# Patient Record
Sex: FEMALE | Race: WHITE | NOT HISPANIC OR LATINO | Employment: UNEMPLOYED | URBAN - NONMETROPOLITAN AREA
[De-identification: names, ages, dates, MRNs, and addresses within clinical notes are randomized per-mention and may not be internally consistent; named-entity substitution may affect disease eponyms.]

---

## 2017-01-05 RX ORDER — ROSUVASTATIN CALCIUM 40 MG/1
TABLET, FILM COATED ORAL
Qty: 30 TABLET | Refills: 5 | Status: SHIPPED | OUTPATIENT
Start: 2017-01-05 | End: 2017-01-30 | Stop reason: SDUPTHER

## 2017-01-30 ENCOUNTER — OFFICE VISIT (OUTPATIENT)
Dept: FAMILY MEDICINE CLINIC | Facility: CLINIC | Age: 77
End: 2017-01-30

## 2017-01-30 VITALS
TEMPERATURE: 98.7 F | RESPIRATION RATE: 12 BRPM | SYSTOLIC BLOOD PRESSURE: 125 MMHG | DIASTOLIC BLOOD PRESSURE: 75 MMHG | OXYGEN SATURATION: 98 % | BODY MASS INDEX: 30.76 KG/M2 | HEIGHT: 67 IN | HEART RATE: 98 BPM | WEIGHT: 196 LBS

## 2017-01-30 DIAGNOSIS — H81.09 MENIERE'S DISEASE, UNSPECIFIED LATERALITY: ICD-10-CM

## 2017-01-30 DIAGNOSIS — D50.8 OTHER IRON DEFICIENCY ANEMIA: ICD-10-CM

## 2017-01-30 DIAGNOSIS — E55.9 VITAMIN D DEFICIENCY DISEASE: ICD-10-CM

## 2017-01-30 DIAGNOSIS — R74.8 ELEVATED LIVER ENZYMES: ICD-10-CM

## 2017-01-30 DIAGNOSIS — J30.9 CHRONIC ALLERGIC RHINITIS: ICD-10-CM

## 2017-01-30 DIAGNOSIS — E11.9 TYPE 2 DIABETES MELLITUS WITHOUT COMPLICATION, WITH LONG-TERM CURRENT USE OF INSULIN (HCC): ICD-10-CM

## 2017-01-30 DIAGNOSIS — J45.20 EXTRINSIC ASTHMA, MILD INTERMITTENT, UNCOMPLICATED: ICD-10-CM

## 2017-01-30 DIAGNOSIS — K21.9 GASTROESOPHAGEAL REFLUX DISEASE WITHOUT ESOPHAGITIS: ICD-10-CM

## 2017-01-30 DIAGNOSIS — Z79.4 TYPE 2 DIABETES MELLITUS WITHOUT COMPLICATION, WITH LONG-TERM CURRENT USE OF INSULIN (HCC): ICD-10-CM

## 2017-01-30 DIAGNOSIS — Z00.00 HEALTHCARE MAINTENANCE: ICD-10-CM

## 2017-01-30 DIAGNOSIS — M15.9 GENERALIZED OSTEOARTHRITIS: ICD-10-CM

## 2017-01-30 DIAGNOSIS — Z23 ENCOUNTER FOR IMMUNIZATION: ICD-10-CM

## 2017-01-30 DIAGNOSIS — E78.5 DYSLIPIDEMIA: ICD-10-CM

## 2017-01-30 DIAGNOSIS — I10 ESSENTIAL HYPERTENSION: Primary | ICD-10-CM

## 2017-01-30 PROCEDURE — 90471 IMMUNIZATION ADMIN: CPT | Performed by: GENERAL PRACTICE

## 2017-01-30 PROCEDURE — 99214 OFFICE O/P EST MOD 30 MIN: CPT | Performed by: GENERAL PRACTICE

## 2017-01-30 PROCEDURE — 90670 PCV13 VACCINE IM: CPT | Performed by: GENERAL PRACTICE

## 2017-01-30 RX ORDER — LOSARTAN POTASSIUM 100 MG/1
100 TABLET ORAL DAILY
Qty: 30 TABLET | Refills: 5 | Status: SHIPPED | OUTPATIENT
Start: 2017-01-30 | End: 2017-10-18 | Stop reason: SDUPTHER

## 2017-01-30 RX ORDER — FERROUS SULFATE 325(65) MG
325 TABLET ORAL
Qty: 30 TABLET | Refills: 5 | Status: SHIPPED | OUTPATIENT
Start: 2017-01-30 | End: 2017-01-30

## 2017-01-30 RX ORDER — PANTOPRAZOLE SODIUM 40 MG/1
40 TABLET, DELAYED RELEASE ORAL DAILY
Qty: 30 TABLET | Refills: 5 | Status: SHIPPED | OUTPATIENT
Start: 2017-01-30 | End: 2017-04-28 | Stop reason: SDUPTHER

## 2017-01-30 RX ORDER — AMLODIPINE BESYLATE 5 MG/1
5 TABLET ORAL DAILY
Qty: 30 TABLET | Refills: 5 | Status: SHIPPED | OUTPATIENT
Start: 2017-01-30 | End: 2017-07-31 | Stop reason: SDUPTHER

## 2017-01-30 RX ORDER — ALBUTEROL SULFATE 90 UG/1
2 AEROSOL, METERED RESPIRATORY (INHALATION) EVERY 4 HOURS PRN
Qty: 8.5 G | Refills: 5 | Status: SHIPPED | OUTPATIENT
Start: 2017-01-30 | End: 2017-12-05 | Stop reason: SDUPTHER

## 2017-01-30 RX ORDER — ROSUVASTATIN CALCIUM 40 MG/1
40 TABLET, FILM COATED ORAL DAILY
Qty: 30 TABLET | Refills: 5 | Status: SHIPPED | OUTPATIENT
Start: 2017-01-30 | End: 2017-03-01 | Stop reason: CLARIF

## 2017-01-30 RX ORDER — INSULIN GLARGINE 100 [IU]/ML
30 INJECTION, SOLUTION SUBCUTANEOUS DAILY
Qty: 10 ML | Refills: 5 | Status: SHIPPED | OUTPATIENT
Start: 2017-01-30 | End: 2017-05-03 | Stop reason: SDUPTHER

## 2017-01-30 RX ORDER — NAPROXEN 375 MG/1
375 TABLET ORAL 2 TIMES DAILY WITH MEALS
Qty: 60 TABLET | Refills: 5 | Status: SHIPPED | OUTPATIENT
Start: 2017-01-30 | End: 2017-12-07 | Stop reason: SDUPTHER

## 2017-01-30 RX ORDER — MECLIZINE HYDROCHLORIDE 25 MG/1
25 TABLET ORAL EVERY 6 HOURS PRN
Qty: 120 TABLET | Refills: 5 | Status: SHIPPED | OUTPATIENT
Start: 2017-01-30 | End: 2017-07-31 | Stop reason: SDUPTHER

## 2017-01-30 NOTE — PROGRESS NOTES
Subjective   Sean Chen is a 76 y.o. female.     History of Present Illness     Diabetes  Current symptoms include none. Patient denies paresthesia of the feet, visual disturbances, polydipsia, polyuria, hypoglycemia and foot ulcerations. Evaluation to date has been: fasting blood sugar and hemoglobin A1C. Home sugars: have steadily improved over last several months. Current treatments: metformin and basal insulin - lantus - 30 units daily. Last dilated eye exam more than one year ago. Most recent hemoglobin A1c   Lab Results   Component Value Date    HGBA1C 8.80 (H) 10/14/2016    HGBA1C 8.5 (H) 11/16/2015    HGBA1C 9.0 (H) 01/19/2015    HGBA1C 8.8 (H) 06/20/2014     Dyslipidemia  Compliance with treatment has been fair. The patient exercises intermittently. She is currently being prescribed the following medication for her dyslipidemia - rosuvastatin, omega 3 fatty acids. Patient denies side effects associated with her medications. Most recent lipids include  Lab Results   Component Value Date    TRIG 143 10/14/2016    TRIG 91 11/16/2015    TRIG 66 01/19/2015    HDL 60 10/14/2016    HDL 60 11/16/2015    HDL 58 (L) 01/19/2015    LDLCALC 31 10/14/2016    LDL 32 11/16/2015    LDL 25 01/19/2015     Hypertension  Home blood pressure readings: not doing. Associated signs and symptoms: shortness of breath with activity and intermittent lower extremity edema worse at night. Patient denies: chest pain, palpitations, orthopnea and paroxysmal nocturnal dyspnea. Current antihypertensive medications includes benazepril  and amlodipine. Medication compliance: taking as prescribed. Most recent creatinine   Lab Results   Component Value Date    CREATININE 0.82 10/14/2016    CREATININE 0.70 11/16/2015     Asthma  Patient's symptoms have included dyspnea and non-productive cough. She denies any chest pain and hemoptysis. The patient has been suffering from these symptoms for a number of years. Symptoms have been controlled  since last here. Medications used in the past to treat these symptoms include beta agonist inhalers and combination beta agonists/steroid inhalers. Suspected precipitants include upper respiratory infection and allergens. Patient is awoken from sleep approximately none times per night. Patient has not required emergency room treatment for these symptoms    Labs   Most recent vitamin D 40    The following portions of the patient's history were reviewed and updated as appropriate: allergies, current medications, past medical history, past social history and problem list.    Review of Systems   Constitutional: Positive for fatigue. Negative for appetite change, chills, fever and unexpected weight change.   HENT: Positive for hearing loss, rhinorrhea, sneezing and tinnitus. Negative for congestion, ear pain, sore throat and voice change.    Eyes: Negative for visual disturbance.   Respiratory: Positive for shortness of breath. Negative for cough and wheezing.    Cardiovascular: Positive for leg swelling. Negative for chest pain and palpitations.   Gastrointestinal: Negative for abdominal pain, blood in stool, constipation, diarrhea, nausea and vomiting.   Endocrine: Negative for polydipsia.   Genitourinary: Negative for difficulty urinating, dysuria, frequency, hematuria, menstrual problem, pelvic pain, urgency, vaginal bleeding and vaginal discharge.   Musculoskeletal: Positive for arthralgias and back pain. Negative for joint swelling, myalgias and neck pain.   Skin: Negative for color change.   Neurological: Negative for tremors, weakness, numbness and headaches.   Psychiatric/Behavioral: Negative for dysphoric mood, sleep disturbance and suicidal ideas. The patient is not nervous/anxious.      Objective   Physical Exam   Constitutional: She is oriented to person, place, and time. No distress.   Bright and in good spirits.  Gait slow and cautious. No apparent distress at rest. No pallor, jaundice, diaphoresis, or  cyanosis.     HENT:   Head: Atraumatic.   Right Ear: Tympanic membrane, external ear and ear canal normal.   Left Ear: Tympanic membrane, external ear and ear canal normal.   Nose: Nose normal.   Mouth/Throat: Oropharynx is clear and moist. Mucous membranes are not pale and not cyanotic.   Eyes: EOM are normal. Pupils are equal, round, and reactive to light. No scleral icterus.   Neck: No JVD present. Carotid bruit is not present. No tracheal deviation present. No thyromegaly present.   Cardiovascular: Normal rate, regular rhythm, S1 normal, S2 normal and intact distal pulses.  Exam reveals no gallop, no S3 and no S4.    No murmur heard.  Pulmonary/Chest: No stridor. No respiratory distress. She has no decreased breath sounds. She has wheezes (diffuse-mild).   Abdominal: Soft. Normal aorta and bowel sounds are normal. She exhibits no distension, no abdominal bruit and no mass. There is no hepatosplenomegaly. There is no tenderness. No hernia.   Musculoskeletal: She exhibits no tenderness or deformity.       Vascular Status -  Her exam exhibits no right foot edema. Her exam exhibits no left foot edema.  Lymphadenopathy:        Head (right side): No submandibular adenopathy present.        Head (left side): No submandibular adenopathy present.     She has no cervical adenopathy.   Neurological: She is alert and oriented to person, place, and time. She has normal reflexes. She displays normal reflexes. No cranial nerve deficit. She exhibits normal muscle tone. Coordination normal.   Skin: Skin is warm and dry. No rash noted. She is not diaphoretic. No cyanosis. No pallor. Nails show no clubbing.   Psychiatric: She has a normal mood and affect.     Assessment/Plan   Problems Addressed this Visit        Cardiovascular and Mediastinum    Essential hypertension - Primary  Hypertension: at goal. Evidence of target organ damage: none.  Encouraged to continue to work on diet and exercise plan.   Continue current medication     Relevant Medications    losartan (COZAAR) 100 MG tablet    amLODIPine (NORVASC) 5 MG tablet       Respiratory    Extrinsic asthma  Mild persistent asthma. The patient is not currently having an exacerbation. In general, the patient's disease is well controlled.  Discussed distinction between quick-relief and controlled medications.  Discussed monitoring symptoms and use of quick-relief medications and contacting us early in the course of exacerbations.    Relevant Medications    albuterol (PROAIR HFA) 108 (90 BASE) MCG/ACT inhaler    fluticasone-salmeterol (ADVAIR DISKUS) 100-50 MCG/DOSE DISKUS    Chronic allergic rhinitis       Digestive    Vitamin D deficiency disease    Relevant Medications    Cholecalciferol (VITAMIN D) 1000 UNITS tablet    Gastroesophageal reflux disease without esophagitis    Relevant Medications    pantoprazole (PROTONIX) 40 MG EC tablet       Endocrine    Type 2 diabetes mellitus without complication, with long-term current use of insulin  Diabetes mellitus Type II, under fair control.   Encouraged to continue to pursue ADA diet  Encouraged aerobic exercise.  Reminded to get yearly retinal exam.  Updated labs to be done at her return    Relevant Medications    metFORMIN (GLUCOPHAGE) 1000 MG tablet    insulin glargine (LANTUS) 100 UNIT/ML injection       Nervous and Auditory    Meniere's disease    Relevant Medications    meclizine (ANTIVERT) 25 MG tablet       Musculoskeletal and Integument    Generalized osteoarthritis    Relevant Medications    naproxen (NAPROSYN) 375 MG tablet       Hematopoietic and Hemostatic    Iron deficiency anemia       Other    Dyslipidemia  As above. Continue current medication.    Relevant Medications    CRESTOR 40 MG tablet    Elevated liver enzymes    Healthcare maintenance  Due for a Prevnar and Zostavax     Encounter for immunization    Relevant Orders    Pneumococcal Conjugate Vaccine 13-Valent All (Completed)

## 2017-01-30 NOTE — MR AVS SNAPSHOT
Sean Chen   1/30/2017 1:00 PM   Office Visit    Dept Phone:  [phone]   Encounter #:  [id number]    Provider:  Ganga Gallardo MD   Department:  [facility]                Your Full Care Plan              Today's Medication Changes          These changes are accurate as of: 1/30/17  2:14 PM.  If you have any questions, ask your nurse or doctor.               Medication(s)that have changed:     albuterol 108 (90 BASE) MCG/ACT inhaler   Commonly known as:  PROAIR HFA   Inhale 2 puffs Every 4 (Four) Hours As Needed for wheezing.   What changed:  See the new instructions.   Changed by:  Ganga Gallardo MD       CRESTOR 40 MG tablet   Generic drug:  rosuvastatin   Take 1 tablet by mouth Daily.   What changed:  See the new instructions.   Changed by:  Ganga Gallardo MD       fluticasone-salmeterol 100-50 MCG/DOSE DISKUS   Commonly known as:  ADVAIR DISKUS   Inhale 1 puff 2 (Two) Times a Day.   What changed:  See the new instructions.   Changed by:  Ganga Gallardo MD       insulin glargine 100 UNIT/ML injection   Commonly known as:  LANTUS   Inject 30 Units under the skin Daily.   What changed:  See the new instructions.   Changed by:  Ganga Gallardo MD       meclizine 25 MG tablet   Commonly known as:  ANTIVERT   Take 1 tablet by mouth Every 6 (Six) Hours As Needed for dizziness.   What changed:  See the new instructions.   Changed by:  Ganga Gallardo MD       metFORMIN 1000 MG tablet   Commonly known as:  GLUCOPHAGE   Take 1 tablet by mouth 2 (Two) Times a Day With Meals.   What changed:    - how much to take  - when to take this   Changed by:  Ganga Gallardo MD       naproxen 375 MG tablet   Commonly known as:  NAPROSYN   Take 1 tablet by mouth 2 (Two) Times a Day With Meals.   What changed:  See the new instructions.   Changed by:  Ganga Gallardo MD         Stop taking  "medication(s)listed here:     benazepril 20 MG tablet   Commonly known as:  LOTENSIN   Stopped by:  Ganga Gallardo MD           CARAFATE 1 G tablet   Generic drug:  sucralfate   Stopped by:  Ganga Gallardo MD           ferrous sulfate 325 (65 FE) MG tablet   Stopped by:  Ganga Gallardo MD           LOVAZA 1 G capsule   Generic drug:  omega-3 acid ethyl esters   Stopped by:  Ganga Gallardo MD                Where to Get Your Medications      These medications were sent to [place], [place] - [address] - [phone]  - [phone] [address] [address]     Phone:  [phone]     albuterol 108 (90 BASE) MCG/ACT inhaler    amLODIPine 5 MG tablet    CRESTOR 40 MG tablet    fluticasone-salmeterol 100-50 MCG/DOSE DISKUS    insulin glargine 100 UNIT/ML injection    losartan 100 MG tablet    meclizine 25 MG tablet    metFORMIN 1000 MG tablet    naproxen 375 MG tablet    pantoprazole 40 MG EC tablet    Vitamin D 1000 UNITS tablet                  Your Updated Medication List          This list is accurate as of: 1/30/17  2:14 PM.  Always use your most recent med list.                acetaminophen 500 MG tablet   Commonly known as:  TYLENOL       albuterol 108 (90 BASE) MCG/ACT inhaler   Commonly known as:  PROAIR HFA   Inhale 2 puffs Every 4 (Four) Hours As Needed for wheezing.       amLODIPine 5 MG tablet   Commonly known as:  NORVASC   Take 1 tablet by mouth Daily.       B-D INS SYR ULTRAFINE .3CC/30G 30G X 1/2\" 0.3 ML misc   Generic drug:  Insulin Syringe-Needle U-100   USE WITH INSULIN ONCE DAILY       CRESTOR 40 MG tablet   Generic drug:  rosuvastatin   Take 1 tablet by mouth Daily.       fluticasone-salmeterol 100-50 MCG/DOSE DISKUS   Commonly known as:  ADVAIR DISKUS   Inhale 1 puff 2 (Two) Times a Day.       insulin glargine 100 UNIT/ML injection   Commonly known as:  LANTUS   Inject 30 Units under the skin Daily.       losartan 100 " MG tablet   Commonly known as:  COZAAR   Take 1 tablet by mouth Daily.       meclizine 25 MG tablet   Commonly known as:  ANTIVERT   Take 1 tablet by mouth Every 6 (Six) Hours As Needed for dizziness.       metFORMIN 1000 MG tablet   Commonly known as:  GLUCOPHAGE   Take 1 tablet by mouth 2 (Two) Times a Day With Meals.       naproxen 375 MG tablet   Commonly known as:  NAPROSYN   Take 1 tablet by mouth 2 (Two) Times a Day With Meals.       pantoprazole 40 MG EC tablet   Commonly known as:  PROTONIX   Take 1 tablet by mouth Daily.       TRUEPLUS LANCETS 28G misc   1 each 3 (Three) Times a Day.       TRUETRACK TEST test strip   Generic drug:  glucose blood   TAKE AS DIRECTED. TEST 3 TIMES DAILY       Vitamin D 1000 UNITS tablet   Take 1 tablet by mouth Daily.               We Performed the Following     Pneumococcal Conjugate Vaccine 13-Valent All       You Were Diagnosed With        Codes Comments    Essential hypertension    -  Primary ICD-10-CM: I10  ICD-9-CM: 401.9     Extrinsic asthma, mild intermittent, uncomplicated     ICD-10-CM: J45.20  ICD-9-CM: 493.00     Chronic allergic rhinitis     ICD-10-CM: J30.9  ICD-9-CM: 477.9     Vitamin D deficiency disease     ICD-10-CM: E55.9  ICD-9-CM: 268.9     Gastroesophageal reflux disease without esophagitis     ICD-10-CM: K21.9  ICD-9-CM: 530.81     Type 2 diabetes mellitus without complication, with long-term current use of insulin     ICD-10-CM: E11.9, Z79.4  ICD-9-CM: 250.00, V58.67     Meniere's disease, unspecified laterality     ICD-10-CM: H81.09  ICD-9-CM: 386.00     Generalized osteoarthritis     ICD-10-CM: M15.9  ICD-9-CM: 715.00     Other iron deficiency anemia     ICD-10-CM: D50.8     Dyslipidemia     ICD-10-CM: E78.5  ICD-9-CM: 272.4     Elevated liver enzymes     ICD-10-CM: R74.8  ICD-9-CM: 790.5     Encounter for immunization     ICD-10-CM: Z23  ICD-9-CM: V03.89       Instructions     None    Patient Instructions History      Upcoming Appointments     Visit  "Type Date Time Department    OFFICE VISIT 1/30/2017  1:00 PM [facility]    OFFICE VISIT 4/28/2017  1:00 PM [facility]      MyChart Signup     Our records indicate that you have declined [facility] sign[facility]. If you would like to sign up for [facility], please email [email or web address] or call [phone] to obtain an activation code.             Other Info from Your Visit           Your Appointments     Apr 28, 2017  1:00 PM EDT   Office Visit with Ganga Gallardo MD   [facility] (--)    [address] [place] [id number] [phone]           Please arrive 10 minutes early. Bring a complete list of all medications and bring any previous records or diagnostic testing results.              Allergies     Keflex [Cephalexin]      Lodine [Etodolac]      Penicillins      Sulfa Antibiotics        Vital Signs     Pulse Temperature Height Weight Oxygen Saturation Body Mass Index    98 98.7 °F (37.1 °C) (Tympanic) 67\" (170.2 cm) 196 lb (88.9 kg) 98% 30.7 kg/m2    Smoking Status                   Never Smoker           Problems and Diagnoses Noted     Chronic allergic rhinitis    Dyslipidemia    Elevated liver enzymes    Encounter for immunization    High blood pressure    Allergic asthma    Acid reflux disease    Generalized osteoarthritis    Iron deficiency anemia    Meniere's disease    Type 2 diabetes mellitus without complication, with long-term current use of insulin    Vitamin D deficiency disease        "

## 2017-03-01 RX ORDER — ROSUVASTATIN CALCIUM 20 MG/1
40 TABLET, COATED ORAL DAILY
Qty: 60 TABLET | Refills: 4
Start: 2017-03-01 | End: 2017-03-14 | Stop reason: SDUPTHER

## 2017-03-14 ENCOUNTER — TELEPHONE (OUTPATIENT)
Dept: FAMILY MEDICINE CLINIC | Facility: CLINIC | Age: 77
End: 2017-03-14

## 2017-03-14 RX ORDER — ROSUVASTATIN CALCIUM 40 MG/1
40 TABLET, COATED ORAL DAILY
Qty: 30 TABLET | Refills: 5 | Status: SHIPPED | OUTPATIENT
Start: 2017-03-14 | End: 2017-10-30

## 2017-03-14 NOTE — TELEPHONE ENCOUNTER
----- Message from Ganga Gallardo MD sent at 3/14/2017  3:59 PM EDT -----  i dont know how she ended up on 20 mg tabs 1 twice daily  i have changed her to the 40 mg tabs 1 once daily      ----- Message -----     From: Tana Hitchcock MA     Sent: 3/14/2017   3:55 PM       To: Ganga Gallardo MD    [facility] has a QL on the Crestor. 31 per 31 days. Do you want to try something diff or do a QL PA?

## 2017-04-28 ENCOUNTER — OFFICE VISIT (OUTPATIENT)
Dept: FAMILY MEDICINE CLINIC | Facility: CLINIC | Age: 77
End: 2017-04-28

## 2017-04-28 VITALS
HEART RATE: 60 BPM | OXYGEN SATURATION: 99 % | WEIGHT: 198 LBS | SYSTOLIC BLOOD PRESSURE: 125 MMHG | TEMPERATURE: 98.6 F | HEIGHT: 67 IN | DIASTOLIC BLOOD PRESSURE: 70 MMHG | RESPIRATION RATE: 12 BRPM | BODY MASS INDEX: 31.08 KG/M2

## 2017-04-28 DIAGNOSIS — Z00.00 HEALTHCARE MAINTENANCE: ICD-10-CM

## 2017-04-28 DIAGNOSIS — M15.9 GENERALIZED OSTEOARTHRITIS: ICD-10-CM

## 2017-04-28 DIAGNOSIS — J45.20 EXTRINSIC ASTHMA, MILD INTERMITTENT, UNCOMPLICATED: ICD-10-CM

## 2017-04-28 DIAGNOSIS — Z12.31 ENCOUNTER FOR SCREENING MAMMOGRAM FOR BREAST CANCER: ICD-10-CM

## 2017-04-28 DIAGNOSIS — I10 ESSENTIAL HYPERTENSION: Primary | ICD-10-CM

## 2017-04-28 DIAGNOSIS — R74.8 ELEVATED LIVER ENZYMES: ICD-10-CM

## 2017-04-28 DIAGNOSIS — E11.9 TYPE 2 DIABETES MELLITUS WITHOUT COMPLICATION, WITH LONG-TERM CURRENT USE OF INSULIN (HCC): ICD-10-CM

## 2017-04-28 DIAGNOSIS — D50.8 OTHER IRON DEFICIENCY ANEMIA: ICD-10-CM

## 2017-04-28 DIAGNOSIS — H81.09 MENIERE'S DISEASE, UNSPECIFIED LATERALITY: ICD-10-CM

## 2017-04-28 DIAGNOSIS — J30.9 CHRONIC ALLERGIC RHINITIS: ICD-10-CM

## 2017-04-28 DIAGNOSIS — Z79.4 TYPE 2 DIABETES MELLITUS WITHOUT COMPLICATION, WITH LONG-TERM CURRENT USE OF INSULIN (HCC): ICD-10-CM

## 2017-04-28 DIAGNOSIS — K21.9 GASTROESOPHAGEAL REFLUX DISEASE WITHOUT ESOPHAGITIS: ICD-10-CM

## 2017-04-28 DIAGNOSIS — E55.9 VITAMIN D DEFICIENCY DISEASE: ICD-10-CM

## 2017-04-28 DIAGNOSIS — E78.5 DYSLIPIDEMIA: ICD-10-CM

## 2017-04-28 LAB
25(OH)D3 SERPL-MCNC: 21 NG/ML
ALBUMIN SERPL-MCNC: 4.8 G/DL (ref 3.4–4.8)
ALBUMIN UR-MCNC: 33.2 MG/L
ALBUMIN/GLOB SERPL: 1.7 G/DL (ref 1.5–2.5)
ALP SERPL-CCNC: 94 U/L (ref 35–104)
ALT SERPL W P-5'-P-CCNC: 16 U/L (ref 10–36)
ANION GAP SERPL CALCULATED.3IONS-SCNC: 6.8 MMOL/L (ref 3.6–11.2)
AST SERPL-CCNC: 17 U/L (ref 10–30)
BASOPHILS # BLD AUTO: 0.06 10*3/MM3 (ref 0–0.3)
BASOPHILS NFR BLD AUTO: 0.6 % (ref 0–2)
BILIRUB SERPL-MCNC: 0.6 MG/DL (ref 0.2–1.8)
BUN BLD-MCNC: 21 MG/DL (ref 7–21)
BUN/CREAT SERPL: 20 (ref 7–25)
CALCIUM SPEC-SCNC: 10 MG/DL (ref 7.7–10)
CHLORIDE SERPL-SCNC: 105 MMOL/L (ref 99–112)
CHOLEST SERPL-MCNC: 117 MG/DL (ref 0–200)
CO2 SERPL-SCNC: 29.2 MMOL/L (ref 24.3–31.9)
CREAT BLD-MCNC: 1.05 MG/DL (ref 0.43–1.29)
DEPRECATED RDW RBC AUTO: 38.8 FL (ref 37–54)
EOSINOPHIL # BLD AUTO: 0.44 10*3/MM3 (ref 0–0.7)
EOSINOPHIL NFR BLD AUTO: 4.3 % (ref 0–7)
ERYTHROCYTE [DISTWIDTH] IN BLOOD BY AUTOMATED COUNT: 12.6 % (ref 11.5–14.5)
GFR SERPL CREATININE-BSD FRML MDRD: 51 ML/MIN/1.73
GLOBULIN UR ELPH-MCNC: 2.9 GM/DL
GLUCOSE BLD-MCNC: 177 MG/DL (ref 70–110)
HBA1C MFR BLD: 10.2 % (ref 4.5–5.7)
HCT VFR BLD AUTO: 38.5 % (ref 37–47)
HDLC SERPL-MCNC: 51 MG/DL (ref 60–100)
HGB BLD-MCNC: 12.6 G/DL (ref 12–16)
IMM GRANULOCYTES # BLD: 0.04 10*3/MM3 (ref 0–0.03)
IMM GRANULOCYTES NFR BLD: 0.4 % (ref 0–0.5)
LDLC SERPL CALC-MCNC: 22 MG/DL (ref 0–100)
LDLC/HDLC SERPL: 0.43 {RATIO}
LYMPHOCYTES # BLD AUTO: 2.71 10*3/MM3 (ref 1–3)
LYMPHOCYTES NFR BLD AUTO: 26.4 % (ref 16–46)
MCH RBC QN AUTO: 28.3 PG (ref 27–33)
MCHC RBC AUTO-ENTMCNC: 32.7 G/DL (ref 33–37)
MCV RBC AUTO: 86.5 FL (ref 80–94)
MONOCYTES # BLD AUTO: 1.12 10*3/MM3 (ref 0.1–0.9)
MONOCYTES NFR BLD AUTO: 10.9 % (ref 0–12)
NEUTROPHILS # BLD AUTO: 5.9 10*3/MM3 (ref 1.4–6.5)
NEUTROPHILS NFR BLD AUTO: 57.4 % (ref 40–75)
OSMOLALITY SERPL CALC.SUM OF ELEC: 288.6 MOSM/KG (ref 273–305)
PLATELET # BLD AUTO: 190 10*3/MM3 (ref 130–400)
PMV BLD AUTO: 11.2 FL (ref 6–10)
POTASSIUM BLD-SCNC: 4.9 MMOL/L (ref 3.5–5.3)
PROT SERPL-MCNC: 7.7 G/DL (ref 6–8)
RBC # BLD AUTO: 4.45 10*6/MM3 (ref 4.2–5.4)
SODIUM BLD-SCNC: 141 MMOL/L (ref 135–153)
TRIGL SERPL-MCNC: 220 MG/DL (ref 0–150)
VLDLC SERPL-MCNC: 44 MG/DL
WBC NRBC COR # BLD: 10.27 10*3/MM3 (ref 4.5–12.5)

## 2017-04-28 PROCEDURE — 80053 COMPREHEN METABOLIC PANEL: CPT | Performed by: GENERAL PRACTICE

## 2017-04-28 PROCEDURE — 99214 OFFICE O/P EST MOD 30 MIN: CPT | Performed by: GENERAL PRACTICE

## 2017-04-28 PROCEDURE — 83036 HEMOGLOBIN GLYCOSYLATED A1C: CPT | Performed by: GENERAL PRACTICE

## 2017-04-28 PROCEDURE — 36415 COLL VENOUS BLD VENIPUNCTURE: CPT | Performed by: GENERAL PRACTICE

## 2017-04-28 PROCEDURE — 82043 UR ALBUMIN QUANTITATIVE: CPT | Performed by: GENERAL PRACTICE

## 2017-04-28 PROCEDURE — 82306 VITAMIN D 25 HYDROXY: CPT | Performed by: GENERAL PRACTICE

## 2017-04-28 PROCEDURE — 85025 COMPLETE CBC W/AUTO DIFF WBC: CPT | Performed by: GENERAL PRACTICE

## 2017-04-28 PROCEDURE — 80061 LIPID PANEL: CPT | Performed by: GENERAL PRACTICE

## 2017-04-28 RX ORDER — PANTOPRAZOLE SODIUM 40 MG/1
40 TABLET, DELAYED RELEASE ORAL DAILY
Qty: 30 TABLET | Refills: 5 | Status: SHIPPED | OUTPATIENT
Start: 2017-04-28 | End: 2018-04-30

## 2017-04-28 RX ORDER — GLUCOSAM/CHON-MSM1/C/MANG/BOSW 500-416.6
1 TABLET ORAL 3 TIMES DAILY
Qty: 100 EACH | Refills: 5 | Status: SHIPPED | OUTPATIENT
Start: 2017-04-28 | End: 2018-04-11 | Stop reason: SDUPTHER

## 2017-04-28 NOTE — PROGRESS NOTES
Subjective   Sean Chen is a 76 y.o. female.     History of Present Illness     Diabetes  Current symptoms include none. Patient denies paresthesia of the feet, visual disturbances, polydipsia, polyuria, hypoglycemia and foot ulcerations. Evaluation to date has been: fasting blood sugar and hemoglobin A1C. Home sugars: have steadily improved over last several months. Current treatments: metformin and basal insulin - lantus - 30 units daily. Last dilated eye exam more than one year ago.  She has had no recent labs    Dyslipidemia  Compliance with treatment has been fair. The patient exercises intermittently. She is currently being prescribed the following medication for her dyslipidemia - rosuvastatin, and omega 3 fatty acids. Patient denies side effects associated with her medications.     Hypertension  Home blood pressure readings: not doing. Associated signs and symptoms: shortness of breath with activity and intermittent lower extremity edema worse at night. Patient denies: chest pain, palpitations, orthopnea and paroxysmal nocturnal dyspnea. Current antihypertensive medications includes benazepril  and amlodipine. Medication compliance: taking as prescribed.     Asthma  Patient's symptoms have included dyspnea and non-productive cough. She denies any chest pain and hemoptysis. The patient has been suffering from these symptoms for a number of years.  Apart from an exacerbation with a recent URTI she has done well since last here.  Medications used in the past to treat these symptoms include beta agonist inhalers and combination beta agonists/steroid inhalers. Suspected precipitants include upper respiratory infection and allergens. Patient is awoken from sleep approximately none times per night. Patient has not required emergency room treatment for these symptoms    The following portions of the patient's history were reviewed and updated as appropriate: allergies, current medications, past medical history,  past social history and problem list.    Review of Systems   Constitutional: Positive for fatigue. Negative for appetite change, chills, fever and unexpected weight change.   HENT: Positive for hearing loss, rhinorrhea, sneezing and tinnitus. Negative for congestion, ear pain, sore throat and voice change.    Eyes: Negative for visual disturbance.   Respiratory: Positive for shortness of breath. Negative for cough and wheezing.    Cardiovascular: Positive for leg swelling. Negative for chest pain and palpitations.   Gastrointestinal: Negative for abdominal pain, blood in stool, constipation, diarrhea, nausea and vomiting.   Endocrine: Negative for polydipsia.   Genitourinary: Negative for difficulty urinating, dysuria, frequency, hematuria, menstrual problem, pelvic pain, urgency, vaginal bleeding and vaginal discharge.   Musculoskeletal: Positive for arthralgias and back pain. Negative for joint swelling, myalgias and neck pain.   Skin: Negative for color change.   Neurological: Negative for tremors, weakness, numbness and headaches.   Psychiatric/Behavioral: Negative for dysphoric mood, sleep disturbance and suicidal ideas. The patient is not nervous/anxious.      Objective   Physical Exam   Constitutional: She is oriented to person, place, and time. No distress.   Bright and in good spirits.  Gait slow and cautious. No apparent distress at rest. No pallor, jaundice, diaphoresis, or cyanosis.     HENT:   Head: Atraumatic.   Right Ear: Tympanic membrane, external ear and ear canal normal.   Left Ear: Tympanic membrane, external ear and ear canal normal.   Nose: Nose normal.   Mouth/Throat: Oropharynx is clear and moist. Mucous membranes are not pale and not cyanotic.   Eyes: EOM are normal. Pupils are equal, round, and reactive to light. No scleral icterus.   Neck: No JVD present. Carotid bruit is not present. No tracheal deviation present. No thyromegaly present.   Cardiovascular: Normal rate, regular rhythm, S1  normal, S2 normal and intact distal pulses.  Exam reveals no gallop, no S3 and no S4.    No murmur heard.  Pulmonary/Chest: No stridor. No respiratory distress. She has no decreased breath sounds. She has no wheezes.   Abdominal: Soft. Normal aorta and bowel sounds are normal. She exhibits no distension, no abdominal bruit and no mass. There is no hepatosplenomegaly. There is no tenderness. No hernia.   Musculoskeletal: She exhibits no tenderness or deformity.       Vascular Status -  Her exam exhibits no right foot edema. Her exam exhibits no left foot edema.  Lymphadenopathy:        Head (right side): No submandibular adenopathy present.        Head (left side): No submandibular adenopathy present.     She has no cervical adenopathy.   Neurological: She is alert and oriented to person, place, and time. She has normal reflexes. She displays normal reflexes. No cranial nerve deficit. She exhibits normal muscle tone. Coordination normal.   Skin: Skin is warm and dry. No rash noted. She is not diaphoretic. No cyanosis. No pallor. Nails show no clubbing.   Psychiatric: She has a normal mood and affect.     Assessment/Plan   Problems Addressed this Visit        Cardiovascular and Mediastinum    Essential hypertension  Hypertension: at goal. Evidence of target organ damage: none.  Encouraged to continue to work on diet and exercise plan.   Continue current medication  Reminded to continue to do home blood pressure checks and to bring log to next visit.    Updated lab work drawn     Relevant Orders    CBC & Differential (Completed)    Comprehensive Metabolic Panel (Completed)    CBC Auto Differential (Completed)    Osmolality, Calculated (Completed)       Respiratory    Extrinsic asthma  Mild persistent asthma. The patient is not currently having an exacerbation. In general, the patient's disease is well controlled.  Discussed distinction between quick-relief and controlled medications.  Discussed monitoring symptoms and  "use of quick-relief medications and contacting us early in the course of exacerbations.    Relevant Medications    fluticasone-salmeterol (ADVAIR DISKUS) 100-50 MCG/DOSE DISKUS    Chronic allergic rhinitis       Digestive    Vitamin D deficiency disease    Relevant Medications    Cholecalciferol (VITAMIN D) 1000 UNITS tablet    Other Relevant Orders    Vitamin D 25 Hydroxy (Completed)    Gastroesophageal reflux disease without esophagitis    Relevant Medications    pantoprazole (PROTONIX) 40 MG EC tablet       Endocrine    Type 2 diabetes mellitus without complication, with long-term current use of insulin  Diabetes mellitus Type II, under unknown control.   Encouraged to continue to pursue ADA diet  Encouraged aerobic exercise.    Relevant Medications    Insulin Syringe-Needle U-100 (B-D INS SYR ULTRAFINE .3CC/30G) 30G X 1/2\" 0.3 ML misc    TRUEPLUS LANCETS 28G misc    Other Relevant Orders    Hemoglobin A1c    MicroAlbumin, Urine, Random       Nervous and Auditory    Meniere's disease       Musculoskeletal and Integument    Generalized osteoarthritis  Reminded regarding symptomatic treatment.   Will continue current treatment.       Hematopoietic and Hemostatic    Iron deficiency anemia  Previous.  We'll continue to monitor    Relevant Orders    CBC & Differential (Completed)    CBC Auto Differential (Completed)       Other    Dyslipidemia  As above. Continue current medication.    Relevant Orders    Lipid Panel (Completed)    Elevated liver enzymes    Healthcare maintenance  Due for a mammogram and a Zostavax    Encounter for screening mammogram for breast cancer    Relevant Orders    Mammo Screening Digital Tomosynthesis Bilateral With CAD                 "

## 2017-05-03 DIAGNOSIS — E11.9 TYPE 2 DIABETES MELLITUS WITHOUT COMPLICATION, WITH LONG-TERM CURRENT USE OF INSULIN (HCC): ICD-10-CM

## 2017-05-03 DIAGNOSIS — Z79.4 TYPE 2 DIABETES MELLITUS WITHOUT COMPLICATION, WITH LONG-TERM CURRENT USE OF INSULIN (HCC): ICD-10-CM

## 2017-05-03 RX ORDER — ERGOCALCIFEROL 1.25 MG/1
50000 CAPSULE ORAL WEEKLY
Qty: 4 CAPSULE | Refills: 5 | Status: SHIPPED | OUTPATIENT
Start: 2017-05-03 | End: 2017-10-30 | Stop reason: SDUPTHER

## 2017-05-03 RX ORDER — INSULIN GLARGINE 100 [IU]/ML
40 INJECTION, SOLUTION SUBCUTANEOUS DAILY
Qty: 20 ML | Refills: 5 | Status: SHIPPED | OUTPATIENT
Start: 2017-05-03 | End: 2017-10-16 | Stop reason: SDUPTHER

## 2017-05-04 ENCOUNTER — TELEPHONE (OUTPATIENT)
Dept: FAMILY MEDICINE CLINIC | Facility: CLINIC | Age: 77
End: 2017-05-04

## 2017-05-31 ENCOUNTER — OFFICE VISIT (OUTPATIENT)
Dept: FAMILY MEDICINE CLINIC | Facility: CLINIC | Age: 77
End: 2017-05-31

## 2017-05-31 VITALS
BODY MASS INDEX: 31.55 KG/M2 | OXYGEN SATURATION: 97 % | TEMPERATURE: 98.5 F | SYSTOLIC BLOOD PRESSURE: 120 MMHG | WEIGHT: 201 LBS | HEIGHT: 67 IN | HEART RATE: 51 BPM | DIASTOLIC BLOOD PRESSURE: 70 MMHG

## 2017-05-31 DIAGNOSIS — Z00.00 MEDICARE ANNUAL WELLNESS VISIT, SUBSEQUENT: Primary | ICD-10-CM

## 2017-05-31 DIAGNOSIS — Z79.4 TYPE 2 DIABETES MELLITUS WITHOUT COMPLICATION, WITH LONG-TERM CURRENT USE OF INSULIN (HCC): ICD-10-CM

## 2017-05-31 DIAGNOSIS — E11.9 TYPE 2 DIABETES MELLITUS WITHOUT COMPLICATION, WITH LONG-TERM CURRENT USE OF INSULIN (HCC): ICD-10-CM

## 2017-05-31 PROCEDURE — G0439 PPPS, SUBSEQ VISIT: HCPCS | Performed by: NURSE PRACTITIONER

## 2017-06-07 ENCOUNTER — OFFICE VISIT (OUTPATIENT)
Dept: FAMILY MEDICINE CLINIC | Facility: CLINIC | Age: 77
End: 2017-06-07

## 2017-06-07 VITALS
TEMPERATURE: 98.3 F | BODY MASS INDEX: 32.02 KG/M2 | DIASTOLIC BLOOD PRESSURE: 60 MMHG | HEART RATE: 50 BPM | WEIGHT: 204 LBS | OXYGEN SATURATION: 99 % | SYSTOLIC BLOOD PRESSURE: 140 MMHG | HEIGHT: 67 IN

## 2017-06-07 DIAGNOSIS — I10 ESSENTIAL HYPERTENSION: Chronic | ICD-10-CM

## 2017-06-07 DIAGNOSIS — Z79.4 TYPE 2 DIABETES MELLITUS WITHOUT COMPLICATION, WITH LONG-TERM CURRENT USE OF INSULIN (HCC): Primary | Chronic | ICD-10-CM

## 2017-06-07 DIAGNOSIS — E11.9 TYPE 2 DIABETES MELLITUS WITHOUT COMPLICATION, WITH LONG-TERM CURRENT USE OF INSULIN (HCC): Primary | Chronic | ICD-10-CM

## 2017-06-07 DIAGNOSIS — E78.5 DYSLIPIDEMIA: Chronic | ICD-10-CM

## 2017-06-07 PROCEDURE — 99214 OFFICE O/P EST MOD 30 MIN: CPT | Performed by: NURSE PRACTITIONER

## 2017-06-07 NOTE — PROGRESS NOTES
"  HPI Comments: Sean presents to the clinic today regarding:    Diabetes   She has type 2 diabetes mellitus. Onset time: [birth year]. 1979. Her disease course has been improving. Pertinent negatives for hypoglycemia include no nervousness/anxiousness. Associated symptoms include fatigue. Pertinent negatives for diabetes include no chest pain, no foot ulcerations, no polydipsia, no polyphagia, no polyuria and no weakness. Current diabetic treatment includes insulin injections and oral agent (monotherapy).   Her most recent A1C on 4/28/2017 was 10.2. At that time, she was instructed to increase her basal insulin to 40 units. She reports when she did this that she had an episode of hypoglycemia so she has continued her previous dose of 30 units. She does bring in a blood glucose log which we will review. Sean does report one of the biggest issues is her inconsistent eating habits which she contributes to her \"stomach and bowel troubles\".     Dyslipidemia  Compliance with treatment has been fair. She exercises intermittently. She is currently being prescribed the following medication for her dyslipidemia - rosuvastatin, and omega 3 fatty acids. She denies side effects associated with her medications. Last lipid panel done on 4/28/2017 included a TC of 117 mg/dL; Triglycerides: 220 mg/dL; HDL: 51 mg/dL and LDL: 22 mg/dL.     Hypertension  Home blood pressure readings: not doing. Associated signs and symptoms: shortness of breath with activity and intermittent lower extremity edema worse at night. Patient denies: chest pain, palpitations, orthopnea and paroxysmal nocturnal dyspnea. Current antihypertensive medications includes benazepril  and amlodipine. Medication compliance: taking as prescribed.  CMP done on 4/28/2017 revealed a BUN: 21; Creatinine: 21 and EGFR: 51.      The following portions of the patient's history were reviewed and updated as appropriate: allergies, current medications, past family history, past " medical history, past social history, past surgical history and problem list.    Review of Systems   Constitutional: Positive for fatigue. Negative for activity change, appetite change, chills, fever and unexpected weight change.   HENT: Negative.    Eyes: Negative for visual disturbance.   Respiratory: Positive for cough (Intermittent). Negative for chest tightness, shortness of breath and wheezing.    Cardiovascular: Positive for leg swelling (Mostly in the evenings). Negative for chest pain and palpitations.   Gastrointestinal: Positive for diarrhea (Frequent). Negative for abdominal pain, nausea and vomiting.   Endocrine: Negative for cold intolerance, heat intolerance, polydipsia, polyphagia and polyuria.   Skin: Negative for color change and rash.   Neurological: Negative for weakness and light-headedness.   Hematological: Negative for adenopathy.   Psychiatric/Behavioral: Negative for behavioral problems, decreased concentration and self-injury. The patient is not nervous/anxious.    All other systems reviewed and are negative.    Physical Exam   Constitutional: She is oriented to person, place, and time. She appears well-developed and well-nourished. No distress.   HENT:   Head: Normocephalic.   Nose: Nose normal.   Mouth/Throat: Oropharynx is clear and moist.   Eyes: Conjunctivae are normal. Pupils are equal, round, and reactive to light. No scleral icterus.   Wears glasses   Neck: Neck supple. No JVD present.   Cardiovascular: Normal rate, regular rhythm and normal heart sounds.  Exam reveals no friction rub.    No murmur heard.  Pulmonary/Chest: Effort normal. No respiratory distress. She has no wheezes. She has no rales.   Musculoskeletal: She exhibits no edema.   Lymphadenopathy:     She has no cervical adenopathy.   Neurological: She is alert and oriented to person, place, and time.   Skin: Skin is warm and dry. No rash noted. No erythema.   Psychiatric: She has a normal mood and affect. Her behavior  is normal. Judgment and thought content normal.   Vitals reviewed.    Assessment/Plan   Diagnoses and all orders for this visit:    Type 2 diabetes mellitus without complication, with long-term current use of insulin    Dyslipidemia    Essential hypertension    Findings and recommendations discussed with Sean. Reviewed her blood glucose log which she brought with her today.Discussed options with her. At this time, she is receptive to gradually increasing her insulin until she has a consistent morning blood sugar of less than 150 mg/dL. Encouraged to always have a source of CHO with her to use for hypoglycemia. Also, Sean is having pain/tenderness with testing her blood sugars. Encouraged her to request Ultra Fine lancets. Counseled recommended technique with  She has f/u with Dr Gallardo in July. Encouraged for her to f/u with me as needed.

## 2017-06-07 NOTE — PATIENT INSTRUCTIONS
Type 2 Diabetes Mellitus, Self Care, Adult  When you have type 2 diabetes (type 2 diabetes mellitus), you must keep your blood sugar (glucose) under control. You can do this with:  · Nutrition.  · Exercise.  · Lifestyle changes.  · Medicines or insulin, if needed.  · Support from your doctors and others.  HOW DO I MANAGE MY BLOOD SUGAR?  · Check your blood sugar level every day, as often as told.  · Call your doctor if your blood sugar is above your goal numbers for 2 tests in a row.  · Have your A1c (hemoglobin A1c) level checked at least two times a year. Have it checked more often if your doctor tells you to.  Your doctor will set treatment goals for you. Generally, you should have these blood sugar levels:  · Before meals (preprandial):  mg/dL (4.4-7.2 mmol/L).  · After meals (postprandial): lower than 180 mg/dL (10 mmol/L).  · A1c level: less than 7%.  WHAT DO I NEED TO KNOW ABOUT HIGH BLOOD SUGAR?  High blood sugar is called hyperglycemia. Know the signs of high blood sugar. Signs may include:  · Feeling:    Thirsty.    Hungry.    Very tired.  · Needing to pee (urinate) more than usual.  · Blurry vision.  WHAT DO I NEED TO KNOW ABOUT LOW BLOOD SUGAR?  Low blood sugar is called hypoglycemia. This is when blood sugar is at or below 70 mg/dL (3.9 mmol/L). Symptoms may include:  · Feeling:    Hungry.    Worried or nervous (anxious).    Sweaty and clammy.    Confused.    Dizzy.    Sleepy.    Sick to your stomach (nauseous).  · Having:    A fast heartbeat (palpitations).    A headache.    A change in your vision.    Jerky movements that you cannot control (seizure).    Nightmares.    Tingling or no feeling (numbness) around the mouth, lips, or tongue.  · Having trouble with:    Talking.    Paying attention (concentrating).    Moving (coordination).    Sleeping.  · Shaking.  · Passing out (fainting).  · Getting upset easily (irritability).  Treating low blood sugar  To treat low blood sugar, eat or drink  something sugary right away. If you can think clearly and swallow safely, follow the 15:15 rule:   · Take 15 grams of a fast-acting carb (carbohydrate). Some fast-acting carbs are:    1 tube of glucose gel.    3 sugar tablets (glucose pills).    6-8 pieces of hard candy.    4 oz (120 mL) of fruit juice.    4 oz (120 mL) regular (not diet) soda.  · Check your blood sugar 15 minutes after you take the carb.  · If your blood sugar is still at or below 70 mg/dL (3.9 mmol/L), take 15 grams of a carb again.  · If your blood sugar does not go above 70 mg/dL (3.9 mmol/L) after 3 tries, get help right away.  · After your blood sugar goes back to normal, eat a meal or a snack within 1 hour.  Treating very low blood sugar  If your blood sugar is at or below 54 mg/dL (3 mmol/L), you have very low blood sugar (severe hypoglycemia). This is an emergency. Do not wait to see if the symptoms will go away. Get medical help right away. Call your local emergency services (911 in the [place] Do not drive yourself to the hospital.  If you have very low blood sugar and you cannot eat or drink, you may need a glucagon shot (injection). A family member or friend should learn how to check your blood sugar and how to give you a glucagon shot. Ask your doctor if you need to have a glucagon shot kit at home.  WHAT ELSE IS IMPORTANT TO MANAGE MY DIABETES?  Medicine  Follow these instructions about insulin and diabetes medicines:  · Take them as told by your doctor.  · Adjust them as told by your doctor.  · Do not run out of them.  Having diabetes can raise your risk for other long-term conditions. These include heart or kidney disease. Your doctor may prescribe medicines to help prevent problems from diabetes.  Food  · Make healthy food choices. These include:    Chicken, fish, egg whites, and beans.    Oats, whole wheat, bulgur, brown rice, quinoa, and millet.    Fresh fruits and vegetables.    Low-fat dairy products.    Nuts, avocado, olive  oil, and canola oil.  · Make a food plan with a specialist (dietitian).  · Follow instructions from your doctor about what you cannot eat or drink.  · Drink enough fluid to keep your pee (urine) clear or pale yellow.  · Eat healthy snacks between healthy meals.  · Keep track of carbs that you eat. Read food labels. Learn food serving sizes.  · Follow your sick day plan when you cannot eat or drink normally. Make this plan with your doctor so it is ready to use.  Activity  · Exercise at least 3 times a week.  · Do not go more than 2 days without exercising.  · Talk with your doctor before you start a new exercise. Your doctor may need to adjust your insulin, medicines, or food.  Lifestyle  · Do not use any tobacco products. These include cigarettes, chewing tobacco, and e-cigarettes.If you need help quitting, ask your doctor.  · Ask your doctor how much alcohol is safe for you.  · Learn to deal with stress. If you need help with this, ask your doctor.  Body care  · Stay up to date with your shots (immunizations).  · Have your eyes and feet checked by a doctor as often as told.  · Check your skin and feet every day. Check for cuts, bruises, redness, blisters, or sores.  · Brush your teeth and gums two times a day.  · Floss at least one time a day.  · Go to the dentist least one time every 6 months.  · Stay at a healthy weight.  General instructions  · Take over-the-counter and prescription medicines only as told by your doctor.  · Share your diabetes care plan with:    Your work or school.    People you live with.  · Check your pee (urine) for ketones:    When you are sick.    As told by your doctor.  · Carry a card or wear jewelry that says that you have diabetes.  · Ask your doctor:    Do I need to meet with a diabetes educator?    Where can I find a support group for people with diabetes?  · Keep all follow-up visits as told by your doctor. This is important.  WHERE TO FIND MORE INFORMATION:  To learn more about  diabetes, visit:  · American Diabetes Association: www.diabetes.org  · American Association of Diabetes Educators: www.diabeteseducator.org/patient-resources     This information is not intended to replace advice given to you by your health care provider. Make sure you discuss any questions you have with your health care provider.     Document Released: 04/10/2017 Document Reviewed: 01/20/2017  [employer] Interactive Patient Education ©2017 [employer]  Blood Glucose Monitoring, Adult  Monitoring your blood glucose (also known as blood sugar) helps you to manage your diabetes. It also helps you and your health care provider monitor your diabetes and determine how well your treatment plan is working.  WHY SHOULD YOU MONITOR YOUR BLOOD GLUCOSE?  · It can help you understand how food, exercise, and medicine affect your blood glucose.  · It allows you to know what your blood glucose is at any given moment. You can quickly tell if you are having low blood glucose (hypoglycemia) or high blood glucose (hyperglycemia).  · It can help you and your health care provider know how to adjust your medicines.  · It can help you understand how to manage an illness or adjust medicine for exercise.  WHEN SHOULD YOU TEST?  Your health care provider will help you decide how often you should check your blood glucose. This may depend on the type of diabetes you have, your diabetes control, or the types of medicines you are taking. Be sure to write down all of your blood glucose readings so that this information can be reviewed with your health care provider. See below for examples of testing times that your health care provider may suggest.  Type 1 Diabetes  · Test your blood glucose at least 2 times a day.  · Also test your blood glucose:    Before every insulin injection.    Before and after exercise.    Between meals.    2 hours after a meal.    Between 2:00 a.m. and 3:00 a.m. on occasional days.  · You may need to test your blood  glucose more often:    If you use an insulin pump.    If you need multiple daily injections.    If your diabetes is not well controlled.    If you are ill.  Type 2 Diabetes  · If you are taking insulin, test at least 2 times per day. However, it is best to test before every insulin injection.  · If you take medicines by mouth (orally), test 2 times a day.  · If you are on a controlled diet, test once a day.  · If your diabetes is not well controlled or if you are sick, you may need to monitor more often.  HOW TO MONITOR YOUR BLOOD GLUCOSE  Supplies Needed  · Blood glucose meter.  · Test strips for your meter. Each meter has its own strips. You must use the strips that go with your own meter.  · A pricking needle (lancet).  · A device that holds the lancet (lancing device).  · A journal or log book to write down your results.  Procedure  · Wash your hands with soap and water. Alcohol is not preferred.  · Prick the side of your finger (not the tip) with the lancet.  · Gently milk the finger until a small drop of blood appears.  · Follow the instructions that come with your meter for inserting the test strip, applying blood to the strip, and using your blood glucose meter.  Other Areas to Get Blood for Testing  Some meters allow you to use other areas of your body (other than your finger) to test your blood. These areas are called alternative sites. The most common alternative sites are:  · The forearm.  · The thigh.  · The back area of the lower leg.  · The palm of the hand.  The blood flow in these areas is slower. Therefore, the blood glucose values you get may be delayed, and the numbers are different from what you would get from your fingers. Do not use alternative sites if you think you are having hypoglycemia. Your reading will not be accurate. Always use a finger if you are having hypoglycemia. Also, if you cannot feel your lows (hypoglycemia unawareness), always use your fingers for your blood glucose  "checks.  ADDITIONAL TIPS FOR GLUCOSE MONITORING  · Do not reuse lancets.  · Always carry your supplies with you.  · All blood glucose meters have a 24-hour \"hotline\" number to call if you have questions or need help.  · Adjust (calibrate) your blood glucose meter with a control solution after finishing a few boxes of strips.  BLOOD GLUCOSE RECORD KEEPING  It is a good idea to keep a daily record or log of your blood glucose readings. Most glucose meters, if not all, keep your glucose records stored in the meter. Some meters come with the ability to download your records to your home computer. Keeping a record of your blood glucose readings is especially helpful if you are wanting to look for patterns. Make notes to go along with the blood glucose readings because you might forget what happened at that exact time. Keeping good records helps you and your health care provider to work together to achieve good diabetes management.    ASK FOR ULTRA FINE LANCETS  This information is not intended to replace advice given to you by your health care provider. Make sure you discuss any questions you have with your health care provider.     Document Released: 12/20/2004 Document Revised: 04/10/2017 Document Reviewed: 05/12/2014  [employer] Interactive Patient Education ©2017 [employer]    "

## 2017-07-31 ENCOUNTER — OFFICE VISIT (OUTPATIENT)
Dept: FAMILY MEDICINE CLINIC | Facility: CLINIC | Age: 77
End: 2017-07-31

## 2017-07-31 VITALS
SYSTOLIC BLOOD PRESSURE: 125 MMHG | BODY MASS INDEX: 31.39 KG/M2 | WEIGHT: 200 LBS | OXYGEN SATURATION: 97 % | DIASTOLIC BLOOD PRESSURE: 70 MMHG | HEIGHT: 67 IN | HEART RATE: 60 BPM | TEMPERATURE: 98.3 F | RESPIRATION RATE: 12 BRPM

## 2017-07-31 DIAGNOSIS — M15.9 GENERALIZED OSTEOARTHRITIS: ICD-10-CM

## 2017-07-31 DIAGNOSIS — E78.5 DYSLIPIDEMIA: ICD-10-CM

## 2017-07-31 DIAGNOSIS — I10 ESSENTIAL HYPERTENSION: ICD-10-CM

## 2017-07-31 DIAGNOSIS — Z00.00 HEALTHCARE MAINTENANCE: ICD-10-CM

## 2017-07-31 DIAGNOSIS — E55.9 VITAMIN D DEFICIENCY DISEASE: ICD-10-CM

## 2017-07-31 DIAGNOSIS — R74.8 ELEVATED LIVER ENZYMES: ICD-10-CM

## 2017-07-31 DIAGNOSIS — Z79.4 TYPE 2 DIABETES MELLITUS WITHOUT COMPLICATION, WITH LONG-TERM CURRENT USE OF INSULIN (HCC): ICD-10-CM

## 2017-07-31 DIAGNOSIS — E11.9 TYPE 2 DIABETES MELLITUS WITHOUT COMPLICATION, WITH LONG-TERM CURRENT USE OF INSULIN (HCC): ICD-10-CM

## 2017-07-31 DIAGNOSIS — K21.9 GASTROESOPHAGEAL REFLUX DISEASE WITHOUT ESOPHAGITIS: ICD-10-CM

## 2017-07-31 DIAGNOSIS — J45.20 EXTRINSIC ASTHMA, MILD INTERMITTENT, UNCOMPLICATED: ICD-10-CM

## 2017-07-31 DIAGNOSIS — H81.09 MENIERE'S DISEASE, UNSPECIFIED LATERALITY: ICD-10-CM

## 2017-07-31 DIAGNOSIS — J30.9 CHRONIC ALLERGIC RHINITIS: ICD-10-CM

## 2017-07-31 DIAGNOSIS — I10 ESSENTIAL HYPERTENSION: Primary | ICD-10-CM

## 2017-07-31 LAB
25(OH)D3 SERPL-MCNC: 34 NG/ML
HBA1C MFR BLD: 8.1 % (ref 4.5–5.7)

## 2017-07-31 PROCEDURE — 36415 COLL VENOUS BLD VENIPUNCTURE: CPT | Performed by: GENERAL PRACTICE

## 2017-07-31 PROCEDURE — 82306 VITAMIN D 25 HYDROXY: CPT | Performed by: GENERAL PRACTICE

## 2017-07-31 PROCEDURE — 99214 OFFICE O/P EST MOD 30 MIN: CPT | Performed by: GENERAL PRACTICE

## 2017-07-31 PROCEDURE — 83036 HEMOGLOBIN GLYCOSYLATED A1C: CPT | Performed by: GENERAL PRACTICE

## 2017-07-31 RX ORDER — AMLODIPINE BESYLATE 5 MG/1
TABLET ORAL
Qty: 30 TABLET | Refills: 5 | Status: SHIPPED | OUTPATIENT
Start: 2017-07-31 | End: 2018-03-07 | Stop reason: SDUPTHER

## 2017-07-31 RX ORDER — MONTELUKAST SODIUM 10 MG/1
10 TABLET ORAL DAILY
Qty: 30 TABLET | Refills: 5 | Status: SHIPPED | OUTPATIENT
Start: 2017-07-31 | End: 2018-02-05 | Stop reason: SDUPTHER

## 2017-07-31 RX ORDER — MECLIZINE HYDROCHLORIDE 25 MG/1
TABLET ORAL
Qty: 120 TABLET | Refills: 5 | Status: SHIPPED | OUTPATIENT
Start: 2017-07-31 | End: 2017-10-30

## 2017-07-31 NOTE — PROGRESS NOTES
Subjective   Sean Chen is a 77 y.o. female.     History of Present Illness     GERD  Patient complains of heartburn. Symptoms have been present for a number of years . Symptoms include nausea. The patient denies abdominal bloating, bilious reflux, dysphagia and vomiting or hematemesis. Symptoms appear to be worsened by large meals and lying down. Risk factors present for GERD include caffeine use, obesity and NSAID use. Risk factors absent for GERD are tobacco abuse, alcohol use and ASA use. Studies performed so far include upper endoscopy, result: negative. Treatments tried so far include proton pump inhibitor: pantoprazole. Results of treatment: some improvement in symptom severity, some improvement in symptom frequency.     Diabetes  Current symptoms include none. Patient denies paresthesia of the feet, visual disturbances, polydipsia, polyuria, hypoglycemia and foot ulcerations. Evaluation to date has been: hemoglobin A1C. Home sugars: have apparently been better since last here. Current treatments: metformin and basal insulin - lantus 46 units daily. Last dilated eye exam more than one year ago. Most recent hemoglobin A1c   Lab Results   Component Value Date    HGBA1C 8.10 (H) 07/31/2017    HGBA1C 10.20 (H) 04/28/2017    HGBA1C 8.80 (H) 10/14/2016      Dyslipidemia  Compliance with treatment has been fair. The patient exercises intermittently. She is currently being prescribed the following medication for her dyslipidemia - rosuvastatin, omega 3 fatty acids. Patient denies side effects associated with her medications. Most recent lipids include  Lab Results   Component Value Date    TRIG 220 (H) 04/28/2017    TRIG 143 10/14/2016    HDL 51 (L) 04/28/2017    HDL 60 10/14/2016    LDLCALC 22 04/28/2017    LDLCALC 31 10/14/2016    LDL 32 11/16/2015    LDL 25 01/19/2015     Hypertension  Home blood pressure readings: not doing. Associated signs and symptoms: dyspnea and peripheral edema. Patient denies: chest  pain, palpitations, orthopnea and paroxysmal nocturnal dyspnea. Current antihypertensive medications includes benazepril and amlodipine. Medication compliance: taking as prescribed. Most recent creatinine   Lab Results   Component Value Date    CREATININE 1.05 04/28/2017     Allergic Rhinitis  Patient has a history of allergic rhinitis. Patient's symptoms include sneezing, clear rhinorrhea, nasal congestion, periorbital pressure and postnasal drip. These symptoms are perennial with seasonal exacerbation. The patient has been suffering from these symptoms for a number of years .     Asthma  Patient's symptoms have included dyspnea and non-productive cough. She denies any chest pain and hemoptysis. The patient has been suffering from these symptoms for a number of years.  Like her allergies, these symptoms have been worse through summer.  Medications used in the past to treat these symptoms include beta agonist inhalers and combination beta agonists/steroid inhalers.  She has been using her rescue inhaler once or twice daily on average over the last month or two. Suspected precipitants include upper respiratory infection and allergens.     Labs  Most recent vitamin D 21    The following portions of the patient's history were reviewed and updated as appropriate: allergies, current medications, past medical history, past social history and problem list.    Review of Systems   Constitutional: Positive for fatigue. Negative for appetite change, chills, fever and unexpected weight change.   HENT: Positive for congestion, hearing loss, postnasal drip, rhinorrhea, sneezing and tinnitus. Negative for ear pain, sore throat and voice change.    Eyes: Negative for visual disturbance.   Respiratory: Positive for cough and shortness of breath. Negative for wheezing.    Cardiovascular: Positive for leg swelling. Negative for chest pain and palpitations.   Gastrointestinal: Positive for nausea. Negative for abdominal pain, blood in  stool, constipation, diarrhea and vomiting.        Intermittent heartburn   Endocrine: Negative for polydipsia.   Genitourinary: Negative for difficulty urinating, dysuria, frequency, hematuria, menstrual problem, pelvic pain, urgency, vaginal bleeding and vaginal discharge.   Musculoskeletal: Positive for arthralgias and back pain. Negative for joint swelling, myalgias and neck pain.   Skin: Negative for color change.   Neurological: Negative for tremors, weakness, numbness and headaches.   Psychiatric/Behavioral: Negative for dysphoric mood, sleep disturbance and suicidal ideas. The patient is not nervous/anxious.      Objective   Physical Exam   Constitutional: She is oriented to person, place, and time. No distress.   Bright and in good spirits.  Gait slow and cautious. No apparent distress at rest. No pallor, jaundice, diaphoresis, or cyanosis.     HENT:   Head: Atraumatic.   Right Ear: Tympanic membrane, external ear and ear canal normal.   Left Ear: Tympanic membrane, external ear and ear canal normal.   Nose: Nose normal.   Mouth/Throat: Oropharynx is clear and moist. Mucous membranes are not pale and not cyanotic.   Eyes: EOM are normal. Pupils are equal, round, and reactive to light. No scleral icterus.   Neck: No JVD present. Carotid bruit is not present. No tracheal deviation present. No thyromegaly present.   Cardiovascular: Normal rate, regular rhythm, S1 normal, S2 normal and intact distal pulses.  Exam reveals no gallop, no S3 and no S4.    No murmur heard.  Pulmonary/Chest: No stridor. No respiratory distress. She has no decreased breath sounds. She has no wheezes.   Abdominal: Soft. Normal aorta and bowel sounds are normal. She exhibits no distension, no abdominal bruit and no mass. There is no hepatosplenomegaly. There is no tenderness. No hernia.   Musculoskeletal: She exhibits no tenderness or deformity.    Sean had a diabetic foot exam performed today.    Vascular Status -  Her exam exhibits  no right foot edema. Her exam exhibits no left foot edema.  Lymphadenopathy:        Head (right side): No submandibular adenopathy present.        Head (left side): No submandibular adenopathy present.     She has no cervical adenopathy.   Neurological: She is alert and oriented to person, place, and time. She has normal reflexes. She displays normal reflexes. No cranial nerve deficit. She exhibits normal muscle tone. Coordination normal.   Skin: Skin is warm and dry. No rash noted. She is not diaphoretic. No cyanosis. No pallor. Nails show no clubbing.   Psychiatric: She has a normal mood and affect.     Assessment/Plan   Problems Addressed this Visit        Cardiovascular and Mediastinum    Essential hypertension   Hypertension: at goal. Evidence of target organ damage: none.  Encouraged to continue to work on diet and exercise plan.   Continue current medication       Respiratory    Extrinsic asthma  Moderate persistent asthma. The patient is currently having an exacerbation. In general, the patient's disease is well controlled.  Discussed distinction between quick-relief and controlled medications.  Medications: we'll titrate ICS and resume montelukast   Discussed monitoring symptoms and use of quick-relief medications and contacting us early in the course of exacerbations.    Relevant Medications    montelukast (SINGULAIR) 10 MG tablet    fluticasone-salmeterol (ADVAIR DISKUS) 250-50 MCG/DOSE DISKUS    Chronic allergic rhinitis  As above.    Relevant Medications    montelukast (SINGULAIR) 10 MG tablet       Digestive    Vitamin D deficiency disease  Will continue to monitor    Relevant Orders    Vitamin D 25 Hydroxy (Completed)    Gastroesophageal reflux disease without esophagitis  Symptoms are currently worse  Advised regarding lifestyle modification including: eating smaller meals, elevation of the head of bed at night, avoidance of caffeine, chocolate, nicotine and peppermint, and avoiding tight fitting  clothing.  Will continue current treatment.       Endocrine    Type 2 diabetes mellitus without complication, with long-term current use of insulin  Diabetes mellitus Type II, under unknown control.   Encouraged to continue to pursue ADA diet  Encouraged aerobic exercise.  Reminded to get yearly retinal exam.  Updated A1c drawn    Relevant Orders    Hemoglobin A1c (Completed)       Nervous and Auditory    Meniere's disease       Musculoskeletal and Integument    Generalized osteoarthritis       Other    Dyslipidemia  As above. Continue current medication.    Elevated liver enzymes    Healthcare maintenance  Reminded to follow through with her previously scheduled mammogram  Reminded to get a flu shot when available  We'll discuss a Zostavax again at her return

## 2017-08-01 DIAGNOSIS — H81.09 MENIERE'S DISEASE, UNSPECIFIED LATERALITY: ICD-10-CM

## 2017-08-01 DIAGNOSIS — I10 ESSENTIAL HYPERTENSION: ICD-10-CM

## 2017-08-02 RX ORDER — MECLIZINE HYDROCHLORIDE 25 MG/1
25 TABLET ORAL EVERY 6 HOURS PRN
Qty: 120 TABLET | Refills: 0 | Status: SHIPPED | OUTPATIENT
Start: 2017-08-02 | End: 2018-04-10 | Stop reason: SDUPTHER

## 2017-08-02 RX ORDER — AMLODIPINE BESYLATE 5 MG/1
TABLET ORAL
Qty: 30 TABLET | Refills: 5 | Status: SHIPPED | OUTPATIENT
Start: 2017-08-02 | End: 2017-10-30

## 2017-09-08 ENCOUNTER — EPISODE CHANGES (OUTPATIENT)
Dept: CASE MANAGEMENT | Facility: OTHER | Age: 77
End: 2017-09-08

## 2017-10-04 DIAGNOSIS — E78.5 DYSLIPIDEMIA: ICD-10-CM

## 2017-10-04 RX ORDER — ROSUVASTATIN CALCIUM 20 MG/1
TABLET, COATED ORAL
Qty: 30 TABLET | Refills: 5 | Status: SHIPPED | OUTPATIENT
Start: 2017-10-04 | End: 2018-05-09 | Stop reason: SDUPTHER

## 2017-10-16 DIAGNOSIS — E11.9 TYPE 2 DIABETES MELLITUS WITHOUT COMPLICATION, WITH LONG-TERM CURRENT USE OF INSULIN (HCC): ICD-10-CM

## 2017-10-16 DIAGNOSIS — Z79.4 TYPE 2 DIABETES MELLITUS WITHOUT COMPLICATION, WITH LONG-TERM CURRENT USE OF INSULIN (HCC): ICD-10-CM

## 2017-10-17 RX ORDER — INSULIN GLARGINE 100 [IU]/ML
INJECTION, SOLUTION SUBCUTANEOUS
Qty: 10 ML | Refills: 5 | Status: SHIPPED | OUTPATIENT
Start: 2017-10-17 | End: 2017-10-30 | Stop reason: SDUPTHER

## 2017-10-18 DIAGNOSIS — I10 ESSENTIAL HYPERTENSION: ICD-10-CM

## 2017-10-22 RX ORDER — LOSARTAN POTASSIUM 100 MG/1
TABLET ORAL
Qty: 30 TABLET | Refills: 5 | Status: SHIPPED | OUTPATIENT
Start: 2017-10-22 | End: 2018-10-09 | Stop reason: SDUPTHER

## 2017-10-30 ENCOUNTER — OFFICE VISIT (OUTPATIENT)
Dept: FAMILY MEDICINE CLINIC | Facility: CLINIC | Age: 77
End: 2017-10-30

## 2017-10-30 VITALS
OXYGEN SATURATION: 98 % | WEIGHT: 203 LBS | TEMPERATURE: 98.8 F | HEART RATE: 65 BPM | HEIGHT: 67 IN | DIASTOLIC BLOOD PRESSURE: 80 MMHG | BODY MASS INDEX: 31.86 KG/M2 | SYSTOLIC BLOOD PRESSURE: 125 MMHG

## 2017-10-30 DIAGNOSIS — Z79.4 TYPE 2 DIABETES MELLITUS WITHOUT COMPLICATION, WITH LONG-TERM CURRENT USE OF INSULIN (HCC): ICD-10-CM

## 2017-10-30 DIAGNOSIS — Z23 ENCOUNTER FOR IMMUNIZATION: ICD-10-CM

## 2017-10-30 DIAGNOSIS — K21.9 GASTROESOPHAGEAL REFLUX DISEASE WITHOUT ESOPHAGITIS: ICD-10-CM

## 2017-10-30 DIAGNOSIS — Z00.00 HEALTHCARE MAINTENANCE: ICD-10-CM

## 2017-10-30 DIAGNOSIS — I10 ESSENTIAL HYPERTENSION: Primary | ICD-10-CM

## 2017-10-30 DIAGNOSIS — E55.9 VITAMIN D DEFICIENCY DISEASE: ICD-10-CM

## 2017-10-30 DIAGNOSIS — E78.5 DYSLIPIDEMIA: ICD-10-CM

## 2017-10-30 DIAGNOSIS — J45.40 MODERATE PERSISTENT EXTRINSIC ASTHMA WITHOUT COMPLICATION: ICD-10-CM

## 2017-10-30 DIAGNOSIS — M15.9 GENERALIZED OSTEOARTHRITIS: ICD-10-CM

## 2017-10-30 DIAGNOSIS — J30.2 CHRONIC SEASONAL ALLERGIC RHINITIS, UNSPECIFIED TRIGGER: ICD-10-CM

## 2017-10-30 DIAGNOSIS — E11.9 TYPE 2 DIABETES MELLITUS WITHOUT COMPLICATION, WITH LONG-TERM CURRENT USE OF INSULIN (HCC): ICD-10-CM

## 2017-10-30 PROCEDURE — 99214 OFFICE O/P EST MOD 30 MIN: CPT | Performed by: GENERAL PRACTICE

## 2017-10-30 RX ORDER — ERGOCALCIFEROL 1.25 MG/1
CAPSULE ORAL
Qty: 4 CAPSULE | Refills: 0 | Status: SHIPPED | OUTPATIENT
Start: 2017-10-30 | End: 2018-01-08 | Stop reason: SDUPTHER

## 2017-10-30 RX ORDER — VITAMIN E 268 MG
400 CAPSULE ORAL DAILY
COMMUNITY
End: 2020-07-06 | Stop reason: SDUPTHER

## 2017-10-30 RX ORDER — INSULIN GLARGINE 100 [IU]/ML
48 INJECTION, SOLUTION SUBCUTANEOUS EVERY MORNING
Qty: 10 ML | Refills: 5 | Status: SHIPPED | OUTPATIENT
Start: 2017-10-30 | End: 2018-04-30 | Stop reason: SDUPTHER

## 2017-10-30 NOTE — PROGRESS NOTES
Subjective   Sean Chen is a 77 y.o. female.     History of Present Illness     Diabetes  Current symptoms include: 1 or 2 mild episodes of hypoglycemia since last here. Patient denies paresthesia of the feet, visual disturbances, polydipsia, polyuria, and foot ulcerations. Evaluation to date has been: hemoglobin A1C. Home sugars: have generally been at goal since last here. Current treatments: metformin and basal insulin - lantus 48 units daily. Last dilated eye exam more than one year ago. Most recent hemoglobin A1c   Lab Results   Component Value Date    HGBA1C 8.10 (H) 07/31/2017    HGBA1C 10.20 (H) 04/28/2017    HGBA1C 8.80 (H) 10/14/2016      Dyslipidemia  Compliance with treatment has been fair. The patient exercises intermittently. She is currently being prescribed the following medication for her dyslipidemia - rosuvastatin, omega 3 fatty acids. Patient denies side effects associated with her medications.     Hypertension  Home blood pressure readings: not doing. Associated signs and symptoms: dyspnea and peripheral edema. Patient denies: chest pain, palpitations, orthopnea and paroxysmal nocturnal dyspnea. Current antihypertensive medications includes benazepril and amlodipine. Medication compliance: taking as prescribed.     Allergic Rhinitis  Patient has a history of allergic rhinitis. Patient's symptoms include sneezing, clear rhinorrhea, nasal congestion, periorbital pressure and postnasal drip. These symptoms are perennial with seasonal exacerbation. The patient has been suffering from these symptoms for a number of years .     Asthma  Patient's symptoms have included dyspnea and non-productive cough. She denies any chest pain and hemoptysis. The patient has been suffering from these symptoms for a number of years. The symptoms have been under fairly good control as of late.  Medications used in the past to treat these symptoms include beta agonist inhalers and combination beta agonists/steroid  inhalers.  She has been using her rescue inhaler once or twice weekly on average over the last month or two. Suspected precipitants include upper respiratory infection and allergens.     GERD  Patient has noted a significant improvement in the frequency and severity of her heartburn. Symptoms have been present for a number of years . Symptoms include nausea. The patient denies abdominal bloating, bilious reflux, dysphagia and vomiting or hematemesis. Symptoms appear to be worsened by large meals and lying down. Risk factors present for GERD include caffeine use, obesity and NSAID use. Risk factors absent for GERD are tobacco abuse, alcohol use and ASA use. Studies performed so far include upper endoscopy, result: negative. Treatments tried so far include proton pump inhibitor: pantoprazole. Results of treatment: some improvement in symptom severity, some improvement in symptom frequency.     Labs  Most recent vitamin D 34    The following portions of the patient's history were reviewed and updated as appropriate: allergies, current medications, past medical history, past social history and problem list.    Review of Systems   Constitutional: Positive for fatigue. Negative for appetite change, chills, fever and unexpected weight change.   HENT: Positive for hearing loss, sneezing and tinnitus. Negative for congestion, ear pain, postnasal drip, rhinorrhea, sore throat and voice change.    Eyes: Negative for visual disturbance.   Respiratory: Negative for cough, shortness of breath and wheezing.    Cardiovascular: Positive for leg swelling. Negative for chest pain and palpitations.   Gastrointestinal: Negative for abdominal pain, blood in stool, constipation, diarrhea, nausea and vomiting.        Occasional heartburn   Endocrine: Negative for polydipsia.   Genitourinary: Negative for difficulty urinating, dysuria, frequency, hematuria, menstrual problem, pelvic pain, urgency, vaginal bleeding and vaginal discharge.    Musculoskeletal: Positive for arthralgias and back pain. Negative for joint swelling, myalgias and neck pain.   Skin: Negative for color change.   Neurological: Negative for tremors, weakness, numbness and headaches.   Psychiatric/Behavioral: Negative for dysphoric mood, sleep disturbance and suicidal ideas. The patient is not nervous/anxious.      Objective   Physical Exam   Constitutional: She is oriented to person, place, and time. No distress.   Bright and in good spirits.  Gait slow and cautious. No apparent distress at rest. No pallor, jaundice, diaphoresis, or cyanosis.     HENT:   Head: Atraumatic.   Right Ear: Tympanic membrane, external ear and ear canal normal.   Left Ear: Tympanic membrane, external ear and ear canal normal.   Nose: Nose normal.   Mouth/Throat: Oropharynx is clear and moist. Mucous membranes are not pale and not cyanotic.   Eyes: EOM are normal. Pupils are equal, round, and reactive to light. No scleral icterus.   Neck: No JVD present. Carotid bruit is not present. No tracheal deviation present. No thyromegaly present.   Cardiovascular: Normal rate, regular rhythm, S1 normal, S2 normal and intact distal pulses.  Exam reveals no gallop, no S3 and no S4.    No murmur heard.  Pulmonary/Chest: No stridor. No respiratory distress. She has no decreased breath sounds. She has no wheezes.   Abdominal: Soft. Normal aorta and bowel sounds are normal. She exhibits no distension, no abdominal bruit and no mass. There is no hepatosplenomegaly. There is no tenderness. No hernia.   Musculoskeletal: She exhibits no tenderness or deformity.       Vascular Status -  Her exam exhibits no right foot edema. Her exam exhibits no left foot edema.  Lymphadenopathy:        Head (right side): No submandibular adenopathy present.        Head (left side): No submandibular adenopathy present.     She has no cervical adenopathy.   Neurological: She is alert and oriented to person, place, and time. She has normal  reflexes. She displays normal reflexes. No cranial nerve deficit. She exhibits normal muscle tone. Coordination normal.   Skin: Skin is warm and dry. No rash noted. She is not diaphoretic. No cyanosis. No pallor. Nails show no clubbing.   Psychiatric: She has a normal mood and affect.     Assessment/Plan   Problems Addressed this Visit        Cardiovascular and Mediastinum    Essential hypertension   Hypertension: at goal. Evidence of target organ damage: none.  Encouraged to continue to work on diet and exercise plan.   Continue current medication       Respiratory    Extrinsic asthma  Moderate persistent asthma. The patient is not currently having an exacerbation. In general, the patient's disease is well controlled.  Discussed monitoring symptoms and use of quick-relief medications and contacting us early in the course of exacerbations.    Chronic seasonal allergic rhinitis  Continue current treatment. Reminded regarding allergen avoidance.       Digestive    Vitamin D deficiency disease    Gastroesophageal reflux disease without esophagitis  Symptoms are currently well controlled.  Reminded regarding lifestyle modification.  Will continue current treatment.       Endocrine    Type 2 diabetes mellitus without complication, with long-term current use of insulin  Diabetes mellitus Type II, under fair control.   Encouraged to continue to pursue ADA diet  Encouraged aerobic exercise.  Discussed ways to avoid symptomatic hypoglycemia.  Updated labs will be drawn at her return    Relevant Medications    insulin glargine (LANTUS) 100 UNIT/ML injection       Musculoskeletal and Integument    Generalized osteoarthritis  Reminded regarding symptomatic treatment.   Will continue current treatment       Other    Dyslipidemia  As above.   Continue current medication.    Healthcare maintenance  Recommended a flu shot

## 2017-11-06 ENCOUNTER — FLU SHOT (OUTPATIENT)
Dept: FAMILY MEDICINE CLINIC | Facility: CLINIC | Age: 77
End: 2017-11-06

## 2017-11-06 PROCEDURE — G0008 ADMIN INFLUENZA VIRUS VAC: HCPCS | Performed by: GENERAL PRACTICE

## 2017-11-06 PROCEDURE — 90686 IIV4 VACC NO PRSV 0.5 ML IM: CPT | Performed by: GENERAL PRACTICE

## 2017-12-05 DIAGNOSIS — J45.20 EXTRINSIC ASTHMA, MILD INTERMITTENT, UNCOMPLICATED: ICD-10-CM

## 2017-12-05 DIAGNOSIS — Z79.4 TYPE 2 DIABETES MELLITUS WITHOUT COMPLICATION, WITH LONG-TERM CURRENT USE OF INSULIN (HCC): ICD-10-CM

## 2017-12-05 DIAGNOSIS — E11.9 TYPE 2 DIABETES MELLITUS WITHOUT COMPLICATION, WITH LONG-TERM CURRENT USE OF INSULIN (HCC): ICD-10-CM

## 2017-12-06 RX ORDER — DIAPER,BRIEF,ADULT, DISPOSABLE
EACH MISCELLANEOUS
Qty: 100 EACH | Refills: 5 | Status: SHIPPED | OUTPATIENT
Start: 2017-12-06 | End: 2019-01-09 | Stop reason: SDUPTHER

## 2017-12-07 DIAGNOSIS — M15.9 GENERALIZED OSTEOARTHRITIS: ICD-10-CM

## 2017-12-07 RX ORDER — NAPROXEN 375 MG/1
TABLET ORAL
Qty: 60 TABLET | Refills: 5 | Status: SHIPPED | OUTPATIENT
Start: 2017-12-07 | End: 2018-06-08 | Stop reason: SDUPTHER

## 2018-01-09 RX ORDER — ERGOCALCIFEROL 1.25 MG/1
CAPSULE ORAL
Qty: 4 CAPSULE | Refills: 0 | Status: SHIPPED | OUTPATIENT
Start: 2018-01-09 | End: 2018-02-09 | Stop reason: SDUPTHER

## 2018-01-30 ENCOUNTER — OFFICE VISIT (OUTPATIENT)
Dept: FAMILY MEDICINE CLINIC | Facility: CLINIC | Age: 78
End: 2018-01-30

## 2018-01-30 DIAGNOSIS — M15.9 GENERALIZED OSTEOARTHRITIS: ICD-10-CM

## 2018-01-30 DIAGNOSIS — K21.9 GASTROESOPHAGEAL REFLUX DISEASE WITHOUT ESOPHAGITIS: ICD-10-CM

## 2018-01-30 DIAGNOSIS — E11.9 TYPE 2 DIABETES MELLITUS WITHOUT COMPLICATION, WITH LONG-TERM CURRENT USE OF INSULIN (HCC): ICD-10-CM

## 2018-01-30 DIAGNOSIS — D50.8 OTHER IRON DEFICIENCY ANEMIA: ICD-10-CM

## 2018-01-30 DIAGNOSIS — I10 ESSENTIAL HYPERTENSION: Primary | ICD-10-CM

## 2018-01-30 DIAGNOSIS — Z00.00 HEALTHCARE MAINTENANCE: ICD-10-CM

## 2018-01-30 DIAGNOSIS — E78.5 DYSLIPIDEMIA: ICD-10-CM

## 2018-01-30 DIAGNOSIS — J45.40 MODERATE PERSISTENT EXTRINSIC ASTHMA WITHOUT COMPLICATION: ICD-10-CM

## 2018-01-30 DIAGNOSIS — Z79.4 TYPE 2 DIABETES MELLITUS WITHOUT COMPLICATION, WITH LONG-TERM CURRENT USE OF INSULIN (HCC): ICD-10-CM

## 2018-01-30 DIAGNOSIS — J30.2 CHRONIC SEASONAL ALLERGIC RHINITIS, UNSPECIFIED TRIGGER: ICD-10-CM

## 2018-01-30 DIAGNOSIS — H81.09 MENIERE'S DISEASE, UNSPECIFIED LATERALITY: ICD-10-CM

## 2018-01-30 DIAGNOSIS — E55.9 VITAMIN D DEFICIENCY DISEASE: ICD-10-CM

## 2018-01-30 LAB
25(OH)D3 SERPL-MCNC: 38 NG/ML
ALBUMIN SERPL-MCNC: 5 G/DL (ref 3.4–4.8)
ALBUMIN/GLOB SERPL: 1.9 G/DL (ref 1.5–2.5)
ALP SERPL-CCNC: 91 U/L (ref 35–104)
ALT SERPL W P-5'-P-CCNC: 32 U/L (ref 10–36)
ANION GAP SERPL CALCULATED.3IONS-SCNC: 4 MMOL/L (ref 3.6–11.2)
AST SERPL-CCNC: 29 U/L (ref 10–30)
BASOPHILS # BLD AUTO: 0.05 10*3/MM3 (ref 0–0.3)
BASOPHILS NFR BLD AUTO: 0.4 % (ref 0–2)
BILIRUB SERPL-MCNC: 0.3 MG/DL (ref 0.2–1.8)
BUN BLD-MCNC: 21 MG/DL (ref 7–21)
BUN/CREAT SERPL: 18.6 (ref 7–25)
CALCIUM SPEC-SCNC: 10.1 MG/DL (ref 7.7–10)
CHLORIDE SERPL-SCNC: 107 MMOL/L (ref 99–112)
CHOLEST SERPL-MCNC: 139 MG/DL (ref 0–200)
CO2 SERPL-SCNC: 27 MMOL/L (ref 24.3–31.9)
CREAT BLD-MCNC: 1.13 MG/DL (ref 0.43–1.29)
DEPRECATED RDW RBC AUTO: 38.9 FL (ref 37–54)
EOSINOPHIL # BLD AUTO: 0.47 10*3/MM3 (ref 0–0.7)
EOSINOPHIL NFR BLD AUTO: 3.8 % (ref 0–7)
ERYTHROCYTE [DISTWIDTH] IN BLOOD BY AUTOMATED COUNT: 13.2 % (ref 11.5–14.5)
FERRITIN SERPL-MCNC: 34 NG/ML (ref 10–290.3)
GFR SERPL CREATININE-BSD FRML MDRD: 47 ML/MIN/1.73
GLOBULIN UR ELPH-MCNC: 2.6 GM/DL
GLUCOSE BLD-MCNC: 151 MG/DL (ref 70–110)
HBA1C MFR BLD: 9.9 % (ref 4.5–5.7)
HCT VFR BLD AUTO: 38.2 % (ref 37–47)
HDLC SERPL-MCNC: 54 MG/DL (ref 60–100)
HGB BLD-MCNC: 13 G/DL (ref 12–16)
IMM GRANULOCYTES # BLD: 0.05 10*3/MM3 (ref 0–0.03)
IMM GRANULOCYTES NFR BLD: 0.4 % (ref 0–0.5)
IRON 24H UR-MRATE: 39 MCG/DL (ref 49–151)
LDLC SERPL CALC-MCNC: 34 MG/DL (ref 0–100)
LDLC/HDLC SERPL: 0.63 {RATIO}
LYMPHOCYTES # BLD AUTO: 4.71 10*3/MM3 (ref 1–3)
LYMPHOCYTES NFR BLD AUTO: 38 % (ref 16–46)
MCH RBC QN AUTO: 28.3 PG (ref 27–33)
MCHC RBC AUTO-ENTMCNC: 34 G/DL (ref 33–37)
MCV RBC AUTO: 83.2 FL (ref 80–94)
MONOCYTES # BLD AUTO: 0.95 10*3/MM3 (ref 0.1–0.9)
MONOCYTES NFR BLD AUTO: 7.7 % (ref 0–12)
NEUTROPHILS # BLD AUTO: 6.15 10*3/MM3 (ref 1.4–6.5)
NEUTROPHILS NFR BLD AUTO: 49.7 % (ref 40–75)
OSMOLALITY SERPL CALC.SUM OF ELEC: 281.6 MOSM/KG (ref 273–305)
PLATELET # BLD AUTO: 220 10*3/MM3 (ref 130–400)
PMV BLD AUTO: 10.2 FL (ref 6–10)
POTASSIUM BLD-SCNC: 4.6 MMOL/L (ref 3.5–5.3)
PROT SERPL-MCNC: 7.6 G/DL (ref 6–8)
RBC # BLD AUTO: 4.59 10*6/MM3 (ref 4.2–5.4)
SODIUM BLD-SCNC: 138 MMOL/L (ref 135–153)
TRIGL SERPL-MCNC: 256 MG/DL (ref 0–150)
TSH SERPL DL<=0.05 MIU/L-ACNC: 1.77 MIU/ML (ref 0.55–4.78)
VLDLC SERPL-MCNC: 51.2 MG/DL
WBC NRBC COR # BLD: 12.38 10*3/MM3 (ref 4.5–12.5)

## 2018-01-30 PROCEDURE — 82306 VITAMIN D 25 HYDROXY: CPT | Performed by: GENERAL PRACTICE

## 2018-01-30 PROCEDURE — 84466 ASSAY OF TRANSFERRIN: CPT | Performed by: GENERAL PRACTICE

## 2018-01-30 PROCEDURE — 83540 ASSAY OF IRON: CPT | Performed by: GENERAL PRACTICE

## 2018-01-30 PROCEDURE — 36415 COLL VENOUS BLD VENIPUNCTURE: CPT | Performed by: GENERAL PRACTICE

## 2018-01-30 PROCEDURE — 80053 COMPREHEN METABOLIC PANEL: CPT | Performed by: GENERAL PRACTICE

## 2018-01-30 PROCEDURE — 99214 OFFICE O/P EST MOD 30 MIN: CPT | Performed by: GENERAL PRACTICE

## 2018-01-30 PROCEDURE — 82728 ASSAY OF FERRITIN: CPT | Performed by: GENERAL PRACTICE

## 2018-01-30 PROCEDURE — 85025 COMPLETE CBC W/AUTO DIFF WBC: CPT | Performed by: GENERAL PRACTICE

## 2018-01-30 PROCEDURE — 80061 LIPID PANEL: CPT | Performed by: GENERAL PRACTICE

## 2018-01-30 PROCEDURE — 83036 HEMOGLOBIN GLYCOSYLATED A1C: CPT | Performed by: GENERAL PRACTICE

## 2018-01-30 PROCEDURE — 84443 ASSAY THYROID STIM HORMONE: CPT | Performed by: GENERAL PRACTICE

## 2018-01-30 NOTE — PROGRESS NOTES
Subjective   Sean Chen is a 77 y.o. female.     History of Present Illness     Diabetes  Current symptoms include: mild fatigue. Patient denies paresthesia of the feet, visual disturbances, polydipsia, polyuria, and foot ulcerations. Evaluation to date has been: hemoglobin A1C. Home sugars: have generally been at or close to goal since last here. Current treatments: metformin and basal insulin - lantus 48 units daily. Last dilated eye exam more than one year ago. She has not had any recent labs     Dyslipidemia  Compliance with treatment has been fair. The patient exercises intermittently. She is currently being prescribed the following medication for her dyslipidemia - rosuvastatin, omega 3 fatty acids. Patient denies side effects associated with her medications.     Hypertension  Home blood pressure readings: not doing. Associated signs and symptoms: dyspnea and peripheral edema. Patient denies: chest pain, palpitations, orthopnea and paroxysmal nocturnal dyspnea. Current antihypertensive medications includes losartanl and amlodipine. Medication compliance: taking as prescribed.     Allergic Rhinitis  Patient has a history of allergic rhinitis. Patient's symptoms include sneezing, clear rhinorrhea, nasal congestion, periorbital pressure and postnasal drip. These symptoms are perennial with seasonal exacerbation. The patient has been suffering from these symptoms for a number of years .     Asthma  Patient's symptoms have included dyspnea and non-productive cough. She denies any chest pain and hemoptysis. The patient has been suffering from these symptoms for a number of years. The symptoms have been under fairly good control as of late.  Medications used in the past to treat these symptoms include beta agonist inhalers and combination beta agonists/steroid inhalers.  She has been using her rescue inhaler once or twice monthly on average since last here. Suspected precipitants include upper respiratory  infection and allergens.     GERD  Patient has noted a significant improvement in the frequency and severity of her heartburn. Symptoms have been present for a number of years . Symptoms include occasional nausea. The patient denies abdominal bloating, bilious reflux, dysphagia and vomiting or hematemesis. Symptoms appear to be worsened by large meals and lying down. Risk factors present for GERD include caffeine use, obesity and NSAID use. Risk factors absent for GERD are tobacco abuse, alcohol use and ASA use. Studies performed so far include upper endoscopy, result: negative. Treatments tried so far include proton pump inhibitor: pantoprazole. Results of treatment: some improvement in symptom severity, some improvement in symptom frequency.     The following portions of the patient's history were reviewed and updated as appropriate: allergies, current medications, past medical history, past social history and problem list.    Review of Systems   Constitutional: Positive for fatigue. Negative for appetite change, chills, fever and unexpected weight change.   HENT: Positive for hearing loss, sneezing and tinnitus. Negative for congestion, ear pain, postnasal drip, rhinorrhea, sore throat and voice change.    Eyes: Negative for visual disturbance.   Respiratory: Positive for cough and shortness of breath (both intermittent). Negative for wheezing.    Cardiovascular: Positive for leg swelling. Negative for chest pain and palpitations.   Gastrointestinal: Negative for abdominal pain, blood in stool, constipation, diarrhea, nausea and vomiting.        Occasional heartburn   Endocrine: Negative for polydipsia.   Genitourinary: Negative for difficulty urinating, dysuria, frequency, hematuria, menstrual problem, pelvic pain, urgency, vaginal bleeding and vaginal discharge.   Musculoskeletal: Positive for arthralgias and back pain. Negative for joint swelling, myalgias and neck pain.   Skin: Negative for color change.    Neurological: Negative for tremors, weakness, numbness and headaches.   Psychiatric/Behavioral: Negative for dysphoric mood, sleep disturbance and suicidal ideas. The patient is not nervous/anxious.      Objective   Physical Exam   Constitutional: She is oriented to person, place, and time. No distress.   Bright and in good spirits.  Gait slow and cautious. No apparent distress at rest. No pallor, jaundice, diaphoresis, or cyanosis.     HENT:   Head: Atraumatic.   Right Ear: Tympanic membrane, external ear and ear canal normal.   Left Ear: Tympanic membrane, external ear and ear canal normal.   Nose: Nose normal.   Mouth/Throat: Oropharynx is clear and moist. Mucous membranes are not pale and not cyanotic.   Eyes: EOM are normal. Pupils are equal, round, and reactive to light. No scleral icterus.   Neck: No JVD present. Carotid bruit is not present. No tracheal deviation present. No thyromegaly present.   Cardiovascular: Normal rate, regular rhythm, S1 normal, S2 normal and intact distal pulses.  Exam reveals no gallop, no S3 and no S4.    No murmur heard.  Pulmonary/Chest: No stridor. No respiratory distress. She has no decreased breath sounds. She has no wheezes.   Abdominal: Soft. Normal aorta and bowel sounds are normal. She exhibits no distension, no abdominal bruit and no mass. There is no hepatosplenomegaly. There is no tenderness. No hernia.   Musculoskeletal: She exhibits no tenderness or deformity.       Vascular Status -  Her exam exhibits no right foot edema. Her exam exhibits no left foot edema.  Lymphadenopathy:        Head (right side): No submandibular adenopathy present.        Head (left side): No submandibular adenopathy present.     She has no cervical adenopathy.   Neurological: She is alert and oriented to person, place, and time. She has normal reflexes. She displays normal reflexes. No cranial nerve deficit. She exhibits normal muscle tone. Coordination normal.   Skin: Skin is warm and dry.  No rash noted. She is not diaphoretic. No cyanosis. No pallor. Nails show no clubbing.   Psychiatric: She has a normal mood and affect.     Assessment/Plan   Problems Addressed this Visit        Cardiovascular and Mediastinum    Essential hypertension  Encouraged to continue to work on her diet and exercise plan.  Continue current medication  Updated labs drawn.    Relevant Orders    CBC & Differential (Completed)    Comprehensive Metabolic Panel (Completed)    CBC Auto Differential (Completed)    Osmolality, Calculated (Completed)       Respiratory    Extrinsic asthma  Moderate persistent asthma. The patient is not currently having an exacerbation. In general, the patient's disease is well controlled.  Discussed monitoring symptoms and use of quick-relief medications and contacting us early in the course of exacerbations.  If she continues to do well will eliminate LABA at her return and continue ICS alone    Chronic seasonal allergic rhinitis       Digestive    Vitamin D deficiency disease  Will continue to monitor    Relevant Orders    Vitamin D 25 Hydroxy (Completed)    Gastroesophageal reflux disease without esophagitis  Symptoms are currently well controlled.  Reminded regarding lifestyle modification.  Continue current medication       Endocrine    Type 2 diabetes mellitus without complication, with long-term current use of insulin  Diabetes mellitus Type II, under unknown control.   Encouraged to continue to pursue ADA diet  Encouraged aerobic exercise.  Reminded to get yearly retinal exam.    Relevant Orders    Hemoglobin A1c (Completed)       Nervous and Auditory    Meniere's disease       Musculoskeletal and Integument    Generalized osteoarthritis  Reminded regarding symptomatic treatment.   Continue current medication       Hematopoietic and Hemostatic    Iron deficiency anemia  Will continue to monitor    Relevant Orders    CBC & Differential (Completed)    Iron (Completed)    Ferritin (Completed)     Transferrin    CBC Auto Differential (Completed)       Other    Dyslipidemia  As above.   Continue current medication.    Relevant Orders    Lipid Panel (Completed)    TSH (Completed)    Healthcare maintenance  Reviewed the potential benefits of shingrix. Will discuss further at return.  Patient uninterested in an updated mammogram or DEXA scan

## 2018-01-31 VITALS
BODY MASS INDEX: 32.18 KG/M2 | DIASTOLIC BLOOD PRESSURE: 75 MMHG | RESPIRATION RATE: 12 BRPM | HEART RATE: 74 BPM | TEMPERATURE: 97.4 F | WEIGHT: 205 LBS | SYSTOLIC BLOOD PRESSURE: 125 MMHG | HEIGHT: 67 IN | OXYGEN SATURATION: 97 %

## 2018-02-01 LAB — TRANSFERRIN SERPL-MCNC: 257 MG/DL (ref 200–370)

## 2018-02-05 DIAGNOSIS — J45.20 EXTRINSIC ASTHMA, MILD INTERMITTENT, UNCOMPLICATED: ICD-10-CM

## 2018-02-05 DIAGNOSIS — J30.9 CHRONIC ALLERGIC RHINITIS: ICD-10-CM

## 2018-02-05 DIAGNOSIS — K21.9 GASTROESOPHAGEAL REFLUX DISEASE WITHOUT ESOPHAGITIS: ICD-10-CM

## 2018-02-05 DIAGNOSIS — E11.9 TYPE 2 DIABETES MELLITUS WITHOUT COMPLICATION, WITH LONG-TERM CURRENT USE OF INSULIN (HCC): ICD-10-CM

## 2018-02-05 DIAGNOSIS — Z79.4 TYPE 2 DIABETES MELLITUS WITHOUT COMPLICATION, WITH LONG-TERM CURRENT USE OF INSULIN (HCC): ICD-10-CM

## 2018-02-05 RX ORDER — PANTOPRAZOLE SODIUM 40 MG/1
TABLET, DELAYED RELEASE ORAL
Qty: 30 TABLET | Refills: 5 | Status: SHIPPED | OUTPATIENT
Start: 2018-02-05 | End: 2018-07-05 | Stop reason: SDUPTHER

## 2018-02-05 RX ORDER — SYRINGE-NEEDLE,INSULIN,0.5 ML 31 GX5/16"
SYRINGE, EMPTY DISPOSABLE MISCELLANEOUS
Qty: 30 EACH | Refills: 5 | Status: SHIPPED | OUTPATIENT
Start: 2018-02-05 | End: 2018-08-02

## 2018-02-06 RX ORDER — MONTELUKAST SODIUM 10 MG/1
TABLET ORAL
Qty: 30 TABLET | Refills: 5 | Status: SHIPPED | OUTPATIENT
Start: 2018-02-06 | End: 2018-07-05 | Stop reason: SDUPTHER

## 2018-02-10 RX ORDER — ERGOCALCIFEROL 1.25 MG/1
CAPSULE ORAL
Qty: 4 CAPSULE | Refills: 0 | Status: SHIPPED | OUTPATIENT
Start: 2018-02-10 | End: 2018-04-07 | Stop reason: SDUPTHER

## 2018-03-07 DIAGNOSIS — I10 ESSENTIAL HYPERTENSION: ICD-10-CM

## 2018-03-07 RX ORDER — AMLODIPINE BESYLATE 5 MG/1
TABLET ORAL
Qty: 30 TABLET | Refills: 5 | Status: SHIPPED | OUTPATIENT
Start: 2018-03-07 | End: 2018-10-04 | Stop reason: SDUPTHER

## 2018-04-09 RX ORDER — ERGOCALCIFEROL 1.25 MG/1
CAPSULE ORAL
Qty: 4 CAPSULE | Refills: 0 | Status: SHIPPED | OUTPATIENT
Start: 2018-04-09 | End: 2018-05-09 | Stop reason: SDUPTHER

## 2018-04-10 DIAGNOSIS — H81.09 MENIERE'S DISEASE, UNSPECIFIED LATERALITY: ICD-10-CM

## 2018-04-11 DIAGNOSIS — Z79.4 TYPE 2 DIABETES MELLITUS WITHOUT COMPLICATION, WITH LONG-TERM CURRENT USE OF INSULIN (HCC): ICD-10-CM

## 2018-04-11 DIAGNOSIS — E11.9 TYPE 2 DIABETES MELLITUS WITHOUT COMPLICATION, WITH LONG-TERM CURRENT USE OF INSULIN (HCC): ICD-10-CM

## 2018-04-11 RX ORDER — MECLIZINE HYDROCHLORIDE 25 MG/1
TABLET ORAL
Qty: 120 TABLET | Refills: 0 | Status: SHIPPED | OUTPATIENT
Start: 2018-04-11 | End: 2018-06-08 | Stop reason: SDUPTHER

## 2018-04-11 RX ORDER — GLUCOSAM/CHON-MSM1/C/MANG/BOSW 500-416.6
TABLET ORAL
Qty: 100 EACH | Refills: 5 | Status: SHIPPED | OUTPATIENT
Start: 2018-04-11 | End: 2019-05-13 | Stop reason: SDUPTHER

## 2018-04-30 ENCOUNTER — OFFICE VISIT (OUTPATIENT)
Dept: FAMILY MEDICINE CLINIC | Facility: CLINIC | Age: 78
End: 2018-04-30

## 2018-04-30 VITALS
WEIGHT: 206 LBS | DIASTOLIC BLOOD PRESSURE: 80 MMHG | RESPIRATION RATE: 12 BRPM | HEIGHT: 67 IN | TEMPERATURE: 98.1 F | BODY MASS INDEX: 32.33 KG/M2 | HEART RATE: 60 BPM | OXYGEN SATURATION: 98 % | SYSTOLIC BLOOD PRESSURE: 130 MMHG

## 2018-04-30 DIAGNOSIS — Z79.4 TYPE 2 DIABETES MELLITUS WITHOUT COMPLICATION, WITH LONG-TERM CURRENT USE OF INSULIN (HCC): ICD-10-CM

## 2018-04-30 DIAGNOSIS — Z00.00 HEALTHCARE MAINTENANCE: ICD-10-CM

## 2018-04-30 DIAGNOSIS — D50.8 OTHER IRON DEFICIENCY ANEMIA: ICD-10-CM

## 2018-04-30 DIAGNOSIS — E78.5 DYSLIPIDEMIA: ICD-10-CM

## 2018-04-30 DIAGNOSIS — H81.09 MENIERE'S DISEASE, UNSPECIFIED LATERALITY: ICD-10-CM

## 2018-04-30 DIAGNOSIS — E55.9 VITAMIN D DEFICIENCY DISEASE: ICD-10-CM

## 2018-04-30 DIAGNOSIS — I10 ESSENTIAL HYPERTENSION: Primary | ICD-10-CM

## 2018-04-30 DIAGNOSIS — E11.9 TYPE 2 DIABETES MELLITUS WITHOUT COMPLICATION, WITH LONG-TERM CURRENT USE OF INSULIN (HCC): ICD-10-CM

## 2018-04-30 DIAGNOSIS — M15.9 GENERALIZED OSTEOARTHRITIS: ICD-10-CM

## 2018-04-30 DIAGNOSIS — J45.40 MODERATE PERSISTENT EXTRINSIC ASTHMA WITHOUT COMPLICATION: ICD-10-CM

## 2018-04-30 DIAGNOSIS — K21.9 GASTROESOPHAGEAL REFLUX DISEASE WITHOUT ESOPHAGITIS: ICD-10-CM

## 2018-04-30 DIAGNOSIS — J30.2 CHRONIC SEASONAL ALLERGIC RHINITIS, UNSPECIFIED TRIGGER: ICD-10-CM

## 2018-04-30 PROCEDURE — 99214 OFFICE O/P EST MOD 30 MIN: CPT | Performed by: GENERAL PRACTICE

## 2018-04-30 RX ORDER — INSULIN GLARGINE 100 [IU]/ML
55 INJECTION, SOLUTION SUBCUTANEOUS EVERY MORNING
Qty: 20 ML | Refills: 5 | Status: SHIPPED | OUTPATIENT
Start: 2018-04-30 | End: 2018-06-08 | Stop reason: SDUPTHER

## 2018-04-30 NOTE — PROGRESS NOTES
Subjective   Sean Chen is a 77 y.o. female.     History of Present Illness     Diabetes  Current symptoms include: mild fatigue. Patient denies paresthesia of the feet, visual disturbances, polydipsia, polyuria, and foot ulcerations. Evaluation to date has been: hemoglobin A1C. Home sugars: have generally been at or close to goal since last here. Current treatments: metformin and basal insulin - lantus  - the dosage was increased to 55 units daily following her most recent labs. Last dilated eye exam more than one year ago.   Lab Results   Component Value Date    HGBA1C 9.90 (H) 01/30/2018      Dyslipidemia  Compliance with treatment has been fair. The patient exercises intermittently. She is currently being prescribed the following medication for her dyslipidemia - rosuvastatin, omega 3 fatty acids. Patient denies side effects associated with her medications.   Lab Results   Component Value Date    CHOL 139 01/30/2018    CHLPL 111 11/16/2015    TRIG 256 (H) 01/30/2018    HDL 54 (L) 01/30/2018    LDL 34 01/30/2018     Hypertension  Home blood pressure readings: not doing. Associated signs and symptoms: dyspnea and peripheral edema. Patient denies: chest pain, palpitations, orthopnea and paroxysmal nocturnal dyspnea. Current antihypertensive medications includes losartan and amlodipine. Medication compliance: taking as prescribed.   Lab Results   Component Value Date    CREATININE 1.13 01/30/2018     Chronic Allergic Rhinitis  Patient has a history of allergic rhinitis. Patient's symptoms include sneezing, clear rhinorrhea, nasal congestion, periorbital pressure and postnasal drip. These symptoms are perennial with seasonal exacerbation. The patient has been suffering from these symptoms for a number of years .     Asthma  Patient's symptoms have included dyspnea and non-productive cough. She denies any chest pain and hemoptysis. The patient has been suffering from these symptoms for a number of years. The  symptoms have been under fairly good control as of late.  Medications used in the past to treat these symptoms include beta agonist inhalers and combination beta agonists/steroid inhalers.  She has been using her rescue inhaler once or twice monthly on average since last here. Suspected precipitants include upper respiratory infection and allergens.     GERD  Patient has noted a significant improvement in the frequency and severity of her heartburn. Symptoms have been present for a number of years . Symptoms include occasional nausea. The patient denies abdominal bloating, bilious reflux, dysphagia and vomiting or hematemesis. Symptoms appear to be worsened by large meals and lying down. Risk factors present for GERD include caffeine use, obesity and NSAID use. Risk factors absent for GERD are tobacco abuse, alcohol use and ASA use. Studies performed so far include upper endoscopy, result: negative. Treatments tried so far include proton pump inhibitor: pantoprazole. Results of treatment: some improvement in symptom severity, some improvement in symptom frequency.     Labs  Most recent vitamin D 38    The following portions of the patient's history were reviewed and updated as appropriate: allergies, current medications, past medical history, past social history and problem list.    Review of Systems   Constitutional: Positive for fatigue. Negative for appetite change, chills, fever and unexpected weight change.   HENT: Positive for hearing loss, sneezing and tinnitus. Negative for congestion, ear pain, postnasal drip, rhinorrhea, sore throat and voice change.    Eyes: Negative for visual disturbance.   Respiratory: Positive for cough and shortness of breath (both intermittent). Negative for wheezing.    Cardiovascular: Positive for leg swelling. Negative for chest pain and palpitations.   Gastrointestinal: Negative for abdominal pain, blood in stool, constipation, diarrhea, nausea and vomiting.        Occasional  heartburn   Endocrine: Negative for polydipsia.   Genitourinary: Negative for difficulty urinating, dysuria, frequency, hematuria, menstrual problem, pelvic pain, urgency, vaginal bleeding and vaginal discharge.   Musculoskeletal: Positive for arthralgias and back pain. Negative for joint swelling, myalgias and neck pain.   Skin: Negative for color change.   Neurological: Negative for tremors, weakness, numbness and headaches.   Psychiatric/Behavioral: Negative for dysphoric mood, sleep disturbance and suicidal ideas. The patient is not nervous/anxious.      Objective   Physical Exam   Constitutional: She is oriented to person, place, and time. No distress.   Bright and in good spirits.  Gait slow and cautious. No apparent distress at rest. No pallor, jaundice, diaphoresis, or cyanosis.     HENT:   Head: Atraumatic.   Right Ear: Tympanic membrane, external ear and ear canal normal.   Left Ear: Tympanic membrane, external ear and ear canal normal.   Nose: Nose normal.   Mouth/Throat: Oropharynx is clear and moist. Mucous membranes are not pale and not cyanotic.   Eyes: EOM are normal. Pupils are equal, round, and reactive to light. No scleral icterus.   Neck: No JVD present. Carotid bruit is not present. No tracheal deviation present. No thyromegaly present.   Cardiovascular: Normal rate, regular rhythm, S1 normal, S2 normal and intact distal pulses.  Exam reveals no gallop, no S3 and no S4.    No murmur heard.  Pulmonary/Chest: No stridor. No respiratory distress. She has no decreased breath sounds. She has no wheezes.   Abdominal: Soft. Normal aorta and bowel sounds are normal. She exhibits no distension, no abdominal bruit and no mass. There is no hepatosplenomegaly. There is no tenderness. No hernia.   Musculoskeletal: She exhibits no tenderness or deformity.     Vascular Status -  Her right foot exhibits no edema. Her left foot exhibits no edema.  Lymphadenopathy:        Head (right side): No submandibular  adenopathy present.        Head (left side): No submandibular adenopathy present.     She has no cervical adenopathy.   Neurological: She is alert and oriented to person, place, and time. She has normal reflexes. She displays normal reflexes. No cranial nerve deficit. She exhibits normal muscle tone. Coordination normal.   Skin: Skin is warm and dry. No rash noted. She is not diaphoretic. No cyanosis. No pallor. Nails show no clubbing.   Psychiatric: She has a normal mood and affect.     Assessment/Plan   Problems Addressed this Visit        Cardiovascular and Mediastinum    Essential hypertension   Encouraged to continue to work on her diet and exercise plan.  Continue current medication  Updated labs will be drawn at her return       Respiratory    Extrinsic asthma  Moderate persistent asthma. The patient is not currently having an exacerbation. In general, the patient's disease is well controlled.  Discussed monitoring symptoms and use of quick-relief medications and contacting us early in the course of exacerbations.    Chronic seasonal allergic rhinitis       Digestive    Vitamin D deficiency disease  Corrected   Continue maintenance supplementation with monitoring.    Gastroesophageal reflux disease without esophagitis       Endocrine    Type 2 diabetes mellitus without complication, with long-term current use of insulin  Diabetes mellitus Type II, under inadequate control.   Encouraged to continue to pursue ADA diet  Encouraged aerobic exercise.   Recommended an updated diabetic eye exam.  Offered referral however patient will arrange this on her own    Relevant Medications    insulin glargine (LANTUS) 100 UNIT/ML injection       Nervous and Auditory    Meniere's disease       Musculoskeletal and Integument    Generalized osteoarthritis       Hematopoietic and Hemostatic    Iron deficiency anemia  Will continue to monitor       Other    Dyslipidemia  As above.   Continue current medication.    Healthcare  maintenance  Reviewed the potential benefits of shingrix. Prescription written.    Relevant Medications    Zoster Vac Recomb Adjuvanted (SHINGRIX) 50 MCG reconstituted suspension

## 2018-05-09 DIAGNOSIS — E78.5 DYSLIPIDEMIA: ICD-10-CM

## 2018-05-09 RX ORDER — ERGOCALCIFEROL 1.25 MG/1
CAPSULE ORAL
Qty: 4 CAPSULE | Refills: 5 | Status: SHIPPED | OUTPATIENT
Start: 2018-05-09 | End: 2018-11-08 | Stop reason: SDUPTHER

## 2018-05-09 RX ORDER — ROSUVASTATIN CALCIUM 20 MG/1
TABLET, COATED ORAL
Qty: 60 TABLET | Refills: 5 | Status: SHIPPED | OUTPATIENT
Start: 2018-05-09 | End: 2018-10-29 | Stop reason: SDUPTHER

## 2018-06-08 DIAGNOSIS — J45.20 EXTRINSIC ASTHMA, MILD INTERMITTENT, UNCOMPLICATED: ICD-10-CM

## 2018-06-08 DIAGNOSIS — M15.9 GENERALIZED OSTEOARTHRITIS: ICD-10-CM

## 2018-06-08 DIAGNOSIS — H81.09 MENIERE'S DISEASE, UNSPECIFIED LATERALITY: ICD-10-CM

## 2018-06-08 DIAGNOSIS — Z79.4 TYPE 2 DIABETES MELLITUS WITHOUT COMPLICATION, WITH LONG-TERM CURRENT USE OF INSULIN (HCC): ICD-10-CM

## 2018-06-08 DIAGNOSIS — E11.9 TYPE 2 DIABETES MELLITUS WITHOUT COMPLICATION, WITH LONG-TERM CURRENT USE OF INSULIN (HCC): ICD-10-CM

## 2018-06-08 RX ORDER — NAPROXEN 375 MG/1
375 TABLET ORAL 2 TIMES DAILY
Qty: 60 TABLET | Refills: 0 | Status: SHIPPED | OUTPATIENT
Start: 2018-06-08 | End: 2018-07-05 | Stop reason: SDUPTHER

## 2018-06-08 RX ORDER — INSULIN GLARGINE 100 [IU]/ML
55 INJECTION, SOLUTION SUBCUTANEOUS EVERY MORNING
Qty: 20 ML | Refills: 5 | Status: SHIPPED | OUTPATIENT
Start: 2018-06-08 | End: 2018-08-02

## 2018-06-11 RX ORDER — MECLIZINE HYDROCHLORIDE 25 MG/1
TABLET ORAL
Qty: 120 TABLET | Refills: 0 | Status: SHIPPED | OUTPATIENT
Start: 2018-06-11 | End: 2018-07-05 | Stop reason: SDUPTHER

## 2018-07-05 DIAGNOSIS — J30.9 CHRONIC ALLERGIC RHINITIS, UNSPECIFIED SEASONALITY, UNSPECIFIED TRIGGER: ICD-10-CM

## 2018-07-05 DIAGNOSIS — K21.9 GASTROESOPHAGEAL REFLUX DISEASE WITHOUT ESOPHAGITIS: ICD-10-CM

## 2018-07-05 DIAGNOSIS — Z79.4 TYPE 2 DIABETES MELLITUS WITHOUT COMPLICATION, WITH LONG-TERM CURRENT USE OF INSULIN (HCC): ICD-10-CM

## 2018-07-05 DIAGNOSIS — E11.9 TYPE 2 DIABETES MELLITUS WITHOUT COMPLICATION, WITH LONG-TERM CURRENT USE OF INSULIN (HCC): ICD-10-CM

## 2018-07-05 DIAGNOSIS — J45.20 EXTRINSIC ASTHMA, MILD INTERMITTENT, UNCOMPLICATED: ICD-10-CM

## 2018-07-05 DIAGNOSIS — H81.09 MENIERE'S DISEASE, UNSPECIFIED LATERALITY: ICD-10-CM

## 2018-07-05 DIAGNOSIS — M15.9 GENERALIZED OSTEOARTHRITIS: ICD-10-CM

## 2018-07-05 RX ORDER — ALBUTEROL SULFATE 90 UG/1
2 AEROSOL, METERED RESPIRATORY (INHALATION)
Qty: 8.5 G | Refills: 5 | Status: SHIPPED | OUTPATIENT
Start: 2018-07-05 | End: 2019-01-09 | Stop reason: SDUPTHER

## 2018-07-05 RX ORDER — MECLIZINE HYDROCHLORIDE 25 MG/1
25 TABLET ORAL EVERY 6 HOURS
Qty: 120 TABLET | Refills: 0 | Status: SHIPPED | OUTPATIENT
Start: 2018-07-05 | End: 2018-09-07 | Stop reason: SDUPTHER

## 2018-07-05 RX ORDER — NAPROXEN 375 MG/1
375 TABLET ORAL 2 TIMES DAILY
Qty: 60 TABLET | Refills: 0 | Status: SHIPPED | OUTPATIENT
Start: 2018-07-05 | End: 2018-10-09 | Stop reason: SDUPTHER

## 2018-07-05 RX ORDER — MONTELUKAST SODIUM 10 MG/1
10 TABLET ORAL DAILY
Qty: 30 TABLET | Refills: 5 | Status: SHIPPED | OUTPATIENT
Start: 2018-07-05 | End: 2019-02-07 | Stop reason: SDUPTHER

## 2018-07-05 RX ORDER — PANTOPRAZOLE SODIUM 40 MG/1
40 TABLET, DELAYED RELEASE ORAL DAILY
Qty: 30 TABLET | Refills: 5 | Status: SHIPPED | OUTPATIENT
Start: 2018-07-05 | End: 2019-02-07 | Stop reason: SDUPTHER

## 2018-07-05 RX ORDER — NAPROXEN 375 MG/1
TABLET ORAL
Qty: 60 TABLET | Refills: 0 | Status: SHIPPED | OUTPATIENT
Start: 2018-07-05 | End: 2018-10-09

## 2018-07-31 ENCOUNTER — OFFICE VISIT (OUTPATIENT)
Dept: FAMILY MEDICINE CLINIC | Facility: CLINIC | Age: 78
End: 2018-07-31

## 2018-07-31 DIAGNOSIS — E55.9 VITAMIN D DEFICIENCY DISEASE: ICD-10-CM

## 2018-07-31 DIAGNOSIS — Z00.00 HEALTHCARE MAINTENANCE: ICD-10-CM

## 2018-07-31 DIAGNOSIS — M15.9 GENERALIZED OSTEOARTHRITIS: ICD-10-CM

## 2018-07-31 DIAGNOSIS — J30.2 CHRONIC SEASONAL ALLERGIC RHINITIS, UNSPECIFIED TRIGGER: ICD-10-CM

## 2018-07-31 DIAGNOSIS — E78.5 DYSLIPIDEMIA: ICD-10-CM

## 2018-07-31 DIAGNOSIS — K21.9 GASTROESOPHAGEAL REFLUX DISEASE WITHOUT ESOPHAGITIS: ICD-10-CM

## 2018-07-31 DIAGNOSIS — D50.8 OTHER IRON DEFICIENCY ANEMIA: ICD-10-CM

## 2018-07-31 DIAGNOSIS — H81.09 MENIERE'S DISEASE, UNSPECIFIED LATERALITY: ICD-10-CM

## 2018-07-31 DIAGNOSIS — I10 ESSENTIAL HYPERTENSION: Primary | ICD-10-CM

## 2018-07-31 DIAGNOSIS — J45.20 MILD INTERMITTENT EXTRINSIC ASTHMA WITHOUT COMPLICATION: ICD-10-CM

## 2018-07-31 DIAGNOSIS — Z79.4 TYPE 2 DIABETES MELLITUS WITHOUT COMPLICATION, WITH LONG-TERM CURRENT USE OF INSULIN (HCC): ICD-10-CM

## 2018-07-31 DIAGNOSIS — E11.9 TYPE 2 DIABETES MELLITUS WITHOUT COMPLICATION, WITH LONG-TERM CURRENT USE OF INSULIN (HCC): ICD-10-CM

## 2018-07-31 LAB
25(OH)D3 SERPL-MCNC: 41 NG/ML
ALBUMIN SERPL-MCNC: 4.6 G/DL (ref 3.4–4.8)
ALBUMIN/GLOB SERPL: 1.6 G/DL (ref 1.5–2.5)
ALP SERPL-CCNC: 86 U/L (ref 35–104)
ALT SERPL W P-5'-P-CCNC: 23 U/L (ref 10–36)
ANION GAP SERPL CALCULATED.3IONS-SCNC: 8.6 MMOL/L (ref 3.6–11.2)
AST SERPL-CCNC: 27 U/L (ref 10–30)
BASOPHILS # BLD AUTO: 0.04 10*3/MM3 (ref 0–0.3)
BASOPHILS NFR BLD AUTO: 0.4 % (ref 0–2)
BILIRUB SERPL-MCNC: 0.3 MG/DL (ref 0.2–1.8)
BUN BLD-MCNC: 19 MG/DL (ref 7–21)
BUN/CREAT SERPL: 17.9 (ref 7–25)
CALCIUM SPEC-SCNC: 9.7 MG/DL (ref 7.7–10)
CHLORIDE SERPL-SCNC: 108 MMOL/L (ref 99–112)
CHOLEST SERPL-MCNC: 118 MG/DL (ref 0–200)
CO2 SERPL-SCNC: 25.4 MMOL/L (ref 24.3–31.9)
CREAT BLD-MCNC: 1.06 MG/DL (ref 0.43–1.29)
DEPRECATED RDW RBC AUTO: 41 FL (ref 37–54)
EOSINOPHIL # BLD AUTO: 0.27 10*3/MM3 (ref 0–0.7)
EOSINOPHIL NFR BLD AUTO: 2.7 % (ref 0–7)
ERYTHROCYTE [DISTWIDTH] IN BLOOD BY AUTOMATED COUNT: 13.8 % (ref 11.5–14.5)
FERRITIN SERPL-MCNC: 36 NG/ML (ref 10–290.3)
GFR SERPL CREATININE-BSD FRML MDRD: 50 ML/MIN/1.73
GLOBULIN UR ELPH-MCNC: 2.8 GM/DL
GLUCOSE BLD-MCNC: 165 MG/DL (ref 70–110)
HBA1C MFR BLD: 8.1 % (ref 4.5–5.7)
HCT VFR BLD AUTO: 35 % (ref 37–47)
HDLC SERPL-MCNC: 54 MG/DL (ref 60–100)
HGB BLD-MCNC: 11.7 G/DL (ref 12–16)
IMM GRANULOCYTES # BLD: 0.05 10*3/MM3 (ref 0–0.03)
IMM GRANULOCYTES NFR BLD: 0.5 % (ref 0–0.5)
IRON 24H UR-MRATE: 48 MCG/DL (ref 49–151)
LDLC SERPL CALC-MCNC: 31 MG/DL (ref 0–100)
LDLC/HDLC SERPL: 0.58 {RATIO}
LYMPHOCYTES # BLD AUTO: 3.37 10*3/MM3 (ref 1–3)
LYMPHOCYTES NFR BLD AUTO: 33.6 % (ref 16–46)
MCH RBC QN AUTO: 28.1 PG (ref 27–33)
MCHC RBC AUTO-ENTMCNC: 33.4 G/DL (ref 33–37)
MCV RBC AUTO: 83.9 FL (ref 80–94)
MONOCYTES # BLD AUTO: 0.77 10*3/MM3 (ref 0.1–0.9)
MONOCYTES NFR BLD AUTO: 7.7 % (ref 0–12)
NEUTROPHILS # BLD AUTO: 5.54 10*3/MM3 (ref 1.4–6.5)
NEUTROPHILS NFR BLD AUTO: 55.1 % (ref 40–75)
OSMOLALITY SERPL CALC.SUM OF ELEC: 289.1 MOSM/KG (ref 273–305)
PLATELET # BLD AUTO: 241 10*3/MM3 (ref 130–400)
PMV BLD AUTO: 11 FL (ref 6–10)
POTASSIUM BLD-SCNC: 4.9 MMOL/L (ref 3.5–5.3)
PROT SERPL-MCNC: 7.4 G/DL (ref 6–8)
RBC # BLD AUTO: 4.17 10*6/MM3 (ref 4.2–5.4)
SODIUM BLD-SCNC: 142 MMOL/L (ref 135–153)
TRIGL SERPL-MCNC: 163 MG/DL (ref 0–150)
TSH SERPL DL<=0.05 MIU/L-ACNC: 0.89 MIU/ML (ref 0.55–4.78)
VLDLC SERPL-MCNC: 32.6 MG/DL
WBC NRBC COR # BLD: 10.04 10*3/MM3 (ref 4.5–12.5)

## 2018-07-31 PROCEDURE — 80061 LIPID PANEL: CPT | Performed by: GENERAL PRACTICE

## 2018-07-31 PROCEDURE — 83036 HEMOGLOBIN GLYCOSYLATED A1C: CPT | Performed by: GENERAL PRACTICE

## 2018-07-31 PROCEDURE — 80053 COMPREHEN METABOLIC PANEL: CPT | Performed by: GENERAL PRACTICE

## 2018-07-31 PROCEDURE — 83540 ASSAY OF IRON: CPT | Performed by: GENERAL PRACTICE

## 2018-07-31 PROCEDURE — 36415 COLL VENOUS BLD VENIPUNCTURE: CPT | Performed by: GENERAL PRACTICE

## 2018-07-31 PROCEDURE — 85025 COMPLETE CBC W/AUTO DIFF WBC: CPT | Performed by: GENERAL PRACTICE

## 2018-07-31 PROCEDURE — 82728 ASSAY OF FERRITIN: CPT | Performed by: GENERAL PRACTICE

## 2018-07-31 PROCEDURE — 82306 VITAMIN D 25 HYDROXY: CPT | Performed by: GENERAL PRACTICE

## 2018-07-31 PROCEDURE — 84443 ASSAY THYROID STIM HORMONE: CPT | Performed by: GENERAL PRACTICE

## 2018-07-31 PROCEDURE — 99214 OFFICE O/P EST MOD 30 MIN: CPT | Performed by: GENERAL PRACTICE

## 2018-08-01 VITALS
OXYGEN SATURATION: 96 % | WEIGHT: 206 LBS | RESPIRATION RATE: 12 BRPM | SYSTOLIC BLOOD PRESSURE: 125 MMHG | BODY MASS INDEX: 32.33 KG/M2 | HEIGHT: 67 IN | TEMPERATURE: 98.6 F | DIASTOLIC BLOOD PRESSURE: 65 MMHG | HEART RATE: 77 BPM

## 2018-08-02 ENCOUNTER — TELEPHONE (OUTPATIENT)
Dept: FAMILY MEDICINE CLINIC | Facility: CLINIC | Age: 78
End: 2018-08-02

## 2018-08-02 NOTE — TELEPHONE ENCOUNTER
----- Message from Ganga Gallardo MD sent at 8/2/2018  1:39 PM EDT -----  Emailed    ----- Message -----  From: Ruth Fajardo MA  Sent: 8/2/2018   1:33 PM  To: Ganga Gallardo MD    Patient needs pen needles for the toujeo please :)

## 2018-08-03 ENCOUNTER — TELEPHONE (OUTPATIENT)
Dept: FAMILY MEDICINE CLINIC | Facility: CLINIC | Age: 78
End: 2018-08-03

## 2018-08-03 NOTE — TELEPHONE ENCOUNTER
----- Message from Ganga Gallardo MD sent at 8/2/2018 12:56 PM EDT -----  Please let patient know   Janis emailed a script to her pharm and she can change from the lantus to the toujeo whenever she wants    ----- Message -----  From: Ruth Fajardo MA  Sent: 8/2/2018  11:04 AM  To: Ganga Gallardo MD    It is covered on the patient insurance.     ----- Message -----  From: Ganga Gallardo MD  Sent: 7/31/2018   2:18 PM  To: Ruth Fajardo MA    Need to try to PA toujeo  Dx - insulin requiring type 2 DM - glucose labile on lantus

## 2018-09-07 DIAGNOSIS — H81.09 MENIERE'S DISEASE, UNSPECIFIED LATERALITY: ICD-10-CM

## 2018-09-08 RX ORDER — MECLIZINE HYDROCHLORIDE 25 MG/1
TABLET ORAL
Qty: 120 TABLET | Refills: 0 | Status: SHIPPED | OUTPATIENT
Start: 2018-09-08 | End: 2018-10-18 | Stop reason: SDUPTHER

## 2018-10-04 DIAGNOSIS — I10 ESSENTIAL HYPERTENSION: ICD-10-CM

## 2018-10-04 RX ORDER — AMLODIPINE BESYLATE 5 MG/1
TABLET ORAL
Qty: 30 TABLET | Refills: 5 | Status: SHIPPED | OUTPATIENT
Start: 2018-10-04 | End: 2019-04-10 | Stop reason: SDUPTHER

## 2018-10-09 DIAGNOSIS — I10 ESSENTIAL HYPERTENSION: ICD-10-CM

## 2018-10-09 DIAGNOSIS — M15.9 GENERALIZED OSTEOARTHRITIS: ICD-10-CM

## 2018-10-09 RX ORDER — NAPROXEN 375 MG/1
TABLET ORAL
Qty: 60 TABLET | Refills: 5 | Status: SHIPPED | OUTPATIENT
Start: 2018-10-09 | End: 2019-05-13 | Stop reason: SDUPTHER

## 2018-10-09 RX ORDER — LOSARTAN POTASSIUM 100 MG/1
TABLET ORAL
Qty: 30 TABLET | Refills: 5 | Status: SHIPPED | OUTPATIENT
Start: 2018-10-09 | End: 2019-04-10 | Stop reason: SDUPTHER

## 2018-10-18 DIAGNOSIS — H81.09 MENIERE'S DISEASE, UNSPECIFIED LATERALITY: ICD-10-CM

## 2018-10-18 RX ORDER — MECLIZINE HYDROCHLORIDE 25 MG/1
TABLET ORAL
Qty: 120 TABLET | Refills: 0 | Status: SHIPPED | OUTPATIENT
Start: 2018-10-18 | End: 2018-12-06 | Stop reason: SDUPTHER

## 2018-10-29 ENCOUNTER — OFFICE VISIT (OUTPATIENT)
Dept: FAMILY MEDICINE CLINIC | Facility: CLINIC | Age: 78
End: 2018-10-29

## 2018-10-29 VITALS
OXYGEN SATURATION: 98 % | TEMPERATURE: 98.1 F | BODY MASS INDEX: 31.86 KG/M2 | DIASTOLIC BLOOD PRESSURE: 70 MMHG | HEIGHT: 67 IN | HEART RATE: 76 BPM | SYSTOLIC BLOOD PRESSURE: 125 MMHG | RESPIRATION RATE: 12 BRPM | WEIGHT: 203 LBS

## 2018-10-29 DIAGNOSIS — Z00.00 HEALTHCARE MAINTENANCE: ICD-10-CM

## 2018-10-29 DIAGNOSIS — I10 ESSENTIAL HYPERTENSION: Primary | ICD-10-CM

## 2018-10-29 DIAGNOSIS — K21.9 GASTROESOPHAGEAL REFLUX DISEASE WITHOUT ESOPHAGITIS: ICD-10-CM

## 2018-10-29 DIAGNOSIS — E55.9 VITAMIN D DEFICIENCY DISEASE: ICD-10-CM

## 2018-10-29 DIAGNOSIS — E11.9 TYPE 2 DIABETES MELLITUS WITHOUT COMPLICATION, WITH LONG-TERM CURRENT USE OF INSULIN (HCC): ICD-10-CM

## 2018-10-29 DIAGNOSIS — R74.8 ELEVATED LIVER ENZYMES: ICD-10-CM

## 2018-10-29 DIAGNOSIS — E78.5 DYSLIPIDEMIA: ICD-10-CM

## 2018-10-29 DIAGNOSIS — J30.2 CHRONIC SEASONAL ALLERGIC RHINITIS: ICD-10-CM

## 2018-10-29 DIAGNOSIS — J45.40 MODERATE PERSISTENT EXTRINSIC ASTHMA WITHOUT COMPLICATION: ICD-10-CM

## 2018-10-29 DIAGNOSIS — Z79.4 TYPE 2 DIABETES MELLITUS WITHOUT COMPLICATION, WITH LONG-TERM CURRENT USE OF INSULIN (HCC): ICD-10-CM

## 2018-10-29 DIAGNOSIS — Z23 ENCOUNTER FOR IMMUNIZATION: ICD-10-CM

## 2018-10-29 DIAGNOSIS — M15.9 GENERALIZED OSTEOARTHRITIS: ICD-10-CM

## 2018-10-29 DIAGNOSIS — J01.10 ACUTE NON-RECURRENT FRONTAL SINUSITIS: ICD-10-CM

## 2018-10-29 DIAGNOSIS — H81.09 MENIERE'S DISEASE, UNSPECIFIED LATERALITY: ICD-10-CM

## 2018-10-29 PROCEDURE — 99214 OFFICE O/P EST MOD 30 MIN: CPT | Performed by: GENERAL PRACTICE

## 2018-10-29 PROCEDURE — G0008 ADMIN INFLUENZA VIRUS VAC: HCPCS | Performed by: GENERAL PRACTICE

## 2018-10-29 PROCEDURE — 90686 IIV4 VACC NO PRSV 0.5 ML IM: CPT | Performed by: GENERAL PRACTICE

## 2018-10-29 RX ORDER — DOXYCYCLINE HYCLATE 100 MG/1
100 CAPSULE ORAL 2 TIMES DAILY
Qty: 20 CAPSULE | Refills: 0 | Status: SHIPPED | OUTPATIENT
Start: 2018-10-29 | End: 2018-11-08

## 2018-10-29 RX ORDER — CETIRIZINE HYDROCHLORIDE 10 MG/1
10 TABLET ORAL DAILY PRN
Qty: 30 TABLET | Refills: 5 | Status: SHIPPED | OUTPATIENT
Start: 2018-10-29 | End: 2019-09-23 | Stop reason: SDUPTHER

## 2018-10-29 RX ORDER — ROSUVASTATIN CALCIUM 40 MG/1
40 TABLET, COATED ORAL NIGHTLY
Qty: 30 TABLET | Refills: 5 | Status: SHIPPED | OUTPATIENT
Start: 2018-10-29 | End: 2019-05-13 | Stop reason: SDUPTHER

## 2018-10-29 NOTE — PROGRESS NOTES
Subjective   Sean Chen is a 78 y.o. female.     History of Present Illness     Upper Respiratory Tract Infection  Presents with the following symptoms : increased sneezing , nasal congestion, postnasal drip, periorbital pressure and cough now productive of yellowish sputum.  The symptoms were initially associated with a low-grade fever. Onset of symptoms was 3 days ago, and have been unchanged since that time. There is no history of any sore throst, bilateral ear pain, chest pain, shortness of breath, nausea, vomiting, diarrhea, rash and chills.  She is drinking plenty of fluids. Evaluation to date: none. Treatment to date: none    Chronic Allergic Rhinitis  Patient has a history of allergic rhinitis. Patient's symptoms have included sneezing, clear rhinorrhea, nasal congestion, periorbital pressure and postnasal drip. These symptoms are perennial with seasonal exacerbation. The patient has been suffering from these symptoms for a number of years .     Asthma  Patient's symptoms have included dyspnea and non-productive cough. She denies any chest pain and hemoptysis. The patient has been suffering from these symptoms for a number of years. The symptoms have otherwise remained under fairly good control since last here.  Medications used in the past to treat these symptoms include beta agonist inhalers and combination beta agonists/steroid inhalers.  She has been using her rescue inhaler once or twice monthly on average since last here. Suspected precipitants include upper respiratory infection and allergens.     Diabetes  Current symptoms include: mild fatigue. Patient denies paresthesia of the feet, visual disturbances, polydipsia, polyuria, and foot ulcerations. Evaluation to date has been: hemoglobin A1C. Home sugars: have remained fairly labile since last here. Current treatments: metformin and basal insulin - lantus - 55 units daily. Last dilated eye exam more than one year ago.   Lab Results   Component  Value Date    HGBA1C 8.10 (H) 07/31/2018      Dyslipidemia  Compliance with treatment has been fair. The patient exercises intermittently. She is currently being prescribed the following medication for her dyslipidemia - rosuvastatin, omega 3 fatty acids. Patient denies side effects associated with her medications.   Lab Results   Component Value Date    CHOL 118 07/31/2018    CHLPL 111 11/16/2015    TRIG 163 (H) 07/31/2018    HDL 54 (L) 07/31/2018    LDL 31 07/31/2018     Hypertension  Home blood pressure readings: not doing. Associated signs and symptoms: dyspnea and peripheral edema. Patient denies: chest pain, palpitations, orthopnea and paroxysmal nocturnal dyspnea. Current antihypertensive medications includes losartan and amlodipine. Medication compliance: taking as prescribed.   Lab Results   Component Value Date    CREATININE 1.06 07/31/2018     GERD  Patient has a history of heartburn. Symptoms have been present for a number of years. Symptoms include occasional nausea. The patient denies abdominal bloating, bilious reflux, dysphagia and vomiting or hematemesis. Symptoms appear to be worsened by large meals and lying down. Risk factors present for GERD include caffeine use, obesity and NSAID use. Risk factors absent for GERD are tobacco abuse, alcohol use and ASA use. Studies performed so far include upper endoscopy, result: negative. Treatments tried so far include proton pump inhibitor: pantoprazole. Results of treatment: some improvement in symptom severity, some improvement in symptom frequency.     The following portions of the patient's history were reviewed and updated as appropriate: allergies, current medications, past medical history, past social history,  and problem list.    Review of Systems   Constitutional: Positive for fatigue. Negative for appetite change, chills, fever and unexpected weight change.   HENT: Positive for congestion, hearing loss, postnasal drip, rhinorrhea, sinus pressure,  sneezing and tinnitus. Negative for ear pain, sore throat and voice change.    Eyes: Negative for visual disturbance.   Respiratory: Positive for cough and shortness of breath (both intermittent). Negative for wheezing.    Cardiovascular: Positive for leg swelling. Negative for chest pain and palpitations.   Gastrointestinal: Negative for abdominal pain, blood in stool, constipation, diarrhea, nausea and vomiting.        Occasional heartburn   Endocrine: Negative for polydipsia.   Genitourinary: Negative for difficulty urinating, dysuria, frequency, hematuria, menstrual problem, pelvic pain, urgency, vaginal bleeding and vaginal discharge.   Musculoskeletal: Positive for arthralgias and back pain. Negative for joint swelling, myalgias and neck pain.   Skin: Negative for color change.   Neurological: Negative for tremors, weakness, numbness and headaches.   Psychiatric/Behavioral: Negative for dysphoric mood, sleep disturbance and suicidal ideas. The patient is not nervous/anxious.      Objective   Physical Exam   Constitutional: She is oriented to person, place, and time. No distress.   Bright and in good spirits.  Gait slow and cautious. No apparent distress at rest. No pallor, jaundice, diaphoresis, or cyanosis.     HENT:   Head: Atraumatic.   Right Ear: Tympanic membrane, external ear and ear canal normal.   Left Ear: Tympanic membrane, external ear and ear canal normal.   Nose: Nose normal.   Mouth/Throat: Oropharynx is clear and moist. Mucous membranes are not pale and not cyanotic.   Eyes: Pupils are equal, round, and reactive to light. EOM are normal. No scleral icterus.   Neck: No JVD present. Carotid bruit is not present. No tracheal deviation present. No thyromegaly present.   Cardiovascular: Normal rate, regular rhythm, S1 normal, S2 normal and intact distal pulses.  Exam reveals no gallop, no S3 and no S4.    No murmur heard.  Pulmonary/Chest: No stridor. No respiratory distress. She has no decreased  breath sounds. She has wheezes (expiration just slightly coarse).   Abdominal: Soft. Normal aorta and bowel sounds are normal. She exhibits no distension, no abdominal bruit and no mass. There is no hepatosplenomegaly. There is no tenderness. No hernia.   Musculoskeletal: She exhibits no tenderness or deformity.     Vascular Status -  Her right foot exhibits no edema. Her left foot exhibits no edema.  Lymphadenopathy:        Head (right side): No submandibular adenopathy present.        Head (left side): No submandibular adenopathy present.     She has no cervical adenopathy.   Neurological: She is alert and oriented to person, place, and time. She has normal reflexes. She displays normal reflexes. No cranial nerve deficit. She exhibits normal muscle tone. Coordination normal.   Skin: Skin is warm and dry. No rash noted. She is not diaphoretic. No cyanosis. No pallor. Nails show no clubbing.   Psychiatric: She has a normal mood and affect.     Assessment/Plan   Problems Addressed this Visit        Cardiovascular and Mediastinum    Essential hypertension    Hypertension: at goal. Evidence of target organ damage: none.  Encouraged to continue to work on diet and exercise plan.   Continue current medication       Respiratory    Extrinsic asthma  With recent mild exacerbation  Continue current medication  Encouraged to report if any worse, any new symptoms, or if not resolving over the next week     Chronic seasonal allergic rhinitis  Trial of cetirizine in addition to montelukast     Relevant Medications    cetirizine (zyrTEC) 10 MG tablet    Acute non-recurrent frontal sinusitis  Acute sinusitis  Advised regarding symptomatic treatment.  Suggested OTC remedies.  Antibiotic as per orders.  Encouraged to report if any worse or if any new symptoms.  Call in 4 days if symptoms aren't resolving.    Relevant Medications    doxycycline (VIBRAMYCIN) 100 MG capsule       Digestive    Vitamin D deficiency disease     Gastroesophageal reflux disease without esophagitis   Symptoms are currently well controlled.  Reminded regarding lifestyle modification.  Continue current medication       Endocrine    Type 2 diabetes mellitus without complication, with long-term current use of insulin (CMS/[facility])  Diabetes mellitus Type II, under better control.   Encouraged to continue to pursue ADA diet  Encouraged aerobic exercise.  Reminded to get yearly retinal exam.  Updated labs will be drawn at her return.       Nervous and Auditory    Meniere's disease       Musculoskeletal and Integument    Generalized osteoarthritis       Other    Dyslipidemia  As above.   Continue current medication.    Relevant Medications    rosuvastatin (CRESTOR) 40 MG tablet    Elevated liver enzymes    Healthcare maintenance  Recommended a flu shot    Relevant Orders    Fluarix/Fluzone/Afluria Quad>6 Months (Completed)    Encounter for immunization    Relevant Orders    Fluarix/Fluzone/Afluria Quad>6 Months (Completed)

## 2018-11-08 RX ORDER — ERGOCALCIFEROL 1.25 MG/1
CAPSULE ORAL
Qty: 4 CAPSULE | Refills: 0 | Status: SHIPPED | OUTPATIENT
Start: 2018-11-08 | End: 2018-12-06 | Stop reason: SDUPTHER

## 2018-12-06 DIAGNOSIS — H81.09 MENIERE'S DISEASE, UNSPECIFIED LATERALITY: ICD-10-CM

## 2018-12-06 RX ORDER — MECLIZINE HYDROCHLORIDE 25 MG/1
TABLET ORAL
Qty: 120 TABLET | Refills: 0 | Status: SHIPPED | OUTPATIENT
Start: 2018-12-06 | End: 2019-01-09 | Stop reason: SDUPTHER

## 2018-12-06 RX ORDER — ERGOCALCIFEROL 1.25 MG/1
CAPSULE ORAL
Qty: 4 CAPSULE | Refills: 2 | Status: SHIPPED | OUTPATIENT
Start: 2018-12-06 | End: 2019-03-11 | Stop reason: SDUPTHER

## 2018-12-06 RX ORDER — ROSUVASTATIN CALCIUM 20 MG/1
20 TABLET, COATED ORAL NIGHTLY
Qty: 30 TABLET | Refills: 5 | OUTPATIENT
Start: 2018-12-06

## 2019-01-09 DIAGNOSIS — J45.20 EXTRINSIC ASTHMA, MILD INTERMITTENT, UNCOMPLICATED: ICD-10-CM

## 2019-01-09 DIAGNOSIS — H81.09 MENIERE'S DISEASE, UNSPECIFIED LATERALITY: ICD-10-CM

## 2019-01-09 RX ORDER — MECLIZINE HYDROCHLORIDE 25 MG/1
TABLET ORAL
Qty: 120 TABLET | Refills: 5 | Status: SHIPPED | OUTPATIENT
Start: 2019-01-09 | End: 2019-07-11 | Stop reason: SDUPTHER

## 2019-01-09 RX ORDER — DIAPER,BRIEF,ADULT, DISPOSABLE
EACH MISCELLANEOUS
Qty: 100 EACH | Refills: 5 | Status: SHIPPED | OUTPATIENT
Start: 2019-01-09 | End: 2020-07-06 | Stop reason: SDUPTHER

## 2019-02-07 ENCOUNTER — OFFICE VISIT (OUTPATIENT)
Dept: FAMILY MEDICINE CLINIC | Facility: CLINIC | Age: 79
End: 2019-02-07

## 2019-02-07 VITALS
SYSTOLIC BLOOD PRESSURE: 125 MMHG | RESPIRATION RATE: 12 BRPM | WEIGHT: 201 LBS | DIASTOLIC BLOOD PRESSURE: 80 MMHG | HEART RATE: 73 BPM | HEIGHT: 67 IN | OXYGEN SATURATION: 97 % | TEMPERATURE: 99.4 F | BODY MASS INDEX: 31.55 KG/M2

## 2019-02-07 DIAGNOSIS — J45.20 EXTRINSIC ASTHMA, MILD INTERMITTENT, UNCOMPLICATED: ICD-10-CM

## 2019-02-07 DIAGNOSIS — E55.9 VITAMIN D DEFICIENCY DISEASE: ICD-10-CM

## 2019-02-07 DIAGNOSIS — J45.40 MODERATE PERSISTENT EXTRINSIC ASTHMA WITHOUT COMPLICATION: ICD-10-CM

## 2019-02-07 DIAGNOSIS — Z79.4 TYPE 2 DIABETES MELLITUS WITHOUT COMPLICATION, WITH LONG-TERM CURRENT USE OF INSULIN (HCC): ICD-10-CM

## 2019-02-07 DIAGNOSIS — K21.9 GASTROESOPHAGEAL REFLUX DISEASE WITHOUT ESOPHAGITIS: ICD-10-CM

## 2019-02-07 DIAGNOSIS — E78.5 DYSLIPIDEMIA: ICD-10-CM

## 2019-02-07 DIAGNOSIS — E11.9 TYPE 2 DIABETES MELLITUS WITHOUT COMPLICATION, WITH LONG-TERM CURRENT USE OF INSULIN (HCC): ICD-10-CM

## 2019-02-07 DIAGNOSIS — H81.09 MENIERE'S DISEASE, UNSPECIFIED LATERALITY: ICD-10-CM

## 2019-02-07 DIAGNOSIS — Z00.00 HEALTHCARE MAINTENANCE: ICD-10-CM

## 2019-02-07 DIAGNOSIS — M15.9 GENERALIZED OSTEOARTHRITIS: ICD-10-CM

## 2019-02-07 DIAGNOSIS — J30.9 CHRONIC ALLERGIC RHINITIS: ICD-10-CM

## 2019-02-07 DIAGNOSIS — D50.8 OTHER IRON DEFICIENCY ANEMIA: ICD-10-CM

## 2019-02-07 DIAGNOSIS — J30.2 CHRONIC SEASONAL ALLERGIC RHINITIS: ICD-10-CM

## 2019-02-07 DIAGNOSIS — I10 ESSENTIAL HYPERTENSION: Primary | ICD-10-CM

## 2019-02-07 PROBLEM — J01.10 ACUTE NON-RECURRENT FRONTAL SINUSITIS: Status: RESOLVED | Noted: 2018-10-29 | Resolved: 2019-02-07

## 2019-02-07 LAB
25(OH)D3 SERPL-MCNC: 67 NG/ML
ALBUMIN SERPL-MCNC: 4.9 G/DL (ref 3.4–4.8)
ALBUMIN/GLOB SERPL: 2 G/DL (ref 1.5–2.5)
ALP SERPL-CCNC: 87 U/L (ref 35–104)
ALT SERPL W P-5'-P-CCNC: 26 U/L (ref 10–36)
ANION GAP SERPL CALCULATED.3IONS-SCNC: 9.5 MMOL/L (ref 3.6–11.2)
AST SERPL-CCNC: 30 U/L (ref 10–30)
BASOPHILS # BLD AUTO: 0.05 10*3/MM3 (ref 0–0.3)
BASOPHILS NFR BLD AUTO: 0.4 % (ref 0–2)
BILIRUB SERPL-MCNC: 0.5 MG/DL (ref 0.2–1.8)
BUN BLD-MCNC: 20 MG/DL (ref 7–21)
BUN/CREAT SERPL: 15.3 (ref 7–25)
CALCIUM SPEC-SCNC: 10.3 MG/DL (ref 7.7–10)
CHLORIDE SERPL-SCNC: 103 MMOL/L (ref 99–112)
CHOLEST SERPL-MCNC: 115 MG/DL (ref 0–200)
CO2 SERPL-SCNC: 25.5 MMOL/L (ref 24.3–31.9)
CREAT BLD-MCNC: 1.31 MG/DL (ref 0.43–1.29)
DEPRECATED RDW RBC AUTO: 39.9 FL (ref 37–54)
EOSINOPHIL # BLD AUTO: 0.42 10*3/MM3 (ref 0–0.7)
EOSINOPHIL NFR BLD AUTO: 3.4 % (ref 0–7)
ERYTHROCYTE [DISTWIDTH] IN BLOOD BY AUTOMATED COUNT: 13.5 % (ref 11.5–14.5)
GFR SERPL CREATININE-BSD FRML MDRD: 39 ML/MIN/1.73
GLOBULIN UR ELPH-MCNC: 2.5 GM/DL
GLUCOSE BLD-MCNC: 185 MG/DL (ref 70–110)
HBA1C MFR BLD: 9.9 % (ref 4.5–5.7)
HCT VFR BLD AUTO: 37.5 % (ref 37–47)
HDLC SERPL-MCNC: 47 MG/DL (ref 60–100)
HGB BLD-MCNC: 12.8 G/DL (ref 12–16)
IMM GRANULOCYTES # BLD AUTO: 0.05 10*3/MM3 (ref 0–0.03)
IMM GRANULOCYTES NFR BLD AUTO: 0.4 % (ref 0–0.5)
LDLC SERPL CALC-MCNC: 18 MG/DL (ref 0–100)
LDLC/HDLC SERPL: 0.39 {RATIO}
LYMPHOCYTES # BLD AUTO: 2.8 10*3/MM3 (ref 1–3)
LYMPHOCYTES NFR BLD AUTO: 22.6 % (ref 16–46)
MCH RBC QN AUTO: 28.4 PG (ref 27–33)
MCHC RBC AUTO-ENTMCNC: 34.1 G/DL (ref 33–37)
MCV RBC AUTO: 83.1 FL (ref 80–94)
MONOCYTES # BLD AUTO: 0.94 10*3/MM3 (ref 0.1–0.9)
MONOCYTES NFR BLD AUTO: 7.6 % (ref 0–12)
NEUTROPHILS # BLD AUTO: 8.12 10*3/MM3 (ref 1.4–6.5)
NEUTROPHILS NFR BLD AUTO: 65.6 % (ref 40–75)
OSMOLALITY SERPL CALC.SUM OF ELEC: 283.1 MOSM/KG (ref 273–305)
PLATELET # BLD AUTO: 239 10*3/MM3 (ref 130–400)
PMV BLD AUTO: 10.7 FL (ref 6–10)
POTASSIUM BLD-SCNC: 5.1 MMOL/L (ref 3.5–5.3)
PROT SERPL-MCNC: 7.4 G/DL (ref 6–8)
RBC # BLD AUTO: 4.51 10*6/MM3 (ref 4.2–5.4)
SODIUM BLD-SCNC: 138 MMOL/L (ref 135–153)
TRIGL SERPL-MCNC: 248 MG/DL (ref 0–150)
TSH SERPL DL<=0.05 MIU/L-ACNC: 2 MIU/ML (ref 0.55–4.78)
VLDLC SERPL-MCNC: 49.6 MG/DL
WBC NRBC COR # BLD: 12.38 10*3/MM3 (ref 4.5–12.5)

## 2019-02-07 PROCEDURE — 82306 VITAMIN D 25 HYDROXY: CPT | Performed by: GENERAL PRACTICE

## 2019-02-07 PROCEDURE — 85025 COMPLETE CBC W/AUTO DIFF WBC: CPT | Performed by: GENERAL PRACTICE

## 2019-02-07 PROCEDURE — 83036 HEMOGLOBIN GLYCOSYLATED A1C: CPT | Performed by: GENERAL PRACTICE

## 2019-02-07 PROCEDURE — 80053 COMPREHEN METABOLIC PANEL: CPT | Performed by: GENERAL PRACTICE

## 2019-02-07 PROCEDURE — 36415 COLL VENOUS BLD VENIPUNCTURE: CPT | Performed by: GENERAL PRACTICE

## 2019-02-07 PROCEDURE — 99214 OFFICE O/P EST MOD 30 MIN: CPT | Performed by: GENERAL PRACTICE

## 2019-02-07 PROCEDURE — 80061 LIPID PANEL: CPT | Performed by: GENERAL PRACTICE

## 2019-02-07 PROCEDURE — 84443 ASSAY THYROID STIM HORMONE: CPT | Performed by: GENERAL PRACTICE

## 2019-02-07 RX ORDER — PANTOPRAZOLE SODIUM 40 MG/1
40 TABLET, DELAYED RELEASE ORAL DAILY
Qty: 30 TABLET | Refills: 5 | Status: SHIPPED | OUTPATIENT
Start: 2019-02-07 | End: 2019-08-20 | Stop reason: SDUPTHER

## 2019-02-07 RX ORDER — MONTELUKAST SODIUM 10 MG/1
10 TABLET ORAL DAILY
Qty: 30 TABLET | Refills: 5 | Status: SHIPPED | OUTPATIENT
Start: 2019-02-07 | End: 2019-08-20 | Stop reason: SDUPTHER

## 2019-02-07 NOTE — PROGRESS NOTES
Subjective   Sean Chen is a 78 y.o. female.     History of Present Illness     Osteoarthritis  The patient returns with a history of joint pain.  She describes a history of pain involving multiple joints.  The pain has been present for many years. Since last here, the symptoms have been worse about both hips.  The patient reports that the pain is aggravated by joint use, walking and cold.  The patient reports that the pain is relieved by heat and rest.  In addition to the pain, the patient also reports stiffness.  The patient reports limitations in the ability to ambulate and work.. Treatments that have been tried include heat, acetaminophen and NSAIDs. Current medications include acetaminophen and naproxen.      Chronic Allergic Rhinitis  Patient has a history of allergic rhinitis. Patient's symptoms have included sneezing, clear rhinorrhea, nasal congestion, periorbital pressure and postnasal drip. These symptoms are perennial with seasonal exacerbation. The patient has been suffering from these symptoms for a number of years .     Asthma  Patient's symptoms have included dyspnea and non-productive cough. She denies any chest pain and hemoptysis. The patient has been suffering from these symptoms for a number of years. The symptoms have otherwise remained under fairly good control since last here.  Medications used in the past to treat these symptoms include beta agonist inhalers and combination beta agonists/steroid inhalers.  She has been using her rescue inhaler once or twice monthly on average since last here. Suspected precipitants include upper respiratory infection and allergens.     Diabetes  Current symptoms include: mild fatigue. Patient denies paresthesia of the feet, visual disturbances, polydipsia, polyuria, and foot ulcerations. Evaluation to date has been: hemoglobin A1C. Home sugars: have remained fairly labile since last here. Current treatments: metformin and basal insulin - lantus - 55  units daily. Last dilated eye exam more than one year ago.      Dyslipidemia  Compliance with treatment has been fair. The patient exercises intermittently. She is currently being prescribed the following medication for her dyslipidemia - rosuvastatin, omega 3 fatty acids. Patient denies side effects associated with her medications.     Hypertension  Home blood pressure readings: not doing. Associated signs and symptoms: dyspnea and peripheral edema. Patient denies: chest pain, palpitations, orthopnea and paroxysmal nocturnal dyspnea. Current antihypertensive medications includes losartan and amlodipine. Medication compliance: taking as prescribed.     GERD  Patient has a history of heartburn. Symptoms have been present for a number of years. Symptoms include occasional nausea. The patient denies abdominal bloating, bilious reflux, dysphagia and vomiting or hematemesis. Symptoms appear to be worsened by large meals and lying down. Risk factors present for GERD include caffeine use, obesity and NSAID use. Risk factors absent for GERD are tobacco abuse, alcohol use and ASA use. Studies performed so far include upper endoscopy, result: negative. Treatments tried so far include proton pump inhibitor: pantoprazole. Results of treatment: some improvement in symptom severity, some improvement in symptom frequency.     The following portions of the patient's history were reviewed and updated as appropriate: allergies, current medications, past medical history, past social history and problem list.    Review of Systems   Constitutional: Positive for fatigue. Negative for appetite change, chills, fever and unexpected weight change.   HENT: Positive for hearing loss, sneezing and tinnitus. Negative for congestion, ear pain, postnasal drip, rhinorrhea, sore throat and voice change.    Eyes: Negative for visual disturbance.   Respiratory: Positive for cough and shortness of breath (both intermittent). Negative for wheezing.     Cardiovascular: Positive for leg swelling. Negative for chest pain and palpitations.   Gastrointestinal: Negative for abdominal pain, blood in stool, constipation, diarrhea, nausea and vomiting.        Occasional heartburn   Endocrine: Negative for polydipsia.   Genitourinary: Negative for difficulty urinating, dysuria, frequency, hematuria, menstrual problem, pelvic pain, urgency, vaginal bleeding and vaginal discharge.   Musculoskeletal: Positive for arthralgias and back pain. Negative for joint swelling, myalgias and neck pain.   Skin: Negative for color change.   Neurological: Negative for tremors, weakness, numbness and headaches.   Psychiatric/Behavioral: Negative for dysphoric mood, sleep disturbance and suicidal ideas. The patient is not nervous/anxious.      Objective   Physical Exam   Constitutional: She is oriented to person, place, and time. No distress.   Bright and in good spirits.  Gait slow and cautious. No apparent distress at rest. No pallor, jaundice, diaphoresis, or cyanosis.     HENT:   Head: Atraumatic.   Right Ear: Tympanic membrane, external ear and ear canal normal.   Left Ear: Tympanic membrane, external ear and ear canal normal.   Nose: Nose normal.   Mouth/Throat: Oropharynx is clear and moist. Mucous membranes are not pale and not cyanotic.   Eyes: EOM are normal. Pupils are equal, round, and reactive to light. No scleral icterus.   Neck: No JVD present. Carotid bruit is not present. No tracheal deviation present. No thyromegaly present.   Cardiovascular: Normal rate, regular rhythm, S1 normal, S2 normal and intact distal pulses. Exam reveals no gallop, no S3 and no S4.   No murmur heard.  Pulmonary/Chest: No stridor. No respiratory distress. She has no decreased breath sounds. She has wheezes (expiration just slightly coarse).   Abdominal: Soft. Normal aorta and bowel sounds are normal. She exhibits no distension, no abdominal bruit and no mass. There is no hepatosplenomegaly. There  is no tenderness. No hernia.   Musculoskeletal: She exhibits no deformity.        Right hip: She exhibits tenderness (mild tenderness about the greater trochanter). She exhibits normal range of motion (pain with internal and to a lesser extent external rotation), no crepitus and no deformity.        Left hip: She exhibits normal range of motion (pain with internal rotation), no tenderness and no crepitus.   Negative straight leg raise.     Vascular Status -  Her right foot exhibits no edema. Her left foot exhibits no edema.  Lymphadenopathy:        Head (right side): No submandibular adenopathy present.        Head (left side): No submandibular adenopathy present.     She has no cervical adenopathy.   Neurological: She is alert and oriented to person, place, and time. She has normal reflexes. She displays normal reflexes. No cranial nerve deficit. She exhibits normal muscle tone. Coordination normal.   Skin: Skin is warm and dry. No rash noted. She is not diaphoretic. No cyanosis. No pallor. Nails show no clubbing.   Psychiatric: She has a normal mood and affect.     Assessment/Plan   Problems Addressed this Visit        Cardiovascular and Mediastinum    Essential hypertension   Hypertension: at goal. Evidence of target organ damage: none.  Encouraged to continue to work on diet and exercise plan.   Continue current medication  Updated labs drawn.    Relevant Orders    CBC & Differential (Completed)    Comprehensive Metabolic Panel (Completed)    CBC Auto Differential (Completed)    Osmolality, Calculated (Completed)       Respiratory    Extrinsic asthma  Moderate persistent asthma. The patient is not currently having an exacerbation. In general, the patient's disease is fairly well controlled.  Discussed monitoring symptoms and use of quick-relief medications and contacting us early in the course of exacerbations.    Chronic seasonal allergic rhinitis  As above.   Continue current medication.       Digestive     Vitamin D deficiency disease  Continue maintenance supplementation with monitoring.    Relevant Orders    Vitamin D 25 Hydroxy (Completed)    Gastroesophageal reflux disease without esophagitis       Endocrine    Type 2 diabetes mellitus without complication, with long-term current use of insulin (CMS/[facility])  Diabetes mellitus Type II, under unknown control.   Encouraged to continue to pursue ADA diet  Encouraged aerobic exercise.  Reminded to get yearly retinal exam.    Relevant Orders    Hemoglobin A1c (Completed)    MicroAlbumin, Urine, Random - Urine, Clean Catch       Nervous and Auditory    Meniere's disease       Musculoskeletal and Integument    Generalized osteoarthritis  Reminded regarding symptomatic treatment.   Continue current medication  Patient uninterested in x-rays of the right hip, physical therapy, or an orthopedic opinion but will report if any worse or if any new symptoms prior to her return        Hematopoietic and Hemostatic    Iron deficiency anemia       Other    Dyslipidemia  As above.   Continue current medication.    Relevant Orders    Lipid Panel (Completed)    TSH (Completed)    Healthcare maintenance  Reviewed the potential benefits and risks of hepatitis A immunizations. Patient will consider.

## 2019-03-11 RX ORDER — ERGOCALCIFEROL 1.25 MG/1
CAPSULE ORAL
Qty: 4 CAPSULE | Refills: 0 | Status: SHIPPED | OUTPATIENT
Start: 2019-03-11 | End: 2019-04-10 | Stop reason: SDUPTHER

## 2019-03-11 RX ORDER — INSULIN GLARGINE 300 U/ML
INJECTION, SOLUTION SUBCUTANEOUS
Qty: 4.5 ML | Refills: 0 | Status: SHIPPED | OUTPATIENT
Start: 2019-03-11 | End: 2019-04-10 | Stop reason: SDUPTHER

## 2019-04-10 DIAGNOSIS — I10 ESSENTIAL HYPERTENSION: ICD-10-CM

## 2019-04-10 RX ORDER — LOSARTAN POTASSIUM 100 MG/1
TABLET ORAL
Qty: 30 TABLET | Refills: 5 | Status: SHIPPED | OUTPATIENT
Start: 2019-04-10 | End: 2019-09-23 | Stop reason: SDUPTHER

## 2019-04-10 RX ORDER — AMLODIPINE BESYLATE 5 MG/1
TABLET ORAL
Qty: 30 TABLET | Refills: 5 | Status: SHIPPED | OUTPATIENT
Start: 2019-04-10 | End: 2019-09-23 | Stop reason: SDUPTHER

## 2019-04-10 RX ORDER — ERGOCALCIFEROL 1.25 MG/1
CAPSULE ORAL
Qty: 4 CAPSULE | Refills: 5 | Status: SHIPPED | OUTPATIENT
Start: 2019-04-10 | End: 2019-05-23

## 2019-04-10 RX ORDER — PEN NEEDLE, DIABETIC 29 G X1/2"
NEEDLE, DISPOSABLE MISCELLANEOUS
Qty: 50 EACH | Refills: 5 | Status: SHIPPED | OUTPATIENT
Start: 2019-04-10 | End: 2020-07-06 | Stop reason: SDUPTHER

## 2019-04-10 RX ORDER — INSULIN GLARGINE 300 U/ML
INJECTION, SOLUTION SUBCUTANEOUS
Qty: 6 PEN | Refills: 5 | Status: SHIPPED | OUTPATIENT
Start: 2019-04-10 | End: 2019-04-11 | Stop reason: SDUPTHER

## 2019-04-11 NOTE — TELEPHONE ENCOUNTER
Patient called and stated that she is suppose to be taking 60 units of the toujeo instead of 55. Could you resend it for her please?

## 2019-05-13 DIAGNOSIS — Z79.4 TYPE 2 DIABETES MELLITUS WITHOUT COMPLICATION, WITH LONG-TERM CURRENT USE OF INSULIN (HCC): ICD-10-CM

## 2019-05-13 DIAGNOSIS — E11.9 TYPE 2 DIABETES MELLITUS WITHOUT COMPLICATION, WITH LONG-TERM CURRENT USE OF INSULIN (HCC): ICD-10-CM

## 2019-05-13 DIAGNOSIS — M15.9 GENERALIZED OSTEOARTHRITIS: ICD-10-CM

## 2019-05-13 DIAGNOSIS — E78.5 DYSLIPIDEMIA: ICD-10-CM

## 2019-05-13 RX ORDER — GLUCOSAM/CHON-MSM1/C/MANG/BOSW 500-416.6
TABLET ORAL
Qty: 100 EACH | Refills: 0 | Status: SHIPPED | OUTPATIENT
Start: 2019-05-13 | End: 2019-06-10 | Stop reason: SDUPTHER

## 2019-05-13 RX ORDER — NAPROXEN 375 MG/1
TABLET ORAL
Qty: 60 TABLET | Refills: 0 | Status: SHIPPED | OUTPATIENT
Start: 2019-05-13 | End: 2019-06-10 | Stop reason: SDUPTHER

## 2019-05-13 RX ORDER — ROSUVASTATIN CALCIUM 40 MG/1
TABLET, COATED ORAL
Qty: 30 TABLET | Refills: 0 | Status: SHIPPED | OUTPATIENT
Start: 2019-05-13 | End: 2019-06-10 | Stop reason: SDUPTHER

## 2019-05-23 ENCOUNTER — OFFICE VISIT (OUTPATIENT)
Dept: FAMILY MEDICINE CLINIC | Facility: CLINIC | Age: 79
End: 2019-05-23

## 2019-05-23 VITALS
SYSTOLIC BLOOD PRESSURE: 125 MMHG | WEIGHT: 195 LBS | DIASTOLIC BLOOD PRESSURE: 70 MMHG | HEART RATE: 78 BPM | OXYGEN SATURATION: 98 % | RESPIRATION RATE: 12 BRPM | TEMPERATURE: 98 F | HEIGHT: 67 IN | BODY MASS INDEX: 30.61 KG/M2

## 2019-05-23 DIAGNOSIS — M15.9 GENERALIZED OSTEOARTHRITIS: ICD-10-CM

## 2019-05-23 DIAGNOSIS — Z00.00 HEALTHCARE MAINTENANCE: ICD-10-CM

## 2019-05-23 DIAGNOSIS — I10 ESSENTIAL HYPERTENSION: Primary | ICD-10-CM

## 2019-05-23 DIAGNOSIS — J45.40 MODERATE PERSISTENT EXTRINSIC ASTHMA WITHOUT COMPLICATION: ICD-10-CM

## 2019-05-23 DIAGNOSIS — K21.9 GASTROESOPHAGEAL REFLUX DISEASE WITHOUT ESOPHAGITIS: ICD-10-CM

## 2019-05-23 DIAGNOSIS — E55.9 VITAMIN D DEFICIENCY DISEASE: ICD-10-CM

## 2019-05-23 DIAGNOSIS — J30.2 CHRONIC SEASONAL ALLERGIC RHINITIS: ICD-10-CM

## 2019-05-23 DIAGNOSIS — Z79.4 TYPE 2 DIABETES MELLITUS WITHOUT COMPLICATION, WITH LONG-TERM CURRENT USE OF INSULIN (HCC): ICD-10-CM

## 2019-05-23 DIAGNOSIS — E78.5 DYSLIPIDEMIA: ICD-10-CM

## 2019-05-23 DIAGNOSIS — E11.9 TYPE 2 DIABETES MELLITUS WITHOUT COMPLICATION, WITH LONG-TERM CURRENT USE OF INSULIN (HCC): ICD-10-CM

## 2019-05-23 DIAGNOSIS — J06.9 VIRAL UPPER RESPIRATORY INFECTION: ICD-10-CM

## 2019-05-23 LAB
ALBUMIN SERPL-MCNC: 4.7 G/DL (ref 3.5–5.2)
ALBUMIN/GLOB SERPL: 1.9 G/DL
ALP SERPL-CCNC: 75 U/L (ref 39–117)
ALT SERPL W P-5'-P-CCNC: 11 U/L (ref 1–33)
ANION GAP SERPL CALCULATED.3IONS-SCNC: 12.4 MMOL/L
AST SERPL-CCNC: 16 U/L (ref 1–32)
BASOPHILS # BLD AUTO: 0.06 10*3/MM3 (ref 0–0.2)
BASOPHILS NFR BLD AUTO: 0.5 % (ref 0–1.5)
BILIRUB SERPL-MCNC: 0.4 MG/DL (ref 0.2–1.2)
BUN BLD-MCNC: 17 MG/DL (ref 8–23)
BUN/CREAT SERPL: 16.3 (ref 7–25)
CALCIUM SPEC-SCNC: 9.8 MG/DL (ref 8.6–10.5)
CHLORIDE SERPL-SCNC: 103 MMOL/L (ref 98–107)
CO2 SERPL-SCNC: 23.6 MMOL/L (ref 22–29)
CREAT BLD-MCNC: 1.04 MG/DL (ref 0.57–1)
DEPRECATED RDW RBC AUTO: 46.8 FL (ref 37–54)
EOSINOPHIL # BLD AUTO: 0.39 10*3/MM3 (ref 0–0.4)
EOSINOPHIL NFR BLD AUTO: 3.3 % (ref 0.3–6.2)
ERYTHROCYTE [DISTWIDTH] IN BLOOD BY AUTOMATED COUNT: 14.4 % (ref 12.3–15.4)
GFR SERPL CREATININE-BSD FRML MDRD: 51 ML/MIN/1.73
GLOBULIN UR ELPH-MCNC: 2.5 GM/DL
GLUCOSE BLD-MCNC: 186 MG/DL (ref 65–99)
HBA1C MFR BLD: 7.6 % (ref 4.8–5.6)
HCT VFR BLD AUTO: 38 % (ref 34–46.6)
HGB BLD-MCNC: 11.9 G/DL (ref 12–15.9)
IMM GRANULOCYTES # BLD AUTO: 0.04 10*3/MM3 (ref 0–0.05)
IMM GRANULOCYTES NFR BLD AUTO: 0.3 % (ref 0–0.5)
LYMPHOCYTES # BLD AUTO: 2.61 10*3/MM3 (ref 0.7–3.1)
LYMPHOCYTES NFR BLD AUTO: 22 % (ref 19.6–45.3)
MCH RBC QN AUTO: 27.9 PG (ref 26.6–33)
MCHC RBC AUTO-ENTMCNC: 31.3 G/DL (ref 31.5–35.7)
MCV RBC AUTO: 89.2 FL (ref 79–97)
MONOCYTES # BLD AUTO: 0.81 10*3/MM3 (ref 0.1–0.9)
MONOCYTES NFR BLD AUTO: 6.8 % (ref 5–12)
NEUTROPHILS # BLD AUTO: 7.98 10*3/MM3 (ref 1.7–7)
NEUTROPHILS NFR BLD AUTO: 67.1 % (ref 42.7–76)
NRBC BLD AUTO-RTO: 0 /100 WBC (ref 0–0.2)
PLATELET # BLD AUTO: 199 10*3/MM3 (ref 140–450)
PMV BLD AUTO: 11 FL (ref 6–12)
POTASSIUM BLD-SCNC: 4.9 MMOL/L (ref 3.5–5.2)
PROT SERPL-MCNC: 7.2 G/DL (ref 6–8.5)
RBC # BLD AUTO: 4.26 10*6/MM3 (ref 3.77–5.28)
SODIUM BLD-SCNC: 139 MMOL/L (ref 136–145)
WBC NRBC COR # BLD: 11.89 10*3/MM3 (ref 3.4–10.8)

## 2019-05-23 PROCEDURE — 99214 OFFICE O/P EST MOD 30 MIN: CPT | Performed by: GENERAL PRACTICE

## 2019-05-23 PROCEDURE — 80053 COMPREHEN METABOLIC PANEL: CPT | Performed by: GENERAL PRACTICE

## 2019-05-23 PROCEDURE — 83036 HEMOGLOBIN GLYCOSYLATED A1C: CPT | Performed by: GENERAL PRACTICE

## 2019-05-23 PROCEDURE — 85025 COMPLETE CBC W/AUTO DIFF WBC: CPT | Performed by: GENERAL PRACTICE

## 2019-05-23 NOTE — PROGRESS NOTES
Subjective   Sean Chen is a 78 y.o. female.     History of Present Illness     Fatigue  Returns with an one week history of increased fatigue associated with nasal congestion, and increased cough, nausea, and generalized weakness. She denies any other URT symptoms and has had no change in her shortness of breath with exertion. She denies any chest pain, hemoptysis, vomiting, diarrhea, rash, dysuria, headache, rash, fever or chills. Her  and a grandchild have had similar symptoms as of late    Chronic Allergic Rhinitis  Patient has a history of allergic rhinitis. Patient's symptoms have included sneezing, clear rhinorrhea, nasal congestion, periorbital pressure and postnasal drip. These symptoms are perennial with seasonal exacerbation. The patient has been suffering from these symptoms for a number of years .     Asthma  Patient's symptoms have included dyspnea and non-productive cough. She denies any chest pain and hemoptysis. The patient has been suffering from these symptoms for a number of years. Medications used in the past to treat these symptoms include beta agonist inhalers and combination beta agonists/steroid inhalers.  She hasd been using her rescue inhaler once or twice monthly on average since last here but has had to increased this to once or twice daily over the last week. Suspected precipitants include upper respiratory infection and allergens. Her  smokes in the house and she has also been able to relate the severity of her symptoms to how much he is smoking    Diabetes  Current symptoms include: mild fatigue. Patient denies paresthesia of the feet, visual disturbances, polydipsia, polyuria, and foot ulcerations. Evaluation to date has been: hemoglobin A1C. Home sugars: have been better since last here. Current treatments: metformin and basal insulin - lantus - 60 units daily. The dose of the latter was increased following her last labs. She has been following her diet more  carefully. Last dilated eye exam more than one year ago.   Lab Results   Component Value Date    HGBA1C 9.90 (H) 02/07/2019      Dyslipidemia  Compliance with treatment has been good. The patient has been following her diet more carefully and continues to exercise intermittently. She is currently being prescribed the following medication for her dyslipidemia - rosuvastatin, omega 3 fatty acids. Patient denies side effects associated with her medications.   Lab Results   Component Value Date    CHOL 115 02/07/2019    CHLPL 111 11/16/2015    TRIG 248 (H) 02/07/2019    HDL 47 (L) 02/07/2019    LDL 18 02/07/2019     Hypertension  Home blood pressure readings: not doing. Associated signs and symptoms: dyspnea and peripheral edema. Patient denies: chest pain, palpitations, orthopnea and paroxysmal nocturnal dyspnea. Current antihypertensive medications includes losartan and amlodipine. Medication compliance: taking as prescribed.   Lab Results   Component Value Date    CREATININE 1.31 (H) 02/07/2019     GERD  Patient has a history of heartburn. Symptoms have been present for a number of years. Symptoms include occasional nausea. The patient denies abdominal bloating, bilious reflux, dysphagia and vomiting or hematemesis. Symptoms appear to be worsened by large meals and lying down. Risk factors present for GERD include caffeine use, obesity and NSAID use. Risk factors absent for GERD are tobacco abuse, alcohol use and ASA use. Studies performed so far include upper endoscopy, result: negative. Treatments tried so far include proton pump inhibitor: pantoprazole. Results of treatment: some improvement in symptom severity, some improvement in symptom frequency.     Osteoarthritis  The patient returns with a history of joint pain.  She describes a history of pain involving multiple joints.  The pain has been present for many years. Since last here, the symptoms have been worse about both hips.  The patient reports that the  pain is aggravated by joint use, walking and cold.  The patient reports that the pain is relieved by heat and rest.  In addition to the pain, the patient also reports stiffness.  The patient reports limitations in the ability to ambulate and work.. Treatments that have been tried include heat, acetaminophen and NSAIDs. Current medications include acetaminophen and naproxen.     Labs  Most recent vitamin D 67    The following portions of the patient's history were reviewed and updated as appropriate: allergies, current medications, past medical history, past social history and problem list.    Review of Systems   Constitutional: Positive for fatigue. Negative for appetite change, chills, fever and unexpected weight change.   HENT: Positive for congestion, hearing loss, sneezing and tinnitus. Negative for ear pain, postnasal drip, rhinorrhea, sore throat and voice change.    Eyes: Negative for visual disturbance.   Respiratory: Positive for cough and shortness of breath (with exertion). Negative for wheezing.    Cardiovascular: Positive for leg swelling. Negative for chest pain and palpitations.   Gastrointestinal: Negative for abdominal pain, blood in stool, constipation, diarrhea, nausea and vomiting.   Endocrine: Negative for polydipsia.   Genitourinary: Negative for difficulty urinating, dysuria, frequency, hematuria, menstrual problem, pelvic pain, urgency, vaginal bleeding and vaginal discharge.   Musculoskeletal: Positive for arthralgias and back pain. Negative for joint swelling, myalgias and neck pain.   Skin: Negative for color change.   Neurological: Negative for tremors, weakness, numbness and headaches.   Psychiatric/Behavioral: Negative for dysphoric mood, sleep disturbance and suicidal ideas. The patient is not nervous/anxious.      Objective   Physical Exam   Constitutional: She is oriented to person, place, and time. No distress.   Bright and in good spirits.  Gait slow and cautious. No apparent  distress at rest. No pallor, jaundice, diaphoresis, or cyanosis.     HENT:   Head: Atraumatic.   Right Ear: Tympanic membrane, external ear and ear canal normal.   Left Ear: Tympanic membrane, external ear and ear canal normal.   Nose: Nose normal.   Mouth/Throat: Oropharynx is clear and moist. Mucous membranes are not pale and not cyanotic.   Eyes: EOM are normal. Pupils are equal, round, and reactive to light. No scleral icterus.   Neck: No JVD present. Carotid bruit is not present. No tracheal deviation present. No thyromegaly present.   Cardiovascular: Normal rate, regular rhythm, S1 normal, S2 normal and intact distal pulses. Exam reveals no gallop, no S3 and no S4.   No murmur heard.  Pulmonary/Chest: No stridor. No respiratory distress. She has no decreased breath sounds. She has wheezes (diffuse - mild - primarily on forced expiration).   Abdominal: Soft. Normal aorta and bowel sounds are normal. She exhibits no distension, no abdominal bruit and no mass. There is no hepatosplenomegaly. There is no tenderness. No hernia.   Musculoskeletal: She exhibits no deformity.        Right hip: She exhibits normal range of motion (pain with internal and to a lesser extent external rotation), no tenderness, no crepitus and no deformity.        Left hip: She exhibits normal range of motion (pain with internal rotation), no tenderness and no crepitus.   Negative straight leg raise.     Vascular Status -  Her right foot exhibits no edema. Her left foot exhibits no edema.  Lymphadenopathy:        Head (right side): No submandibular adenopathy present.        Head (left side): No submandibular adenopathy present.     She has no cervical adenopathy.   Neurological: She is alert and oriented to person, place, and time. She has normal reflexes. She displays normal reflexes. No cranial nerve deficit. She exhibits normal muscle tone. Coordination normal.   Skin: Skin is warm and dry. No rash noted. She is not diaphoretic. No  cyanosis. No pallor. Nails show no clubbing.   Psychiatric: She has a normal mood and affect.     Assessment/Plan   Problems Addressed this Visit        Cardiovascular and Mediastinum    Essential hypertension    Hypertension: at goal. Evidence of target organ damage: none.  Encouraged to continue to work on diet and exercise plan.   Continue current medication  Updated labs drawn.    Relevant Orders    CBC & Differential    Comprehensive Metabolic Panel    CBC Auto Differential       Respiratory    Extrinsic asthma  Moderate persistent asthma. The patient is currently having a mild exacerbation. In general, the patient's disease is well controlled.  Discussed monitoring symptoms and use of quick-relief medications and contacting us early in the course of exacerbations.    Chronic seasonal allergic rhinitis  As above.   Continue current medication.    Viral upper respiratory infection  Viral upper respiratory tract infection  Advised regarding symptomatic treatment.  Discussed the importance of avoiding unnecessary antibiotic therapy.  Suggested OTC remedies.  Encouraged to report if any worse or if any new symptoms.  Call in 5 days if symptoms aren't resolving.       Digestive    Vitamin D deficiency disease  Continue maintenance supplementation with monitoring.    Gastroesophageal reflux disease without esophagitis       Endocrine    Type 2 diabetes mellitus without complication, with long-term current use of insulin (CMS/[facility])  Diabetes mellitus Type II, under unknown control.   Encouraged to continue to pursue ADA diet  Encouraged aerobic exercise.  Continue current medication    Relevant Orders    Hemoglobin A1c       Musculoskeletal and Integument    Generalized osteoarthritis  Reminded regarding symptomatic treatment.   Continue current medication       Other    Dyslipidemia  As above.   Continue current medication.

## 2019-06-10 DIAGNOSIS — M15.9 GENERALIZED OSTEOARTHRITIS: ICD-10-CM

## 2019-06-10 DIAGNOSIS — E78.5 DYSLIPIDEMIA: ICD-10-CM

## 2019-06-10 DIAGNOSIS — Z79.4 TYPE 2 DIABETES MELLITUS WITHOUT COMPLICATION, WITH LONG-TERM CURRENT USE OF INSULIN (HCC): ICD-10-CM

## 2019-06-10 DIAGNOSIS — E11.9 TYPE 2 DIABETES MELLITUS WITHOUT COMPLICATION, WITH LONG-TERM CURRENT USE OF INSULIN (HCC): ICD-10-CM

## 2019-06-10 RX ORDER — GLUCOSAM/CHON-MSM1/C/MANG/BOSW 500-416.6
TABLET ORAL
Qty: 100 EACH | Refills: 5 | Status: SHIPPED | OUTPATIENT
Start: 2019-06-10 | End: 2020-07-06 | Stop reason: SDUPTHER

## 2019-06-10 RX ORDER — NAPROXEN 375 MG/1
TABLET ORAL
Qty: 60 TABLET | Refills: 5 | Status: SHIPPED | OUTPATIENT
Start: 2019-06-10 | End: 2019-09-23 | Stop reason: SDUPTHER

## 2019-06-10 RX ORDER — ROSUVASTATIN CALCIUM 40 MG/1
TABLET, COATED ORAL
Qty: 30 TABLET | Refills: 5 | Status: SHIPPED | OUTPATIENT
Start: 2019-06-10 | End: 2019-09-23 | Stop reason: SDUPTHER

## 2019-07-11 DIAGNOSIS — J45.20 EXTRINSIC ASTHMA, MILD INTERMITTENT, UNCOMPLICATED: ICD-10-CM

## 2019-07-11 DIAGNOSIS — H81.09 MENIERE'S DISEASE, UNSPECIFIED LATERALITY: ICD-10-CM

## 2019-07-11 RX ORDER — MECLIZINE HYDROCHLORIDE 25 MG/1
TABLET ORAL
Qty: 120 TABLET | Refills: 0 | Status: SHIPPED | OUTPATIENT
Start: 2019-07-11 | End: 2019-08-20 | Stop reason: SDUPTHER

## 2019-07-22 DIAGNOSIS — E11.9 TYPE 2 DIABETES MELLITUS WITHOUT COMPLICATION, WITH LONG-TERM CURRENT USE OF INSULIN (HCC): ICD-10-CM

## 2019-07-22 DIAGNOSIS — Z79.4 TYPE 2 DIABETES MELLITUS WITHOUT COMPLICATION, WITH LONG-TERM CURRENT USE OF INSULIN (HCC): ICD-10-CM

## 2019-08-20 DIAGNOSIS — E55.9 VITAMIN D DEFICIENCY DISEASE: ICD-10-CM

## 2019-08-20 DIAGNOSIS — K21.9 GASTROESOPHAGEAL REFLUX DISEASE WITHOUT ESOPHAGITIS: ICD-10-CM

## 2019-08-20 DIAGNOSIS — J45.20 EXTRINSIC ASTHMA, MILD INTERMITTENT, UNCOMPLICATED: ICD-10-CM

## 2019-08-20 DIAGNOSIS — H81.09 MENIERE'S DISEASE, UNSPECIFIED LATERALITY: ICD-10-CM

## 2019-08-20 DIAGNOSIS — J30.9 CHRONIC ALLERGIC RHINITIS: ICD-10-CM

## 2019-08-20 RX ORDER — MECLIZINE HYDROCHLORIDE 25 MG/1
TABLET ORAL
Qty: 120 TABLET | Refills: 5 | Status: SHIPPED | OUTPATIENT
Start: 2019-08-20 | End: 2020-02-11 | Stop reason: SDUPTHER

## 2019-08-20 RX ORDER — MONTELUKAST SODIUM 10 MG/1
10 TABLET ORAL DAILY
Qty: 30 TABLET | Refills: 5 | Status: SHIPPED | OUTPATIENT
Start: 2019-08-20 | End: 2019-09-23 | Stop reason: SDUPTHER

## 2019-08-20 RX ORDER — PANTOPRAZOLE SODIUM 40 MG/1
TABLET, DELAYED RELEASE ORAL
Qty: 30 TABLET | Refills: 5 | Status: SHIPPED | OUTPATIENT
Start: 2019-08-20 | End: 2019-09-23 | Stop reason: SDUPTHER

## 2019-09-23 ENCOUNTER — OFFICE VISIT (OUTPATIENT)
Dept: FAMILY MEDICINE CLINIC | Facility: CLINIC | Age: 79
End: 2019-09-23

## 2019-09-23 VITALS
RESPIRATION RATE: 12 BRPM | HEART RATE: 97 BPM | SYSTOLIC BLOOD PRESSURE: 130 MMHG | BODY MASS INDEX: 30.76 KG/M2 | HEIGHT: 67 IN | DIASTOLIC BLOOD PRESSURE: 80 MMHG | WEIGHT: 196 LBS | OXYGEN SATURATION: 98 % | TEMPERATURE: 96.9 F

## 2019-09-23 DIAGNOSIS — Z00.00 HEALTHCARE MAINTENANCE: ICD-10-CM

## 2019-09-23 DIAGNOSIS — J45.40 MODERATE PERSISTENT EXTRINSIC ASTHMA WITHOUT COMPLICATION: ICD-10-CM

## 2019-09-23 DIAGNOSIS — H81.09 MENIERE'S DISEASE, UNSPECIFIED LATERALITY: ICD-10-CM

## 2019-09-23 DIAGNOSIS — J30.9 CHRONIC ALLERGIC RHINITIS: ICD-10-CM

## 2019-09-23 DIAGNOSIS — D50.8 OTHER IRON DEFICIENCY ANEMIA: ICD-10-CM

## 2019-09-23 DIAGNOSIS — E78.5 DYSLIPIDEMIA: ICD-10-CM

## 2019-09-23 DIAGNOSIS — K21.9 GASTROESOPHAGEAL REFLUX DISEASE WITHOUT ESOPHAGITIS: ICD-10-CM

## 2019-09-23 DIAGNOSIS — J45.20 EXTRINSIC ASTHMA, MILD INTERMITTENT, UNCOMPLICATED: ICD-10-CM

## 2019-09-23 DIAGNOSIS — E11.9 TYPE 2 DIABETES MELLITUS WITHOUT COMPLICATION, WITH LONG-TERM CURRENT USE OF INSULIN (HCC): ICD-10-CM

## 2019-09-23 DIAGNOSIS — J30.2 CHRONIC SEASONAL ALLERGIC RHINITIS: ICD-10-CM

## 2019-09-23 DIAGNOSIS — Z79.4 TYPE 2 DIABETES MELLITUS WITHOUT COMPLICATION, WITH LONG-TERM CURRENT USE OF INSULIN (HCC): ICD-10-CM

## 2019-09-23 DIAGNOSIS — I10 ESSENTIAL HYPERTENSION: Primary | ICD-10-CM

## 2019-09-23 DIAGNOSIS — M15.9 GENERALIZED OSTEOARTHRITIS: ICD-10-CM

## 2019-09-23 DIAGNOSIS — E55.9 VITAMIN D DEFICIENCY DISEASE: ICD-10-CM

## 2019-09-23 PROBLEM — J06.9 VIRAL UPPER RESPIRATORY INFECTION: Status: RESOLVED | Noted: 2019-05-23 | Resolved: 2019-09-23

## 2019-09-23 PROCEDURE — 99214 OFFICE O/P EST MOD 30 MIN: CPT | Performed by: GENERAL PRACTICE

## 2019-09-23 PROCEDURE — 80053 COMPREHEN METABOLIC PANEL: CPT | Performed by: GENERAL PRACTICE

## 2019-09-23 PROCEDURE — 82607 VITAMIN B-12: CPT | Performed by: GENERAL PRACTICE

## 2019-09-23 PROCEDURE — 36415 COLL VENOUS BLD VENIPUNCTURE: CPT | Performed by: GENERAL PRACTICE

## 2019-09-23 PROCEDURE — 85025 COMPLETE CBC W/AUTO DIFF WBC: CPT | Performed by: GENERAL PRACTICE

## 2019-09-23 PROCEDURE — 83036 HEMOGLOBIN GLYCOSYLATED A1C: CPT | Performed by: GENERAL PRACTICE

## 2019-09-23 PROCEDURE — 80061 LIPID PANEL: CPT | Performed by: GENERAL PRACTICE

## 2019-09-23 RX ORDER — ROSUVASTATIN CALCIUM 40 MG/1
40 TABLET, COATED ORAL NIGHTLY
Qty: 30 TABLET | Refills: 5 | Status: SHIPPED | OUTPATIENT
Start: 2019-09-23 | End: 2020-02-11 | Stop reason: SDUPTHER

## 2019-09-23 RX ORDER — CETIRIZINE HYDROCHLORIDE 10 MG/1
10 TABLET ORAL DAILY PRN
Qty: 30 TABLET | Refills: 5 | Status: SHIPPED | OUTPATIENT
Start: 2019-09-23 | End: 2020-02-11 | Stop reason: SDUPTHER

## 2019-09-23 RX ORDER — PANTOPRAZOLE SODIUM 40 MG/1
40 TABLET, DELAYED RELEASE ORAL DAILY
Qty: 30 TABLET | Refills: 5 | Status: SHIPPED | OUTPATIENT
Start: 2019-09-23 | End: 2020-02-11 | Stop reason: SDUPTHER

## 2019-09-23 RX ORDER — NAPROXEN 375 MG/1
375 TABLET ORAL 2 TIMES DAILY
Qty: 60 TABLET | Refills: 5 | Status: SHIPPED | OUTPATIENT
Start: 2019-09-23 | End: 2020-02-11 | Stop reason: SDUPTHER

## 2019-09-23 RX ORDER — MONTELUKAST SODIUM 10 MG/1
10 TABLET ORAL DAILY
Qty: 30 TABLET | Refills: 5 | Status: SHIPPED | OUTPATIENT
Start: 2019-09-23 | End: 2020-02-11 | Stop reason: SDUPTHER

## 2019-09-23 RX ORDER — AMLODIPINE BESYLATE 5 MG/1
5 TABLET ORAL NIGHTLY
Qty: 30 TABLET | Refills: 5 | Status: SHIPPED | OUTPATIENT
Start: 2019-09-23 | End: 2020-02-11 | Stop reason: SDUPTHER

## 2019-09-23 RX ORDER — LOSARTAN POTASSIUM 100 MG/1
100 TABLET ORAL DAILY
Qty: 30 TABLET | Refills: 5 | Status: SHIPPED | OUTPATIENT
Start: 2019-09-23 | End: 2020-02-11 | Stop reason: SDUPTHER

## 2019-09-23 RX ORDER — ALBUTEROL SULFATE 90 UG/1
2 AEROSOL, METERED RESPIRATORY (INHALATION) 4 TIMES DAILY
Qty: 8.5 G | Refills: 5 | Status: SHIPPED | OUTPATIENT
Start: 2019-09-23 | End: 2020-02-11 | Stop reason: SDUPTHER

## 2019-09-23 NOTE — PROGRESS NOTES
Subjective   Sean Chen is a 79 y.o. female.     History of Present Illness     Chronic Allergic Rhinitis  Patient has a history of allergic rhinitis. Patient's symptoms have included sneezing, clear rhinorrhea, nasal congestion, periorbital pressure and postnasal drip. These symptoms are perennial with seasonal exacerbation. The patient has been suffering from these symptoms for a number of years .     Asthma  Patient's symptoms have included dyspnea and non-productive cough. She denies any chest pain and hemoptysis. The patient has been suffering from these symptoms for a number of years. Medications used in the past to treat these symptoms include beta agonist inhalers and combination beta agonists/steroid inhalers.  She continues to use her rescue inhaler once or twice monthly on average.  She has learned that her insurance will no longer cover advair.  Suspected precipitants include upper respiratory infection and allergens. Her  smokes in the house and she has also been able to relate the severity of her symptoms to how much he is smoking    Diabetes  Current symptoms include: mild fatigue. Patient denies paresthesia of the feet, visual disturbances, polydipsia, polyuria, and foot ulcerations. Evaluation to date has been: hemoglobin A1C. Home sugars: have been better since last here. Current treatments: metformin and basal insulin - lantus - 55 units daily.   Lab Results   Component Value Date    HGBA1C 7.60 (H) 05/23/2019      Dyslipidemia  Compliance with treatment has been good. The patient has been following her diet more carefully and continues to exercise intermittently. She is currently being prescribed the following medication for her dyslipidemia - rosuvastatin, omega 3 fatty acids. Patient denies side effects associated with her medications.     Hypertension  Home blood pressure readings: not doing. Associated signs and symptoms: dyspnea and peripheral edema. Patient denies: chest pain,  palpitations, orthopnea and paroxysmal nocturnal dyspnea. Current antihypertensive medications includes losartan and amlodipine. Medication compliance: taking as prescribed.   Lab Results   Component Value Date    CREATININE 1.04 (H) 05/23/2019     Lab Results   Component Value Date    K 4.9 05/23/2019     GERD  Patient has a history of heartburn. Symptoms have been present for a number of years. Symptoms include occasional nausea. The patient denies abdominal bloating, bilious reflux, dysphagia and vomiting or hematemesis. Symptoms appear to be worsened by large meals and lying down. Risk factors present for GERD include caffeine use, obesity and NSAID use. Risk factors absent for GERD are tobacco abuse, alcohol use and ASA use. Studies performed so far include upper endoscopy, result: negative. Treatments tried so far include proton pump inhibitor: pantoprazole. Results of treatment: some improvement in symptom severity, some improvement in symptom frequency.   Lab Results   Component Value Date    WBC 11.89 (H) 05/23/2019    HGB 11.9 (L) 05/23/2019    HCT 38.0 05/23/2019    MCV 89.2 05/23/2019     05/23/2019     Osteoarthritis  The patient returns with a history of joint pain.  She describes a history of pain involving multiple joints.  The pain has been present for many years. Since last here, the symptoms have been worse about both hips.  The patient reports that the pain is aggravated by joint use, walking and cold.  The patient reports that the pain is relieved by heat and rest.  In addition to the pain, the patient also reports stiffness.  The patient reports limitations in the ability to ambulate and work. Treatments that have been tried include heat, acetaminophen and NSAIDs. Current medications include acetaminophen and naproxen.     The following portions of the patient's history were reviewed and updated as appropriate: allergies, current medications, past medical history, past social history and  problem list.    Review of Systems   Constitutional: Positive for fatigue. Negative for appetite change, chills, fever and unexpected weight change.   HENT: Positive for congestion, hearing loss, sneezing and tinnitus. Negative for ear pain, postnasal drip, rhinorrhea, sore throat and voice change.    Eyes: Negative for visual disturbance.   Respiratory: Positive for cough and shortness of breath (with exertion). Negative for wheezing.    Cardiovascular: Positive for leg swelling. Negative for chest pain and palpitations.   Gastrointestinal: Negative for abdominal pain, blood in stool, constipation, diarrhea, nausea and vomiting.   Endocrine: Negative for polydipsia.   Genitourinary: Negative for difficulty urinating, dysuria, frequency, hematuria, menstrual problem, pelvic pain, urgency, vaginal bleeding and vaginal discharge.   Musculoskeletal: Positive for arthralgias and back pain. Negative for joint swelling, myalgias and neck pain.   Skin: Negative for color change.   Neurological: Negative for tremors, weakness, numbness and headaches.   Psychiatric/Behavioral: Negative for dysphoric mood, sleep disturbance and suicidal ideas. The patient is not nervous/anxious.      Objective   Physical Exam   Constitutional: She is oriented to person, place, and time. No distress.   Bright and in good spirits.  Gait slow and cautious. No apparent distress at rest. No pallor, jaundice, diaphoresis, or cyanosis.     HENT:   Head: Atraumatic.   Right Ear: Tympanic membrane, external ear and ear canal normal.   Left Ear: Tympanic membrane, external ear and ear canal normal.   Nose: Nose normal.   Mouth/Throat: Oropharynx is clear and moist. Mucous membranes are not pale and not cyanotic.   Eyes: EOM are normal. Pupils are equal, round, and reactive to light. No scleral icterus.   Neck: No JVD present. Carotid bruit is not present. No tracheal deviation present. No thyromegaly present.   Cardiovascular: Normal rate, regular  rhythm, S1 normal, S2 normal and intact distal pulses. Exam reveals no gallop, no S3 and no S4.   No murmur heard.  Pulmonary/Chest: No stridor. No respiratory distress. She has no decreased breath sounds. She has wheezes (diffuse - mild - primarily on forced expiration).   Abdominal: Soft. Normal aorta and bowel sounds are normal. She exhibits no distension, no abdominal bruit and no mass. There is no hepatosplenomegaly. There is no tenderness. No hernia.   Musculoskeletal: She exhibits no deformity.   No peripheral joint redness or warmth.     Vascular Status -  Her right foot exhibits no edema. Her left foot exhibits no edema.  Lymphadenopathy:        Head (right side): No submandibular adenopathy present.        Head (left side): No submandibular adenopathy present.     She has no cervical adenopathy.   Neurological: She is alert and oriented to person, place, and time. She has normal reflexes. She displays normal reflexes. No cranial nerve deficit. She exhibits normal muscle tone. Coordination normal.   Skin: Skin is warm and dry. No rash noted. She is not diaphoretic. No cyanosis. No pallor. Nails show no clubbing.   Psychiatric: She has a normal mood and affect.     Assessment/Plan   Problems Addressed this Visit        Cardiovascular and Mediastinum    Essential hypertension   Hypertension: at goal. Evidence of target organ damage: none.  Encouraged to continue to work on diet and exercise plan.   Continue current medication  Updated labs drawn.    Relevant Medications    amLODIPine (NORVASC) 5 MG tablet    losartan (COZAAR) 100 MG tablet    Other Relevant Orders    CBC & Differential    Comprehensive Metabolic Panel    CBC Auto Differential       Respiratory    Extrinsic asthma  Moderate persistent asthma. The patient is not currently having an exacerbation. In general, the patient's disease is well controlled.  Will replace advair with wixela.  Patient will report if she should have any difficulty  obtaining this  Discussed monitoring symptoms and use of quick-relief medications and contacting us early in the course of exacerbations.    Relevant Medications    fluticasone-salmeterol (ADVAIR) 250-50 MCG/DOSE DISKUS    montelukast (SINGULAIR) 10 MG tablet    albuterol sulfate HFA (PROAIR HFA) 108 (90 Base) MCG/ACT inhaler    Chronic seasonal allergic rhinitis    Relevant Medications    cetirizine (zyrTEC) 10 MG tablet    montelukast (SINGULAIR) 10 MG tablet       Digestive    Vitamin D deficiency disease  Continue supplementation with monitoring.    Relevant Medications    Cholecalciferol (VITAMIN D) 1000 units tablet    Gastroesophageal reflux disease without esophagitis    Relevant Medications    pantoprazole (PROTONIX) 40 MG EC tablet       Endocrine    Type 2 diabetes mellitus without complication, with long-term current use of insulin (CMS/[facility])  Diabetes mellitus Type II, under good control.   Encouraged to continue to pursue ADA diet  Encouraged aerobic exercise.  Continue current medication  Reminded to undergo an updated diabetic eye exam    Relevant Medications    Insulin Glargine (TOUJEO SOLOSTAR) 300 UNIT/ML solution pen-injector injection    metFORMIN (GLUCOPHAGE) 1000 MG tablet    Other Relevant Orders    Hemoglobin A1c    MicroAlbumin, Urine, Random - Urine, Clean Catch    Vitamin B12       Nervous and Auditory    Meniere's disease       Musculoskeletal and Integument    Generalized osteoarthritis  Reminded regarding symptomatic treatment.   Continue current medication    Relevant Medications    naproxen (NAPROSYN) 375 MG tablet       Hematopoietic and Hemostatic    Iron deficiency anemia       Other    Dyslipidemia  As above.   Continue current medication.    Relevant Medications    rosuvastatin (CRESTOR) 40 MG tablet    Other Relevant Orders    Lipid Panel    Healthcare maintenance  Patient will obtain a flu shot through her pharmacy

## 2019-09-24 LAB
ALBUMIN SERPL-MCNC: 4.8 G/DL (ref 3.5–5.2)
ALBUMIN/GLOB SERPL: 2 G/DL
ALP SERPL-CCNC: 93 U/L (ref 39–117)
ALT SERPL W P-5'-P-CCNC: 9 U/L (ref 1–33)
ANION GAP SERPL CALCULATED.3IONS-SCNC: 15.5 MMOL/L (ref 5–15)
AST SERPL-CCNC: 16 U/L (ref 1–32)
BASOPHILS # BLD AUTO: 0.11 10*3/MM3 (ref 0–0.2)
BASOPHILS NFR BLD AUTO: 0.8 % (ref 0–1.5)
BILIRUB SERPL-MCNC: 0.3 MG/DL (ref 0.2–1.2)
BUN BLD-MCNC: 17 MG/DL (ref 8–23)
BUN/CREAT SERPL: 15.6 (ref 7–25)
CALCIUM SPEC-SCNC: 9.7 MG/DL (ref 8.6–10.5)
CHLORIDE SERPL-SCNC: 101 MMOL/L (ref 98–107)
CHOLEST SERPL-MCNC: 103 MG/DL (ref 0–200)
CO2 SERPL-SCNC: 22.5 MMOL/L (ref 22–29)
CREAT BLD-MCNC: 1.09 MG/DL (ref 0.57–1)
DEPRECATED RDW RBC AUTO: 44.1 FL (ref 37–54)
EOSINOPHIL # BLD AUTO: 0.4 10*3/MM3 (ref 0–0.4)
EOSINOPHIL NFR BLD AUTO: 2.7 % (ref 0.3–6.2)
ERYTHROCYTE [DISTWIDTH] IN BLOOD BY AUTOMATED COUNT: 14 % (ref 12.3–15.4)
GFR SERPL CREATININE-BSD FRML MDRD: 49 ML/MIN/1.73
GLOBULIN UR ELPH-MCNC: 2.4 GM/DL
GLUCOSE BLD-MCNC: 192 MG/DL (ref 65–99)
HBA1C MFR BLD: 9.38 % (ref 4.8–5.6)
HCT VFR BLD AUTO: 35.7 % (ref 34–46.6)
HDLC SERPL-MCNC: 55 MG/DL (ref 40–60)
HGB BLD-MCNC: 11.6 G/DL (ref 12–15.9)
IMM GRANULOCYTES # BLD AUTO: 0.08 10*3/MM3 (ref 0–0.05)
IMM GRANULOCYTES NFR BLD AUTO: 0.5 % (ref 0–0.5)
LDLC SERPL CALC-MCNC: 21 MG/DL (ref 0–100)
LDLC/HDLC SERPL: 0.39 {RATIO}
LYMPHOCYTES # BLD AUTO: 3.12 10*3/MM3 (ref 0.7–3.1)
LYMPHOCYTES NFR BLD AUTO: 21.4 % (ref 19.6–45.3)
MCH RBC QN AUTO: 28.2 PG (ref 26.6–33)
MCHC RBC AUTO-ENTMCNC: 32.5 G/DL (ref 31.5–35.7)
MCV RBC AUTO: 86.7 FL (ref 79–97)
MONOCYTES # BLD AUTO: 0.88 10*3/MM3 (ref 0.1–0.9)
MONOCYTES NFR BLD AUTO: 6 % (ref 5–12)
NEUTROPHILS # BLD AUTO: 10.01 10*3/MM3 (ref 1.7–7)
NEUTROPHILS NFR BLD AUTO: 68.6 % (ref 42.7–76)
NRBC BLD AUTO-RTO: 0 /100 WBC (ref 0–0.2)
PLATELET # BLD AUTO: 218 10*3/MM3 (ref 140–450)
PMV BLD AUTO: 11 FL (ref 6–12)
POTASSIUM BLD-SCNC: 5.1 MMOL/L (ref 3.5–5.2)
PROT SERPL-MCNC: 7.2 G/DL (ref 6–8.5)
RBC # BLD AUTO: 4.12 10*6/MM3 (ref 3.77–5.28)
SODIUM BLD-SCNC: 139 MMOL/L (ref 136–145)
TRIGL SERPL-MCNC: 133 MG/DL (ref 0–150)
VIT B12 BLD-MCNC: 227 PG/ML (ref 211–946)
VLDLC SERPL-MCNC: 26.6 MG/DL (ref 5–40)
WBC NRBC COR # BLD: 14.6 10*3/MM3 (ref 3.4–10.8)

## 2019-10-23 RX ORDER — ERGOCALCIFEROL 1.25 MG/1
CAPSULE ORAL
Qty: 4 CAPSULE | Refills: 0 | Status: SHIPPED | OUTPATIENT
Start: 2019-10-23 | End: 2019-12-03 | Stop reason: SDUPTHER

## 2019-12-03 RX ORDER — ERGOCALCIFEROL 1.25 MG/1
CAPSULE ORAL
Qty: 4 CAPSULE | Refills: 0 | Status: SHIPPED | OUTPATIENT
Start: 2019-12-03 | End: 2020-07-06 | Stop reason: SDUPTHER

## 2020-01-14 DIAGNOSIS — J45.40 MODERATE PERSISTENT EXTRINSIC ASTHMA WITHOUT COMPLICATION: ICD-10-CM

## 2020-02-11 ENCOUNTER — OFFICE VISIT (OUTPATIENT)
Dept: FAMILY MEDICINE CLINIC | Facility: CLINIC | Age: 80
End: 2020-02-11

## 2020-02-11 DIAGNOSIS — J30.9 CHRONIC ALLERGIC RHINITIS: ICD-10-CM

## 2020-02-11 DIAGNOSIS — E55.9 VITAMIN D DEFICIENCY DISEASE: ICD-10-CM

## 2020-02-11 DIAGNOSIS — J45.40 MODERATE PERSISTENT EXTRINSIC ASTHMA WITHOUT COMPLICATION: ICD-10-CM

## 2020-02-11 DIAGNOSIS — E78.5 DYSLIPIDEMIA: ICD-10-CM

## 2020-02-11 DIAGNOSIS — Z79.4 TYPE 2 DIABETES MELLITUS WITHOUT COMPLICATION, WITH LONG-TERM CURRENT USE OF INSULIN (HCC): ICD-10-CM

## 2020-02-11 DIAGNOSIS — I10 ESSENTIAL HYPERTENSION: Primary | ICD-10-CM

## 2020-02-11 DIAGNOSIS — R74.8 ELEVATED LIVER ENZYMES: ICD-10-CM

## 2020-02-11 DIAGNOSIS — J45.20 EXTRINSIC ASTHMA, MILD INTERMITTENT, UNCOMPLICATED: ICD-10-CM

## 2020-02-11 DIAGNOSIS — E11.9 TYPE 2 DIABETES MELLITUS WITHOUT COMPLICATION, WITH LONG-TERM CURRENT USE OF INSULIN (HCC): ICD-10-CM

## 2020-02-11 DIAGNOSIS — H81.09 MENIERE'S DISEASE, UNSPECIFIED LATERALITY: ICD-10-CM

## 2020-02-11 DIAGNOSIS — E53.8 LOW SERUM VITAMIN B12: ICD-10-CM

## 2020-02-11 DIAGNOSIS — K21.9 GASTROESOPHAGEAL REFLUX DISEASE WITHOUT ESOPHAGITIS: ICD-10-CM

## 2020-02-11 DIAGNOSIS — Z00.00 HEALTHCARE MAINTENANCE: ICD-10-CM

## 2020-02-11 DIAGNOSIS — J30.2 CHRONIC SEASONAL ALLERGIC RHINITIS: ICD-10-CM

## 2020-02-11 DIAGNOSIS — E78.2 MIXED HYPERLIPIDEMIA: ICD-10-CM

## 2020-02-11 DIAGNOSIS — M15.9 GENERALIZED OSTEOARTHRITIS: ICD-10-CM

## 2020-02-11 PROCEDURE — 96372 THER/PROPH/DIAG INJ SC/IM: CPT | Performed by: GENERAL PRACTICE

## 2020-02-11 PROCEDURE — G0439 PPPS, SUBSEQ VISIT: HCPCS | Performed by: GENERAL PRACTICE

## 2020-02-11 PROCEDURE — 80053 COMPREHEN METABOLIC PANEL: CPT | Performed by: GENERAL PRACTICE

## 2020-02-11 PROCEDURE — 80061 LIPID PANEL: CPT | Performed by: GENERAL PRACTICE

## 2020-02-11 PROCEDURE — 82043 UR ALBUMIN QUANTITATIVE: CPT | Performed by: GENERAL PRACTICE

## 2020-02-11 PROCEDURE — 82306 VITAMIN D 25 HYDROXY: CPT | Performed by: GENERAL PRACTICE

## 2020-02-11 PROCEDURE — 85025 COMPLETE CBC W/AUTO DIFF WBC: CPT | Performed by: GENERAL PRACTICE

## 2020-02-11 PROCEDURE — 36415 COLL VENOUS BLD VENIPUNCTURE: CPT | Performed by: GENERAL PRACTICE

## 2020-02-11 PROCEDURE — 83036 HEMOGLOBIN GLYCOSYLATED A1C: CPT | Performed by: GENERAL PRACTICE

## 2020-02-11 RX ORDER — LOSARTAN POTASSIUM 100 MG/1
100 TABLET ORAL DAILY
Qty: 30 TABLET | Refills: 5 | Status: SHIPPED | OUTPATIENT
Start: 2020-02-11 | End: 2020-07-06 | Stop reason: SDUPTHER

## 2020-02-11 RX ORDER — CYANOCOBALAMIN 1000 UG/ML
1000 INJECTION, SOLUTION INTRAMUSCULAR; SUBCUTANEOUS
Status: DISCONTINUED | OUTPATIENT
Start: 2020-02-11 | End: 2020-08-24

## 2020-02-11 RX ORDER — ALBUTEROL SULFATE 90 UG/1
2 AEROSOL, METERED RESPIRATORY (INHALATION) 4 TIMES DAILY
Qty: 8.5 G | Refills: 5 | Status: SHIPPED | OUTPATIENT
Start: 2020-02-11 | End: 2020-07-06 | Stop reason: SDUPTHER

## 2020-02-11 RX ORDER — POLYMYXIN B SULFATE AND TRIMETHOPRIM 1; 10000 MG/ML; [USP'U]/ML
SOLUTION OPHTHALMIC
COMMUNITY
Start: 2020-01-06 | End: 2020-02-11

## 2020-02-11 RX ORDER — PREDNISOLONE ACETATE 10 MG/ML
SUSPENSION/ DROPS OPHTHALMIC
COMMUNITY
Start: 2020-01-06 | End: 2020-02-11

## 2020-02-11 RX ORDER — PANTOPRAZOLE SODIUM 40 MG/1
40 TABLET, DELAYED RELEASE ORAL DAILY
Qty: 30 TABLET | Refills: 5 | Status: SHIPPED | OUTPATIENT
Start: 2020-02-11 | End: 2020-07-06 | Stop reason: SDUPTHER

## 2020-02-11 RX ORDER — LANOLIN ALCOHOL/MO/W.PET/CERES
1000 CREAM (GRAM) TOPICAL DAILY
Qty: 30 TABLET | Refills: 5 | Status: SHIPPED | OUTPATIENT
Start: 2020-02-11 | End: 2020-07-06 | Stop reason: SDUPTHER

## 2020-02-11 RX ORDER — MECLIZINE HYDROCHLORIDE 25 MG/1
25 TABLET ORAL EVERY 6 HOURS
Qty: 120 TABLET | Refills: 5 | Status: SHIPPED | OUTPATIENT
Start: 2020-02-11 | End: 2020-07-06 | Stop reason: SDUPTHER

## 2020-02-11 RX ORDER — NAPROXEN 375 MG/1
375 TABLET ORAL 2 TIMES DAILY
Qty: 60 TABLET | Refills: 5 | Status: SHIPPED | OUTPATIENT
Start: 2020-02-11 | End: 2020-07-06 | Stop reason: SDUPTHER

## 2020-02-11 RX ORDER — CETIRIZINE HYDROCHLORIDE 10 MG/1
10 TABLET ORAL DAILY PRN
Qty: 30 TABLET | Refills: 5 | Status: SHIPPED | OUTPATIENT
Start: 2020-02-11 | End: 2020-07-06 | Stop reason: SDUPTHER

## 2020-02-11 RX ORDER — MONTELUKAST SODIUM 10 MG/1
10 TABLET ORAL DAILY
Qty: 30 TABLET | Refills: 5 | Status: SHIPPED | OUTPATIENT
Start: 2020-02-11 | End: 2020-07-06 | Stop reason: SDUPTHER

## 2020-02-11 RX ORDER — ROSUVASTATIN CALCIUM 40 MG/1
40 TABLET, COATED ORAL NIGHTLY
Qty: 30 TABLET | Refills: 5 | Status: SHIPPED | OUTPATIENT
Start: 2020-02-11 | End: 2020-07-06 | Stop reason: SDUPTHER

## 2020-02-11 RX ORDER — AMLODIPINE BESYLATE 5 MG/1
5 TABLET ORAL NIGHTLY
Qty: 30 TABLET | Refills: 5 | Status: SHIPPED | OUTPATIENT
Start: 2020-02-11 | End: 2020-07-06 | Stop reason: SDUPTHER

## 2020-02-11 RX ORDER — TOBRAMYCIN 3 MG/ML
SOLUTION/ DROPS OPHTHALMIC
COMMUNITY
Start: 2020-01-06 | End: 2020-02-11

## 2020-02-11 RX ADMIN — CYANOCOBALAMIN 1000 MCG: 1000 INJECTION, SOLUTION INTRAMUSCULAR; SUBCUTANEOUS at 14:15

## 2020-02-11 NOTE — PROGRESS NOTES
The ABCs of the Annual Wellness Visit  Subsequent Medicare Wellness Visit    Subjective   History of Present Illness:  Sean Chen is a 79 y.o. female who presents for a Subsequent Medicare Wellness Visit.    Chronic Allergic Rhinitis  Patient has a history of allergic rhinitis. Patient's symptoms have included sneezing, clear rhinorrhea, nasal congestion, periorbital pressure and postnasal drip. These symptoms are perennial with seasonal exacerbation.  Through winter she has done well.  The patient has been suffering from these symptoms for a number of years .     Asthma  Patient's symptoms have included dyspnea and non-productive cough. She denies any chest pain and hemoptysis. The patient has been suffering from these symptoms for a number of years. Medications used in the past to treat these symptoms include beta agonist inhalers and combination beta agonists/steroid inhalers.  She has been using generic advair since last here and has noted an increase in her symptoms.  She has been using her rescue inhaler every day or two.  Suspected precipitants include upper respiratory infection and allergens. Her  continues to smoke in the house and she has also been able to relate the severity of her symptoms to how much he is smoking    Diabetes  Current symptoms include: mild fatigue. Patient denies paresthesia of the feet, visual disturbances, polydipsia, polyuria, and foot ulcerations. Evaluation to date has been: hemoglobin A1C. Home sugars: have been better since last here. Current treatments: metformin and basal insulin - toujeo - 60 units daily.   Lab Results   Component Value Date    HGBA1C 8.20 (H) 02/11/2020      Dyslipidemia  Compliance with treatment has been good. The patient has been following her diet more carefully and continues to exercise intermittently. She is currently being prescribed the following medication for her dyslipidemia - rosuvastatin, omega 3 fatty acids. Patient denies side  effects associated with her medications.   Lab Results   Component Value Date    CHOL 128 02/11/2020    CHLPL 111 11/16/2015    TRIG 170 (H) 02/11/2020    HDL 63 (H) 02/11/2020    LDL 31 02/11/2020     Hypertension  Home blood pressure readings: not doing. Associated signs and symptoms: dyspnea and peripheral edema. Patient denies: chest pain, palpitations, orthopnea and paroxysmal nocturnal dyspnea. Current antihypertensive medications includes losartan and amlodipine. Medication compliance: taking as prescribed.   Lab Results   Component Value Date    GLUCOSE 161 (H) 02/11/2020    BUN 30 (H) 02/11/2020    CREATININE 1.38 (H) 02/11/2020    EGFRIFNONA 37 (L) 02/11/2020    BCR 21.7 02/11/2020    K 4.8 02/11/2020    CO2 20.2 (L) 02/11/2020    CALCIUM 9.7 02/11/2020    ALBUMIN 4.40 02/11/2020    LABIL2 1.8 11/16/2015    AST 16 02/11/2020    ALT 9 02/11/2020     GERD  Patient has a history of heartburn. Symptoms have been present for a number of years. Symptoms include occasional nausea. The patient denies abdominal bloating, bilious reflux, dysphagia and vomiting or hematemesis. Symptoms appear to be worsened by large meals and lying down. Risk factors present for GERD include caffeine use, obesity and NSAID use. Risk factors absent for GERD are tobacco abuse, alcohol use and ASA use. Studies performed so far include upper endoscopy, result: negative. Treatments tried so far include proton pump inhibitor: pantoprazole. Results of treatment: some improvement in symptom severity, some improvement in symptom frequency.   Lab Results   Component Value Date    WBC 11.57 (H) 02/11/2020    HGB 11.7 (L) 02/11/2020    HCT 35.6 02/11/2020    MCV 84.6 02/11/2020     02/11/2020     Osteoarthritis  The patient returns with a history of joint pain.  She describes a history of pain involving multiple joints.  The pain has been present for many years. Since last here, the symptoms have been relatively stable.  The patient  reports that the pain is aggravated by joint use, walking and cold.  The patient reports that the pain is relieved by heat and rest.  In addition to the pain, the patient also reports stiffness.  The patient reports limitations in the ability to ambulate and work. Treatments that have been tried include heat, acetaminophen and NSAIDs. Current medications include acetaminophen and naproxen.     Labs  Lab Results   Component Value Date    [id number] 227 09/23/2019       HEALTH RISK ASSESSMENT    Recent Hospitalizations:  No hospitalization(s) within the last year.    Current Medical Providers:  Patient Care Team:  Ganga Gallardo MD as PCP - General  SamiGanga lake MD as PCP - Family Medicine    Smoking Status:  Social History     Tobacco Use   Smoking Status Never Smoker   Smokeless Tobacco Never Used       Alcohol Consumption:  Social History     Substance and Sexual Activity   Alcohol Use No       Depression Screen:   PHQ-2/PHQ-9 Depression Screening 2/11/2020   Little interest or pleasure in doing things 0   Feeling down, depressed, or hopeless 0   Trouble falling or staying asleep, or sleeping too much 0   Feeling tired or having little energy 0   Poor appetite or overeating 0   Feeling bad about yourself - or that you are a failure or have let yourself or your family down 0   Trouble concentrating on things, such as reading the newspaper or watching television 0   Moving or speaking so slowly that other people could have noticed. Or the opposite - being so fidgety or restless that you have been moving around a lot more than usual 0   Thoughts that you would be better off dead, or of hurting yourself in some way 0   Total Score 0   If you checked off any problems, how difficult have these problems made it for you to do your work, take care of things at home, or get along with other people? Not difficult at all       Fall Risk Screen:  STEADI Fall Risk Assessment was completed, and patient is at  LOW risk for falls.Assessment completed on:2/11/2020    Health Habits and Functional and Cognitive Screening:  Functional & Cognitive Status 2/11/2020   Do you have difficulty preparing food and eating? No   Do you have difficulty bathing yourself, getting dressed or grooming yourself? No   Do you have difficulty using the toilet? No   Do you have difficulty moving around from place to place? No   Do you have trouble with steps or getting out of a bed or a chair? No   Current Diet Well Balanced Diet   Dental Exam Not up to date   Eye Exam Up to date   Exercise (times per week) 5 times per week   Current Exercise Activities Include Walking   Do you need help using the phone?  No   Are you deaf or do you have serious difficulty hearing?  No   Do you need help with transportation? No   Do you need help shopping? No   Do you need help preparing meals?  No   Do you need help with housework?  No   Do you need help with laundry? No   Do you need help taking your medications? No   Do you need help managing money? No   Do you ever drive or ride in a car without wearing a seat belt? No   Have you felt unusual stress, anger or loneliness in the last month? No   Who do you live with? Spouse   If you need help, do you have trouble finding someone available to you? No   Have you been bothered in the last four weeks by sexual problems? No   Do you have difficulty concentrating, remembering or making decisions? No     Does the patient have evidence of cognitive impairment? No    Asprin use counseling:Does not need ASA (and currently is not on it)    Age-appropriate Screening Schedule:  Refer to the list below for future screening recommendations based on patient's age, sex and/or medical conditions. Orders for these recommended tests are listed in the plan section. The patient has been provided with a written plan.    Health Maintenance   Topic Date Due   • ZOSTER VACCINE (1 of 2) 06/29/1991   • DIABETIC EYE EXAM  06/30/2016   •  MAMMOGRAM  01/30/2020   • DXA SCAN  01/30/2020   • HEMOGLOBIN A1C  08/11/2020   • TDAP/TD VACCINES (2 - Td) 11/29/2020   • COLONOSCOPY  12/18/2020   • LIPID PANEL  02/11/2021   • URINE MICROALBUMIN  02/11/2021   • INFLUENZA VACCINE  Completed          The following portions of the patient's history were reviewed and updated as appropriate: allergies, current medications, past family history, past medical history, past social history, past surgical history and problem list.    Outpatient Medications Prior to Visit   Medication Sig Dispense Refill   • BD ULTRA-FINE PEN NEEDLES 29G X 12.7MM misc USE WITH INSULIN 50 each 5   • TRUEPLUS LANCETS 28G misc TEST 3 TIMES DAILY. 100 each 5   • TRUETRACK TEST test strip TEST 3 TIMES DAILY AS DIRECTED. 100 each 5   • vitamin D (ERGOCALCIFEROL) 1.25 MG (80826 UT) capsule capsule TAKE 1 CAPSULE ONCE WEEKLY. 4 capsule 0   • vitamin E 400 UNIT capsule Take 400 Units by mouth Daily.     • Zoster Vac Recomb Adjuvanted (SHINGRIX) 50 MCG reconstituted suspension Inject 50 mcg into the shoulder, thigh, or buttocks See Admin Instructions. Repeat in 2-6 months 1 each 1   • albuterol sulfate HFA (PROAIR HFA) 108 (90 Base) MCG/ACT inhaler Inhale 2 puffs 4 (Four) Times a Day. 8.5 g 5   • amLODIPine (NORVASC) 5 MG tablet Take 1 tablet by mouth Every Night. 30 tablet 5   • cetirizine (zyrTEC) 10 MG tablet Take 1 tablet by mouth Daily As Needed for Allergies. 30 tablet 5   • Cholecalciferol (VITAMIN D) 1000 units tablet Take 1 tablet by mouth Daily. 30 tablet 5   • fluticasone-salmeterol (ADVAIR) 250-50 MCG/DOSE DISKUS Inhale 1 puff 2 (Two) Times a Day. 60 each 5   • Insulin Glargine (TOUJEO SOLOSTAR) 300 UNIT/ML solution pen-injector injection Inject 60 Units under the skin into the appropriate area as directed Daily. 6 pen 5   • losartan (COZAAR) 100 MG tablet Take 1 tablet by mouth Daily. 30 tablet 5   • meclizine (ANTIVERT) 25 MG tablet TAKE 1 TAB EVERY 6 HOURS 120 tablet 5   • metFORMIN  (GLUCOPHAGE) 1000 MG tablet Take 1 tablet by mouth 2 (Two) Times a Day. 60 tablet 5   • montelukast (SINGULAIR) 10 MG tablet Take 1 tablet by mouth Daily. 30 tablet 5   • naproxen (NAPROSYN) 375 MG tablet Take 1 tablet by mouth 2 (Two) Times a Day. 60 tablet 5   • pantoprazole (PROTONIX) 40 MG EC tablet Take 1 tablet by mouth Daily. 30 tablet 5   • prednisoLONE acetate (PRED FORTE) 1 % ophthalmic suspension      • rosuvastatin (CRESTOR) 40 MG tablet Take 1 tablet by mouth Every Night. 30 tablet 5   • tobramycin 0.3 % solution ophthalmic solution      • trimethoprim-polymyxin b (POLYTRIM) 06301-3.1 UNIT/ML-% ophthalmic solution        No facility-administered medications prior to visit.        Patient Active Problem List   Diagnosis   • Iron deficiency anemia   • Type 2 diabetes mellitus without complication, with long-term current use of insulin (CMS/[facility])   • Vitamin D deficiency disease   • Extrinsic asthma   • Essential hypertension   • Mixed hyperlipidemia   • Generalized osteoarthritis   • Gastroesophageal reflux disease without esophagitis   • Meniere's disease   • Elevated liver enzymes   • Chronic seasonal allergic rhinitis   • Healthcare maintenance   • Encounter for immunization   • Encounter for screening mammogram for breast cancer   • Low serum vitamin B12       Advanced Care Planning:  ACP discussion was held with the patient during this visit. Patient does not have an advance directive, information provided.    Review of Systems   Constitutional: Positive for fatigue. Negative for appetite change, chills, fever and unexpected weight change.   HENT: Positive for congestion, hearing loss, sneezing and tinnitus. Negative for ear pain, postnasal drip, rhinorrhea, sore throat and voice change.    Eyes: Negative for visual disturbance.   Respiratory: Positive for cough and shortness of breath (with exertion). Negative for wheezing.    Cardiovascular: Positive for leg swelling. Negative for chest pain and  "palpitations.   Gastrointestinal: Negative for abdominal pain, blood in stool, constipation, diarrhea, nausea and vomiting.   Endocrine: Negative for polydipsia and polyuria.   Genitourinary: Negative for difficulty urinating, dysuria, frequency, hematuria and urgency.   Musculoskeletal: Positive for arthralgias and back pain. Negative for joint swelling, myalgias and neck pain.   Skin: Negative for rash.   Neurological: Negative for weakness, numbness and headaches.   Psychiatric/Behavioral: Negative for dysphoric mood and sleep disturbance. The patient is not nervous/anxious.      Compared to one year ago, the patient feels her physical health is the same.  Compared to one year ago, the patient feels her mental health is the same.    Reviewed chart for potential of high risk medication in the elderly: not applicable  Reviewed chart for potential of harmful drug interactions in the elderly:yes    Objective         Vitals:    02/11/20 1311   Pulse: 58   Temp: 98.9 °F (37.2 °C)   TempSrc: Temporal   SpO2: 98%   Weight: 87.1 kg (192 lb)   Height: 170.2 cm (67\")       Body mass index is 30.07 kg/m².  Discussed the patient's BMI with her. The BMI is above average; BMI management plan is completed.    Physical Exam   Constitutional: She is oriented to person, place, and time. No distress.   Bright and in good spirits.  Gait slow and cautious. No apparent distress at rest. No pallor, jaundice, diaphoresis, or cyanosis.     HENT:   Head: Atraumatic.   Right Ear: Tympanic membrane, external ear and ear canal normal.   Left Ear: Tympanic membrane, external ear and ear canal normal.   Nose: Nose normal.   Mouth/Throat: Oropharynx is clear and moist. Mucous membranes are not pale and not cyanotic.   Eyes: Pupils are equal, round, and reactive to light. EOM are normal. No scleral icterus.   Neck: No JVD present. Carotid bruit is not present. No tracheal deviation present. No thyromegaly present.   Cardiovascular: Normal rate, " regular rhythm, S1 normal, S2 normal and intact distal pulses. Exam reveals no gallop, no S3 and no S4.   No murmur heard.  Pulmonary/Chest: No stridor. No respiratory distress. She has no decreased breath sounds. She has wheezes (diffuse - mild - primarily on forced expiration).   Abdominal: Soft. Normal aorta and bowel sounds are normal. She exhibits no distension, no abdominal bruit and no mass. There is no hepatosplenomegaly. There is no tenderness. No hernia.   Musculoskeletal: She exhibits no deformity.   No peripheral joint redness or warmth.     Vascular Status -  Her right foot exhibits no edema. Her left foot exhibits no edema.  Lymphadenopathy:        Head (right side): No submandibular adenopathy present.        Head (left side): No submandibular adenopathy present.     She has no cervical adenopathy.   Neurological: She is alert and oriented to person, place, and time. She has normal reflexes. She displays normal reflexes. No cranial nerve deficit. She exhibits normal muscle tone. Coordination normal.   Skin: Skin is warm and dry. No rash noted. She is not diaphoretic. No cyanosis. No pallor. Nails show no clubbing.   Psychiatric: She has a normal mood and affect.     Assessment/Plan   Medicare Risks and Personalized Health Plan  CMS Preventative Services Quick Reference  Advance Directive Discussion  Breast Cancer/Mammogram Screening  Colon Cancer Screening  Fall Risk  Immunizations Discussed/Encouraged (specific immunizations; Shingrix )  Obesity/Overweight     The above risks/problems have been discussed with the patient.  Pertinent information has been shared with the patient in the After Visit Summary.  Follow up plans and orders are seen below in the Assessment/Plan Section.    Diagnoses and all orders for this visit:    1. Essential hypertension   Hypertension: at goal. Evidence of target organ damage: none.  Encouraged to continue to work on diet and exercise plan.   Continue current  medication  Updated labs drawn.  -     CBC & Differential  -     Comprehensive Metabolic Panel  -     amLODIPine (NORVASC) 5 MG tablet; Take 1 tablet by mouth Every Night.  Dispense: 30 tablet; Refill: 5  -     losartan (COZAAR) 100 MG tablet; Take 1 tablet by mouth Daily.  Dispense: 30 tablet; Refill: 5  -     CBC Auto Differential    2. Moderate persistent extrinsic asthma without complication  Mild persistent asthma. The patient is not currently having an exacerbation. In general, the patient's disease is fairly well controlled.  We will titrate generic advair to 500-51 puff twice daily  Discussed monitoring symptoms and use of quick-relief medications and contacting us early in the course of exacerbations.  -     fluticasone-salmeterol (ADVAIR) 500-50 MCG/DOSE DISKUS; 1 puff twice daily  Dispense: 60 each; Refill: 5    3. Chronic seasonal allergic rhinitis  Reminded regarding allergen avoidance.  Continue current medication  -     cetirizine (zyrTEC) 10 MG tablet; Take 1 tablet by mouth Daily As Needed for Allergies.  Dispense: 30 tablet; Refill: 5    4. Vitamin D deficiency disease  Continue supplementation with monitoring.  -     Vitamin D 25 Hydroxy  -     Cholecalciferol (D-1000) 25 MCG (1000 UT) tablet; Take 1 tablet by mouth Daily.  Dispense: 30 tablet; Refill: 11    5. Gastroesophageal reflux disease without esophagitis   Symptoms are currently well controlled.  Reminded regarding lifestyle modification.  Continue current medication.  -     pantoprazole (PROTONIX) 40 MG EC tablet; Take 1 tablet by mouth Daily.  Dispense: 30 tablet; Refill: 5    6. Type 2 diabetes mellitus without complication, with long-term current use of insulin (CMS/[facility])  Diabetes mellitus Type II, under unknown control.   Encouraged to continue to pursue ADA diet  Encouraged aerobic exercise.   Continue current medication for now  -     Hemoglobin A1c  -     MicroAlbumin, Urine, Random - Urine, Clean Catch  -     metFORMIN  (GLUCOPHAGE) 1000 MG tablet; Take 1 tablet by mouth 2 (Two) Times a Day.  Dispense: 60 tablet; Refill: 5    7. Meniere's disease, unspecified laterality  -     meclizine (ANTIVERT) 25 MG tablet; Take 1 tablet by mouth Every 6 (Six) Hours.  Dispense: 120 tablet; Refill: 5    8. Generalized osteoarthritis  Reminded regarding symptomatic treatment.   Continue current medication  -     naproxen (NAPROSYN) 375 MG tablet; Take 1 tablet by mouth 2 (Two) Times a Day.  Dispense: 60 tablet; Refill: 5    9. Healthcare maintenance  Patient uninterested in any further mammograms or DEXA scans  Encouraged to follow up with shingrix.    10. Elevated liver enzymes  Will continue to monitor    11. Mixed hyperlipidemia  As above.   Continue current medication.  -     Lipid Panel    12. Low serum vitamin B12  We will start on oral supplementation with injections at her visits and monitor  -     cyanocobalamin injection 1,000 mcg      Follow Up:  Return in about 3 months (around 5/11/2020).     An After Visit Summary and PPPS were given to the patient.

## 2020-02-12 VITALS
BODY MASS INDEX: 30.13 KG/M2 | TEMPERATURE: 98.9 F | HEART RATE: 58 BPM | DIASTOLIC BLOOD PRESSURE: 75 MMHG | HEIGHT: 67 IN | WEIGHT: 192 LBS | SYSTOLIC BLOOD PRESSURE: 128 MMHG | OXYGEN SATURATION: 98 % | RESPIRATION RATE: 14 BRPM

## 2020-02-12 LAB
25(OH)D3 SERPL-MCNC: 35.6 NG/ML (ref 30–100)
ALBUMIN SERPL-MCNC: 4.4 G/DL (ref 3.5–5.2)
ALBUMIN UR-MCNC: 228.5 MG/DL
ALBUMIN/GLOB SERPL: 1.6 G/DL
ALP SERPL-CCNC: 84 U/L (ref 39–117)
ALT SERPL W P-5'-P-CCNC: 9 U/L (ref 1–33)
ANION GAP SERPL CALCULATED.3IONS-SCNC: 15.8 MMOL/L (ref 5–15)
AST SERPL-CCNC: 16 U/L (ref 1–32)
BASOPHILS # BLD AUTO: 0.08 10*3/MM3 (ref 0–0.2)
BASOPHILS NFR BLD AUTO: 0.7 % (ref 0–1.5)
BILIRUB SERPL-MCNC: 0.4 MG/DL (ref 0.2–1.2)
BUN BLD-MCNC: 30 MG/DL (ref 8–23)
BUN/CREAT SERPL: 21.7 (ref 7–25)
CALCIUM SPEC-SCNC: 9.7 MG/DL (ref 8.6–10.5)
CHLORIDE SERPL-SCNC: 103 MMOL/L (ref 98–107)
CHOLEST SERPL-MCNC: 128 MG/DL (ref 0–200)
CO2 SERPL-SCNC: 20.2 MMOL/L (ref 22–29)
CREAT BLD-MCNC: 1.38 MG/DL (ref 0.57–1)
DEPRECATED RDW RBC AUTO: 42.1 FL (ref 37–54)
EOSINOPHIL # BLD AUTO: 0.28 10*3/MM3 (ref 0–0.4)
EOSINOPHIL NFR BLD AUTO: 2.4 % (ref 0.3–6.2)
ERYTHROCYTE [DISTWIDTH] IN BLOOD BY AUTOMATED COUNT: 13.7 % (ref 12.3–15.4)
GFR SERPL CREATININE-BSD FRML MDRD: 37 ML/MIN/1.73
GLOBULIN UR ELPH-MCNC: 2.7 GM/DL
GLUCOSE BLD-MCNC: 161 MG/DL (ref 65–99)
HBA1C MFR BLD: 8.2 % (ref 4.8–5.6)
HCT VFR BLD AUTO: 35.6 % (ref 34–46.6)
HDLC SERPL-MCNC: 63 MG/DL (ref 40–60)
HGB BLD-MCNC: 11.7 G/DL (ref 12–15.9)
IMM GRANULOCYTES # BLD AUTO: 0.03 10*3/MM3 (ref 0–0.05)
IMM GRANULOCYTES NFR BLD AUTO: 0.3 % (ref 0–0.5)
LDLC SERPL CALC-MCNC: 31 MG/DL (ref 0–100)
LDLC/HDLC SERPL: 0.49 {RATIO}
LYMPHOCYTES # BLD AUTO: 3.34 10*3/MM3 (ref 0.7–3.1)
LYMPHOCYTES NFR BLD AUTO: 28.9 % (ref 19.6–45.3)
MCH RBC QN AUTO: 27.8 PG (ref 26.6–33)
MCHC RBC AUTO-ENTMCNC: 32.9 G/DL (ref 31.5–35.7)
MCV RBC AUTO: 84.6 FL (ref 79–97)
MONOCYTES # BLD AUTO: 0.74 10*3/MM3 (ref 0.1–0.9)
MONOCYTES NFR BLD AUTO: 6.4 % (ref 5–12)
NEUTROPHILS # BLD AUTO: 7.1 10*3/MM3 (ref 1.7–7)
NEUTROPHILS NFR BLD AUTO: 61.3 % (ref 42.7–76)
NRBC BLD AUTO-RTO: 0 /100 WBC (ref 0–0.2)
PLATELET # BLD AUTO: 222 10*3/MM3 (ref 140–450)
PMV BLD AUTO: 10.7 FL (ref 6–12)
POTASSIUM BLD-SCNC: 4.8 MMOL/L (ref 3.5–5.2)
PROT SERPL-MCNC: 7.1 G/DL (ref 6–8.5)
RBC # BLD AUTO: 4.21 10*6/MM3 (ref 3.77–5.28)
SODIUM BLD-SCNC: 139 MMOL/L (ref 136–145)
TRIGL SERPL-MCNC: 170 MG/DL (ref 0–150)
VLDLC SERPL-MCNC: 34 MG/DL (ref 5–40)
WBC NRBC COR # BLD: 11.57 10*3/MM3 (ref 3.4–10.8)

## 2020-02-12 NOTE — PATIENT INSTRUCTIONS
Advance Directive    Advance directives are legal documents that let you make choices ahead of time about your health care and medical treatment in case you become unable to communicate for yourself. Advance directives are a way for you to communicate your wishes to family, friends, and health care providers. This can help convey your decisions about end-of-life care if you become unable to communicate.  Discussing and writing advance directives should happen over time rather than all at once. Advance directives can be changed depending on your situation and what you want, even after you have signed the advance directives.  If you do not have an advance directive, some states assign family decision makers to act on your behalf based on how closely you are related to them. Each state has its own laws regarding advance directives. You may want to check with your health care provider, , or state representative about the laws in your state. There are different types of advance directives, such as:  · Medical power of .  · Living will.  · Do not resuscitate (DNR) or do not attempt resuscitation (DNAR) order.  Health care proxy and medical power of   A health care proxy, also called a health care agent, is a person who is appointed to make medical decisions for you in cases in which you are unable to make the decisions yourself. Generally, people choose someone they know well and trust to represent their preferences. Make sure to ask this person for an agreement to act as your proxy. A proxy may have to exercise judgment in the event of a medical decision for which your wishes are not known.  A medical power of  is a legal document that names your health care proxy. Depending on the laws in your state, after the document is written, it may also need to be:  · Signed.  · Notarized.  · Dated.  · Copied.  · Witnessed.  · Incorporated into your medical record.  You may also want to appoint  someone to manage your financial affairs in a situation in which you are unable to do so. This is called a durable power of  for finances. It is a separate legal document from the durable power of  for health care. You may choose the same person or someone different from your health care proxy to act as your agent in financial matters.  If you do not appoint a proxy, or if there is a concern that the proxy is not acting in your best interests, a court-appointed guardian may be designated to act on your behalf.  Living will  A living will is a set of instructions documenting your wishes about medical care when you cannot express them yourself. Health care providers should keep a copy of your living will in your medical record. You may want to give a copy to family members or friends. To alert caregivers in case of an emergency, you can place a card in your wallet to let them know that you have a living will and where they can find it. A living will is used if you become:  · Terminally ill.  · Incapacitated.  · Unable to communicate or make decisions.  Items to consider in your living will include:  · The use or non-use of life-sustaining equipment, such as dialysis machines and breathing machines (ventilators).  · A DNR or DNAR order, which is the instruction not to use cardiopulmonary resuscitation (CPR) if breathing or heartbeat stops.  · The use or non-use of tube feeding.  · Withholding of food and fluids.  · Comfort (palliative) care when the goal becomes comfort rather than a cure.  · Organ and tissue donation.  A living will does not give instructions for distributing your money and property if you should pass away. It is recommended that you seek the advice of a  when writing a will. Decisions about taxes, beneficiaries, and asset distribution will be legally binding. This process can relieve your family and friends of any concerns surrounding disputes or questions that may come up about  the distribution of your assets.  DNR or DNAR  A DNR or DNAR order is a request not to have CPR in the event that your heart stops beating or you stop breathing. If a DNR or DNAR order has not been made and shared, a health care provider will try to help any patient whose heart has stopped or who has stopped breathing. If you plan to have surgery, talk with your health care provider about how your DNR or DNAR order will be followed if problems occur.  Summary  · Advance directives are the legal documents that allow you to make choices ahead of time about your health care and medical treatment in case you become unable to communicate for yourself.  · The process of discussing and writing advance directives should happen over time. You can change the advance directives, even after you have signed them.  · Advance directives include DNR or DNAR orders, living giang, and designating an agent as your medical power of .  This information is not intended to replace advice given to you by your health care provider. Make sure you discuss any questions you have with your health care provider.  Document Released: 03/26/2009 Document Revised: 11/06/2017 Document Reviewed: 11/06/2017  [employer] Interactive Patient Education © 2019 [employer]      Mammogram  A mammogram is a low energy X-ray of the breasts that is done to check for abnormal changes. This procedure can screen for and detect any changes that may indicate breast cancer. Mammograms are regularly done on women. A man may have a mammogram if he has a lump or swelling in his breast. A mammogram can also identify other changes and variations in the breast, such as:  · Inflammation of the breast tissue (mastitis).  · An infected area that contains a collection of pus (abscess).  · A fluid-filled sac (cyst).  · Fibrocystic changes. This is when breast tissue becomes denser, which can make the tissue feel rope-like or uneven under the skin.  · Tumors that are not  cancerous (benign).  Tell a health care provider:  · About any allergies you have.  · If you have breast implants.  · If you have had previous breast disease, biopsy, or surgery.  · If you are breastfeeding.  · If you are younger than age 25.  · If you have a family history of breast cancer.  · Whether you are pregnant or may be pregnant.  What are the risks?  Generally, this is a safe procedure. However, problems may occur, including:  · Exposure to radiation. Radiation levels are very low with this test.  · The results being misinterpreted.  · The need for further tests.  · The inability of the mammogram to detect certain cancers.  What happens before the procedure?  · Schedule your test about 1-2 weeks after your menstrual period if you are still menstruating. This is usually when your breasts are the least tender.  · If you have had a mammogram done at a different facility in the past, get the mammogram X-rays or have them sent to your current exam facility. The new and old images will be compared.  · Wash your breasts and underarms on the day of the test.  · Do not wear deodorants, perfumes, lotions, or powders anywhere on your body on the day of the test.  · Remove any jewelry from your neck.  · Wear clothes that you can change into and out of easily.  What happens during the procedure?    · You will undress from the waist up and put on a gown that opens in the front.  · You will  front of the X-ray machine.  · Each breast will be placed between two plastic or glass plates. The plates will compress your breast for a few seconds. Try to stay as relaxed as possible during the procedure. This does not cause any harm to your breasts and any discomfort you feel will be very brief.  · X-rays will be taken from different angles of each breast.  The procedure may vary among health care providers and hospitals.  What happens after the procedure?  · The mammogram will be examined by a specialist  (radiologist).  · You may need to repeat certain parts of the test, depending on the quality of the images. This is commonly done if the radiologist needs a better view of the breast tissue.  · You may resume your normal activities.  · It is up to you to get the results of your procedure. Ask your health care provider, or the department that is doing the procedure, when your results will be ready.  Summary  · A mammogram is a low energy X-ray of the breasts that is done to check for abnormal changes. A man may have a mammogram if he has a lump or swelling in his breast.  · If you have had a mammogram done at a different facility in the past, get the mammogram X-rays or have them sent to your current exam facility in order to compare them.  · Schedule your test about 1-2 weeks after your menstrual period if you are still menstruating.  · For this test, each breast will be placed between two plastic or glass plates. The plates will compress your breast for a few seconds.  · Ask when your test results will be ready. Make sure you get your test results.  This information is not intended to replace advice given to you by your health care provider. Make sure you discuss any questions you have with your health care provider.  Document Released: 12/15/2001 Document Revised: 08/08/2019 Document Reviewed: 08/08/2019  [employer]vier Interactive Patient Education © 2019 [employer]      Colorectal Cancer Screening    Colorectal cancer screening is a group of tests that are used to check for colorectal cancer before symptoms develop. Colorectal refers to the colon and rectum. The colon and rectum are located at the end of the digestive tract and carry bowel movements out of the body.  Who should have screening?  All adults starting at age 50 until age 75 should have screening. Your health care provider may recommend screening at age 45. You will have tests every 1-10 years, depending on your results and the type of screening  test.  You may have screening tests starting at an earlier age, or more frequently than other people, if you have any of the following risk factors:  · A personal or family history of colorectal cancer or abnormal growths (polyps).  · Inflammatory bowel disease, such as ulcerative colitis or Crohn's disease.  · A history of having radiation treatment to the abdomen or pelvic area for cancer.  · Colorectal cancer symptoms, such as changes in bowel habits or blood in your stool.  · A type of colon cancer syndrome that is passed from parent to child (hereditary), such as:  ? Pineda syndrome.  ? Familial adenomatous polyposis.  ? Turcot syndrome.  ? Peutz-Jeghers syndrome.  Screening recommendations for adults who are 75-85 years old vary depending on health.  How is screening done?  There are several types of colorectal screening tests. You may have one or more of the following:  · Guaiac-based fecal occult blood testing. For this test, a stool (feces) sample is checked for hidden (occult) blood, which could be a sign of colorectal cancer.  · Fecal immunochemical test (FIT). For this test, a stool sample is checked for blood, which could be a sign of colorectal cancer.  · Stool DNA test. For this test, a stool sample is checked for blood and changes in DNA that could lead to colorectal cancer.  · Sigmoidoscopy. During this test, a thin, flexible tube with a camera on the end (sigmoidoscope) is used to examine the rectum and the lower colon.  · Colonoscopy. During this test, a long, flexible tube with a camera on the end (colonoscope) is used to examine the entire colon and rectum. With a colonoscopy, it is possible to take a sample of tissue (biopsy) and remove small polyps during the test.  · Virtual colonoscopy. Instead of a colonoscope, this type of colonoscopy uses X-rays (CT scan) and computers to produce images of the colon and rectum.  What are the benefits of screening?  Screening reduces your risk for  colorectal cancer and can help identify cancer at an early stage, when the cancer can be removed or treated more easily. It is common for polyps to form in the lining of the colon, especially as you age. These polyps may be cancerous or become cancerous over time. Screening can identify these polyps.  What are the risks of screening?  Each screening test may have different risks.  · Stool sample tests have fewer risks than other types of screening tests. However, you may need more tests to confirm results from a stool sample test.  · Screening tests that involve X-rays expose you to low levels of radiation, which may slightly increase your cancer risk. The benefit of detecting cancer outweighs the slight increase in risk.  · Screening tests such as sigmoidoscopy and colonoscopy may place you at risk for bleeding, intestinal damage, infection, or a reaction to medicines given during the exam.  Talk with your health care provider to understand your risk for colorectal cancer and to make a screening plan that is right for you.  Questions to ask your health care provider  · When should I start colorectal cancer screening?  · What is my risk for colorectal cancer?  · How often do I need screening?  · Which screening tests do I need?  · How do I get my test results?  · What do my results mean?  Where to find more information  Learn more about colorectal cancer screening from:  · The American Cancer Society: www.cancer.org  · The National Cancer [place]: www.cancer.gov  Summary  · Colorectal cancer screening is a group of tests used to check for colorectal cancer before symptoms develop.  · Screening reduces your risk for colorectal cancer and can help identify cancer at an early stage, when the cancer can be removed or treated more easily.  · All adults starting at age 50 until age 75 should have screening. Your health care provider may recommend screening at age 45.  · You may have screening tests starting at an  earlier age, or more frequently than other people, if you have certain risk factors.  · Talk with your health care provider to understand your risk for colorectal cancer and to make a screening plan that is right for you.  This information is not intended to replace advice given to you by your health care provider. Make sure you discuss any questions you have with your health care provider.  Document Released: 06/07/2011 Document Revised: 11/30/2018 Document Reviewed: 09/19/2018  [employer] Interactive Patient Education © 2019 [employer]      Fall Prevention in the Home, Adult  Falls can cause injuries. They can happen to people of all ages. There are many things you can do to make your home safe and to help prevent falls. Ask for help when making these changes, if needed.  What actions can I take to prevent falls?  General Instructions  · Use good lighting in all rooms. Replace any light bulbs that burn out.  · Turn on the lights when you go into a dark area. Use night-lights.  · Keep items that you use often in easy-to-reach places. Lower the shelves around your home if necessary.  · Set up your furniture so you have a clear path. Avoid moving your furniture around.  · Do not have throw rugs and other things on the floor that can make you trip.  · Avoid walking on wet floors.  · If any of your floors are uneven, fix them.  · Add color or contrast paint or tape to clearly sandie and help you see:  ? Any grab bars or handrails.  ? First and last steps of stairways.  ? Where the edge of each step is.  · If you use a stepladder:  ? Make sure that it is fully opened. Do not climb a closed stepladder.  ? Make sure that both sides of the stepladder are locked into place.  ? Ask someone to hold the stepladder for you while you use it.  · If there are any pets around you, be aware of where they are.  What can I do in the bathroom?         · Keep the floor dry. Clean up any water that spills onto the floor as soon as it  happens.  · Remove soap buildup in the tub or shower regularly.  · Use non-skid mats or decals on the floor of the tub or shower.  · Attach bath mats securely with double-sided, non-slip rug tape.  · If you need to sit down in the shower, use a plastic, non-slip stool.  · Install grab bars by the toilet and in the tub and shower. Do not use towel bars as grab bars.  What can I do in the bedroom?  · Make sure that you have a light by your bed that is easy to reach.  · Do not use any sheets or blankets that are too big for your bed. They should not hang down onto the floor.  · Have a firm chair that has side arms. You can use this for support while you get dressed.  What can I do in the kitchen?  · Clean up any spills right away.  · If you need to reach something above you, use a strong step stool that has a grab bar.  · Keep electrical cords out of the way.  · Do not use floor polish or wax that makes floors slippery. If you must use wax, use non-skid floor wax.  What can I do with my stairs?  · Do not leave any items on the stairs.  · Make sure that you have a light switch at the top of the stairs and the bottom of the stairs. If you do not have them, ask someone to add them for you.  · Make sure that there are handrails on both sides of the stairs, and use them. Fix handrails that are broken or loose. Make sure that handrails are as long as the stairways.  · Install non-slip stair treads on all stairs in your home.  · Avoid having throw rugs at the top or bottom of the stairs. If you do have throw rugs, attach them to the floor with carpet tape.  · Choose a carpet that does not hide the edge of the steps on the stairway.  · Check any carpeting to make sure that it is firmly attached to the stairs. Fix any carpet that is loose or worn.  What can I do on the outside of my home?  · Use bright outdoor lighting.  · Regularly fix the edges of walkways and driveways and fix any cracks.  · Remove anything that might make  you trip as you walk through a door, such as a raised step or threshold.  · Trim any bushes or trees on the path to your home.  · Regularly check to see if handrails are loose or broken. Make sure that both sides of any steps have handrails.  · Install guardrails along the edges of any raised decks and porches.  · Clear walking paths of anything that might make someone trip, such as tools or rocks.  · Have any leaves, snow, or ice cleared regularly.  · Use sand or salt on walking paths during winter.  · Clean up any spills in your garage right away. This includes grease or oil spills.  What other actions can I take?  · Wear shoes that:  ? Have a low heel. Do not wear high heels.  ? Have rubber bottoms.  ? Are comfortable and fit you well.  ? Are closed at the toe. Do not wear open-toe sandals.  · Use tools that help you move around (mobility aids) if they are needed. These include:  ? Canes.  ? Walkers.  ? Scooters.  ? Crutches.  · Review your medicines with your doctor. Some medicines can make you feel dizzy. This can increase your chance of falling.  Ask your doctor what other things you can do to help prevent falls.  Where to find more information  · Centers for Disease Control and Prevention, [employer]: https://cdc.gov  · National Strong on Aging: https://ro0hkpw.kimberly.nih.gov  Contact a doctor if:  · You are afraid of falling at home.  · You feel weak, drowsy, or dizzy at home.  · You fall at home.  Summary  · There are many simple things that you can do to make your home safe and to help prevent falls.  · Ways to make your home safe include removing tripping hazards and installing grab bars in the bathroom.  · Ask for help when making these changes in your home.  This information is not intended to replace advice given to you by your health care provider. Make sure you discuss any questions you have with your health care provider.  Document Released: 10/14/2010 Document Revised: 08/02/2018 Document Reviewed:  08/02/2018  [employer]vier Interactive Patient Education © 2019 [employer]

## 2020-03-11 DIAGNOSIS — E55.9 VITAMIN D DEFICIENCY DISEASE: ICD-10-CM

## 2020-03-11 RX ORDER — MELATONIN
Qty: 30 TABLET | Refills: 5 | Status: SHIPPED | OUTPATIENT
Start: 2020-03-11 | End: 2020-07-06 | Stop reason: SDUPTHER

## 2020-05-12 ENCOUNTER — OFFICE VISIT (OUTPATIENT)
Dept: FAMILY MEDICINE CLINIC | Facility: CLINIC | Age: 80
End: 2020-05-12

## 2020-05-12 ENCOUNTER — TELEPHONE (OUTPATIENT)
Dept: FAMILY MEDICINE CLINIC | Facility: CLINIC | Age: 80
End: 2020-05-12

## 2020-05-12 DIAGNOSIS — R74.8 ELEVATED LIVER ENZYMES: ICD-10-CM

## 2020-05-12 DIAGNOSIS — Z79.4 TYPE 2 DIABETES MELLITUS WITHOUT COMPLICATION, WITH LONG-TERM CURRENT USE OF INSULIN (HCC): ICD-10-CM

## 2020-05-12 DIAGNOSIS — J30.2 CHRONIC SEASONAL ALLERGIC RHINITIS: ICD-10-CM

## 2020-05-12 DIAGNOSIS — E53.8 LOW SERUM VITAMIN B12: ICD-10-CM

## 2020-05-12 DIAGNOSIS — E11.9 TYPE 2 DIABETES MELLITUS WITHOUT COMPLICATION, WITH LONG-TERM CURRENT USE OF INSULIN (HCC): ICD-10-CM

## 2020-05-12 DIAGNOSIS — E78.2 MIXED HYPERLIPIDEMIA: Primary | ICD-10-CM

## 2020-05-12 DIAGNOSIS — J45.40 MODERATE PERSISTENT EXTRINSIC ASTHMA WITHOUT COMPLICATION: ICD-10-CM

## 2020-05-12 DIAGNOSIS — M15.9 GENERALIZED OSTEOARTHRITIS: ICD-10-CM

## 2020-05-12 DIAGNOSIS — K21.9 GASTROESOPHAGEAL REFLUX DISEASE WITHOUT ESOPHAGITIS: ICD-10-CM

## 2020-05-12 DIAGNOSIS — Z00.00 HEALTHCARE MAINTENANCE: ICD-10-CM

## 2020-05-12 DIAGNOSIS — I10 ESSENTIAL HYPERTENSION: ICD-10-CM

## 2020-05-12 DIAGNOSIS — E55.9 VITAMIN D DEFICIENCY DISEASE: ICD-10-CM

## 2020-05-12 DIAGNOSIS — D50.8 OTHER IRON DEFICIENCY ANEMIA: ICD-10-CM

## 2020-05-12 PROCEDURE — G2025 DIS SITE TELE SVCS RHC/FQHC: HCPCS | Performed by: GENERAL PRACTICE

## 2020-05-12 NOTE — PROGRESS NOTES
Subjective   Sean Chen is a 79 y.o. female.     History of Present Illness     This visit has been rescheduled as a phone visit to comply with patient safety concerns in accordance with CDC recommendations. Total time of discussion was 15 minutes.    You have chosen to receive care through a telephone visit. Do you consent to use a telephone visit for your medical care today? Yes    Chronic Allergic Rhinitis  Patient has a history of allergic rhinitis. Patient's symptoms have included sneezing, clear rhinorrhea, nasal congestion, periorbital pressure and postnasal drip. These symptoms are perennial with seasonal exacerbation.  She has been worse through spring.  She remains on cetirizine and montelukast as prescribed with no apparent side effects.  She has used a nasal corticosteroid in the past.    Asthma  Patient's symptoms have included dyspnea and non-productive cough. She denies any chest pain and hemoptysis. The patient has been suffering from these symptoms for a number of years. Medications used in the past to treat these symptoms include beta agonist inhalers and combination beta agonists/steroid inhalers.  She remains on generic advair and has noted a persistent increase in her symptoms.  She has been using her rescue inhaler several times daily on average.  Suspected precipitants include upper respiratory infection and allergens. Her  continues to smoke in the house and she has also been able to relate the severity of her symptoms to how much he is smoking    Diabetes  Current symptoms include: mild fatigue. Patient denies paresthesia of the feet, visual disturbances, polydipsia, polyuria, and foot ulcerations. Evaluation to date has been: hemoglobin A1C. Home sugars: have been better since last here. Current treatments: metformin and basal insulin - toujeo - 60 units daily.      Dyslipidemia  Compliance with treatment has been good. The patient has been following her diet more carefully and  continues to exercise intermittently. She is currently being prescribed the following medication for her dyslipidemia - rosuvastatin, omega 3 fatty acids. Patient denies side effects associated with her medications.     Hypertension  Home blood pressure readings: not doing. Associated signs and symptoms: dyspnea and peripheral edema. Patient denies: chest pain, palpitations, orthopnea and paroxysmal nocturnal dyspnea. Current antihypertensive medications includes losartan and amlodipine. Medication compliance: taking as prescribed.     GERD  Patient has a history of heartburn. Symptoms have been present for a number of years. Symptoms include occasional nausea. The patient denies abdominal bloating, bilious reflux, dysphagia and vomiting or hematemesis. Symptoms appear to be worsened by large meals and lying down. Risk factors present for GERD include caffeine use, obesity and NSAID use. Risk factors absent for GERD are tobacco abuse, alcohol use and ASA use. Studies performed so far include upper endoscopy, result: negative. Treatments tried so far include proton pump inhibitor: pantoprazole. Results of treatment: some improvement in symptom severity, some improvement in symptom frequency.     The following portions of the patient's history were reviewed and updated as appropriate: allergies, current medications, past medical history, past social history and problem list.    Review of Systems   Constitutional: Positive for fatigue. Negative for appetite change, chills, fever and unexpected weight change.   HENT: Positive for congestion, hearing loss, sneezing and tinnitus. Negative for ear pain, postnasal drip, rhinorrhea, sore throat and voice change.    Eyes: Negative for visual disturbance.   Respiratory: Positive for cough and shortness of breath (with exertion). Negative for wheezing.    Cardiovascular: Positive for leg swelling. Negative for chest pain and palpitations.   Gastrointestinal: Negative for  abdominal pain, blood in stool, constipation, diarrhea, nausea and vomiting.   Endocrine: Negative for polydipsia and polyuria.   Musculoskeletal: Positive for arthralgias and back pain.   Skin: Negative for rash.   Neurological: Positive for light-headedness (Intermittent-when getting up from sitting and lying). Negative for weakness, numbness and headaches.   Psychiatric/Behavioral: Negative for dysphoric mood and sleep disturbance. The patient is not nervous/anxious.      Objective   Physical Exam   Constitutional:   Alert and oriented.  Bright and in good spirits.  No apparent distress or shortness of breath     Assessment/Plan   Problems Addressed this Visit        Cardiovascular and Mediastinum    Essential hypertension  With postural hypotension   Instructed to get up from sitting and especially lying gradually   Encouraged to continue to work on her diet and exercise plan.  Continue current medication    Mixed hyperlipidemia   As above.   Continue current medication.       Respiratory    Chronic seasonal allergic rhinitis  Reminded regarding allergen avoidance.  Patient uninterested in resuming a nasal corticosteroid at present but will let us know if she should change her mind  Continue remaining medication    Extrinsic asthma  As above.  We will try to obtain brand-name advair through her insurance  Encouraged to report if any worse or if any new symptoms or concerns.    Relevant Medications    ADVAIR DISKUS 500-50 MCG/DOSE DISKUS       Digestive    Gastroesophageal reflux disease without esophagitis   Symptoms are currently well controlled.  Continue current medication.    Vitamin D deficiency disease       Endocrine    Type 2 diabetes mellitus without complication, with long-term current use of insulin (CMS/[facility])  Diabetes mellitus Type II, under fair control.   Encouraged to continue to pursue ADA diet  Encouraged aerobic exercise.  Continue current medication  Updated labs will be drawn at her return.        Musculoskeletal and Integument    Generalized osteoarthritis       Hematopoietic and Hemostatic    Iron deficiency anemia    Low serum vitamin B12       Other    Elevated liver enzymes  Will continue to monitor    Healthcare maintenance  We will discuss an updated Tdap and Shingrix at her return

## 2020-06-01 RX ORDER — FLURBIPROFEN SODIUM 0.3 MG/ML
SOLUTION/ DROPS OPHTHALMIC
Qty: 50 EACH | Refills: 5 | Status: SHIPPED | OUTPATIENT
Start: 2020-06-01 | End: 2020-07-06 | Stop reason: SDUPTHER

## 2020-07-06 DIAGNOSIS — J30.9 CHRONIC ALLERGIC RHINITIS: ICD-10-CM

## 2020-07-06 DIAGNOSIS — I10 ESSENTIAL HYPERTENSION: ICD-10-CM

## 2020-07-06 DIAGNOSIS — J30.2 CHRONIC SEASONAL ALLERGIC RHINITIS: ICD-10-CM

## 2020-07-06 DIAGNOSIS — K21.9 GASTROESOPHAGEAL REFLUX DISEASE WITHOUT ESOPHAGITIS: ICD-10-CM

## 2020-07-06 DIAGNOSIS — H81.09 MENIERE'S DISEASE, UNSPECIFIED LATERALITY: ICD-10-CM

## 2020-07-06 DIAGNOSIS — E11.9 TYPE 2 DIABETES MELLITUS WITHOUT COMPLICATION, WITH LONG-TERM CURRENT USE OF INSULIN (HCC): ICD-10-CM

## 2020-07-06 DIAGNOSIS — E55.9 VITAMIN D DEFICIENCY DISEASE: ICD-10-CM

## 2020-07-06 DIAGNOSIS — J45.40 MODERATE PERSISTENT EXTRINSIC ASTHMA WITHOUT COMPLICATION: ICD-10-CM

## 2020-07-06 DIAGNOSIS — E78.5 DYSLIPIDEMIA: ICD-10-CM

## 2020-07-06 DIAGNOSIS — J45.20 EXTRINSIC ASTHMA, MILD INTERMITTENT, UNCOMPLICATED: ICD-10-CM

## 2020-07-06 DIAGNOSIS — Z79.4 TYPE 2 DIABETES MELLITUS WITHOUT COMPLICATION, WITH LONG-TERM CURRENT USE OF INSULIN (HCC): ICD-10-CM

## 2020-07-06 DIAGNOSIS — M15.9 GENERALIZED OSTEOARTHRITIS: ICD-10-CM

## 2020-07-06 RX ORDER — LOSARTAN POTASSIUM 100 MG/1
100 TABLET ORAL DAILY
Qty: 30 TABLET | Refills: 5 | Status: SHIPPED | OUTPATIENT
Start: 2020-07-06 | End: 2020-11-24 | Stop reason: SDUPTHER

## 2020-07-06 RX ORDER — NAPROXEN 375 MG/1
375 TABLET ORAL 2 TIMES DAILY
Qty: 60 TABLET | Refills: 5 | Status: SHIPPED | OUTPATIENT
Start: 2020-07-06 | End: 2020-11-24 | Stop reason: SDUPTHER

## 2020-07-06 RX ORDER — MECLIZINE HYDROCHLORIDE 25 MG/1
25 TABLET ORAL EVERY 6 HOURS
Qty: 120 TABLET | Refills: 5 | Status: SHIPPED | OUTPATIENT
Start: 2020-07-06 | End: 2020-11-24 | Stop reason: SDUPTHER

## 2020-07-06 RX ORDER — MELATONIN
1000 DAILY
Qty: 30 TABLET | Refills: 5 | Status: SHIPPED | OUTPATIENT
Start: 2020-07-06 | End: 2020-11-24 | Stop reason: SDUPTHER

## 2020-07-06 RX ORDER — VITAMIN E 268 MG
400 CAPSULE ORAL DAILY
Qty: 30 CAPSULE | Refills: 5 | Status: SHIPPED | OUTPATIENT
Start: 2020-07-06 | End: 2020-11-24 | Stop reason: SDUPTHER

## 2020-07-06 RX ORDER — PANTOPRAZOLE SODIUM 40 MG/1
40 TABLET, DELAYED RELEASE ORAL DAILY
Qty: 30 TABLET | Refills: 5 | Status: SHIPPED | OUTPATIENT
Start: 2020-07-06 | End: 2020-11-24 | Stop reason: SDUPTHER

## 2020-07-06 RX ORDER — MONTELUKAST SODIUM 10 MG/1
10 TABLET ORAL DAILY
Qty: 30 TABLET | Refills: 5 | Status: SHIPPED | OUTPATIENT
Start: 2020-07-06 | End: 2020-11-24 | Stop reason: SDUPTHER

## 2020-07-06 RX ORDER — GLUCOSAM/CHON-MSM1/C/MANG/BOSW 500-416.6
TABLET ORAL
Qty: 100 EACH | Refills: 5 | Status: SHIPPED | OUTPATIENT
Start: 2020-07-06 | End: 2021-02-23 | Stop reason: SDUPTHER

## 2020-07-06 RX ORDER — ALBUTEROL SULFATE 90 UG/1
2 AEROSOL, METERED RESPIRATORY (INHALATION) 4 TIMES DAILY
Qty: 8.5 G | Refills: 5 | Status: SHIPPED | OUTPATIENT
Start: 2020-07-06 | End: 2020-11-24 | Stop reason: SDUPTHER

## 2020-07-06 RX ORDER — LANOLIN ALCOHOL/MO/W.PET/CERES
1000 CREAM (GRAM) TOPICAL DAILY
Qty: 30 TABLET | Refills: 5 | Status: SHIPPED | OUTPATIENT
Start: 2020-07-06 | End: 2020-11-24 | Stop reason: SDUPTHER

## 2020-07-06 RX ORDER — ROSUVASTATIN CALCIUM 40 MG/1
40 TABLET, COATED ORAL NIGHTLY
Qty: 30 TABLET | Refills: 5 | Status: SHIPPED | OUTPATIENT
Start: 2020-07-06 | End: 2020-11-24 | Stop reason: SDUPTHER

## 2020-07-06 RX ORDER — CETIRIZINE HYDROCHLORIDE 10 MG/1
10 TABLET ORAL DAILY PRN
Qty: 30 TABLET | Refills: 5 | Status: SHIPPED | OUTPATIENT
Start: 2020-07-06 | End: 2020-11-24 | Stop reason: SDUPTHER

## 2020-07-06 RX ORDER — AMLODIPINE BESYLATE 5 MG/1
5 TABLET ORAL NIGHTLY
Qty: 30 TABLET | Refills: 5 | Status: SHIPPED | OUTPATIENT
Start: 2020-07-06 | End: 2020-11-24 | Stop reason: SDUPTHER

## 2020-07-06 RX ORDER — ERGOCALCIFEROL 1.25 MG/1
50000 CAPSULE ORAL WEEKLY
Qty: 4 CAPSULE | Refills: 0 | Status: SHIPPED | OUTPATIENT
Start: 2020-07-06 | End: 2020-11-24

## 2020-07-06 NOTE — TELEPHONE ENCOUNTER
----- Message from Lor Beavers sent at 7/6/2020  9:29 AM EDT -----  Qiana pt    Request all medicine be sent to [facility] [place]

## 2020-08-24 ENCOUNTER — OFFICE VISIT (OUTPATIENT)
Dept: FAMILY MEDICINE CLINIC | Facility: CLINIC | Age: 80
End: 2020-08-24

## 2020-08-24 VITALS
SYSTOLIC BLOOD PRESSURE: 158 MMHG | TEMPERATURE: 97.1 F | HEART RATE: 70 BPM | WEIGHT: 194 LBS | RESPIRATION RATE: 14 BRPM | OXYGEN SATURATION: 97 % | DIASTOLIC BLOOD PRESSURE: 82 MMHG | HEIGHT: 67 IN | BODY MASS INDEX: 30.45 KG/M2

## 2020-08-24 DIAGNOSIS — E78.2 MIXED HYPERLIPIDEMIA: Primary | ICD-10-CM

## 2020-08-24 DIAGNOSIS — Z79.4 TYPE 2 DIABETES MELLITUS WITHOUT COMPLICATION, WITH LONG-TERM CURRENT USE OF INSULIN (HCC): ICD-10-CM

## 2020-08-24 DIAGNOSIS — D50.8 OTHER IRON DEFICIENCY ANEMIA: ICD-10-CM

## 2020-08-24 DIAGNOSIS — E11.9 TYPE 2 DIABETES MELLITUS WITHOUT COMPLICATION, WITH LONG-TERM CURRENT USE OF INSULIN (HCC): ICD-10-CM

## 2020-08-24 DIAGNOSIS — I10 ESSENTIAL HYPERTENSION: ICD-10-CM

## 2020-08-24 DIAGNOSIS — E55.9 VITAMIN D DEFICIENCY DISEASE: ICD-10-CM

## 2020-08-24 DIAGNOSIS — J45.40 MODERATE PERSISTENT EXTRINSIC ASTHMA WITHOUT COMPLICATION: ICD-10-CM

## 2020-08-24 DIAGNOSIS — M15.9 GENERALIZED OSTEOARTHRITIS: ICD-10-CM

## 2020-08-24 DIAGNOSIS — Z00.00 HEALTHCARE MAINTENANCE: ICD-10-CM

## 2020-08-24 DIAGNOSIS — J30.2 CHRONIC SEASONAL ALLERGIC RHINITIS: ICD-10-CM

## 2020-08-24 DIAGNOSIS — E53.8 LOW SERUM VITAMIN B12: ICD-10-CM

## 2020-08-24 DIAGNOSIS — K21.9 GASTROESOPHAGEAL REFLUX DISEASE WITHOUT ESOPHAGITIS: ICD-10-CM

## 2020-08-24 PROCEDURE — 85025 COMPLETE CBC W/AUTO DIFF WBC: CPT | Performed by: GENERAL PRACTICE

## 2020-08-24 PROCEDURE — 82728 ASSAY OF FERRITIN: CPT | Performed by: GENERAL PRACTICE

## 2020-08-24 PROCEDURE — 36415 COLL VENOUS BLD VENIPUNCTURE: CPT | Performed by: GENERAL PRACTICE

## 2020-08-24 PROCEDURE — 80053 COMPREHEN METABOLIC PANEL: CPT | Performed by: GENERAL PRACTICE

## 2020-08-24 PROCEDURE — 84443 ASSAY THYROID STIM HORMONE: CPT | Performed by: GENERAL PRACTICE

## 2020-08-24 PROCEDURE — 82306 VITAMIN D 25 HYDROXY: CPT | Performed by: GENERAL PRACTICE

## 2020-08-24 PROCEDURE — 83540 ASSAY OF IRON: CPT | Performed by: GENERAL PRACTICE

## 2020-08-24 PROCEDURE — 82607 VITAMIN B-12: CPT | Performed by: GENERAL PRACTICE

## 2020-08-24 PROCEDURE — 86803 HEPATITIS C AB TEST: CPT | Performed by: GENERAL PRACTICE

## 2020-08-24 PROCEDURE — 80061 LIPID PANEL: CPT | Performed by: GENERAL PRACTICE

## 2020-08-24 PROCEDURE — 96372 THER/PROPH/DIAG INJ SC/IM: CPT | Performed by: GENERAL PRACTICE

## 2020-08-24 PROCEDURE — 99214 OFFICE O/P EST MOD 30 MIN: CPT | Performed by: GENERAL PRACTICE

## 2020-08-24 PROCEDURE — 83036 HEMOGLOBIN GLYCOSYLATED A1C: CPT | Performed by: GENERAL PRACTICE

## 2020-08-24 RX ORDER — CYANOCOBALAMIN 1000 UG/ML
1000 INJECTION, SOLUTION INTRAMUSCULAR; SUBCUTANEOUS ONCE
Status: COMPLETED | OUTPATIENT
Start: 2020-08-24 | End: 2020-08-24

## 2020-08-24 RX ADMIN — CYANOCOBALAMIN 1000 MCG: 1000 INJECTION, SOLUTION INTRAMUSCULAR; SUBCUTANEOUS at 13:34

## 2020-08-24 NOTE — PROGRESS NOTES
Subjective   Sean Chen is a 80 y.o. female.     History of Present Illness     Chronic Allergic Rhinitis  Patient has a history of allergic rhinitis. Patient's symptoms have included sneezing, clear rhinorrhea, nasal congestion, periorbital pressure and postnasal drip. These symptoms are perennial with seasonal exacerbation.  Through summer her symptoms have been under fairly good control.  She remains on cetirizine and montelukast as prescribed with no apparent side effects.  She has used a nasal corticosteroid in the past.    Asthma  Patient's symptoms have included dyspnea and non-productive cough. She denies any chest pain and hemoptysis. The patient has been suffering from these symptoms for a number of years. Medications used in the past to treat these symptoms include beta agonist inhalers and combination beta agonists/steroid inhalers.  She is back on brand name advair with an improvement in her symptoms.  She has been using her rescue inhaler 2-3 times weekly on average.  Suspected precipitants include upper respiratory infection and allergens. Her  continues to smoke in the house and she has also been able to relate the severity of her symptoms to how much he is smoking    Diabetes  Current symptoms include: mild fatigue. Patient denies paresthesia of the feet, visual disturbances, polydipsia, polyuria, and foot ulcerations. Evaluation to date has been: hemoglobin A1C. Home sugars: have been better since last here. Current treatments: metformin and basal insulin - toujeo - 60 units daily.  She has had no recent labs     Dyslipidemia  Compliance with treatment has been good. The patient has been following her diet more carefully and continues to exercise intermittently. She is currently being prescribed the following medication for her dyslipidemia - rosuvastatin, omega 3 fatty acids. Patient denies side effects associated with her medications.     Hypertension  Home blood pressure readings:  not doing. Associated signs and symptoms: dyspnea and peripheral edema. Patient denies: chest pain, palpitations, orthopnea and paroxysmal nocturnal dyspnea. Current antihypertensive medications includes losartan and amlodipine. Medication compliance: taking as prescribed.     GERD  Patient has a history of heartburn. Symptoms have been present for a number of years. Symptoms include occasional nausea. The patient denies abdominal bloating, bilious reflux, dysphagia and vomiting or hematemesis. Symptoms appear to be worsened by large meals and lying down. Risk factors present for GERD include caffeine use, obesity and NSAID use. Risk factors absent for GERD are tobacco abuse, alcohol use and ASA use. Studies performed so far include upper endoscopy, result: negative. Treatments tried so far include proton pump inhibitor: pantoprazole. Results of treatment: some improvement in symptom severity, some improvement in symptom frequency.     The following portions of the patient's history were reviewed and updated as appropriate: allergies, current medications, past medical history, past social history and problem list.    Review of Systems   Constitutional: Positive for fatigue. Negative for appetite change, chills, fever and unexpected weight change.   HENT: Positive for congestion, hearing loss, sneezing and tinnitus. Negative for ear pain, postnasal drip, rhinorrhea and sore throat.    Eyes: Negative for visual disturbance.   Respiratory: Positive for cough and shortness of breath (with exertion). Negative for wheezing.    Cardiovascular: Positive for leg swelling. Negative for chest pain and palpitations.   Gastrointestinal: Negative for abdominal pain, blood in stool, constipation, diarrhea, nausea and vomiting.   Endocrine: Negative for polydipsia and polyuria.   Genitourinary: Negative for dysuria, frequency, hematuria and urgency.   Musculoskeletal: Positive for arthralgias and back pain. Negative for myalgias.    Skin: Negative for rash.   Neurological: Positive for light-headedness (Intermittent-when getting up from sitting and lying). Negative for weakness, numbness and headaches.   Psychiatric/Behavioral: Negative for dysphoric mood and sleep disturbance. The patient is not nervous/anxious.      Objective   Physical Exam   Constitutional: She is oriented to person, place, and time. No distress.   Bright and in good spirits.  Gait slow and cautious. No apparent distress at rest. No pallor, jaundice, diaphoresis, or cyanosis.     HENT:   Head: Atraumatic.   Right Ear: Tympanic membrane, external ear and ear canal normal.   Left Ear: Tympanic membrane, external ear and ear canal normal.   Eyes: Pupils are equal, round, and reactive to light. EOM are normal. No scleral icterus.   Neck: No JVD present. Carotid bruit is not present. No tracheal deviation present. No thyromegaly present.   Cardiovascular: Normal rate, regular rhythm, S1 normal, S2 normal and intact distal pulses. Exam reveals no gallop, no S3 and no S4.   No murmur heard.  Pulmonary/Chest: No stridor. No respiratory distress. She has no decreased breath sounds. She has wheezes (diffuse - mild - primarily on forced expiration).   Musculoskeletal: She exhibits no deformity.   No peripheral joint redness or warmth.     Vascular Status -  Her right foot exhibits no edema. Her left foot exhibits no edema.  Lymphadenopathy:        Head (right side): No submandibular adenopathy present.        Head (left side): No submandibular adenopathy present.     She has no cervical adenopathy.   Neurological: She is alert and oriented to person, place, and time. No cranial nerve deficit. She exhibits normal muscle tone. Coordination normal.   Skin: Skin is warm and dry. No rash noted. She is not diaphoretic. No cyanosis. No pallor. Nails show no clubbing.   Psychiatric: She has a normal mood and affect.     Assessment/Plan   Problems Addressed this Visit        Cardiovascular  and Mediastinum    Essential hypertension   Hypertension: elevated today. Evidence of target organ damage: none.  Encouraged to continue to work on diet and exercise plan.   Continue current medication for now  If blood pressure remains elevated at her return will modify her antihypertensive regimen    Relevant Orders    CBC & Differential    Comprehensive Metabolic Panel    CBC Auto Differential    Mixed hyperlipidemia   As above.   Continue current medication.    Relevant Orders    Lipid Panel    TSH       Respiratory    Chronic seasonal allergic rhinitis    Extrinsic asthma  Moderate persistent asthma. The patient is not currently having an exacerbation. In general, the patient's disease is well controlled.  Discussed monitoring symptoms and use of quick-relief medications and contacting us early in the course of exacerbations.       Digestive    Gastroesophageal reflux disease without esophagitis   Symptoms are currently well controlled.  Continue current medication.    Vitamin D deficiency disease  Continue supplementation with monitoring.    Relevant Orders    Vitamin D 25 Hydroxy       Endocrine    Type 2 diabetes mellitus without complication, with long-term current use of insulin (CMS/[facility])  Diabetes mellitus Type II, under unknown control.   Encouraged to continue to pursue ADA diet  Encouraged aerobic exercise.  Continue current medication  Updated labs drawn.    Relevant Orders    Hemoglobin A1c    MicroAlbumin, Urine, Random - Urine, Clean Catch       Musculoskeletal and Integument    Generalized osteoarthritis       Hematopoietic and Hemostatic    Iron deficiency anemia    Low serum vitamin B12  As above.    Relevant Medications    cyanocobalamin injection 1,000 mcg (Completed)    Other Relevant Orders    Vitamin B12       Other    Healthcare maintenance  Reminded to get a flu shot when available.    Relevant Orders    Hepatitis C Antibody

## 2020-08-25 DIAGNOSIS — D50.8 OTHER IRON DEFICIENCY ANEMIA: Primary | ICD-10-CM

## 2020-08-25 LAB
25(OH)D3 SERPL-MCNC: 23.7 NG/ML (ref 30–100)
ALBUMIN SERPL-MCNC: 4 G/DL (ref 3.5–5.2)
ALBUMIN/GLOB SERPL: 1.4 G/DL
ALP SERPL-CCNC: 91 U/L (ref 39–117)
ALT SERPL W P-5'-P-CCNC: 11 U/L (ref 1–33)
ANION GAP SERPL CALCULATED.3IONS-SCNC: 9.2 MMOL/L (ref 5–15)
AST SERPL-CCNC: 14 U/L (ref 1–32)
BASOPHILS # BLD AUTO: 0.06 10*3/MM3 (ref 0–0.2)
BASOPHILS NFR BLD AUTO: 0.5 % (ref 0–1.5)
BILIRUB SERPL-MCNC: 0.3 MG/DL (ref 0–1.2)
BUN SERPL-MCNC: 25 MG/DL (ref 8–23)
BUN/CREAT SERPL: 18.7 (ref 7–25)
CALCIUM SPEC-SCNC: 9.6 MG/DL (ref 8.6–10.5)
CHLORIDE SERPL-SCNC: 108 MMOL/L (ref 98–107)
CHOLEST SERPL-MCNC: 175 MG/DL (ref 0–200)
CO2 SERPL-SCNC: 21.8 MMOL/L (ref 22–29)
CREAT SERPL-MCNC: 1.34 MG/DL (ref 0.57–1)
DEPRECATED RDW RBC AUTO: 45.1 FL (ref 37–54)
EOSINOPHIL # BLD AUTO: 0.34 10*3/MM3 (ref 0–0.4)
EOSINOPHIL NFR BLD AUTO: 3 % (ref 0.3–6.2)
ERYTHROCYTE [DISTWIDTH] IN BLOOD BY AUTOMATED COUNT: 14.2 % (ref 12.3–15.4)
FERRITIN SERPL-MCNC: 70.2 NG/ML (ref 13–150)
GFR SERPL CREATININE-BSD FRML MDRD: 38 ML/MIN/1.73
GLOBULIN UR ELPH-MCNC: 2.8 GM/DL
GLUCOSE SERPL-MCNC: 197 MG/DL (ref 65–99)
HBA1C MFR BLD: 7.8 % (ref 4.8–5.6)
HCT VFR BLD AUTO: 32 % (ref 34–46.6)
HCV AB SER DONR QL: NORMAL
HDLC SERPL-MCNC: 65 MG/DL (ref 40–60)
HGB BLD-MCNC: 10.5 G/DL (ref 12–15.9)
IMM GRANULOCYTES # BLD AUTO: 0.07 10*3/MM3 (ref 0–0.05)
IMM GRANULOCYTES NFR BLD AUTO: 0.6 % (ref 0–0.5)
IRON 24H UR-MRATE: 60 MCG/DL (ref 37–145)
LDLC SERPL CALC-MCNC: 83 MG/DL (ref 0–100)
LDLC/HDLC SERPL: 1.27 {RATIO}
LYMPHOCYTES # BLD AUTO: 2.63 10*3/MM3 (ref 0.7–3.1)
LYMPHOCYTES NFR BLD AUTO: 23.5 % (ref 19.6–45.3)
MCH RBC QN AUTO: 28.3 PG (ref 26.6–33)
MCHC RBC AUTO-ENTMCNC: 32.8 G/DL (ref 31.5–35.7)
MCV RBC AUTO: 86.3 FL (ref 79–97)
MONOCYTES # BLD AUTO: 0.72 10*3/MM3 (ref 0.1–0.9)
MONOCYTES NFR BLD AUTO: 6.4 % (ref 5–12)
NEUTROPHILS NFR BLD AUTO: 66 % (ref 42.7–76)
NEUTROPHILS NFR BLD AUTO: 7.38 10*3/MM3 (ref 1.7–7)
NRBC BLD AUTO-RTO: 0 /100 WBC (ref 0–0.2)
PLATELET # BLD AUTO: 195 10*3/MM3 (ref 140–450)
PMV BLD AUTO: 10.5 FL (ref 6–12)
POTASSIUM SERPL-SCNC: 5 MMOL/L (ref 3.5–5.2)
PROT SERPL-MCNC: 6.8 G/DL (ref 6–8.5)
RBC # BLD AUTO: 3.71 10*6/MM3 (ref 3.77–5.28)
SODIUM SERPL-SCNC: 139 MMOL/L (ref 136–145)
TRIGL SERPL-MCNC: 137 MG/DL (ref 0–150)
TSH SERPL DL<=0.05 MIU/L-ACNC: 0.73 UIU/ML (ref 0.27–4.2)
VIT B12 BLD-MCNC: 613 PG/ML (ref 211–946)
VLDLC SERPL-MCNC: 27.4 MG/DL (ref 5–40)
WBC # BLD AUTO: 11.2 10*3/MM3 (ref 3.4–10.8)

## 2020-11-24 ENCOUNTER — OFFICE VISIT (OUTPATIENT)
Dept: FAMILY MEDICINE CLINIC | Facility: CLINIC | Age: 80
End: 2020-11-24

## 2020-11-24 DIAGNOSIS — Z23 ENCOUNTER FOR IMMUNIZATION: ICD-10-CM

## 2020-11-24 DIAGNOSIS — E55.9 VITAMIN D DEFICIENCY DISEASE: ICD-10-CM

## 2020-11-24 DIAGNOSIS — R74.8 ELEVATED LIVER ENZYMES: ICD-10-CM

## 2020-11-24 DIAGNOSIS — E53.8 LOW SERUM VITAMIN B12: ICD-10-CM

## 2020-11-24 DIAGNOSIS — T14.8XXA SUPERFICIAL LACERATION: ICD-10-CM

## 2020-11-24 DIAGNOSIS — J45.40 MODERATE PERSISTENT EXTRINSIC ASTHMA WITHOUT COMPLICATION: ICD-10-CM

## 2020-11-24 DIAGNOSIS — J30.2 CHRONIC SEASONAL ALLERGIC RHINITIS: ICD-10-CM

## 2020-11-24 DIAGNOSIS — H81.09 MENIERE'S DISEASE, UNSPECIFIED LATERALITY: ICD-10-CM

## 2020-11-24 DIAGNOSIS — E78.2 MIXED HYPERLIPIDEMIA: Primary | ICD-10-CM

## 2020-11-24 DIAGNOSIS — I10 ESSENTIAL HYPERTENSION: ICD-10-CM

## 2020-11-24 DIAGNOSIS — D50.8 OTHER IRON DEFICIENCY ANEMIA: ICD-10-CM

## 2020-11-24 DIAGNOSIS — K21.9 GASTROESOPHAGEAL REFLUX DISEASE WITHOUT ESOPHAGITIS: ICD-10-CM

## 2020-11-24 DIAGNOSIS — M15.9 GENERALIZED OSTEOARTHRITIS: ICD-10-CM

## 2020-11-24 DIAGNOSIS — E11.9 TYPE 2 DIABETES MELLITUS WITHOUT COMPLICATION, WITH LONG-TERM CURRENT USE OF INSULIN (HCC): ICD-10-CM

## 2020-11-24 DIAGNOSIS — J30.9 CHRONIC ALLERGIC RHINITIS: ICD-10-CM

## 2020-11-24 DIAGNOSIS — E78.5 DYSLIPIDEMIA: ICD-10-CM

## 2020-11-24 DIAGNOSIS — Z00.00 HEALTHCARE MAINTENANCE: ICD-10-CM

## 2020-11-24 DIAGNOSIS — Z79.4 TYPE 2 DIABETES MELLITUS WITHOUT COMPLICATION, WITH LONG-TERM CURRENT USE OF INSULIN (HCC): ICD-10-CM

## 2020-11-24 DIAGNOSIS — J45.20 EXTRINSIC ASTHMA, MILD INTERMITTENT, UNCOMPLICATED: ICD-10-CM

## 2020-11-24 PROCEDURE — 90694 VACC AIIV4 NO PRSRV 0.5ML IM: CPT | Performed by: GENERAL PRACTICE

## 2020-11-24 PROCEDURE — G0008 ADMIN INFLUENZA VIRUS VAC: HCPCS | Performed by: GENERAL PRACTICE

## 2020-11-24 PROCEDURE — 90715 TDAP VACCINE 7 YRS/> IM: CPT | Performed by: GENERAL PRACTICE

## 2020-11-24 PROCEDURE — 90472 IMMUNIZATION ADMIN EACH ADD: CPT | Performed by: GENERAL PRACTICE

## 2020-11-24 PROCEDURE — 99214 OFFICE O/P EST MOD 30 MIN: CPT | Performed by: GENERAL PRACTICE

## 2020-11-24 RX ORDER — FLURBIPROFEN SODIUM 0.3 MG/ML
SOLUTION/ DROPS OPHTHALMIC
Qty: 50 EACH | Refills: 5 | Status: SHIPPED | OUTPATIENT
Start: 2020-11-24 | End: 2021-02-23 | Stop reason: SDUPTHER

## 2020-11-24 RX ORDER — MECLIZINE HYDROCHLORIDE 25 MG/1
25 TABLET ORAL EVERY 6 HOURS
Qty: 120 TABLET | Refills: 5 | Status: SHIPPED | OUTPATIENT
Start: 2020-11-24 | End: 2021-02-23 | Stop reason: SDUPTHER

## 2020-11-24 RX ORDER — NAPROXEN 375 MG/1
375 TABLET ORAL 2 TIMES DAILY
Qty: 60 TABLET | Refills: 5 | Status: SHIPPED | OUTPATIENT
Start: 2020-11-24 | End: 2021-02-23 | Stop reason: SDUPTHER

## 2020-11-24 RX ORDER — ALBUTEROL SULFATE 90 UG/1
2 AEROSOL, METERED RESPIRATORY (INHALATION) 4 TIMES DAILY
Qty: 8.5 G | Refills: 5 | Status: SHIPPED | OUTPATIENT
Start: 2020-11-24 | End: 2021-02-23 | Stop reason: SDUPTHER

## 2020-11-24 RX ORDER — MONTELUKAST SODIUM 10 MG/1
10 TABLET ORAL DAILY
Qty: 30 TABLET | Refills: 5 | Status: SHIPPED | OUTPATIENT
Start: 2020-11-24 | End: 2021-02-23 | Stop reason: SDUPTHER

## 2020-11-24 RX ORDER — LOSARTAN POTASSIUM 100 MG/1
100 TABLET ORAL DAILY
Qty: 30 TABLET | Refills: 5 | Status: SHIPPED | OUTPATIENT
Start: 2020-11-24 | End: 2021-02-23 | Stop reason: SDUPTHER

## 2020-11-24 RX ORDER — VITAMIN E 268 MG
400 CAPSULE ORAL DAILY
Qty: 30 CAPSULE | Refills: 5 | Status: SHIPPED | OUTPATIENT
Start: 2020-11-24 | End: 2021-02-23 | Stop reason: SDUPTHER

## 2020-11-24 RX ORDER — MELATONIN
1000 DAILY
Qty: 30 TABLET | Refills: 5 | Status: SHIPPED | OUTPATIENT
Start: 2020-11-24 | End: 2021-02-23 | Stop reason: SDUPTHER

## 2020-11-24 RX ORDER — ROSUVASTATIN CALCIUM 40 MG/1
40 TABLET, COATED ORAL NIGHTLY
Qty: 30 TABLET | Refills: 5 | Status: SHIPPED | OUTPATIENT
Start: 2020-11-24 | End: 2021-02-23 | Stop reason: SDUPTHER

## 2020-11-24 RX ORDER — CETIRIZINE HYDROCHLORIDE 10 MG/1
10 TABLET ORAL DAILY PRN
Qty: 30 TABLET | Refills: 5 | Status: SHIPPED | OUTPATIENT
Start: 2020-11-24 | End: 2021-02-23 | Stop reason: SDUPTHER

## 2020-11-24 RX ORDER — LANOLIN ALCOHOL/MO/W.PET/CERES
1000 CREAM (GRAM) TOPICAL DAILY
Qty: 30 TABLET | Refills: 5 | Status: SHIPPED | OUTPATIENT
Start: 2020-11-24 | End: 2021-02-23 | Stop reason: SDUPTHER

## 2020-11-24 RX ORDER — PANTOPRAZOLE SODIUM 40 MG/1
40 TABLET, DELAYED RELEASE ORAL DAILY
Qty: 30 TABLET | Refills: 5 | Status: SHIPPED | OUTPATIENT
Start: 2020-11-24 | End: 2021-02-23 | Stop reason: SDUPTHER

## 2020-11-24 RX ORDER — AMLODIPINE BESYLATE 5 MG/1
5 TABLET ORAL NIGHTLY
Qty: 30 TABLET | Refills: 5 | Status: SHIPPED | OUTPATIENT
Start: 2020-11-24 | End: 2021-02-23 | Stop reason: SDUPTHER

## 2020-11-24 NOTE — PROGRESS NOTES
Subjective   Sean Chen is a 80 y.o. female.     History of Present Illness     Chronic Allergic Rhinitis  Patient has a history of allergic rhinitis. Patient's symptoms have included sneezing, clear rhinorrhea, nasal congestion, periorbital pressure and postnasal drip. These symptoms are perennial with seasonal exacerbation. She remains on cetirizine and montelukast as prescribed with no apparent side effects.  She has used a nasal corticosteroid in the past.    Asthma  Patient's symptoms have included dyspnea and non-productive cough. She denies any chest pain and hemoptysis. The patient has been suffering from these symptoms for a number of years. Medications used in the past to treat these symptoms include beta agonist inhalers and combination beta agonists/steroid inhalers.  She is back on brand name advair with an improvement in her symptoms.  She has been using her rescue inhaler 2-3 times weekly on average.  Suspected precipitants include upper respiratory infection and allergens. Her  continues to smoke in the house and she has also been able to relate the severity of her symptoms to how much he is smoking    Diabetes  Current symptoms include: mild fatigue. Patient denies paresthesia of the feet, visual disturbances, polydipsia, polyuria, or foot ulcerations. Evaluation to date has been: hemoglobin A1C. Home sugars: have been at or near goal since last here. Current treatments: metformin and basal insulin - toujeo - 60 units daily.    Lab Results   Component Value Date    HGBA1C 7.80 (H) 08/24/2020      Dyslipidemia  Compliance with treatment has been good. The patient has been following her diet more carefully and continues to exercise intermittently. She is currently being prescribed the following medication for her dyslipidemia - rosuvastatin, omega 3 fatty acids. Patient denies side effects associated with her medications.   Lab Results   Component Value Date    CHOL 175 08/24/2020    CHLPL  111 11/16/2015    TRIG 137 08/24/2020    HDL 65 (H) 08/24/2020    LDL 83 08/24/2020     Hypertension  Home blood pressure readings: not doing. Associated signs and symptoms: dyspnea or peripheral edema. Patient denies: chest pain, palpitations, orthopnea and paroxysmal nocturnal dyspnea. Current antihypertensive medications includes losartan and amlodipine. Medication compliance: taking as prescribed.   Lab Results   Component Value Date    GLUCOSE 197 (H) 08/24/2020    BUN 25 (H) 08/24/2020    CREATININE 1.34 (H) 08/24/2020    EGFRIFNONA 38 (L) 08/24/2020    BCR 18.7 08/24/2020    K 5.0 08/24/2020    CO2 21.8 (L) 08/24/2020    CALCIUM 9.6 08/24/2020    ALBUMIN 4.00 08/24/2020    LABIL2 1.8 11/16/2015    AST 14 08/24/2020    ALT 11 08/24/2020     GERD  Patient has a history of heartburn. Symptoms have been present for a number of years. Symptoms include occasional nausea. The patient denies abdominal bloating, bilious reflux, dysphagia and vomiting or hematemesis. Symptoms appear to be worsened by large meals and lying down. Risk factors present for GERD include caffeine use, obesity and NSAID use. Risk factors absent for GERD are tobacco abuse, alcohol use and ASA use. Studies performed so far include upper endoscopy, result: negative. Treatments tried so far include proton pump inhibitor: pantoprazole. Results of treatment: some improvement in symptom severity, some improvement in symptom frequency.   Lab Results   Component Value Date    WBC 11.20 (H) 08/24/2020    HGB 10.5 (L) 08/24/2020    HCT 32.0 (L) 08/24/2020    MCV 86.3 08/24/2020     08/24/2020     Lab Results   Component Value Date    IRON 60 08/24/2020    TIBC 373 11/24/2015    FERRITIN 70.20 08/24/2020     Lab Results   Component Value Date    NZCMIFIV98 613 08/24/2020     Labs  Most recent vitamin D 23.7    The following portions of the patient's history were reviewed and updated as appropriate: allergies, current medications, past medical  history, past social history and problem list.    Review of Systems   Constitutional: Positive for fatigue. Negative for appetite change, chills, fever and unexpected weight change.   HENT: Positive for congestion, hearing loss, sneezing and tinnitus. Negative for ear pain, postnasal drip, rhinorrhea and sore throat.    Eyes: Negative for visual disturbance.   Respiratory: Positive for cough and shortness of breath (with exertion). Negative for wheezing.    Cardiovascular: Positive for leg swelling. Negative for chest pain and palpitations.   Gastrointestinal: Negative for abdominal pain, blood in stool, constipation, diarrhea, nausea and vomiting.   Endocrine: Negative for polydipsia and polyuria.   Genitourinary: Negative for dysuria, frequency, hematuria and urgency.   Musculoskeletal: Positive for arthralgias and back pain. Negative for myalgias.   Skin: Negative for rash.   Neurological: Positive for light-headedness (intermittent-when getting up from sitting and lying). Negative for weakness, numbness and headaches.   Psychiatric/Behavioral: Negative for dysphoric mood and sleep disturbance. The patient is not nervous/anxious.      Objective   Physical Exam  Constitutional:       General: She is not in acute distress.     Appearance: Normal appearance. She is well-developed. She is not diaphoretic.      Comments: Gait slow and cautious.  No apparent distress   HENT:      Head: Atraumatic.      Right Ear: Tympanic membrane, ear canal and external ear normal.      Left Ear: Tympanic membrane, ear canal and external ear normal.   Eyes:      Conjunctiva/sclera: Conjunctivae normal.   Neck:      Thyroid: No thyroid mass or thyromegaly.      Vascular: No carotid bruit or JVD.      Trachea: Trachea normal. No tracheal deviation.   Cardiovascular:      Rate and Rhythm: Normal rate and regular rhythm.      Heart sounds: Normal heart sounds, S1 normal and S2 normal. No murmur. No gallop.    Pulmonary:      Effort:  Pulmonary effort is normal.      Breath sounds: Wheezing (diffuse - mild -primarily on forced vital capacity) present. No rales.   Abdominal:      General: Bowel sounds are normal. There is no distension or abdominal bruit.      Palpations: Abdomen is soft. There is no hepatomegaly, splenomegaly or mass.      Tenderness: There is no abdominal tenderness.      Hernia: No hernia is present.   Musculoskeletal:      Right lower leg: No edema.      Left lower leg: No edema.   Lymphadenopathy:      Head:      Right side of head: No submental, submandibular, tonsillar, preauricular, posterior auricular or occipital adenopathy.      Left side of head: No submental, submandibular, tonsillar, preauricular, posterior auricular or occipital adenopathy.      Cervical: No cervical adenopathy.      Upper Body:      Right upper body: No supraclavicular adenopathy.      Left upper body: No supraclavicular adenopathy.   Skin:     General: Skin is warm.      Coloration: Skin is not cyanotic, jaundiced or pale.      Findings: Laceration (clean healing superficial laceration right index finger -patient states she caught a cleaning the house early this week) present. No rash.      Nails: There is no clubbing.     Neurological:      Mental Status: She is alert and oriented to person, place, and time.      Cranial Nerves: No cranial nerve deficit.      Motor: No tremor.      Coordination: Coordination normal.      Gait: Gait normal.   Psychiatric:         Speech: Speech normal.         Behavior: Behavior normal.         Thought Content: Thought content normal.       Assessment/Plan   Problems Addressed this Visit        Cardiovascular and Mediastinum    Essential hypertension   Hypertension: at goal. Evidence of target organ damage: none.  Encouraged to continue to work on diet and exercise plan.   Continue current medication    Relevant Medications    amLODIPine (NORVASC) 5 MG tablet    losartan (COZAAR) 100 MG tablet    Mixed  hyperlipidemia   As above.   Continue current medication.    Relevant Medications    rosuvastatin (CRESTOR) 40 MG tablet       Respiratory    Chronic seasonal allergic rhinitis  Reminded regarding allergen avoidance.  Continue current medication    Relevant Medications    naproxen (NAPROSYN) 375 MG tablet    cetirizine (zyrTEC) 10 MG tablet    montelukast (SINGULAIR) 10 MG tablet    Extrinsic asthma  Moderate persistent asthma. The patient is not currently having an exacerbation. In general, the patient's disease is well controlled.  Discussed monitoring symptoms and use of quick-relief medications and contacting us early in the course of exacerbations.    Relevant Medications    naproxen (NAPROSYN) 375 MG tablet    albuterol sulfate HFA (ProAir HFA) 108 (90 Base) MCG/ACT inhaler    montelukast (SINGULAIR) 10 MG tablet    Advair Diskus 500-50 MCG/DOSE DISKUS       Digestive    Gastroesophageal reflux disease without esophagitis    Relevant Medications    pantoprazole (PROTONIX) 40 MG EC tablet    Vitamin D deficiency disease  Continue supplementation with monitoring.    Relevant Medications    cholecalciferol (VITAMIN D3) 25 MCG (1000 UT) tablet       Endocrine    Type 2 diabetes mellitus without complication, with long-term current use of insulin (CMS/[facility])  Diabetes mellitus Type II, under good control.   Encouraged to continue to pursue ADA diet  Encouraged aerobic exercise.  Continue current medication  Updated labs will be drawn at her return.    Relevant Medications    Insulin Glargine, 2 Unit Dial, (TOUJEO) 300 UNIT/ML solution pen-injector injection    metFORMIN (GLUCOPHAGE) 1000 MG tablet    Insulin Pen Needle (B-D UF III MINI PEN NEEDLES) 31G X 5 MM misc       Nervous and Auditory    Meniere's disease    Relevant Medications    meclizine (ANTIVERT) 25 MG tablet       Musculoskeletal and Integument    Generalized osteoarthritis    Relevant Medications    naproxen (NAPROSYN) 375 MG tablet       Hematopoietic  and Hemostatic    Iron deficiency anemia    Will continue to monitor        Low serum vitamin B12  Continue supplementation with monitoring.    Relevant Medications    vitamin B-12 (CYANOCOBALAMIN) 1000 MCG tablet       Other    Elevated liver enzymes    Superficial laceration  Advised regarding wound care  Recommended an updated Tdap    Relevant Orders    Tdap Vaccine Greater Than or Equal To 8yo IM (Completed)    Healthcare maintenance  Recommended a flu shot    Relevant Orders    Influenza Vaccine Quad 65+ years    Superficial laceration    Relevant Orders    Tdap Vaccine Greater Than or Equal To 8yo IM (Completed)         Diagnoses       Codes Comments    Mixed hyperlipidemia    -  Primary ICD-10-CM: E78.2  ICD-9-CM: 272.2     Essential hypertension     ICD-10-CM: I10  ICD-9-CM: 401.9     Moderate persistent extrinsic asthma without complication     ICD-10-CM: J45.40  ICD-9-CM: 493.00     Chronic seasonal allergic rhinitis     ICD-10-CM: J30.2  ICD-9-CM: 477.8     Vitamin D deficiency disease     ICD-10-CM: E55.9  ICD-9-CM: 268.9     Gastroesophageal reflux disease without esophagitis     ICD-10-CM: K21.9  ICD-9-CM: 530.81     Type 2 diabetes mellitus without complication, with long-term current use of insulin (CMS/[facility])     ICD-10-CM: E11.9, Z79.4  ICD-9-CM: 250.00, V58.67     Meniere's disease, unspecified laterality     ICD-10-CM: H81.09  ICD-9-CM: 386.00     Generalized osteoarthritis     ICD-10-CM: M15.9  ICD-9-CM: 715.00     Low serum vitamin B12     ICD-10-CM: E53.8  ICD-9-CM: 266.2     Other iron deficiency anemia     ICD-10-CM: D50.8  ICD-9-CM: 280.8     Elevated liver enzymes     ICD-10-CM: R74.8  ICD-9-CM: 790.5     Healthcare maintenance     ICD-10-CM: Z00.00  ICD-9-CM: V70.0     Encounter for immunization     ICD-10-CM: Z23  ICD-9-CM: V03.89     Superficial laceration     ICD-10-CM: T14.8XXA  ICD-9-CM: 919.8     Dyslipidemia     ICD-10-CM: E78.5  ICD-9-CM: 272.4     Extrinsic asthma, mild  intermittent, uncomplicated     ICD-10-CM: J45.20  ICD-9-CM: 493.00     Chronic allergic rhinitis     ICD-10-CM: J30.9  ICD-9-CM: 477.9

## 2020-11-26 VITALS
TEMPERATURE: 97.1 F | OXYGEN SATURATION: 94 % | HEART RATE: 73 BPM | SYSTOLIC BLOOD PRESSURE: 126 MMHG | DIASTOLIC BLOOD PRESSURE: 74 MMHG | HEIGHT: 67 IN | BODY MASS INDEX: 28.25 KG/M2 | WEIGHT: 180 LBS | RESPIRATION RATE: 14 BRPM

## 2021-01-28 ENCOUNTER — TELEPHONE (OUTPATIENT)
Dept: FAMILY MEDICINE CLINIC | Facility: CLINIC | Age: 81
End: 2021-01-28

## 2021-01-28 NOTE — TELEPHONE ENCOUNTER
Patient requested a call back. Patient would like to know if she could have a printed list of her medications.     Please call and advise. Patient call back [phone]

## 2021-01-28 NOTE — TELEPHONE ENCOUNTER
Called and let the patient know that I have put a copy of her med list up front. She is going to come by and pick this up.

## 2021-01-29 ENCOUNTER — APPOINTMENT (OUTPATIENT)
Dept: VACCINE CLINIC | Facility: HOSPITAL | Age: 81
End: 2021-01-29

## 2021-02-19 ENCOUNTER — IMMUNIZATION (OUTPATIENT)
Dept: VACCINE CLINIC | Facility: HOSPITAL | Age: 81
End: 2021-02-19

## 2021-02-19 ENCOUNTER — APPOINTMENT (OUTPATIENT)
Dept: VACCINE CLINIC | Facility: HOSPITAL | Age: 81
End: 2021-02-19

## 2021-02-19 PROCEDURE — 91300 HC SARSCOV02 VAC 30MCG/0.3ML IM: CPT | Performed by: INTERNAL MEDICINE

## 2021-02-19 PROCEDURE — 0001A: CPT | Performed by: INTERNAL MEDICINE

## 2021-02-23 ENCOUNTER — OFFICE VISIT (OUTPATIENT)
Dept: FAMILY MEDICINE CLINIC | Facility: CLINIC | Age: 81
End: 2021-02-23

## 2021-02-23 DIAGNOSIS — J45.20 EXTRINSIC ASTHMA, MILD INTERMITTENT, UNCOMPLICATED: ICD-10-CM

## 2021-02-23 DIAGNOSIS — I10 ESSENTIAL HYPERTENSION: ICD-10-CM

## 2021-02-23 DIAGNOSIS — E78.2 MIXED HYPERLIPIDEMIA: ICD-10-CM

## 2021-02-23 DIAGNOSIS — E78.5 DYSLIPIDEMIA: ICD-10-CM

## 2021-02-23 DIAGNOSIS — H81.09 MENIERE'S DISEASE, UNSPECIFIED LATERALITY: ICD-10-CM

## 2021-02-23 DIAGNOSIS — E11.9 TYPE 2 DIABETES MELLITUS WITHOUT COMPLICATION, WITH LONG-TERM CURRENT USE OF INSULIN (HCC): Primary | ICD-10-CM

## 2021-02-23 DIAGNOSIS — Z00.00 HEALTHCARE MAINTENANCE: ICD-10-CM

## 2021-02-23 DIAGNOSIS — R74.8 ELEVATED LIVER ENZYMES: ICD-10-CM

## 2021-02-23 DIAGNOSIS — E53.8 LOW SERUM VITAMIN B12: ICD-10-CM

## 2021-02-23 DIAGNOSIS — K21.9 GASTROESOPHAGEAL REFLUX DISEASE WITHOUT ESOPHAGITIS: ICD-10-CM

## 2021-02-23 DIAGNOSIS — J30.2 CHRONIC SEASONAL ALLERGIC RHINITIS: ICD-10-CM

## 2021-02-23 DIAGNOSIS — J30.9 CHRONIC ALLERGIC RHINITIS: ICD-10-CM

## 2021-02-23 DIAGNOSIS — D50.8 OTHER IRON DEFICIENCY ANEMIA: ICD-10-CM

## 2021-02-23 DIAGNOSIS — E55.9 VITAMIN D DEFICIENCY DISEASE: ICD-10-CM

## 2021-02-23 DIAGNOSIS — Z79.4 TYPE 2 DIABETES MELLITUS WITHOUT COMPLICATION, WITH LONG-TERM CURRENT USE OF INSULIN (HCC): Primary | ICD-10-CM

## 2021-02-23 DIAGNOSIS — M15.9 GENERALIZED OSTEOARTHRITIS: ICD-10-CM

## 2021-02-23 DIAGNOSIS — J45.40 MODERATE PERSISTENT EXTRINSIC ASTHMA WITHOUT COMPLICATION: ICD-10-CM

## 2021-02-23 PROBLEM — T14.8XXA SUPERFICIAL LACERATION: Status: RESOLVED | Noted: 2020-11-24 | Resolved: 2021-02-23

## 2021-02-23 PROCEDURE — 96160 PT-FOCUSED HLTH RISK ASSMT: CPT | Performed by: GENERAL PRACTICE

## 2021-02-23 PROCEDURE — G0439 PPPS, SUBSEQ VISIT: HCPCS | Performed by: GENERAL PRACTICE

## 2021-02-23 PROCEDURE — 36415 COLL VENOUS BLD VENIPUNCTURE: CPT | Performed by: GENERAL PRACTICE

## 2021-02-23 PROCEDURE — 1125F AMNT PAIN NOTED PAIN PRSNT: CPT | Performed by: GENERAL PRACTICE

## 2021-02-23 PROCEDURE — 1170F FXNL STATUS ASSESSED: CPT | Performed by: GENERAL PRACTICE

## 2021-02-23 PROCEDURE — 1160F RVW MEDS BY RX/DR IN RCRD: CPT | Performed by: GENERAL PRACTICE

## 2021-02-23 RX ORDER — VITAMIN E 268 MG
400 CAPSULE ORAL DAILY
Qty: 30 CAPSULE | Refills: 5 | Status: SHIPPED | OUTPATIENT
Start: 2021-02-23 | End: 2021-04-27

## 2021-02-23 RX ORDER — CETIRIZINE HYDROCHLORIDE 10 MG/1
10 TABLET ORAL DAILY PRN
Qty: 30 TABLET | Refills: 5 | Status: SHIPPED | OUTPATIENT
Start: 2021-02-23 | End: 2021-04-27

## 2021-02-23 RX ORDER — ALBUTEROL SULFATE 90 UG/1
2 AEROSOL, METERED RESPIRATORY (INHALATION) 4 TIMES DAILY
Qty: 8.5 G | Refills: 5 | Status: SHIPPED | OUTPATIENT
Start: 2021-02-23 | End: 2021-04-27 | Stop reason: SDUPTHER

## 2021-02-23 RX ORDER — NAPROXEN 375 MG/1
375 TABLET ORAL 2 TIMES DAILY
Qty: 60 TABLET | Refills: 5 | Status: SHIPPED | OUTPATIENT
Start: 2021-02-23 | End: 2021-04-27

## 2021-02-23 RX ORDER — DIAPER,BRIEF,ADULT, DISPOSABLE
EACH MISCELLANEOUS
Qty: 100 EACH | Refills: 5 | Status: SHIPPED | OUTPATIENT
Start: 2021-02-23 | End: 2022-05-04

## 2021-02-23 RX ORDER — GLUCOSAM/CHON-MSM1/C/MANG/BOSW 500-416.6
TABLET ORAL
Qty: 100 EACH | Refills: 5 | Status: SHIPPED | OUTPATIENT
Start: 2021-02-23 | End: 2022-05-04

## 2021-02-23 RX ORDER — ROSUVASTATIN CALCIUM 40 MG/1
40 TABLET, COATED ORAL NIGHTLY
Qty: 30 TABLET | Refills: 5 | Status: SHIPPED | OUTPATIENT
Start: 2021-02-23 | End: 2021-04-27

## 2021-02-23 RX ORDER — LANOLIN ALCOHOL/MO/W.PET/CERES
1000 CREAM (GRAM) TOPICAL DAILY
Qty: 30 TABLET | Refills: 5 | Status: SHIPPED | OUTPATIENT
Start: 2021-02-23 | End: 2021-04-27

## 2021-02-23 RX ORDER — FLURBIPROFEN SODIUM 0.3 MG/ML
SOLUTION/ DROPS OPHTHALMIC
Qty: 50 EACH | Refills: 5 | Status: SHIPPED | OUTPATIENT
Start: 2021-02-23 | End: 2022-05-04

## 2021-02-23 RX ORDER — PANTOPRAZOLE SODIUM 40 MG/1
40 TABLET, DELAYED RELEASE ORAL DAILY
Qty: 30 TABLET | Refills: 5 | Status: SHIPPED | OUTPATIENT
Start: 2021-02-23 | End: 2021-04-27

## 2021-02-23 RX ORDER — MELATONIN
1000 DAILY
Qty: 30 TABLET | Refills: 5 | Status: SHIPPED | OUTPATIENT
Start: 2021-02-23 | End: 2021-04-27

## 2021-02-23 RX ORDER — MONTELUKAST SODIUM 10 MG/1
10 TABLET ORAL DAILY
Qty: 30 TABLET | Refills: 5 | Status: SHIPPED | OUTPATIENT
Start: 2021-02-23 | End: 2021-04-27

## 2021-02-23 RX ORDER — LOSARTAN POTASSIUM 100 MG/1
100 TABLET ORAL DAILY
Qty: 30 TABLET | Refills: 5 | Status: SHIPPED | OUTPATIENT
Start: 2021-02-23 | End: 2021-04-27 | Stop reason: SDUPTHER

## 2021-02-23 RX ORDER — AMLODIPINE BESYLATE 5 MG/1
5 TABLET ORAL NIGHTLY
Qty: 30 TABLET | Refills: 5 | Status: SHIPPED | OUTPATIENT
Start: 2021-02-23 | End: 2021-04-27 | Stop reason: SDUPTHER

## 2021-02-23 RX ORDER — MECLIZINE HYDROCHLORIDE 25 MG/1
25 TABLET ORAL EVERY 6 HOURS
Qty: 120 TABLET | Refills: 5 | Status: SHIPPED | OUTPATIENT
Start: 2021-02-23 | End: 2021-04-27

## 2021-02-23 NOTE — PROGRESS NOTES
The ABCs of the Annual Wellness Visit  Subsequent Medicare Wellness Visit    Subjective   History of Present Illness:  Sean Chen is a 80 y.o. female who presents for a Subsequent Medicare Wellness Visit.    Chronic Allergic Rhinitis  Patient has a history of allergic rhinitis.  She admits to intermittent sneezing, clear rhinorrhea, nasal congestion, periorbital pressure and postnasal drip. These symptoms are perennial with seasonal exacerbation. She remains on cetirizine and montelukast as prescribed with no apparent side effects.  She has used a nasal corticosteroid in the past.    Asthma  Patient's symptoms have included dyspnea and non-productive cough. She has been following her diet and exercise plan quite carefully and with the weight that she has lost has noted a significant improvement in the symptoms.  There is no history of any chest pain or hemoptysis. Medications used in the past to treat these symptoms include beta agonist inhalers and combination beta agonists/steroid inhalers. She has been using her rescue inhaler once every week or two on average.  Suspected precipitants include upper respiratory infection and allergens. Her  continues to smoke in the house     Diabetes  Current symptoms include: mild fatigue.  She continues to deny any paresthesias of the feet, visual disturbances, polydipsia, polyuria, or foot ulcerations. Evaluation to date has been: hemoglobin A1C. Home sugars: have been at or near goal since last here. Current treatments: metformin and basal insulin - toujeo - 60 units daily.       Dyslipidemia  Compliance with treatment has been good. The patient has been following her diet more carefully and continues to exercise intermittently. She is currently being prescribed the following medication for her dyslipidemia - rosuvastatin, omega 3 fatty acids. Patient denies side effects associated with her medications.     Hypertension  Home blood pressure readings: not doing.  Associated signs and symptoms: dyspnea and peripheral edema. Patient denies: chest pain, palpitations, orthopnea or paroxysmal nocturnal dyspnea. Current antihypertensive medications includes losartan and amlodipine. Medication compliance: taking as prescribed.     GERD  Patient has a history of heartburn. Symptoms have been present for a number of years. Symptoms include occasional nausea. The patient denies abdominal bloating, bilious reflux, dysphagia and vomiting or hematemesis. Symptoms appear to be worsened by large meals and lying down. Risk factors present for GERD include caffeine use, obesity and NSAID use. Risk factors absent for GERD are tobacco abuse, alcohol use and ASA use. Studies performed so far include upper endoscopy, result: negative. Treatments tried so far include proton pump inhibitor: pantoprazole. Results of treatment: some improvement in symptom severity, some improvement in symptom frequency.     HEALTH RISK ASSESSMENT    Recent Hospitalizations:  No hospitalization(s) within the last year.    Current Medical Providers:  Patient Care Team:  Ganga Gallardo MD as PCP - General  Ganga Gallardo MD as PCP - Family Medicine    Smoking Status:  Social History     Tobacco Use   Smoking Status Never Smoker   Smokeless Tobacco Never Used     Alcohol Consumption:  Social History     Substance and Sexual Activity   Alcohol Use No     Depression Screen:   PHQ-2/PHQ-9 Depression Screening 2/23/2021   Little interest or pleasure in doing things 0   Feeling down, depressed, or hopeless 0   Trouble falling or staying asleep, or sleeping too much 0   Feeling tired or having little energy 0   Poor appetite or overeating 0   Feeling bad about yourself - or that you are a failure or have let yourself or your family down 0   Trouble concentrating on things, such as reading the newspaper or watching television 0   Moving or speaking so slowly that other people could have noticed. Or the  opposite - being so fidgety or restless that you have been moving around a lot more than usual 0   Thoughts that you would be better off dead, or of hurting yourself in some way 0   Total Score 0   If you checked off any problems, how difficult have these problems made it for you to do your work, take care of things at home, or get along with other people? Not difficult at all     Fall Risk Screen:  KRISTIN Fall Risk Assessment was completed, and patient is at HIGH risk for falls. Assessment completed on:2/23/2021    Health Habits and Functional and Cognitive Screening:  Functional & Cognitive Status 2/23/2021   Do you have difficulty preparing food and eating? No   Do you have difficulty bathing yourself, getting dressed or grooming yourself? No   Do you have difficulty using the toilet? No   Do you have difficulty moving around from place to place? No   Do you have trouble with steps or getting out of a bed or a chair? No   Current Diet Well Balanced Diet   Dental Exam Up to date   Eye Exam Not up to date   Exercise (times per week) 5 times per week   Current Exercises Include Walking   Current Exercise Activities Include -   Do you need help using the phone?  No   Are you deaf or do you have serious difficulty hearing?  No   Do you need help with transportation? No   Do you need help shopping? No   Do you need help preparing meals?  No   Do you need help with housework?  No   Do you need help with laundry? No   Do you need help taking your medications? No   Do you need help managing money? No   Do you ever drive or ride in a car without wearing a seat belt? No   Have you felt unusual stress, anger or loneliness in the last month? No   Who do you live with? Other   If you need help, do you have trouble finding someone available to you? No   Have you been bothered in the last four weeks by sexual problems? No   Do you have difficulty concentrating, remembering or making decisions? No     Does the patient have  evidence of cognitive impairment? No    Asprin use counseling:Start ASA 81 mg daily     Age-appropriate Screening Schedule:  Refer to the list below for future screening recommendations based on patient's age, sex and/or medical conditions. Orders for these recommended tests are listed in the plan section. The patient has been provided with a written plan.    Health Maintenance   Topic Date Due   • ZOSTER VACCINE (1 of 2) 06/29/1991   • PAP SMEAR  06/30/2016   • DIABETIC EYE EXAM  06/30/2016   • DXA SCAN  01/30/2020   • COLONOSCOPY  12/18/2020   • URINE MICROALBUMIN  02/11/2021   • HEMOGLOBIN A1C  02/24/2021   • LIPID PANEL  08/24/2021   • TDAP/TD VACCINES (3 - Td) 11/24/2030   • INFLUENZA VACCINE  Completed          The following portions of the patient's history were reviewed and updated as appropriate: allergies, current medications, past family history, past medical history, past social history, past surgical history and problem list.    Outpatient Medications Prior to Visit   Medication Sig Dispense Refill   • Zoster Vac Recomb Adjuvanted (SHINGRIX) 50 MCG reconstituted suspension Inject 50 mcg into the shoulder, thigh, or buttocks See Admin Instructions. Repeat in 2-6 months 1 each 1   • Advair Diskus 500-50 MCG/DOSE DISKUS Inhale 1 puff 2 (Two) Times a Day. 60 each 5   • albuterol sulfate HFA (ProAir HFA) 108 (90 Base) MCG/ACT inhaler Inhale 2 puffs 4 (Four) Times a Day. 8.5 g 5   • amLODIPine (NORVASC) 5 MG tablet Take 1 tablet by mouth Every Night. 30 tablet 5   • cetirizine (zyrTEC) 10 MG tablet Take 1 tablet by mouth Daily As Needed for Allergies. 30 tablet 5   • cholecalciferol (VITAMIN D3) 25 MCG (1000 UT) tablet Take 1 tablet by mouth Daily. 30 tablet 5   • glucose blood (TrueTrack Test) test strip Test tid e11.9 100 each 5   • Insulin Glargine, 2 Unit Dial, (TOUJEO) 300 UNIT/ML solution pen-injector injection Inject 60 Units under the skin into the appropriate area as directed Daily. 5 pen 5   •  Insulin Pen Needle (B-D UF III MINI PEN NEEDLES) 31G X 5 MM misc Test tid 50 each 5   • losartan (COZAAR) 100 MG tablet Take 1 tablet by mouth Daily. 30 tablet 5   • meclizine (ANTIVERT) 25 MG tablet Take 1 tablet by mouth Every 6 (Six) Hours. 120 tablet 5   • metFORMIN (GLUCOPHAGE) 1000 MG tablet Take 1 tablet by mouth 2 (Two) Times a Day. 60 tablet 5   • montelukast (SINGULAIR) 10 MG tablet Take 1 tablet by mouth Daily. 30 tablet 5   • naproxen (NAPROSYN) 375 MG tablet Take 1 tablet by mouth 2 (Two) Times a Day. 60 tablet 5   • pantoprazole (PROTONIX) 40 MG EC tablet Take 1 tablet by mouth Daily. 30 tablet 5   • rosuvastatin (CRESTOR) 40 MG tablet Take 1 tablet by mouth Every Night. 30 tablet 5   • TRUEplus Lancets 28G misc Test tid 100 each 5   • vitamin B-12 (CYANOCOBALAMIN) 1000 MCG tablet Take 1 tablet by mouth Daily. 30 tablet 5   • vitamin E 400 UNIT capsule Take 1 capsule by mouth Daily. 30 capsule 5     No facility-administered medications prior to visit.      Patient Active Problem List   Diagnosis   • Iron deficiency anemia   • Type 2 diabetes mellitus without complication, with long-term current use of insulin (CMS/[facility])   • Vitamin D deficiency disease   • Extrinsic asthma   • Essential hypertension   • Mixed hyperlipidemia   • Generalized osteoarthritis   • Gastroesophageal reflux disease without esophagitis   • Meniere's disease   • Elevated liver enzymes   • Chronic seasonal allergic rhinitis   • Healthcare maintenance   • Encounter for immunization   • Encounter for screening mammogram for breast cancer   • Low serum vitamin B12     Advanced Care Planning:  ACP discussion was held with the patient during this visit. Patient does not have an advance directive, information provided.    Review of Systems   Constitutional: Positive for fatigue. Negative for appetite change, chills, fever and unexpected weight change.   HENT: Positive for congestion, hearing loss, sneezing and tinnitus. Negative for ear  "pain, postnasal drip, rhinorrhea and sore throat.    Eyes: Negative for visual disturbance.   Respiratory: Positive for cough and shortness of breath (with exertion). Negative for wheezing.    Cardiovascular: Positive for leg swelling. Negative for chest pain and palpitations.   Gastrointestinal: Negative for abdominal pain, blood in stool, constipation, diarrhea, nausea and vomiting.   Endocrine: Negative for polydipsia and polyuria.   Genitourinary: Negative for dysuria, frequency, hematuria and urgency.   Musculoskeletal: Positive for arthralgias and back pain. Negative for myalgias.        Occasional falls -usually occur if she moves too fast and loses her balance   Skin: Negative for rash.   Neurological: Positive for light-headedness (intermittent-when getting up from sitting and lying). Negative for weakness, numbness and headaches.   Psychiatric/Behavioral: Negative for dysphoric mood and sleep disturbance. The patient is not nervous/anxious.      Compared to one year ago, the patient feels her physical health is the same.  Compared to one year ago, the patient feels her mental health is the same.    Reviewed chart for potential of high risk medication in the elderly: yes  Reviewed chart for potential of harmful drug interactions in the elderly:yes    Objective        Vitals:    02/23/21 1340   BP: 122/60   Pulse: 81   Resp: 14   Temp: 97.1 °F (36.2 °C)   TempSrc: Temporal   SpO2: 94%   Weight: 76.7 kg (169 lb)   Height: 170.2 cm (67\")       Body mass index is 26.47 kg/m².  Discussed the patient's BMI with her. The BMI is above average; BMI management plan is completed.    Physical Exam  Constitutional:       General: She is not in acute distress.     Appearance: Normal appearance. She is well-developed. She is not diaphoretic.      Comments: Bright and in good spirits. Gait slow and cautious. No apparent distress. No pallor, jaundice, diaphoresis, or cyanosis.   HENT:      Head: Atraumatic.      Right Ear: " Tympanic membrane, ear canal and external ear normal.      Left Ear: Tympanic membrane, ear canal and external ear normal.   Eyes:      Conjunctiva/sclera: Conjunctivae normal.   Neck:      Thyroid: No thyroid mass or thyromegaly.      Vascular: No carotid bruit or JVD.      Trachea: Trachea normal. No tracheal deviation.   Cardiovascular:      Rate and Rhythm: Normal rate and regular rhythm.      Heart sounds: Normal heart sounds, S1 normal and S2 normal. No murmur. No gallop.    Pulmonary:      Effort: Pulmonary effort is normal.      Breath sounds: Wheezing (diffuse - mild -primarily on forced vital capacity) present. No rales.   Abdominal:      General: Bowel sounds are normal. There is no distension or abdominal bruit.      Palpations: Abdomen is soft. There is no hepatomegaly, splenomegaly or mass.      Tenderness: There is no abdominal tenderness.      Hernia: No hernia is present.   Musculoskeletal:      Right lower leg: No edema.      Left lower leg: No edema.   Lymphadenopathy:      Head:      Right side of head: No submental, submandibular, tonsillar, preauricular, posterior auricular or occipital adenopathy.      Left side of head: No submental, submandibular, tonsillar, preauricular, posterior auricular or occipital adenopathy.      Cervical: No cervical adenopathy.      Upper Body:      Right upper body: No supraclavicular adenopathy.      Left upper body: No supraclavicular adenopathy.   Skin:     General: Skin is warm.      Coloration: Skin is not cyanotic, jaundiced or pale.      Findings: No rash.      Nails: There is no clubbing.     Neurological:      Mental Status: She is alert and oriented to person, place, and time.      Cranial Nerves: No cranial nerve deficit.      Motor: No tremor.      Coordination: Coordination normal.      Gait: Gait normal.   Psychiatric:         Attention and Perception: Attention normal.         Mood and Affect: Mood normal.         Speech: Speech normal.          Behavior: Behavior normal.         Thought Content: Thought content normal.       Assessment/Plan   Medicare Risks and Personalized Health Plan  CMS Preventative Services Quick Reference  Advance Directive Discussion  Breast Cancer/Mammogram Screening  Cardiovascular risk  Chronic Pain   Colon Cancer Screening  Fall Risk  Immunizations Discussed/Encouraged (specific immunizations; Shingrix )  Obesity/Overweight   Osteoprorosis Risk    The above risks/problems have been discussed with the patient.  Pertinent information has been shared with the patient in the After Visit Summary.  Follow up plans and orders are seen below in the Assessment/Plan Section.    Diagnoses and all orders for this visit:    1. Type 2 diabetes mellitus without complication, with long-term current use of insulin (CMS/[facility])   Diabetes mellitus Type II, under unknown control.   Encouraged to continue to pursue ADA diet  Encouraged aerobic exercise.  Continue current medication  Reminded to undergo an updated diabetic eye exam  Updated labs drawn.  -     Hemoglobin A1c  -     MicroAlbumin, Urine, Random - Urine, Clean Catch  -     Vitamin B12  -     Insulin Pen Needle (B-D UF III MINI PEN NEEDLES) 31G X 5 MM misc; Test tid  Dispense: 50 each; Refill: 5  -     metFORMIN (GLUCOPHAGE) 1000 MG tablet; Take 1 tablet by mouth 2 (Two) Times a Day.  Dispense: 60 tablet; Refill: 5  -     TRUEplus Lancets 28G misc; Test tid  Dispense: 100 each; Refill: 5  -     aspirin 81 MG EC tablet; Take 1 tablet by mouth Daily.  Dispense: 90 tablet; Refill: 3    2. Mixed hyperlipidemia  As above.   Continue current medication.  -     Comprehensive Metabolic Panel  -     Lipid Panel    3. Essential hypertension  As above.   Continue current medication.  -     CBC & Differential  -     Comprehensive Metabolic Panel  -     amLODIPine (NORVASC) 5 MG tablet; Take 1 tablet by mouth Every Night.  Dispense: 30 tablet; Refill: 5  -     losartan (COZAAR) 100 MG tablet; Take 1  tablet by mouth Daily.  Dispense: 30 tablet; Refill: 5    4. Moderate persistent extrinsic asthma without complication  Moderate persistent asthma. The patient is not currently having an exacerbation. In general, the patient's disease is well controlled.  Continue current medication  Encouraged to report if any worse or if any new symptoms or concerns.  -     Advair Diskus 500-50 MCG/DOSE DISKUS; Inhale 1 puff 2 (Two) Times a Day.  Dispense: 60 each; Refill: 5    5. Chronic seasonal allergic rhinitis  -     cetirizine (zyrTEC) 10 MG tablet; Take 1 tablet by mouth Daily As Needed for Allergies.  Dispense: 30 tablet; Refill: 5    6. Generalized osteoarthritis  Reminded regarding symptomatic treatment.   Continue current medication  -     naproxen (NAPROSYN) 375 MG tablet; Take 1 tablet by mouth 2 (Two) Times a Day.  Dispense: 60 tablet; Refill: 5    7. Gastroesophageal reflux disease without esophagitis  -     pantoprazole (PROTONIX) 40 MG EC tablet; Take 1 tablet by mouth Daily.  Dispense: 30 tablet; Refill: 5    8. Elevated liver enzymes  Will continue to monitor    9. Other iron deficiency anemia    10. Low serum vitamin B12  Continue supplementation with monitoring.  -     Vitamin B12  -     vitamin B-12 (CYANOCOBALAMIN) 1000 MCG tablet; Take 1 tablet by mouth Daily.  Dispense: 30 tablet; Refill: 5    11. Vitamin D deficiency disease  As above.  -     Vitamin D 25 Hydroxy  -     cholecalciferol (VITAMIN D3) 25 MCG (1000 UT) tablet; Take 1 tablet by mouth Daily.  Dispense: 30 tablet; Refill: 5    12. Meniere's disease, unspecified laterality  -     meclizine (ANTIVERT) 25 MG tablet; Take 1 tablet by mouth Every 6 (Six) Hours.  Dispense: 120 tablet; Refill: 5    13.  Healthcare maintenance  Lengthy discussion regarding fall avoidance  Patient has received COVID-19 #1 and is aware to return for a second dose.   Reminded that she is also due for Shingrix  Patient remains uninterested in an updated mammogram, DEXA, or  screening colonoscopy    Follow Up:  Return in about 3 months (around 5/23/2021).     An After Visit Summary and PPPS were given to the patient.

## 2021-02-24 VITALS
HEIGHT: 67 IN | WEIGHT: 169 LBS | RESPIRATION RATE: 14 BRPM | TEMPERATURE: 97.1 F | SYSTOLIC BLOOD PRESSURE: 122 MMHG | OXYGEN SATURATION: 94 % | DIASTOLIC BLOOD PRESSURE: 60 MMHG | BODY MASS INDEX: 26.53 KG/M2 | HEART RATE: 81 BPM

## 2021-02-24 LAB
25(OH)D3+25(OH)D2 SERPL-MCNC: 17.7 NG/ML (ref 30–100)
ALBUMIN SERPL-MCNC: 3.8 G/DL (ref 3.5–5.2)
ALBUMIN/GLOB SERPL: 1.8 G/DL
ALP SERPL-CCNC: 113 U/L (ref 39–117)
ALT SERPL-CCNC: 8 U/L (ref 1–33)
AST SERPL-CCNC: 14 U/L (ref 1–32)
BASOPHILS # BLD AUTO: 0.07 10*3/MM3 (ref 0–0.2)
BASOPHILS NFR BLD AUTO: 0.7 % (ref 0–1.5)
BILIRUB SERPL-MCNC: 0.3 MG/DL (ref 0–1.2)
BUN SERPL-MCNC: 22 MG/DL (ref 8–23)
BUN/CREAT SERPL: 15.9 (ref 7–25)
CALCIUM SERPL-MCNC: 9.3 MG/DL (ref 8.6–10.5)
CHLORIDE SERPL-SCNC: 103 MMOL/L (ref 98–107)
CHOLEST SERPL-MCNC: 268 MG/DL (ref 0–200)
CO2 SERPL-SCNC: 25.1 MMOL/L (ref 22–29)
CREAT SERPL-MCNC: 1.38 MG/DL (ref 0.57–1)
EOSINOPHIL # BLD AUTO: 0.23 10*3/MM3 (ref 0–0.4)
EOSINOPHIL NFR BLD AUTO: 2.3 % (ref 0.3–6.2)
ERYTHROCYTE [DISTWIDTH] IN BLOOD BY AUTOMATED COUNT: 13.1 % (ref 12.3–15.4)
GLOBULIN SER CALC-MCNC: 2.1 GM/DL
GLUCOSE SERPL-MCNC: 330 MG/DL (ref 65–99)
HBA1C MFR BLD: 10.2 % (ref 4.8–5.6)
HCT VFR BLD AUTO: 38.8 % (ref 34–46.6)
HDLC SERPL-MCNC: 65 MG/DL (ref 40–60)
HGB BLD-MCNC: 12.9 G/DL (ref 12–15.9)
IMM GRANULOCYTES # BLD AUTO: 0.03 10*3/MM3 (ref 0–0.05)
IMM GRANULOCYTES NFR BLD AUTO: 0.3 % (ref 0–0.5)
LDLC SERPL CALC-MCNC: 165 MG/DL (ref 0–100)
LYMPHOCYTES # BLD AUTO: 2.72 10*3/MM3 (ref 0.7–3.1)
LYMPHOCYTES NFR BLD AUTO: 26.6 % (ref 19.6–45.3)
MCH RBC QN AUTO: 28.9 PG (ref 26.6–33)
MCHC RBC AUTO-ENTMCNC: 33.2 G/DL (ref 31.5–35.7)
MCV RBC AUTO: 87 FL (ref 79–97)
MONOCYTES # BLD AUTO: 0.59 10*3/MM3 (ref 0.1–0.9)
MONOCYTES NFR BLD AUTO: 5.8 % (ref 5–12)
NEUTROPHILS # BLD AUTO: 6.58 10*3/MM3 (ref 1.7–7)
NEUTROPHILS NFR BLD AUTO: 64.3 % (ref 42.7–76)
NRBC BLD AUTO-RTO: 0.1 /100 WBC (ref 0–0.2)
PLATELET # BLD AUTO: 266 10*3/MM3 (ref 140–450)
POTASSIUM SERPL-SCNC: 4.1 MMOL/L (ref 3.5–5.2)
PROT SERPL-MCNC: 5.9 G/DL (ref 6–8.5)
RBC # BLD AUTO: 4.46 10*6/MM3 (ref 3.77–5.28)
SODIUM SERPL-SCNC: 141 MMOL/L (ref 136–145)
TRIGL SERPL-MCNC: 207 MG/DL (ref 0–150)
VIT B12 SERPL-MCNC: 719 PG/ML (ref 211–946)
VLDLC SERPL CALC-MCNC: 38 MG/DL (ref 5–40)
WBC # BLD AUTO: 10.22 10*3/MM3 (ref 3.4–10.8)

## 2021-02-24 RX ORDER — ASPIRIN 81 MG/1
81 TABLET ORAL DAILY
Qty: 90 TABLET | Refills: 3 | Status: SHIPPED | OUTPATIENT
Start: 2021-02-24 | End: 2021-04-27 | Stop reason: SDUPTHER

## 2021-02-24 NOTE — PATIENT INSTRUCTIONS
Advance Directive    Advance directives are legal documents that let you make choices ahead of time about your health care and medical treatment in case you become unable to communicate for yourself. Advance directives are a way for you to make known your wishes to family, friends, and health care providers. This can let others know about your end-of-life care if you become unable to communicate.  Discussing and writing advance directives should happen over time rather than all at once. Advance directives can be changed depending on your situation and what you want, even after you have signed the advance directives.  There are different types of advance directives, such as:  · Medical power of .  · Living will.  · Do not resuscitate (DNR) or do not attempt resuscitation (DNAR) order.  Health care proxy and medical power of   A health care proxy is also called a health care agent. This is a person who is appointed to make medical decisions for you in cases where you are unable to make the decisions yourself. Generally, people choose someone they know well and trust to represent their preferences. Make sure to ask this person for an agreement to act as your proxy. A proxy may have to exercise judgment in the event of a medical decision for which your wishes are not known.  A medical power of  is a legal document that names your health care proxy. Depending on the laws in your state, after the document is written, it may also need to be:  · Signed.  · Notarized.  · Dated.  · Copied.  · Witnessed.  · Incorporated into your medical record.  You may also want to appoint someone to manage your money in a situation in which you are unable to do so. This is called a durable power of  for finances. It is a separate legal document from the durable power of  for health care. You may choose the same person or someone different from your health care proxy to act as your agent in money  matters.  If you do not appoint a proxy, or if there is a concern that the proxy is not acting in your best interests, a court may appoint a guardian to act on your behalf.  Living will  A living will is a set of instructions that state your wishes about medical care when you cannot express them yourself. Health care providers should keep a copy of your living will in your medical record. You may want to give a copy to family members or friends. To alert caregivers in case of an emergency, you can place a card in your wallet to let them know that you have a living will and where they can find it. A living will is used if you become:  · Terminally ill.  · Disabled.  · Unable to communicate or make decisions.  Items to consider in your living will include:  · To use or not to use life-support equipment, such as dialysis machines and breathing machines (ventilators).  · A DNR or DNAR order. This tells health care providers not to use cardiopulmonary resuscitation (CPR) if breathing or heartbeat stops.  · To use or not to use tube feeding.  · To be given or not to be given food and fluids.  · Comfort (palliative) care when the goal becomes comfort rather than a cure.  · Donation of organs and tissues.  A living will does not give instructions for distributing your money and property if you should pass away.  DNR or DNAR  A DNR or DNAR order is a request not to have CPR in the event that your heart stops beating or you stop breathing. If a DNR or DNAR order has not been made and shared, a health care provider will try to help any patient whose heart has stopped or who has stopped breathing. If you plan to have surgery, talk with your health care provider about how your DNR or DNAR order will be followed if problems occur.  What if I do not have an advance directive?  If you do not have an advance directive, some states assign family decision makers to act on your behalf based on how closely you are related to them. Each  state has its own laws about advance directives. You may want to check with your health care provider, , or state representative about the laws in your state.  Summary  · Advance directives are the legal documents that allow you to make choices ahead of time about your health care and medical treatment in case you become unable to tell others about your care.  · The process of discussing and writing advance directives should happen over time. You can change the advance directives, even after you have signed them.  · Advance directives include DNR or DNAR orders, living giang, and designating an agent as your medical power of .  This information is not intended to replace advice given to you by your health care provider. Make sure you discuss any questions you have with your health care provider.  Document Revised: 07/16/2020 Document Reviewed: 07/16/2020  [employer]vier Patient Education © 2020 [employer]      Mammogram  A mammogram is a low energy X-ray of the breasts that is done to check for abnormal changes. This procedure can screen for and detect any changes that may indicate breast cancer. Mammograms are regularly done on women. A man may have a mammogram if he has a lump or swelling in his breast. A mammogram can also identify other changes and variations in the breast, such as:  · Inflammation of the breast tissue (mastitis).  · An infected area that contains a collection of pus (abscess).  · A fluid-filled sac (cyst).  · Fibrocystic changes. This is when breast tissue becomes denser, which can make the tissue feel rope-like or uneven under the skin.  · Tumors that are not cancerous (benign).  Tell a health care provider:  · About any allergies you have.  · If you have breast implants.  · If you have had previous breast disease, biopsy, or surgery.  · If you are breastfeeding.  · If you are younger than age 25.  · If you have a family history of breast cancer.  · Whether you are pregnant or may  be pregnant.  What are the risks?  Generally, this is a safe procedure. However, problems may occur, including:  · Exposure to radiation. Radiation levels are very low with this test.  · The results being misinterpreted.  · The need for further tests.  · The inability of the mammogram to detect certain cancers.  What happens before the procedure?  · Schedule your test about 1-2 weeks after your menstrual period if you are still menstruating. This is usually when your breasts are the least tender.  · If you have had a mammogram done at a different facility in the past, get the mammogram X-rays or have them sent to your current exam facility. The new and old images will be compared.  · Wash your breasts and underarms on the day of the test.  · Do not wear deodorants, perfumes, lotions, or powders anywhere on your body on the day of the test.  · Remove any jewelry from your neck.  · Wear clothes that you can change into and out of easily.  What happens during the procedure?    · You will undress from the waist up and put on a gown that opens in the front.  · You will  front of the X-ray machine.  · Each breast will be placed between two plastic or glass plates. The plates will compress your breast for a few seconds. Try to stay as relaxed as possible during the procedure. This does not cause any harm to your breasts and any discomfort you feel will be very brief.  · X-rays will be taken from different angles of each breast.  The procedure may vary among health care providers and hospitals.  What happens after the procedure?  · The mammogram will be examined by a specialist (radiologist).  · You may need to repeat certain parts of the test, depending on the quality of the images. This is commonly done if the radiologist needs a better view of the breast tissue.  · You may resume your normal activities.  · It is up to you to get the results of your procedure. Ask your health care provider, or the department that  is doing the procedure, when your results will be ready.  Summary  · A mammogram is a low energy X-ray of the breasts that is done to check for abnormal changes. A man may have a mammogram if he has a lump or swelling in his breast.  · If you have had a mammogram done at a different facility in the past, get the mammogram X-rays or have them sent to your current exam facility in order to compare them.  · Schedule your test about 1-2 weeks after your menstrual period if you are still menstruating.  · For this test, each breast will be placed between two plastic or glass plates. The plates will compress your breast for a few seconds.  · Ask when your test results will be ready. Make sure you get your test results.  This information is not intended to replace advice given to you by your health care provider. Make sure you discuss any questions you have with your health care provider.  Document Revised: 08/08/2019 Document Reviewed: 08/08/2019  [employer] Patient Education © 2020 [employer]      Colorectal Cancer Screening    Colorectal cancer screening is a group of tests that are used to check for colorectal cancer before symptoms develop. Colorectal refers to the colon and rectum. The colon and rectum are located at the end of the digestive tract and carry bowel movements out of the body.  Who should have screening?  All adults starting at age 50 until age 75 should have screening. Your health care provider may recommend screening at age 45. You will have tests every 1-10 years, depending on your results and the type of screening test.  You may have screening tests starting at an earlier age, or more frequently than other people, if you have any of the following risk factors:  · A personal or family history of colorectal cancer or abnormal growths (polyps).  · Inflammatory bowel disease, such as ulcerative colitis or Crohn's disease.  · A history of having radiation treatment to the abdomen or pelvic area for  cancer.  · Colorectal cancer symptoms, such as changes in bowel habits or blood in your stool.  · A type of colon cancer syndrome that is passed from parent to child (hereditary), such as:  ? Pineda syndrome.  ? Familial adenomatous polyposis.  ? Turcot syndrome.  ? Peutz-Jeghers syndrome.  Screening recommendations for adults who are 75-85 years old vary depending on health.  How is screening done?  There are several types of colorectal screening tests. You may have one or more of the following:  · Guaiac-based fecal occult blood testing. For this test, a stool (feces) sample is checked for hidden (occult) blood, which could be a sign of colorectal cancer.  · Fecal immunochemical test (FIT). For this test, a stool sample is checked for blood, which could be a sign of colorectal cancer.  · Stool DNA test. For this test, a stool sample is checked for blood and changes in DNA that could lead to colorectal cancer.  · Sigmoidoscopy. During this test, a thin, flexible tube with a camera on the end (sigmoidoscope) is used to examine the rectum and the lower colon.  · Colonoscopy. During this test, a long, flexible tube with a camera on the end (colonoscope) is used to examine the entire colon and rectum. With a colonoscopy, it is possible to take a sample of tissue (biopsy) and remove small polyps during the test.  · Virtual colonoscopy. Instead of a colonoscope, this type of colonoscopy uses X-rays (CT scan) and computers to produce images of the colon and rectum.  What are the benefits of screening?  Screening reduces your risk for colorectal cancer and can help identify cancer at an early stage, when the cancer can be removed or treated more easily. It is common for polyps to form in the lining of the colon, especially as you age. These polyps may be cancerous or become cancerous over time. Screening can identify these polyps.  What are the risks of screening?  Each screening test may have different risks.  · Stool  sample tests have fewer risks than other types of screening tests. However, you may need more tests to confirm results from a stool sample test.  · Screening tests that involve X-rays expose you to low levels of radiation, which may slightly increase your cancer risk. The benefit of detecting cancer outweighs the slight increase in risk.  · Screening tests such as sigmoidoscopy and colonoscopy may place you at risk for bleeding, intestinal damage, infection, or a reaction to medicines given during the exam.  Talk with your health care provider to understand your risk for colorectal cancer and to make a screening plan that is right for you.  Questions to ask your health care provider  · When should I start colorectal cancer screening?  · What is my risk for colorectal cancer?  · How often do I need screening?  · Which screening tests do I need?  · How do I get my test results?  · What do my results mean?  Where to find more information  Learn more about colorectal cancer screening from:  · The American Cancer Society: www.cancer.org  · The National Cancer [place]: www.cancer.gov  Summary  · Colorectal cancer screening is a group of tests used to check for colorectal cancer before symptoms develop.  · Screening reduces your risk for colorectal cancer and can help identify cancer at an early stage, when the cancer can be removed or treated more easily.  · All adults starting at age 50 until age 75 should have screening. Your health care provider may recommend screening at age 45.  · You may have screening tests starting at an earlier age, or more frequently than other people, if you have certain risk factors.  · Talk with your health care provider to understand your risk for colorectal cancer and to make a screening plan that is right for you.  This information is not intended to replace advice given to you by your health care provider. Make sure you discuss any questions you have with your health care  provider.  Document Revised: 04/08/2020 Document Reviewed: 09/19/2018  [employer] Patient Education © 2020 [employer]      Heart Disease Prevention  Heart disease is the leading cause of death in the world. Coronary artery disease is the most common cause of heart disease. This condition results when cholesterol and other substances (plaque) build up inside the walls of the blood vessels that supply your heart muscle (arteries). This buildup in arteries is called atherosclerosis. You can take actions to lower your risk of heart disease.  How can heart disease affect me?  Heart disease can cause many unpleasant symptoms and complications, such as:  · Chest pain (angina).  · Reduced or blocked blood flow to your heart. This can cause:  ? Irregular heartbeats (arrhythmias).  ? Heart attack.  ? Heart failure.  What can increase my risk?  The following factors may make you more likely to develop this condition:  · High blood pressure (hypertension).  · High cholesterol.  · Smoking.  · A diet high in saturated fats or trans fats.  · Lack of physical activity.  · Obesity.  · Drinking too much alcohol.  · Diabetes.  · Having a family history of heart disease.  What actions can I take to prevent heart disease?  Nutrition    · Eat a heart-healthy eating plan as told by your health care provider. Examples include the DASH (Dietary Approaches to Stop Hypertension) eating plan or the Mediterranean diet.  · Generally, it is recommended that you:  ? Eat less salt (sodium). Ask your health care provider how much sodium is safe for you. Most people should have less than 2,300 mg each day.  ? Limit unhealthy fats, such as saturated and trans fats, in your diet. You can do this by eating low-fat dairy products, eating less red meat, and avoiding processed foods.  ? Eat healthy fats (omega-3 fatty acids). These are found in fish, such as mackerel or salmon.  ? Eat more fruits and vegetables. You should try to fill one-half of your  plate with fruits and vegetables at each meal.  ? Eat more whole grains.  ? Avoid foods and drinks that have added sugars.  Lifestyle    · Get regular exercise. This is one of the most important things you can do for your health. Generally, it is recommended that you:  ? Exercise for at least 30 minutes on most days of the week (150 minutes each week). The exercise should increase your heart rate and make you sweat (aerobic exercise).  ? Add strength exercises on at least 2 days each week.  · Do not use any products that contain nicotine or tobacco, such as cigarettes and e-cigarettes. These can damage your heart and blood vessels. If you need help quitting, ask your health care provider.  Alcohol use  · Do not drink alcohol if:  ? Your health care provider tells you not to drink.  ? You are pregnant, may be pregnant, or are planning to become pregnant.  · If you drink alcohol, limit how much you have:  ? 0-1 drink a day for women.  ? 0-2 drinks a day for men.  · Be aware of how much alcohol is in your drink. In the [place] one drink equals one typical bottle of beer (12 oz), one-half glass of wine (5 oz), or one shot of hard liquor (1½ oz).  Medicines  · Take over-the-counter and prescription medicines only as told by your health care provider.  · Ask your health care provider whether you should take an aspirin every day. Taking aspirin may help reduce your risk of heart disease and stroke.  · Depending on your risk factors, your health care provider may prescribe medicines to lower your risk of heart disease or to control related conditions. You may take medicine to:  ? Lower cholesterol.  ? Control blood pressure.  ? Control diabetes.  General information  · Keep your blood pressure under control, as recommended by your health care provider. For most healthy people, the upper number of your blood pressure (systolic) should be no higher than 120, and the lower number (diastolic) no higher than 80. Treatment may be  needed if your blood pressure is higher than 130/80.  · Have your blood pressure checked at least every two years. Your health care provider may check your blood pressure more often if you have high blood pressure.  · After age 20, have your cholesterol checked every 4-6 years. If you have risk factors for heart disease, you may need to have it checked more frequently. Treatment may be needed if your cholesterol is high.  · Have your body mass index (BMI) checked every year. Your health care provider can calculate your BMI from your height and weight.  · Work with your health care provider to lose weight, if needed, or to maintain a healthy weight.  Where to find more information:  · Centers for Disease Control and Prevention: www.cdc.gov/heartdisease  · American Heart Association: www.heart.org  ? Take a free online heart disease risk quiz to better understand your personal risk factors.  Summary  · Heart disease is the leading cause of death in the world.  · Heart disease can cause chest pain, abnormal heart rhythms, heart attack, and heart failure.  · High blood pressure, high cholesterol, and smoking are the main risk factors for heart disease, although other factors also contribute.  · You can take actions to lower your chances of developing heart disease. Work with your health care provider to reduce your risk by following a heart-healthy diet, being physically active, and controlling your weight, blood pressure, and cholesterol level.  This information is not intended to replace advice given to you by your health care provider. Make sure you discuss any questions you have with your health care provider.  Document Revised: 01/02/2019 Document Reviewed: 01/02/2019  [employer] Patient Education © 2020 [employer]      Fall Prevention in the Home, Adult  Falls can cause injuries. They can happen to people of all ages. There are many things you can do to make your home safe and to help prevent falls. Ask for help  when making these changes, if needed.  What actions can I take to prevent falls?  General Instructions  · Use good lighting in all rooms. Replace any light bulbs that burn out.  · Turn on the lights when you go into a dark area. Use night-lights.  · Keep items that you use often in easy-to-reach places. Lower the shelves around your home if necessary.  · Set up your furniture so you have a clear path. Avoid moving your furniture around.  · Do not have throw rugs and other things on the floor that can make you trip.  · Avoid walking on wet floors.  · If any of your floors are uneven, fix them.  · Add color or contrast paint or tape to clearly sandie and help you see:  ? Any grab bars or handrails.  ? First and last steps of stairways.  ? Where the edge of each step is.  · If you use a stepladder:  ? Make sure that it is fully opened. Do not climb a closed stepladder.  ? Make sure that both sides of the stepladder are locked into place.  ? Ask someone to hold the stepladder for you while you use it.  · If there are any pets around you, be aware of where they are.  What can I do in the bathroom?         · Keep the floor dry. Clean up any water that spills onto the floor as soon as it happens.  · Remove soap buildup in the tub or shower regularly.  · Use non-skid mats or decals on the floor of the tub or shower.  · Attach bath mats securely with double-sided, non-slip rug tape.  · If you need to sit down in the shower, use a plastic, non-slip stool.  · Install grab bars by the toilet and in the tub and shower. Do not use towel bars as grab bars.  What can I do in the bedroom?  · Make sure that you have a light by your bed that is easy to reach.  · Do not use any sheets or blankets that are too big for your bed. They should not hang down onto the floor.  · Have a firm chair that has side arms. You can use this for support while you get dressed.  What can I do in the kitchen?  · Clean up any spills right away.  · If you  need to reach something above you, use a strong step stool that has a grab bar.  · Keep electrical cords out of the way.  · Do not use floor polish or wax that makes floors slippery. If you must use wax, use non-skid floor wax.  What can I do with my stairs?  · Do not leave any items on the stairs.  · Make sure that you have a light switch at the top of the stairs and the bottom of the stairs. If you do not have them, ask someone to add them for you.  · Make sure that there are handrails on both sides of the stairs, and use them. Fix handrails that are broken or loose. Make sure that handrails are as long as the stairways.  · Install non-slip stair treads on all stairs in your home.  · Avoid having throw rugs at the top or bottom of the stairs. If you do have throw rugs, attach them to the floor with carpet tape.  · Choose a carpet that does not hide the edge of the steps on the stairway.  · Check any carpeting to make sure that it is firmly attached to the stairs. Fix any carpet that is loose or worn.  What can I do on the outside of my home?  · Use bright outdoor lighting.  · Regularly fix the edges of walkways and driveways and fix any cracks.  · Remove anything that might make you trip as you walk through a door, such as a raised step or threshold.  · Trim any bushes or trees on the path to your home.  · Regularly check to see if handrails are loose or broken. Make sure that both sides of any steps have handrails.  · Install guardrails along the edges of any raised decks and porches.  · Clear walking paths of anything that might make someone trip, such as tools or rocks.  · Have any leaves, snow, or ice cleared regularly.  · Use sand or salt on walking paths during winter.  · Clean up any spills in your garage right away. This includes grease or oil spills.  What other actions can I take?  · Wear shoes that:  ? Have a low heel. Do not wear high heels.  ? Have rubber bottoms.  ? Are comfortable and fit you  well.  ? Are closed at the toe. Do not wear open-toe sandals.  · Use tools that help you move around (mobility aids) if they are needed. These include:  ? Canes.  ? Walkers.  ? Scooters.  ? Crutches.  · Review your medicines with your doctor. Some medicines can make you feel dizzy. This can increase your chance of falling.  Ask your doctor what other things you can do to help prevent falls.  Where to find more information  · Centers for Disease Control and Prevention, [place]: https://cdc.gov  · National [place] [employer] A[place]: https://no0dteu.kimberly.nih.gov  Contact a doctor if:  · You are afraid of falling at home.  · You feel weak, drowsy, or dizzy at home.  · You fall at home.  Summary  · There are many simple things that you can do to make your home safe and to help prevent falls.  · Ways to make your home safe include removing tripping hazards and installing grab bars in the bathroom.  · Ask for help when making these changes in your home.  This information is not intended to replace advice given to you by your health care provider. Make sure you discuss any questions you have with your health care provider.  Document Revised: 04/09/2020 Document Reviewed: 08/02/2018  [employer] Patient Education © 2020 [employer]      Osteoporosis    Osteoporosis happens when your bones get thin and weak. This can cause your bones to break (fracture) more easily. You can do things at home to make your bones stronger.  Follow these instructions at home:    Activity  · Exercise as told by your doctor. Ask your doctor what activities are safe for you. You should do:  ? Exercises that make your muscles work to hold your body weight up (weight-bearing exercises). These include rajiv chi, yoga, and walking.  ? Exercises to make your muscles stronger. One example is lifting weights.  Lifestyle  · Limit alcohol intake to no more than 1 drink a day for nonpregnant women and 2 drinks a day for men. One drink equals 12 oz of beer, 5 oz of wine,  or 1½ oz of hard liquor.  · Do not use any products that have nicotine or tobacco in them. These include cigarettes and e-cigarettes. If you need help quitting, ask your doctor.  Preventing falls  · Use tools to help you move around (mobility aids) as needed. These include canes, walkers, scooters, and crutches.  · Keep rooms well-lit and free of clutter.  · Put away things that could make you trip. These include cords and rugs.  · Install safety rails on stairs. Install grab bars in bathrooms.  · Use rubber mats in slippery areas, like bathrooms.  · Wear shoes that:  ? Fit you well.  ? Support your feet.  ? Have closed toes.  ? Have rubber soles or low heels.  · Tell your doctor about all of the medicines you are taking. Some medicines can make you more likely to fall.  General instructions  · Eat plenty of calcium and vitamin D. These nutrients are good for your bones. Good sources of calcium and vitamin D include:  ? Some fatty fish, such as salmon and tuna.  ? Foods that have calcium and vitamin D added to them (fortified foods). For example, some breakfast cereals are fortified with calcium and vitamin D.  ? Egg yolks.  ? Cheese.  ? Liver.  · Take over-the-counter and prescription medicines only as told by your doctor.  · Keep all follow-up visits as told by your doctor. This is important.  Contact a doctor if:  · You have not been tested (screened) for osteoporosis and you are:  ? A woman who is age 65 or older.  ? A man who is age 70 or older.  Get help right away if:  · You fall.  · You get hurt.  Summary  · Osteoporosis happens when your bones get thin and weak.  · Weak bones can break (fracture) more easily.  · Eat plenty of calcium and vitamin D. These nutrients are good for your bones.  · Tell your doctor about all of the medicines that you take.  This information is not intended to replace advice given to you by your health care provider. Make sure you discuss any questions you have with your health  care provider.  Document Revised: 11/30/2018 Document Reviewed: 10/12/2018  [employer] Patient Education © 2020 [employer]

## 2021-03-12 ENCOUNTER — IMMUNIZATION (OUTPATIENT)
Dept: VACCINE CLINIC | Facility: HOSPITAL | Age: 81
End: 2021-03-12

## 2021-03-12 PROCEDURE — 0002A: CPT | Performed by: INTERNAL MEDICINE

## 2021-03-12 PROCEDURE — 91300 HC SARSCOV02 VAC 30MCG/0.3ML IM: CPT | Performed by: INTERNAL MEDICINE

## 2021-03-16 ENCOUNTER — OFFICE VISIT (OUTPATIENT)
Dept: FAMILY MEDICINE CLINIC | Facility: CLINIC | Age: 81
End: 2021-03-16

## 2021-03-16 DIAGNOSIS — Z79.4 TYPE 2 DIABETES MELLITUS WITHOUT COMPLICATION, WITH LONG-TERM CURRENT USE OF INSULIN (HCC): ICD-10-CM

## 2021-03-16 DIAGNOSIS — R74.8 ELEVATED LIVER ENZYMES: ICD-10-CM

## 2021-03-16 DIAGNOSIS — E11.9 TYPE 2 DIABETES MELLITUS WITHOUT COMPLICATION, WITH LONG-TERM CURRENT USE OF INSULIN (HCC): ICD-10-CM

## 2021-03-16 DIAGNOSIS — R41.3 ACUTE MEMORY IMPAIRMENT: ICD-10-CM

## 2021-03-16 DIAGNOSIS — E78.2 MIXED HYPERLIPIDEMIA: Primary | ICD-10-CM

## 2021-03-16 DIAGNOSIS — I10 ESSENTIAL HYPERTENSION: ICD-10-CM

## 2021-03-16 DIAGNOSIS — E55.9 VITAMIN D DEFICIENCY DISEASE: ICD-10-CM

## 2021-03-16 DIAGNOSIS — J45.40 MODERATE PERSISTENT EXTRINSIC ASTHMA WITHOUT COMPLICATION: ICD-10-CM

## 2021-03-16 DIAGNOSIS — E53.8 LOW SERUM VITAMIN B12: ICD-10-CM

## 2021-03-16 LAB
BILIRUB BLD-MCNC: NEGATIVE MG/DL
CLARITY, POC: ABNORMAL
COLOR UR: YELLOW
GLUCOSE UR STRIP-MCNC: ABNORMAL MG/DL
KETONES UR QL: NEGATIVE
LEUKOCYTE EST, POC: NEGATIVE
NITRITE UR-MCNC: NEGATIVE MG/ML
PH UR: 6 [PH] (ref 5–8)
PROT UR STRIP-MCNC: ABNORMAL MG/DL
RBC # UR STRIP: ABNORMAL /UL
SP GR UR: 1.01 (ref 1–1.03)
UROBILINOGEN UR QL: NORMAL

## 2021-03-16 PROCEDURE — 99213 OFFICE O/P EST LOW 20 MIN: CPT | Performed by: GENERAL PRACTICE

## 2021-03-16 RX ORDER — PAROXETINE 10 MG/1
10 TABLET, FILM COATED ORAL EVERY MORNING
Qty: 30 TABLET | Refills: 5 | Status: SHIPPED | OUTPATIENT
Start: 2021-03-16 | End: 2021-10-28 | Stop reason: SDUPTHER

## 2021-03-16 NOTE — PROGRESS NOTES
Subjective   Sean Chen is a 80 y.o. female.     History of Present Illness     Confusion  Brought in by her son with a 3 to 4 week history of increasing confusion.  Over this time she does not appear to have taken her medication consistently, has left the stove on several times, and was stopped when leaving her property to walk to the nursing home which is a distance of 18 miles.  She admits that her thinking has not been quite right.  She also admits that she is concerned her family might have replaced in the nursing home.  She denies any headaches and there is no history of any changes in her vision, strength, or sensation.  There is no history of any cold or flulike symptoms and she has had no apparent fever or chills.  She received her second COVID-19 immunization apparently just prior to the onset of the symptoms.  Within the last 4 to 6 months her son has noted that she has been somewhat irritable and has appeared to lose some interest in activities.  She denies any depression or suicidal ideation.    Asthma  Patient's symptoms have included dyspnea and non-productive cough.  The symptoms have been under good control and she continues to deny any chest pain or hemoptysis. Medications used in the past to treat these symptoms include beta agonist inhalers and combination beta agonists/steroid inhalers. She has been using her rescue inhaler once every week or two on average.  Suspected precipitants include upper respiratory infection and allergens. Her  continues to smoke in the house     Diabetes  Current symptoms include: mild fatigue.  She continues to deny any paresthesias of the feet, visual disturbances, polydipsia, polyuria, or foot ulcerations. Current treatments: metformin and basal insulin - toujeo - 60 units daily.    Lab Results   Component Value Date    HGBA1C 10.20 (H) 02/23/2021      Dyslipidemia  She is currently being prescribed the following medication for her dyslipidemia -  rosuvastatin, omega 3 fatty acids.   Lab Results   Component Value Date    CHOL 175 08/24/2020    CHLPL 268 (H) 02/23/2021    TRIG 207 (H) 02/23/2021    HDL 65 (H) 02/23/2021     (H) 02/23/2021     Hypertension  Home blood pressure readings: not doing. Associated signs and symptoms: dyspnea and peripheral edema.  She continues to deny any chest pain, palpitations, orthopnea or paroxysmal nocturnal dyspnea. Current antihypertensive medications includes losartan and amlodipine.   Lab Results   Component Value Date    GLUCOSE 197 (H) 08/24/2020    BUN 22 02/23/2021    CREATININE 1.38 (H) 02/23/2021    EGFRIFNONA 37 (L) 02/23/2021    EGFRIFAFRI 45 (L) 02/23/2021    BCR 15.9 02/23/2021    K 4.1 02/23/2021    CO2 25.1 02/23/2021    CALCIUM 9.3 02/23/2021    PROTENTOTREF 5.9 (L) 02/23/2021    ALBUMIN 3.80 02/23/2021    LABIL2 1.8 02/23/2021    AST 14 02/23/2021    ALT 8 02/23/2021     Labs  Most recent vitamin D 17.7  with a B12 of 719  Lab Results   Component Value Date    WBC 10.22 02/23/2021    HGB 12.9 02/23/2021    HCT 38.8 02/23/2021    MCV 87.0 02/23/2021     02/23/2021     Lab Results   Component Value Date    TSH 0.728 08/24/2020     The following portions of the patient's history were reviewed and updated as appropriate: allergies, current medications, past medical history, past social history and problem list.    Review of Systems   Constitutional: Positive for fatigue. Negative for appetite change, chills, fever and unexpected weight change.   HENT: Positive for congestion, hearing loss, sneezing and tinnitus. Negative for ear pain, postnasal drip, rhinorrhea and sore throat.    Eyes: Negative for visual disturbance.   Respiratory: Positive for cough and shortness of breath (with exertion). Negative for wheezing.    Cardiovascular: Positive for leg swelling. Negative for chest pain and palpitations.   Gastrointestinal: Negative for abdominal pain, blood in stool, constipation, diarrhea, nausea and  vomiting.   Endocrine: Negative for polydipsia and polyuria.   Genitourinary: Negative for dysuria, frequency, hematuria and urgency.   Musculoskeletal: Positive for arthralgias and back pain. Negative for myalgias.        Occasional falls -usually occur if she moves too fast and loses her balance   Skin: Negative for rash.   Neurological: Positive for light-headedness (intermittent-when getting up from sitting and lying). Negative for weakness, numbness and headaches.   Psychiatric/Behavioral: Positive for confusion. Negative for dysphoric mood and sleep disturbance. The patient is not nervous/anxious.      Objective   Physical Exam  Constitutional:       General: She is not in acute distress.     Appearance: Normal appearance. She is well-developed. She is not diaphoretic.      Comments: Accompanied by her son.  Bright and in fair spirits. Gait slow and cautious.  Required one person assistance to climb onto the exam table.  No apparent distress. No pallor, jaundice, diaphoresis, or cyanosis.   HENT:      Head: Atraumatic.      Right Ear: Tympanic membrane, ear canal and external ear normal.      Left Ear: Tympanic membrane, ear canal and external ear normal.   Eyes:      Conjunctiva/sclera: Conjunctivae normal.   Neck:      Thyroid: No thyroid mass or thyromegaly.      Vascular: No carotid bruit or JVD.      Trachea: Trachea normal. No tracheal deviation.   Cardiovascular:      Rate and Rhythm: Normal rate and regular rhythm.      Heart sounds: Normal heart sounds, S1 normal and S2 normal. No murmur heard.   No gallop.    Pulmonary:      Effort: Pulmonary effort is normal.      Breath sounds: Wheezing (diffuse - mild -primarily on forced vital capacity) present. No rales.   Abdominal:      General: Bowel sounds are normal. There is no distension or abdominal bruit.      Palpations: Abdomen is soft. There is no hepatomegaly, splenomegaly or mass.      Tenderness: There is no abdominal tenderness.      Hernia: No  hernia is present.   Musculoskeletal:      Right lower leg: No edema.      Left lower leg: No edema.   Lymphadenopathy:      Head:      Right side of head: No submental, submandibular, tonsillar, preauricular, posterior auricular or occipital adenopathy.      Left side of head: No submental, submandibular, tonsillar, preauricular, posterior auricular or occipital adenopathy.      Cervical: No cervical adenopathy.      Upper Body:      Right upper body: No supraclavicular adenopathy.      Left upper body: No supraclavicular adenopathy.   Skin:     General: Skin is warm.      Coloration: Skin is not cyanotic, jaundiced or pale.      Findings: No rash.      Nails: There is no clubbing.   Neurological:      Mental Status: She is alert and oriented to person, place, and time.      Cranial Nerves: No cranial nerve deficit.      Motor: No tremor.      Coordination: Coordination normal.      Gait: Gait abnormal.      Deep Tendon Reflexes:      Reflex Scores:       Bicep reflexes are 1+ on the right side and 1+ on the left side.       Patellar reflexes are 1+ on the right side and 1+ on the left side.       Achilles reflexes are 0 on the right side and 0 on the left side.  Psychiatric:         Attention and Perception: Attention normal.         Mood and Affect: Mood normal.         Speech: Speech normal.         Behavior: Behavior normal.         Thought Content: Thought content normal.         Cognition and Memory: She exhibits impaired recent memory and impaired remote memory.       Assessment/Plan   Problems Addressed this Visit        Cardiac and Vasculature    Essential hypertension   Hypertension: at goal. Evidence of target organ damage: none.  Encouraged to continue to work on diet and exercise plan.   Continue current medication    Mixed hyperlipidemia   Most recent LDL and TG elevated -likely due to missed medication  Encouraged to resume rosuvastatin       Endocrine and Metabolic    Low serum vitamin  B12  Corrected  Continue supplementation with monitoring.    Type 2 diabetes mellitus without complication, with long-term current use of insulin (CMS/[facility])  Diabetes mellitus Type II, under inadequate control -again, likely due to missed medication  Encouraged to continue to pursue ADA diet  Encouraged aerobic exercise.  Family will monitor medication compliance    Vitamin D deficiency disease       Gastrointestinal Abdominal     Elevated liver enzymes  Most recent hepatic transaminases normal  Will continue to monitor       Neuro    Acute memory impairment  Possible depression.  Recent COVID-19 vaccination.  Trial of paroxetine  Urinalysis  We will arrange a chest x-ray along with an MRI of the brain  Family will report what medication she has in the house and will insure someone keeps an eye on her continuously  We will see back following her MRI sooner if any worse or if any new symptoms or concerns in the meantime    Relevant Medications    PARoxetine (PAXIL) 10 MG tablet    Other Relevant Orders    POC Urinalysis Dipstick, Automated (Completed)    Urine Culture - Urine, Urine, Clean Catch    Urinalysis, Microscopic Only - Urine, Clean Catch    MRI Brain With Contrast    XR Chest 2 View       Pulmonary and Pneumonias    Extrinsic asthma  Remains well controlled  Encouraged to report if this should change.  Continue current medication    Relevant Orders    XR Chest 2 View      Diagnoses       Codes Comments    Mixed hyperlipidemia    -  Primary ICD-10-CM: E78.2  ICD-9-CM: 272.2     Essential hypertension     ICD-10-CM: I10  ICD-9-CM: 401.9     Type 2 diabetes mellitus without complication, with long-term current use of insulin (CMS/HCC)     ICD-10-CM: E11.9, Z79.4  ICD-9-CM: 250.00, V58.67     Low serum vitamin B12     ICD-10-CM: E53.8  ICD-9-CM: 266.2     Vitamin D deficiency disease     ICD-10-CM: E55.9  ICD-9-CM: 268.9     Acute memory impairment     ICD-10-CM: R41.3  ICD-9-CM: 780.93     Moderate persistent  extrinsic asthma without complication     ICD-10-CM: J45.40  ICD-9-CM: 493.00     Elevated liver enzymes     ICD-10-CM: R74.8  ICD-9-CM: 790.5

## 2021-03-17 VITALS
WEIGHT: 166 LBS | BODY MASS INDEX: 26.06 KG/M2 | OXYGEN SATURATION: 94 % | DIASTOLIC BLOOD PRESSURE: 72 MMHG | SYSTOLIC BLOOD PRESSURE: 126 MMHG | HEIGHT: 67 IN | TEMPERATURE: 97.1 F | HEART RATE: 82 BPM | RESPIRATION RATE: 14 BRPM

## 2021-03-19 LAB
BACTERIA #/AREA URNS HPF: ABNORMAL /HPF
BACTERIA UR CULT: ABNORMAL
BACTERIA UR CULT: ABNORMAL
CASTS URNS MICRO: ABNORMAL
EPI CELLS #/AREA URNS HPF: ABNORMAL /HPF
OTHER ANTIBIOTIC SUSC ISLT: ABNORMAL
RBC #/AREA URNS HPF: ABNORMAL /HPF
WBC #/AREA URNS HPF: ABNORMAL /HPF

## 2021-03-20 RX ORDER — NITROFURANTOIN 25; 75 MG/1; MG/1
100 CAPSULE ORAL 2 TIMES DAILY
Qty: 20 CAPSULE | Refills: 0 | Status: SHIPPED | OUTPATIENT
Start: 2021-03-20 | End: 2021-04-27

## 2021-04-05 ENCOUNTER — TELEPHONE (OUTPATIENT)
Dept: FAMILY MEDICINE CLINIC | Facility: CLINIC | Age: 81
End: 2021-04-05

## 2021-04-05 NOTE — TELEPHONE ENCOUNTER
Canceled the MRI at [facility] and faxed orders to [facility] for MRI.     ----- Message from Ganga Gallardo MD sent at 4/5/2021 10:22 AM EDT -----  Please cancel MRI at [facility] and fax an order to [facility] for the same - she is on a swing bed there

## 2021-04-06 ENCOUNTER — APPOINTMENT (OUTPATIENT)
Dept: MRI IMAGING | Facility: HOSPITAL | Age: 81
End: 2021-04-06

## 2021-04-06 DIAGNOSIS — R41.3 ACUTE MEMORY IMPAIRMENT: Primary | ICD-10-CM

## 2021-04-27 ENCOUNTER — OFFICE VISIT (OUTPATIENT)
Dept: FAMILY MEDICINE CLINIC | Facility: CLINIC | Age: 81
End: 2021-04-27

## 2021-04-27 DIAGNOSIS — J45.40 MODERATE PERSISTENT EXTRINSIC ASTHMA WITHOUT COMPLICATION: ICD-10-CM

## 2021-04-27 DIAGNOSIS — J45.20 EXTRINSIC ASTHMA, MILD INTERMITTENT, UNCOMPLICATED: ICD-10-CM

## 2021-04-27 DIAGNOSIS — M15.9 GENERALIZED OSTEOARTHRITIS: ICD-10-CM

## 2021-04-27 DIAGNOSIS — I10 ESSENTIAL HYPERTENSION: ICD-10-CM

## 2021-04-27 DIAGNOSIS — R41.3 MEMORY IMPAIRMENT: ICD-10-CM

## 2021-04-27 DIAGNOSIS — Z00.00 HEALTHCARE MAINTENANCE: ICD-10-CM

## 2021-04-27 DIAGNOSIS — E55.9 VITAMIN D DEFICIENCY DISEASE: ICD-10-CM

## 2021-04-27 DIAGNOSIS — E53.8 LOW SERUM VITAMIN B12: ICD-10-CM

## 2021-04-27 DIAGNOSIS — Z79.4 TYPE 2 DIABETES MELLITUS WITHOUT COMPLICATION, WITH LONG-TERM CURRENT USE OF INSULIN (HCC): ICD-10-CM

## 2021-04-27 DIAGNOSIS — S42.214D CLOSED NONDISPLACED FRACTURE OF SURGICAL NECK OF RIGHT HUMERUS WITH ROUTINE HEALING, UNSPECIFIED FRACTURE MORPHOLOGY, SUBSEQUENT ENCOUNTER: ICD-10-CM

## 2021-04-27 DIAGNOSIS — K21.9 GASTROESOPHAGEAL REFLUX DISEASE WITHOUT ESOPHAGITIS: ICD-10-CM

## 2021-04-27 DIAGNOSIS — E11.9 TYPE 2 DIABETES MELLITUS WITHOUT COMPLICATION, WITH LONG-TERM CURRENT USE OF INSULIN (HCC): ICD-10-CM

## 2021-04-27 DIAGNOSIS — J30.2 CHRONIC SEASONAL ALLERGIC RHINITIS: Primary | ICD-10-CM

## 2021-04-27 DIAGNOSIS — E78.2 MIXED HYPERLIPIDEMIA: ICD-10-CM

## 2021-04-27 PROCEDURE — 99214 OFFICE O/P EST MOD 30 MIN: CPT | Performed by: GENERAL PRACTICE

## 2021-04-27 RX ORDER — LOSARTAN POTASSIUM 100 MG/1
100 TABLET ORAL DAILY
Qty: 30 TABLET | Refills: 5 | Status: SHIPPED | OUTPATIENT
Start: 2021-04-27 | End: 2021-10-28 | Stop reason: SDUPTHER

## 2021-04-27 RX ORDER — ALBUTEROL SULFATE 90 UG/1
2 AEROSOL, METERED RESPIRATORY (INHALATION) 4 TIMES DAILY
Qty: 8.5 G | Refills: 5 | Status: SHIPPED | OUTPATIENT
Start: 2021-04-27 | End: 2021-10-28 | Stop reason: SDUPTHER

## 2021-04-27 RX ORDER — ATORVASTATIN CALCIUM 80 MG/1
80 TABLET, FILM COATED ORAL NIGHTLY
Qty: 30 TABLET | Refills: 5 | Status: SHIPPED | OUTPATIENT
Start: 2021-04-27 | End: 2021-10-28 | Stop reason: SDUPTHER

## 2021-04-27 RX ORDER — HYDRALAZINE HYDROCHLORIDE 25 MG/1
25 TABLET, FILM COATED ORAL 3 TIMES DAILY
Qty: 90 TABLET | Refills: 5 | Status: CANCELLED | OUTPATIENT
Start: 2021-04-27

## 2021-04-27 RX ORDER — AMLODIPINE BESYLATE 10 MG/1
10 TABLET ORAL NIGHTLY
Qty: 30 TABLET | Refills: 5 | Status: SHIPPED | OUTPATIENT
Start: 2021-04-27 | End: 2021-10-28 | Stop reason: SDUPTHER

## 2021-04-27 RX ORDER — ASPIRIN 81 MG/1
81 TABLET ORAL DAILY
Qty: 30 TABLET | Refills: 5 | Status: SHIPPED | OUTPATIENT
Start: 2021-04-27 | End: 2021-10-28 | Stop reason: SDUPTHER

## 2021-04-28 VITALS
OXYGEN SATURATION: 98 % | WEIGHT: 183 LBS | DIASTOLIC BLOOD PRESSURE: 62 MMHG | SYSTOLIC BLOOD PRESSURE: 128 MMHG | HEART RATE: 68 BPM | RESPIRATION RATE: 14 BRPM | BODY MASS INDEX: 28.72 KG/M2 | HEIGHT: 67 IN | TEMPERATURE: 97.1 F

## 2021-04-28 PROBLEM — S42.201D CLOSED FRACTURE OF PROXIMAL END OF RIGHT HUMERUS WITH ROUTINE HEALING: Status: ACTIVE | Noted: 2021-04-28

## 2021-04-28 PROBLEM — S42.214D CLOSED NONDISPLACED FRACTURE OF SURGICAL NECK OF RIGHT HUMERUS WITH ROUTINE HEALING: Status: ACTIVE | Noted: 2021-04-28

## 2021-04-28 NOTE — PROGRESS NOTES
Subjective   Sean Chen is a 80 y.o. female.     History of Present Illness     Hospital Follow-Up  Discharged from [facility] on 4/6/2021 following a 4 day admission for a fracture of the right humeral neck sustained in a fall at home.  She underwent an orthopedic assessment and was placed in a sling and swath.  While in hospital she underwent a CT of the head, chest, abdomen, and pelvis with evidence of chronic ischemic microvascular changes of the brain.  MRI done afterward revealed the same.  She was quite confused on admission but this seemed to improve significantly during her stay.  She was ultimately discharged on paroxetine 10 daily, amlodipine 5 daily, pantoprazole 40 daily, metformin 1000 twice daily, fluticasone-salmeterol 500-51 puff twice daily, ASA 81 daily, albuterol 2 puffs every 4 hours as needed, losartan 100 daily, rosuvastatin 40 daily, cetirizine 10 daily, and toujeo 60 units daily.  She is not taking paroxetine, metformin, fluticasone-salmeterol, or cetirizine and is on atorvastatin 80 daily in place of the rosuvastatin, and lantus 20 units twice daily in place of the toujeo.  Home health is not in place but she and her  feel that they are managing.  She complains of persistent right shoulder pain.  She underwent an orthopedic reassessment on 4/21/2021 and was scheduled a 2-week follow-up.  She and her  feel that her mental status continues to improve.  She denies any further falls or any close calls.    Asthma  Patient's symptoms have included dyspnea and non-productive cough.  These symptoms have been somewhat worse as of late.  She continues to deny any chest pain or hemoptysis. Suspected precipitants include upper respiratory infection and allergens. Her  continues to smoke in the house     Diabetes  Current symptoms include: mild fatigue.  She continues to deny any paresthesias of the feet, visual disturbances, polydipsia, polyuria, or foot ulcerations.  Current treatments: lantus 20 units bid      Dyslipidemia  She is currently being prescribed the following medication for her dyslipidemia - atorvastatin    Hypertension  Home blood pressure readings: not doing. Associated signs and symptoms: dyspnea and peripheral edema.  She continues to deny any chest pain, palpitations, orthopnea or paroxysmal nocturnal dyspnea. Current antihypertensive medications includes losartan and amlodipine.    The following portions of the patient's history were reviewed and updated as appropriate: allergies, current medications, past medical history, past social history and problem list.    Review of Systems   Constitutional: Positive for fatigue. Negative for appetite change, chills, fever and unexpected weight change.   HENT: Positive for congestion, hearing loss, sneezing and tinnitus. Negative for ear pain, postnasal drip, rhinorrhea and sore throat.    Eyes: Negative for visual disturbance.   Respiratory: Positive for cough and shortness of breath (with exertion). Negative for wheezing.    Cardiovascular: Positive for leg swelling. Negative for chest pain and palpitations.   Gastrointestinal: Negative for abdominal pain, blood in stool, constipation, diarrhea, nausea and vomiting.   Endocrine: Negative for polydipsia and polyuria.   Genitourinary: Negative for dysuria, frequency, hematuria and urgency.   Musculoskeletal: Positive for arthralgias and back pain. Negative for myalgias.   Skin: Negative for rash.   Neurological: Positive for light-headedness (intermittent-when getting up from sitting and lying). Negative for weakness, numbness and headaches.   Psychiatric/Behavioral: Positive for confusion (memory impairment). Negative for dysphoric mood and sleep disturbance. The patient is not nervous/anxious.      Objective   Physical Exam  Constitutional:       General: She is not in acute distress.     Appearance: Normal appearance. She is well-developed. She is not diaphoretic.       Comments: Accompanied by her .  Bright and in fair spirits.  Right arm in a sling and swath.  Gait slow and cautious.  Required one person assistance to climb onto the exam table.  No apparent distress. No pallor, jaundice, diaphoresis, or cyanosis.   HENT:      Head: Atraumatic.      Right Ear: Tympanic membrane, ear canal and external ear normal.      Left Ear: Tympanic membrane, ear canal and external ear normal.   Eyes:      Conjunctiva/sclera: Conjunctivae normal.   Neck:      Thyroid: No thyroid mass or thyromegaly.      Vascular: No carotid bruit or JVD.      Trachea: Trachea normal. No tracheal deviation.   Cardiovascular:      Rate and Rhythm: Normal rate and regular rhythm.      Heart sounds: Normal heart sounds, S1 normal and S2 normal. No murmur heard.   No gallop.    Pulmonary:      Effort: Pulmonary effort is normal.      Breath sounds: Wheezing (diffuse - mild -primarily on forced vital capacity) present. No rales.   Abdominal:      General: Bowel sounds are normal. There is no distension.   Musculoskeletal:      Right lower leg: No edema.      Left lower leg: No edema.   Lymphadenopathy:      Head:      Right side of head: No submental, submandibular, tonsillar, preauricular, posterior auricular or occipital adenopathy.      Left side of head: No submental, submandibular, tonsillar, preauricular, posterior auricular or occipital adenopathy.      Cervical: No cervical adenopathy.      Upper Body:      Right upper body: No supraclavicular adenopathy.      Left upper body: No supraclavicular adenopathy.   Skin:     General: Skin is warm.      Coloration: Skin is not cyanotic, jaundiced or pale.      Findings: No rash.      Nails: There is no clubbing.   Neurological:      Mental Status: She is alert and oriented to person, place, and time.      Cranial Nerves: No cranial nerve deficit.      Motor: No tremor.      Coordination: Coordination normal.      Gait: Gait abnormal.   Psychiatric:          Attention and Perception: Attention normal.         Mood and Affect: Mood normal.         Speech: Speech normal.         Behavior: Behavior normal.         Thought Content: Thought content normal.         Cognition and Memory: She exhibits impaired recent memory and impaired remote memory.       Assessment/Plan   Problems Addressed this Visit        Allergies and Adverse Reactions    Chronic seasonal allergic rhinitis        Cardiac and Vasculature    Essential hypertension   Hypertension: at goal. Evidence of target organ damage: none.  Encouraged to continue to work on diet and exercise plan.   Continue current medication    Relevant Medications    amLODIPine (NORVASC) 10 MG tablet    losartan (COZAAR) 100 MG tablet    Mixed hyperlipidemia  As above.   Continue current medication.    Relevant Medications    atorvastatin (LIPITOR) 80 MG tablet       Endocrine and Metabolic    Low serum vitamin B12    Type 2 diabetes mellitus without complication, with long-term current use of insulin (CMS/[facility])  Diabetes mellitus Type II, under poor control.   Encouraged to continue to pursue ADA diet  Encouraged aerobic exercise.  Continue current medication for now.    Relevant Medications    Insulin Glargine (LANTUS SOLOSTAR) 100 UNIT/ML injection pen    aspirin 81 MG EC tablet    Vitamin D deficiency disease       Gastrointestinal Abdominal     Gastroesophageal reflux disease without esophagitis       Health Encounters    Healthcare maintenance  Patient has received both doses of the COVID-19 vaccine.  We will discuss Shingrix again at her return       Musculoskeletal and Injuries    Closed fracture surgical neck right humerus with routine healing  Follow up with orthopedic surgery    Generalized osteoarthritis       Neuro    Memory impairment  Improved  Will continue to monitor       Pulmonary and Pneumonias    Extrinsic asthma  Mild persistent asthma. In general, the patient's disease is well controlled.  Discussed  distinction between quick-relief and controlled medications.  Encouraged to resume fluticasone-salmeterol  Encouraged to report if any worse, any new symptoms, or if no better over the next week    Relevant Medications    Advair Diskus 500-50 MCG/DOSE DISKUS    albuterol sulfate HFA (ProAir HFA) 108 (90 Base) MCG/ACT inhaler         Diagnoses       Codes Comments    Chronic seasonal allergic rhinitis    -  Primary ICD-10-CM: J30.2  ICD-9-CM: 477.8     Mixed hyperlipidemia     ICD-10-CM: E78.2  ICD-9-CM: 272.2     Essential hypertension     ICD-10-CM: I10  ICD-9-CM: 401.9     Vitamin D deficiency disease     ICD-10-CM: E55.9  ICD-9-CM: 268.9     Type 2 diabetes mellitus without complication, with long-term current use of insulin (CMS/[facility])     ICD-10-CM: E11.9, Z79.4  ICD-9-CM: 250.00, V58.67     Low serum vitamin B12     ICD-10-CM: E53.8  ICD-9-CM: 266.2     Gastroesophageal reflux disease without esophagitis     ICD-10-CM: K21.9  ICD-9-CM: 530.81     Healthcare maintenance     ICD-10-CM: Z00.00  ICD-9-CM: V70.0     Generalized osteoarthritis     ICD-10-CM: M15.9  ICD-9-CM: 715.00     Moderate persistent extrinsic asthma without complication     ICD-10-CM: J45.40  ICD-9-CM: 493.00     Extrinsic asthma, mild intermittent, uncomplicated     ICD-10-CM: J45.20  ICD-9-CM: 493.00     Memory impairment     ICD-10-CM: R41.3  ICD-9-CM: 780.93     Closed fracture of proximal end of right humerus with routine healing, unspecified fracture morphology, subsequent encounter     ICD-10-CM: S42.201D  ICD-9-CM: V54.11

## 2021-04-29 ENCOUNTER — TELEPHONE (OUTPATIENT)
Dept: FAMILY MEDICINE CLINIC | Facility: CLINIC | Age: 81
End: 2021-04-29

## 2021-06-02 NOTE — TELEPHONE ENCOUNTER
----- Message from Ganga Gallardo MD sent at 5/12/2020  1:55 PM EDT -----  Hey thanks!    ----- Message -----  From: Ruth Fajardo MA  Sent: 5/12/2020   1:53 PM EDT  To: Ganga Gallardo MD    I sent a request to her insurance to approve the brand name. I got a approval back. I let her know.   ----- Message -----  From: Ganga Gallardo MD  Sent: 5/12/2020   1:27 PM EDT  To: Ruth Fajardo MA    Need to either PA brand name advair or find out what other ICS/LABA her insurance will let us use  She has not done well with generic advair - despite an increase in dose to the max she has had to use her rescue inhaler more often (several times daily)       Physical Therapy  Visit Type: initial evaluation  Precautions:  Medical precautions:  fall risk;.   Therapist donned gloves, face shield and procedural mask prior to entering patient's room.    Patient's nurse cleared patient to be seen by therapy for eval and treat.   Lines:     Basic: telemetry  Safety Measures: bed alarm    SUBJECTIVE  Patient agreed to participate in therapy this date.  C/o pain in her L sh and L side which is tender to touch.      OBJECTIVE     Oriented to person and place     Arousal alertness: appropriate responses to stimuli    Affect/Behavior: alert, appropriate and cooperative  Functional Communication/Cognition    Overall status:  Within functional limits    Form of communication:  Verbal   Attention span:  Appears intact    Commands: follows all commands and directions consistently.    Transition between tasks: transitions tasks without difficulty.    Safety judgement: decreased awareness of need for safety.  Scar:    Location: incision(healed) in R hip from h/o R hip surgery which pt states was about a month ago.     Range of Motion (measured in degrees unless otherwise noted, active unless indicated)  WFL: LUE RUE  WFL: RLE, LLE  Strength (out of 5 unless otherwise indicated)   WFL: LUE, RUE  WFL: LLE, RLE  Balance    Sitting: Static: independent    Standing - Firm Surface - Eyes Open: Static: contact guard/touching/steadying assist double upper extremity support (RW)    Transfers:      Sit to stand: moderate assist and with verbal cues (safety and hand placement)    Stand to sit: minimal assist and with verbal cues    Stand pivot: minimal assist and with verbal cues (safety, hand placement, and sequencing)  Training completed:      Education details: patient safety and patient requires additional training  Gait/Ambulation:     Assistance: minimal assist   Assistive device: 2-wheeled walker    Distance (ft): 8    Type: unsteady and decreased jose (slight forward trunk posture)     Stance phase: Left: decreased push off and decreased knee stability; Right: decreased push off and decreased knee stability    Swing phase: Left: decreased step length and decreased hip flexion in swing; Right: decreased step length and decreased hip flexion in swing    Surface: even  Training Completed:    Tasks: gait training on level surfaces    Education details: patient safety and patient requires additional training      Interventions     Training provided: gait training and transfer training    Skilled input: Verbal instruction/cues  Verbal Consent: Writer verbally educated and received verbal consent for hand placement, positioning of patient, and techniques to be performed today from patient        ASSESSMENT    Impairments: strength, pain, activity tolerance, range of motion and endurance  Functional Limitations: all functional mobility    Pt is seated on eob at entry, alert, follows commands consistently, c/o pain in her L post rib area and L sh. XR's on these areas are negative for fx. Pt relates h/o R hip fx s/p surgery which pt states was about a month ago ([facility]). Pt was released from [facility] on 5/25 and was needing assistance for transfers and adl's pta. Per jennifer, pt was not ambulating at home but was able to stand and take a few steps for stand pivot transfers bed<>w/c. Uses w/c all the time for mobility at home.   During this PT eval, pt needs mod a in sit<>stand, min a in stand pivot bed<>w/c transfers. She was able to amb x ~8' using a rw, with min a. Pt is a fall risk.  Daughter, who the therapist spoke over the phone, confirms availability of 24hr assistance at home.     Current status is at baseline or close to baseline    Discharge Recommendations   Recommendation for Discharge: PT IL: Patient needs nondaily skilled therapy after discharge, Patient requires 24 hour nonskilled assistance to perform mobility and/or ADLs safely                    Skilled therapy is required to address these  limitations in attempt to maximize the patient's independence.  Predicted patient presentation: Moderate (evolving) - Patient comorbidities and complexities, as defined above, may have varying impact on steady progress for prescribed plan of care.    End of Session:   Location: in bed  Safety measures: alarm system in place/re-engaged, call light within reach and lines intact  Handoff to: nurse    PLAN    Suggestions for next session as indicated: PT Frequency: Once a day  Frequency Comments: seen 6/2 luma    Interventions: strengthening, ROM, neuromuscular re-education, functional transfer training, safety education and gait training  Agreement to plan and goals: patient agrees with goals and treatment plan        GOALS  Long Term Goals: (to be met by time of discharge from hospital)  Sit to supine: Patient will complete sit to supine supervision.  Supine to sit: Patient will complete supine to sit supervision.  Sit to stand: Patient will complete sit to stand transfer with minimal assist and with minimal cues.   Stand to sit: Patient will complete stand to sit transfer with minimal assist and with minimal cues.   Ambulation (even): Patient will ambulate on even surface for 10 feet with minimal assist.     Documented in the chart in the following areas: Prior Level of Function. Assessment.      Therapy procedure time and total treatment time can be found documented on the Time Entry flowsheet

## 2021-10-27 ENCOUNTER — TELEPHONE (OUTPATIENT)
Dept: FAMILY MEDICINE CLINIC | Facility: CLINIC | Age: 81
End: 2021-10-27

## 2021-10-27 NOTE — TELEPHONE ENCOUNTER
PATIENTS SON HOMERO CARDENAS JR CALLED TO SCHEDULE APPOINTMENT FOR PATIENT BUT WOULD LIKE TO KNOW IF PROVIDER WILL REVIEW AND DISCUSS PATIENT HER MEDICATIONS amLODIPine (NORVASC) 10 MG tablet AND HER HYDRALAZINE 25MG. IT WAS PRESCRIBE WHILE IN THE HOSPITAL. HE IS ASKING IF SHE STILL NEEDS IT. PLEASE RETURN CALL WITH QUESTIONS [phone]

## 2021-10-28 ENCOUNTER — OFFICE VISIT (OUTPATIENT)
Dept: FAMILY MEDICINE CLINIC | Facility: CLINIC | Age: 81
End: 2021-10-28

## 2021-10-28 VITALS
HEIGHT: 67 IN | WEIGHT: 166 LBS | HEART RATE: 63 BPM | DIASTOLIC BLOOD PRESSURE: 70 MMHG | OXYGEN SATURATION: 99 % | RESPIRATION RATE: 14 BRPM | TEMPERATURE: 97.1 F | BODY MASS INDEX: 26.06 KG/M2 | SYSTOLIC BLOOD PRESSURE: 142 MMHG

## 2021-10-28 DIAGNOSIS — D50.8 OTHER IRON DEFICIENCY ANEMIA: ICD-10-CM

## 2021-10-28 DIAGNOSIS — E78.2 MIXED HYPERLIPIDEMIA: ICD-10-CM

## 2021-10-28 DIAGNOSIS — Z00.00 HEALTHCARE MAINTENANCE: ICD-10-CM

## 2021-10-28 DIAGNOSIS — K21.9 GASTROESOPHAGEAL REFLUX DISEASE WITHOUT ESOPHAGITIS: ICD-10-CM

## 2021-10-28 DIAGNOSIS — J30.2 CHRONIC SEASONAL ALLERGIC RHINITIS: Primary | ICD-10-CM

## 2021-10-28 DIAGNOSIS — Z79.4 TYPE 2 DIABETES MELLITUS WITHOUT COMPLICATION, WITH LONG-TERM CURRENT USE OF INSULIN (HCC): ICD-10-CM

## 2021-10-28 DIAGNOSIS — R41.3 ACUTE MEMORY IMPAIRMENT: ICD-10-CM

## 2021-10-28 DIAGNOSIS — R41.3 MEMORY IMPAIRMENT: ICD-10-CM

## 2021-10-28 DIAGNOSIS — E55.9 VITAMIN D DEFICIENCY DISEASE: ICD-10-CM

## 2021-10-28 DIAGNOSIS — Z23 ENCOUNTER FOR IMMUNIZATION: ICD-10-CM

## 2021-10-28 DIAGNOSIS — S42.214D CLOSED NONDISPLACED FRACTURE OF SURGICAL NECK OF RIGHT HUMERUS WITH ROUTINE HEALING, UNSPECIFIED FRACTURE MORPHOLOGY, SUBSEQUENT ENCOUNTER: ICD-10-CM

## 2021-10-28 DIAGNOSIS — I10 ESSENTIAL HYPERTENSION: ICD-10-CM

## 2021-10-28 DIAGNOSIS — E53.8 LOW SERUM VITAMIN B12: ICD-10-CM

## 2021-10-28 DIAGNOSIS — E11.9 TYPE 2 DIABETES MELLITUS WITHOUT COMPLICATION, WITH LONG-TERM CURRENT USE OF INSULIN (HCC): ICD-10-CM

## 2021-10-28 DIAGNOSIS — J45.40 MODERATE PERSISTENT EXTRINSIC ASTHMA WITHOUT COMPLICATION: ICD-10-CM

## 2021-10-28 DIAGNOSIS — J45.20 EXTRINSIC ASTHMA, MILD INTERMITTENT, UNCOMPLICATED: ICD-10-CM

## 2021-10-28 DIAGNOSIS — M15.9 GENERALIZED OSTEOARTHRITIS: ICD-10-CM

## 2021-10-28 PROCEDURE — 90686 IIV4 VACC NO PRSV 0.5 ML IM: CPT | Performed by: GENERAL PRACTICE

## 2021-10-28 PROCEDURE — 99214 OFFICE O/P EST MOD 30 MIN: CPT | Performed by: GENERAL PRACTICE

## 2021-10-28 PROCEDURE — G0008 ADMIN INFLUENZA VIRUS VAC: HCPCS | Performed by: GENERAL PRACTICE

## 2021-10-28 RX ORDER — LOSARTAN POTASSIUM 100 MG/1
100 TABLET ORAL DAILY
Qty: 30 TABLET | Refills: 5 | Status: SHIPPED | OUTPATIENT
Start: 2021-10-28 | End: 2021-12-17

## 2021-10-28 RX ORDER — ASPIRIN 81 MG/1
81 TABLET ORAL DAILY
Qty: 30 TABLET | Refills: 5 | Status: SHIPPED | OUTPATIENT
Start: 2021-10-28 | End: 2022-05-04

## 2021-10-28 RX ORDER — ATORVASTATIN CALCIUM 80 MG/1
80 TABLET, FILM COATED ORAL NIGHTLY
Qty: 30 TABLET | Refills: 5 | Status: SHIPPED | OUTPATIENT
Start: 2021-10-28 | End: 2022-04-30 | Stop reason: SDUPTHER

## 2021-10-28 RX ORDER — ALBUTEROL SULFATE 90 UG/1
2 AEROSOL, METERED RESPIRATORY (INHALATION) 4 TIMES DAILY
Qty: 8.5 G | Refills: 5 | Status: SHIPPED | OUTPATIENT
Start: 2021-10-28 | End: 2022-04-30 | Stop reason: SDUPTHER

## 2021-10-28 RX ORDER — AMLODIPINE BESYLATE 10 MG/1
10 TABLET ORAL NIGHTLY
Qty: 30 TABLET | Refills: 5 | Status: SHIPPED | OUTPATIENT
Start: 2021-10-28 | End: 2022-04-30 | Stop reason: SDUPTHER

## 2021-10-28 RX ORDER — PAROXETINE 10 MG/1
10 TABLET, FILM COATED ORAL EVERY MORNING
Qty: 30 TABLET | Refills: 5 | Status: SHIPPED | OUTPATIENT
Start: 2021-10-28 | End: 2022-04-30 | Stop reason: SDUPTHER

## 2021-10-28 RX ORDER — HYDRALAZINE HYDROCHLORIDE 25 MG/1
25 TABLET, FILM COATED ORAL 3 TIMES DAILY
Qty: 90 TABLET | Refills: 5 | Status: SHIPPED | OUTPATIENT
Start: 2021-10-28 | End: 2022-04-30 | Stop reason: SDUPTHER

## 2021-10-28 NOTE — PROGRESS NOTES
Subjective   Sean Chen is a 81 y.o. female.     Chief Complaint  She returns for a scheduled reassessment of multiple medical problems including a recent right humeral neck fracture, asthma, type 2 diabetes mellitus, hyperlipidemia, and essential hypertension    History of Present Illness     Right Humeral Neck Fracture  Discharged from [facility] on 4/6/2021 following a 4 day admission for a fracture of the right humeral neck sustained in a fall at home.  She underwent an orthopedic assessment and was placed in a sling and swath.  While in hospital she underwent a CT of the head, chest, abdomen, and pelvis with evidence of chronic ischemic microvascular changes of the brain.  MRI done afterward revealed the same.  She was quite confused on admission but this continues to improve.  She is living with her  with the close support of her son and daughter-in-law.  She admits to some weakness of her right arm but denies any further pain.    Asthma  Her symptoms have included dyspnea and non-productive cough.  She has done fairly well since last here and continues to deny any chest pain or hemoptysis. Suspected precipitants include upper respiratory infection and allergens. Her  continues to smoke in the house     Diabetes  Current symptoms include: mild fatigue.  She continues to deny any paresthesias of the feet, visual disturbances, polydipsia, polyuria, or foot ulcerations. Current treatments: lantus 20 units bid      Dyslipidemia  She is currently being prescribed the following medication for her dyslipidemia - atorvastatin    Hypertension  Home blood pressure readings: not doing. Associated signs and symptoms: dyspnea and peripheral edema.  She continues to deny any chest pain, palpitations, orthopnea or paroxysmal nocturnal dyspnea. Current antihypertensive medications includes losartan, amlodipine, and hydralazine.  She ran out of the latter several days ago.    The following portions of  the patient's history were reviewed and updated as appropriate: allergies, current medications, past medical history, past social history and problem list.    Review of Systems   Constitutional: Positive for fatigue. Negative for appetite change, chills, fever and unexpected weight change.   HENT: Positive for congestion, hearing loss, sneezing and tinnitus. Negative for ear pain, postnasal drip, rhinorrhea and sore throat.    Eyes: Negative for visual disturbance.   Respiratory: Positive for cough and shortness of breath (with exertion). Negative for wheezing.    Cardiovascular: Positive for leg swelling. Negative for chest pain and palpitations.   Gastrointestinal: Negative for abdominal pain, blood in stool, constipation, diarrhea, nausea and vomiting.   Endocrine: Negative for polydipsia and polyuria.   Genitourinary: Negative for dysuria, frequency, hematuria and urgency.   Musculoskeletal: Positive for arthralgias and back pain. Negative for myalgias.   Skin: Negative for rash.   Neurological: Positive for light-headedness (intermittent-when getting up from sitting and lying). Negative for weakness, numbness and headaches.   Psychiatric/Behavioral: Positive for confusion (memory impairment). Negative for dysphoric mood and sleep disturbance. The patient is not nervous/anxious.      Objective   Physical Exam  Constitutional:       General: She is not in acute distress.     Appearance: Normal appearance. She is well-developed. She is not diaphoretic.      Comments: Accompanied by her son.  Bright and in fair spirits. Gait slow and cautious.  Required one person assistance to climb onto the exam table.  No apparent distress.  Slightly pale. No jaundice, diaphoresis, or cyanosis.   HENT:      Head: Atraumatic.      Right Ear: Tympanic membrane, ear canal and external ear normal.      Left Ear: Tympanic membrane, ear canal and external ear normal.   Eyes:      Conjunctiva/sclera: Conjunctivae normal.   Neck:       Thyroid: No thyroid mass or thyromegaly.      Vascular: No carotid bruit or JVD.      Trachea: Trachea normal. No tracheal deviation.   Cardiovascular:      Rate and Rhythm: Normal rate and regular rhythm.      Heart sounds: Normal heart sounds, S1 normal and S2 normal. No murmur heard.  No gallop.    Pulmonary:      Effort: Pulmonary effort is normal.      Breath sounds: Wheezing (diffuse - mild -primarily on forced vital capacity) present. No rales.   Chest:   Breasts:      Right: No supraclavicular adenopathy.      Left: No supraclavicular adenopathy.       Abdominal:      General: Bowel sounds are normal. There is no distension or abdominal bruit.      Palpations: Abdomen is soft. There is no hepatomegaly, splenomegaly or mass.      Tenderness: There is no abdominal tenderness.      Hernia: No hernia is present.   Musculoskeletal:      Right lower leg: No edema.      Left lower leg: No edema.   Lymphadenopathy:      Head:      Right side of head: No submental, submandibular, tonsillar, preauricular, posterior auricular or occipital adenopathy.      Left side of head: No submental, submandibular, tonsillar, preauricular, posterior auricular or occipital adenopathy.      Cervical: No cervical adenopathy.      Upper Body:      Right upper body: No supraclavicular adenopathy.      Left upper body: No supraclavicular adenopathy.   Skin:     General: Skin is warm.      Coloration: Skin is not cyanotic, jaundiced or pale.      Findings: No rash.      Nails: There is no clubbing.   Neurological:      Mental Status: She is alert and oriented to person, place, and time.      Cranial Nerves: No cranial nerve deficit.      Motor: No tremor.      Coordination: Coordination normal.      Gait: Gait abnormal.   Psychiatric:         Attention and Perception: Attention normal.         Mood and Affect: Mood normal.         Speech: Speech normal.         Behavior: Behavior normal.         Thought Content: Thought content normal.          Cognition and Memory: She exhibits impaired recent memory and impaired remote memory.       Assessment/Plan   Problems Addressed this Visit        Allergies and Adverse Reactions    Chronic seasonal allergic rhinitis  Reminded regarding allergen avoidance.  Continue current medication       Cardiac and Vasculature    Essential hypertension   Hypertension: elevated today. Evidence of target organ damage: none.  Encouraged to continue to work on diet and exercise plan.   Hydralazine will be resumed    Relevant Medications    amLODIPine (NORVASC) 10 MG tablet    losartan (COZAAR) 100 MG tablet  Hydralazine 25 MG tablet    Other Relevant Orders    Comprehensive Metabolic Panel    Lipid Panel    Mixed hyperlipidemia  As above.   Continue current medication.    Relevant Medications    atorvastatin (LIPITOR) 80 MG tablet    Other Relevant Orders    Lipid Panel    TSH       Endocrine and Metabolic    Low serum vitamin B12  Continue supplementation with monitoring.    Relevant Orders    CBC & Differential    Vitamin B12    Type 2 diabetes mellitus without complication, with long-term current use of insulin ([facility])  Diabetes mellitus Type II, under unknown control.   Encouraged to continue to pursue ADA diet  Encouraged aerobic exercise.  Continue current medication  Scheduled for updated labs  We will arrange an updated diabetic eye exam    Relevant Medications    aspirin 81 MG EC tablet    Insulin Glargine (LANTUS SOLOSTAR) 100 UNIT/ML injection pen    Other Relevant Orders    Ambulatory Referral to Ophthalmology    Hemoglobin A1c    MicroAlbumin, Urine, Random - Urine, Clean Catch    Vitamin D deficiency disease  Continue supplementation with monitoring.    Relevant Orders    Vitamin D 25 Hydroxy       Gastrointestinal Abdominal     Gastroesophageal reflux disease without esophagitis       Health Encounters    Healthcare maintenance  Flu shot administered.  Recommended a third dose of the Pfizer COVID-19 vaccine.     Relevant Orders    FluLaval/Fluarix/Fluzone >6 Months (Completed)       Hematology and Neoplasia    Iron deficiency anemia  With possible recurrence  Scheduled for updated labs    Relevant Orders    CBC & Differential    Iron    Transferrin    Ferritin       Musculoskeletal and Injuries    Closed nondisplaced fracture of surgical neck of right humerus with routine healing  Doing well  Encouraged to report if this should change.    Generalized osteoarthritis       Neuro    Memory impairment  Supportive therapy  Continue current medication  Will continue to monitor    Relevant Medications    PARoxetine (PAXIL) 10 MG tablet       Pulmonary and Pneumonias    Extrinsic asthma  Moderate persistent asthma. The patient is not currently having an exacerbation. In general, the patient's disease is well controlled.  Encouraged to report if this should change.  Continue current medication    Relevant Medications    Advair Diskus 500-50 MCG/DOSE DISKUS    albuterol sulfate HFA (ProAir HFA) 108 (90 Base) MCG/ACT inhaler          Diagnoses       Codes Comments    Chronic seasonal allergic rhinitis    -  Primary ICD-10-CM: J30.2  ICD-9-CM: 477.8     Essential hypertension     ICD-10-CM: I10  ICD-9-CM: 401.9     Mixed hyperlipidemia     ICD-10-CM: E78.2  ICD-9-CM: 272.2     Low serum vitamin B12     ICD-10-CM: E53.8  ICD-9-CM: 266.2     Type 2 diabetes mellitus without complication, with long-term current use of insulin ([facility])     ICD-10-CM: E11.9, Z79.4  ICD-9-CM: 250.00, V58.67     Vitamin D deficiency disease     ICD-10-CM: E55.9  ICD-9-CM: 268.9     Gastroesophageal reflux disease without esophagitis     ICD-10-CM: K21.9  ICD-9-CM: 530.81     Healthcare maintenance     ICD-10-CM: Z00.00  ICD-9-CM: V70.0     Generalized osteoarthritis     ICD-10-CM: M15.9  ICD-9-CM: 715.00     Memory impairment     ICD-10-CM: R41.3  ICD-9-CM: 780.93     Moderate persistent extrinsic asthma without complication     ICD-10-CM: J45.40  ICD-9-CM:  493.00     Extrinsic asthma, mild intermittent, uncomplicated     ICD-10-CM: J45.20  ICD-9-CM: 493.00     Acute memory impairment     ICD-10-CM: R41.3  ICD-9-CM: 780.93     Encounter for immunization     ICD-10-CM: Z23  ICD-9-CM: V03.89     Other iron deficiency anemia     ICD-10-CM: D50.8  ICD-9-CM: 280.8     Closed nondisplaced fracture of surgical neck of right humerus with routine healing, unspecified fracture morphology, subsequent encounter     ICD-10-CM: S42.214D  ICD-9-CM: V54.11

## 2021-12-16 ENCOUNTER — LAB (OUTPATIENT)
Dept: FAMILY MEDICINE CLINIC | Facility: CLINIC | Age: 81
End: 2021-12-16

## 2021-12-16 DIAGNOSIS — E55.9 VITAMIN D DEFICIENCY DISEASE: ICD-10-CM

## 2021-12-16 DIAGNOSIS — E53.8 LOW SERUM VITAMIN B12: ICD-10-CM

## 2021-12-16 DIAGNOSIS — Z79.4 TYPE 2 DIABETES MELLITUS WITHOUT COMPLICATION, WITH LONG-TERM CURRENT USE OF INSULIN (HCC): ICD-10-CM

## 2021-12-16 DIAGNOSIS — D50.8 OTHER IRON DEFICIENCY ANEMIA: ICD-10-CM

## 2021-12-16 DIAGNOSIS — I10 ESSENTIAL HYPERTENSION: ICD-10-CM

## 2021-12-16 DIAGNOSIS — E11.9 TYPE 2 DIABETES MELLITUS WITHOUT COMPLICATION, WITH LONG-TERM CURRENT USE OF INSULIN (HCC): ICD-10-CM

## 2021-12-16 DIAGNOSIS — E78.2 MIXED HYPERLIPIDEMIA: ICD-10-CM

## 2021-12-16 PROCEDURE — 36415 COLL VENOUS BLD VENIPUNCTURE: CPT | Performed by: GENERAL PRACTICE

## 2021-12-17 DIAGNOSIS — N18.9 ACUTE RENAL FAILURE SUPERIMPOSED ON CHRONIC KIDNEY DISEASE, UNSPECIFIED CKD STAGE, UNSPECIFIED ACUTE RENAL FAILURE TYPE (HCC): Primary | ICD-10-CM

## 2021-12-17 DIAGNOSIS — N17.9 ACUTE RENAL FAILURE SUPERIMPOSED ON CHRONIC KIDNEY DISEASE, UNSPECIFIED CKD STAGE, UNSPECIFIED ACUTE RENAL FAILURE TYPE (HCC): Primary | ICD-10-CM

## 2021-12-17 LAB
25(OH)D3+25(OH)D2 SERPL-MCNC: 20.3 NG/ML (ref 30–100)
ALBUMIN SERPL-MCNC: 3.6 G/DL (ref 3.5–5.2)
ALBUMIN/GLOB SERPL: 1.8 G/DL
ALP SERPL-CCNC: 89 U/L (ref 39–117)
ALT SERPL-CCNC: 7 U/L (ref 1–33)
AST SERPL-CCNC: 18 U/L (ref 1–32)
BASOPHILS # BLD AUTO: 0.08 10*3/MM3 (ref 0–0.2)
BASOPHILS NFR BLD AUTO: 0.9 % (ref 0–1.5)
BILIRUB SERPL-MCNC: 0.3 MG/DL (ref 0–1.2)
BUN SERPL-MCNC: 32 MG/DL (ref 8–23)
BUN/CREAT SERPL: 10.9 (ref 7–25)
CALCIUM SERPL-MCNC: 8.5 MG/DL (ref 8.6–10.5)
CHLORIDE SERPL-SCNC: 107 MMOL/L (ref 98–107)
CHOLEST SERPL-MCNC: 127 MG/DL (ref 0–200)
CO2 SERPL-SCNC: 19.6 MMOL/L (ref 22–29)
CREAT SERPL-MCNC: 2.93 MG/DL (ref 0.57–1)
EOSINOPHIL # BLD AUTO: 0.18 10*3/MM3 (ref 0–0.4)
EOSINOPHIL NFR BLD AUTO: 2 % (ref 0.3–6.2)
ERYTHROCYTE [DISTWIDTH] IN BLOOD BY AUTOMATED COUNT: 14 % (ref 12.3–15.4)
FERRITIN SERPL-MCNC: 99.8 NG/ML (ref 13–150)
GLOBULIN SER CALC-MCNC: 2 GM/DL
GLUCOSE SERPL-MCNC: 123 MG/DL (ref 65–99)
HBA1C MFR BLD: 4.9 % (ref 4.8–5.6)
HCT VFR BLD AUTO: 29.4 % (ref 34–46.6)
HDLC SERPL-MCNC: 68 MG/DL (ref 40–60)
HGB BLD-MCNC: 9.4 G/DL (ref 12–15.9)
IMM GRANULOCYTES # BLD AUTO: 0.02 10*3/MM3 (ref 0–0.05)
IMM GRANULOCYTES NFR BLD AUTO: 0.2 % (ref 0–0.5)
IRON SERPL-MCNC: 56 MCG/DL (ref 37–145)
LDLC SERPL CALC-MCNC: 42 MG/DL (ref 0–100)
LYMPHOCYTES # BLD AUTO: 2.18 10*3/MM3 (ref 0.7–3.1)
LYMPHOCYTES NFR BLD AUTO: 24.7 % (ref 19.6–45.3)
MCH RBC QN AUTO: 27.8 PG (ref 26.6–33)
MCHC RBC AUTO-ENTMCNC: 32 G/DL (ref 31.5–35.7)
MCV RBC AUTO: 87 FL (ref 79–97)
MONOCYTES # BLD AUTO: 0.47 10*3/MM3 (ref 0.1–0.9)
MONOCYTES NFR BLD AUTO: 5.3 % (ref 5–12)
NEUTROPHILS # BLD AUTO: 5.88 10*3/MM3 (ref 1.7–7)
NEUTROPHILS NFR BLD AUTO: 66.9 % (ref 42.7–76)
NRBC BLD AUTO-RTO: 0 /100 WBC (ref 0–0.2)
PLATELET # BLD AUTO: 256 10*3/MM3 (ref 140–450)
POTASSIUM SERPL-SCNC: 4.5 MMOL/L (ref 3.5–5.2)
PROT SERPL-MCNC: 5.6 G/DL (ref 6–8.5)
RBC # BLD AUTO: 3.38 10*6/MM3 (ref 3.77–5.28)
SODIUM SERPL-SCNC: 140 MMOL/L (ref 136–145)
TRANSFERRIN SERPL-MCNC: 201 MG/DL (ref 192–364)
TRIGL SERPL-MCNC: 90 MG/DL (ref 0–150)
TSH SERPL DL<=0.005 MIU/L-ACNC: 1.38 UIU/ML (ref 0.27–4.2)
VIT B12 SERPL-MCNC: 603 PG/ML (ref 211–946)
VLDLC SERPL CALC-MCNC: 17 MG/DL (ref 5–40)
WBC # BLD AUTO: 8.81 10*3/MM3 (ref 3.4–10.8)

## 2021-12-20 NOTE — PROGRESS NOTES
Spoke with pt about the following per Dr. Gallardo. [facility]    -- Patient needs to stop losartan and avoid and NSAIDS (advil, motrin, aleve, ibuprofen, naproxen)  She needs another CMP drawn the week between [facility] and [facility]

## 2022-01-27 ENCOUNTER — OFFICE VISIT (OUTPATIENT)
Dept: FAMILY MEDICINE CLINIC | Facility: CLINIC | Age: 82
End: 2022-01-27

## 2022-01-27 VITALS
TEMPERATURE: 97.8 F | BODY MASS INDEX: 24.64 KG/M2 | OXYGEN SATURATION: 100 % | RESPIRATION RATE: 16 BRPM | HEIGHT: 67 IN | WEIGHT: 157 LBS | HEART RATE: 48 BPM

## 2022-01-27 DIAGNOSIS — Z79.4 TYPE 2 DIABETES MELLITUS WITHOUT COMPLICATION, WITH LONG-TERM CURRENT USE OF INSULIN: ICD-10-CM

## 2022-01-27 DIAGNOSIS — R41.3 MEMORY IMPAIRMENT: ICD-10-CM

## 2022-01-27 DIAGNOSIS — H81.09 MENIERE'S DISEASE, UNSPECIFIED LATERALITY: ICD-10-CM

## 2022-01-27 DIAGNOSIS — R55 PRE-SYNCOPE: Primary | ICD-10-CM

## 2022-01-27 DIAGNOSIS — R00.1 BRADYCARDIA: ICD-10-CM

## 2022-01-27 DIAGNOSIS — I10 ESSENTIAL HYPERTENSION: Primary | ICD-10-CM

## 2022-01-27 DIAGNOSIS — E11.9 TYPE 2 DIABETES MELLITUS WITHOUT COMPLICATION, WITH LONG-TERM CURRENT USE OF INSULIN: ICD-10-CM

## 2022-01-27 DIAGNOSIS — N28.9 RENAL INSUFFICIENCY: ICD-10-CM

## 2022-01-27 DIAGNOSIS — D50.8 OTHER IRON DEFICIENCY ANEMIA: ICD-10-CM

## 2022-01-27 DIAGNOSIS — R74.8 ELEVATED LIVER ENZYMES: ICD-10-CM

## 2022-01-27 DIAGNOSIS — J45.40 MODERATE PERSISTENT EXTRINSIC ASTHMA WITHOUT COMPLICATION: ICD-10-CM

## 2022-01-27 DIAGNOSIS — E78.2 MIXED HYPERLIPIDEMIA: ICD-10-CM

## 2022-01-27 DIAGNOSIS — Z00.00 HEALTHCARE MAINTENANCE: ICD-10-CM

## 2022-01-27 PROCEDURE — 93000 ELECTROCARDIOGRAM COMPLETE: CPT | Performed by: GENERAL PRACTICE

## 2022-01-27 PROCEDURE — 99214 OFFICE O/P EST MOD 30 MIN: CPT | Performed by: GENERAL PRACTICE

## 2022-01-27 NOTE — PROGRESS NOTES
Subjective   Sean Chen is a 81 y.o. female.     Chief Complaint  Dizziness    History of Present Illness     Dizziness  She returns with an approximate 1 month history of intermittent dizziness described as a lightheaded faint feeling.  This has been associated with several falls.  Within the last week she has felt that her heart rate has been too low.  There is no history of any chest pain, diaphoresis or nausea and she denies any change in her shortness of breath or lower extremity edema.  There is no history of any new headaches and she denies any changes in her strength or sensation.    Diabetes  Current symptoms include: fatigue.  She continues to deny any paresthesias of the feet, visual disturbances, polydipsia, polyuria, or foot ulcerations. Current treatments: lantus 20 units bid   Lab Results   Component Value Date    HGBA1C 4.90 12/16/2021      Lab Results   Component Value Date    MPRJRUSL70 603 12/16/2021     Dyslipidemia  She is currently being prescribed the following medication for her dyslipidemia - atorvastatin  Lab Results   Component Value Date    CHOL 175 08/24/2020    CHLPL 127 12/16/2021    TRIG 90 12/16/2021    HDL 68 (H) 12/16/2021    LDL 42 12/16/2021     Hypertension  Home blood pressure readings: not doing. Associated signs and symptoms: dyspnea and peripheral edema.  She continues to deny any chest pain, palpitations, orthopnea or paroxysmal nocturnal dyspnea. Current antihypertensive medications includes losartan, amlodipine, and hydralazine.  She was asked to come in for further labs following her most recent but elected not to do so  Lab Results   Component Value Date    GLUCOSE 123 (H) 12/16/2021    BUN 32 (H) 12/16/2021    CREATININE 2.93 (H) 12/16/2021    EGFRIFNONA 15 (L) 12/16/2021    EGFRIFAFRI 19 (L) 12/16/2021    BCR 10.9 12/16/2021    K 4.5 12/16/2021    CO2 19.6 (L) 12/16/2021    CALCIUM 8.5 (L) 12/16/2021    PROTENTOTREF 5.6 (L) 12/16/2021    ALBUMIN 3.60 12/16/2021     LABIL2 1.8 12/16/2021    AST 18 12/16/2021    ALT 7 12/16/2021     Lab Results   Component Value Date    ALKPHOS 89 12/16/2021    ALKPHOS 91 08/24/2020     Asthma  Her symptoms have included dyspnea and non-productive cough.  She has done fairly well since last here and continues to deny any chest pain or hemoptysis. Suspected precipitants include upper respiratory infection and allergens.     Labs  Lab Results   Component Value Date    WBC 8.81 12/16/2021    HGB 9.4 (L) 12/16/2021    HCT 29.4 (L) 12/16/2021    MCV 87.0 12/16/2021     12/16/2021     Lab Results   Component Value Date    IRON 60 08/24/2020    TIBC 373 11/24/2015    FERRITIN 99.80 12/16/2021     Lab Results   Component Value Date    TSH 1.380 12/16/2021     The following portions of the patient's history were reviewed and updated as appropriate: allergies, current medications, past medical history, past social history and problem list.    Review of Systems   Constitutional: Positive for fatigue. Negative for appetite change, chills, fever and unexpected weight change.   HENT: Positive for congestion, hearing loss, sneezing and tinnitus. Negative for ear pain, postnasal drip, rhinorrhea and sore throat.    Eyes: Negative for visual disturbance.   Respiratory: Positive for cough and shortness of breath (with exertion). Negative for wheezing.    Cardiovascular: Positive for leg swelling. Negative for chest pain and palpitations.   Gastrointestinal: Negative for abdominal pain, blood in stool, constipation, diarrhea, nausea and vomiting.   Endocrine: Negative for polydipsia and polyuria.   Genitourinary: Negative for dysuria, frequency, hematuria and urgency.   Musculoskeletal: Positive for arthralgias and back pain. Negative for myalgias.   Skin: Negative for rash.   Neurological: Positive for light-headedness. Negative for weakness and numbness.   Psychiatric/Behavioral: Positive for confusion (memory impairment). Negative for dysphoric mood  and sleep disturbance. The patient is not nervous/anxious.      Objective   Physical Exam  Constitutional:       General: She is not in acute distress.     Appearance: Normal appearance. She is well-developed. She is not diaphoretic.      Comments: Accompanied by her .  Bright and in fair spirits. Gait slow and cautious.  Required one person assistance to climb onto the exam table.  No apparent distress.  Slightly pale. No jaundice, diaphoresis, or cyanosis.   HENT:      Head: Atraumatic.      Right Ear: Tympanic membrane, ear canal and external ear normal.      Left Ear: Tympanic membrane, ear canal and external ear normal.   Eyes:      Conjunctiva/sclera: Conjunctivae normal.   Neck:      Thyroid: No thyroid mass or thyromegaly.      Vascular: No carotid bruit or JVD.      Trachea: Trachea normal. No tracheal deviation.   Cardiovascular:      Rate and Rhythm: Regular rhythm. Bradycardia present.      Heart sounds: Normal heart sounds, S1 normal and S2 normal. No murmur heard.  No gallop.    Pulmonary:      Effort: Pulmonary effort is normal.      Breath sounds: Wheezing (diffuse - mild -primarily on forced vital capacity) present. No rales.   Chest:   Breasts:      Right: No supraclavicular adenopathy.      Left: No supraclavicular adenopathy.       Abdominal:      General: Bowel sounds are normal. There is no distension or abdominal bruit.      Palpations: Abdomen is soft. There is no hepatomegaly, splenomegaly or mass.      Tenderness: There is no abdominal tenderness.      Hernia: No hernia is present.   Musculoskeletal:      Right lower leg: No edema.      Left lower leg: No edema.   Lymphadenopathy:      Head:      Right side of head: No submental, submandibular, tonsillar, preauricular, posterior auricular or occipital adenopathy.      Left side of head: No submental, submandibular, tonsillar, preauricular, posterior auricular or occipital adenopathy.      Cervical: No cervical adenopathy.      Upper  Body:      Right upper body: No supraclavicular adenopathy.      Left upper body: No supraclavicular adenopathy.   Skin:     General: Skin is warm.      Coloration: Skin is not cyanotic, jaundiced or pale.      Findings: No rash.      Nails: There is no clubbing.   Neurological:      Mental Status: She is alert and oriented to person, place, and time.      Cranial Nerves: No cranial nerve deficit.      Motor: No tremor.      Coordination: Coordination normal.      Gait: Gait abnormal.   Psychiatric:         Attention and Perception: Attention normal.         Mood and Affect: Mood normal.         Speech: Speech normal.         Behavior: Behavior normal.         Thought Content: Thought content normal.         Cognition and Memory: She exhibits impaired recent memory and impaired remote memory.       Assessment/Plan   Problems Addressed this Visit        Cardiac and Vasculature    Bradycardia  With intermittent lightheadedness and presyncope  Sinus bradycardia with pre-existing RBBB on EKG  Possible electrolyte disturbance associated with recent renal insufficiency  Lengthy discussion regarding fall avoidance  Cardiology assessment will be considered    Relevant Orders    Comprehensive Metabolic Panel    Magnesium    Magnesium    Essential hypertension   Encouraged to continue to work on her diet and exercise plan.  Continue current medication    Relevant Orders    CBC & Differential    Comprehensive Metabolic Panel    Magnesium    Magnesium    Mixed hyperlipidemia  As above.   Continue current medication.       ENT    Meniere's disease       Endocrine and Metabolic    Type 2 diabetes mellitus without complication, with long-term current use of insulin (HCC)  As above.   Continue current medication.       Gastrointestinal Abdominal     Elevated liver enzymes       Genitourinary and Reproductive     Renal insufficiency  Etiology unclear  Labs updated  Nephrology assessment will be arranged    Relevant Orders    CBC &  Differential    Comprehensive Metabolic Panel    Magnesium    Magnesium       Health Encounters    Healthcare maintenance  Patient has already received a flu shot fall.  Recommended a third dose of the Pfizer COVID-19 vaccine.       Hematology and Neoplasia    Iron deficiency anemia  Will continue to monitor    Relevant Orders    CBC & Differential       Neuro    Memory impairment  Will continue to monitor       Pulmonary and Pneumonias    Extrinsic asthma  Moderate persistent asthma. The patient is not currently having an exacerbation. In general, the patient's disease is well controlled.  Encouraged to report if this should change.  Continue current medication      Diagnoses       Codes Comments    Essential hypertension    -  Primary ICD-10-CM: I10  ICD-9-CM: 401.9     Mixed hyperlipidemia     ICD-10-CM: E78.2  ICD-9-CM: 272.2     Type 2 diabetes mellitus without complication, with long-term current use of insulin (HCC)     ICD-10-CM: E11.9, Z79.4  ICD-9-CM: 250.00, V58.67     Healthcare maintenance     ICD-10-CM: Z00.00  ICD-9-CM: V70.0     Other iron deficiency anemia     ICD-10-CM: D50.8  ICD-9-CM: 280.8     Memory impairment     ICD-10-CM: R41.3  ICD-9-CM: 780.93     Moderate persistent extrinsic asthma without complication     ICD-10-CM: J45.40  ICD-9-CM: 493.00     Meniere's disease, unspecified laterality     ICD-10-CM: H81.09  ICD-9-CM: 386.00     Renal insufficiency     ICD-10-CM: N28.9  ICD-9-CM: 593.9     Elevated liver enzymes     ICD-10-CM: R74.8  ICD-9-CM: 790.5     Bradycardia     ICD-10-CM: R00.1  ICD-9-CM: 427.89

## 2022-01-28 LAB
ALBUMIN SERPL-MCNC: 3.7 G/DL (ref 3.7–4.7)
ALBUMIN/GLOB SERPL: 1.6 {RATIO} (ref 1.2–2.2)
ALP SERPL-CCNC: 106 IU/L (ref 44–121)
ALT SERPL-CCNC: 6 IU/L (ref 0–32)
AST SERPL-CCNC: 13 IU/L (ref 0–40)
BASOPHILS # BLD AUTO: 0.1 X10E3/UL (ref 0–0.2)
BASOPHILS NFR BLD AUTO: 1 %
BILIRUB SERPL-MCNC: 0.3 MG/DL (ref 0–1.2)
BUN SERPL-MCNC: 36 MG/DL (ref 8–27)
BUN/CREAT SERPL: 11 (ref 12–28)
CALCIUM SERPL-MCNC: 8.3 MG/DL (ref 8.7–10.3)
CHLORIDE SERPL-SCNC: 107 MMOL/L (ref 96–106)
CO2 SERPL-SCNC: 15 MMOL/L (ref 20–29)
CREAT SERPL-MCNC: 3.3 MG/DL (ref 0.57–1)
EOSINOPHIL # BLD AUTO: 0.1 X10E3/UL (ref 0–0.4)
EOSINOPHIL NFR BLD AUTO: 1 %
ERYTHROCYTE [DISTWIDTH] IN BLOOD BY AUTOMATED COUNT: 14.4 % (ref 11.7–15.4)
GLOBULIN SER CALC-MCNC: 2.3 G/DL (ref 1.5–4.5)
GLUCOSE SERPL-MCNC: 164 MG/DL (ref 65–99)
HCT VFR BLD AUTO: 30.7 % (ref 34–46.6)
HGB BLD-MCNC: 10 G/DL (ref 11.1–15.9)
IMM GRANULOCYTES # BLD AUTO: 0 X10E3/UL (ref 0–0.1)
IMM GRANULOCYTES NFR BLD AUTO: 0 %
LYMPHOCYTES # BLD AUTO: 1.8 X10E3/UL (ref 0.7–3.1)
LYMPHOCYTES NFR BLD AUTO: 17 %
MAGNESIUM SERPL-MCNC: 1.7 MG/DL (ref 1.6–2.3)
MAGNESIUM SERPL-MCNC: 1.7 MG/DL (ref 1.6–2.3)
MCH RBC QN AUTO: 28.6 PG (ref 26.6–33)
MCHC RBC AUTO-ENTMCNC: 32.6 G/DL (ref 31.5–35.7)
MCV RBC AUTO: 88 FL (ref 79–97)
MONOCYTES # BLD AUTO: 0.5 X10E3/UL (ref 0.1–0.9)
MONOCYTES NFR BLD AUTO: 4 %
NEUTROPHILS # BLD AUTO: 8 X10E3/UL (ref 1.4–7)
NEUTROPHILS NFR BLD AUTO: 77 %
PLATELET # BLD AUTO: 343 X10E3/UL (ref 150–450)
POTASSIUM SERPL-SCNC: 3.3 MMOL/L (ref 3.5–5.2)
PROT SERPL-MCNC: 6 G/DL (ref 6–8.5)
RBC # BLD AUTO: 3.5 X10E6/UL (ref 3.77–5.28)
SODIUM SERPL-SCNC: 141 MMOL/L (ref 134–144)
WBC # BLD AUTO: 10.5 X10E3/UL (ref 3.4–10.8)

## 2022-01-28 NOTE — PROGRESS NOTES
Adult ECG Report     Name: Sean Chen   Age: 81 y.o.   Gender: female       Rate: 48   Rhythm: sinus bradycardia   QRS Axis: normal   VT Interval: normal   QRS Duration: prolonged   QTc: normal   Voltages: normal   Conduction Disturbances: right bundle branch block (RBBB)   Other Abnormalities: none     Narrative Interpretation: Minus bradycardia of 46 with a right bundle branch block.  Apart from rate, study unchanged from one done at [facility] 3/30/2021

## 2022-02-01 ENCOUNTER — TELEPHONE (OUTPATIENT)
Dept: FAMILY MEDICINE CLINIC | Facility: CLINIC | Age: 82
End: 2022-02-01

## 2022-02-01 NOTE — TELEPHONE ENCOUNTER
Parveen from Dr. Jesus office called and states that they have been trying to schedule patient an appointment but has been unsuccessful. They cannot reach her and when they do, she is refusing to schedule.     Tried to reach patient to have her call Parveen to schedule an appt, patient is not answering phone calls.

## 2022-04-29 ENCOUNTER — OFFICE VISIT (OUTPATIENT)
Dept: FAMILY MEDICINE CLINIC | Facility: CLINIC | Age: 82
End: 2022-04-29

## 2022-04-29 DIAGNOSIS — R41.3 MEMORY IMPAIRMENT: ICD-10-CM

## 2022-04-29 DIAGNOSIS — Z00.00 HEALTHCARE MAINTENANCE: ICD-10-CM

## 2022-04-29 DIAGNOSIS — I10 ESSENTIAL HYPERTENSION: ICD-10-CM

## 2022-04-29 DIAGNOSIS — R00.1 BRADYCARDIA: ICD-10-CM

## 2022-04-29 DIAGNOSIS — E55.9 VITAMIN D DEFICIENCY DISEASE: ICD-10-CM

## 2022-04-29 DIAGNOSIS — H81.09 MENIERE'S DISEASE, UNSPECIFIED LATERALITY: ICD-10-CM

## 2022-04-29 DIAGNOSIS — K21.9 GASTROESOPHAGEAL REFLUX DISEASE WITHOUT ESOPHAGITIS: ICD-10-CM

## 2022-04-29 DIAGNOSIS — E78.2 MIXED HYPERLIPIDEMIA: ICD-10-CM

## 2022-04-29 DIAGNOSIS — J45.40 MODERATE PERSISTENT EXTRINSIC ASTHMA WITHOUT COMPLICATION: ICD-10-CM

## 2022-04-29 DIAGNOSIS — N28.9 RENAL INSUFFICIENCY: ICD-10-CM

## 2022-04-29 DIAGNOSIS — Z79.4 TYPE 2 DIABETES MELLITUS WITHOUT COMPLICATION, WITH LONG-TERM CURRENT USE OF INSULIN: ICD-10-CM

## 2022-04-29 DIAGNOSIS — J30.2 CHRONIC SEASONAL ALLERGIC RHINITIS: Primary | ICD-10-CM

## 2022-04-29 DIAGNOSIS — E11.9 TYPE 2 DIABETES MELLITUS WITHOUT COMPLICATION, WITH LONG-TERM CURRENT USE OF INSULIN: ICD-10-CM

## 2022-04-29 DIAGNOSIS — R41.3 ACUTE MEMORY IMPAIRMENT: ICD-10-CM

## 2022-04-29 DIAGNOSIS — E53.8 LOW SERUM VITAMIN B12: ICD-10-CM

## 2022-04-29 DIAGNOSIS — R74.8 ELEVATED LIVER ENZYMES: ICD-10-CM

## 2022-04-29 DIAGNOSIS — M15.9 GENERALIZED OSTEOARTHRITIS: ICD-10-CM

## 2022-04-29 DIAGNOSIS — J45.20 EXTRINSIC ASTHMA, MILD INTERMITTENT, UNCOMPLICATED: ICD-10-CM

## 2022-04-29 PROCEDURE — 99214 OFFICE O/P EST MOD 30 MIN: CPT | Performed by: GENERAL PRACTICE

## 2022-04-29 PROCEDURE — 36415 COLL VENOUS BLD VENIPUNCTURE: CPT | Performed by: GENERAL PRACTICE

## 2022-04-30 VITALS
RESPIRATION RATE: 14 BRPM | DIASTOLIC BLOOD PRESSURE: 74 MMHG | HEART RATE: 52 BPM | HEIGHT: 67 IN | TEMPERATURE: 97.2 F | SYSTOLIC BLOOD PRESSURE: 158 MMHG | OXYGEN SATURATION: 99 % | BODY MASS INDEX: 24.59 KG/M2

## 2022-04-30 PROBLEM — S42.214D CLOSED NONDISPLACED FRACTURE OF SURGICAL NECK OF RIGHT HUMERUS WITH ROUTINE HEALING: Status: RESOLVED | Noted: 2021-04-28 | Resolved: 2022-04-30

## 2022-04-30 PROBLEM — Z12.31 ENCOUNTER FOR SCREENING MAMMOGRAM FOR BREAST CANCER: Status: RESOLVED | Noted: 2017-04-28 | Resolved: 2022-04-30

## 2022-04-30 RX ORDER — PAROXETINE 10 MG/1
10 TABLET, FILM COATED ORAL EVERY MORNING
Qty: 30 TABLET | Refills: 5 | Status: SHIPPED | OUTPATIENT
Start: 2022-04-30 | End: 2022-06-08 | Stop reason: HOSPADM

## 2022-04-30 RX ORDER — HYDRALAZINE HYDROCHLORIDE 25 MG/1
25 TABLET, FILM COATED ORAL 3 TIMES DAILY
Qty: 90 TABLET | Refills: 5 | Status: SHIPPED | OUTPATIENT
Start: 2022-04-30 | End: 2022-05-13 | Stop reason: HOSPADM

## 2022-04-30 RX ORDER — AMLODIPINE BESYLATE 10 MG/1
10 TABLET ORAL NIGHTLY
Qty: 30 TABLET | Refills: 5 | Status: SHIPPED | OUTPATIENT
Start: 2022-04-30 | End: 2022-05-13 | Stop reason: HOSPADM

## 2022-04-30 RX ORDER — ATORVASTATIN CALCIUM 80 MG/1
80 TABLET, FILM COATED ORAL NIGHTLY
Qty: 30 TABLET | Refills: 5 | Status: SHIPPED | OUTPATIENT
Start: 2022-04-30

## 2022-04-30 RX ORDER — ALBUTEROL SULFATE 90 UG/1
2 AEROSOL, METERED RESPIRATORY (INHALATION) 4 TIMES DAILY
Qty: 8.5 G | Refills: 5 | Status: SHIPPED | OUTPATIENT
Start: 2022-04-30

## 2022-05-02 LAB
25(OH)D3+25(OH)D2 SERPL-MCNC: 17.1 NG/ML (ref 30–100)
ALBUMIN SERPL-MCNC: 3.6 G/DL (ref 3.5–5.2)
ALBUMIN/GLOB SERPL: 1.3 G/DL
ALP SERPL-CCNC: 94 U/L (ref 39–117)
ALT SERPL-CCNC: 12 U/L (ref 1–33)
AST SERPL-CCNC: 15 U/L (ref 1–32)
BASOPHILS # BLD AUTO: 0.07 10*3/MM3 (ref 0–0.2)
BASOPHILS NFR BLD AUTO: 0.7 % (ref 0–1.5)
BILIRUB SERPL-MCNC: 0.3 MG/DL (ref 0–1.2)
BUN SERPL-MCNC: 73 MG/DL (ref 8–23)
BUN/CREAT SERPL: 13.7 (ref 7–25)
CALCIUM SERPL-MCNC: 8.2 MG/DL (ref 8.6–10.5)
CHLORIDE SERPL-SCNC: 110 MMOL/L (ref 98–107)
CHOLEST SERPL-MCNC: 123 MG/DL (ref 0–200)
CO2 SERPL-SCNC: 13.4 MMOL/L (ref 22–29)
CREAT SERPL-MCNC: 5.33 MG/DL (ref 0.57–1)
EGFRCR SERPLBLD CKD-EPI 2021: 7.7 ML/MIN/1.73
EOSINOPHIL # BLD AUTO: 0.16 10*3/MM3 (ref 0–0.4)
EOSINOPHIL NFR BLD AUTO: 1.5 % (ref 0.3–6.2)
ERYTHROCYTE [DISTWIDTH] IN BLOOD BY AUTOMATED COUNT: 14.5 % (ref 12.3–15.4)
GLOBULIN SER CALC-MCNC: 2.7 GM/DL
GLUCOSE SERPL-MCNC: 128 MG/DL (ref 65–99)
HBA1C MFR BLD: 5.5 % (ref 4.8–5.6)
HCT VFR BLD AUTO: 24.1 % (ref 34–46.6)
HDLC SERPL-MCNC: 62 MG/DL (ref 40–60)
HGB BLD-MCNC: 7.6 G/DL (ref 12–15.9)
IMM GRANULOCYTES # BLD AUTO: 0.04 10*3/MM3 (ref 0–0.05)
IMM GRANULOCYTES NFR BLD AUTO: 0.4 % (ref 0–0.5)
LDLC SERPL CALC-MCNC: 45 MG/DL (ref 0–100)
LYMPHOCYTES # BLD AUTO: 1.02 10*3/MM3 (ref 0.7–3.1)
LYMPHOCYTES NFR BLD AUTO: 9.5 % (ref 19.6–45.3)
MAGNESIUM SERPL-MCNC: 1.7 MG/DL (ref 1.6–2.4)
MCH RBC QN AUTO: 28.5 PG (ref 26.6–33)
MCHC RBC AUTO-ENTMCNC: 31.5 G/DL (ref 31.5–35.7)
MCV RBC AUTO: 90.3 FL (ref 79–97)
MONOCYTES # BLD AUTO: 0.48 10*3/MM3 (ref 0.1–0.9)
MONOCYTES NFR BLD AUTO: 4.5 % (ref 5–12)
NEUTROPHILS # BLD AUTO: 8.92 10*3/MM3 (ref 1.7–7)
NEUTROPHILS NFR BLD AUTO: 83.4 % (ref 42.7–76)
NRBC BLD AUTO-RTO: 0 /100 WBC (ref 0–0.2)
PLATELET # BLD AUTO: 352 10*3/MM3 (ref 140–450)
POTASSIUM SERPL-SCNC: 5.8 MMOL/L (ref 3.5–5.2)
PROT SERPL-MCNC: 6.3 G/DL (ref 6–8.5)
RBC # BLD AUTO: 2.67 10*6/MM3 (ref 3.77–5.28)
SODIUM SERPL-SCNC: 141 MMOL/L (ref 136–145)
TRIGL SERPL-MCNC: 82 MG/DL (ref 0–150)
TSH SERPL DL<=0.005 MIU/L-ACNC: 1.75 UIU/ML (ref 0.27–4.2)
VIT B12 SERPL-MCNC: 520 PG/ML (ref 211–946)
VLDLC SERPL CALC-MCNC: 16 MG/DL (ref 5–40)
WBC # BLD AUTO: 10.69 10*3/MM3 (ref 3.4–10.8)
WRITTEN AUTHORIZATION: NORMAL

## 2022-05-04 ENCOUNTER — HOSPITAL ENCOUNTER (INPATIENT)
Facility: HOSPITAL | Age: 82
LOS: 9 days | Discharge: HOME | End: 2022-05-13
Attending: EMERGENCY MEDICINE | Admitting: STUDENT IN AN ORGANIZED HEALTH CARE EDUCATION/TRAINING PROGRAM

## 2022-05-04 ENCOUNTER — APPOINTMENT (OUTPATIENT)
Dept: GENERAL RADIOLOGY | Facility: HOSPITAL | Age: 82
End: 2022-05-04

## 2022-05-04 DIAGNOSIS — I16.0 HYPERTENSIVE URGENCY: Primary | ICD-10-CM

## 2022-05-04 DIAGNOSIS — N17.9 ACUTE RENAL FAILURE SUPERIMPOSED ON CHRONIC KIDNEY DISEASE, UNSPECIFIED CKD STAGE, UNSPECIFIED ACUTE RENAL FAILURE TYPE: ICD-10-CM

## 2022-05-04 DIAGNOSIS — I10 ESSENTIAL HYPERTENSION: ICD-10-CM

## 2022-05-04 DIAGNOSIS — N28.9 RENAL INSUFFICIENCY: ICD-10-CM

## 2022-05-04 DIAGNOSIS — N18.9 ACUTE RENAL FAILURE SUPERIMPOSED ON CHRONIC KIDNEY DISEASE, UNSPECIFIED CKD STAGE, UNSPECIFIED ACUTE RENAL FAILURE TYPE: ICD-10-CM

## 2022-05-04 DIAGNOSIS — I21.4 NSTEMI (NON-ST ELEVATED MYOCARDIAL INFARCTION): ICD-10-CM

## 2022-05-04 PROBLEM — F03.90 DEMENTIA WITHOUT BEHAVIORAL DISTURBANCE: Chronic | Status: ACTIVE | Noted: 2022-05-04

## 2022-05-04 LAB
ALBUMIN SERPL-MCNC: 3.69 G/DL (ref 3.5–5.2)
ALBUMIN/GLOB SERPL: 1.1 G/DL
ALP SERPL-CCNC: 105 U/L (ref 39–117)
ALT SERPL W P-5'-P-CCNC: 8 U/L (ref 1–33)
ANION GAP SERPL CALCULATED.3IONS-SCNC: 13.6 MMOL/L (ref 5–15)
AST SERPL-CCNC: 13 U/L (ref 1–32)
BACTERIA UR QL AUTO: ABNORMAL /HPF
BASOPHILS # BLD AUTO: 0.06 10*3/MM3 (ref 0–0.2)
BASOPHILS NFR BLD AUTO: 0.6 % (ref 0–1.5)
BILIRUB SERPL-MCNC: 0.4 MG/DL (ref 0–1.2)
BILIRUB UR QL STRIP: NEGATIVE
BUN SERPL-MCNC: 70 MG/DL (ref 8–23)
BUN/CREAT SERPL: 13.1 (ref 7–25)
CALCIUM SPEC-SCNC: 8.7 MG/DL (ref 8.6–10.5)
CHLORIDE SERPL-SCNC: 106 MMOL/L (ref 98–107)
CLARITY UR: CLEAR
CO2 SERPL-SCNC: 13.4 MMOL/L (ref 22–29)
COLOR UR: YELLOW
CREAT SERPL-MCNC: 5.36 MG/DL (ref 0.57–1)
CRP SERPL-MCNC: 0.63 MG/DL (ref 0–0.5)
DEPRECATED RDW RBC AUTO: 46.4 FL (ref 37–54)
EGFRCR SERPLBLD CKD-EPI 2021: 7.6 ML/MIN/1.73
EOSINOPHIL # BLD AUTO: 0.24 10*3/MM3 (ref 0–0.4)
EOSINOPHIL NFR BLD AUTO: 2.3 % (ref 0.3–6.2)
ERYTHROCYTE [DISTWIDTH] IN BLOOD BY AUTOMATED COUNT: 14.5 % (ref 12.3–15.4)
FLUAV RNA RESP QL NAA+PROBE: NOT DETECTED
FLUBV RNA RESP QL NAA+PROBE: NOT DETECTED
GLOBULIN UR ELPH-MCNC: 3.3 GM/DL
GLUCOSE SERPL-MCNC: 112 MG/DL (ref 65–99)
GLUCOSE UR STRIP-MCNC: ABNORMAL MG/DL
HCT VFR BLD AUTO: 25.3 % (ref 34–46.6)
HGB BLD-MCNC: 8.4 G/DL (ref 12–15.9)
HGB UR QL STRIP.AUTO: ABNORMAL
HOLD SPECIMEN: NORMAL
HOLD SPECIMEN: NORMAL
HYALINE CASTS UR QL AUTO: ABNORMAL /LPF
IMM GRANULOCYTES # BLD AUTO: 0.06 10*3/MM3 (ref 0–0.05)
IMM GRANULOCYTES NFR BLD AUTO: 0.6 % (ref 0–0.5)
INR PPP: 0.96 (ref 0.9–1.1)
KETONES UR QL STRIP: NEGATIVE
LEUKOCYTE ESTERASE UR QL STRIP.AUTO: ABNORMAL
LYMPHOCYTES # BLD AUTO: 1.67 10*3/MM3 (ref 0.7–3.1)
LYMPHOCYTES NFR BLD AUTO: 16.1 % (ref 19.6–45.3)
MAGNESIUM SERPL-MCNC: 1.6 MG/DL (ref 1.6–2.4)
MCH RBC QN AUTO: 29 PG (ref 26.6–33)
MCHC RBC AUTO-ENTMCNC: 33.2 G/DL (ref 31.5–35.7)
MCV RBC AUTO: 87.2 FL (ref 79–97)
MONOCYTES # BLD AUTO: 0.51 10*3/MM3 (ref 0.1–0.9)
MONOCYTES NFR BLD AUTO: 4.9 % (ref 5–12)
NEUTROPHILS NFR BLD AUTO: 7.86 10*3/MM3 (ref 1.7–7)
NEUTROPHILS NFR BLD AUTO: 75.5 % (ref 42.7–76)
NITRITE UR QL STRIP: NEGATIVE
NRBC BLD AUTO-RTO: 0 /100 WBC (ref 0–0.2)
NT-PROBNP SERPL-MCNC: ABNORMAL PG/ML (ref 0–1800)
PH UR STRIP.AUTO: 6 [PH] (ref 5–8)
PLATELET # BLD AUTO: 339 10*3/MM3 (ref 140–450)
PMV BLD AUTO: 9.9 FL (ref 6–12)
POTASSIUM SERPL-SCNC: 5.3 MMOL/L (ref 3.5–5.2)
PROT SERPL-MCNC: 7 G/DL (ref 6–8.5)
PROT UR QL STRIP: ABNORMAL
PROTHROMBIN TIME: 13 SECONDS (ref 12.1–14.7)
QT INTERVAL: 518 MS
QTC INTERVAL: 448 MS
RBC # BLD AUTO: 2.9 10*6/MM3 (ref 3.77–5.28)
RBC # UR STRIP: ABNORMAL /HPF
REF LAB TEST METHOD: ABNORMAL
SARS-COV-2 RNA RESP QL NAA+PROBE: NOT DETECTED
SODIUM SERPL-SCNC: 133 MMOL/L (ref 136–145)
SP GR UR STRIP: 1.01 (ref 1–1.03)
SQUAMOUS #/AREA URNS HPF: ABNORMAL /HPF
TROPONIN T SERPL-MCNC: 0.06 NG/ML (ref 0–0.03)
TROPONIN T SERPL-MCNC: 0.08 NG/ML (ref 0–0.03)
TROPONIN T SERPL-MCNC: 0.1 NG/ML (ref 0–0.03)
UROBILINOGEN UR QL STRIP: ABNORMAL
WBC # UR STRIP: ABNORMAL /HPF
WBC NRBC COR # BLD: 10.4 10*3/MM3 (ref 3.4–10.8)
WHOLE BLOOD HOLD SPECIMEN: NORMAL
WHOLE BLOOD HOLD SPECIMEN: NORMAL

## 2022-05-04 PROCEDURE — 87636 SARSCOV2 & INF A&B AMP PRB: CPT | Performed by: NURSE PRACTITIONER

## 2022-05-04 PROCEDURE — 84484 ASSAY OF TROPONIN QUANT: CPT | Performed by: STUDENT IN AN ORGANIZED HEALTH CARE EDUCATION/TRAINING PROGRAM

## 2022-05-04 PROCEDURE — 94761 N-INVAS EAR/PLS OXIMETRY MLT: CPT

## 2022-05-04 PROCEDURE — 71045 X-RAY EXAM CHEST 1 VIEW: CPT

## 2022-05-04 PROCEDURE — 71045 X-RAY EXAM CHEST 1 VIEW: CPT | Performed by: RADIOLOGY

## 2022-05-04 PROCEDURE — 25010000002 HEPARIN (PORCINE) PER 1000 UNITS: Performed by: STUDENT IN AN ORGANIZED HEALTH CARE EDUCATION/TRAINING PROGRAM

## 2022-05-04 PROCEDURE — 94799 UNLISTED PULMONARY SVC/PX: CPT

## 2022-05-04 PROCEDURE — 86140 C-REACTIVE PROTEIN: CPT | Performed by: NURSE PRACTITIONER

## 2022-05-04 PROCEDURE — 25010000002 MAGNESIUM SULFATE IN D5W 1G/100ML (PREMIX) 1-5 GM/100ML-% SOLUTION

## 2022-05-04 PROCEDURE — 84484 ASSAY OF TROPONIN QUANT: CPT | Performed by: NURSE PRACTITIONER

## 2022-05-04 PROCEDURE — 94640 AIRWAY INHALATION TREATMENT: CPT

## 2022-05-04 PROCEDURE — 85610 PROTHROMBIN TIME: CPT | Performed by: NURSE PRACTITIONER

## 2022-05-04 PROCEDURE — 83735 ASSAY OF MAGNESIUM: CPT | Performed by: NURSE PRACTITIONER

## 2022-05-04 PROCEDURE — 81001 URINALYSIS AUTO W/SCOPE: CPT | Performed by: NURSE PRACTITIONER

## 2022-05-04 PROCEDURE — 83880 ASSAY OF NATRIURETIC PEPTIDE: CPT | Performed by: NURSE PRACTITIONER

## 2022-05-04 PROCEDURE — 87086 URINE CULTURE/COLONY COUNT: CPT | Performed by: NURSE PRACTITIONER

## 2022-05-04 PROCEDURE — 80053 COMPREHEN METABOLIC PANEL: CPT | Performed by: NURSE PRACTITIONER

## 2022-05-04 PROCEDURE — 85025 COMPLETE CBC W/AUTO DIFF WBC: CPT | Performed by: NURSE PRACTITIONER

## 2022-05-04 PROCEDURE — 93010 ELECTROCARDIOGRAM REPORT: CPT | Performed by: INTERNAL MEDICINE

## 2022-05-04 PROCEDURE — 99284 EMERGENCY DEPT VISIT MOD MDM: CPT

## 2022-05-04 PROCEDURE — 25010000002 HYDRALAZINE PER 20 MG: Performed by: NURSE PRACTITIONER

## 2022-05-04 PROCEDURE — 25010000002 ATROPINE PER 0.01 MG: Performed by: INTERNAL MEDICINE

## 2022-05-04 PROCEDURE — 94760 N-INVAS EAR/PLS OXIMETRY 1: CPT

## 2022-05-04 PROCEDURE — 99223 1ST HOSP IP/OBS HIGH 75: CPT | Performed by: STUDENT IN AN ORGANIZED HEALTH CARE EDUCATION/TRAINING PROGRAM

## 2022-05-04 PROCEDURE — 93005 ELECTROCARDIOGRAM TRACING: CPT | Performed by: NURSE PRACTITIONER

## 2022-05-04 RX ORDER — SODIUM CHLORIDE 0.9 % (FLUSH) 0.9 %
10 SYRINGE (ML) INJECTION EVERY 12 HOURS SCHEDULED
Status: DISCONTINUED | OUTPATIENT
Start: 2022-05-04 | End: 2022-05-13 | Stop reason: HOSPADM

## 2022-05-04 RX ORDER — SODIUM CHLORIDE 0.9 % (FLUSH) 0.9 %
10 SYRINGE (ML) INJECTION AS NEEDED
Status: DISCONTINUED | OUTPATIENT
Start: 2022-05-04 | End: 2022-05-13 | Stop reason: HOSPADM

## 2022-05-04 RX ORDER — AMLODIPINE BESYLATE 10 MG/1
10 TABLET ORAL NIGHTLY
Status: DISCONTINUED | OUTPATIENT
Start: 2022-05-04 | End: 2022-05-06

## 2022-05-04 RX ORDER — HYDRALAZINE HYDROCHLORIDE 25 MG/1
25 TABLET, FILM COATED ORAL 3 TIMES DAILY
Status: DISCONTINUED | OUTPATIENT
Start: 2022-05-04 | End: 2022-05-05

## 2022-05-04 RX ORDER — NICOTINE POLACRILEX 4 MG
15 LOZENGE BUCCAL
Status: DISCONTINUED | OUTPATIENT
Start: 2022-05-04 | End: 2022-05-06

## 2022-05-04 RX ORDER — MAGNESIUM SULFATE 1 G/100ML
1 INJECTION INTRAVENOUS ONCE
Status: COMPLETED | OUTPATIENT
Start: 2022-05-04 | End: 2022-05-04

## 2022-05-04 RX ORDER — HYDRALAZINE HYDROCHLORIDE 20 MG/ML
20 INJECTION INTRAMUSCULAR; INTRAVENOUS ONCE
Status: COMPLETED | OUTPATIENT
Start: 2022-05-04 | End: 2022-05-04

## 2022-05-04 RX ORDER — ASPIRIN 325 MG
325 TABLET, DELAYED RELEASE (ENTERIC COATED) ORAL ONCE
Status: COMPLETED | OUTPATIENT
Start: 2022-05-04 | End: 2022-05-04

## 2022-05-04 RX ORDER — PAROXETINE HYDROCHLORIDE 20 MG/1
10 TABLET, FILM COATED ORAL EVERY MORNING
Status: DISCONTINUED | OUTPATIENT
Start: 2022-05-05 | End: 2022-05-13 | Stop reason: HOSPADM

## 2022-05-04 RX ORDER — CLONIDINE HYDROCHLORIDE 0.1 MG/1
0.1 TABLET ORAL ONCE
Status: COMPLETED | OUTPATIENT
Start: 2022-05-04 | End: 2022-05-04

## 2022-05-04 RX ORDER — DEXTROSE MONOHYDRATE 25 G/50ML
25 INJECTION, SOLUTION INTRAVENOUS
Status: DISCONTINUED | OUTPATIENT
Start: 2022-05-04 | End: 2022-05-06

## 2022-05-04 RX ORDER — ALBUTEROL SULFATE 2.5 MG/3ML
2.5 SOLUTION RESPIRATORY (INHALATION)
Status: DISCONTINUED | OUTPATIENT
Start: 2022-05-04 | End: 2022-05-13 | Stop reason: HOSPADM

## 2022-05-04 RX ORDER — HEPARIN SODIUM 5000 [USP'U]/ML
5000 INJECTION, SOLUTION INTRAVENOUS; SUBCUTANEOUS EVERY 8 HOURS SCHEDULED
Status: DISCONTINUED | OUTPATIENT
Start: 2022-05-04 | End: 2022-05-11

## 2022-05-04 RX ORDER — ATROPINE SULFATE 0.4 MG/ML
0.4 AMPUL (ML) INJECTION ONCE AS NEEDED
Status: COMPLETED | OUTPATIENT
Start: 2022-05-04 | End: 2022-05-04

## 2022-05-04 RX ORDER — ATORVASTATIN CALCIUM 40 MG/1
80 TABLET, FILM COATED ORAL NIGHTLY
Status: DISCONTINUED | OUTPATIENT
Start: 2022-05-04 | End: 2022-05-13 | Stop reason: HOSPADM

## 2022-05-04 RX ADMIN — CLONIDINE HYDROCHLORIDE 0.1 MG: 0.1 TABLET ORAL at 16:23

## 2022-05-04 RX ADMIN — Medication 10 ML: at 21:26

## 2022-05-04 RX ADMIN — AMLODIPINE BESYLATE 10 MG: 10 TABLET ORAL at 21:23

## 2022-05-04 RX ADMIN — SODIUM CHLORIDE 2.5 MG/HR: 9 INJECTION, SOLUTION INTRAVENOUS at 19:07

## 2022-05-04 RX ADMIN — HYDRALAZINE HYDROCHLORIDE 25 MG: 25 TABLET, FILM COATED ORAL at 22:00

## 2022-05-04 RX ADMIN — MAGNESIUM SULFATE HEPTAHYDRATE 1 G: 1 INJECTION, SOLUTION INTRAVENOUS at 21:24

## 2022-05-04 RX ADMIN — ALBUTEROL SULFATE 2.5 MG: 2.5 SOLUTION RESPIRATORY (INHALATION) at 20:53

## 2022-05-04 RX ADMIN — ASPIRIN 325 MG: 325 TABLET, COATED ORAL at 21:23

## 2022-05-04 RX ADMIN — HEPARIN SODIUM 5000 UNITS: 5000 INJECTION INTRAVENOUS; SUBCUTANEOUS at 22:00

## 2022-05-04 RX ADMIN — ATROPINE SULFATE 0.4 MG: 0.4 INJECTION, SOLUTION INTRAMUSCULAR; INTRAVENOUS; SUBCUTANEOUS at 22:48

## 2022-05-04 RX ADMIN — ATORVASTATIN CALCIUM 80 MG: 40 TABLET, FILM COATED ORAL at 21:26

## 2022-05-04 RX ADMIN — HYDRALAZINE HYDROCHLORIDE 20 MG: 20 INJECTION INTRAMUSCULAR; INTRAVENOUS at 15:35

## 2022-05-04 NOTE — H&P
"  [facility] Services  History & Physical    Patient Identification:  Name:  Sean Chen  Age:  81 y.o.  Sex:  female  :  1940  MRN:  [id number]   Visit Number:  [id number]  Admit Date: 2022   Primary Care Physician:  Ganga Gallardo MD    Subjective     Chief complaint: Abnormal lab, nausea/vomiting    History of presenting illness:      Sean Chen is a 81 y.o. female with past medical history significant for primary essential hypertension, type 2 diabetes mellitis without long-term or current use of insulin, mixed hyperlipidemia, progressive dementia without behavioral disturbance, iron deficiency anemia, vitamin D deficiency, osteoarthritis, Ménière's disease, chronic seasonal allergic rhinitis, and chronic kidney disease stage IIIb. Today, the patient presented to the ED for further evaluation after notification from her PCP, Dr. Gallardo, that her potassium was elevated and that she was in kidney failure according to recent labs obtained. Patient also complains of nausea and vomiting which she states began this past Friday () shortly after her labs were drawn. Patient reports that the emesis appeared to be undigested food, and then bile-like. She also reports associated left chest pain which she describes as a \"mashing\" feeling. She also admits to mild dizziness with these episodes.The patient states that the chest pain gets worse with activity or exertion and is relieved by rest. She currently denies any chest pain upon exam, nausea, shortness of air, fatigue, weakness, palpitations, and syncopal episodes. Patient states that she \"took something\" for nausea but could not recall what the medication was. She states the medication relieved her nausea and vomiting since then. Please note, the patient is an unreliable historian as she is only oriented to self at this time. Per the patient's son who was present during exam, patient does not take " medications as scheduled and is very forgetful. He also states that the patient was recently referred by PCP to cardiology for symptomatic bradycardia and nephrology for management of CKD, and has since followed up with them. However, he can not recall the name of cardiologist or nephrologist.     Upon arrival to the ED, vital signs were temperature 97.7, pulse 46, respirations 16, blood pressure 218/70 sitting, SPO2 saturation 100% on room air.    Known Emergency Department medications received prior to my evaluation included clonidine 0.1 mg, hydralazine 20 mg IV. Room location at the time of my evaluation was [address]. BRYAN Mcclendon present during exam as well as patient's son. Discussed case with Dr. Tomas.    ---------------------------------------------------------------------------------------------------------------------   Review of Systems   Constitutional: Negative for activity change, appetite change, chills, diaphoresis and fatigue.   Respiratory: Negative for cough, choking, shortness of breath and wheezing.    Cardiovascular: Positive for chest pain. Negative for palpitations and leg swelling.   Gastrointestinal: Positive for nausea and vomiting. Negative for abdominal pain, constipation and diarrhea.   Genitourinary: Negative for difficulty urinating and dysuria.   Neurological: Positive for dizziness. Negative for syncope, speech difficulty and headaches.   Psychiatric/Behavioral: Positive for confusion. Negative for agitation, behavioral problems and hallucinations.      ---------------------------------------------------------------------------------------------------------------------   Past Medical History:   Diagnosis Date   • Allergic rhinitis    • Elevated liver enzymes    • Extrinsic asthma    • Gastritis    • GERD (gastroesophageal reflux disease)    • Hyperlipidemia    • Hypertension    • Iron deficiency anemia    • Meniere's disease    • Osteoarthritis    • Type 2 diabetes mellitus (HCC)    •  Vitamin D deficiency disease      Past Surgical History:   Procedure Laterality Date   • APPENDECTOMY     • CHOLECYSTECTOMY     • HYSTERECTOMY       No family history on file.  Social History     Socioeconomic History   • Marital status:      Spouse name: moshe   • Number of children: 1   • Years of education: 8   Tobacco Use   • Smoking status: Never Smoker   • Smokeless tobacco: Never Used   Substance and Sexual Activity   • Alcohol use: No   • Drug use: No   • Sexual activity: Defer     ---------------------------------------------------------------------------------------------------------------------   Allergies:  Keflex [cephalexin], Lodine [etodolac], Penicillins, and Sulfa antibiotics  ---------------------------------------------------------------------------------------------------------------------   Home medications:    Medications below are reported home medications pulling from within the system; at this time, these medications have not been reconciled unless otherwise specified and are in the verification process for further verifcation as current home medications.  Medications Prior to Admission   Medication Sig Dispense Refill Last Dose   • albuterol sulfate HFA (ProAir HFA) 108 (90 Base) MCG/ACT inhaler Inhale 2 puffs 4 (Four) Times a Day. 8.5 g 5 5/3/2022 at Unknown time   • amLODIPine (NORVASC) 10 MG tablet Take 1 tablet by mouth Every Night. 30 tablet 5 5/3/2022 at Unknown time   • atorvastatin (LIPITOR) 80 MG tablet Take 1 tablet by mouth Every Night. 30 tablet 5 5/3/2022 at Unknown time   • hydrALAZINE (APRESOLINE) 25 MG tablet Take 1 tablet by mouth 3 (Three) Times a Day. 90 tablet 5 5/3/2022 at Unknown time   • PARoxetine (PAXIL) 10 MG tablet Take 1 tablet by mouth Every Morning. 30 tablet 5 5/3/2022 at Unknown time       Hospital Scheduled Meds:  albuterol, 2.5 mg, Nebulization, 4x Daily - RT  amLODIPine, 10 mg, Oral, Nightly  aspirin, 325 mg, Oral, Once  atorvastatin, 80 mg, Oral,  Nightly  heparin (porcine), 5,000 Units, Subcutaneous, Q8H  hydrALAZINE, 25 mg, Oral, TID  magnesium sulfate, 1 g, Intravenous, Once  [START ON 5/5/2022] PARoxetine, 10 mg, Oral, QAM  sodium chloride, 10 mL, Intravenous, Q12H      niCARdipine, 5-15 mg/hr, Last Rate: 5 mg/hr (05/04/22 1932)        Current listed hospital scheduled medications may not yet reflect those currently placed in orders that are signed and held awaiting patient's arrival to floor.   ---------------------------------------------------------------------------------------------------------------------     Objective     Vital Signs:  Temp:  [97.4 °F (36.3 °C)-97.7 °F (36.5 °C)] 97.4 °F (36.3 °C)  Heart Rate:  [44-48] 45  Resp:  [16] 16  BP: (190-218)/(62-71) 198/66      05/04/22  1407 05/04/22  1815   Weight: 63.5 kg (140 lb) 64.2 kg (141 lb 9.6 oz)     Body mass index is 23.56 kg/m².  ---------------------------------------------------------------------------------------------------------------------       Physical Exam  Vitals reviewed.   Constitutional:       General: She is awake. She is not in acute distress.     Appearance: She is normal weight. She is not diaphoretic.      Comments: Pt alert, oriented only to self.    HENT:      Head: Normocephalic.      Mouth/Throat:      Mouth: Mucous membranes are dry.   Eyes:      Extraocular Movements: Extraocular movements intact.      Pupils: Pupils are equal, round, and reactive to light.   Cardiovascular:      Rate and Rhythm: Regular rhythm. Bradycardia present.      Pulses:           Radial pulses are 2+ on the right side and 2+ on the left side.        Dorsalis pedis pulses are 1+ on the right side and 1+ on the left side.      Heart sounds: Murmur heard.    Systolic murmur is present.  Pulmonary:      Effort: Pulmonary effort is normal. No respiratory distress or retractions.      Breath sounds: Normal breath sounds. No wheezing or rhonchi.   Chest:      Chest wall: No tenderness.   Abdominal:       General: Abdomen is flat. Bowel sounds are normal.      Palpations: Abdomen is soft. There is no mass.      Tenderness: There is no abdominal tenderness. There is no guarding.   Musculoskeletal:         General: No swelling.      Cervical back: Normal range of motion and neck supple.      Right lower leg: No edema.      Left lower leg: No edema.   Skin:     General: Skin is warm and dry.      Capillary Refill: Capillary refill takes 2 to 3 seconds.      Coloration: Skin is pale.   Neurological:      Mental Status: She is alert. She is disoriented.      Motor: No weakness.   Psychiatric:         Mood and Affect: Mood normal.         Behavior: Behavior normal. Behavior is cooperative.       ---------------------------------------------------------------------------------------------------------------------  EKG: Pending cardiology read; appears sinus bradycardia, rate 40's, with right bundle branch block. QTc 448. See below.    Addendum: EKG later confirmed by Dr. Franco.           Telemetry:  Appears sinus bradycardia 40's upon my exam.    I have personally looked at both the EKG and the telemetry strips.  ---------------------------------------------------------------------------------------------------------------------   Results from last 7 days   Lab Units 05/04/22  1504 04/29/22  1230   CRP mg/dL 0.63*  --    WBC 10*3/mm3 10.40 10.69   HEMOGLOBIN g/dL 8.4* 7.6*   HEMATOCRIT % 25.3* 24.1*   MCV fL 87.2 90.3   MCHC g/dL 33.2 31.5   PLATELETS 10*3/mm3 339 352   INR  0.96  --          Results from last 7 days   Lab Units 05/04/22  1504 04/29/22  1230   SODIUM mmol/L 133* 141   POTASSIUM mmol/L 5.3* 5.8*   MAGNESIUM mg/dL 1.6 1.7   CHLORIDE mmol/L 106 110*   TOTAL CO2 mmol/L  --  13.4*   CO2 mmol/L 13.4*  --    BUN mg/dL 70* 73*   CREATININE mg/dL 5.36* 5.33*   CALCIUM mg/dL 8.7 8.2*   GLUCOSE mg/dL 112* 128*   ALBUMIN g/dL 3.69 3.60   BILIRUBIN mg/dL 0.4 0.3   ALK PHOS U/L 105 94   AST (SGOT) U/L 13 15   ALT  (SGPT) U/L 8 12   Estimated Creatinine Clearance: 8.5 mL/min (A) (by C-G formula based on SCr of 5.36 mg/dL (H)).  No results found for: AMMONIA  Results from last 7 days   Lab Units 05/04/22  1716 05/04/22  1504   TROPONIN T ng/mL 0.084* 0.095*     Results from last 7 days   Lab Units 05/04/22  1504   PROBNP pg/mL 29,931.0*     Lab Results   Component Value Date    HGBA1C 5.50 04/29/2022     Lab Results   Component Value Date    TSH 1.750 04/29/2022     No results found for: PREGTESTUR, PREGSERUM, HCG, HCGQUANT  Pain Management Panel    There is no flowsheet data to display.       Results from last 7 days   Lab Units 04/29/22  1230   TRIGLYCERIDES mg/dL 82   HDL CHOL mg/dL 62*   LDL CHOL mg/dL 45     ---------------------------------------------------------------------------------------------------------------------  Imaging Results (Last 7 Days)     Procedure Component Value Units Date/Time    XR Chest 1 View [[id number]] Collected: 05/04/22 1611     Updated: 05/04/22 1613    Narrative:      EXAM:    XR Chest, 1 View     EXAM DATE:    5/4/2022 2:43 PM     CLINICAL HISTORY:    abnormal labs     TECHNIQUE:    Frontal view of the chest.     COMPARISON:    No relevant prior studies available.     FINDINGS:    Lungs:  Unremarkable.  No consolidation.    Pleural space:  Unremarkable.  No pneumothorax.    Heart:  Unremarkable.  No cardiomegaly.    Mediastinum:  Unremarkable.    Bones/joints:  Degenerative changes bilateral shoulders.       Impression:        No acute cardiopulmonary findings identified.     This report was finalized on 5/4/2022 4:11 PM by Dr. Mingo Cardenas MD.             Last echocardiogram: No previous echo on file.       I have personally reviewed the above radiology images and read the final radiology report on 05/04/22  ---------------------------------------------------------------------------------------------------------------------  Assessment / Plan     Active Hospital Problems    Diagnosis   POA   • **Hypertensive urgency [I16.0]  Yes   • Dementia without behavioral disturbance (HCC) [F03.90]  Yes   • Bradycardia [R00.1]  Yes   • Mixed hyperlipidemia [E78.2]  Yes   • Essential hypertension [I10]  Yes   • Type 2 diabetes mellitus without complication, without long-term current use of insulin (HCC) [E11.9]  Yes       ASSESSMENT/PLAN:  -Reports recent chest pain with typical features, POA  -Possible NSTEMI with troponin elevation, POA  -Systolic murmur heard best at apex without known or documented hx of significant valvular disease, POA  -Hypomagnesemia, POA  -Troponin 0.095 initially in ED; trended down to 0.084.  -Cardiology consult; evaluation and input greatly appreciated.  -Continue to trend troponins.  -Patient denies chest pain upon my exam.   -Repeat EKG; see initial EKG above.   -Heparin 5,000 units subcutaneous Q 12 hours for DVT prophylaxis; discussed with Dr. Tomas and decided against initiating heparin gtt at this time.  -Loading dose of ASA.  -Lipitor 80 mg Nightly  -Echo in AM.   -Magnesium 1.6 currently; will add 1g IV magnesium and recheck in AM.     -Hypertensive urgency, POA  -/70 initially; during ED course, SBP ranging from 190-210.   -Clonidine and hydralazine given in ED with some decline in BP noted.  -Initiate cardene gtt to maintain -180.  -Continue PO Hydralazine 25 mg TID.  -Continue PO Norvasc 10 mg Nightly.  -Avoid medications that may worsen bradycardia.    -Acute kidney injury on chronic renal failure, stage IIIb, POA  -Hyperkalemia, POA  -Elevated proBNP 29,931 POA  -Creatinine 5.36 initially; baseline creatinine appears to be around 2.9-3.3.  -Hold nephrotoxins as able.  -Strict I/O.  -K+ 5.3 currently; K+ 5.8 on 04/29/2022 with labs from PCP.  -Dextrose and Insulin.   -Continuous cardiac monitoring.  -Holding IV fluids for now due to elevated proBNP.   -CMP in AM to closely monitor renal function.  -PRITI possibly related to bradycardia and/or persistent  uncontrolled HTN.  -Inpatient nephrology consult; input and evaluation greatly appreciated.    -Bradycardia, POA  -SB 40's during ED course with right BBB noted on EKG.  -Continuous telemetry monitoring.  -VS per hospital policy.   -Patient had been recently referred to cardiology by PCP for symptomatic bradycardia, however patient is unable to relay details due to memory deficits/cognitive decline.   -Patient's son also unable to provide many details.  -Records requested by Dr. Tomas.     -Progressive dementia, POA  -Supportive care; provide reorientation.  -Bed alarm/fall precautions.  -Aspiration precautions.  -Case management consulted for discharge planning.    -Non-insulin dependent type II DM  -Accu checks AC and HS.  -Will initiate low-dose SS insulin coverage if needed.   -Hemoglobin A1C 5.5 on 04/29/2022 indicating adequate control of BG recently.  -Glucose 112 upon initial labs today in ED.  -Hypoglycemia protocol.    -Chronic normocytic anemia  -Currently stable.  -Hemoglobin currently 8.4; improved from 7.6 (04/29).  -No signs of active bleeding; patient denies hematuria, rectal bleeding, coffee-ground emesis.  -CBC in AM.        ----------  -DVT prophylaxis: heparin 5,000 units subcutaneous Q 12 hours   -Activity: up ad tiffani/fall precautions.  -Expected length of stay:   INPATIENT status due to the need for care which can only be reasonably provided in an hospital setting such as aggressive/expedited ancillary services and/or consultation services, the necessity for IV medications, close physician monitoring and/or the possible need for procedures.  In such, I feel patient's risk for adverse outcomes and need for care warrant INPATIENT evaluation and predict the patient's care encounter to likely last beyond 2 midnights.  -Disposition: pending clinical course.     CODE STATUS: FULL; discussed with patient and son.    High risk secondary to hypertensive urgency, bradycardia, possible NSTEMI with troponin  elevation and recent chest pain w/ typical features, acute on chronic renal failure stage IIIb, hyperkalemia, progressive dementia, and advanced age.       Sanjuanita Tena, APRN   05/04/22  19:45 EDT  Pager #[phone]  ---------------------------------------------------------------------------------------------------------------------

## 2022-05-04 NOTE — PLAN OF CARE
Problem: Adult Inpatient Plan of Care  Goal: Plan of Care Review  Outcome: Ongoing, Progressing  Flowsheets (Taken 5/4/2022 1947)  Plan of Care Reviewed With: patient  Goal: Patient-Specific Goal (Individualized)  Outcome: Ongoing, Progressing  Goal: Absence of Hospital-Acquired Illness or Injury  Outcome: Ongoing, Progressing  Goal: Optimal Comfort and Wellbeing  Outcome: Ongoing, Progressing  Goal: Readiness for Transition of Care  Outcome: Ongoing, Progressing   Problem: Fall Injury Risk  Goal: Absence of Fall and Fall-Related Injury  Outcome: Ongoing, Progressing   Problem: Diabetes Comorbidity  Goal: Blood Glucose Level Within Targeted Range  Outcome: Ongoing, Progressing   Problem: Heart Failure Comorbidity  Goal: Maintenance of Heart Failure Symptom Control  Outcome: Ongoing, Progressing   Goal Outcome Evaluation:  Plan of Care Reviewed With: patient

## 2022-05-04 NOTE — ED PROVIDER NOTES
Subjective   Patient is a 81-year-old female with past medical history significant for hypertension, hyperlipidemia, iron deficiency anemia, Ménière's disease, osteoarthritis, diabetes mellitus, vitamin D deficiency, dementia, and GERD.  She presents to the ED today for abnormal labs.  She was seen by her PCP-Dr. Jacobson in [place] on 4/29/2022.  Her son states that they shady some blood work and called today and told her that her kidney function was abnormal and that she was in renal failure and that she needed to come to the ED for further evaluation and admission to the hospital.  Patient's son reports that she had a recent admission at [facility] and had a follow-up with nephrology.  Patient's son reports that her blood pressure has been uncontrolled at home.  He states that her dementia is worsening and she has not been taking her medications.  He states that they live next door to her and are now assisting her with medications and her care.  Patient denies any significant complaints.          Review of Systems   Constitutional: Negative.  Negative for fever.   HENT: Negative.    Eyes: Negative.    Respiratory: Negative.    Cardiovascular: Negative.  Negative for chest pain.   Gastrointestinal: Negative.  Negative for abdominal pain.   Endocrine: Negative.    Genitourinary: Negative.  Negative for dysuria.   Musculoskeletal: Negative.    Skin: Negative.    Allergic/Immunologic: Negative.    Neurological: Negative.    Hematological: Negative.    Psychiatric/Behavioral: Negative.    All other systems reviewed and are negative.      Past Medical History:   Diagnosis Date   • Allergic rhinitis    • Elevated liver enzymes    • Extrinsic asthma    • Gastritis    • GERD (gastroesophageal reflux disease)    • Hyperlipidemia    • Hypertension    • Iron deficiency anemia    • Meniere's disease    • Osteoarthritis    • Type 2 diabetes mellitus (HCC)    • Vitamin D deficiency disease        Allergies   Allergen  Reactions   • Keflex [Cephalexin]    • Lodine [Etodolac]    • Penicillins    • Sulfa Antibiotics        Past Surgical History:   Procedure Laterality Date   • APPENDECTOMY     • CHOLECYSTECTOMY     • HYSTERECTOMY         No family history on file.    Social History     Socioeconomic History   • Marital status:      Spouse name: moshe   • Number of children: 1   • Years of education: 8   Tobacco Use   • Smoking status: Never Smoker   • Smokeless tobacco: Never Used   Substance and Sexual Activity   • Alcohol use: No   • Drug use: No   • Sexual activity: Defer           Objective   Physical Exam  Vitals and nursing note reviewed.   Constitutional:       General: She is not in acute distress.     Appearance: She is well-developed. She is not diaphoretic.   HENT:      Head: Normocephalic and atraumatic.      Right Ear: External ear normal.      Left Ear: External ear normal.      Nose: Nose normal.      Mouth/Throat:      Mouth: Mucous membranes are moist.      Pharynx: Oropharynx is clear.   Eyes:      Conjunctiva/sclera: Conjunctivae normal.      Pupils: Pupils are equal, round, and reactive to light.   Neck:      Vascular: No JVD.      Trachea: No tracheal deviation.   Cardiovascular:      Rate and Rhythm: Normal rate and regular rhythm.      Heart sounds: Normal heart sounds. No murmur heard.  Pulmonary:      Effort: Pulmonary effort is normal. No respiratory distress.      Breath sounds: Normal breath sounds. No wheezing.   Abdominal:      General: Bowel sounds are normal.      Palpations: Abdomen is soft.      Tenderness: There is no abdominal tenderness.   Musculoskeletal:         General: No deformity. Normal range of motion.      Cervical back: Normal range of motion and neck supple.   Skin:     General: Skin is warm and dry.      Capillary Refill: Capillary refill takes less than 2 seconds.      Coloration: Skin is pale.      Findings: No erythema or rash.   Neurological:      General: No focal  deficit present.      Mental Status: She is alert. She is disoriented.      Cranial Nerves: No cranial nerve deficit.   Psychiatric:         Mood and Affect: Mood normal.         Behavior: Behavior normal.         Thought Content: Thought content normal.         Judgment: Judgment normal.         Procedures       Results for orders placed or performed during the hospital encounter of 05/04/22   COVID-19 and FLU A/B PCR - Swab, Nasopharynx    Specimen: Nasopharynx; Swab   Result Value Ref Range    COVID19 Not Detected Not Detected - Ref. Range    Influenza A PCR Not Detected Not Detected    Influenza B PCR Not Detected Not Detected   Comprehensive Metabolic Panel    Specimen: Blood   Result Value Ref Range    Glucose 112 (H) 65 - 99 mg/dL    BUN 70 (H) 8 - 23 mg/dL    Creatinine 5.36 (H) 0.57 - 1.00 mg/dL    Sodium 133 (L) 136 - 145 mmol/L    Potassium 5.3 (H) 3.5 - 5.2 mmol/L    Chloride 106 98 - 107 mmol/L    CO2 13.4 (L) 22.0 - 29.0 mmol/L    Calcium 8.7 8.6 - 10.5 mg/dL    Total Protein 7.0 6.0 - 8.5 g/dL    Albumin 3.69 3.50 - 5.20 g/dL    ALT (SGPT) 8 1 - 33 U/L    AST (SGOT) 13 1 - 32 U/L    Alkaline Phosphatase 105 39 - 117 U/L    Total Bilirubin 0.4 0.0 - 1.2 mg/dL    Globulin 3.3 gm/dL    A/G Ratio 1.1 g/dL    BUN/Creatinine Ratio 13.1 7.0 - 25.0    Anion Gap 13.6 5.0 - 15.0 mmol/L    eGFR 7.6 (L) >60.0 mL/min/1.73   Protime-INR    Specimen: Blood   Result Value Ref Range    Protime 13.0 12.1 - 14.7 Seconds    INR 0.96 0.90 - 1.10   Urinalysis With Culture If Indicated - Urine, Clean Catch    Specimen: Urine, Clean Catch   Result Value Ref Range    Color, UA Yellow Yellow, Straw    Appearance, UA Clear Clear    pH, UA 6.0 5.0 - 8.0    Specific Gravity, UA 1.013 1.005 - 1.030    Glucose,  mg/dL (1+) (A) Negative    Ketones, UA Negative Negative    Bilirubin, UA Negative Negative    Blood, UA Small (1+) (A) Negative    Protein, UA >=300 mg/dL (3+) (A) Negative    Leuk Esterase, UA Trace (A) Negative     Nitrite, UA Negative Negative    Urobilinogen, UA 0.2 E.U./dL 0.2 - 1.0 E.U./dL   BNP    Specimen: Blood   Result Value Ref Range    proBNP 29,931.0 (H) 0.0 - 1,800.0 pg/mL   Troponin    Specimen: Blood   Result Value Ref Range    Troponin T 0.095 (C) 0.000 - 0.030 ng/mL   C-reactive Protein    Specimen: Blood   Result Value Ref Range    C-Reactive Protein 0.63 (H) 0.00 - 0.50 mg/dL   Magnesium    Specimen: Blood   Result Value Ref Range    Magnesium 1.6 1.6 - 2.4 mg/dL   CBC Auto Differential    Specimen: Blood   Result Value Ref Range    WBC 10.40 3.40 - 10.80 10*3/mm3    RBC 2.90 (L) 3.77 - 5.28 10*6/mm3    Hemoglobin 8.4 (L) 12.0 - 15.9 g/dL    Hematocrit 25.3 (L) 34.0 - 46.6 %    MCV 87.2 79.0 - 97.0 fL    MCH 29.0 26.6 - 33.0 pg    MCHC 33.2 31.5 - 35.7 g/dL    RDW 14.5 12.3 - 15.4 %    RDW-SD 46.4 37.0 - 54.0 fl    MPV 9.9 6.0 - 12.0 fL    Platelets 339 140 - 450 10*3/mm3    Neutrophil % 75.5 42.7 - 76.0 %    Lymphocyte % 16.1 (L) 19.6 - 45.3 %    Monocyte % 4.9 (L) 5.0 - 12.0 %    Eosinophil % 2.3 0.3 - 6.2 %    Basophil % 0.6 0.0 - 1.5 %    Immature Grans % 0.6 (H) 0.0 - 0.5 %    Neutrophils, Absolute 7.86 (H) 1.70 - 7.00 10*3/mm3    Lymphocytes, Absolute 1.67 0.70 - 3.10 10*3/mm3    Monocytes, Absolute 0.51 0.10 - 0.90 10*3/mm3    Eosinophils, Absolute 0.24 0.00 - 0.40 10*3/mm3    Basophils, Absolute 0.06 0.00 - 0.20 10*3/mm3    Immature Grans, Absolute 0.06 (H) 0.00 - 0.05 10*3/mm3    nRBC 0.0 0.0 - 0.2 /100 WBC   Urinalysis, Microscopic Only - Urine, Clean Catch    Specimen: Urine, Clean Catch   Result Value Ref Range    RBC, UA 3-5 (A) None Seen, 0-2 /HPF    WBC, UA 6-12 (A) None Seen, 0-2 /HPF    Bacteria, UA None Seen None Seen /HPF    Squamous Epithelial Cells, UA 0-2 None Seen, 0-2 /HPF    Hyaline Casts, UA 3-6 None Seen /LPF    Methodology Automated Microscopy    Green Top (Gel)   Result Value Ref Range    Extra Tube Hold for add-ons.    Lavender Top   Result Value Ref Range    Extra Tube hold for  add-on    Gold Top - SST   Result Value Ref Range    Extra Tube Hold for add-ons.    Light Blue Top   Result Value Ref Range    Extra Tube hold for add-on        ED Course  ED Course as of 05/04/22 1659   Wed May 04, 2022   1441 EKG at 1431 hrs. unclear sinus rhythm, appears mild T wave inversion in aVF.  45 bpm, , , QTc 448, regular axis, RBBB.  No significant ST deviation or T wave abnormalities concerning for acute ischemia. [KP]   1658 XR Chest 1 View  IMPRESSION:    No acute cardiopulmonary findings identified. [MB]   1659 Case d/w Dr. Tomas, will admit to hospitalist service. [MB]      ED Course User Index  [KP] Mingo Pineda MD  [MB] Milla Clarke, ELGIN                                                 [facility]    Final diagnoses:   Hypertensive urgency   Acute renal failure superimposed on chronic kidney disease, unspecified CKD stage, unspecified acute renal failure type (HCC)       ED Disposition  ED Disposition     ED Disposition   Decision to Admit    Condition   --    Comment   --             No follow-up provider specified.       Medication List      No changes were made to your prescriptions during this visit.          Milla Clarke, ELGIN  05/04/22 1700

## 2022-05-05 ENCOUNTER — APPOINTMENT (OUTPATIENT)
Dept: ULTRASOUND IMAGING | Facility: HOSPITAL | Age: 82
End: 2022-05-05

## 2022-05-05 ENCOUNTER — APPOINTMENT (OUTPATIENT)
Dept: CARDIOLOGY | Facility: HOSPITAL | Age: 82
End: 2022-05-05

## 2022-05-05 LAB
ABO GROUP BLD: NORMAL
ABO GROUP BLD: NORMAL
ALBUMIN SERPL-MCNC: 2.5 G/DL (ref 3.5–5.2)
ALBUMIN/GLOB SERPL: 1 G/DL
ALP SERPL-CCNC: 75 U/L (ref 39–117)
ALT SERPL W P-5'-P-CCNC: 5 U/L (ref 1–33)
ANION GAP SERPL CALCULATED.3IONS-SCNC: 13.3 MMOL/L (ref 5–15)
AST SERPL-CCNC: 9 U/L (ref 1–32)
BACTERIA SPEC AEROBE CULT: NO GROWTH
BASOPHILS # BLD AUTO: 0.05 10*3/MM3 (ref 0–0.2)
BASOPHILS NFR BLD AUTO: 0.6 % (ref 0–1.5)
BH CV ECHO MEAS - ACS: 1.3 CM
BH CV ECHO MEAS - AO MAX PG: 64.6 MMHG
BH CV ECHO MEAS - AO MEAN PG: 26.5 MMHG
BH CV ECHO MEAS - AO ROOT DIAM: 3 CM
BH CV ECHO MEAS - AO V2 MAX: 402 CM/SEC
BH CV ECHO MEAS - AO V2 VTI: 104.5 CM
BH CV ECHO MEAS - AVA(I,D): 0.84 CM2
BH CV ECHO MEAS - EDV(CUBED): 186.2 ML
BH CV ECHO MEAS - EDV(MOD-SP4): 94.9 ML
BH CV ECHO MEAS - EF(MOD-SP4): 74 %
BH CV ECHO MEAS - ESV(CUBED): 40.2 ML
BH CV ECHO MEAS - ESV(MOD-SP4): 24.7 ML
BH CV ECHO MEAS - FS: 40 %
BH CV ECHO MEAS - IVS/LVPW: 0.97 CM
BH CV ECHO MEAS - IVSD: 1.33 CM
BH CV ECHO MEAS - LA DIMENSION: 4.5 CM
BH CV ECHO MEAS - LAT PEAK E' VEL: 8.4 CM/SEC
BH CV ECHO MEAS - LV DIASTOLIC VOL/BSA (35-75): 54.4 CM2
BH CV ECHO MEAS - LV MASS(C)D: 341.5 GRAMS
BH CV ECHO MEAS - LV MAX PG: 5.4 MMHG
BH CV ECHO MEAS - LV MEAN PG: 2 MMHG
BH CV ECHO MEAS - LV SYSTOLIC VOL/BSA (12-30): 14.2 CM2
BH CV ECHO MEAS - LV V1 MAX: 116 CM/SEC
BH CV ECHO MEAS - LV V1 VTI: 31 CM
BH CV ECHO MEAS - LVIDD: 5.7 CM
BH CV ECHO MEAS - LVIDS: 3.4 CM
BH CV ECHO MEAS - LVOT AREA: 2.8 CM2
BH CV ECHO MEAS - LVOT DIAM: 1.9 CM
BH CV ECHO MEAS - LVPWD: 1.38 CM
BH CV ECHO MEAS - MED PEAK E' VEL: 3.8 CM/SEC
BH CV ECHO MEAS - MV A MAX VEL: 84.8 CM/SEC
BH CV ECHO MEAS - MV DEC SLOPE: 185 CM/SEC2
BH CV ECHO MEAS - MV E MAX VEL: 125 CM/SEC
BH CV ECHO MEAS - MV E/A: 1.47
BH CV ECHO MEAS - MV MAX PG: 6.6 MMHG
BH CV ECHO MEAS - MV MEAN PG: 2 MMHG
BH CV ECHO MEAS - MV P1/2T: 199.5 MSEC
BH CV ECHO MEAS - MV V2 VTI: 74.6 CM
BH CV ECHO MEAS - MVA(P1/2T): 1.1 CM2
BH CV ECHO MEAS - MVA(VTI): 1.18 CM2
BH CV ECHO MEAS - PA ACC TIME: 0.13 SEC
BH CV ECHO MEAS - PA PR(ACCEL): 20.5 MMHG
BH CV ECHO MEAS - RAP SYSTOLE: 10 MMHG
BH CV ECHO MEAS - RVSP: 75 MMHG
BH CV ECHO MEAS - SI(MOD-SP4): 40.2 ML/M2
BH CV ECHO MEAS - SV(LVOT): 87.9 ML
BH CV ECHO MEAS - SV(MOD-SP4): 70.2 ML
BH CV ECHO MEAS - TAPSE (>1.6): 1.6 CM
BH CV ECHO MEAS - TR MAX PG: 65 MMHG
BH CV ECHO MEAS - TR MAX VEL: 403 CM/SEC
BH CV ECHO MEASUREMENTS AVERAGE E/E' RATIO: 20.49
BILIRUB SERPL-MCNC: 0.2 MG/DL (ref 0–1.2)
BLD GP AB SCN SERPL QL: NEGATIVE
BUN SERPL-MCNC: 67 MG/DL (ref 8–23)
BUN/CREAT SERPL: 11.8 (ref 7–25)
CALCIUM SPEC-SCNC: 8.1 MG/DL (ref 8.6–10.5)
CHLORIDE SERPL-SCNC: 109 MMOL/L (ref 98–107)
CHOLEST SERPL-MCNC: 98 MG/DL (ref 0–200)
CO2 SERPL-SCNC: 12.7 MMOL/L (ref 22–29)
CREAT SERPL-MCNC: 5.67 MG/DL (ref 0.57–1)
DEPRECATED RDW RBC AUTO: 46.9 FL (ref 37–54)
EGFRCR SERPLBLD CKD-EPI 2021: 7.1 ML/MIN/1.73
EOSINOPHIL # BLD AUTO: 0.17 10*3/MM3 (ref 0–0.4)
EOSINOPHIL NFR BLD AUTO: 2 % (ref 0.3–6.2)
ERYTHROCYTE [DISTWIDTH] IN BLOOD BY AUTOMATED COUNT: 14.6 % (ref 12.3–15.4)
GLOBULIN UR ELPH-MCNC: 2.5 GM/DL
GLUCOSE BLDC GLUCOMTR-MCNC: 127 MG/DL (ref 70–130)
GLUCOSE BLDC GLUCOMTR-MCNC: 166 MG/DL (ref 70–130)
GLUCOSE BLDC GLUCOMTR-MCNC: 169 MG/DL (ref 70–130)
GLUCOSE SERPL-MCNC: 140 MG/DL (ref 65–99)
HCT VFR BLD AUTO: 17.6 % (ref 34–46.6)
HCT VFR BLD AUTO: 22.5 % (ref 34–46.6)
HCT VFR BLD AUTO: 22.9 % (ref 34–46.6)
HDLC SERPL-MCNC: 50 MG/DL (ref 40–60)
HGB BLD-MCNC: 5.8 G/DL (ref 12–15.9)
HGB BLD-MCNC: 7.1 G/DL (ref 12–15.9)
HGB BLD-MCNC: 7.6 G/DL (ref 12–15.9)
IMM GRANULOCYTES # BLD AUTO: 0.04 10*3/MM3 (ref 0–0.05)
IMM GRANULOCYTES NFR BLD AUTO: 0.5 % (ref 0–0.5)
LDLC SERPL CALC-MCNC: 34 MG/DL (ref 0–100)
LDLC/HDLC SERPL: 0.7 {RATIO}
LEFT ATRIUM VOLUME INDEX: 33.3 ML/M2
LYMPHOCYTES # BLD AUTO: 1.7 10*3/MM3 (ref 0.7–3.1)
LYMPHOCYTES NFR BLD AUTO: 19.7 % (ref 19.6–45.3)
MAGNESIUM SERPL-MCNC: 1.8 MG/DL (ref 1.6–2.4)
MAXIMAL PREDICTED HEART RATE: 140 BPM
MCH RBC QN AUTO: 28.9 PG (ref 26.6–33)
MCHC RBC AUTO-ENTMCNC: 33 G/DL (ref 31.5–35.7)
MCV RBC AUTO: 87.6 FL (ref 79–97)
MONOCYTES # BLD AUTO: 0.63 10*3/MM3 (ref 0.1–0.9)
MONOCYTES NFR BLD AUTO: 7.3 % (ref 5–12)
NEUTROPHILS NFR BLD AUTO: 6.05 10*3/MM3 (ref 1.7–7)
NEUTROPHILS NFR BLD AUTO: 69.9 % (ref 42.7–76)
NRBC BLD AUTO-RTO: 0 /100 WBC (ref 0–0.2)
PLATELET # BLD AUTO: 250 10*3/MM3 (ref 140–450)
PMV BLD AUTO: 10 FL (ref 6–12)
POTASSIUM SERPL-SCNC: 5 MMOL/L (ref 3.5–5.2)
PROT SERPL-MCNC: 5 G/DL (ref 6–8.5)
QT INTERVAL: 546 MS
QTC INTERVAL: 461 MS
RBC # BLD AUTO: 2.01 10*6/MM3 (ref 3.77–5.28)
RH BLD: NEGATIVE
RH BLD: NEGATIVE
SODIUM SERPL-SCNC: 135 MMOL/L (ref 136–145)
STRESS TARGET HR: 119 BPM
T&S EXPIRATION DATE: NORMAL
TRIGL SERPL-MCNC: 65 MG/DL (ref 0–150)
TROPONIN T SERPL-MCNC: 0.06 NG/ML (ref 0–0.03)
VLDLC SERPL-MCNC: 14 MG/DL (ref 5–40)
WBC NRBC COR # BLD: 8.64 10*3/MM3 (ref 3.4–10.8)

## 2022-05-05 PROCEDURE — 85018 HEMOGLOBIN: CPT | Performed by: STUDENT IN AN ORGANIZED HEALTH CARE EDUCATION/TRAINING PROGRAM

## 2022-05-05 PROCEDURE — 94664 DEMO&/EVAL PT USE INHALER: CPT

## 2022-05-05 PROCEDURE — 93010 ELECTROCARDIOGRAM REPORT: CPT | Performed by: INTERNAL MEDICINE

## 2022-05-05 PROCEDURE — 94761 N-INVAS EAR/PLS OXIMETRY MLT: CPT

## 2022-05-05 PROCEDURE — 93306 TTE W/DOPPLER COMPLETE: CPT

## 2022-05-05 PROCEDURE — 93005 ELECTROCARDIOGRAM TRACING: CPT

## 2022-05-05 PROCEDURE — 82043 UR ALBUMIN QUANTITATIVE: CPT | Performed by: INTERNAL MEDICINE

## 2022-05-05 PROCEDURE — 85014 HEMATOCRIT: CPT | Performed by: STUDENT IN AN ORGANIZED HEALTH CARE EDUCATION/TRAINING PROGRAM

## 2022-05-05 PROCEDURE — 86923 COMPATIBILITY TEST ELECTRIC: CPT

## 2022-05-05 PROCEDURE — 76775 US EXAM ABDO BACK WALL LIM: CPT

## 2022-05-05 PROCEDURE — 86850 RBC ANTIBODY SCREEN: CPT | Performed by: INTERNAL MEDICINE

## 2022-05-05 PROCEDURE — 94799 UNLISTED PULMONARY SVC/PX: CPT

## 2022-05-05 PROCEDURE — 80053 COMPREHEN METABOLIC PANEL: CPT

## 2022-05-05 PROCEDURE — 86900 BLOOD TYPING SEROLOGIC ABO: CPT

## 2022-05-05 PROCEDURE — 86901 BLOOD TYPING SEROLOGIC RH(D): CPT

## 2022-05-05 PROCEDURE — 99233 SBSQ HOSP IP/OBS HIGH 50: CPT | Performed by: STUDENT IN AN ORGANIZED HEALTH CARE EDUCATION/TRAINING PROGRAM

## 2022-05-05 PROCEDURE — 84484 ASSAY OF TROPONIN QUANT: CPT | Performed by: INTERNAL MEDICINE

## 2022-05-05 PROCEDURE — 93005 ELECTROCARDIOGRAM TRACING: CPT | Performed by: INTERNAL MEDICINE

## 2022-05-05 PROCEDURE — 84156 ASSAY OF PROTEIN URINE: CPT | Performed by: INTERNAL MEDICINE

## 2022-05-05 PROCEDURE — P9016 RBC LEUKOCYTES REDUCED: HCPCS

## 2022-05-05 PROCEDURE — 25010000002 HEPARIN (PORCINE) PER 1000 UNITS: Performed by: STUDENT IN AN ORGANIZED HEALTH CARE EDUCATION/TRAINING PROGRAM

## 2022-05-05 PROCEDURE — 36430 TRANSFUSION BLD/BLD COMPNT: CPT

## 2022-05-05 PROCEDURE — 82962 GLUCOSE BLOOD TEST: CPT

## 2022-05-05 PROCEDURE — 80061 LIPID PANEL: CPT

## 2022-05-05 PROCEDURE — 83735 ASSAY OF MAGNESIUM: CPT

## 2022-05-05 PROCEDURE — 85025 COMPLETE CBC W/AUTO DIFF WBC: CPT

## 2022-05-05 PROCEDURE — 86901 BLOOD TYPING SEROLOGIC RH(D): CPT | Performed by: INTERNAL MEDICINE

## 2022-05-05 PROCEDURE — 82570 ASSAY OF URINE CREATININE: CPT | Performed by: INTERNAL MEDICINE

## 2022-05-05 PROCEDURE — 63710000001 DIPHENHYDRAMINE PER 50 MG: Performed by: INTERNAL MEDICINE

## 2022-05-05 PROCEDURE — 86900 BLOOD TYPING SEROLOGIC ABO: CPT | Performed by: INTERNAL MEDICINE

## 2022-05-05 RX ORDER — CLOPIDOGREL BISULFATE 75 MG/1
75 TABLET ORAL DAILY
Status: DISCONTINUED | OUTPATIENT
Start: 2022-05-05 | End: 2022-05-12

## 2022-05-05 RX ORDER — ACETAMINOPHEN 650 MG/1
650 SUPPOSITORY RECTAL ONCE
Status: COMPLETED | OUTPATIENT
Start: 2022-05-05 | End: 2022-05-05

## 2022-05-05 RX ORDER — ACETAMINOPHEN 325 MG/1
650 TABLET ORAL ONCE
Status: COMPLETED | OUTPATIENT
Start: 2022-05-05 | End: 2022-05-05

## 2022-05-05 RX ORDER — SODIUM CHLORIDE 9 MG/ML
75 INJECTION, SOLUTION INTRAVENOUS CONTINUOUS
Status: DISCONTINUED | OUTPATIENT
Start: 2022-05-05 | End: 2022-05-05

## 2022-05-05 RX ORDER — ACETAMINOPHEN 160 MG/5ML
650 SOLUTION ORAL ONCE
Status: COMPLETED | OUTPATIENT
Start: 2022-05-05 | End: 2022-05-05

## 2022-05-05 RX ORDER — HYDRALAZINE HYDROCHLORIDE 50 MG/1
50 TABLET, FILM COATED ORAL 3 TIMES DAILY
Status: DISCONTINUED | OUTPATIENT
Start: 2022-05-05 | End: 2022-05-07

## 2022-05-05 RX ORDER — DIPHENHYDRAMINE HCL 25 MG
25 CAPSULE ORAL ONCE
Status: COMPLETED | OUTPATIENT
Start: 2022-05-05 | End: 2022-05-05

## 2022-05-05 RX ORDER — DIPHENHYDRAMINE HYDROCHLORIDE 50 MG/ML
25 INJECTION INTRAMUSCULAR; INTRAVENOUS ONCE
Status: COMPLETED | OUTPATIENT
Start: 2022-05-05 | End: 2022-05-05

## 2022-05-05 RX ADMIN — HYDRALAZINE HYDROCHLORIDE 50 MG: 50 TABLET, FILM COATED ORAL at 21:04

## 2022-05-05 RX ADMIN — ALBUTEROL SULFATE 2.5 MG: 2.5 SOLUTION RESPIRATORY (INHALATION) at 12:39

## 2022-05-05 RX ADMIN — ALBUTEROL SULFATE 2.5 MG: 2.5 SOLUTION RESPIRATORY (INHALATION) at 06:13

## 2022-05-05 RX ADMIN — PAROXETINE HYDROCHLORIDE 10 MG: 20 TABLET, FILM COATED ORAL at 06:00

## 2022-05-05 RX ADMIN — ACETAMINOPHEN 650 MG: 325 TABLET ORAL at 09:03

## 2022-05-05 RX ADMIN — SODIUM BICARBONATE 75 MEQ: 84 INJECTION, SOLUTION INTRAVENOUS at 22:26

## 2022-05-05 RX ADMIN — Medication 10 ML: at 21:05

## 2022-05-05 RX ADMIN — Medication 10 ML: at 09:04

## 2022-05-05 RX ADMIN — SODIUM BICARBONATE 75 MEQ: 84 INJECTION, SOLUTION INTRAVENOUS at 10:27

## 2022-05-05 RX ADMIN — HYDRALAZINE HYDROCHLORIDE 25 MG: 25 TABLET, FILM COATED ORAL at 15:41

## 2022-05-05 RX ADMIN — HEPARIN SODIUM 5000 UNITS: 5000 INJECTION INTRAVENOUS; SUBCUTANEOUS at 21:05

## 2022-05-05 RX ADMIN — HEPARIN SODIUM 5000 UNITS: 5000 INJECTION INTRAVENOUS; SUBCUTANEOUS at 13:40

## 2022-05-05 RX ADMIN — SODIUM CHLORIDE 5 MG/HR: 9 INJECTION, SOLUTION INTRAVENOUS at 22:54

## 2022-05-05 RX ADMIN — CLOPIDOGREL 75 MG: 75 TABLET, FILM COATED ORAL at 18:32

## 2022-05-05 RX ADMIN — ALBUTEROL SULFATE 2.5 MG: 2.5 SOLUTION RESPIRATORY (INHALATION) at 18:52

## 2022-05-05 RX ADMIN — HEPARIN SODIUM 5000 UNITS: 5000 INJECTION INTRAVENOUS; SUBCUTANEOUS at 05:47

## 2022-05-05 RX ADMIN — HYDRALAZINE HYDROCHLORIDE 25 MG: 25 TABLET, FILM COATED ORAL at 09:02

## 2022-05-05 RX ADMIN — AMLODIPINE BESYLATE 10 MG: 10 TABLET ORAL at 21:04

## 2022-05-05 RX ADMIN — ATORVASTATIN CALCIUM 80 MG: 40 TABLET, FILM COATED ORAL at 21:04

## 2022-05-05 RX ADMIN — DIPHENHYDRAMINE HYDROCHLORIDE 25 MG: 25 CAPSULE ORAL at 09:02

## 2022-05-05 NOTE — PROGRESS NOTES
Patient Identification:  Name:  Sean Chen  Age:  81 y.o.  Sex:  female  :  1940  MRN:  [id number]  Visit Number:  [id number]  Primary Care Provider:  Ganga Gallardo MD    Length of stay:  1    Subjective/Interval History/Consultants/Procedures     Chief complaint: Abnormal lab    Subjective:  Today, the patient was sleeping upon initial entry into room, however, patient was easily aroused. She remains disoriented to place, time, and situation. She is oriented to self. She denies any chest pain, dizziness, palpitations, nausea, vomiting, diarrhea, constipation. She does report some mild shortness of breath on exertion while transferring from bed to bedside commode and back, however she states SOA improves with rest. She remains on room air. The patient does not voice any new concerns or needs today.     Discussed with AM RN Kavitha with some acute events overnight. Patient received 1 dose of IV Atropine for bradycardia with a rate in the low 30's. Patient is still sinus arnav 30-40's, but RN reports rate is mostly 40's today. Patient's hemoglobin also decreased to 5.8, and planned to transfuse 1 unit PRBC's however nursing staff was unable to reach patient's son for consent for blood products until later this AM. Patient receiving 1 unit PRBC's during my exam. Room location at the time of evaluation was [facility].  ----------------------------------------------------------------------------------------------------------------------  Current Hospital Meds:  albuterol, 2.5 mg, Nebulization, 4x Daily - RT  amLODIPine, 10 mg, Oral, Nightly  atorvastatin, 80 mg, Oral, Nightly  heparin (porcine), 5,000 Units, Subcutaneous, Q8H  hydrALAZINE, 25 mg, Oral, TID  PARoxetine, 10 mg, Oral, QAM  sodium chloride, 10 mL, Intravenous, Q12H      niCARdipine, 5-15 mg/hr, Last Rate: Stopped (22 0548)  sodium bicarbonate drip less than/equal to 75 mEq/bag, 75 mEq, Last Rate: 75 mEq (22  1027)      ----------------------------------------------------------------------------------------------------------------------      Objective     Vital Signs:  Temp:  [97.2 °F (36.2 °C)-97.9 °F (36.6 °C)] 97.9 °F (36.6 °C)  Heart Rate:  [32-52] 46  Resp:  [10-22] 12  BP: (111-205)/() 166/52      05/04/22  1407 05/04/22  1815 05/05/22  0503   Weight: 63.5 kg (140 lb) 64.2 kg (141 lb 9.6 oz) 67.9 kg (149 lb 11.2 oz)     Body mass index is 24.91 kg/m².    Intake/Output Summary (Last 24 hours) at 5/5/2022 1505  Last data filed at 5/5/2022 1303  Gross per 24 hour   Intake 1566.16 ml   Output 330 ml   Net 1236.16 ml     I/O this shift:  In: 891.2 [P.O.:600; Blood:291.2]  Out: -   Diet Regular; Cardiac  ----------------------------------------------------------------------------------------------------------------------    Physical Exam  Vitals and nursing note reviewed.   Constitutional:       General: She is not in acute distress.  HENT:      Mouth/Throat:      Mouth: Mucous membranes are moist.      Pharynx: Oropharynx is clear.   Eyes:      Pupils: Pupils are equal, round, and reactive to light.   Cardiovascular:      Rate and Rhythm: Regular rhythm. Bradycardia present.      Pulses: Normal pulses.           Radial pulses are 2+ on the right side and 2+ on the left side.        Dorsalis pedis pulses are 2+ on the right side and 2+ on the left side.      Heart sounds: Murmur heard.    Systolic murmur is present with a grade of 3/6.    No friction rub.   Pulmonary:      Effort: Pulmonary effort is normal. No respiratory distress.      Breath sounds: Normal breath sounds. No wheezing or rhonchi.   Abdominal:      General: Abdomen is flat. Bowel sounds are normal.      Palpations: Abdomen is soft. There is no mass.      Tenderness: There is no abdominal tenderness.   Musculoskeletal:         General: No swelling. Normal range of motion.      Cervical back: Normal range of motion and neck supple.      Right lower  leg: No edema.      Left lower leg: No edema.   Skin:     General: Skin is warm and dry.      Capillary Refill: Capillary refill takes 2 to 3 seconds.      Coloration: Skin is pale.   Neurological:      Mental Status: She is alert. Mental status is at baseline. She is disoriented.   Psychiatric:         Mood and Affect: Mood normal.         Behavior: Behavior normal.       ----------------------------------------------------------------------------------------------------------------------  Tele:  Sinus arnav 37 upon my exam.   ----------------------------------------------------------------------------------------------------------------------  Results from last 7 days   Lab Units 05/04/22  2019 05/04/22  1716 05/04/22  1504   TROPONIN T ng/mL 0.058* 0.084* 0.095*   PROBNP pg/mL  --   --  29,931.0*     Results from last 7 days   Lab Units 05/05/22  0732 05/05/22  0222 05/04/22  1504 04/29/22  1230   CRP mg/dL  --   --  0.63*  --    WBC 10*3/mm3  --  8.64 10.40 10.69   HEMOGLOBIN g/dL 7.1* 5.8* 8.4* 7.6*   HEMATOCRIT % 22.5* 17.6* 25.3* 24.1*   MCV fL  --  87.6 87.2 90.3   MCHC g/dL  --  33.0 33.2 31.5   PLATELETS 10*3/mm3  --  250 339 352   INR   --   --  0.96  --          Results from last 7 days   Lab Units 05/05/22  0106 05/04/22  1504 04/29/22  1230   SODIUM mmol/L 135* 133* 141   POTASSIUM mmol/L 5.0 5.3* 5.8*   MAGNESIUM mg/dL 1.8 1.6 1.7   CHLORIDE mmol/L 109* 106 110*   TOTAL CO2 mmol/L  --   --  13.4*   CO2 mmol/L 12.7* 13.4*  --    BUN mg/dL 67* 70* 73*   CREATININE mg/dL 5.67* 5.36* 5.33*   CALCIUM mg/dL 8.1* 8.7 8.2*   GLUCOSE mg/dL 140* 112* 128*   ALBUMIN g/dL 2.50* 3.69 3.60   BILIRUBIN mg/dL 0.2 0.4 0.3   ALK PHOS U/L 75 105 94   AST (SGOT) U/L 9 13 15   ALT (SGPT) U/L 5 8 12   Estimated Creatinine Clearance: 8.5 mL/min (A) (by C-G formula based on SCr of 5.67 mg/dL (H)).  No results found for: AMMONIA  Results from last 7 days   Lab Units 05/05/22  0106   CHOLESTEROL mg/dL 98   TRIGLYCERIDES mg/dL  65   HDL CHOL mg/dL 50   LDL CHOL mg/dL 34       Urine Culture   Date Value Ref Range Status   05/04/2022 No growth  Preliminary     ----------------------------------------------------------------------------------------------------------------------  Imaging Results (Last 24 Hours)     Procedure Component Value Units Date/Time    XR Chest 1 View [[id number]] Collected: 05/04/22 1611     Updated: 05/04/22 1613    Narrative:      EXAM:    XR Chest, 1 View     EXAM DATE:    5/4/2022 2:43 PM     CLINICAL HISTORY:    abnormal labs     TECHNIQUE:    Frontal view of the chest.     COMPARISON:    No relevant prior studies available.     FINDINGS:    Lungs:  Unremarkable.  No consolidation.    Pleural space:  Unremarkable.  No pneumothorax.    Heart:  Unremarkable.  No cardiomegaly.    Mediastinum:  Unremarkable.    Bones/joints:  Degenerative changes bilateral shoulders.       Impression:        No acute cardiopulmonary findings identified.     This report was finalized on 5/4/2022 4:11 PM by Dr. Mingo Cardenas MD.           ----------------------------------------------------------------------------------------------------------------------   I have reviewed the above laboratory values for 05/05/22    Assessment/Plan     Active Hospital Problems    Diagnosis  POA   • **Hypertensive urgency [I16.0]  Yes   • Dementia without behavioral disturbance (HCC) [F03.90]  Yes   • Bradycardia [R00.1]  Yes   • Mixed hyperlipidemia [E78.2]  Yes   • Essential hypertension [I10]  Yes   • Type 2 diabetes mellitus without complication, without long-term current use of insulin (HCC) [E11.9]  Yes           ASSESSMENT/PLAN:  -Reports recent chest pain with typical and atypical features, POA  -Possible NSTEMI with troponin elevation, POA  -Systolic murmur heard best at apex without known or documented hx of significant valvular disease, POA  -Hypomagnesemia, POA  -Troponin 0.095 initially in ED; trended down to 0.084, then 0.058.   -Patient  denies chest pain upon my exam today.   -Heparin 5,000 units subcutaneous Q 12 hours for DVT prophylaxis.  -Loading dose of ASA administered.  -Lipitor 80 mg Nightly.  -EKG obtained this AM revealed no significant change from previous; sinus bradycardia, rate 43. Confirmed by Dr. Franco.   -TTE revealed mild left ventricular hypertrophy, grade 2 left ventricular diastolic dysfunction, mild aortic valve stenosis, mild mitral regurg, mild tricuspid regurg, severe pulmonary hypertension with estimated right ventricular systolic pressure > 55 mmHg, and small left ventricular pericardial effusion.  -Magnesium 1.8 after replacement with 1g IV mag sulfate; repeat magnesium level in AM.  -Awaiting inpatient evaluation by cardiology, input greatly appreciated.      -Hypertensive urgency, POA  -/70 initially; during ED course, SBP ranging from 190-210.   -Clonidine and hydralazine given in ED with some decline in BP noted.  -Continue PO Hydralazine 25 mg TID.  -Continue PO Norvasc 10 mg Nightly.  -Avoid medications that may worsen bradycardia.  -Goal is to place patient on BP medications that may be taken daily or BID vs. TID due to forgetfulness/compliance.  -BP more controlled today with systolic pressures ranging from 110-190's. Continuing Cardene drip with holding parameters.        -Acute kidney injury on chronic renal failure, stage IIIb, POA  -Hyperkalemia, POA  -Elevated proBNP 29,931 POA  -Creatinine 5.36 initially; baseline creatinine appears to be around 2.9-3.3.  -Creatinine worsened to 5.67 today.  -Hold nephrotoxins as able.  -Strict I/O; although proBNP elevated, chest xray reveals no acute cardiopulmonary findings.  -K+ 5.3 initially in ED; K+ 5.8 on 04/29/2022 with labs from PCP; K+ trended down to 5.0 this AM.  -Continuous cardiac monitoring.  -CMP in AM to closely monitor renal function.  -PRITI possibly related to bradycardia and/or persistent uncontrolled HTN.  -Nephrology following; input greatly  appreciated.  -Continuous sodium bicarb gtt initiated per nephrology.  -Renal ultrasound as well as urine protein/creatinine ratio and urine microalbumin/creatinine ratio ordered.      -Bradycardia, POA  -SB 40's during ED course with right BBB noted on EKG.  -Continuous cardiac monitoring.  -VS per hospital policy.   -Patient had been recently referred to cardiology by PCP for symptomatic bradycardia, however patient is unable to relay details due to memory deficits/cognitive decline.   -Records requested by Dr. Tomas.   -Patient received 1 dose of IV atropine overnight due to sustained bradycardia in the low 30's; HR improved slightly afterwards to upper 30-40's.    -Inpatient cardiology consult placed; input and evaluation appreciated.      -Progressive dementia, POA  -Supportive care; provide reorientation.  -Bed alarm/fall precautions.  -Aspiration precautions.  -Case management following for discharge planning.     -Non-insulin dependent type II DM  -Accu checks AC and HS.  -Will initiate low-dose SS insulin coverage if needed.   -Hemoglobin A1C 5.5 on 04/29/2022 indicating adequate control of BG recently.  -Glucose 112 upon initial labs in ED.  -Hypoglycemia protocol.   -BG stable and well-controlled today, ranging from 120-60's.      -Chronic normocytic anemia  -Hemoglobin initially 8.4 in ED; improved from 7.6 on (04/29).  -Still no signs of active bleeding; patient denies hematuria, rectal bleeding, coffee-ground emesis.  -Hemoglobin 5.8 overnight with repeat Hgb 7.1 early this AM.   -Orders were placed to transfuse 1 unit PRBC's, however, nursing staff was unable to get telephone consent from patient's son until 0900 today.   -1 unit PRBC's infusing during my exam.   -Recheck hemoglobin after blood transfusion.  -CBC in AM to closely monitor.         ----------  -DVT prophylaxis: heparin 5,000 units subcutaneous Q 12 hours   -Activity: up ad tiffani/fall precautions.  -Expected length of stay:   INPATIENT  status due to the need for care which can only be reasonably provided in an hospital setting such as aggressive/expedited ancillary services and/or consultation services, the necessity for IV medications, close physician monitoring and/or the possible need for procedures.  In such, I feel patient's risk for adverse outcomes and need for care warrant INPATIENT evaluation and predict the patient's care encounter to likely last beyond 2 midnights.  -Disposition: pending clinical course.      CODE STATUS: FULL     High risk secondary to hypertensive urgency, bradycardia, possible NSTEMI with troponin elevation and recent chest pain w/ typical and atypical features, acute anemia, acute on chronic renal failure stage IIIb, hyperkalemia, progressive dementia, and advanced age.         ELGIN Eaton  05/05/22  15:05 EDT  Pager #[phone]

## 2022-05-05 NOTE — NURSING NOTE
Called patient's son, who is  listed, for consent for blood transfusion.  I left message because he did not answer.  I then called patient's home number to see if there was anyone there. Patient's  answered phone but would not give me consent to give transfusion.  Patient's  kept instructing me to call their son.  I explained that I couldn't reach the son and he gave me the son's wife number ([phone]) and when I called that number it went straight to voicemail as well.  I called son's number 3 more times and lead Renay tried the son's number as well.  Notified dr Paris at [phone] and explained situation.  MD said to keep trying son's number and she would inform Dr Tomas this AM.

## 2022-05-05 NOTE — PLAN OF CARE
Problem: Adult Inpatient Plan of Care  Goal: Plan of Care Review  Outcome: Ongoing, Progressing  Flowsheets (Taken 5/5/2022 1425)  Progress: improving  Outcome Evaluation: Patient resting in bed. Recieved 1 unit of PRBC today. Remains confused. No needs at this time. Will monitor 7a-7p.  Goal: Patient-Specific Goal (Individualized)  Outcome: Ongoing, Progressing  Goal: Absence of Hospital-Acquired Illness or Injury  Outcome: Ongoing, Progressing  Intervention: Identify and Manage Fall Risk  Recent Flowsheet Documentation  Taken 5/5/2022 1422 by Kavitha Red RN  Safety Promotion/Fall Prevention: safety round/check completed  Taken 5/5/2022 1300 by Kavitha Red RN  Safety Promotion/Fall Prevention: safety round/check completed  Taken 5/5/2022 1100 by Kavitha Red RN  Safety Promotion/Fall Prevention: safety round/check completed  Taken 5/5/2022 0900 by Kavitha Red RN  Safety Promotion/Fall Prevention: safety round/check completed  Taken 5/5/2022 0830 by Kavitha Red RN  Safety Promotion/Fall Prevention: safety round/check completed  Taken 5/5/2022 0700 by Kavitha Red RN  Safety Promotion/Fall Prevention: safety round/check completed  Intervention: Prevent Skin Injury  Recent Flowsheet Documentation  Taken 5/5/2022 1422 by Kavitha Red RN  Skin Protection: adhesive use limited  Taken 5/5/2022 0830 by Kavitha eRd RN  Skin Protection: adhesive use limited  Intervention: Prevent and Manage VTE (Venous Thromboembolism) Risk  Recent Flowsheet Documentation  Taken 5/5/2022 1422 by Kavitha Red RN  Activity Management: activity adjusted per tolerance  VTE Prevention/Management: (heparin) other (see comments)  Taken 5/5/2022 0830 by Kavitha Red RN  Activity Management: activity adjusted per tolerance  VTE Prevention/Management: (heparin) other (see comments)  Intervention: Prevent Infection  Recent Flowsheet Documentation  Taken 5/5/2022 1422 by Kavitha Red RN  Infection Prevention: rest/sleep  Multiple abscesses c/w Berna's gangrene, s/p I&D by urology on 1/5. Cultures growing ampicillin-resistant Ecoli and C albicans. Repeat CT A/P 1/13 w/ 4cm fluid collection c/f persistent abscess which was drained bedside by urology  - urology following  - cont zosyn (re-started for HCAP)  - further ID recs pending  - repeat CT pelvis.  Consulted IR for drainage of residual fluid  collection on CT pelvic. IR procedure is not done,duo to high risk for complications,will continue with IV Abx and repeat CT in AM.         promoted  Taken 5/5/2022 1300 by Kavitha Red RN  Infection Prevention: rest/sleep promoted  Taken 5/5/2022 1100 by Kavitha Red RN  Infection Prevention: rest/sleep promoted  Taken 5/5/2022 0900 by Kavitha Red RN  Infection Prevention: rest/sleep promoted  Taken 5/5/2022 0830 by Kavitha Red RN  Infection Prevention: rest/sleep promoted  Taken 5/5/2022 0700 by Kavitha Red RN  Infection Prevention:   rest/sleep promoted   single patient room provided  Goal: Optimal Comfort and Wellbeing  Outcome: Ongoing, Progressing  Intervention: Provide Person-Centered Care  Recent Flowsheet Documentation  Taken 5/5/2022 1422 by Kavitha Red RN  Trust Relationship/Rapport:   care explained   choices provided  Taken 5/5/2022 0830 by Kavitha Red RN  Trust Relationship/Rapport:   care explained   choices provided  Goal: Readiness for Transition of Care  Outcome: Ongoing, Progressing     Problem: Fall Injury Risk  Goal: Absence of Fall and Fall-Related Injury  Outcome: Ongoing, Progressing  Intervention: Identify and Manage Contributors  Recent Flowsheet Documentation  Taken 5/5/2022 1422 by Kavitha Red RN  Medication Review/Management: medications reviewed  Self-Care Promotion:   independence encouraged   BADL personal objects within reach   BADL personal routines maintained   safe use of adaptive equipment encouraged  Taken 5/5/2022 1300 by Kavitha Red RN  Medication Review/Management: medications reviewed  Taken 5/5/2022 1100 by Kavitha Red RN  Medication Review/Management: medications reviewed  Taken 5/5/2022 0900 by Kavitha Red RN  Medication Review/Management: medications reviewed  Taken 5/5/2022 0830 by Kavitha Red RN  Medication Review/Management: medications reviewed  Self-Care Promotion:   independence encouraged   BADL personal objects within reach   BADL personal routines maintained   safe use of adaptive equipment encouraged  Taken 5/5/2022 0700 by Kavitha Red RN  Medication  Review/Management: medications reviewed  Intervention: Promote Injury-Free Environment  Recent Flowsheet Documentation  Taken 5/5/2022 1422 by Kavitha Red RN  Safety Promotion/Fall Prevention: safety round/check completed  Taken 5/5/2022 1300 by Kavitha Red RN  Safety Promotion/Fall Prevention: safety round/check completed  Taken 5/5/2022 1100 by Kavitha Red RN  Safety Promotion/Fall Prevention: safety round/check completed  Taken 5/5/2022 0900 by Kavitha Red RN  Safety Promotion/Fall Prevention: safety round/check completed  Taken 5/5/2022 0830 by Kavitha Red RN  Safety Promotion/Fall Prevention: safety round/check completed  Taken 5/5/2022 0700 by Kavitha Red RN  Safety Promotion/Fall Prevention: safety round/check completed     Problem: Diabetes Comorbidity  Goal: Blood Glucose Level Within Targeted Range  Outcome: Ongoing, Progressing  Intervention: Monitor and Manage Glycemia  Recent Flowsheet Documentation  Taken 5/5/2022 1422 by Kavitha Red RN  Glycemic Management: blood glucose monitored  Taken 5/5/2022 0830 by Kavitha Red RN  Glycemic Management: blood glucose monitored     Problem: Heart Failure Comorbidity  Goal: Maintenance of Heart Failure Symptom Control  Outcome: Ongoing, Progressing  Intervention: Maintain Heart Failure-Management  Recent Flowsheet Documentation  Taken 5/5/2022 1422 by Kavitha Red RN  Medication Review/Management: medications reviewed  Taken 5/5/2022 1300 by Kavitha Red RN  Medication Review/Management: medications reviewed  Taken 5/5/2022 1100 by Kavitha Red RN  Medication Review/Management: medications reviewed  Taken 5/5/2022 0900 by Kavitha Red RN  Medication Review/Management: medications reviewed  Taken 5/5/2022 0830 by Kavitha Red RN  Medication Review/Management: medications reviewed  Taken 5/5/2022 0700 by Kavitha Red RN  Medication Review/Management: medications reviewed     Problem: Hypertension Comorbidity  Goal: Blood Pressure in  Desired Range  Outcome: Ongoing, Progressing  Intervention: Maintain Blood Pressure Management  Recent Flowsheet Documentation  Taken 5/5/2022 1422 by Kavitha Red RN  Medication Review/Management: medications reviewed  Taken 5/5/2022 1300 by Kavitha Red RN  Medication Review/Management: medications reviewed  Taken 5/5/2022 1100 by Kavitha Red RN  Medication Review/Management: medications reviewed  Taken 5/5/2022 0900 by Kavitha Red RN  Medication Review/Management: medications reviewed  Taken 5/5/2022 0830 by Kavitha Red RN  Medication Review/Management: medications reviewed  Taken 5/5/2022 0700 by Kavitha Red RN  Medication Review/Management: medications reviewed     Problem: Skin Injury Risk Increased  Goal: Skin Health and Integrity  Outcome: Ongoing, Progressing  Intervention: Promote and Optimize Oral Intake  Recent Flowsheet Documentation  Taken 5/5/2022 1422 by Kavitha Red RN  Oral Nutrition Promotion: rest periods promoted  Taken 5/5/2022 0830 by Kavitha Red RN  Oral Nutrition Promotion: rest periods promoted  Intervention: Optimize Skin Protection  Recent Flowsheet Documentation  Taken 5/5/2022 1422 by Kavitha Red RN  Pressure Reduction Techniques: frequent weight shift encouraged  Pressure Reduction Devices: pressure-redistributing mattress utilized  Skin Protection: adhesive use limited  Taken 5/5/2022 0830 by Kavitha Red RN  Pressure Reduction Techniques: frequent weight shift encouraged  Pressure Reduction Devices: pressure-redistributing mattress utilized  Skin Protection: adhesive use limited   Goal Outcome Evaluation:           Progress: improving  Outcome Evaluation: Patient resting in bed. Recieved 1 unit of PRBC today. Remains confused. No needs at this time. Will monitor 7a-7p.

## 2022-05-05 NOTE — CASE MANAGEMENT/SOCIAL WORK
Discharge Planning Assessment  [facility]     Patient Name: Sean Chen  MRN: [id number]  Today's Date: 5/5/2022    Admit Date: 5/4/2022     Discharge Needs Assessment     Row Name 05/05/22 1257       Living Environment    People in Home spouse    Name(s) of People in Home Dylon Chen-also live next door to son    Current Living Arrangements home    Primary Care Provided by self    Provides Primary Care For no one    Family Caregiver if Needed child(thomas), adult    Quality of Family Relationships helpful;involved;supportive    Able to Return to Prior Arrangements yes       Resource/Environmental Concerns    Resource/Environmental Concerns none    Transportation Concerns none       Transition Planning    Patient/Family Anticipates Transition to home with family    Patient/Family Anticipated Services at Transition none    Transportation Anticipated family or friend will provide       Discharge Needs Assessment    Equipment Currently Used at Home glucometer    Anticipated Changes Related to Illness none    Equipment Needed After Discharge none               Discharge Plan     Row Name 05/05/22 3991       Plan    Plan Pt lives at home with spouse, they live next door to their son. Pt does not utilize home health services at this time. Pt has a glucometer. PCP is Ganga Gallardo. Pt does not have a POA or living will on file. Pt's family to provide transportation home at discharge, pending improvement. SS to follow and assist.    Patient/Family in Agreement with Plan yes    Row Name 05/05/22 3879       Plan    Plan Comments Presents with abn lab, N/V. Admit to PCU, RA sat 100%, T 97.9.  on Cardene gtt, IVF's. PO BP meds, Cards cslt for chest pain, ^ Trop, (pending). Cslt Neph for PRITI/CKD, (pending). Hgb 5.7, 1 u PRBC's. BUN 67, Cr 5.67,               Demographic Summary     Row Name 05/05/22 1250       General Information    Referral Source nursing    Reason for Consult --  dc planning              Sonia Beck,  MSW

## 2022-05-05 NOTE — NURSING NOTE
Spoke with dr Paris about HR 35-39 for last hour when patient is trying to sleep now. Asked about amlodipine dose tonight, MD said okay to give. Asked about mag sulfate and hydralazine doses tonight if okay to give and MD said both were okay to give.  Reported recent trop level 0.058.

## 2022-05-05 NOTE — CONSULTS
Nephrology Consult Note    Referring Provider: Shaw Tomas  Reason for Consultation: PRITI    Subjective       History of present illness:  Sean Chen is a 81 y.o. female who presented to [facility] emergency department with chief complaint of worsening renal functions and was told by her PCP to come to ED.   Pt is known to have essential hypertension, type 2 diabetes mellitis without long-term or current use of insulin, mixed hyperlipidemia, progressive dementia without behavioral disturbance, iron deficiency anemia, vitamin D deficiency, osteoarthritis, Ménière's disease, chronic seasonal allergic rhinitis. Pt has was referred to our service and was seen in Feb with Cr >3.0, and urgent workup was ordered but patient no showed. She is now admitted with Cr 5.3  Pt denied any history of Chronic NSAIDS use. Patient denies hematuria, dysuria, difficulty passing urine. No prior history of renal stones. No family history of renal disease    History  Past Medical History:   Diagnosis Date   • Allergic rhinitis    • Elevated liver enzymes    • Extrinsic asthma    • Gastritis    • GERD (gastroesophageal reflux disease)    • Hyperlipidemia    • Hypertension    • Iron deficiency anemia    • Meniere's disease    • Osteoarthritis    • Type 2 diabetes mellitus (HCC)    • Vitamin D deficiency disease    ,   Past Surgical History:   Procedure Laterality Date   • APPENDECTOMY     • CHOLECYSTECTOMY     • HYSTERECTOMY     , No family history on file.,   Social History     Tobacco Use   • Smoking status: Never Smoker   • Smokeless tobacco: Never Used   Substance Use Topics   • Alcohol use: No   • Drug use: No   ,   Medications Prior to Admission   Medication Sig Dispense Refill Last Dose   • albuterol sulfate HFA (ProAir HFA) 108 (90 Base) MCG/ACT inhaler Inhale 2 puffs 4 (Four) Times a Day. 8.5 g 5 5/3/2022 at Unknown time   • amLODIPine (NORVASC) 10 MG tablet Take 1 tablet by mouth Every Night. 30 tablet 5 5/3/2022  at Unknown time   • atorvastatin (LIPITOR) 80 MG tablet Take 1 tablet by mouth Every Night. 30 tablet 5 5/3/2022 at Unknown time   • hydrALAZINE (APRESOLINE) 25 MG tablet Take 1 tablet by mouth 3 (Three) Times a Day. 90 tablet 5 5/3/2022 at Unknown time   • PARoxetine (PAXIL) 10 MG tablet Take 1 tablet by mouth Every Morning. 30 tablet 5 5/3/2022 at Unknown time   , Scheduled Meds:  acetaminophen, 650 mg, Oral, Once   Or  acetaminophen, 650 mg, Oral, Once   Or  acetaminophen, 650 mg, Rectal, Once  albuterol, 2.5 mg, Nebulization, 4x Daily - RT  amLODIPine, 10 mg, Oral, Nightly  atorvastatin, 80 mg, Oral, Nightly  diphenhydrAMINE, 25 mg, Oral, Once   Or  diphenhydrAMINE, 25 mg, Intravenous, Once  heparin (porcine), 5,000 Units, Subcutaneous, Q8H  hydrALAZINE, 25 mg, Oral, TID  PARoxetine, 10 mg, Oral, QAM  sodium chloride, 10 mL, Intravenous, Q12H    , Continuous Infusions:  niCARdipine, 5-15 mg/hr, Last Rate: Stopped (05/05/22 0548)  sodium chloride, 75 mL/hr    , PRN Meds:  dextrose  •  dextrose  •  glucagon (human recombinant)  •  [COMPLETED] Insert peripheral IV **AND** sodium chloride  •  sodium chloride and Allergies:  Keflex [cephalexin], Lodine [etodolac], Penicillins, and Sulfa antibiotics    Review of Systems  More than 10 point review of systems was done. Pertinent items are noted in HPI, all other systems reviewed and negative    Objective     Vital Signs  Temp:  [97.2 °F (36.2 °C)-97.8 °F (36.6 °C)] 97.8 °F (36.6 °C)  Heart Rate:  [32-52] 41  Resp:  [10-21] 10  BP: (123-218)/() 144/49    I/O this shift:  In: 360 [P.O.:360]  Out: -   I/O last 3 completed shifts:  In: 675 [P.O.:500; I.V.:175]  Out: 330 [Urine:330]    Physical Examination:  General Appearance: not in acute distress  no JVD  Lungs: Clear to auscultation, respirations regular and unlabored  Heart: Regular rhythm & normal rate, normal S1, S2, no murmur, no gallop, no rub   Abdomen: Normal bowel sounds, no masses and soft  non-tender  Extremities:no edema  Neurologic: Orientated to person, place, time, grossly no focal deficitis    Laboratory Data :      WBC WBC   Date Value Ref Range Status   05/05/2022 8.64 3.40 - 10.80 10*3/mm3 Final   05/04/2022 10.40 3.40 - 10.80 10*3/mm3 Final      HGB Hemoglobin   Date Value Ref Range Status   05/05/2022 5.8 (C) 12.0 - 15.9 g/dL Final   05/04/2022 8.4 (L) 12.0 - 15.9 g/dL Final      HCT Hematocrit   Date Value Ref Range Status   05/05/2022 17.6 (C) 34.0 - 46.6 % Final   05/04/2022 25.3 (L) 34.0 - 46.6 % Final      Platlets No results found for: LABPLAT   MCV MCV   Date Value Ref Range Status   05/05/2022 87.6 79.0 - 97.0 fL Final   05/04/2022 87.2 79.0 - 97.0 fL Final          Sodium Sodium   Date Value Ref Range Status   05/05/2022 135 (L) 136 - 145 mmol/L Final   05/04/2022 133 (L) 136 - 145 mmol/L Final      Potassium Potassium   Date Value Ref Range Status   05/05/2022 5.0 3.5 - 5.2 mmol/L Final   05/04/2022 5.3 (H) 3.5 - 5.2 mmol/L Final      Chloride Chloride   Date Value Ref Range Status   05/05/2022 109 (H) 98 - 107 mmol/L Final   05/04/2022 106 98 - 107 mmol/L Final      CO2 CO2   Date Value Ref Range Status   05/05/2022 12.7 (L) 22.0 - 29.0 mmol/L Final   05/04/2022 13.4 (L) 22.0 - 29.0 mmol/L Final      BUN BUN   Date Value Ref Range Status   05/05/2022 67 (H) 8 - 23 mg/dL Final   05/04/2022 70 (H) 8 - 23 mg/dL Final      Creatinine Creatinine   Date Value Ref Range Status   05/05/2022 5.67 (H) 0.57 - 1.00 mg/dL Final   05/04/2022 5.36 (H) 0.57 - 1.00 mg/dL Final      Calcium Calcium   Date Value Ref Range Status   05/05/2022 8.1 (L) 8.6 - 10.5 mg/dL Final   05/04/2022 8.7 8.6 - 10.5 mg/dL Final      PO4 No results found for: CAPO4   Albumin Albumin   Date Value Ref Range Status   05/05/2022 2.50 (L) 3.50 - 5.20 g/dL Final   05/04/2022 3.69 3.50 - 5.20 g/dL Final      Magnesium Magnesium   Date Value Ref Range Status   05/05/2022 1.8 1.6 - 2.4 mg/dL Final   05/04/2022 1.6 1.6 - 2.4  mg/dL Final      Uric Acid No results found for: URICACID     Radiology results :     Imaging Results (Last 72 Hours)     Procedure Component Value Units Date/Time    XR Chest 1 View [[id number]] Collected: 05/04/22 1611     Updated: 05/04/22 1613    Narrative:      EXAM:    XR Chest, 1 View     EXAM DATE:    5/4/2022 2:43 PM     CLINICAL HISTORY:    abnormal labs     TECHNIQUE:    Frontal view of the chest.     COMPARISON:    No relevant prior studies available.     FINDINGS:    Lungs:  Unremarkable.  No consolidation.    Pleural space:  Unremarkable.  No pneumothorax.    Heart:  Unremarkable.  No cardiomegaly.    Mediastinum:  Unremarkable.    Bones/joints:  Degenerative changes bilateral shoulders.       Impression:        No acute cardiopulmonary findings identified.     This report was finalized on 5/4/2022 4:11 PM by Dr. Mingo Cardenas MD.               Medications:      acetaminophen, 650 mg, Oral, Once   Or  acetaminophen, 650 mg, Oral, Once   Or  acetaminophen, 650 mg, Rectal, Once  albuterol, 2.5 mg, Nebulization, 4x Daily - RT  amLODIPine, 10 mg, Oral, Nightly  atorvastatin, 80 mg, Oral, Nightly  diphenhydrAMINE, 25 mg, Oral, Once   Or  diphenhydrAMINE, 25 mg, Intravenous, Once  heparin (porcine), 5,000 Units, Subcutaneous, Q8H  hydrALAZINE, 25 mg, Oral, TID  PARoxetine, 10 mg, Oral, QAM  sodium chloride, 10 mL, Intravenous, Q12H      niCARdipine, 5-15 mg/hr, Last Rate: Stopped (05/05/22 0548)  sodium chloride, 75 mL/hr        Assessment/Plan       Hypertensive urgency    Type 2 diabetes mellitus without complication, without long-term current use of insulin (HCC)    Essential hypertension    Mixed hyperlipidemia    Bradycardia    Dementia without behavioral disturbance (HCC)      1. PRITI on CKD vs rapidly worsening CKD  2. Metabolic acidosis  3. Anemia  4. 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 DM-ii  6. Essential hypertension  7. Hyperkalemia  8. hyponatremia    Unknown baseline Cr, Cr was 1.3 in Feb 2021,  2.9 in 12/2021, and 3.2 in Jan 2022 and now admitted with 5.3  More than 300mg/dl proteinuria, mild hematuria  Given significant anemia, SHPT and worsening renal functions highly likely its worsening CKD  -DC NS  -1/2NS with bicarb 75meq/L at 100ml/h  -UA with microscopy  -ACR/PCR  -renal USG      Thanks Dr Tomas for the consult. Nephrology will follow the patient.   I discussed the patient's findings and my recommendations with patient and consulting provider    Jude Muñoz MD  05/05/22  08:41 EDT

## 2022-05-05 NOTE — PLAN OF CARE
Problem: Adult Inpatient Plan of Care  Goal: Plan of Care Review  Outcome: Ongoing, Progressing  Flowsheets (Taken 5/5/2022 0446)  Progress: no change  Plan of Care Reviewed With: patient  Goal: Patient-Specific Goal (Individualized)  Outcome: Ongoing, Progressing  Goal: Absence of Hospital-Acquired Illness or Injury  Outcome: Ongoing, Progressing  Intervention: Identify and Manage Fall Risk  Description: Perform standard risk assessment on admission using a validated tool or comprehensive approach appropriate to the patient; reassess fall risk frequently, with change in status or transfer to another level of care.  Communicate fall injury risk to interprofessional healthcare team.  Determine need for increased observation, equipment and environmental modification, such as low bed, signage and supportive, nonskid footwear.  Adjust safety measures to individual developmental age, stage and identified risk factors.  Reinforce the importance of safety and physical activity with patient and family.  Perform regular intentional rounding to assess need for position change, pain assessment and personal needs, including assistance with toileting.  Recent Flowsheet Documentation  Taken 5/5/2022 0100 by Minna Lopez RN  Safety Promotion/Fall Prevention:   safety round/check completed   fall prevention program maintained  Taken 5/4/2022 2300 by Minna Lopez RN  Safety Promotion/Fall Prevention:   safety round/check completed   fall prevention program maintained  Taken 5/4/2022 2100 by Minna Lopez RN  Safety Promotion/Fall Prevention:   safety round/check completed   fall prevention program maintained  Taken 5/4/2022 1900 by Minna Lopez RN  Safety Promotion/Fall Prevention:   safety round/check completed   fall prevention program maintained  Intervention: Prevent Skin Injury  Description: Perform a screening for skin injury risk, such as pressure or moisture associated skin damage on admission and at regular  intervals throughout hospital stay.  Keep all areas of skin (especially folds) clean and dry.  Maintain adequate skin hydration.  Relieve and redistribute pressure and protect bony prominences; implement measures based on patient-specific risk factors.  Match turning and repositioning schedule to clinical condition.  Encourage weight shift frequently; assist with reposition if unable to complete independently.  Float heels off bed; avoid pressure on the Achilles tendon.  Keep skin free from extended contact with medical devices.  Encourage functional activity and mobility, as early as tolerated.  Use aids (e.g., slide boards, mechanical lift) during transfer.  Recent Flowsheet Documentation  Taken 5/4/2022 2007 by Minna Lopez, RN  Body Position: position changed independently  Intervention: Prevent Infection  Description: Maintain skin and mucous membrane integrity; promote hand, oral and pulmonary hygiene.  Optimize fluid balance, nutrition, sleep and glycemic control to maximize infection resistance.  Identify potential sources of infection early to prevent or mitigate progression of infection (e.g., wound, lines, devices).  Evaluate ongoing need for invasive devices; remove promptly when no longer indicated.  Recent Flowsheet Documentation  Taken 5/4/2022 1900 by Minna Lopez, RN  Infection Prevention:   single patient room provided   rest/sleep promoted  Goal: Optimal Comfort and Wellbeing  Outcome: Ongoing, Progressing  Goal: Readiness for Transition of Care  Outcome: Ongoing, Progressing     Problem: Fall Injury Risk  Goal: Absence of Fall and Fall-Related Injury  Outcome: Ongoing, Progressing  Intervention: Identify and Manage Contributors  Description: Develop a fall prevention plan with the patient and caregiver/family.  Provide reorientation, appropriate sensory stimulation and routines with changes in mental status to decrease risk of fall.  Promote use of personal vision and auditory aids.  Assess  assistance level required for safe and effective self-care; provide support as needed, such as toileting, mobilization. For age 65 and older, implement timed toileting with assistance.  Encourage physical activity, such as performance of mobility and self-care at highest level of patient ability, multicomponent exercise program and provision of appropriate assistive devices.  If fall occurs, assess the severity of injury; implement fall injury protocol. Determine the cause and revise fall injury prevention plan.  Regularly review medication contribution to fall risk; adjust medication administration times to minimize risk of falling.  Consider risk related to polypharmacy and age.  Balance adequate pain management with potential for oversedation.  Recent Flowsheet Documentation  Taken 5/4/2022 1900 by Minna Lopez RN  Medication Review/Management: medications reviewed  Intervention: Promote Injury-Free Environment  Description: Provide a safe, barrier-free environment that encourages independent activity.  Keep care area uncluttered and well-lighted.  Determine need for increased observation or monitoring.  Avoid use of devices that minimize mobility, such as restraints or indwelling urinary catheter.  Recent Flowsheet Documentation  Taken 5/5/2022 0100 by Minna Lopez RN  Safety Promotion/Fall Prevention:   safety round/check completed   fall prevention program maintained  Taken 5/4/2022 2300 by Minna Lopez RN  Safety Promotion/Fall Prevention:   safety round/check completed   fall prevention program maintained  Taken 5/4/2022 2100 by Minna Lopez RN  Safety Promotion/Fall Prevention:   safety round/check completed   fall prevention program maintained  Taken 5/4/2022 1900 by Minna Lopez RN  Safety Promotion/Fall Prevention:   safety round/check completed   fall prevention program maintained     Problem: Hypertension Comorbidity  Goal: Blood Pressure in Desired Range  Outcome: Ongoing,  Progressing  Intervention: Maintain Blood Pressure Management  Description: Evaluate adherence to home antihypertensive regimen (e.g., exercise and activity, diet modification, medication).  Provide scheduled antihypertensive medication; consider administration time and effects (e.g., avoid giving diuretic prior to bedtime).  Monitor response to antihypertensive medication therapy (e.g., blood pressure, electrolyte levels, medication effects).  Minimize risk of orthostatic hypotension; encourage caution with position changes, particularly if elderly.  Recent Flowsheet Documentation  Taken 5/4/2022 1900 by Minna Lopez RN  Medication Review/Management: medications reviewed     Problem: Skin Injury Risk Increased  Goal: Skin Health and Integrity  Outcome: Ongoing, Progressing  Intervention: Optimize Skin Protection  Description: Perform a full pressure injury risk assessment, as indicated by screening, upon admission to care unit.  Reassess skin (injury risk, full inspection) frequently (e.g., scheduled interval, with change in condition) to provide optimal early detection and prevention.  Maintain adequate tissue perfusion (e.g., encourage fluid balance; avoid crossing legs, constrictive clothing or devices) to promote tissue oxygenation.  Maintain head of bed at lowest degree of elevation tolerated, considering medical condition and other restrictions.  Avoid positioning onto an area that remains reddened.  Minimize incontinence and moisture (e.g., toileting schedule; moisture-wicking pad, diaper or incontinence collection device; skin moisture barrier).  Cleanse skin promptly and gently when soiled utilizing a pH-balanced cleanser.  Relieve and redistribute pressure (e.g., scheduled position changes, weight shifts, use of support surface, medical device repositioning, protective dressing application, use of positioning device, microclimate control, use of pressure-injury-monitor  Encourage increased activity,  such as sitting in a chair at the bedside or early mobilization, when able to tolerate.  Recent Flowsheet Documentation  Taken 5/4/2022 2007 by Minna Lopez RN  Pressure Reduction Techniques: frequent weight shift encouraged   Goal Outcome Evaluation:  Plan of Care Reviewed With: patient        Progress: no change

## 2022-05-05 NOTE — CONSULTS
Cardiology  CONSULT NOTE    Consults    Patient Identification:  Name:  Sean Chen  Age:  81 y.o.  Sex:  female  :  1940  MRN:  [id number]  Visit Number:  [id number]  Primary care provider:  Ganga Gallardo MD    Subjective       Reason for the consult:  Elevated troponin T level-NSTEMI    Chief complaints:  Abnormal labs      History of presenting illness:    81-year-old woman has been admitted with PRITI on CKD associated with hypocalcemia, hyperkalemia and hyponatremia.    On arrival, her systolic blood pressure was more than 200 mmHg.  She has history of poor compliance with antihypertensive medications.  In the hospital, initially she was treated with IV Cardene, oral clonidine and hydralazine.  Now, Cardene is off.  She is on amlodipine 10 mg daily and hydralazine 25 mg 3 times daily.  Her blood pressure when I evaluated her was 180/90 mmHg.    I have been consulted as her troponin T levels are elevated.  Initial level was 0.09 then decreased to 0.08 and 0.05.  EKG showed no evidence of ischemia.  She denied history of chest pain.    She has sinus bradycardia with heart rate in upper 40s.  I evaluated her for this problem in my office few months ago and she did not show up for follow-up.  She is symptomatic with dizziness and imbalance while walking.  TSH level is normal.  Not taking any medication that causes bradycardia.    She has severe anemia with hemoglobin between 6 and 7 and received 1 unit of PRBC transfusion.    No history of known CAD or CHF.    She has history of hypertension, DM type II and hyperlipidemia.      --------------------------------------------------------------------------------------------------------------------  Review of Systems:     Constitutional-generalized weakness  ENT- Ménière's disease  Cardiovascular-as above  Endocrine-diabetes mellitus  GI-stomach problem  Musculoskeletal-arthritis  Neurological- dementia with no behavioral  problems      ---------------------------------------------------------------------------------------------------------------------   Past History:  No family history on file.  Past Medical History:   Diagnosis Date   • Allergic rhinitis    • Elevated liver enzymes    • Extrinsic asthma    • Gastritis    • GERD (gastroesophageal reflux disease)    • Hyperlipidemia    • Hypertension    • Iron deficiency anemia    • Meniere's disease    • Osteoarthritis    • Type 2 diabetes mellitus (HCC)    • Vitamin D deficiency disease      Past Surgical History:   Procedure Laterality Date   • APPENDECTOMY     • CHOLECYSTECTOMY     • HYSTERECTOMY       Social History     Socioeconomic History   • Marital status:      Spouse name: moshe   • Number of children: 1   • Years of education: 8   Tobacco Use   • Smoking status: Never Smoker   • Smokeless tobacco: Never Used   Substance and Sexual Activity   • Alcohol use: No   • Drug use: No   • Sexual activity: Defer     ---------------------------------------------------------------------------------------------------------------------   Allergies:  Keflex [cephalexin], Lodine [etodolac], Penicillins, and Sulfa antibiotics  ---------------------------------------------------------------------------------------------------------------------     Hospital Meds:  albuterol, 2.5 mg, Nebulization, 4x Daily - RT  amLODIPine, 10 mg, Oral, Nightly  atorvastatin, 80 mg, Oral, Nightly  heparin (porcine), 5,000 Units, Subcutaneous, Q8H  hydrALAZINE, 25 mg, Oral, TID  PARoxetine, 10 mg, Oral, QAM  sodium chloride, 10 mL, Intravenous, Q12H      niCARdipine, 5-15 mg/hr, Last Rate: Stopped (05/05/22 0548)  sodium bicarbonate drip less than/equal to 75 mEq/bag, 75 mEq, Last Rate: 75 mEq (05/05/22 1027)      ---------------------------------------------------------------------------------------------------------------------     Objective     Vital Signs:  Temp:  [97.2 °F (36.2 °C)-97.9 °F (36.6  °C)] 97.9 °F (36.6 °C)  Heart Rate:  [32-52] 46  Resp:  [10-22] 18  BP: (111-205)/() 156/60      05/04/22  1407 05/04/22  1815 05/05/22  0503   Weight: 63.5 kg (140 lb) 64.2 kg (141 lb 9.6 oz) 67.9 kg (149 lb 11.2 oz)     Body mass index is 24.91 kg/m².  ---------------------------------------------------------------------------------------------------------------------   Physical exam:      General Appearance:   Alert, cooperative, in no acute distress   Head:   Normocephalic, without obvious abnormality.   Eyes:          Lids and lashes normal, conjunctivae and sclerae normal, no   icterus,    Ears:  Ears appear intact with no abnormalities noted   Throat:   No oral lesions, no thrush, oral mucosa moist   Neck: No adenopathy, supple, trachea midline,  carotid bruit, no JVD   Back:   No significant abnormality   Lungs:   Clear to auscultation,respirations regular, No added sounds    Heart:   Bradycardia.  Regular rhythm.  Grade 3 x 6 ejection systolic murmur at the aortic area   Chest Wall:  No abnormalities observed   Abdomen   Soft, nontender, no masses, no organomegaly and bowel sounds are heard.          Extremities:  No pedal edema     Pulses: Pulses palpable and equal bilaterally   Skin: No bleeding, bruising or rash       Neurologic: No focal deficits       Telemetry:  Sinus bradycardia.  Heart rate in upper 40s.    ---------------------------------------------------------------------------------------------------------------------   ECG:   ECG/EMG Results (last 24 hours)     Procedure Component Value Units Date/Time    SCANNED - TELEMETRY   [[id number]] Resulted: 05/04/22     Updated: 05/05/22 0608    SCANNED - TELEMETRY   [[id number]] Resulted: 05/04/22     Updated: 05/05/22 0625    SCANNED - TELEMETRY   [[id number]] Resulted: 05/04/22     Updated: 05/05/22 0636    SCANNED - TELEMETRY   [[id number]] Resulted: 05/04/22     Updated: 05/05/22 0647    SCANNED - TELEMETRY   [[id number]] Resulted: 05/04/22      Updated: 05/05/22 0807    ECG 12 Lead [[id number]] Collected: 05/05/22 0712     Updated: 05/05/22 0859     QT Interval 546 ms      QTC Interval 461 ms     Narrative:      Test Reason : Elevated troponin  Blood Pressure :   */*   mmHG  Vent. Rate :  43 BPM     Atrial Rate :  43 BPM     P-R Int : 192 ms          QRS Dur : 130 ms      QT Int : 546 ms       P-R-T Axes :   *  54  48 degrees     QTc Int : 461 ms    Marked sinus bradycardia sinus arrhythmia  Right bundle branch block  Abnormal ECG  When compared with ECG of 04-MAY-2022 14:31,  No significant change was found  Confirmed by Kate Franco ([id number]) on 5/5/2022 8:59:02 AM    Referred By: VALERIE           Confirmed By: Kate Franco    Adult Transthoracic Echo Complete w/ Color, Spectral and Contrast if necessary per protocol [[id number]] Resulted: 05/05/22 1458     Updated: 05/05/22 1504     Target HR (85%) 119 bpm      Max. Pred. HR (100%) 140 bpm      LA Volume Index 33.3 mL/m2      Avg E/e' ratio 20.49     ACS 1.30 cm      Ao root diam 3.0 cm      Ao pk raj 402.0 cm/sec      Ao V2 .5 cm      ER(I,D) 0.84 cm2      EDV(cubed) 186.2 ml      EDV(MOD-sp4) 94.9 ml      EF(MOD-sp4) 74.0 %      ESV(cubed) 40.2 ml      ESV(MOD-sp4) 24.7 ml      IVS/LVPW 0.97 cm      Lat Peak E' Raj 8.4 cm/sec      LV mass(C)d 341.5 grams      LV V1 max PG 5.4 mmHg      LV V1 mean PG 2.00 mmHg      LV V1 max 116.0 cm/sec      LVPWd 1.38 cm      Med Peak E' Raj 3.8 cm/sec      MV dec slope 185.0 cm/sec2      MV P1/2t 199.5 msec      MV V2 VTI 74.6 cm      MVA(VTI) 1.18 cm2      PA acc time 0.13 sec      PA pr(Accel) 20.5 mmHg      RAP systole 10.0 mmHg      RVSP(TR) 75.0 mmHg      SI(MOD-sp4) 40.2 ml/m2      SV(LVOT) 87.9 ml      SV(MOD-sp4) 70.2 ml      TR max PG 65.0 mmHg      Ao max PG 64.6 mmHg      Ao mean PG 26.5 mmHg      FS 40.0 %      IVSd 1.33 cm      LA dimension 4.5 cm      LV V1 VTI 31.0 cm      LVIDd 5.7 cm      LVIDs 3.4 cm      LVOT area 2.8 cm2      LVOT diam  1.90 cm      MV E/A 1.47     MV max PG 6.6 mmHg      MV mean PG 2.00 mmHg      MVA(P1/2t) 1.10 cm2      MV A max frances 84.8 cm/sec      MV E max frances 125.0 cm/sec      TR max frances 403.0 cm/sec      LV Carney Vol (BSA corrected) 54.4 cm2      LV Sys Vol (BSA corrected) 14.2 cm2      TAPSE (>1.6) 1.60 cm     Narrative:      · Left ventricular wall thickness is consistent with mild concentric   hypertrophy.  · Left ventricular ejection fraction appears to be 66 - 70%. Left   ventricular systolic function is normal.  · All left ventricular wall segments contract normally.  · Left ventricular diastolic function is consistent with (grade II w/high   LAP) pseudonormalization.  · The left atrial cavity is mildly dilated. Left atrial volume is mildly   increased.  · Mild calcific aortic valve stenosis is present.  · Mild mitral valve regurgitation is present.  · Moderate tricuspid valve regurgitation is present.  · Severe pulmonary hypertension is present.  · Estimated right ventricular systolic pressure from tricuspid   regurgitation is markedly elevated (>55 mmHg).  · There is a small (<1cm) pericardial effusion adjacent to the left   ventricle (seen in parasternal long axis view).       SCANNED - TELEMETRY   [[id number]] Resulted: 05/04/22     Updated: 05/05/22 1513    SCANNED - TELEMETRY   [[id number]] Resulted: 05/04/22     Updated: 05/05/22 1639          ---------------------------------------------------------------------------------------------------------------------   Results from last 7 days   Lab Units 05/05/22  0732 05/05/22  0222 05/04/22  1504 04/29/22  1230   CRP mg/dL  --   --  0.63*  --    WBC 10*3/mm3  --  8.64 10.40 10.69   HEMOGLOBIN g/dL 7.1* 5.8* 8.4* 7.6*   HEMATOCRIT % 22.5* 17.6* 25.3* 24.1*   MCV fL  --  87.6 87.2 90.3   MCHC g/dL  --  33.0 33.2 31.5   PLATELETS 10*3/mm3  --  250 339 352   INR   --   --  0.96  --          Results from last 7 days   Lab Units 05/05/22  0106 05/04/22  1504 04/29/22  1230    SODIUM mmol/L 135* 133* 141   POTASSIUM mmol/L 5.0 5.3* 5.8*   MAGNESIUM mg/dL 1.8 1.6 1.7   CHLORIDE mmol/L 109* 106 110*   TOTAL CO2 mmol/L  --   --  13.4*   CO2 mmol/L 12.7* 13.4*  --    BUN mg/dL 67* 70* 73*   CREATININE mg/dL 5.67* 5.36* 5.33*   CALCIUM mg/dL 8.1* 8.7 8.2*   GLUCOSE mg/dL 140* 112* 128*   ALBUMIN g/dL 2.50* 3.69 3.60   BILIRUBIN mg/dL 0.2 0.4 0.3   ALK PHOS U/L 75 105 94   AST (SGOT) U/L 9 13 15   ALT (SGPT) U/L 5 8 12   Estimated Creatinine Clearance: 8.5 mL/min (A) (by C-G formula based on SCr of 5.67 mg/dL (H)).  No results found for: AMMONIA  Results from last 7 days   Lab Units 05/04/22 2019 05/04/22  1716 05/04/22  1504   TROPONIN T ng/mL 0.058* 0.084* 0.095*     Results from last 7 days   Lab Units 05/04/22  1504   PROBNP pg/mL 29,931.0*     Lab Results   Component Value Date    HGBA1C 5.50 04/29/2022     Lab Results   Component Value Date    TSH 1.750 04/29/2022     No results found for: PREGTESTUR, PREGSERUM, HCG, HCGQUANT  Pain Management Panel    There is no flowsheet data to display.       No results found for: BLOODCX  Urine Culture   Date Value Ref Range Status   05/04/2022 No growth  Preliminary     No results found for: WOUNDCX  No results found for: STOOLCX  Results from last 7 days   Lab Units 05/05/22  0106   CHOLESTEROL mg/dL 98   TRIGLYCERIDES mg/dL 65   HDL CHOL mg/dL 50   LDL CHOL mg/dL 34     ---------------------------------------------------------------------------------------------------------------------   Radiology:    No results found for this or any previous visit.      No results found for this or any previous visit.      Results for orders placed during the hospital encounter of 05/04/22    XR Chest 1 View    Narrative  EXAM:  XR Chest, 1 View    EXAM DATE:  5/4/2022 2:43 PM    CLINICAL HISTORY:  abnormal labs    TECHNIQUE:  Frontal view of the chest.    COMPARISON:  No relevant prior studies available.    FINDINGS:  Lungs:  Unremarkable.  No  consolidation.  Pleural space:  Unremarkable.  No pneumothorax.  Heart:  Unremarkable.  No cardiomegaly.  Mediastinum:  Unremarkable.  Bones/joints:  Degenerative changes bilateral shoulders.    Impression  No acute cardiopulmonary findings identified.    This report was finalized on 5/4/2022 4:11 PM by Dr. Mingo Cardenas MD.      No results found for this or any previous visit.      I have personally reviewed the radiology images and read the final radiology report.        Assessment      1.  NSTEMI.  No chest pain.  No evidence of ischemia on ECG.  EF and wall motion are normal.  2.  Uncontrolled hypertension due to poor compliance.  3.  Marked sinus bradycardia.  Symptomatic.  Due to sinus node dysfunction.  4.  Heart murmur due to mild calcific aortic stenosis.  5.  Severe pulmonary hypertension  6.  Multiple cardiac risk factors- DM, hypertension and hyperlipidemia  7.  PRITI on CKD with electrolyte abnormality.  8.  Severe anemia requiring blood transfusion    Recommendations     1.  NSTEMI-  Start aspirin 81 mg daily.  Continue statin.  No beta-blocker due to bradycardia.  Reviewed echocardiogram and it showed normal EF and wall motion.  Scheduled for a nuclear stress test for ischemia evaluation and restratification.    2.  Uncontrolled hypertension-  Counseling was made to be compliant with the medications.  Increase hydralazine to 50 mg 3 times daily and continue amlodipine 10 mg daily.    3.  Symptomatic bradycardia-  Will continue to monitor.  Concern for sinus node dysfunction.  May need permanent pacemaker.      Thank you for the opportunity to participate in the care of your patient. Please do not hesitate to call with any questions or concerns.     Lexi Roman MD, [facility],  05/05/22  17:41 EDT

## 2022-05-06 ENCOUNTER — APPOINTMENT (OUTPATIENT)
Dept: CARDIOLOGY | Facility: HOSPITAL | Age: 82
End: 2022-05-06

## 2022-05-06 ENCOUNTER — APPOINTMENT (OUTPATIENT)
Dept: NUCLEAR MEDICINE | Facility: HOSPITAL | Age: 82
End: 2022-05-06

## 2022-05-06 PROBLEM — I21.4 NSTEMI (NON-ST ELEVATED MYOCARDIAL INFARCTION) (HCC): Status: ACTIVE | Noted: 2022-05-06

## 2022-05-06 PROBLEM — E87.1 HYPONATREMIA: Status: ACTIVE | Noted: 2022-05-06

## 2022-05-06 PROBLEM — R94.31 PROLONGED Q-T INTERVAL ON ECG: Status: ACTIVE | Noted: 2022-05-06

## 2022-05-06 PROBLEM — D64.9 ANEMIA REQUIRING TRANSFUSIONS: Status: ACTIVE | Noted: 2022-05-06

## 2022-05-06 LAB
ALBUMIN SERPL-MCNC: 2.62 G/DL (ref 3.5–5.2)
ALBUMIN UR-MCNC: 331.3 MG/DL
ALBUMIN/GLOB SERPL: 0.9 G/DL
ALP SERPL-CCNC: 90 U/L (ref 39–117)
ALT SERPL W P-5'-P-CCNC: 5 U/L (ref 1–33)
ANION GAP SERPL CALCULATED.3IONS-SCNC: 16.3 MMOL/L (ref 5–15)
ANION GAP SERPL CALCULATED.3IONS-SCNC: 8 MMOL/L (ref 5–15)
ASO AB SERPL-ACNC: NEGATIVE [IU]/ML
AST SERPL-CCNC: 10 U/L (ref 1–32)
BASOPHILS # BLD AUTO: 0.04 10*3/MM3 (ref 0–0.2)
BASOPHILS NFR BLD AUTO: 0.3 % (ref 0–1.5)
BH BB BLOOD EXPIRATION DATE: NORMAL
BH BB BLOOD TYPE BARCODE: 9500
BH BB DISPENSE STATUS: NORMAL
BH BB PRODUCT CODE: NORMAL
BH BB UNIT NUMBER: NORMAL
BH CV NUCLEAR PRIOR STUDY: 3
BH CV REST NUCLEAR ISOTOPE DOSE: 10.3 MCI
BH CV STRESS BP STAGE 1: NORMAL
BH CV STRESS BP STAGE 2: NORMAL
BH CV STRESS COMMENTS STAGE 1: NORMAL
BH CV STRESS COMMENTS STAGE 2: NORMAL
BH CV STRESS DOSE REGADENOSON STAGE 1: 0.4
BH CV STRESS DURATION MIN STAGE 1: 0
BH CV STRESS DURATION MIN STAGE 2: 4
BH CV STRESS DURATION SEC STAGE 1: 10
BH CV STRESS DURATION SEC STAGE 2: 0
BH CV STRESS HR STAGE 1: 63
BH CV STRESS HR STAGE 2: 61
BH CV STRESS NUCLEAR ISOTOPE DOSE: 28.6 MCI
BH CV STRESS PROTOCOL 1: NORMAL
BH CV STRESS RECOVERY BP: NORMAL MMHG
BH CV STRESS RECOVERY HR: 56 BPM
BH CV STRESS STAGE 1: 1
BH CV STRESS STAGE 2: 2
BILIRUB SERPL-MCNC: 0.3 MG/DL (ref 0–1.2)
BUN SERPL-MCNC: 66 MG/DL (ref 8–23)
BUN SERPL-MCNC: 68 MG/DL (ref 8–23)
BUN/CREAT SERPL: 12.2 (ref 7–25)
BUN/CREAT SERPL: 12.3 (ref 7–25)
CALCIUM SPEC-SCNC: 7.4 MG/DL (ref 8.6–10.5)
CALCIUM SPEC-SCNC: 8.4 MG/DL (ref 8.6–10.5)
CHLORIDE SERPL-SCNC: 106 MMOL/L (ref 98–107)
CHLORIDE SERPL-SCNC: 99 MMOL/L (ref 98–107)
CO2 SERPL-SCNC: 12 MMOL/L (ref 22–29)
CO2 SERPL-SCNC: 13.7 MMOL/L (ref 22–29)
CREAT SERPL-MCNC: 5.43 MG/DL (ref 0.57–1)
CREAT SERPL-MCNC: 5.53 MG/DL (ref 0.57–1)
CREAT UR-MCNC: 67.4 MG/DL
CREAT UR-MCNC: 67.4 MG/DL
CROSSMATCH INTERPRETATION: NORMAL
DEPRECATED RDW RBC AUTO: 45.6 FL (ref 37–54)
EGFRCR SERPLBLD CKD-EPI 2021: 7.3 ML/MIN/1.73
EGFRCR SERPLBLD CKD-EPI 2021: 7.5 ML/MIN/1.73
EOSINOPHIL # BLD AUTO: 0.08 10*3/MM3 (ref 0–0.4)
EOSINOPHIL NFR BLD AUTO: 0.7 % (ref 0.3–6.2)
ERYTHROCYTE [DISTWIDTH] IN BLOOD BY AUTOMATED COUNT: 14.3 % (ref 12.3–15.4)
GLOBULIN UR ELPH-MCNC: 2.9 GM/DL
GLUCOSE BLDC GLUCOMTR-MCNC: 125 MG/DL (ref 70–130)
GLUCOSE BLDC GLUCOMTR-MCNC: 137 MG/DL (ref 70–130)
GLUCOSE BLDC GLUCOMTR-MCNC: 201 MG/DL (ref 70–130)
GLUCOSE SERPL-MCNC: 102 MG/DL (ref 65–99)
GLUCOSE SERPL-MCNC: 190 MG/DL (ref 65–99)
HBV SURFACE AG SERPL QL IA: NORMAL
HCT VFR BLD AUTO: 22.7 % (ref 34–46.6)
HGB BLD-MCNC: 7.7 G/DL (ref 12–15.9)
IMM GRANULOCYTES # BLD AUTO: 0.09 10*3/MM3 (ref 0–0.05)
IMM GRANULOCYTES NFR BLD AUTO: 0.7 % (ref 0–0.5)
LV EF NUC BP: 62 %
LYMPHOCYTES # BLD AUTO: 0.73 10*3/MM3 (ref 0.7–3.1)
LYMPHOCYTES NFR BLD AUTO: 6 % (ref 19.6–45.3)
MAGNESIUM SERPL-MCNC: 1.5 MG/DL (ref 1.6–2.4)
MAXIMAL PREDICTED HEART RATE: 140 BPM
MCH RBC QN AUTO: 29.6 PG (ref 26.6–33)
MCHC RBC AUTO-ENTMCNC: 33.9 G/DL (ref 31.5–35.7)
MCV RBC AUTO: 87.3 FL (ref 79–97)
MICROALBUMIN/CREAT UR: 4915.4 MG/G
MONOCYTES # BLD AUTO: 0.46 10*3/MM3 (ref 0.1–0.9)
MONOCYTES NFR BLD AUTO: 3.8 % (ref 5–12)
NEUTROPHILS NFR BLD AUTO: 10.72 10*3/MM3 (ref 1.7–7)
NEUTROPHILS NFR BLD AUTO: 88.5 % (ref 42.7–76)
NRBC BLD AUTO-RTO: 0 /100 WBC (ref 0–0.2)
PERCENT MAX PREDICTED HR: 43.57 %
PLATELET # BLD AUTO: 222 10*3/MM3 (ref 140–450)
PMV BLD AUTO: 10.2 FL (ref 6–12)
POTASSIUM SERPL-SCNC: 4.5 MMOL/L (ref 3.5–5.2)
POTASSIUM SERPL-SCNC: 4.7 MMOL/L (ref 3.5–5.2)
PROT ?TM UR-MCNC: 575.5 MG/DL
PROT SERPL-MCNC: 5.5 G/DL (ref 6–8.5)
PROT/CREAT UR: 8538.6 MG/G CREA (ref 0–200)
QT INTERVAL: 520 MS
QT INTERVAL: 546 MS
QTC INTERVAL: 464 MS
QTC INTERVAL: 493 MS
RBC # BLD AUTO: 2.6 10*6/MM3 (ref 3.77–5.28)
SODIUM SERPL-SCNC: 126 MMOL/L (ref 136–145)
SODIUM SERPL-SCNC: 129 MMOL/L (ref 136–145)
STRESS BASELINE BP: NORMAL MMHG
STRESS BASELINE HR: 60 BPM
STRESS PERCENT HR: 51 %
STRESS POST PEAK BP: NORMAL MMHG
STRESS POST PEAK HR: 61 BPM
STRESS TARGET HR: 119 BPM
UNIT  ABO: NORMAL
UNIT  RH: NORMAL
WBC NRBC COR # BLD: 12.12 10*3/MM3 (ref 3.4–10.8)

## 2022-05-06 PROCEDURE — 0 MAGNESIUM SULFATE 4 GM/100ML SOLUTION: Performed by: STUDENT IN AN ORGANIZED HEALTH CARE EDUCATION/TRAINING PROGRAM

## 2022-05-06 PROCEDURE — 86037 ANCA TITER EACH ANTIBODY: CPT | Performed by: INTERNAL MEDICINE

## 2022-05-06 PROCEDURE — 83735 ASSAY OF MAGNESIUM: CPT

## 2022-05-06 PROCEDURE — 25010000002 HEPARIN (PORCINE) PER 1000 UNITS: Performed by: STUDENT IN AN ORGANIZED HEALTH CARE EDUCATION/TRAINING PROGRAM

## 2022-05-06 PROCEDURE — 82962 GLUCOSE BLOOD TEST: CPT

## 2022-05-06 PROCEDURE — 93010 ELECTROCARDIOGRAM REPORT: CPT | Performed by: INTERNAL MEDICINE

## 2022-05-06 PROCEDURE — 94664 DEMO&/EVAL PT USE INHALER: CPT

## 2022-05-06 PROCEDURE — 86063 ANTISTREPTOLYSIN O SCREEN: CPT | Performed by: INTERNAL MEDICINE

## 2022-05-06 PROCEDURE — 87340 HEPATITIS B SURFACE AG IA: CPT | Performed by: INTERNAL MEDICINE

## 2022-05-06 PROCEDURE — 86038 ANTINUCLEAR ANTIBODIES: CPT | Performed by: INTERNAL MEDICINE

## 2022-05-06 PROCEDURE — 84165 PROTEIN E-PHORESIS SERUM: CPT | Performed by: INTERNAL MEDICINE

## 2022-05-06 PROCEDURE — 78452 HT MUSCLE IMAGE SPECT MULT: CPT | Performed by: INTERNAL MEDICINE

## 2022-05-06 PROCEDURE — 94761 N-INVAS EAR/PLS OXIMETRY MLT: CPT

## 2022-05-06 PROCEDURE — 83520 IMMUNOASSAY QUANT NOS NONAB: CPT | Performed by: INTERNAL MEDICINE

## 2022-05-06 PROCEDURE — 0 TECHNETIUM SESTAMIBI: Performed by: STUDENT IN AN ORGANIZED HEALTH CARE EDUCATION/TRAINING PROGRAM

## 2022-05-06 PROCEDURE — 83521 IG LIGHT CHAINS FREE EACH: CPT | Performed by: INTERNAL MEDICINE

## 2022-05-06 PROCEDURE — 85025 COMPLETE CBC W/AUTO DIFF WBC: CPT

## 2022-05-06 PROCEDURE — 86160 COMPLEMENT ANTIGEN: CPT | Performed by: INTERNAL MEDICINE

## 2022-05-06 PROCEDURE — 93005 ELECTROCARDIOGRAM TRACING: CPT | Performed by: INTERNAL MEDICINE

## 2022-05-06 PROCEDURE — 94799 UNLISTED PULMONARY SVC/PX: CPT

## 2022-05-06 PROCEDURE — 63710000001 INSULIN ASPART PER 5 UNITS: Performed by: STUDENT IN AN ORGANIZED HEALTH CARE EDUCATION/TRAINING PROGRAM

## 2022-05-06 PROCEDURE — 93018 CV STRESS TEST I&R ONLY: CPT | Performed by: INTERNAL MEDICINE

## 2022-05-06 PROCEDURE — 99233 SBSQ HOSP IP/OBS HIGH 50: CPT | Performed by: STUDENT IN AN ORGANIZED HEALTH CARE EDUCATION/TRAINING PROGRAM

## 2022-05-06 PROCEDURE — 93017 CV STRESS TEST TRACING ONLY: CPT

## 2022-05-06 PROCEDURE — A9500 TC99M SESTAMIBI: HCPCS | Performed by: STUDENT IN AN ORGANIZED HEALTH CARE EDUCATION/TRAINING PROGRAM

## 2022-05-06 PROCEDURE — 25010000002 REGADENOSON 0.4 MG/5ML SOLUTION: Performed by: STUDENT IN AN ORGANIZED HEALTH CARE EDUCATION/TRAINING PROGRAM

## 2022-05-06 PROCEDURE — 25010000002 PROCHLORPERAZINE 10 MG/2ML SOLUTION: Performed by: INTERNAL MEDICINE

## 2022-05-06 PROCEDURE — 78452 HT MUSCLE IMAGE SPECT MULT: CPT

## 2022-05-06 PROCEDURE — 80053 COMPREHEN METABOLIC PANEL: CPT

## 2022-05-06 PROCEDURE — 97162 PT EVAL MOD COMPLEX 30 MIN: CPT

## 2022-05-06 RX ORDER — NIFEDIPINE 30 MG/1
30 TABLET, FILM COATED, EXTENDED RELEASE ORAL
Status: DISCONTINUED | OUTPATIENT
Start: 2022-05-06 | End: 2022-05-06

## 2022-05-06 RX ORDER — ASPIRIN 81 MG/1
81 TABLET ORAL DAILY
Status: DISCONTINUED | OUTPATIENT
Start: 2022-05-06 | End: 2022-05-13 | Stop reason: HOSPADM

## 2022-05-06 RX ORDER — MAGNESIUM SULFATE HEPTAHYDRATE 40 MG/ML
4 INJECTION, SOLUTION INTRAVENOUS ONCE
Status: COMPLETED | OUTPATIENT
Start: 2022-05-06 | End: 2022-05-06

## 2022-05-06 RX ORDER — DEXTROSE MONOHYDRATE 25 G/50ML
25 INJECTION, SOLUTION INTRAVENOUS
Status: DISCONTINUED | OUTPATIENT
Start: 2022-05-06 | End: 2022-05-13 | Stop reason: HOSPADM

## 2022-05-06 RX ORDER — NIFEDIPINE 30 MG/1
60 TABLET, FILM COATED, EXTENDED RELEASE ORAL
Status: DISCONTINUED | OUTPATIENT
Start: 2022-05-07 | End: 2022-05-12

## 2022-05-06 RX ORDER — INSULIN ASPART 100 [IU]/ML
0-7 INJECTION, SOLUTION INTRAVENOUS; SUBCUTANEOUS
Status: DISCONTINUED | OUTPATIENT
Start: 2022-05-06 | End: 2022-05-12

## 2022-05-06 RX ORDER — PROCHLORPERAZINE EDISYLATE 5 MG/ML
5 INJECTION INTRAMUSCULAR; INTRAVENOUS EVERY 6 HOURS PRN
Status: DISCONTINUED | OUTPATIENT
Start: 2022-05-06 | End: 2022-05-13 | Stop reason: HOSPADM

## 2022-05-06 RX ORDER — NICOTINE POLACRILEX 4 MG
15 LOZENGE BUCCAL
Status: DISCONTINUED | OUTPATIENT
Start: 2022-05-06 | End: 2022-05-13 | Stop reason: HOSPADM

## 2022-05-06 RX ADMIN — SODIUM BICARBONATE 75 MEQ: 84 INJECTION, SOLUTION INTRAVENOUS at 14:55

## 2022-05-06 RX ADMIN — HYDRALAZINE HYDROCHLORIDE 50 MG: 50 TABLET, FILM COATED ORAL at 17:12

## 2022-05-06 RX ADMIN — REGADENOSON 0.4 MG: 0.08 INJECTION, SOLUTION INTRAVENOUS at 16:03

## 2022-05-06 RX ADMIN — SODIUM CHLORIDE 5 MG/HR: 9 INJECTION, SOLUTION INTRAVENOUS at 06:05

## 2022-05-06 RX ADMIN — PROCHLORPERAZINE EDISYLATE 5 MG: 5 INJECTION INTRAMUSCULAR; INTRAVENOUS at 02:06

## 2022-05-06 RX ADMIN — MAGNESIUM SULFATE 4 G: 4 INJECTION INTRAVENOUS at 09:08

## 2022-05-06 RX ADMIN — ASPIRIN 81 MG: 81 TABLET, COATED ORAL at 09:08

## 2022-05-06 RX ADMIN — INSULIN ASPART 3 UNITS: 100 INJECTION, SOLUTION INTRAVENOUS; SUBCUTANEOUS at 09:05

## 2022-05-06 RX ADMIN — ATORVASTATIN CALCIUM 80 MG: 40 TABLET, FILM COATED ORAL at 20:06

## 2022-05-06 RX ADMIN — ALBUTEROL SULFATE 2.5 MG: 2.5 SOLUTION RESPIRATORY (INHALATION) at 07:16

## 2022-05-06 RX ADMIN — HEPARIN SODIUM 5000 UNITS: 5000 INJECTION INTRAVENOUS; SUBCUTANEOUS at 14:56

## 2022-05-06 RX ADMIN — HYDRALAZINE HYDROCHLORIDE 50 MG: 50 TABLET, FILM COATED ORAL at 20:06

## 2022-05-06 RX ADMIN — CLOPIDOGREL 75 MG: 75 TABLET, FILM COATED ORAL at 09:01

## 2022-05-06 RX ADMIN — HEPARIN SODIUM 5000 UNITS: 5000 INJECTION INTRAVENOUS; SUBCUTANEOUS at 21:46

## 2022-05-06 RX ADMIN — ALBUTEROL SULFATE 2.5 MG: 2.5 SOLUTION RESPIRATORY (INHALATION) at 19:04

## 2022-05-06 RX ADMIN — ALBUTEROL SULFATE 2.5 MG: 2.5 SOLUTION RESPIRATORY (INHALATION) at 00:32

## 2022-05-06 RX ADMIN — HEPARIN SODIUM 5000 UNITS: 5000 INJECTION INTRAVENOUS; SUBCUTANEOUS at 06:22

## 2022-05-06 RX ADMIN — Medication 10 ML: at 09:02

## 2022-05-06 RX ADMIN — NIFEDIPINE 30 MG: 30 TABLET, EXTENDED RELEASE ORAL at 09:08

## 2022-05-06 RX ADMIN — Medication 10 ML: at 20:06

## 2022-05-06 RX ADMIN — ALBUTEROL SULFATE 2.5 MG: 2.5 SOLUTION RESPIRATORY (INHALATION) at 13:11

## 2022-05-06 RX ADMIN — TECHNETIUM TC 99M SESTAMIBI 1 DOSE: 1 INJECTION INTRAVENOUS at 11:35

## 2022-05-06 RX ADMIN — TECHNETIUM TC 99M SESTAMIBI 1 DOSE: 1 INJECTION INTRAVENOUS at 16:03

## 2022-05-06 RX ADMIN — PAROXETINE HYDROCHLORIDE 10 MG: 20 TABLET, FILM COATED ORAL at 06:22

## 2022-05-06 RX ADMIN — HYDRALAZINE HYDROCHLORIDE 50 MG: 50 TABLET, FILM COATED ORAL at 09:02

## 2022-05-06 NOTE — THERAPY EVALUATION
Acute Care - Physical Therapy Initial Evaluation   [place]     Patient Name: Sean Chen  : 1940  MRN: [id number]  Today's Date: 2022   Onset of Illness/Injury or Date of Surgery: 22  Visit Dx:     ICD-10-CM ICD-9-CM   1. Hypertensive urgency  I16.0 401.9   2. Acute renal failure superimposed on chronic kidney disease, unspecified CKD stage, unspecified acute renal failure type (HCC)  N17.9 584.9    N18.9 585.9     Patient Active Problem List   Diagnosis   • Iron deficiency anemia   • Type 2 diabetes mellitus without complication, without long-term current use of insulin (HCC)   • Vitamin D deficiency disease   • Extrinsic asthma   • Essential hypertension   • Mixed hyperlipidemia   • Generalized osteoarthritis   • Gastroesophageal reflux disease without esophagitis   • Meniere's disease   • Elevated liver enzymes   • Chronic seasonal allergic rhinitis   • Healthcare maintenance   • Encounter for immunization   • Low serum vitamin B12   • Memory impairment   • Renal insufficiency   • Bradycardia   • Hypertensive urgency   • Dementia without behavioral disturbance (HCC)   • Hyponatremia   • NSTEMI (non-ST elevated myocardial infarction) ([facility])     Past Medical History:   Diagnosis Date   • Allergic rhinitis    • Elevated liver enzymes    • Extrinsic asthma    • Gastritis    • GERD (gastroesophageal reflux disease)    • Hyperlipidemia    • Hypertension    • Iron deficiency anemia    • Meniere's disease    • Osteoarthritis    • Type 2 diabetes mellitus (HCC)    • Vitamin D deficiency disease      Past Surgical History:   Procedure Laterality Date   • APPENDECTOMY     • CHOLECYSTECTOMY     • HYSTERECTOMY       PT Assessment (last 12 hours)     PT Evaluation and Treatment     Row Name 22 0840          Physical Therapy Time and Intention    Subjective Information complains of;weakness;fatigue  -CT     Document Type evaluation  -CT     Mode of Treatment physical therapy  -CT     Patient Effort  good  -CT     Symptoms Noted During/After Treatment fatigue  -CT     Comment Pt tolerated evaluation well. Pt reports using aaxillary crutches at home PLOF. Pt and spouse report a history of multiple falls. Pt is CGA for all mobility and ambulated in hallway aprox 180 ft with use of RW. Anticipated d/c home with assist and HHPT.  -CT     Row Name 05/06/22 0840          General Information    Patient Profile Reviewed yes  -CT     Onset of Illness/Injury or Date of Surgery 05/04/22  -CT     Referring Physician Chambers  -CT     Patient Observations alert;cooperative;agree to therapy  -CT     Prior Level of Function independent:;min assist:;all household mobility  -CT     Equipment Currently Used at Home crutches, axillary  -CT     Existing Precautions/Restrictions fall  -CT     Equipment Issued to Patient gait belt  -CT     Risks Reviewed patient:  -CT     Benefits Reviewed patient:  -CT     Barriers to Rehab medically complex;cognitive status  -CT     Row Name 05/06/22 0840          Living Environment    Current Living Arrangements home  -CT     People in Home spouse  -CT     Row Name 05/06/22 0840          Cognition    Affect/Mental Status (Cognition) confused  -CT     Orientation Status (Cognition) oriented to;person;place  -CT     Follows Commands (Cognition) follows one-step commands  -CT     Row Name 05/06/22 0840          Range of Motion Comprehensive    Comment, General Range of Motion BLE grossly WFL  -CT     Row Name 05/06/22 0840          Strength Comprehensive (MMT)    Comment, General Manual Muscle Testing (MMT) Assessment BLE grossly 4-/5  -CT     Row Name 05/06/22 0840          Bed Mobility    Bed Mobility bed mobility (all) activities  -CT     All Activities, [place] (Bed Mobility) contact guard  -CT     Bed Mobility, Safety Issues decreased use of arms for pushing/pulling;decreased use of legs for bridging/pushing  -CT     Assistive Device (Bed Mobility) bed rails  -CT     Row Name 05/06/22 0840           Transfers    Transfers stand-sit transfer;sit-stand transfer  -CT     Sit-Stand [place] (Transfers) contact guard  -CT     Stand-Sit [place] (Transfers) contact guard  -CT     Row Name 05/06/22 0840          Sit-Stand Transfer    Assistive Device (Sit-Stand Transfers) walker, front-wheeled  -CT     Row Name 05/06/22 0840          Stand-Sit Transfer    Assistive Device (Stand-Sit Transfers) walker, front-wheeled  -CT     Row Name 05/06/22 0840          Gait/Stairs (Locomotion)    [place] Level (Gait) contact guard  -CT     Assistive Device (Gait) walker, front-wheeled  -CT     Distance in Feet (Gait) 180  -CT     Pattern (Gait) swing-through  -CT     Deviations/Abnormal Patterns (Gait) gait speed decreased  -CT     Bilateral Gait Deviations forward flexed posture  -CT     Row Name 05/06/22 0840          Balance    Balance Assessment sitting static balance;standing static balance  -CT     Static Sitting Balance modified independence  -CT     Position, Sitting Balance sitting edge of bed  -CT     Static Standing Balance contact guard  -CT     Position/Device Used, Standing Balance walker, front-wheeled  -CT     Row Name 05/06/22 0840          Coping    Observed Emotional State calm;cooperative  -CT     Verbalized Emotional State acceptance  -CT     Row Name 05/06/22 0840          Plan of Care Review    Plan of Care Reviewed With patient  -CT     Row Name 05/06/22 0840          Positioning and Restraints    Pre-Treatment Position in bed  -CT     Post Treatment Position bed  -CT     In Bed supine;call light within reach;encouraged to call for assist;exit alarm on;with family/caregiver;side rails up x3  -CT     Row Name 05/06/22 0840          Therapy Assessment/Plan (PT)    Patient/Family Therapy Goals Statement (PT) Pt goals are to return to PLOF  -CT     Functional Level at Time of Evaluation (PT) CGA  -CT     PT Diagnosis (PT) decreased functional mobility  -CT     Rehab Potential (PT) good, to  achieve stated therapy goals  -CT     Criteria for Skilled Interventions Met (PT) yes;skilled treatment is necessary  -CT     Therapy Frequency (PT) 2 times/wk  2-5 times/wk as available  -CT     Predicted Duration of Therapy Intervention (PT) length of stay with HHPT at d/c  -CT     Row Name 05/06/22 0840          Physical Therapy Goals    Bed Mobility Goal Selection (PT) bed mobility, PT goal 1  -CT     Transfer Goal Selection (PT) transfer, PT goal 1  -CT     Gait Training Goal Selection (PT) gait training, PT goal 1  -CT     Row Name 05/06/22 0840          Bed Mobility Goal 1 (PT)    Activity/Assistive Device (Bed Mobility Goal 1, PT) bed mobility activities, all  -CT     [place] Level/Cues Needed (Bed Mobility Goal 1, PT) modified independence  -CT     Time Frame (Bed Mobility Goal 1, PT) by discharge  -CT     Row Name 05/06/22 0840          Transfer Goal 1 (PT)    Activity/Assistive Device (Transfer Goal 1, PT) sit-to-stand/stand-to-sit;bed-to-chair/chair-to-bed  -CT     [place] Level/Cues Needed (Transfer Goal 1, PT) supervision required  -CT     Time Frame (Transfer Goal 1, PT) by discharge  -CT     Row Name 05/06/22 0840          Gait Training Goal 1 (PT)    Activity/Assistive Device (Gait Training Goal 1, PT) gait (walking locomotion);assistive device use  -CT     [place] Level (Gait Training Goal 1, PT) supervision required  -CT     Distance (Gait Training Goal 1, PT) 250  -CT     Time Frame (Gait Training Goal 1, PT) by discharge  -CT           User Key  (r) = Recorded By, (t) = Taken By, (c) = Cosigned By    Initials Name Provider Type    CT Judie Salcedo, PT Physical Therapist                  PT Recommendation and Plan  Planned Therapy Interventions (PT): balance training, bed mobility training, gait training, home exercise program, manual therapy techniques, motor coordination training, neuromuscular re-education, patient/family education, postural re-education, strengthening,  transfer training  Therapy Frequency (PT): 2 times/wk (2-5 times/wk as available)  Plan of Care Reviewed With: patient   Outcome Measures     Row Name 05/06/22 0800             Functional Assessment    Outcome Measure Options AM-PAC 6 Clicks Basic Mobility (PT)  -CT            User Key  (r) = Recorded By, (t) = Taken By, (c) = Cosigned By    Initials Name Provider Type    CT Judie Salcedo, [facility] Physical Therapist                 Time Calculation:    PT Charges     Row Name 05/06/22 1410             Time Calculation    PT Received On 05/06/22  -CT      PT Goal Re-Cert Due Date 05/20/22  -CT            User Key  (r) = Recorded By, (t) = Taken By, (c) = Cosigned By    Initials Name Provider Type    CT Judie Salcedo, [facility] Physical Therapist              Therapy Charges for Today     Code Description Service Date Service Provider Modifiers Qty    36469850353 HC PT EVAL MOD COMPLEXITY 4 5/6/2022 Judie Salcedo, PT GP 1          PT G-Codes  Outcome Measure Options: AM-PAC 6 Clicks Basic Mobility (PT)    Judie Salcedo PT  5/6/2022

## 2022-05-06 NOTE — PLAN OF CARE
Goal Outcome Evaluation:  Plan of Care Reviewed With: patient        Progress: no change  Outcome Evaluation: VSS at time, c/o of chest pain during shift EKG and trop obtained see chart, c/o nausea which resolved with compazine, tolerated RA well, NPO since midnight for stress test, WCTM..

## 2022-05-06 NOTE — PROGRESS NOTES
Nephrology Progress Note      Subjective     Patient denied any chest pain or shortness of breath    Objective       Vital signs :     Temp:  [97.6 °F (36.4 °C)-98.9 °F (37.2 °C)] 97.9 °F (36.6 °C)  Heart Rate:  [37-57] 50  Resp:  [10-22] 18  BP: (111-191)/(37-86) 139/86      Intake/Output Summary (Last 24 hours) at 5/6/2022 1331  Last data filed at 5/5/2022 1805  Gross per 24 hour   Intake 1128.44 ml   Output --   Net 1128.44 ml       Physical Exam:    General Appearance : not in acute distress  Lungs : clear to auscultation, respirations regular  Heart :  regular rhythm & normal rate, normal S1, S2 and no murmur, no rub  Abdomen : normal bowel sounds, no masses, no hepatomegaly, no splenomegaly, soft non-tender and no guarding  Extremities : no edema,  Neurologic :   orientated to person, place, time and situation, Grossly no focal deficits      Laboratory Data :     Albumin Albumin   Date Value Ref Range Status   05/06/2022 2.62 (L) 3.50 - 5.20 g/dL Final   05/05/2022 2.50 (L) 3.50 - 5.20 g/dL Final   05/04/2022 3.69 3.50 - 5.20 g/dL Final      Magnesium Magnesium   Date Value Ref Range Status   05/06/2022 1.5 (L) 1.6 - 2.4 mg/dL Final   05/05/2022 1.8 1.6 - 2.4 mg/dL Final   05/04/2022 1.6 1.6 - 2.4 mg/dL Final          PTH               No results found for: PTH    CBC and coagulation:  Results from last 7 days   Lab Units 05/06/22  0022 05/05/22  1754 05/05/22  0732 05/05/22  0222 05/04/22  1504   CRP mg/dL  --   --   --   --  0.63*   WBC 10*3/mm3 12.12*  --   --  8.64 10.40   HEMOGLOBIN g/dL 7.7* 7.6* 7.1* 5.8* 8.4*   HEMATOCRIT % 22.7* 22.9* 22.5* 17.6* 25.3*   MCV fL 87.3  --   --  87.6 87.2   MCHC g/dL 33.9  --   --  33.0 33.2   PLATELETS 10*3/mm3 222  --   --  250 339   INR   --   --   --   --  0.96     Acid/base balance:      Renal and electrolytes:  Results from last 7 days   Lab Units 05/06/22  0022 05/05/22  0106 05/04/22  1504   SODIUM mmol/L 126* 135* 133*   POTASSIUM mmol/L 4.7 5.0 5.3*   MAGNESIUM  mg/dL 1.5* 1.8 1.6   CHLORIDE mmol/L 106 109* 106   CO2 mmol/L 12.0* 12.7* 13.4*   BUN mg/dL 68* 67* 70*   CREATININE mg/dL 5.53* 5.67* 5.36*   CALCIUM mg/dL 7.4* 8.1* 8.7     Estimated Creatinine Clearance: 8.7 mL/min (A) (by C-G formula based on SCr of 5.53 mg/dL (H)).    Liver and pancreatic function:  Results from last 7 days   Lab Units 05/06/22  0022 05/05/22  0106 05/04/22  1504   ALBUMIN g/dL 2.62* 2.50* 3.69   BILIRUBIN mg/dL 0.3 0.2 0.4   ALK PHOS U/L 90 75 105   AST (SGOT) U/L 10 9 13   ALT (SGPT) U/L 5 5 8         Cardiac:  Results from last 7 days   Lab Units 05/04/22  1504   PROBNP pg/mL 29,931.0*     Liver and pancreatic function:  Results from last 7 days   Lab Units 05/06/22  0022 05/05/22  0106 05/04/22  1504   ALBUMIN g/dL 2.62* 2.50* 3.69   BILIRUBIN mg/dL 0.3 0.2 0.4   ALK PHOS U/L 90 75 105   AST (SGOT) U/L 10 9 13   ALT (SGPT) U/L 5 5 8       Medications :     albuterol, 2.5 mg, Nebulization, 4x Daily - RT  aspirin, 81 mg, Oral, Daily  atorvastatin, 80 mg, Oral, Nightly  clopidogrel, 75 mg, Oral, Daily  heparin (porcine), 5,000 Units, Subcutaneous, Q8H  hydrALAZINE, 50 mg, Oral, TID  Insulin Aspart, 0-7 Units, Subcutaneous, TID AC  NIFEdipine CC, 30 mg, Oral, Q24H  PARoxetine, 10 mg, Oral, QAM  sodium chloride, 10 mL, Intravenous, Q12H      niCARdipine, 5-15 mg/hr, Last Rate: Stopped (05/06/22 1100)  sodium bicarbonate drip less than/equal to 75 mEq/bag, 75 mEq, Last Rate: 75 mEq (05/05/22 2226)          Assessment/Plan     1. PRITI on CKD vs rapidly worsening CKD  2. Metabolic acidosis  3. Anemia  4. 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 DM-ii  6. Essential hypertension  7. Hyperkalemia  8. hyponatremia     Cr only slightly improved to 5.5 from 5.6. nephrotic range proteinuria is likely due to DKD. [place] will proceed to serology.   Educated and cousneled the patient on dialysis.   Will check PTH, phosph, iron studies and continue on bicarb fluid for now.     Unknown baseline Cr, Cr was 1.3  in Feb 2021, 2.9 in 12/2021, and 3.2 in Jan 2022 and now admitted with 5.3  8G, mild hematuria  sono no obstruction.   Given significant anemia, SHPT and worsening renal functions highly likely its worsening CKD  -1/2NS with bicarb 75meq/L at 100ml/h      Jude Muñoz MD  05/06/22  13:31 EDT

## 2022-05-06 NOTE — PLAN OF CARE
Goal Outcome Evaluation:  Plan of Care Reviewed With: patient, family           Outcome Evaluation: Pt is alert to self, disoriented to place, time and situation, up with assist x1, VSS, afebrile. No c/o chest pain or nausea during the shift. Pt got up with PT today and tolerated well. Her Cardene gtt has been stopped, pt tolerated well. Stress test was completed, pending results. Bicarb gtt still infusing. Pt verbalizes no complaints at this time, family at bedside, will continue to monitor.

## 2022-05-06 NOTE — PROGRESS NOTES
"  Patient Identification:  Name:  Sean Chen  Age:  81 y.o.  Sex:  female  :  1940  MRN:  [id number]  Visit Number:  [id number]  Primary care provider:  Ganga Gallardo MD    Reason for follow-up:  NSTEMI, bradycardia and hypertension    Subjective     Patient had 1 brief episode of chest pain last night per RN.  No chest pain-free.  Heart rate stayed in lower 30s last night.  At present, telemetry showed sinus bradycardia with a heart rate in 50s.  BP is controlled.      Medication Review:   albuterol, 2.5 mg, Nebulization, 4x Daily - RT  aspirin, 81 mg, Oral, Daily  atorvastatin, 80 mg, Oral, Nightly  clopidogrel, 75 mg, Oral, Daily  heparin (porcine), 5,000 Units, Subcutaneous, Q8H  hydrALAZINE, 50 mg, Oral, TID  Insulin Aspart, 0-7 Units, Subcutaneous, TID AC  magnesium sulfate, 4 g, Intravenous, Once  NIFEdipine CC, 30 mg, Oral, Q24H  PARoxetine, 10 mg, Oral, QAM  sodium chloride, 10 mL, Intravenous, Q12H          Vital Sign Min/Max for last 24 hours  Temp  Min: 97.6 °F (36.4 °C)  Max: 98.9 °F (37.2 °C)   BP  Min: 111/64  Max: 192/61   Pulse  Min: 36  Max: 57   Resp  Min: 10  Max: 22   SpO2  Min: 93 %  Max: 100 %   No data recorded   Weight  Min: 67.7 kg (149 lb 4 oz)  Max: 67.7 kg (149 lb 4 oz)     Flowsheet Rows    Flowsheet Row First Filed Value   Admission Height 162.6 cm (64\") Documented at 2022 1407   Admission Weight 63.5 kg (140 lb) Documented at 2022 1407          Objective       Physical Exam:     General Appearance:   Alert, cooperative, in no acute distress   Head:   Normocephalic, without obvious abnormality.   Eyes:          Lids and lashes normal, conjunctivae and sclerae normal, no   icterus,    Ears:  Ears appear intact with no abnormalities noted   Throat:   No oral lesions, no thrush, oral mucosa moist   Neck: No adenopathy, supple, trachea midline,  carotid bruit, no JVD   Back:   No significant abnormality   Lungs:   Clear to auscultation,respirations " regular, No added sounds    Heart:   Bradycardia.  Regular rhythm.  Grade 3 x 6 ejection systolic murmur at the aortic area   Chest Wall:  No abnormalities observed   Abdomen   Soft, nontender, no masses, no organomegaly and bowel sounds are heard.           Extremities:  No pedal edema          Telemetry:  Sinus bradycardia.  Heart rate in upper 50s.      Labs  Results from last 7 days   Lab Units 05/06/22  0022 05/05/22  1754 05/05/22  0732 05/05/22  0222 05/04/22  1504 04/29/22  1230   WBC 10*3/mm3 12.12*  --   --  8.64 10.40 10.69   HEMOGLOBIN g/dL 7.7* 7.6* 7.1* 5.8* 8.4* 7.6*   HEMATOCRIT % 22.7* 22.9* 22.5* 17.6* 25.3* 24.1*   PLATELETS 10*3/mm3 222  --   --  250 339 352     Results from last 7 days   Lab Units 05/06/22  0022 05/05/22  0106 05/04/22  1504 04/29/22  1230   SODIUM mmol/L 126* 135* 133* 141   POTASSIUM mmol/L 4.7 5.0 5.3* 5.8*   CHLORIDE mmol/L 106 109* 106 110*   TOTAL CO2 mmol/L  --   --   --  13.4*   CO2 mmol/L 12.0* 12.7* 13.4*  --    BUN mg/dL 68* 67* 70* 73*   CREATININE mg/dL 5.53* 5.67* 5.36* 5.33*   CALCIUM mg/dL 7.4* 8.1* 8.7 8.2*   GLUCOSE mg/dL 190* 140* 112* 128*     Results from last 7 days   Lab Units 05/06/22  0022 05/05/22  0106 05/04/22  1504 04/29/22  1230   BILIRUBIN mg/dL 0.3 0.2 0.4 0.3   ALK PHOS U/L 90 75 105 94   AST (SGOT) U/L 10 9 13 15   ALT (SGPT) U/L 5 5 8 12     Results from last 7 days   Lab Units 05/06/22  0022 05/05/22  0106 05/04/22  1504 04/29/22  1230   MAGNESIUM mg/dL 1.5* 1.8 1.6 1.7     Results from last 7 days   Lab Units 05/04/22  1504   INR  0.96     Results from last 7 days   Lab Units 05/04/22  1504   CRP mg/dL 0.63*     Results from last 7 days   Lab Units 05/05/22  2110 05/04/22 2019 05/04/22  1716   TROPONIN T ng/mL 0.061* 0.058* 0.084*             Assessment/Plan       1.  NSTEMI-  Stable.  LVEF and wall motion are normal.  Continue aspirin and statin.  No beta-blocker due to bradycardia.  Scheduled for nuclear stress test today for ischemia  evaluation and restratification.     2.  Uncontrolled hypertension-  Fairly controlled now on current drug regimen which include hydralazine 50 mg p.o. 3 times daily and amlodipine 10 mg p.o. daily.     3.  Symptomatic bradycardia-   Concern for sinus node dysfunction.  Will continue close monitoring.  May need permanent pacemaker.  Will decide by Monday a.m.  Keep her in NPO status from Monday MN.  I discussed with patient's  and he wanted me to discuss with his son.  Notified RN to call me when his son arrives.      Lexi Roman MD [facility]  05/06/22  10:07 EDT

## 2022-05-06 NOTE — PROGRESS NOTES
Patient Identification:  Name:  Sean Chen  Age:  81 y.o.  Sex:  female  :  1940  MRN:  [id number]  Visit Number:  [id number]  Primary Care Provider:  Ganga Gallardo MD    Length of stay:  2    Subjective/Interval History/Consultants/Procedures     Chief complaint: Abnormal lab    Subjective:  This morning Ms. Chen reports that she is doing well. She reports that she experienced some nausea and vomiting overnight. She states that the emesis appeared yellow and frothy. She reports that she has not vomited anymore this morning and nausea has improved with IV Compazine. She currently denies any chest pain, palpitations, shortness of breath, dizziness, constipation, diarrhea, dysuria, fever, and chills. She does report that she also experienced a brief episode of mild chest pain overnight that she describes as pressure under the left breast. She states that the chest pain dissipated on its own after several minutes. She says that she was resting in bed when chest pain occurred and denies any radiation to neck, shoulders, and back.     Discussed with AM BRYAN Plummer with acute events overnight (see above). Patient experienced episode of chest pain- EKG was obtained with no acute changes; troponin obtained with no significant increase. Patient also became nauseous and vomited; relieved with IV Compazine. Room location at the time of evaluation was [address].  ----------------------------------------------------------------------------------------------------------------------  Current Hospital Meds:  albuterol, 2.5 mg, Nebulization, 4x Daily - RT  aspirin, 81 mg, Oral, Daily  atorvastatin, 80 mg, Oral, Nightly  clopidogrel, 75 mg, Oral, Daily  heparin (porcine), 5,000 Units, Subcutaneous, Q8H  hydrALAZINE, 50 mg, Oral, TID  Insulin Aspart, 0-7 Units, Subcutaneous, TID AC  NIFEdipine CC, 30 mg, Oral, Q24H  PARoxetine, 10 mg, Oral, QAM  sodium chloride, 10 mL, Intravenous, Q12H      niCARdipine, 5-15  mg/hr, Last Rate: Stopped (05/06/22 1100)  sodium bicarbonate drip less than/equal to 75 mEq/bag, 75 mEq, Last Rate: 75 mEq (05/05/22 2226)      ----------------------------------------------------------------------------------------------------------------------      Objective     Vital Signs:  Temp:  [97.4 °F (36.3 °C)-98.9 °F (37.2 °C)] 97.4 °F (36.3 °C)  Heart Rate:  [37-57] 50  Resp:  [10-22] 18  BP: (113-191)/(37-86) 139/86      05/04/22  1815 05/05/22  0503 05/06/22  0500   Weight: 64.2 kg (141 lb 9.6 oz) 67.9 kg (149 lb 11.2 oz) 67.7 kg (149 lb 4 oz)     Body mass index is 24.84 kg/m².    Intake/Output Summary (Last 24 hours) at 5/6/2022 1412  Last data filed at 5/6/2022 1410  Gross per 24 hour   Intake 1128.44 ml   Output 250 ml   Net 878.44 ml     I/O this shift:  In: -   Out: 250 [Urine:250]  NPO Diet NPO Type: Sips with Meds  ----------------------------------------------------------------------------------------------------------------------    Physical Exam  Vitals and nursing note reviewed.   Constitutional:       General: She is awake. She is not in acute distress.     Appearance: She is normal weight.   HENT:      Head: Normocephalic.      Mouth/Throat:      Mouth: Mucous membranes are moist.      Pharynx: Oropharynx is clear.   Eyes:      Extraocular Movements: Extraocular movements intact.      Pupils: Pupils are equal, round, and reactive to light.   Cardiovascular:      Rate and Rhythm: Regular rhythm. Bradycardia present.      Pulses: Normal pulses.           Radial pulses are 2+ on the right side and 2+ on the left side.        Dorsalis pedis pulses are 2+ on the right side and 2+ on the left side.      Heart sounds: Murmur heard.    Systolic murmur is present with a grade of 3/6.    No friction rub. No gallop.   Pulmonary:      Effort: Pulmonary effort is normal. No respiratory distress.      Breath sounds: Normal breath sounds. No wheezing or rhonchi.   Abdominal:      General: Bowel sounds  are normal. There is no distension.      Palpations: Abdomen is soft. There is no mass.      Tenderness: There is no guarding.   Musculoskeletal:         General: No swelling.      Cervical back: Normal range of motion and neck supple.      Right lower leg: No edema.      Left lower leg: No edema.   Skin:     General: Skin is warm and dry.      Capillary Refill: Capillary refill takes 2 to 3 seconds.      Coloration: Skin is pale.   Neurological:      Mental Status: She is alert. Mental status is at baseline. She is disoriented.   Psychiatric:         Mood and Affect: Mood normal.         Speech: Speech normal.         Behavior: Behavior normal. Behavior is cooperative.          ----------------------------------------------------------------------------------------------------------------------  Tele:  Sinus bradycardia 50's during my exam.   ----------------------------------------------------------------------------------------------------------------------  Results from last 7 days   Lab Units 05/05/22  2110 05/04/22  2019 05/04/22  1716 05/04/22  1504   TROPONIN T ng/mL 0.061* 0.058* 0.084* 0.095*   PROBNP pg/mL  --   --   --  29,931.0*     Results from last 7 days   Lab Units 05/06/22  0022 05/05/22  1754 05/05/22  0732 05/05/22  0222 05/04/22  1504   CRP mg/dL  --   --   --   --  0.63*   WBC 10*3/mm3 12.12*  --   --  8.64 10.40   HEMOGLOBIN g/dL 7.7* 7.6* 7.1* 5.8* 8.4*   HEMATOCRIT % 22.7* 22.9* 22.5* 17.6* 25.3*   MCV fL 87.3  --   --  87.6 87.2   MCHC g/dL 33.9  --   --  33.0 33.2   PLATELETS 10*3/mm3 222  --   --  250 339   INR   --   --   --   --  0.96         Results from last 7 days   Lab Units 05/06/22  0022 05/05/22  0106 05/04/22  1504   SODIUM mmol/L 126* 135* 133*   POTASSIUM mmol/L 4.7 5.0 5.3*   MAGNESIUM mg/dL 1.5* 1.8 1.6   CHLORIDE mmol/L 106 109* 106   CO2 mmol/L 12.0* 12.7* 13.4*   BUN mg/dL 68* 67* 70*   CREATININE mg/dL 5.53* 5.67* 5.36*   CALCIUM mg/dL 7.4* 8.1* 8.7   GLUCOSE mg/dL 190*  140* 112*   ALBUMIN g/dL 2.62* 2.50* 3.69   BILIRUBIN mg/dL 0.3 0.2 0.4   ALK PHOS U/L 90 75 105   AST (SGOT) U/L 10 9 13   ALT (SGPT) U/L 5 5 8   Estimated Creatinine Clearance: 8.7 mL/min (A) (by C-G formula based on SCr of 5.53 mg/dL (H)).  No results found for: AMMONIA  Results from last 7 days   Lab Units 05/05/22  0106   CHOLESTEROL mg/dL 98   TRIGLYCERIDES mg/dL 65   HDL CHOL mg/dL 50   LDL CHOL mg/dL 34       Urine Culture   Date Value Ref Range Status   05/04/2022 No growth  Final     ----------------------------------------------------------------------------------------------------------------------  Imaging Results (Last 24 Hours)     Procedure Component Value Units Date/Time    US Renal Bilateral [[id number]] Collected: 05/06/22 0138     Updated: 05/06/22 0140    Narrative:      US Renal Comp    INDICATION:   Acute on chronic kidney disease. Hypertensive urgency.    COMPARISON:   None available.    FINDINGS:   KIDNEYS:  The right kidney measures 11.3 cm. The left kidney measures 9.9 cm. Renal cortical thickness and echogenicity is normal.  No hydronephrosis. There is a 4.1 cm right renal cyst in the lower pole.    URINARY BLADDER:  Not imaged.      Impression:      No hydronephrosis. 4.1 cm right renal cyst.    Signer Name: Tomas Hoffman MD   Signed: 5/6/2022 1:38 AM   Workstation Name: [email or web address]    [facility]        ----------------------------------------------------------------------------------------------------------------------   I have reviewed the above laboratory values for 05/06/22    Assessment/Plan     Active Hospital Problems    Diagnosis  POA   • **Hypertensive urgency [I16.0]  Yes   • Hyponatremia [E87.1]  No   • NSTEMI (non-ST elevated myocardial infarction) ([facility]) [I21.4]  Yes   • Prolonged Q-T interval on ECG [R94.31]  No   • Anemia requiring transfusions [D64.9]  No   • Dementia without behavioral disturbance ([facility]) [F03.90]  Yes   • Bradycardia [R00.1]  Yes    • Mixed hyperlipidemia [E78.2]  Yes   • Essential hypertension [I10]  Yes   • Type 2 diabetes mellitus without complication, without long-term current use of insulin ([facility]) [E11.9]  Yes       ASSESSMENT/PLAN:  -Reports recent chest pain with typical and atypical features, POA  -NSTEMI, POA  -Systolic heart murmur due to mild calcific aortic stenosis, POA  -Hypomagnesemia, POA  -Prolonged QTc, 493  -Troponin 0.095 initially in ED; trended down to 0.084, then 0.058.   -Patient denies chest pain upon my exam today.   -Heparin 5,000 units subcutaneous Q 12 hours for DVT prophylaxis.  -Loading dose of ASA administered.  -Aspirin 81 mg Daily.  -Lipitor 80 mg Nightly.  -Avoid QT prolonging agents as able.  -TTE revealed EF 66-70%.  -Patient with complaints of brief episode of chest pain overnight; Troponin obtained showing no significant increase and EKG obtained with no new changes (confirmed by cardiologist).  -Patient will undergo stress test today per cardiology recommendations.   -Magnesium 1.5 this AM; 4g IV magnesium sulfate ordered and infusing during my exam.    -Hypertensive urgency, POA  -/70 initially in ED.  -Clonidine and Hydralazine given in ED with some decline in BP noted.  -Continue PO Norvasc 10 mg Nightly.  -Avoid medications that may worsen bradycardia.  -VS per hospital policy.  -Goal is to place patient on BP medications that may be taken daily or BID vs. TID due to forgetfulness/compliance.  -PO Hydralazine increased from 25 to 50 mg TID per cardiology.  -Continuing Cardene drip with adjusted parameters to maintain -160; orders to hold Cardene infusion with BP < 120. RN reports patient has required Cardene drip today.      -Acute kidney injury on chronic renal failure, stage IIIb vs. rapidly progressing CKD, POA  -Hyperkalemia, POA  -Hyponatremia  -Creatinine 5.36 initially in ED; baseline creatinine appears to be around 2.9-3.3.  -Creatinine 5.53 today, has improved from previous  value (05/05) of 5.67.  -Hold nephrotoxins as able.  -Strict I/O; although proBNP elevated, chest xray reveals no acute cardiopulmonary findings.  -K+ 5.3 initially in ED; K+ trended down to 4.7 this AM.  -Continuous cardiac monitoring.  -PRITI possibly related to bradycardia and persistent uncontrolled HTN.  -Nephrology following; input greatly appreciated.  -Continuous sodium bicarb gtt infusing at 75 ml/hr per nephrology.  -Renal ultrasound as well as urine protein/creatinine ratio and urine microalbumin/creatinine ratio ordered; pending.  -Sodium currently 126.  -CMP in AM to closely monitor renal function and electrolytes.     -Bradycardia, POA  -SB 40's during ED course with right BBB noted on EKG.  -Continuous cardiac monitoring.  -Patient had been recently referred to cardiology by PCP for symptomatic bradycardia, however patient is unable to relay details due to memory deficits/cognitive decline; per Dr. Roman, patient did not attend follow-up appointment.   -Patient received 1 dose of IV atropine due to sustained bradycardia in the low 30's on (05/05); HR improved slightly afterwards to upper 30-40's. Currently SB 50's.  -Cardiology following; concern for sinus node dysfunction, recommending possible permanent pacemaker implantation.     -Progressive dementia, POA  -Supportive care; provide reorientation.  -Bed alarm/fall precautions.  -Aspiration precautions.  -Patient disoriented at baseline; remains alert to self today.   -Case management following for discharge planning.     -Non-insulin dependent type II DM  -Accu checks AC and HS.  -Hypoglycemia protocol in place.  -Hemoglobin A1C 5.5 (04/29) indicating adequate control of BG recently.  -Low dose SS insulin coverage added since BG slightly increasing (BG was 201 at 06:23 this AM).     -Chronic normocytic anemia, POA  -Hemoglobin initially 8.4 in ED.  -Still no signs of active bleeding; patient denies hematuria, rectal bleeding, coffee-ground  emesis.  -Hemoglobin decreased to 5.8 yesterday, thus 1 unit PRBC's was administered with improvement of Hgb to 7.6 noted.  -Hgb currently stable at 7.7.  -CBC in AM to closely monitor; will also check iron, folate, B12.  -Will plan to transfuse again if Hgb < 7.       ----------  -DVT prophylaxis: heparin 5,000 units subcutaneous Q 12 hours   -Activity: up ad tiffani/fall precautions.  -Expected length of stay:   INPATIENT status due to the need for care which can only be reasonably provided in an hospital setting such as aggressive/expedited ancillary services and/or consultation services, the necessity for IV medications, close physician monitoring and/or the possible need for procedures.  In such, I feel patient's risk for adverse outcomes and need for care warrant INPATIENT evaluation and predict the patient's care encounter to likely last beyond 2 midnights.  -Disposition: pending clinical course.      CODE STATUS: FULL     High risk secondary to hypertensive urgency, bradycardia, NSTEMI with troponin elevation and recent chest pain w/ typical and atypical features, acute anemia, acute on chronic renal failure stage IIIb, hyperkalemia, progressive dementia, and advanced age.         Sanjuanita Madsen, ELGIN  05/06/22  14:12 EDT  Pager #[phone]

## 2022-05-06 NOTE — CASE MANAGEMENT/SOCIAL WORK
Discharge Planning Assessment   M[facility]     Patient Name: Sean Chen  MRN: [id number]  Today's Date: 5/6/2022    Admit Date: 5/4/2022     Discharge Plan     Row Name 05/06/22 0912       Plan    Plan Pt admitted 5/4/22. Pt lives at home with spouse, next door to their son who assists as needed. Pt does not utilize home health services. Pt has a glucometer, no other DME. Pt's family to provide transportation home at discharge, pending improvement. SS following.              DEVONTE Danielson

## 2022-05-07 LAB
ALBUMIN SERPL-MCNC: 2.89 G/DL (ref 3.5–5.2)
ALBUMIN/GLOB SERPL: 1.1 G/DL
ALP SERPL-CCNC: 86 U/L (ref 39–117)
ALT SERPL W P-5'-P-CCNC: 6 U/L (ref 1–33)
ANION GAP SERPL CALCULATED.3IONS-SCNC: 15.9 MMOL/L (ref 5–15)
AST SERPL-CCNC: 15 U/L (ref 1–32)
BASOPHILS # BLD AUTO: 0.05 10*3/MM3 (ref 0–0.2)
BASOPHILS NFR BLD AUTO: 0.4 % (ref 0–1.5)
BILIRUB SERPL-MCNC: 0.3 MG/DL (ref 0–1.2)
BUN SERPL-MCNC: 66 MG/DL (ref 8–23)
BUN/CREAT SERPL: 12.2 (ref 7–25)
C3 SERPL-MCNC: 117 MG/DL (ref 82–167)
C4 SERPL-MCNC: 23 MG/DL (ref 14–44)
CALCIUM SPEC-SCNC: 8 MG/DL (ref 8.6–10.5)
CHLORIDE SERPL-SCNC: 97 MMOL/L (ref 98–107)
CO2 SERPL-SCNC: 14.1 MMOL/L (ref 22–29)
CREAT SERPL-MCNC: 5.4 MG/DL (ref 0.57–1)
DEPRECATED RDW RBC AUTO: 44.6 FL (ref 37–54)
EGFRCR SERPLBLD CKD-EPI 2021: 7.5 ML/MIN/1.73
EOSINOPHIL # BLD AUTO: 0.13 10*3/MM3 (ref 0–0.4)
EOSINOPHIL NFR BLD AUTO: 1 % (ref 0.3–6.2)
ERYTHROCYTE [DISTWIDTH] IN BLOOD BY AUTOMATED COUNT: 14.2 % (ref 12.3–15.4)
FOLATE SERPL-MCNC: 3.44 NG/ML (ref 4.78–24.2)
GLOBULIN UR ELPH-MCNC: 2.6 GM/DL
GLUCOSE BLDC GLUCOMTR-MCNC: 118 MG/DL (ref 70–130)
GLUCOSE BLDC GLUCOMTR-MCNC: 141 MG/DL (ref 70–130)
GLUCOSE BLDC GLUCOMTR-MCNC: 168 MG/DL (ref 70–130)
GLUCOSE SERPL-MCNC: 132 MG/DL (ref 65–99)
HCT VFR BLD AUTO: 21.4 % (ref 34–46.6)
HGB BLD-MCNC: 7.2 G/DL (ref 12–15.9)
IMM GRANULOCYTES # BLD AUTO: 0.06 10*3/MM3 (ref 0–0.05)
IMM GRANULOCYTES NFR BLD AUTO: 0.4 % (ref 0–0.5)
IRON 24H UR-MRATE: 33 MCG/DL (ref 37–145)
IRON SATN MFR SERPL: 16 % (ref 20–50)
LYMPHOCYTES # BLD AUTO: 1.08 10*3/MM3 (ref 0.7–3.1)
LYMPHOCYTES NFR BLD AUTO: 8.1 % (ref 19.6–45.3)
MAGNESIUM SERPL-MCNC: 2.3 MG/DL (ref 1.6–2.4)
MCH RBC QN AUTO: 28.9 PG (ref 26.6–33)
MCHC RBC AUTO-ENTMCNC: 33.6 G/DL (ref 31.5–35.7)
MCV RBC AUTO: 85.9 FL (ref 79–97)
MONOCYTES # BLD AUTO: 0.66 10*3/MM3 (ref 0.1–0.9)
MONOCYTES NFR BLD AUTO: 4.9 % (ref 5–12)
NEUTROPHILS NFR BLD AUTO: 11.42 10*3/MM3 (ref 1.7–7)
NEUTROPHILS NFR BLD AUTO: 85.2 % (ref 42.7–76)
NRBC BLD AUTO-RTO: 0 /100 WBC (ref 0–0.2)
PHOSPHATE SERPL-MCNC: 8.4 MG/DL (ref 2.5–4.5)
PLATELET # BLD AUTO: 248 10*3/MM3 (ref 140–450)
PMV BLD AUTO: 10.2 FL (ref 6–12)
POTASSIUM SERPL-SCNC: 4.3 MMOL/L (ref 3.5–5.2)
PROT SERPL-MCNC: 5.5 G/DL (ref 6–8.5)
PTH-INTACT SERPL-MCNC: 241.7 PG/ML (ref 15–65)
RBC # BLD AUTO: 2.49 10*6/MM3 (ref 3.77–5.28)
SODIUM SERPL-SCNC: 127 MMOL/L (ref 136–145)
TIBC SERPL-MCNC: 201 MCG/DL (ref 298–536)
TRANSFERRIN SERPL-MCNC: 135 MG/DL (ref 200–360)
VIT B12 BLD-MCNC: 424 PG/ML (ref 211–946)
WBC NRBC COR # BLD: 13.4 10*3/MM3 (ref 3.4–10.8)

## 2022-05-07 PROCEDURE — 25010000002 HEPARIN (PORCINE) PER 1000 UNITS: Performed by: STUDENT IN AN ORGANIZED HEALTH CARE EDUCATION/TRAINING PROGRAM

## 2022-05-07 PROCEDURE — 99232 SBSQ HOSP IP/OBS MODERATE 35: CPT | Performed by: PHYSICIAN ASSISTANT

## 2022-05-07 PROCEDURE — 94799 UNLISTED PULMONARY SVC/PX: CPT

## 2022-05-07 PROCEDURE — 84156 ASSAY OF PROTEIN URINE: CPT | Performed by: INTERNAL MEDICINE

## 2022-05-07 PROCEDURE — 83540 ASSAY OF IRON: CPT

## 2022-05-07 PROCEDURE — 94664 DEMO&/EVAL PT USE INHALER: CPT

## 2022-05-07 PROCEDURE — 82962 GLUCOSE BLOOD TEST: CPT

## 2022-05-07 PROCEDURE — 83735 ASSAY OF MAGNESIUM: CPT

## 2022-05-07 PROCEDURE — 85025 COMPLETE CBC W/AUTO DIFF WBC: CPT

## 2022-05-07 PROCEDURE — 84100 ASSAY OF PHOSPHORUS: CPT | Performed by: INTERNAL MEDICINE

## 2022-05-07 PROCEDURE — 25010000002 PROCHLORPERAZINE 10 MG/2ML SOLUTION: Performed by: INTERNAL MEDICINE

## 2022-05-07 PROCEDURE — 80053 COMPREHEN METABOLIC PANEL: CPT

## 2022-05-07 PROCEDURE — 83970 ASSAY OF PARATHORMONE: CPT | Performed by: INTERNAL MEDICINE

## 2022-05-07 PROCEDURE — 84166 PROTEIN E-PHORESIS/URINE/CSF: CPT | Performed by: INTERNAL MEDICINE

## 2022-05-07 PROCEDURE — 99232 SBSQ HOSP IP/OBS MODERATE 35: CPT | Performed by: STUDENT IN AN ORGANIZED HEALTH CARE EDUCATION/TRAINING PROGRAM

## 2022-05-07 PROCEDURE — 94761 N-INVAS EAR/PLS OXIMETRY MLT: CPT

## 2022-05-07 PROCEDURE — 63710000001 INSULIN ASPART PER 5 UNITS: Performed by: STUDENT IN AN ORGANIZED HEALTH CARE EDUCATION/TRAINING PROGRAM

## 2022-05-07 PROCEDURE — 82607 VITAMIN B-12: CPT

## 2022-05-07 PROCEDURE — 84466 ASSAY OF TRANSFERRIN: CPT

## 2022-05-07 PROCEDURE — 82746 ASSAY OF FOLIC ACID SERUM: CPT

## 2022-05-07 RX ORDER — HYDRALAZINE HYDROCHLORIDE 25 MG/1
25 TABLET, FILM COATED ORAL EVERY 8 HOURS SCHEDULED
Status: DISCONTINUED | OUTPATIENT
Start: 2022-05-07 | End: 2022-05-07

## 2022-05-07 RX ORDER — ACETAMINOPHEN 325 MG/1
650 TABLET ORAL EVERY 6 HOURS PRN
Status: DISCONTINUED | OUTPATIENT
Start: 2022-05-07 | End: 2022-05-12 | Stop reason: SDUPTHER

## 2022-05-07 RX ADMIN — HEPARIN SODIUM 5000 UNITS: 5000 INJECTION INTRAVENOUS; SUBCUTANEOUS at 06:18

## 2022-05-07 RX ADMIN — ALBUTEROL SULFATE 2.5 MG: 2.5 SOLUTION RESPIRATORY (INHALATION) at 06:52

## 2022-05-07 RX ADMIN — HYDRALAZINE HYDROCHLORIDE 25 MG: 50 TABLET, FILM COATED ORAL at 08:24

## 2022-05-07 RX ADMIN — Medication 10 ML: at 08:24

## 2022-05-07 RX ADMIN — PAROXETINE HYDROCHLORIDE 10 MG: 20 TABLET, FILM COATED ORAL at 06:22

## 2022-05-07 RX ADMIN — INSULIN ASPART 2 UNITS: 100 INJECTION, SOLUTION INTRAVENOUS; SUBCUTANEOUS at 17:28

## 2022-05-07 RX ADMIN — ATORVASTATIN CALCIUM 80 MG: 40 TABLET, FILM COATED ORAL at 21:37

## 2022-05-07 RX ADMIN — CLOPIDOGREL 75 MG: 75 TABLET, FILM COATED ORAL at 08:24

## 2022-05-07 RX ADMIN — HEPARIN SODIUM 5000 UNITS: 5000 INJECTION INTRAVENOUS; SUBCUTANEOUS at 14:21

## 2022-05-07 RX ADMIN — ACETAMINOPHEN 650 MG: 325 TABLET ORAL at 09:11

## 2022-05-07 RX ADMIN — ASPIRIN 81 MG: 81 TABLET, COATED ORAL at 08:24

## 2022-05-07 RX ADMIN — PROCHLORPERAZINE EDISYLATE 5 MG: 5 INJECTION INTRAMUSCULAR; INTRAVENOUS at 08:23

## 2022-05-07 RX ADMIN — ALBUTEROL SULFATE 2.5 MG: 2.5 SOLUTION RESPIRATORY (INHALATION) at 00:17

## 2022-05-07 RX ADMIN — Medication 10 ML: at 21:37

## 2022-05-07 RX ADMIN — ALBUTEROL SULFATE 2.5 MG: 2.5 SOLUTION RESPIRATORY (INHALATION) at 19:09

## 2022-05-07 RX ADMIN — NIFEDIPINE 60 MG: 30 TABLET, EXTENDED RELEASE ORAL at 08:24

## 2022-05-07 RX ADMIN — HEPARIN SODIUM 5000 UNITS: 5000 INJECTION INTRAVENOUS; SUBCUTANEOUS at 21:37

## 2022-05-07 RX ADMIN — SODIUM BICARBONATE 75 MEQ: 84 INJECTION, SOLUTION INTRAVENOUS at 06:17

## 2022-05-07 RX ADMIN — ALBUTEROL SULFATE 2.5 MG: 2.5 SOLUTION RESPIRATORY (INHALATION) at 13:37

## 2022-05-07 NOTE — PROGRESS NOTES
[facility] HOSPITALIST PROGRESS NOTE     Patient Identification:  Name:  Sean Chen  Age:  81 y.o.  Sex:  female  :  1940  MRN:  [id number]  Visit Number:  [id number]  ROOM: [place] [address]     Primary Care Provider:  Ganga Gallardo MD    Length of stay in inpatient status:  3    Subjective     Chief Compliant:    Chief Complaint   Patient presents with   • Abnormal Lab       History of Presenting Illness: Patient seen and evaluated in follow-up for NSTEMI, bradycardia, hypertensive urgency, and PRITI on CKD.  Patient today at time of examination sitting up in bed eating lunch and denying any acute complaints.  Her blood pressure has been well controlled after adjustment of her blood pressure management and she is been transition to nifedipine monotherapy to encourage compliance.    Objective     Current Hospital Meds:albuterol, 2.5 mg, Nebulization, 4x Daily - RT  aspirin, 81 mg, Oral, Daily  atorvastatin, 80 mg, Oral, Nightly  clopidogrel, 75 mg, Oral, Daily  heparin (porcine), 5,000 Units, Subcutaneous, Q8H  Insulin Aspart, 0-7 Units, Subcutaneous, TID AC  NIFEdipine CC, 60 mg, Oral, Q24H  PARoxetine, 10 mg, Oral, QAM  sodium chloride, 10 mL, Intravenous, Q12H    niCARdipine, 5-15 mg/hr, Last Rate: Stopped (22 1100)        Current Antimicrobial Therapy:  Anti-Infectives (From admission, onward)    None        Current Diuretic Therapy:  Diuretics (From admission, onward)    None        ----------------------------------------------------------------------------------------------------------------------  Vital Signs:  Temp:  [97.6 °F (36.4 °C)-98.6 °F (37 °C)] 98 °F (36.7 °C)  Heart Rate:  [55-63] 61  Resp:  [12-] 18  BP: ()/(33-69) 139/54  SpO2:  [87 %-98 %] 94 %  on   ;   Device (Oxygen Therapy): room air  Body mass index is 24.73 kg/m².    Wt Readings from Last 3 Encounters:   22 67.4 kg (148 lb 9.4 oz)   22 71.2 kg (157 lb)   10/28/21 75.3 kg (166 lb)      Intake & Output (last 3 days)       05/04 0701 05/05 0700 05/05 0701  05/06 0700 05/06 0701 05/07 0700 05/07 0701 05/08 0700    P.O. 500 960  1360    I.V. (mL/kg) 175 (2.6) 2293.4 (33.9) 825 (12.2) 310 (4.6)    Blood  291.2      Total Intake(mL/kg) 675 (9.9) 3544.6 (52.4) 825 (12.2) 1670 (24.8)    Urine (mL/kg/hr) 330  400 (0.2)     Total Output 330  400     Net +345 +3544.6 +425 +1670            Urine Unmeasured Occurrence  2 x  3 x    Stool Unmeasured Occurrence  1 x          Diet Regular; Cardiac  ----------------------------------------------------------------------------------------------------------------------  Physical exam:  Constitutional:  Well-developed and well-nourished.  No acute distress.      HENT:  Head:  Normocephalic and atraumatic.  Mouth:  Moist mucous membranes.    Eyes:  Conjunctivae and EOM are normal. No scleral icterus.    Cardiovascular:  Normal rate, regular rhythm and normal heart sounds with systolic murmur.  Pulmonary/Chest:  No respiratory distress, no wheezes, no crackles, with normal breath sounds and good air movement.  Abdominal:  Soft.  Bowel sounds are normal.  No distension and no tenderness.   Musculoskeletal:  No tenderness, and no deformity.  No red or swollen joints anywhere.    Neurological:  Alert and oriented to person, intermittently to place, but not time.  No cranial nerve deficit.    Skin:  Skin is warm and dry. No rash or lesion noted.  Mild pallor.   Peripheral vascular:  Pulses in all 4 extremities with no clubbing, no cyanosis, no edema.  Psychiatric: Appropriate mood and affect, pleasant.   ----------------------------------------------------------------------------------------------------------------------  Tele:    ----------------------------------------------------------------------------------------------------------------------  Results from last 7 days   Lab Units 05/07/22  0110 05/06/22  0022 05/05/22  1754 05/05/22  0732 05/05/22  0222  05/04/22  1504   CRP mg/dL  --   --   --   --   --  0.63*   WBC 10*3/mm3 13.40* 12.12*  --   --  8.64 10.40   HEMOGLOBIN g/dL 7.2* 7.7* 7.6*   < > 5.8* 8.4*   HEMATOCRIT % 21.4* 22.7* 22.9*   < > 17.6* 25.3*   MCV fL 85.9 87.3  --   --  87.6 87.2   MCHC g/dL 33.6 33.9  --   --  33.0 33.2   PLATELETS 10*3/mm3 248 222  --   --  250 339   INR   --   --   --   --   --  0.96    < > = values in this interval not displayed.         Results from last 7 days   Lab Units 05/07/22  1214 05/07/22  0110 05/06/22  1344 05/06/22  0022 05/05/22  0106   SODIUM mmol/L  --  127* 129* 126* 135*   POTASSIUM mmol/L  --  4.3 4.5 4.7 5.0   MAGNESIUM mg/dL  --  2.3  --  1.5* 1.8   CHLORIDE mmol/L  --  97* 99 106 109*   CO2 mmol/L  --  14.1* 13.7* 12.0* 12.7*   BUN mg/dL  --  66* 66* 68* 67*   CREATININE mg/dL  --  5.40* 5.43* 5.53* 5.67*   CALCIUM mg/dL  --  8.0* 8.4* 7.4* 8.1*   PHOSPHORUS mg/dL 8.4*  --   --   --   --    GLUCOSE mg/dL  --  132* 102* 190* 140*   ALBUMIN g/dL  --  2.89*  --  2.62* 2.50*   BILIRUBIN mg/dL  --  0.3  --  0.3 0.2   ALK PHOS U/L  --  86  --  90 75   AST (SGOT) U/L  --  15  --  10 9   ALT (SGPT) U/L  --  6  --  5 5   Estimated Creatinine Clearance: 8.8 mL/min (A) (by C-G formula based on SCr of 5.4 mg/dL (H)).  No results found for: AMMONIA  Results from last 7 days   Lab Units 05/05/22  2110 05/04/22  2019 05/04/22  1716   TROPONIN T ng/mL 0.061* 0.058* 0.084*     Results from last 7 days   Lab Units 05/04/22  1504   PROBNP pg/mL 29,931.0*     Results from last 7 days   Lab Units 05/05/22  0106   CHOLESTEROL mg/dL 98   TRIGLYCERIDES mg/dL 65   HDL CHOL mg/dL 50   LDL CHOL mg/dL 34     Glucose   Date/Time Value Ref Range Status   05/07/2022 1700 168 (H) 70 - 130 mg/dL Final     Comment:     Meter: [id number] : [id number] [facility]   05/07/2022 1136 141 (H) 70 - 130 mg/dL Final     Comment:     Meter: [id number] : [id number] [facility]   05/07/2022 0612 118 70 - 130 mg/dL Final     Comment:      Meter: [id number] : [id number] KANDACE [facility]   05/06/2022 1713 137 (H) 70 - 130 mg/dL Final     Comment:     Meter: [id number] : [id number] ANGEL CHAO   05/06/2022 1056 125 70 - 130 mg/dL Final     Comment:     Meter: [id number] : [id number] [facility]   05/06/2022 0623 201 (H) 70 - 130 mg/dL Final     Comment:     RN Notified Meter: [id number] : [id number] KANDACE JESUS   05/05/2022 1643 169 (H) 70 - 130 mg/dL Final     Comment:     Meter: [id number] : [id number] [facility]   05/05/2022 1119 166 (H) 70 - 130 mg/dL Final     Comment:     Meter: [id number] : [id number] [facility] m     Lab Results   Component Value Date    TSH 1.750 04/29/2022     No results found for: PREGTESTUR, PREGSERUM, HCG, HCGQUANT  Pain Management Panel     Pain Management Panel Latest Ref Rng & Units 5/5/2022 5/5/2022    CREATININE UR mg/dL 67.4 67.4        Brief Urine Lab Results  (Last result in the past 365 days)      Color   Clarity   Blood   Leuk Est   Nitrite   Protein   CREAT   Urine HCG        05/05/22 1416             67.4         05/05/22 1416             67.4             No results found for: BLOODCX  Urine Culture   Date Value Ref Range Status   05/04/2022 No growth  Final     No results found for: WOUNDCX  No results found for: STOOLCX  No results found for: RESPCX  No results found for: AFBCX  Results from last 7 days   Lab Units 05/04/22  1504   CRP mg/dL 0.63*       I have personally looked at the labs and they are summarized above.  ----------------------------------------------------------------------------------------------------------------------  Detailed radiology reports for the last 24 hours:    Imaging Results (Last 24 Hours)     ** No results found for the last 24 hours. **        Assessment & Plan      Chest pain  NSTEMI  Bradycardia    -Patient with elevated troponins with overall downward trend however still with persistent bradycardia.    -TTE with EF of 66 to 70%.    -Stress test  obtained which shows evidence of ischemia in the anterior wall.    -Cardiology consulted and following along and considering possible pacemaker as well as possible left heart catheter will discuss with nephrology due to her renal function.    -Patient not a candidate for beta-blocker due to bradycardia, continue aspirin and statin and Plavix    Hypertensive urgency    -Patient initially with hypertensive urgency requiring Cardene drip however able to be transitioned to oral antihypertensives and off Cardene.    -Patient initially on amlodipine 10 mg and hydralazine 25 3 times daily but still having some elevated pressures.  Family reports difficulty with compliance as patient is often forgetful or does not want take medications and ideally if patient can be on a once daily antihypertensives this would be ideal.  To that end patient was discontinued off of hydralazine and placed on nifedipine at 60 mg with good blood pressure control.  We will  plan for continuation nifedipine for now    PRITI on CKD stage IIIb  Concern for rapidly progressing CKD  Moderate hyponatremia    -Creatinine remains essentially with little to no change with creatinine remaining in the mid fives.    -Patient with nephrotic range proteinuria likely due to component of diabetic nephropathy.    -Nephrology consulted and following along and obtaining serology.  They suspect the patient has likely rapidly progressing chronic kidney disease.    -Half-normal saline with bicarb was discontinued to avoid further volume overload due to lack of improvement in renal function.    -Indication for dialysis at this time.    Dementia    -Continue supportive care    Diabetes mellitus type 2    -Continue sliding scale insulin for now as patient with out significantly elevated glucoses.    Depression    -Continue Paxil.    Chronic normocytic anemia    -Patient status post transfusion 1 packed red blood cells thus far this hospitalization.  Suspect due to her  chronic kidney disease.    -Hemoglobin remained stable at this time, continue to monitor      VTE Prophylaxis:   Mechanical Order History:     None      Pharmalogical Order History:      Ordered     Dose Route Frequency Stop    05/04/22 1810  heparin (porcine) 5000 UNIT/ML injection 5,000 Units         5,000 Units SC Every 8 Hours Scheduled --                Disposition Home once medically stable and improved.    Shaw Tomas DO  [facility] Hospitalist  05/07/22  18:59 EDT

## 2022-05-07 NOTE — PROGRESS NOTES
LOS: 3 days     Name: Sean Chen  Age/Sex: 81 y.o. female  :  1940        PCP: Ganga Gallardo MD    Principal Problem:    Hypertensive urgency  Active Problems:    Type 2 diabetes mellitus without complication, without long-term current use of insulin (HCC)    Essential hypertension    Mixed hyperlipidemia    Bradycardia    Dementia without behavioral disturbance (HCC)    Hyponatremia    NSTEMI (non-ST elevated myocardial infarction) (HCC)    Prolonged Q-T interval on ECG    Anemia requiring transfusions    Following for: NSTEMI    Telemetry Monitoring: sinus in the 50s    Interval history: resting comfortably, denied any chest pains or shortness of breath.     Subjective     ROS    Vital Signs  Vitals:    22 0500 22 0602 22 0652 22 0704   BP:  121/60     Pulse:  62 56 58   Resp:  17 18 18   Temp:       TempSrc:       SpO2:  91% 93% 92%   Weight: 67.4 kg (148 lb 9.4 oz)      Height:         Body mass index is 24.73 kg/m².      Intake/Output Summary (Last 24 hours) at 2022 0905  Last data filed at 2022 0617  Gross per 24 hour   Intake 825 ml   Output 400 ml   Net 425 ml       Constitutional:       General: Not in acute distress.     Appearance: Healthy appearance. Well-developed and not in distress. Not diaphoretic.   Eyes:      Conjunctiva/sclera: Conjunctivae normal.      Pupils: Pupils are equal, round, and reactive to light.   HENT:      Head: Normocephalic and atraumatic.   Neck:      Vascular: No carotid bruit or JVD.   Pulmonary:      Effort: Pulmonary effort is normal. No respiratory distress.      Breath sounds: Normal breath sounds.   Cardiovascular:      Bradycardia present. Regular rhythm.   Edema:     Peripheral edema absent.   Skin:     General: Skin is cool.   Neurological:      Mental Status: Alert, oriented to person, place, and time and oriented to person, place and time.         Results Review:     Results from last 7 days   Lab Units  05/07/22  0110 05/06/22  0022 05/05/22  1754 05/05/22  0732 05/05/22  0222 05/04/22  1504   WBC 10*3/mm3 13.40* 12.12*  --   --  8.64 10.40   HEMOGLOBIN g/dL 7.2* 7.7* 7.6* 7.1* 5.8* 8.4*   PLATELETS 10*3/mm3 248 222  --   --  250 339     Results from last 7 days   Lab Units 05/07/22  0110 05/06/22  1344 05/06/22  0022 05/05/22  0106 05/04/22  1504   SODIUM mmol/L 127* 129* 126* 135* 133*   POTASSIUM mmol/L 4.3 4.5 4.7 5.0 5.3*   CHLORIDE mmol/L 97* 99 106 109* 106   CO2 mmol/L 14.1* 13.7* 12.0* 12.7* 13.4*   BUN mg/dL 66* 66* 68* 67* 70*   CREATININE mg/dL 5.40* 5.43* 5.53* 5.67* 5.36*   CALCIUM mg/dL 8.0* 8.4* 7.4* 8.1* 8.7   GLUCOSE mg/dL 132* 102* 190* 140* 112*   ALT (SGPT) U/L 6  --  5 5 8   AST (SGOT) U/L 15  --  10 9 13     Results from last 7 days   Lab Units 05/05/22  2110 05/04/22  2019 05/04/22  1716 05/04/22  1504   TROPONIN T ng/mL 0.061* 0.058* 0.084* 0.095*       Results from last 7 days   Lab Units 05/04/22  1504   INR  0.96       I reviewed the patient's new clinical results.  I reviewed the patient's new imaging results and agree with the interpretation.  I personally viewed and interpreted the patient's EKG/Telemetry data    Medication Review:   albuterol, 2.5 mg, Nebulization, 4x Daily - RT  aspirin, 81 mg, Oral, Daily  atorvastatin, 80 mg, Oral, Nightly  clopidogrel, 75 mg, Oral, Daily  heparin (porcine), 5,000 Units, Subcutaneous, Q8H  hydrALAZINE, 25 mg, Oral, Q8H  Insulin Aspart, 0-7 Units, Subcutaneous, TID AC  NIFEdipine CC, 60 mg, Oral, Q24H  PARoxetine, 10 mg, Oral, QAM  sodium chloride, 10 mL, Intravenous, Q12H      niCARdipine, 5-15 mg/hr, Last Rate: Stopped (05/06/22 1100)  sodium bicarbonate drip less than/equal to 75 mEq/bag, 75 mEq, Last Rate: 75 mEq (05/07/22 0617)        Assessment:  NSTEMI  Uncontrolled hypertension, improved  Symptomatic bradycardia      Recommendations:   discussed results of stress test with patient which did show evidence of ischemia in the anterior wall.  Will discuss with nephrology as pt is high risk for dialysis given baseline Cr   Bradycardic in the 50s asymptomatic but hasnt ambulated. Will continue to monitor.    I discussed the patients findings and my recommendations with patient and family    Santiago Caldwell PA-C  05/07/22  09:05 EDT

## 2022-05-07 NOTE — PLAN OF CARE
Problem: Adult Inpatient Plan of Care  Goal: Plan of Care Review  Outcome: Ongoing, Progressing  Flowsheets (Taken 5/7/2022 1443)  Progress: no change  Plan of Care Reviewed With: patient  Outcome Evaluation: ALERT AND ORIENTED TO SELF AND PLACE THIS SHIFT, BICARD DRIP DISCONTINUED, 24 HOURT URINE SPECIMEN TO LAB AS ORDERED  Goal: Patient-Specific Goal (Individualized)  Outcome: Ongoing, Progressing  Goal: Absence of Hospital-Acquired Illness or Injury  Outcome: Ongoing, Progressing  Intervention: Identify and Manage Fall Risk  Recent Flowsheet Documentation  Taken 5/7/2022 1400 by Bibiana Kaur RN  Safety Promotion/Fall Prevention:   toileting scheduled   safety round/check completed   room organization consistent   fall prevention program maintained   activity supervised   clutter free environment maintained  Taken 5/7/2022 1300 by Bibiana Kaur RN  Safety Promotion/Fall Prevention:   safety round/check completed   toileting scheduled   room organization consistent  Taken 5/7/2022 1100 by Bibiana Kaur RN  Safety Promotion/Fall Prevention:   safety round/check completed   toileting scheduled   room organization consistent  Taken 5/7/2022 0900 by Bibiana Kaur RN  Safety Promotion/Fall Prevention:   safety round/check completed   toileting scheduled   room organization consistent  Taken 5/7/2022 0800 by Bibiana Kaur RN  Safety Promotion/Fall Prevention:   toileting scheduled   safety round/check completed   room organization consistent   activity supervised   clutter free environment maintained   lighting adjusted   muscle strengthening facilitated   fall prevention program maintained  Taken 5/7/2022 0700 by Bibiana Kaur RN  Safety Promotion/Fall Prevention:   safety round/check completed   toileting scheduled   room organization consistent  Intervention: Prevent Skin Injury  Recent Flowsheet Documentation  Taken 5/7/2022 1400 by Bibiana Kaur RN  Body Position:   right   turned  Taken  5/7/2022 1200 by Bibiana Kaur RN  Body Position:   left   turned  Taken 5/7/2022 0900 by Bibiana Kaur RN  Body Position:   right   turned  Taken 5/7/2022 0700 by Bibiana Kaur RN  Body Position: sitting up in bed  Intervention: Prevent and Manage VTE (Venous Thromboembolism) Risk  Recent Flowsheet Documentation  Taken 5/7/2022 0800 by Bibiana Kaur RN  VTE Prevention/Management: (sweew mar) other (see comments)  Intervention: Prevent Infection  Recent Flowsheet Documentation  Taken 5/7/2022 1400 by Bibiana aKur RN  Infection Prevention:   hand hygiene promoted   single patient room provided  Taken 5/7/2022 0800 by Bibiana Kaur RN  Infection Prevention:   hand hygiene promoted   single patient room provided  Goal: Optimal Comfort and Wellbeing  Outcome: Ongoing, Progressing  Intervention: Provide Person-Centered Care  Recent Flowsheet Documentation  Taken 5/7/2022 1400 by Bibiana Kaur RN  Trust Relationship/Rapport:   care explained   choices provided   emotional support provided   empathic listening provided   questions answered   questions encouraged   reassurance provided   thoughts/feelings acknowledged  Taken 5/7/2022 0800 by Bibiana Kaur RN  Trust Relationship/Rapport:   care explained   choices provided   emotional support provided   empathic listening provided   questions answered   questions encouraged   reassurance provided   thoughts/feelings acknowledged  Goal: Readiness for Transition of Care  Outcome: Ongoing, Progressing     Problem: Fall Injury Risk  Goal: Absence of Fall and Fall-Related Injury  Outcome: Ongoing, Progressing  Intervention: Identify and Manage Contributors  Recent Flowsheet Documentation  Taken 5/7/2022 1400 by Bibiana Kaur RN  Medication Review/Management: medications reviewed  Taken 5/7/2022 1300 by Bibiana Kaur RN  Medication Review/Management: medications reviewed  Taken 5/7/2022 1100 by Bibiana Kaur RN  Medication Review/Management:  medications reviewed  Taken 5/7/2022 0900 by Bibiana Kaur RN  Medication Review/Management: medications reviewed  Taken 5/7/2022 0800 by Bibiana Kaur RN  Medication Review/Management: medications reviewed  Taken 5/7/2022 0700 by Bibiana Kaur RN  Medication Review/Management: medications reviewed  Intervention: Promote Injury-Free Environment  Recent Flowsheet Documentation  Taken 5/7/2022 1400 by Bibiana Kaur RN  Safety Promotion/Fall Prevention:   toileting scheduled   safety round/check completed   room organization consistent   fall prevention program maintained   activity supervised   clutter free environment maintained  Taken 5/7/2022 1300 by Bibiana Kaur RN  Safety Promotion/Fall Prevention:   safety round/check completed   toileting scheduled   room organization consistent  Taken 5/7/2022 1100 by Bibiana Kaur RN  Safety Promotion/Fall Prevention:   safety round/check completed   toileting scheduled   room organization consistent  Taken 5/7/2022 0900 by Bibiana Kaur RN  Safety Promotion/Fall Prevention:   safety round/check completed   toileting scheduled   room organization consistent  Taken 5/7/2022 0800 by Bibiana Kaur RN  Safety Promotion/Fall Prevention:   toileting scheduled   safety round/check completed   room organization consistent   activity supervised   clutter free environment maintained   lighting adjusted   muscle strengthening facilitated   fall prevention program maintained  Taken 5/7/2022 0700 by Bibiana Kaur RN  Safety Promotion/Fall Prevention:   safety round/check completed   toileting scheduled   room organization consistent     Problem: Diabetes Comorbidity  Goal: Blood Glucose Level Within Targeted Range  Outcome: Ongoing, Progressing  Intervention: Monitor and Manage Glycemia  Recent Flowsheet Documentation  Taken 5/7/2022 0800 by Bibiana Kaur RN  Glycemic Management: blood glucose monitored     Problem: Heart Failure Comorbidity  Goal:  Maintenance of Heart Failure Symptom Control  Outcome: Ongoing, Progressing  Intervention: Maintain Heart Failure-Management  Recent Flowsheet Documentation  Taken 5/7/2022 1400 by Bibiana Kaur RN  Medication Review/Management: medications reviewed  Taken 5/7/2022 1300 by Bibiana Karu RN  Medication Review/Management: medications reviewed  Taken 5/7/2022 1100 by Bibiana Kaur RN  Medication Review/Management: medications reviewed  Taken 5/7/2022 0900 by Bibiana Kuar RN  Medication Review/Management: medications reviewed  Taken 5/7/2022 0800 by Bibiana Kaur RN  Medication Review/Management: medications reviewed  Taken 5/7/2022 0700 by Bibiana Kaur RN  Medication Review/Management: medications reviewed     Problem: Hypertension Comorbidity  Goal: Blood Pressure in Desired Range  Outcome: Ongoing, Progressing  Intervention: Maintain Blood Pressure Management  Recent Flowsheet Documentation  Taken 5/7/2022 1400 by Bibiana Kaur RN  Medication Review/Management: medications reviewed  Taken 5/7/2022 1300 by Bibiana Kaur RN  Medication Review/Management: medications reviewed  Taken 5/7/2022 1100 by Bibiana Kaur RN  Medication Review/Management: medications reviewed  Taken 5/7/2022 0900 by Bibiana Kaur RN  Medication Review/Management: medications reviewed  Taken 5/7/2022 0800 by Bibiana Kaur RN  Medication Review/Management: medications reviewed  Taken 5/7/2022 0700 by Bibiana Kaur RN  Medication Review/Management: medications reviewed     Problem: Skin Injury Risk Increased  Goal: Skin Health and Integrity  Outcome: Ongoing, Progressing  Intervention: Optimize Skin Protection  Recent Flowsheet Documentation  Taken 5/7/2022 1400 by Bibiana Kaur RN  Head of Bed ([facility]) Positioning: HOB at 45 degrees  Taken 5/7/2022 1300 by Bibiana Kaur RN  Head of Bed ([facility]) Positioning: HOB at 30 degrees  Taken 5/7/2022 1200 by Bibiana Kaur RN  Head of Bed ([facility]) Positioning: HOB at 30  degrees  Taken 5/7/2022 1100 by Bibiana Kaur RN  Head of Bed (HOB) Positioning: HOB at 30-45 degrees  Taken 5/7/2022 0900 by Bibiana Kaur RN  Head of Bed (HOB) Positioning: HOB at 30 degrees  Taken 5/7/2022 0800 by Bibiana Kaur RN  Head of Bed (HOB) Positioning: HOB at 45 degrees  Taken 5/7/2022 0700 by Bibiana Kaur RN  Head of Bed (HOB) Positioning: HOB at 60 degrees   Goal Outcome Evaluation:  Plan of Care Reviewed With: patient        Progress: no change  Outcome Evaluation: ALERT AND ORIENTED TO SELF AND PLACE THIS SHIFT, BICARD DRIP DISCONTINUED, 24 HOURT URINE SPECIMEN TO LAB AS ORDERED

## 2022-05-07 NOTE — PROGRESS NOTES
Nephrology Progress Note      Subjective     Patient denied any chest pain or shortness of breath    Objective       Vital signs :     Temp:  [97.4 °F (36.3 °C)-98.6 °F (37 °C)] 97.8 °F (36.6 °C)  Heart Rate:  [49-62] 55  Resp:  [12-22] 18  BP: ()/(33-99) 115/44      Intake/Output Summary (Last 24 hours) at 5/7/2022 1146  Last data filed at 5/7/2022 0800  Gross per 24 hour   Intake 1065 ml   Output 400 ml   Net 665 ml       Physical Exam:    General Appearance : not in acute distress  Lungs : clear to auscultation, respirations regular  Heart :  regular rhythm & normal rate, normal S1, S2 and no murmur, no rub  Abdomen : normal bowel sounds, no masses, no hepatomegaly, no splenomegaly, soft non-tender and no guarding  Extremities : no edema,  Neurologic :   orientated to person, place, time and situation, Grossly no focal deficits      Laboratory Data :     Albumin Albumin   Date Value Ref Range Status   05/07/2022 2.89 (L) 3.50 - 5.20 g/dL Final   05/06/2022 2.62 (L) 3.50 - 5.20 g/dL Final   05/05/2022 2.50 (L) 3.50 - 5.20 g/dL Final   05/04/2022 3.69 3.50 - 5.20 g/dL Final      Magnesium Magnesium   Date Value Ref Range Status   05/07/2022 2.3 1.6 - 2.4 mg/dL Final   05/06/2022 1.5 (L) 1.6 - 2.4 mg/dL Final   05/05/2022 1.8 1.6 - 2.4 mg/dL Final   05/04/2022 1.6 1.6 - 2.4 mg/dL Final          PTH               No results found for: PTH    CBC and coagulation:  Results from last 7 days   Lab Units 05/07/22  0110 05/06/22  0022 05/05/22  1754 05/05/22  0732 05/05/22  0222 05/04/22  1504   CRP mg/dL  --   --   --   --   --  0.63*   WBC 10*3/mm3 13.40* 12.12*  --   --  8.64 10.40   HEMOGLOBIN g/dL 7.2* 7.7* 7.6*   < > 5.8* 8.4*   HEMATOCRIT % 21.4* 22.7* 22.9*   < > 17.6* 25.3*   MCV fL 85.9 87.3  --   --  87.6 87.2   MCHC g/dL 33.6 33.9  --   --  33.0 33.2   PLATELETS 10*3/mm3 248 222  --   --  250 339   INR   --   --   --   --   --  0.96    < > = values in this interval not displayed.     Acid/base balance:       Renal and electrolytes:  Results from last 7 days   Lab Units 05/07/22  0110 05/06/22  1344 05/06/22  0022 05/05/22  0106 05/04/22  1504   SODIUM mmol/L 127* 129* 126* 135* 133*   POTASSIUM mmol/L 4.3 4.5 4.7 5.0 5.3*   MAGNESIUM mg/dL 2.3  --  1.5* 1.8 1.6   CHLORIDE mmol/L 97* 99 106 109* 106   CO2 mmol/L 14.1* 13.7* 12.0* 12.7* 13.4*   BUN mg/dL 66* 66* 68* 67* 70*   CREATININE mg/dL 5.40* 5.43* 5.53* 5.67* 5.36*   CALCIUM mg/dL 8.0* 8.4* 7.4* 8.1* 8.7     Estimated Creatinine Clearance: 8.8 mL/min (A) (by C-G formula based on SCr of 5.4 mg/dL (H)).    Liver and pancreatic function:  Results from last 7 days   Lab Units 05/07/22  0110 05/06/22  0022 05/05/22  0106   ALBUMIN g/dL 2.89* 2.62* 2.50*   BILIRUBIN mg/dL 0.3 0.3 0.2   ALK PHOS U/L 86 90 75   AST (SGOT) U/L 15 10 9   ALT (SGPT) U/L 6 5 5         Cardiac:  Results from last 7 days   Lab Units 05/04/22  1504   PROBNP pg/mL 29,931.0*     Liver and pancreatic function:  Results from last 7 days   Lab Units 05/07/22  0110 05/06/22  0022 05/05/22  0106   ALBUMIN g/dL 2.89* 2.62* 2.50*   BILIRUBIN mg/dL 0.3 0.3 0.2   ALK PHOS U/L 86 90 75   AST (SGOT) U/L 15 10 9   ALT (SGPT) U/L 6 5 5       Medications :     albuterol, 2.5 mg, Nebulization, 4x Daily - RT  aspirin, 81 mg, Oral, Daily  atorvastatin, 80 mg, Oral, Nightly  clopidogrel, 75 mg, Oral, Daily  heparin (porcine), 5,000 Units, Subcutaneous, Q8H  Insulin Aspart, 0-7 Units, Subcutaneous, TID AC  NIFEdipine CC, 60 mg, Oral, Q24H  PARoxetine, 10 mg, Oral, QAM  sodium chloride, 10 mL, Intravenous, Q12H      niCARdipine, 5-15 mg/hr, Last Rate: Stopped (05/06/22 1100)  sodium bicarbonate drip less than/equal to 75 mEq/bag, 75 mEq, Last Rate: 75 mEq (05/07/22 0617)          Assessment/Plan     1. PRITI on CKD vs rapidly worsening CKD  2. Metabolic acidosis  3. Anemia  4. 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 DM-ii  6. Essential hypertension  7. Hyperkalemia  8. hyponatremia     Grossly unchanged renal functions.  No significant improvement with IVF, so will stop the fluid to avoid fluid overload.   PTH, PO4, serology are pending. Discussed in detail with patient about dialysis again, there is no emergent  Indication for dialysis, continue to watch closely.     Unknown baseline Cr, Cr was 1.3 in Feb 2021, 2.9 in 12/2021, and 3.2 in Jan 2022 and now admitted with 5.3  8G, mild hematuria  sono no obstruction.   Given significant anemia, SHPT and worsening renal functions highly likely its worsening CKD  -DC 1/2NS with bicarb 75meq/L at 100ml/h  -follow up serology, PTH, PO4, iron panel      Jude Muñoz MD  05/07/22  11:46 EDT

## 2022-05-07 NOTE — PLAN OF CARE
Goal Outcome Evaluation:  Plan of Care Reviewed With: patient        Progress: improving  Outcome Evaluation: VSS, Alert to self, no c/o of chest pain or nausea tonight, SBP remained less than 160 thur night off cardene, bicarb infusing as ordered, 24 hour urnie in progress at time, no signs of distress noted at time, WCTM...

## 2022-05-08 LAB
ALBUMIN SERPL-MCNC: 2.79 G/DL (ref 3.5–5.2)
ALBUMIN/GLOB SERPL: 0.9 G/DL
ALP SERPL-CCNC: 84 U/L (ref 39–117)
ALT SERPL W P-5'-P-CCNC: 8 U/L (ref 1–33)
ANION GAP SERPL CALCULATED.3IONS-SCNC: 19.5 MMOL/L (ref 5–15)
AST SERPL-CCNC: 18 U/L (ref 1–32)
BASOPHILS # BLD AUTO: 0.04 10*3/MM3 (ref 0–0.2)
BASOPHILS NFR BLD AUTO: 0.3 % (ref 0–1.5)
BILIRUB SERPL-MCNC: 0.4 MG/DL (ref 0–1.2)
BUN SERPL-MCNC: 72 MG/DL (ref 8–23)
BUN/CREAT SERPL: 12.7 (ref 7–25)
CALCIUM SPEC-SCNC: 8.1 MG/DL (ref 8.6–10.5)
CHLORIDE SERPL-SCNC: 96 MMOL/L (ref 98–107)
CO2 SERPL-SCNC: 12.5 MMOL/L (ref 22–29)
CREAT SERPL-MCNC: 5.69 MG/DL (ref 0.57–1)
DEPRECATED RDW RBC AUTO: 45 FL (ref 37–54)
EGFRCR SERPLBLD CKD-EPI 2021: 7.1 ML/MIN/1.73
EOSINOPHIL # BLD AUTO: 0.1 10*3/MM3 (ref 0–0.4)
EOSINOPHIL NFR BLD AUTO: 0.7 % (ref 0.3–6.2)
ERYTHROCYTE [DISTWIDTH] IN BLOOD BY AUTOMATED COUNT: 14.3 % (ref 12.3–15.4)
GLOBULIN UR ELPH-MCNC: 3 GM/DL
GLUCOSE BLDC GLUCOMTR-MCNC: 128 MG/DL (ref 70–130)
GLUCOSE BLDC GLUCOMTR-MCNC: 178 MG/DL (ref 70–130)
GLUCOSE BLDC GLUCOMTR-MCNC: 185 MG/DL (ref 70–130)
GLUCOSE SERPL-MCNC: 127 MG/DL (ref 65–99)
HCT VFR BLD AUTO: 20.2 % (ref 34–46.6)
HCT VFR BLD AUTO: 20.6 % (ref 34–46.6)
HCT VFR BLD AUTO: 25.1 % (ref 34–46.6)
HGB BLD-MCNC: 6.9 G/DL (ref 12–15.9)
HGB BLD-MCNC: 6.9 G/DL (ref 12–15.9)
HGB BLD-MCNC: 8.4 G/DL (ref 12–15.9)
IMM GRANULOCYTES # BLD AUTO: 0.08 10*3/MM3 (ref 0–0.05)
IMM GRANULOCYTES NFR BLD AUTO: 0.5 % (ref 0–0.5)
LYMPHOCYTES # BLD AUTO: 0.72 10*3/MM3 (ref 0.7–3.1)
LYMPHOCYTES NFR BLD AUTO: 4.8 % (ref 19.6–45.3)
MCH RBC QN AUTO: 29 PG (ref 26.6–33)
MCHC RBC AUTO-ENTMCNC: 33.5 G/DL (ref 31.5–35.7)
MCV RBC AUTO: 86.6 FL (ref 79–97)
MONOCYTES # BLD AUTO: 0.7 10*3/MM3 (ref 0.1–0.9)
MONOCYTES NFR BLD AUTO: 4.7 % (ref 5–12)
NEUTROPHILS NFR BLD AUTO: 13.26 10*3/MM3 (ref 1.7–7)
NEUTROPHILS NFR BLD AUTO: 89 % (ref 42.7–76)
NRBC BLD AUTO-RTO: 0 /100 WBC (ref 0–0.2)
PLATELET # BLD AUTO: 239 10*3/MM3 (ref 140–450)
PMV BLD AUTO: 10.5 FL (ref 6–12)
POTASSIUM SERPL-SCNC: 5.1 MMOL/L (ref 3.5–5.2)
PROT SERPL-MCNC: 5.8 G/DL (ref 6–8.5)
QT INTERVAL: 484 MS
QTC INTERVAL: 471 MS
RBC # BLD AUTO: 2.38 10*6/MM3 (ref 3.77–5.28)
SODIUM SERPL-SCNC: 128 MMOL/L (ref 136–145)
TROPONIN T SERPL-MCNC: 0.12 NG/ML (ref 0–0.03)
WBC NRBC COR # BLD: 14.9 10*3/MM3 (ref 3.4–10.8)

## 2022-05-08 PROCEDURE — 85025 COMPLETE CBC W/AUTO DIFF WBC: CPT | Performed by: STUDENT IN AN ORGANIZED HEALTH CARE EDUCATION/TRAINING PROGRAM

## 2022-05-08 PROCEDURE — 80053 COMPREHEN METABOLIC PANEL: CPT | Performed by: STUDENT IN AN ORGANIZED HEALTH CARE EDUCATION/TRAINING PROGRAM

## 2022-05-08 PROCEDURE — 93005 ELECTROCARDIOGRAM TRACING: CPT | Performed by: STUDENT IN AN ORGANIZED HEALTH CARE EDUCATION/TRAINING PROGRAM

## 2022-05-08 PROCEDURE — 94799 UNLISTED PULMONARY SVC/PX: CPT

## 2022-05-08 PROCEDURE — 36430 TRANSFUSION BLD/BLD COMPNT: CPT

## 2022-05-08 PROCEDURE — P9016 RBC LEUKOCYTES REDUCED: HCPCS

## 2022-05-08 PROCEDURE — 25010000002 NA FERRIC GLUC CPLX PER 12.5 MG: Performed by: INTERNAL MEDICINE

## 2022-05-08 PROCEDURE — 86900 BLOOD TYPING SEROLOGIC ABO: CPT

## 2022-05-08 PROCEDURE — 85014 HEMATOCRIT: CPT | Performed by: INTERNAL MEDICINE

## 2022-05-08 PROCEDURE — 93010 ELECTROCARDIOGRAM REPORT: CPT | Performed by: INTERNAL MEDICINE

## 2022-05-08 PROCEDURE — 25010000002 HEPARIN (PORCINE) PER 1000 UNITS: Performed by: STUDENT IN AN ORGANIZED HEALTH CARE EDUCATION/TRAINING PROGRAM

## 2022-05-08 PROCEDURE — 85018 HEMOGLOBIN: CPT | Performed by: STUDENT IN AN ORGANIZED HEALTH CARE EDUCATION/TRAINING PROGRAM

## 2022-05-08 PROCEDURE — 82962 GLUCOSE BLOOD TEST: CPT

## 2022-05-08 PROCEDURE — 84484 ASSAY OF TROPONIN QUANT: CPT | Performed by: STUDENT IN AN ORGANIZED HEALTH CARE EDUCATION/TRAINING PROGRAM

## 2022-05-08 PROCEDURE — 94761 N-INVAS EAR/PLS OXIMETRY MLT: CPT

## 2022-05-08 PROCEDURE — 99233 SBSQ HOSP IP/OBS HIGH 50: CPT | Performed by: STUDENT IN AN ORGANIZED HEALTH CARE EDUCATION/TRAINING PROGRAM

## 2022-05-08 PROCEDURE — 99233 SBSQ HOSP IP/OBS HIGH 50: CPT | Performed by: PHYSICIAN ASSISTANT

## 2022-05-08 PROCEDURE — 63710000001 INSULIN ASPART PER 5 UNITS: Performed by: STUDENT IN AN ORGANIZED HEALTH CARE EDUCATION/TRAINING PROGRAM

## 2022-05-08 PROCEDURE — 85018 HEMOGLOBIN: CPT | Performed by: INTERNAL MEDICINE

## 2022-05-08 PROCEDURE — 25010000002 PROCHLORPERAZINE 10 MG/2ML SOLUTION: Performed by: INTERNAL MEDICINE

## 2022-05-08 PROCEDURE — 85014 HEMATOCRIT: CPT | Performed by: STUDENT IN AN ORGANIZED HEALTH CARE EDUCATION/TRAINING PROGRAM

## 2022-05-08 PROCEDURE — 94664 DEMO&/EVAL PT USE INHALER: CPT

## 2022-05-08 RX ORDER — SODIUM CHLORIDE 9 MG/ML
INJECTION, SOLUTION INTRAVENOUS
Status: COMPLETED
Start: 2022-05-08 | End: 2022-05-09

## 2022-05-08 RX ORDER — CALCIUM ACETATE 667 MG/1
1334 CAPSULE ORAL
Status: DISCONTINUED | OUTPATIENT
Start: 2022-05-08 | End: 2022-05-13 | Stop reason: HOSPADM

## 2022-05-08 RX ADMIN — SODIUM CHLORIDE 250 MG: 9 INJECTION, SOLUTION INTRAVENOUS at 13:31

## 2022-05-08 RX ADMIN — Medication 10 ML: at 21:21

## 2022-05-08 RX ADMIN — Medication 10 ML: at 08:44

## 2022-05-08 RX ADMIN — ALBUTEROL SULFATE 2.5 MG: 2.5 SOLUTION RESPIRATORY (INHALATION) at 00:24

## 2022-05-08 RX ADMIN — PROCHLORPERAZINE EDISYLATE 5 MG: 5 INJECTION INTRAMUSCULAR; INTRAVENOUS at 08:46

## 2022-05-08 RX ADMIN — ALBUTEROL SULFATE 2.5 MG: 2.5 SOLUTION RESPIRATORY (INHALATION) at 06:45

## 2022-05-08 RX ADMIN — ALBUTEROL SULFATE 2.5 MG: 2.5 SOLUTION RESPIRATORY (INHALATION) at 13:13

## 2022-05-08 RX ADMIN — HEPARIN SODIUM 5000 UNITS: 5000 INJECTION INTRAVENOUS; SUBCUTANEOUS at 15:22

## 2022-05-08 RX ADMIN — NIFEDIPINE 60 MG: 30 TABLET, EXTENDED RELEASE ORAL at 08:44

## 2022-05-08 RX ADMIN — PAROXETINE HYDROCHLORIDE 10 MG: 20 TABLET, FILM COATED ORAL at 06:00

## 2022-05-08 RX ADMIN — INSULIN ASPART 2 UNITS: 100 INJECTION, SOLUTION INTRAVENOUS; SUBCUTANEOUS at 18:15

## 2022-05-08 RX ADMIN — CLOPIDOGREL 75 MG: 75 TABLET, FILM COATED ORAL at 08:44

## 2022-05-08 RX ADMIN — HEPARIN SODIUM 5000 UNITS: 5000 INJECTION INTRAVENOUS; SUBCUTANEOUS at 06:00

## 2022-05-08 RX ADMIN — ATORVASTATIN CALCIUM 80 MG: 40 TABLET, FILM COATED ORAL at 21:21

## 2022-05-08 RX ADMIN — INSULIN ASPART 2 UNITS: 100 INJECTION, SOLUTION INTRAVENOUS; SUBCUTANEOUS at 11:13

## 2022-05-08 RX ADMIN — ALBUTEROL SULFATE 2.5 MG: 2.5 SOLUTION RESPIRATORY (INHALATION) at 19:00

## 2022-05-08 RX ADMIN — CALCIUM ACETATE 1334 MG: 667 CAPSULE ORAL at 18:15

## 2022-05-08 RX ADMIN — HEPARIN SODIUM 5000 UNITS: 5000 INJECTION INTRAVENOUS; SUBCUTANEOUS at 21:21

## 2022-05-08 RX ADMIN — ASPIRIN 81 MG: 81 TABLET, COATED ORAL at 08:44

## 2022-05-08 NOTE — PLAN OF CARE
Problem: Adult Inpatient Plan of Care  Goal: Plan of Care Review  Outcome: Ongoing, Progressing  Flowsheets (Taken 5/8/2022 1743)  Progress: no change  Plan of Care Reviewed With: patient  Outcome Evaluation: VSS. Patient has been on 2L today. Patient received one unit of PRBC., H&H redraw at 1900. Patient to be NPO at midnight for possible PPM. No other issues reported at this time  Goal: Patient-Specific Goal (Individualized)  Outcome: Ongoing, Progressing  Goal: Absence of Hospital-Acquired Illness or Injury  Outcome: Ongoing, Progressing  Intervention: Identify and Manage Fall Risk  Recent Flowsheet Documentation  Taken 5/8/2022 1700 by Yarely Gibson RN  Safety Promotion/Fall Prevention: safety round/check completed  Taken 5/8/2022 1500 by Yarely Gibson RN  Safety Promotion/Fall Prevention: safety round/check completed  Taken 5/8/2022 1300 by Yarely Gibson RN  Safety Promotion/Fall Prevention: safety round/check completed  Taken 5/8/2022 0700 by Yarely Gibson RN  Safety Promotion/Fall Prevention: safety round/check completed  Intervention: Prevent Skin Injury  Recent Flowsheet Documentation  Taken 5/8/2022 0844 by Yarely Gibson RN  Skin Protection: adhesive use limited  Intervention: Prevent Infection  Recent Flowsheet Documentation  Taken 5/8/2022 1700 by Yarely Gibson RN  Infection Prevention:   rest/sleep promoted   single patient room provided  Taken 5/8/2022 1500 by Yarely Gibson RN  Infection Prevention:   rest/sleep promoted   single patient room provided  Taken 5/8/2022 1300 by Yarely Gibson RN  Infection Prevention:   rest/sleep promoted   single patient room provided  Taken 5/8/2022 0700 by Yarely Gibson RN  Infection Prevention:   rest/sleep promoted   single patient room provided  Goal: Optimal Comfort and Wellbeing  Outcome: Ongoing, Progressing  Intervention: Provide Person-Centered Care  Recent Flowsheet Documentation  Taken 5/8/2022 1430 by Yarely Gibson RN  Trust  Relationship/Rapport:   choices provided   emotional support provided   questions encouraged   reassurance provided   thoughts/feelings acknowledged   questions answered   empathic listening provided   care explained  Taken 5/8/2022 0844 by Yarely Gibson RN  Trust Relationship/Rapport:   care explained   choices provided   emotional support provided   empathic listening provided   questions answered   questions encouraged   reassurance provided   thoughts/feelings acknowledged  Goal: Readiness for Transition of Care  Outcome: Ongoing, Progressing     Problem: Fall Injury Risk  Goal: Absence of Fall and Fall-Related Injury  Outcome: Ongoing, Progressing  Intervention: Identify and Manage Contributors  Recent Flowsheet Documentation  Taken 5/8/2022 1700 by Yarely Gibson RN  Medication Review/Management: medications reviewed  Taken 5/8/2022 1500 by Yarely Gibson RN  Medication Review/Management: medications reviewed  Taken 5/8/2022 1300 by Yarely Gibson RN  Medication Review/Management: medications reviewed  Taken 5/8/2022 0700 by Yarely Gibson RN  Medication Review/Management: medications reviewed  Intervention: Promote Injury-Free Environment  Recent Flowsheet Documentation  Taken 5/8/2022 1700 by Yarely Gibson RN  Safety Promotion/Fall Prevention: safety round/check completed  Taken 5/8/2022 1500 by Yarely Gibson RN  Safety Promotion/Fall Prevention: safety round/check completed  Taken 5/8/2022 1300 by Yarely Gibson RN  Safety Promotion/Fall Prevention: safety round/check completed  Taken 5/8/2022 0700 by Yarely Gibson, BRYAN  Safety Promotion/Fall Prevention: safety round/check completed     Problem: Diabetes Comorbidity  Goal: Blood Glucose Level Within Targeted Range  Outcome: Ongoing, Progressing     Problem: Heart Failure Comorbidity  Goal: Maintenance of Heart Failure Symptom Control  Outcome: Ongoing, Progressing  Intervention: Maintain Heart Failure-Management  Recent Flowsheet Documentation  Taken  5/8/2022 1700 by Yarely Gibson RN  Medication Review/Management: medications reviewed  Taken 5/8/2022 1500 by Yarely Gibson RN  Medication Review/Management: medications reviewed  Taken 5/8/2022 1300 by Yarely Gibson RN  Medication Review/Management: medications reviewed  Taken 5/8/2022 0700 by Yarely Gibson RN  Medication Review/Management: medications reviewed     Problem: Hypertension Comorbidity  Goal: Blood Pressure in Desired Range  Outcome: Ongoing, Progressing  Intervention: Maintain Blood Pressure Management  Recent Flowsheet Documentation  Taken 5/8/2022 1700 by Yarely Gibson RN  Medication Review/Management: medications reviewed  Taken 5/8/2022 1500 by Yarely Gibson RN  Medication Review/Management: medications reviewed  Taken 5/8/2022 1300 by Yarely Gibson RN  Medication Review/Management: medications reviewed  Taken 5/8/2022 0700 by Yarely Gibson RN  Medication Review/Management: medications reviewed     Problem: Skin Injury Risk Increased  Goal: Skin Health and Integrity  Outcome: Ongoing, Progressing  Intervention: Optimize Skin Protection  Recent Flowsheet Documentation  Taken 5/8/2022 0844 by Yarely Gibson RN  Pressure Reduction Techniques: frequent weight shift encouraged  Pressure Reduction Devices: pressure-redistributing mattress utilized  Skin Protection: adhesive use limited   Goal Outcome Evaluation:  Plan of Care Reviewed With: patient        Progress: no change  Outcome Evaluation: VSS. Patient has been on 2L today. Patient received one unit of PRBC., H&H redraw at 1900. Patient to be NPO at midnight for possible PPM. No other issues reported at this time

## 2022-05-08 NOTE — PROGRESS NOTES
LOS: 4 days     Name: Sean Chen  Age/Sex: 81 y.o. female  :  1940        PCP: Ganga Gallardo MD    Principal Problem:    Hypertensive urgency  Active Problems:    Type 2 diabetes mellitus without complication, without long-term current use of insulin (HCC)    Essential hypertension    Mixed hyperlipidemia    Bradycardia    Dementia without behavioral disturbance (HCC)    Hyponatremia    NSTEMI (non-ST elevated myocardial infarction) (HCC)    Prolonged Q-T interval on ECG    Anemia requiring transfusions      Following for: precordial pain bradycardia    Telemetry Monitoring:rate in 50s    Interval history: Ms. Chen did report chest pains in the center of her chest today that lasted roughly 5 to 10 minutes.  Also admits to some dyspnea that has been stable.    Subjective     ROS    Vital Signs  Vitals:    22 1215 22 1250 22 1321 22 1327   BP: 153/55 152/46  123/84   BP Location:       Patient Position:       Pulse: 59 60 60 62   Resp:    Temp:    97.7 °F (36.5 °C)   TempSrc:    Axillary   SpO2: 93% 91% 95% 93%   Weight:       Height:         Body mass index is 25.83 kg/m².      Intake/Output Summary (Last 24 hours) at 2022 1403  Last data filed at 2022 1327  Gross per 24 hour   Intake 1660 ml   Output 600 ml   Net 1060 ml       Constitutional:       General: Not in acute distress.     Appearance: Healthy appearance. Well-developed and not in distress. Not diaphoretic.   Eyes:      Conjunctiva/sclera: Conjunctivae normal.      Pupils: Pupils are equal, round, and reactive to light.   HENT:      Head: Normocephalic and atraumatic.   Neck:      Vascular: No carotid bruit or JVD.   Pulmonary:      Effort: Pulmonary effort is normal. No respiratory distress.      Breath sounds: Normal breath sounds.   Cardiovascular:      Bradycardia present. Regular rhythm.   Edema:     Peripheral edema absent.   Skin:     General: Skin is cool.   Neurological:       Mental Status: Alert, oriented to person, place, and time and oriented to person, place and time.         Results Review:     Results from last 7 days   Lab Units 05/08/22  0652 05/08/22  0027 05/07/22  0110 05/06/22  0022 05/05/22  1754 05/05/22  0732 05/05/22  0222 05/04/22  1504   WBC 10*3/mm3  --  14.90* 13.40* 12.12*  --   --  8.64 10.40   HEMOGLOBIN g/dL 6.9* 6.9* 7.2* 7.7* 7.6* 7.1* 5.8* 8.4*   PLATELETS 10*3/mm3  --  239 248 222  --   --  250 339     Results from last 7 days   Lab Units 05/08/22  0027 05/07/22  0110 05/06/22  1344 05/06/22  0022 05/05/22  0106 05/04/22  1504   SODIUM mmol/L 128* 127* 129* 126* 135* 133*   POTASSIUM mmol/L 5.1 4.3 4.5 4.7 5.0 5.3*   CHLORIDE mmol/L 96* 97* 99 106 109* 106   CO2 mmol/L 12.5* 14.1* 13.7* 12.0* 12.7* 13.4*   BUN mg/dL 72* 66* 66* 68* 67* 70*   CREATININE mg/dL 5.69* 5.40* 5.43* 5.53* 5.67* 5.36*   CALCIUM mg/dL 8.1* 8.0* 8.4* 7.4* 8.1* 8.7   GLUCOSE mg/dL 127* 132* 102* 190* 140* 112*   ALT (SGPT) U/L 8 6  --  5 5 8   AST (SGOT) U/L 18 15  --  10 9 13     Results from last 7 days   Lab Units 05/08/22  0853 05/05/22  2110 05/04/22  2019 05/04/22  1716 05/04/22  1504   TROPONIN T ng/mL 0.122* 0.061* 0.058* 0.084* 0.095*       Results from last 7 days   Lab Units 05/04/22  1504   INR  0.96       I reviewed the patient's new clinical results.  I reviewed the patient's new imaging results and agree with the interpretation.  I personally viewed and interpreted the patient's EKG/Telemetry data    Medication Review:   albuterol, 2.5 mg, Nebulization, 4x Daily - RT  aspirin, 81 mg, Oral, Daily  atorvastatin, 80 mg, Oral, Nightly  calcium acetate, 1,334 mg, Oral, TID With Meals  clopidogrel, 75 mg, Oral, Daily  ferric gluconate (FERRLECIT) IVPB, 250 mg, Intravenous, Daily  heparin (porcine), 5,000 Units, Subcutaneous, Q8H  Insulin Aspart, 0-7 Units, Subcutaneous, TID AC  NIFEdipine CC, 60 mg, Oral, Q24H  PARoxetine, 10 mg, Oral, QAM  sodium chloride, 10 mL, Intravenous,  Q12H  sodium chloride, , ,            Assessment:  1. NSTEMI  2. Uncontrolled hypertension, improved  3. Symptomatic bradycardia        Recommendations:  1. Patient with multiple ongoing issues with severe PRITI, Anemia, chest pains in setting of abnormal nuclear stess, and symptomatic bradycardia. I will make her NPO for Dr. Roman to evaluate patient in the morning on wether or not to proceed with PPM in the AM. In regards to chest pains, given her PRITI and anemia not a candidate for invasive treatment so will treat with medical therapy.   2. Continue aspirin, Lipitor.  3. Patient unable to tolerate ACE/ARB due to PRITI nor AV crow blocking agents due to bradycardia.  4. Overall prognosis is guarded    I discussed the patients findings and my recommendations with patient and family    Santiago Caldwell PA-C  05/08/22  14:03 EDT

## 2022-05-08 NOTE — PROGRESS NOTES
Nephrology Progress Note      Subjective     Patient denied any chest pain or shortness of breath    Objective       Vital signs :     Temp:  [97.6 °F (36.4 °C)-98.1 °F (36.7 °C)] 97.9 °F (36.6 °C)  Heart Rate:  [54-64] 54  Resp:  [10-27] 20  BP: ()/(29-77) 91/54      Intake/Output Summary (Last 24 hours) at 5/8/2022 1106  Last data filed at 5/8/2022 0754  Gross per 24 hour   Intake 1670 ml   Output 600 ml   Net 1070 ml       Physical Exam:    General Appearance : not in acute distress  Lungs : clear to auscultation, respirations regular  Heart :  regular rhythm & normal rate, normal S1, S2 and no murmur, no rub  Abdomen : normal bowel sounds, no masses, no hepatomegaly, no splenomegaly, soft non-tender and no guarding  Extremities : no edema,  Neurologic :   orientated to person, place, time and situation, Grossly no focal deficits      Laboratory Data :     Albumin Albumin   Date Value Ref Range Status   05/08/2022 2.79 (L) 3.50 - 5.20 g/dL Final   05/07/2022 2.89 (L) 3.50 - 5.20 g/dL Final   05/06/2022 2.62 (L) 3.50 - 5.20 g/dL Final      Magnesium Magnesium   Date Value Ref Range Status   05/07/2022 2.3 1.6 - 2.4 mg/dL Final   05/06/2022 1.5 (L) 1.6 - 2.4 mg/dL Final          PTH                 PTH, Intact   Date Value Ref Range Status   05/07/2022 241.7 (H) 15.0 - 65.0 pg/mL Final       CBC and coagulation:  Results from last 7 days   Lab Units 05/08/22  0652 05/08/22  0027 05/07/22  0110 05/06/22  0022 05/05/22  0222 05/04/22  1504   CRP mg/dL  --   --   --   --   --  0.63*   WBC 10*3/mm3  --  14.90* 13.40* 12.12*   < > 10.40   HEMOGLOBIN g/dL 6.9* 6.9* 7.2* 7.7*   < > 8.4*   HEMATOCRIT % 20.2* 20.6* 21.4* 22.7*   < > 25.3*   MCV fL  --  86.6 85.9 87.3   < > 87.2   MCHC g/dL  --  33.5 33.6 33.9   < > 33.2   PLATELETS 10*3/mm3  --  239 248 222   < > 339   INR   --   --   --   --   --  0.96    < > = values in this interval not displayed.     Acid/base balance:      Renal and electrolytes:  Results from  last 7 days   Lab Units 05/08/22  0027 05/07/22  1214 05/07/22  0110 05/06/22  1344 05/06/22  0022 05/05/22  0106   SODIUM mmol/L 128*  --  127* 129* 126* 135*   POTASSIUM mmol/L 5.1  --  4.3 4.5 4.7 5.0   MAGNESIUM mg/dL  --   --  2.3  --  1.5* 1.8   CHLORIDE mmol/L 96*  --  97* 99 106 109*   CO2 mmol/L 12.5*  --  14.1* 13.7* 12.0* 12.7*   BUN mg/dL 72*  --  66* 66* 68* 67*   CREATININE mg/dL 5.69*  --  5.40* 5.43* 5.53* 5.67*   CALCIUM mg/dL 8.1*  --  8.0* 8.4* 7.4* 8.1*   PHOSPHORUS mg/dL  --  8.4*  --   --   --   --      Estimated Creatinine Clearance: 7.8 mL/min (A) (by C-G formula based on SCr of 5.69 mg/dL (H)).    Liver and pancreatic function:  Results from last 7 days   Lab Units 05/08/22  0027 05/07/22  0110 05/06/22  0022   ALBUMIN g/dL 2.79* 2.89* 2.62*   BILIRUBIN mg/dL 0.4 0.3 0.3   ALK PHOS U/L 84 86 90   AST (SGOT) U/L 18 15 10   ALT (SGPT) U/L 8 6 5         Cardiac:  Results from last 7 days   Lab Units 05/04/22  1504   PROBNP pg/mL 29,931.0*     Liver and pancreatic function:  Results from last 7 days   Lab Units 05/08/22  0027 05/07/22  0110 05/06/22  0022   ALBUMIN g/dL 2.79* 2.89* 2.62*   BILIRUBIN mg/dL 0.4 0.3 0.3   ALK PHOS U/L 84 86 90   AST (SGOT) U/L 18 15 10   ALT (SGPT) U/L 8 6 5       Medications :     albuterol, 2.5 mg, Nebulization, 4x Daily - RT  aspirin, 81 mg, Oral, Daily  atorvastatin, 80 mg, Oral, Nightly  clopidogrel, 75 mg, Oral, Daily  heparin (porcine), 5,000 Units, Subcutaneous, Q8H  Insulin Aspart, 0-7 Units, Subcutaneous, TID AC  NIFEdipine CC, 60 mg, Oral, Q24H  PARoxetine, 10 mg, Oral, QAM  sodium chloride, 10 mL, Intravenous, Q12H  sodium chloride, , ,              Assessment/Plan     1. PRITI on CKD vs rapidly worsening CKD  2. Metabolic acidosis  3. Anemia  4. 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 DM-ii  6. Essential hypertension  7. Hyperkalemia  8. hyponatremia     Elevated , phos 8.4 and low iron stores all point out towards sever CKD and most likely CKD5.   No  uremic symptoms yet and no emergent indication for dialysis. Educated and counseled the patient.   Will opitmize treatment of SHPT and start on IV iron daily for 5 days.      Unknown baseline Cr, Cr was 1.3 in Feb 2021, 2.9 in 12/2021, and 3.2 in Jan 2022 and now admitted with 5.3  8G, mild hematuria  sono no obstruction.   Given significant anemia, SHPT and worsening renal functions highly likely its worsening CKD  -IV iron daily for 5 days  -phoslo 2 tab tid with meals  -follow up serology,      Jude Muñoz MD  05/08/22  11:06 EDT

## 2022-05-08 NOTE — PLAN OF CARE
Goal Outcome Evaluation:  Plan of Care Reviewed With: patient        Progress: no change  Outcome Evaluation: VSS, on 2L NC to keep saturation >90%, more confused thur night, attempted to get out of bed a few times, bed alarm on, pt orientated to room each time and help back to bed, no signs of distress noted at time, WCTM...

## 2022-05-08 NOTE — PROGRESS NOTES
[facility] HOSPITALIST PROGRESS NOTE     Patient Identification:  Name:  Sean Chen  Age:  81 y.o.  Sex:  female  :  1940  MRN:  [id number]  Visit Number:  [id number]  ROOM: [place] [address]     Primary Care Provider:  Ganga Gallardo MD    Length of stay in inpatient status:  4    Subjective     Chief Compliant:    Chief Complaint   Patient presents with   • Abnormal Lab       History of Presenting Illness: Patient seen and evaluated in follow-up for NSTEMI, bradycardia, hypertensive urgency, and PRITI on CKD.  Patient today at time of examination sitting up in bed eating lunch and denying any acute complaints.  Patient with brief episode of chest pain earlier this morning with repeat troponin and EKG obtained.  Troponin trended up and EKG showing sinus bradycardia with right bundle branch block with no evidence of any ischemia.    Objective     Current Hospital Meds:albuterol, 2.5 mg, Nebulization, 4x Daily - RT  aspirin, 81 mg, Oral, Daily  atorvastatin, 80 mg, Oral, Nightly  clopidogrel, 75 mg, Oral, Daily  ferric gluconate (FERRLECIT) IVPB, 250 mg, Intravenous, Daily  heparin (porcine), 5,000 Units, Subcutaneous, Q8H  Insulin Aspart, 0-7 Units, Subcutaneous, TID AC  NIFEdipine CC, 60 mg, Oral, Q24H  PARoxetine, 10 mg, Oral, QAM  sodium chloride, 10 mL, Intravenous, Q12H  sodium chloride, , ,          Current Antimicrobial Therapy:  Anti-Infectives (From admission, onward)    None        Current Diuretic Therapy:  Diuretics (From admission, onward)    None        ----------------------------------------------------------------------------------------------------------------------  Vital Signs:  Temp:  [97.6 °F (36.4 °C)-98.1 °F (36.7 °C)] 97.9 °F (36.6 °C)  Heart Rate:  [54-64] 56  Resp:  [10-27] 18  BP: ()/(29-77) 142/62  SpO2:  [87 %-97 %] 93 %  on  Flow (L/min):  [2] 2;   Device (Oxygen Therapy): nasal cannula  Body mass index is 25.83 kg/m².    Wt Readings from Last 3  Encounters:   05/08/22 70.4 kg (155 lb 3.2 oz)   01/27/22 71.2 kg (157 lb)   10/28/21 75.3 kg (166 lb)     Intake & Output (last 3 days)       05/05 0701 05/06 0700 05/06 0701 05/07 0700 05/07 0701 05/08 0700 05/08 0701 05/09 0700    P.O. 960  1360 240    I.V. (mL/kg) 2293.4 (33.9) 825 (12.2) 310 (4.4)     Blood 291.2       Total Intake(mL/kg) 3544.6 (52.4) 825 (12.2) 1670 (23.7) 240 (3.4)    Urine (mL/kg/hr)  400 (0.2)  600 (1.5)    Stool    0    Total Output  400  600    Net +3544.6 +425 +1670 -360            Urine Unmeasured Occurrence 2 x  4 x     Stool Unmeasured Occurrence 1 x   1 x        Diet Regular; Cardiac  ----------------------------------------------------------------------------------------------------------------------  Physical exam:  Constitutional:  Well-developed and well-nourished.  No acute distress.      HENT:  Head:  Normocephalic and atraumatic.  Mouth:  Moist mucous membranes.    Eyes:  Conjunctivae and EOM are normal. No scleral icterus.    Cardiovascular:  Normal rate, regular rhythm and normal heart sounds with systolic murmur.  Pulmonary/Chest:  No respiratory distress, no wheezes, no crackles, with normal breath sounds and good air movement.  Abdominal:  Soft.  Bowel sounds are normal.  No distension and no tenderness.   Musculoskeletal:  No tenderness, and no deformity.  No red or swollen joints anywhere.    Neurological:  Alert and oriented to person, intermittently to place, but not time.  No cranial nerve deficit.    Skin:  Skin is warm and dry. No rash or lesion noted.  Mild pallor.   Peripheral vascular:  Pulses in all 4 extremities with no clubbing, no cyanosis, no edema.  Psychiatric: Appropriate mood and affect, pleasant.   ----------------------------------------------------------------------------------------------------------------------  Tele:     ----------------------------------------------------------------------------------------------------------------------  Results from last 7 days   Lab Units 05/08/22  0652 05/08/22  0027 05/07/22  0110 05/06/22  0022 05/05/22  0222 05/04/22  1504   CRP mg/dL  --   --   --   --   --  0.63*   WBC 10*3/mm3  --  14.90* 13.40* 12.12*   < > 10.40   HEMOGLOBIN g/dL 6.9* 6.9* 7.2* 7.7*   < > 8.4*   HEMATOCRIT % 20.2* 20.6* 21.4* 22.7*   < > 25.3*   MCV fL  --  86.6 85.9 87.3   < > 87.2   MCHC g/dL  --  33.5 33.6 33.9   < > 33.2   PLATELETS 10*3/mm3  --  239 248 222   < > 339   INR   --   --   --   --   --  0.96    < > = values in this interval not displayed.         Results from last 7 days   Lab Units 05/08/22  0027 05/07/22  1214 05/07/22  0110 05/06/22  1344 05/06/22  0022 05/05/22  0106   SODIUM mmol/L 128*  --  127* 129* 126* 135*   POTASSIUM mmol/L 5.1  --  4.3 4.5 4.7 5.0   MAGNESIUM mg/dL  --   --  2.3  --  1.5* 1.8   CHLORIDE mmol/L 96*  --  97* 99 106 109*   CO2 mmol/L 12.5*  --  14.1* 13.7* 12.0* 12.7*   BUN mg/dL 72*  --  66* 66* 68* 67*   CREATININE mg/dL 5.69*  --  5.40* 5.43* 5.53* 5.67*   CALCIUM mg/dL 8.1*  --  8.0* 8.4* 7.4* 8.1*   PHOSPHORUS mg/dL  --  8.4*  --   --   --   --    GLUCOSE mg/dL 127*  --  132* 102* 190* 140*   ALBUMIN g/dL 2.79*  --  2.89*  --  2.62* 2.50*   BILIRUBIN mg/dL 0.4  --  0.3  --  0.3 0.2   ALK PHOS U/L 84  --  86  --  90 75   AST (SGOT) U/L 18  --  15  --  10 9   ALT (SGPT) U/L 8  --  6  --  5 5   Estimated Creatinine Clearance: 7.8 mL/min (A) (by C-G formula based on SCr of 5.69 mg/dL (H)).  No results found for: AMMONIA  Results from last 7 days   Lab Units 05/08/22  0853 05/05/22  2110 05/04/22  2019   TROPONIN T ng/mL 0.122* 0.061* 0.058*     Results from last 7 days   Lab Units 05/04/22  1504   PROBNP pg/mL 29,931.0*     Results from last 7 days   Lab Units 05/05/22  0106   CHOLESTEROL mg/dL 98   TRIGLYCERIDES mg/dL 65   HDL CHOL mg/dL 50   LDL CHOL mg/dL 34     Glucose    Date/Time Value Ref Range Status   05/08/2022 1110 178 (H) 70 - 130 mg/dL Final     Comment:     Meter: [id number] : [id number] SHANA GARCIA   05/08/2022 0558 128 70 - 130 mg/dL Final     Comment:     Meter: [id number] : [id number] KANDACE [facility]   05/07/2022 1700 168 (H) 70 - 130 mg/dL Final     Comment:     Meter: [id number] : [id number] SYMONE OCHOA   05/07/2022 1136 141 (H) 70 - 130 mg/dL Final     Comment:     Meter: [id number] : [id number] SYMONE CASEYHEAR   05/07/2022 0612 118 70 - 130 mg/dL Final     Comment:     Meter: [id number] : [id number] KANDACE [facility]   05/06/2022 1713 137 (H) 70 - 130 mg/dL Final     Comment:     Meter: [id number] : [id number] ANGEL CHAO   05/06/2022 1056 125 70 - 130 mg/dL Final     Comment:     Meter: [id number] : [id number] tomas victor m   05/06/2022 0623 201 (H) 70 - 130 mg/dL Final     Comment:     RN Notified Meter: [id number] : [id number] [facility]     Lab Results   Component Value Date    TSH 1.750 04/29/2022     No results found for: PREGTESTUR, PREGSERUM, HCG, HCGQUANT  Pain Management Panel     Pain Management Panel Latest Ref Rng & Units 5/5/2022 5/5/2022    CREATININE UR mg/dL 67.4 67.4        Brief Urine Lab Results  (Last result in the past 365 days)      Color   Clarity   Blood   Leuk Est   Nitrite   Protein   CREAT   Urine HCG        05/05/22 1416             67.4         05/05/22 1416             67.4             No results found for: BLOODCX  Urine Culture   Date Value Ref Range Status   05/04/2022 No growth  Final     No results found for: WOUNDCX  No results found for: STOOLCX  No results found for: RESPCX  No results found for: AFBCX  Results from last 7 days   Lab Units 05/04/22  1504   CRP mg/dL 0.63*       I have personally looked at the labs and they are summarized above.  ----------------------------------------------------------------------------------------------------------------------  Detailed radiology reports for  the last 24 hours:    Imaging Results (Last 24 Hours)     ** No results found for the last 24 hours. **        Assessment & Plan      Chest pain  NSTEMI  Bradycardia    -Patient with elevated troponins with overall downward trend but increased this morning at time of repeat episode of chest pain.  Heart rate remains in the 50s to 60s.    -TTE with EF of 66 to 70%.    -Stress test obtained which shows evidence of ischemia in the anterior wall.    -Cardiology consulted and following along and considering possible pacemaker as well as possible left heart cath however will discuss with nephrology due to her renal function.  Did briefly discuss with cardiology today and feel that patient is a poor candidate for left heart cath as this would almost certainly push her over into dialysis.  We did discuss the possibility of considering for pacemaker though as this would make guideline directed medical therapy for her NSTEMI easier to be compliant with if concern for bradycardia is taken out of the picture.    -Patient not a candidate for beta-blocker due to bradycardia, continue aspirin and statin and Plavix    Hypertensive urgency  Essential hypertension    -Patient initially with hypertensive urgency requiring Cardene drip however able to be transitioned to oral antihypertensives and off Cardene.    -Patient initially on amlodipine 10 mg and hydralazine 25 3 times daily but still having some elevated pressures.  Family reports difficulty with compliance as patient is often forgetful or does not want take medications and ideally if patient can be on a once daily antihypertensives this would be ideal.  To that end patient was discontinued off of hydralazine and placed on nifedipine at 60 mg with good blood pressure control and we will pursue this for antihypertensive regiment due to ease of compliance once patient returns home given her dementia.    PRITI on CKD stage IIIb  Concern for rapidly progressing CKD  Moderate  hyponatremia    -Creatinine remains essentially with little to no change with creatinine remaining in the mid fives.    -Patient with nephrotic range proteinuria likely due to component of diabetic nephropathy.    -Nephrology consulted and following along and obtaining serology.  They suspect the patient has likely rapidly progressing chronic kidney disease.    -No indication for dialysis at this time.    Chronic normocytic anemia  Anemia of chronic inflammation    -Patient with declining hemoglobin again this morning down to 6.9 likely some component of hemodilution due to previously being on IV fluids.  However patient is both renal and cardiac patient and we will transfuse 1 unit of packed red blood cells.    Dementia    -Continue supportive care    Diabetes mellitus type 2    -Continue sliding scale insulin for now as patient with out significantly elevated glucoses.    Depression    -Continue Paxil.        VTE Prophylaxis:   Mechanical Order History:     None      Pharmalogical Order History:      Ordered     Dose Route Frequency Stop    05/04/22 1810  heparin (porcine) 5000 UNIT/ML injection 5,000 Units         5,000 Units SC Every 8 Hours Scheduled --                Disposition Home once medically stable and improved.    Shaw Tomas DO  [facility] Hospitalist  05/08/22  12:39 EDT

## 2022-05-09 ENCOUNTER — ANESTHESIA EVENT (OUTPATIENT)
Dept: PERIOP | Facility: HOSPITAL | Age: 82
End: 2022-05-09

## 2022-05-09 ENCOUNTER — APPOINTMENT (OUTPATIENT)
Dept: GENERAL RADIOLOGY | Facility: HOSPITAL | Age: 82
End: 2022-05-09

## 2022-05-09 ENCOUNTER — ANESTHESIA (OUTPATIENT)
Dept: PERIOP | Facility: HOSPITAL | Age: 82
End: 2022-05-09

## 2022-05-09 LAB
ALBUMIN SERPL ELPH-MCNC: 2.9 G/DL (ref 2.9–4.4)
ALBUMIN SERPL-MCNC: 2.84 G/DL (ref 3.5–5.2)
ALBUMIN/GLOB SERPL: 0.9 G/DL
ALBUMIN/GLOB SERPL: 1.1 {RATIO} (ref 0.7–1.7)
ALP SERPL-CCNC: 90 U/L (ref 39–117)
ALPHA1 GLOB SERPL ELPH-MCNC: 0.3 G/DL (ref 0–0.4)
ALPHA2 GLOB SERPL ELPH-MCNC: 1.1 G/DL (ref 0.4–1)
ALT SERPL W P-5'-P-CCNC: 10 U/L (ref 1–33)
ANA SER QL: NEGATIVE
ANION GAP SERPL CALCULATED.3IONS-SCNC: 19.5 MMOL/L (ref 5–15)
AST SERPL-CCNC: 29 U/L (ref 1–32)
B-GLOBULIN SERPL ELPH-MCNC: 0.7 G/DL (ref 0.7–1.3)
BASOPHILS # BLD AUTO: 0.04 10*3/MM3 (ref 0–0.2)
BASOPHILS NFR BLD AUTO: 0.3 % (ref 0–1.5)
BH BB BLOOD EXPIRATION DATE: NORMAL
BH BB BLOOD TYPE BARCODE: 9500
BH BB DISPENSE STATUS: NORMAL
BH BB PRODUCT CODE: NORMAL
BH BB UNIT NUMBER: NORMAL
BILIRUB SERPL-MCNC: 0.5 MG/DL (ref 0–1.2)
BUN SERPL-MCNC: 72 MG/DL (ref 8–23)
BUN/CREAT SERPL: 12.8 (ref 7–25)
CALCIUM SPEC-SCNC: 8.6 MG/DL (ref 8.6–10.5)
CHLORIDE SERPL-SCNC: 93 MMOL/L (ref 98–107)
CO2 SERPL-SCNC: 12.5 MMOL/L (ref 22–29)
CREAT SERPL-MCNC: 5.61 MG/DL (ref 0.57–1)
CROSSMATCH INTERPRETATION: NORMAL
DEPRECATED RDW RBC AUTO: 46.8 FL (ref 37–54)
EGFRCR SERPLBLD CKD-EPI 2021: 7.2 ML/MIN/1.73
EOSINOPHIL # BLD AUTO: 0.08 10*3/MM3 (ref 0–0.4)
EOSINOPHIL NFR BLD AUTO: 0.6 % (ref 0.3–6.2)
ERYTHROCYTE [DISTWIDTH] IN BLOOD BY AUTOMATED COUNT: 15.2 % (ref 12.3–15.4)
GAMMA GLOB SERPL ELPH-MCNC: 0.7 G/DL (ref 0.4–1.8)
GLOBULIN SER CALC-MCNC: 2.7 G/DL (ref 2.2–3.9)
GLOBULIN UR ELPH-MCNC: 3.1 GM/DL
GLUCOSE BLDC GLUCOMTR-MCNC: 122 MG/DL (ref 70–130)
GLUCOSE BLDC GLUCOMTR-MCNC: 131 MG/DL (ref 70–130)
GLUCOSE BLDC GLUCOMTR-MCNC: 131 MG/DL (ref 70–130)
GLUCOSE BLDC GLUCOMTR-MCNC: 136 MG/DL (ref 70–130)
GLUCOSE SERPL-MCNC: 121 MG/DL (ref 65–99)
HCT VFR BLD AUTO: 24.4 % (ref 34–46.6)
HGB BLD-MCNC: 8.2 G/DL (ref 12–15.9)
IMM GRANULOCYTES # BLD AUTO: 0.06 10*3/MM3 (ref 0–0.05)
IMM GRANULOCYTES NFR BLD AUTO: 0.4 % (ref 0–0.5)
KAPPA LC FREE SER-MCNC: 115.5 MG/L (ref 3.3–19.4)
KAPPA LC FREE/LAMBDA FREE SER: 1.72 {RATIO} (ref 0.26–1.65)
LABORATORY COMMENT REPORT: ABNORMAL
LAMBDA LC FREE SERPL-MCNC: 67 MG/L (ref 5.7–26.3)
LYMPHOCYTES # BLD AUTO: 0.88 10*3/MM3 (ref 0.7–3.1)
LYMPHOCYTES NFR BLD AUTO: 6.5 % (ref 19.6–45.3)
M PROTEIN SERPL ELPH-MCNC: ABNORMAL G/DL
MCH RBC QN AUTO: 28.7 PG (ref 26.6–33)
MCHC RBC AUTO-ENTMCNC: 33.6 G/DL (ref 31.5–35.7)
MCV RBC AUTO: 85.3 FL (ref 79–97)
MONOCYTES # BLD AUTO: 0.71 10*3/MM3 (ref 0.1–0.9)
MONOCYTES NFR BLD AUTO: 5.2 % (ref 5–12)
NEUTROPHILS NFR BLD AUTO: 11.84 10*3/MM3 (ref 1.7–7)
NEUTROPHILS NFR BLD AUTO: 87 % (ref 42.7–76)
NRBC BLD AUTO-RTO: 0 /100 WBC (ref 0–0.2)
PLATELET # BLD AUTO: 223 10*3/MM3 (ref 140–450)
PMV BLD AUTO: 10 FL (ref 6–12)
POTASSIUM SERPL-SCNC: 5 MMOL/L (ref 3.5–5.2)
PROT PATTERN SERPL ELPH-IMP: ABNORMAL
PROT SERPL-MCNC: 5.6 G/DL (ref 6–8.5)
PROT SERPL-MCNC: 5.9 G/DL (ref 6–8.5)
RBC # BLD AUTO: 2.86 10*6/MM3 (ref 3.77–5.28)
SODIUM SERPL-SCNC: 125 MMOL/L (ref 136–145)
UNIT  ABO: NORMAL
UNIT  RH: NORMAL
WBC NRBC COR # BLD: 13.61 10*3/MM3 (ref 3.4–10.8)

## 2022-05-09 PROCEDURE — 82962 GLUCOSE BLOOD TEST: CPT

## 2022-05-09 PROCEDURE — 99221 1ST HOSP IP/OBS SF/LOW 40: CPT | Performed by: SURGERY

## 2022-05-09 PROCEDURE — 25010000002 NA FERRIC GLUC CPLX PER 12.5 MG: Performed by: INTERNAL MEDICINE

## 2022-05-09 PROCEDURE — 0JH63XZ INSERTION OF TUNNELED VASCULAR ACCESS DEVICE INTO CHEST SUBCUTANEOUS TISSUE AND FASCIA, PERCUTANEOUS APPROACH: ICD-10-PCS | Performed by: SURGERY

## 2022-05-09 PROCEDURE — 36558 INSERT TUNNELED CV CATH: CPT | Performed by: SURGERY

## 2022-05-09 PROCEDURE — 80053 COMPREHEN METABOLIC PANEL: CPT | Performed by: STUDENT IN AN ORGANIZED HEALTH CARE EDUCATION/TRAINING PROGRAM

## 2022-05-09 PROCEDURE — 71045 X-RAY EXAM CHEST 1 VIEW: CPT

## 2022-05-09 PROCEDURE — 85025 COMPLETE CBC W/AUTO DIFF WBC: CPT | Performed by: STUDENT IN AN ORGANIZED HEALTH CARE EDUCATION/TRAINING PROGRAM

## 2022-05-09 PROCEDURE — 94799 UNLISTED PULMONARY SVC/PX: CPT

## 2022-05-09 PROCEDURE — 02HV33Z INSERTION OF INFUSION DEVICE INTO SUPERIOR VENA CAVA, PERCUTANEOUS APPROACH: ICD-10-PCS | Performed by: SURGERY

## 2022-05-09 PROCEDURE — 25010000002 HEPARIN LOCK FLUSH PER 10 UNITS: Performed by: SURGERY

## 2022-05-09 PROCEDURE — 76000 FLUOROSCOPY <1 HR PHYS/QHP: CPT

## 2022-05-09 PROCEDURE — 99232 SBSQ HOSP IP/OBS MODERATE 35: CPT | Performed by: INTERNAL MEDICINE

## 2022-05-09 PROCEDURE — 71045 X-RAY EXAM CHEST 1 VIEW: CPT | Performed by: RADIOLOGY

## 2022-05-09 PROCEDURE — 25010000002 ONDANSETRON PER 1 MG: Performed by: NURSE ANESTHETIST, CERTIFIED REGISTERED

## 2022-05-09 PROCEDURE — C1750 CATH, HEMODIALYSIS,LONG-TERM: HCPCS | Performed by: SURGERY

## 2022-05-09 PROCEDURE — 0 LIDOCAINE 1 % SOLUTION: Performed by: SURGERY

## 2022-05-09 PROCEDURE — 94761 N-INVAS EAR/PLS OXIMETRY MLT: CPT

## 2022-05-09 PROCEDURE — 25010000002 HEPARIN (PORCINE) PER 1000 UNITS: Performed by: STUDENT IN AN ORGANIZED HEALTH CARE EDUCATION/TRAINING PROGRAM

## 2022-05-09 PROCEDURE — 76000 FLUOROSCOPY <1 HR PHYS/QHP: CPT | Performed by: RADIOLOGY

## 2022-05-09 PROCEDURE — 25010000002 PROPOFOL 10 MG/ML EMULSION: Performed by: NURSE ANESTHETIST, CERTIFIED REGISTERED

## 2022-05-09 RX ORDER — FENTANYL CITRATE 50 UG/ML
50 INJECTION, SOLUTION INTRAMUSCULAR; INTRAVENOUS
Status: DISCONTINUED | OUTPATIENT
Start: 2022-05-09 | End: 2022-05-09 | Stop reason: HOSPADM

## 2022-05-09 RX ORDER — SODIUM CHLORIDE, SODIUM LACTATE, POTASSIUM CHLORIDE, CALCIUM CHLORIDE 600; 310; 30; 20 MG/100ML; MG/100ML; MG/100ML; MG/100ML
125 INJECTION, SOLUTION INTRAVENOUS ONCE
Status: COMPLETED | OUTPATIENT
Start: 2022-05-09 | End: 2022-05-09

## 2022-05-09 RX ORDER — MIDAZOLAM HYDROCHLORIDE 1 MG/ML
0.5 INJECTION INTRAMUSCULAR; INTRAVENOUS
Status: DISCONTINUED | OUTPATIENT
Start: 2022-05-09 | End: 2022-05-09 | Stop reason: HOSPADM

## 2022-05-09 RX ORDER — DROPERIDOL 2.5 MG/ML
0.62 INJECTION, SOLUTION INTRAMUSCULAR; INTRAVENOUS ONCE AS NEEDED
Status: DISCONTINUED | OUTPATIENT
Start: 2022-05-09 | End: 2022-05-09 | Stop reason: HOSPADM

## 2022-05-09 RX ORDER — LIDOCAINE HYDROCHLORIDE 20 MG/ML
INJECTION, SOLUTION INFILTRATION; PERINEURAL AS NEEDED
Status: DISCONTINUED | OUTPATIENT
Start: 2022-05-09 | End: 2022-05-09 | Stop reason: SURG

## 2022-05-09 RX ORDER — ONDANSETRON 2 MG/ML
INJECTION INTRAMUSCULAR; INTRAVENOUS AS NEEDED
Status: DISCONTINUED | OUTPATIENT
Start: 2022-05-09 | End: 2022-05-09 | Stop reason: SURG

## 2022-05-09 RX ORDER — IPRATROPIUM BROMIDE AND ALBUTEROL SULFATE 2.5; .5 MG/3ML; MG/3ML
3 SOLUTION RESPIRATORY (INHALATION) ONCE AS NEEDED
Status: DISCONTINUED | OUTPATIENT
Start: 2022-05-09 | End: 2022-05-09 | Stop reason: HOSPADM

## 2022-05-09 RX ORDER — FAMOTIDINE 10 MG/ML
INJECTION, SOLUTION INTRAVENOUS AS NEEDED
Status: DISCONTINUED | OUTPATIENT
Start: 2022-05-09 | End: 2022-05-09 | Stop reason: SURG

## 2022-05-09 RX ORDER — LIDOCAINE HYDROCHLORIDE 10 MG/ML
INJECTION, SOLUTION INFILTRATION; PERINEURAL AS NEEDED
Status: DISCONTINUED | OUTPATIENT
Start: 2022-05-09 | End: 2022-05-09 | Stop reason: HOSPADM

## 2022-05-09 RX ORDER — GLYCOPYRROLATE 0.2 MG/ML
INJECTION INTRAMUSCULAR; INTRAVENOUS AS NEEDED
Status: DISCONTINUED | OUTPATIENT
Start: 2022-05-09 | End: 2022-05-09 | Stop reason: SURG

## 2022-05-09 RX ORDER — SODIUM BICARBONATE 650 MG/1
650 TABLET ORAL 2 TIMES DAILY
Status: DISCONTINUED | OUTPATIENT
Start: 2022-05-09 | End: 2022-05-13 | Stop reason: HOSPADM

## 2022-05-09 RX ORDER — MAGNESIUM HYDROXIDE 1200 MG/15ML
LIQUID ORAL AS NEEDED
Status: DISCONTINUED | OUTPATIENT
Start: 2022-05-09 | End: 2022-05-09 | Stop reason: HOSPADM

## 2022-05-09 RX ORDER — FOLIC ACID 1 MG/1
1 TABLET ORAL DAILY
Status: DISCONTINUED | OUTPATIENT
Start: 2022-05-09 | End: 2022-05-13 | Stop reason: HOSPADM

## 2022-05-09 RX ORDER — OXYCODONE HYDROCHLORIDE AND ACETAMINOPHEN 5; 325 MG/1; MG/1
1 TABLET ORAL ONCE AS NEEDED
Status: DISCONTINUED | OUTPATIENT
Start: 2022-05-09 | End: 2022-05-09 | Stop reason: HOSPADM

## 2022-05-09 RX ORDER — SODIUM CHLORIDE 0.9 % (FLUSH) 0.9 %
10 SYRINGE (ML) INJECTION AS NEEDED
Status: DISCONTINUED | OUTPATIENT
Start: 2022-05-09 | End: 2022-05-09 | Stop reason: HOSPADM

## 2022-05-09 RX ORDER — SODIUM CHLORIDE 0.9 % (FLUSH) 0.9 %
10 SYRINGE (ML) INJECTION EVERY 12 HOURS SCHEDULED
Status: DISCONTINUED | OUTPATIENT
Start: 2022-05-09 | End: 2022-05-09 | Stop reason: HOSPADM

## 2022-05-09 RX ORDER — HEPARIN SODIUM (PORCINE) LOCK FLUSH IV SOLN 100 UNIT/ML 100 UNIT/ML
SOLUTION INTRAVENOUS AS NEEDED
Status: DISCONTINUED | OUTPATIENT
Start: 2022-05-09 | End: 2022-05-09 | Stop reason: HOSPADM

## 2022-05-09 RX ORDER — SODIUM CHLORIDE 9 MG/ML
INJECTION, SOLUTION INTRAVENOUS CONTINUOUS PRN
Status: DISCONTINUED | OUTPATIENT
Start: 2022-05-09 | End: 2022-05-09 | Stop reason: SURG

## 2022-05-09 RX ORDER — SODIUM CHLORIDE, SODIUM LACTATE, POTASSIUM CHLORIDE, CALCIUM CHLORIDE 600; 310; 30; 20 MG/100ML; MG/100ML; MG/100ML; MG/100ML
100 INJECTION, SOLUTION INTRAVENOUS ONCE AS NEEDED
Status: DISCONTINUED | OUTPATIENT
Start: 2022-05-09 | End: 2022-05-09 | Stop reason: HOSPADM

## 2022-05-09 RX ORDER — ONDANSETRON 2 MG/ML
4 INJECTION INTRAMUSCULAR; INTRAVENOUS AS NEEDED
Status: DISCONTINUED | OUTPATIENT
Start: 2022-05-09 | End: 2022-05-09 | Stop reason: HOSPADM

## 2022-05-09 RX ORDER — PROPOFOL 10 MG/ML
VIAL (ML) INTRAVENOUS AS NEEDED
Status: DISCONTINUED | OUTPATIENT
Start: 2022-05-09 | End: 2022-05-09 | Stop reason: SURG

## 2022-05-09 RX ADMIN — SODIUM CHLORIDE, POTASSIUM CHLORIDE, SODIUM LACTATE AND CALCIUM CHLORIDE 125 ML/HR: 600; 310; 30; 20 INJECTION, SOLUTION INTRAVENOUS at 14:43

## 2022-05-09 RX ADMIN — HEPARIN SODIUM 5000 UNITS: 5000 INJECTION INTRAVENOUS; SUBCUTANEOUS at 06:04

## 2022-05-09 RX ADMIN — ONDANSETRON 4 MG: 2 INJECTION INTRAMUSCULAR; INTRAVENOUS at 15:09

## 2022-05-09 RX ADMIN — Medication 10 ML: at 08:40

## 2022-05-09 RX ADMIN — ALBUTEROL SULFATE 2.5 MG: 2.5 SOLUTION RESPIRATORY (INHALATION) at 12:56

## 2022-05-09 RX ADMIN — ALBUTEROL SULFATE 2.5 MG: 2.5 SOLUTION RESPIRATORY (INHALATION) at 19:17

## 2022-05-09 RX ADMIN — SODIUM BICARBONATE TAB 650 MG 650 MG: 650 TAB at 21:21

## 2022-05-09 RX ADMIN — SODIUM BICARBONATE TAB 650 MG 650 MG: 650 TAB at 11:55

## 2022-05-09 RX ADMIN — LIDOCAINE HYDROCHLORIDE 40 MG: 20 INJECTION, SOLUTION INFILTRATION; PERINEURAL at 15:09

## 2022-05-09 RX ADMIN — CLOPIDOGREL 75 MG: 75 TABLET, FILM COATED ORAL at 08:40

## 2022-05-09 RX ADMIN — SODIUM CHLORIDE 250 MG: 9 INJECTION, SOLUTION INTRAVENOUS at 08:43

## 2022-05-09 RX ADMIN — GLYCOPYRROLATE 0.2 MG: 0.2 INJECTION, SOLUTION INTRAMUSCULAR; INTRAVENOUS at 15:16

## 2022-05-09 RX ADMIN — SODIUM CHLORIDE: 9 INJECTION, SOLUTION INTRAVENOUS at 14:59

## 2022-05-09 RX ADMIN — ASPIRIN 81 MG: 81 TABLET, COATED ORAL at 08:32

## 2022-05-09 RX ADMIN — FAMOTIDINE 20 MG: 10 INJECTION INTRAVENOUS at 15:09

## 2022-05-09 RX ADMIN — Medication 10 ML: at 21:21

## 2022-05-09 RX ADMIN — ALBUTEROL SULFATE 2.5 MG: 2.5 SOLUTION RESPIRATORY (INHALATION) at 07:42

## 2022-05-09 RX ADMIN — FOLIC ACID 1 MG: 1 TABLET ORAL at 11:55

## 2022-05-09 RX ADMIN — ATORVASTATIN CALCIUM 80 MG: 40 TABLET, FILM COATED ORAL at 21:21

## 2022-05-09 RX ADMIN — NIFEDIPINE 60 MG: 30 TABLET, EXTENDED RELEASE ORAL at 08:40

## 2022-05-09 RX ADMIN — ALBUTEROL SULFATE 2.5 MG: 2.5 SOLUTION RESPIRATORY (INHALATION) at 00:41

## 2022-05-09 RX ADMIN — PAROXETINE HYDROCHLORIDE 10 MG: 20 TABLET, FILM COATED ORAL at 06:04

## 2022-05-09 RX ADMIN — PROPOFOL 100 MG: 10 INJECTION, EMULSION INTRAVENOUS at 15:09

## 2022-05-09 NOTE — PLAN OF CARE
Goal Outcome Evaluation:  Plan of Care Reviewed With: patient        Progress: no change  Outcome Evaluation: VSS at time, Alert but confused, increased moments of confusion leading to pt pulling of leads and trying to exit bed, bed alarm on, Hemaglobin remained stable thru  night at 8.2, no visiable signs of bleeding noted, On 2L NC to keep saturation <90% thur night,  no signs of distress noted, WCTM...

## 2022-05-09 NOTE — CONSULTS
[facility]   Consult Note    Patient Name: Sean Chen  : 1940  MRN: [id number]  Primary Care Physician:  Ganga Gallardo MD  Referring Physician: No Known Provider  Date of admission: 2022    Consults  Subjective   Subjective     Reason for Consult/ Chief Complaint: tunneled dialysis catheter placement    History of Present Illness  Sean Chen is a 81 y.o. female has had deteriorating renal function and will need dialysis access.     Review of Systems   Constitutional: Negative for activity change, appetite change, chills, fever and unexpected weight change.   HENT: Negative for congestion, facial swelling and sore throat.    Eyes: Negative for photophobia and visual disturbance.   Respiratory: Negative for chest tightness, shortness of breath and wheezing.    Cardiovascular: Positive for leg swelling. Negative for chest pain and palpitations.   Gastrointestinal: Positive for nausea and vomiting. Negative for abdominal distention, abdominal pain, anal bleeding, blood in stool, constipation, diarrhea and rectal pain.   Endocrine: Negative for cold intolerance, heat intolerance, polydipsia and polyuria.   Genitourinary: Negative for difficulty urinating, dysuria, flank pain and urgency.   Musculoskeletal: Negative for back pain and myalgias.   Skin: Negative for rash and wound.   Allergic/Immunologic: Negative for immunocompromised state.   Neurological: Positive for dizziness. Negative for seizures, syncope, light-headedness, numbness and headaches.   Hematological: Negative for adenopathy. Does not bruise/bleed easily.   Psychiatric/Behavioral: Positive for confusion. Negative for behavioral problems. The patient is not nervous/anxious.         Personal History     Past Medical History:   Diagnosis Date   • Allergic rhinitis    • Elevated liver enzymes    • Extrinsic asthma    • Gastritis    • GERD (gastroesophageal reflux disease)    • Hyperlipidemia    • Hypertension    • Iron  deficiency anemia    • Meniere's disease    • Osteoarthritis    • Type 2 diabetes mellitus (HCC)    • Vitamin D deficiency disease        Past Surgical History:   Procedure Laterality Date   • APPENDECTOMY     • CHOLECYSTECTOMY     • HYSTERECTOMY         Family History: family history is not on file. Otherwise pertinent FHx was reviewed and not pertinent to current issue.    Social History:  reports that she has never smoked. She has never used smokeless tobacco. She reports that she does not drink alcohol and does not use drugs.    Home Medications:   PARoxetine, albuterol sulfate HFA, amLODIPine, atorvastatin, and hydrALAZINE    Allergies:  Allergies   Allergen Reactions   • Keflex [Cephalexin]    • Lodine [Etodolac]    • Penicillins    • Sulfa Antibiotics        Objective    Objective     Vitals:  Temp:  [97.1 °F (36.2 °C)-98.3 °F (36.8 °C)] 98.3 °F (36.8 °C)  Heart Rate:  [57-71] 69  Resp:  [10-26] 18  BP: (123-175)/(38-84) 175/69  Flow (L/min):  [2] 2    Physical Exam  Vitals reviewed.   Constitutional:       General: She is not in acute distress.     Appearance: She is well-developed. She is not ill-appearing.   HENT:      Head: Normocephalic. No laceration. Hair is normal.      Right Ear: Hearing and ear canal normal.      Left Ear: Hearing and ear canal normal.      Nose: Nose normal.      Right Sinus: No maxillary sinus tenderness or frontal sinus tenderness.      Left Sinus: No maxillary sinus tenderness or frontal sinus tenderness.   Eyes:      General: Lids are normal.      Conjunctiva/sclera: Conjunctivae normal.      Pupils: Pupils are equal, round, and reactive to light.   Neck:      Thyroid: No thyroid mass or thyromegaly.      Vascular: No JVD.      Trachea: No tracheal tenderness or tracheal deviation.   Cardiovascular:      Rate and Rhythm: Regular rhythm.      Heart sounds:     No friction rub. No gallop.   Pulmonary:      Effort: Pulmonary effort is normal.      Breath sounds: Normal breath  sounds. No stridor. No wheezing.   Chest:      Chest wall: No tenderness.   Breasts:      Right: No supraclavicular adenopathy.      Left: No supraclavicular adenopathy.       Abdominal:      General: Bowel sounds are normal. There is no distension.      Palpations: Abdomen is soft. There is no mass.      Tenderness: There is no abdominal tenderness. There is no guarding or rebound.      Hernia: No hernia is present.   Musculoskeletal:         General: Swelling present. No deformity.      Cervical back: Normal range of motion.   Lymphadenopathy:      Cervical: No cervical adenopathy.      Upper Body:      Right upper body: No supraclavicular adenopathy.      Left upper body: No supraclavicular adenopathy.   Skin:     General: Skin is warm and dry.      Coloration: Skin is not pale.      Findings: No erythema or rash.   Neurological:      Mental Status: She is alert and oriented to person, place, and time.      Motor: No abnormal muscle tone.   Psychiatric:         Behavior: Behavior normal.         Thought Content: Thought content normal.         Result Review    Result Review:  I have personally reviewed the results from the time of this admission to 5/9/2022 12:44 EDT and agree with these findings:  []  Laboratory  []  Microbiology  []  Radiology  []  EKG/Telemetry   []  Cardiology/Vascular   []  Pathology  []  Old records  []  Other:  Most notable findings include:      Assessment/Plan   Assessment / Plan     Brief Patient Summary:  Sean Chen is a 81 y.o. female who needs dialysis acccess.    Active Hospital Problems:  Active Hospital Problems    Diagnosis    • **Hypertensive urgency    • Hyponatremia    • NSTEMI (non-ST elevated myocardial infarction) ([facility])    • Prolonged Q-T interval on ECG    • Anemia requiring transfusions    • Dementia without behavioral disturbance ([facility])    • Bradycardia    • Mixed hyperlipidemia    • Essential hypertension    • Type 2 diabetes mellitus without complication, without  long-term current use of insulin (HCC)      Plan: place a tunneled dialysis catheter      Hans Coates MD

## 2022-05-09 NOTE — ANESTHESIA POSTPROCEDURE EVALUATION
Patient: Sean Chen    Procedure Summary     Date: 05/09/22 Room / Location: [facility] OR 01 /  [facility] OR    Anesthesia Start: 1459 Anesthesia Stop: 1539    Procedure: HEMODIALYSIS CATHETER INSERTION (N/A ) Diagnosis:       Renal insufficiency      (Renal insufficiency [N28.9])    Surgeons: Hans Coates MD Provider: Anson Perales MD    Anesthesia Type: MAC ASA Status: 4          Anesthesia Type: MAC    Vitals  Vitals Value Taken Time   /61 05/09/22 1547   Temp 97.6 °F (36.4 °C) 05/09/22 1542   Pulse 64 05/09/22 1547   Resp 18 05/09/22 1547   SpO2 96 % 05/09/22 1547           Post Anesthesia Care and Evaluation    Patient location during evaluation: PACU  Patient participation: complete - patient participated  Level of consciousness: sleepy but conscious  Pain score: 0  Pain management: adequate  Airway patency: patent  Anesthetic complications: No anesthetic complications  PONV Status: none  Cardiovascular status: hemodynamically stable  Respiratory status: nasal cannula  Hydration status: balanced

## 2022-05-09 NOTE — CASE MANAGEMENT/SOCIAL WORK
Discharge Planning Assessment   Mian     Patient Name: Sean Chen  MRN: [id number]  Today's Date: 5/9/2022    Admit Date: 5/4/2022     Discharge Plan     Row Name 05/09/22 1411       Plan    Plan Pt admitted 5/4/22. Pt lives at home with spouse, son lives next door and assists with care. Pt does not utilize home health services. Pt has a glucometer. Pt to start HD tomorrow per case management. Pt's family to provide transportation home at discharge, pending improvment. SS to continue to follow and assist.                            DEVONTE Danielson

## 2022-05-09 NOTE — PROGRESS NOTES
Nephrology Progress Note      Subjective     Patient has generalized weakness and fatigability and has some chest pressure.     Objective       Vital signs :     Temp:  [97.5 °F (36.4 °C)-98.1 °F (36.7 °C)] 97.5 °F (36.4 °C)  Heart Rate:  [54-68] 68  Resp:  [10-26] 14  BP: ()/(33-84) 166/63      Intake/Output Summary (Last 24 hours) at 5/9/2022 0733  Last data filed at 5/9/2022 0713  Gross per 24 hour   Intake 900 ml   Output 300 ml   Net 600 ml       Physical Exam:    General Appearance : not in acute distress  Lungs : clear to auscultation, respirations regular  Heart :  regular rhythm & normal rate, normal S1, S2 and no murmur, no rub  Abdomen : normal bowel sounds, no masses, no hepatomegaly, no splenomegaly, soft non-tender and no guarding  Extremities : no edema,  Neurologic :   orientated to person, place, time and situation, Grossly no focal deficits      Laboratory Data :     Albumin Albumin   Date Value Ref Range Status   05/09/2022 2.84 (L) 3.50 - 5.20 g/dL Final   05/08/2022 2.79 (L) 3.50 - 5.20 g/dL Final   05/07/2022 2.89 (L) 3.50 - 5.20 g/dL Final      Magnesium Magnesium   Date Value Ref Range Status   05/07/2022 2.3 1.6 - 2.4 mg/dL Final          PTH                 PTH, Intact   Date Value Ref Range Status   05/07/2022 241.7 (H) 15.0 - 65.0 pg/mL Final       CBC and coagulation:  Results from last 7 days   Lab Units 05/09/22  0022 05/08/22  1855 05/08/22  0652 05/08/22  0027 05/07/22  0110 05/05/22  0222 05/04/22  1504   CRP mg/dL  --   --   --   --   --   --  0.63*   WBC 10*3/mm3 13.61*  --   --  14.90* 13.40*   < > 10.40   HEMOGLOBIN g/dL 8.2* 8.4* 6.9* 6.9* 7.2*   < > 8.4*   HEMATOCRIT % 24.4* 25.1* 20.2* 20.6* 21.4*   < > 25.3*   MCV fL 85.3  --   --  86.6 85.9   < > 87.2   MCHC g/dL 33.6  --   --  33.5 33.6   < > 33.2   PLATELETS 10*3/mm3 223  --   --  239 248   < > 339   INR   --   --   --   --   --   --  0.96    < > = values in this interval not displayed.     Acid/base balance:       Renal and electrolytes:  Results from last 7 days   Lab Units 05/09/22  0022 05/08/22  0027 05/07/22  1214 05/07/22  0110 05/06/22  1344 05/06/22  0022 05/05/22  0106   SODIUM mmol/L 125* 128*  --  127* 129* 126* 135*   POTASSIUM mmol/L 5.0 5.1  --  4.3 4.5 4.7 5.0   MAGNESIUM mg/dL  --   --   --  2.3  --  1.5* 1.8   CHLORIDE mmol/L 93* 96*  --  97* 99 106 109*   CO2 mmol/L 12.5* 12.5*  --  14.1* 13.7* 12.0* 12.7*   BUN mg/dL 72* 72*  --  66* 66* 68* 67*   CREATININE mg/dL 5.61* 5.69*  --  5.40* 5.43* 5.53* 5.67*   CALCIUM mg/dL 8.6 8.1*  --  8.0* 8.4* 7.4* 8.1*   PHOSPHORUS mg/dL  --   --  8.4*  --   --   --   --      Estimated Creatinine Clearance: 8 mL/min (A) (by C-G formula based on SCr of 5.61 mg/dL (H)).    Liver and pancreatic function:  Results from last 7 days   Lab Units 05/09/22  0022 05/08/22  0027 05/07/22  0110   ALBUMIN g/dL 2.84* 2.79* 2.89*   BILIRUBIN mg/dL 0.5 0.4 0.3   ALK PHOS U/L 90 84 86   AST (SGOT) U/L 29 18 15   ALT (SGPT) U/L 10 8 6         Cardiac:  Results from last 7 days   Lab Units 05/04/22  1504   PROBNP pg/mL 29,931.0*     Liver and pancreatic function:  Results from last 7 days   Lab Units 05/09/22  0022 05/08/22  0027 05/07/22  0110   ALBUMIN g/dL 2.84* 2.79* 2.89*   BILIRUBIN mg/dL 0.5 0.4 0.3   ALK PHOS U/L 90 84 86   AST (SGOT) U/L 29 18 15   ALT (SGPT) U/L 10 8 6       Medications :     albuterol, 2.5 mg, Nebulization, 4x Daily - RT  aspirin, 81 mg, Oral, Daily  atorvastatin, 80 mg, Oral, Nightly  calcium acetate, 1,334 mg, Oral, TID With Meals  clopidogrel, 75 mg, Oral, Daily  ferric gluconate (FERRLECIT) IVPB, 250 mg, Intravenous, Daily  heparin (porcine), 5,000 Units, Subcutaneous, Q8H  Insulin Aspart, 0-7 Units, Subcutaneous, TID AC  NIFEdipine CC, 60 mg, Oral, Q24H  PARoxetine, 10 mg, Oral, QAM  sodium chloride, 10 mL, Intravenous, Q12H             Assessment/Plan     1. PRITI on CKD vs rapidly worsening CKD  2. Metabolic acidosis  3. Anemia  4. 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 DM-ii  6. Essential hypertension  7. Hyperkalemia  8. hyponatremia     No improvement in renal functions, she started having mild uremic symptoms. Discussed with cardiology and Dr Reeves, she needs cardiac cath during this hospitalization, discussed with patient about risk of further worsening CKD with contrast and starting on dialysis before, she was agreeable. Will proceed to tunneled dialysis cath placement and likely dialysis tomorrow.   Elevated , phos 8.4 and low iron stores all point out towards sever CKD and most likely CKD5.   Will opitmize treatment of SHPT and start on IV iron daily for 5 days.      Unknown baseline Cr, Cr was 1.3 in Feb 2021, 2.9 in 12/2021, and 3.2 in Jan 2022 and now admitted with 5.3  8G, mild hematuria  sono no obstruction.   Given significant anemia, SHPT and worsening renal functions highly likely its worsening CKD  -IV iron daily for 5 days  -phoslo 2 tab tid with meals  -follow up serology,      Jude Muñoz MD  05/09/22  07:33 EDT

## 2022-05-09 NOTE — ANESTHESIA PROCEDURE NOTES
Airway  Urgency: elective    Date/Time: 5/9/2022 3:10 PM    General Information and Staff    Patient location during procedure: OR  CRNA/CAA: Tiffanie Mattson CRNA    Indications and Patient Condition    Preoxygenated: yes      Final Airway Details  Final airway type: supraglottic airway      Successful airway: unique  Size 4    Number of attempts at approach: 1  Assessment: lips, teeth, and gum same as pre-op and atraumatic intubation

## 2022-05-09 NOTE — OP NOTE
HEMODIALYSIS CATHETER INSERTION  Procedure Note    Sean Chen  5/9/2022    Pre-op Diagnosis:   Renal insufficiency [N28.9]    Post-op Diagnosis: same        Procedure(s):  HEMODIALYSIS CATHETER INSERTION    Surgeon(s):  Hans Coates MD    Anesthesia: Monitored Anesthesia Care    Staff:   Circulator: Herbierto Watson RN  Radiology Technologist: Joshua Gibson  Assistant: Alejandra Kowalski    Estimated Blood Loss: minimal    Specimens:                * No orders in the log *      Drains:   [REMOVED] External Urinary Catheter (Removed)   Site Assessment Clean;Skin intact 05/08/22 0210   Application/Removal external catheter applied 05/07/22 2000   Collection Container Standard drainage bag 05/08/22 0210   Securement Method Securing device 05/08/22 0210   Catheter care complete Yes 05/07/22 2000       Procedure: The upper chest and neck was prepped and draped. The right IJ was accessed with the needle and the wire threaded in. The incision was made there and one on the right upper chest. The 19 cm catheter was tunneled up from the chest site to the wire site. The dilator and sheath was placed. The catheter was threaded through the sheath and confirmed with c arm. The ports had good blood return and flushed easily. The upper incision was closed with vicryl and the catheter sutured down with nylon suture.     Findings:      Complications: none   Grafts / Implants N/A    Hans Coates MD     Date: 5/9/2022  Time: 15:31 EDT

## 2022-05-09 NOTE — PROGRESS NOTES
"  Patient Identification:  Name:  Sean Chen  Age:  81 y.o.  Sex:  female  :  1940  MRN:  [id number]  Visit Number:  [id number]  Primary care provider:  Ganga Gallardo MD    Reason for follow-up:  NSTEMI, hypertension and bradycardia.    Subjective     Patient is slightly confused but she could not answer my questions.  She denied history of chest discomfort or increased shortness of breath.  Occasionally experiences dizziness.    Her heart rate is improved.  On telemetry, she was in sinus rhythm with a heart rate in 60s.  There is no significant drop in the heart rate during the night.  BP stable.  Serum creatinine has not improved.  Hemoglobin is 8.2 on iron infusion.      Medication Review:   albuterol, 2.5 mg, Nebulization, 4x Daily - RT  aspirin, 81 mg, Oral, Daily  atorvastatin, 80 mg, Oral, Nightly  calcium acetate, 1,334 mg, Oral, TID With Meals  clopidogrel, 75 mg, Oral, Daily  ferric gluconate (FERRLECIT) IVPB, 250 mg, Intravenous, Daily  heparin (porcine), 5,000 Units, Subcutaneous, Q8H  Insulin Aspart, 0-7 Units, Subcutaneous, TID AC  NIFEdipine CC, 60 mg, Oral, Q24H  PARoxetine, 10 mg, Oral, QAM  sodium chloride, 10 mL, Intravenous, Q12H          Vital Sign Min/Max for last 24 hours  Temp  Min: 97.1 °F (36.2 °C)  Max: 98.1 °F (36.7 °C)   BP  Min: 91/54  Max: 175/69   Pulse  Min: 54  Max: 71   Resp  Min: 10  Max: 26   SpO2  Min: 90 %  Max: 96 %   No data recorded   Weight  Min: 73 kg (161 lb)  Max: 73 kg (161 lb)     Flowsheet Rows    Flowsheet Row First Filed Value   Admission Height 162.6 cm (64\") Documented at 2022 1407   Admission Weight 63.5 kg (140 lb) Documented at 2022 1407          Objective       Physical Exam:     General Appearance:   Alert, cooperative.  No distress.   Head:   Normocephalic, without obvious abnormality.   Eyes:          Lids and lashes normal, conjunctivae and sclerae normal, no   icterus,    Ears:  Ears appear intact with no abnormalities " noted   Throat:   No oral lesions, no thrush, oral mucosa moist   Neck: No adenopathy, supple, trachea midline,  carotid bruit, no JVD   Back:   No significant abnormality   Lungs:   Clear to auscultation,respirations regular, No added sounds    Heart:   Normal heart rate.  Regular rhythm.  Grade 2 x 6 ejection systolic murmur at the aortic area.   Chest Wall:  No abnormalities observed   Abdomen   Soft, nontender, no masses, no organomegaly and bowel sounds are heard.           Extremities:  No pedal edema              Telemetry:  Sinus rhythm.  Heart rate in 60s.      Labs  Results from last 7 days   Lab Units 05/09/22  0022 05/08/22  1855 05/08/22  0652 05/08/22  0027 05/07/22  0110 05/06/22  0022 05/05/22  1754 05/05/22  0732 05/05/22  0222 05/04/22  1504   WBC 10*3/mm3 13.61*  --   --  14.90* 13.40* 12.12*  --   --  8.64 10.40   HEMOGLOBIN g/dL 8.2* 8.4* 6.9* 6.9* 7.2* 7.7* 7.6*   < > 5.8* 8.4*   HEMATOCRIT % 24.4* 25.1* 20.2* 20.6* 21.4* 22.7* 22.9*   < > 17.6* 25.3*   PLATELETS 10*3/mm3 223  --   --  239 248 222  --   --  250 339    < > = values in this interval not displayed.     Results from last 7 days   Lab Units 05/09/22  0022 05/08/22  0027 05/07/22  0110 05/06/22  1344 05/06/22  0022 05/05/22  0106 05/04/22  1504   SODIUM mmol/L 125* 128* 127* 129* 126* 135* 133*   POTASSIUM mmol/L 5.0 5.1 4.3 4.5 4.7 5.0 5.3*   CHLORIDE mmol/L 93* 96* 97* 99 106 109* 106   CO2 mmol/L 12.5* 12.5* 14.1* 13.7* 12.0* 12.7* 13.4*   BUN mg/dL 72* 72* 66* 66* 68* 67* 70*   CREATININE mg/dL 5.61* 5.69* 5.40* 5.43* 5.53* 5.67* 5.36*   CALCIUM mg/dL 8.6 8.1* 8.0* 8.4* 7.4* 8.1* 8.7   GLUCOSE mg/dL 121* 127* 132* 102* 190* 140* 112*     Results from last 7 days   Lab Units 05/09/22  0022 05/08/22  0027 05/07/22  0110 05/06/22  0022 05/05/22  0106 05/04/22  1504   BILIRUBIN mg/dL 0.5 0.4 0.3 0.3 0.2 0.4   ALK PHOS U/L 90 84 86 90 75 105   AST (SGOT) U/L 29 18 15 10 9 13   ALT (SGPT) U/L 10 8 6 5 5 8     Results from last 7 days    Lab Units 05/07/22  0110 05/06/22  0022 05/05/22  0106 05/04/22  1504   MAGNESIUM mg/dL 2.3 1.5* 1.8 1.6     Results from last 7 days   Lab Units 05/04/22  1504   INR  0.96     Results from last 7 days   Lab Units 05/04/22  1504   CRP mg/dL 0.63*     Results from last 7 days   Lab Units 05/08/22  0853 05/05/22  2110 05/04/22 2019   TROPONIN T ng/mL 0.122* 0.061* 0.058*                  Assessment/Plan         1.  NSTEMI-  Stable.    Nuclear stress test showed inferior ischemia.  LVEF and wall motion are normal.  At present, not a candidate for coronary arteriogram and possible PCI due to comorbid conditions-risk of dialysis in the setting of renal failure and severe anemia requiring blood transfusion.  Continue aspirin, Plavix and statin.  No beta-blocker due to bradycardia.     2.  Uncontrolled hypertension-  Fairly controlled now.  She is on long-acting nifedipine for the control of hypertension.     3.  Bradycardia-  Improved.  Now the heart rate stays in 50s-60s.  Ischemia vs. sinus node dysfunction.  Deferred the  plan of permanent pacemaker implantation at this time.    4.  Prognosis- guarded.         Lexi Roman MD [facility]  05/09/22  08:58 EDT

## 2022-05-09 NOTE — ANESTHESIA PREPROCEDURE EVALUATION
Anesthesia Evaluation     Patient summary reviewed and Nursing notes reviewed   no history of anesthetic complications:  NPO Solid Status: > 8 hours  NPO Liquid Status: > 8 hours           Airway   Mallampati: II  TM distance: >3 FB  Neck ROM: full  Dental    (+) poor dentition    Pulmonary     breath sounds clear to auscultation  (+) asthma,  Cardiovascular   Exercise tolerance: poor (<4 METS)    Rhythm: regular  Rate: normal    (+) hypertension, past MI , hyperlipidemia,       Neuro/Psych  (+) psychiatric history, dementia,    GI/Hepatic/Renal/Endo    (+) obesity,  GERD,  renal disease, diabetes mellitus,     Musculoskeletal     Abdominal   (+) obese,     Abdomen: soft.   Substance History - negative use     OB/GYN negative ob/gyn ROS         Other   arthritis,                    Anesthesia Plan    ASA 4     MAC     intravenous induction     Anesthetic plan, all risks, benefits, and alternatives have been provided, discussed and informed consent has been obtained with: patient.  Use of blood products discussed with consented to blood products.   Plan discussed with CRNA.        CODE STATUS:    Code Status (Patient has no pulse and is not breathing): CPR (Attempt to Resuscitate)  Medical Interventions (Patient has pulse or is breathing): Full Support

## 2022-05-09 NOTE — PLAN OF CARE
Problem: Adult Inpatient Plan of Care  Goal: Plan of Care Review  Outcome: Ongoing, Progressing  Flowsheets (Taken 5/9/2022 1408)  Progress: no change  Outcome Evaluation: Patient resting in bed. VSS. Alert and confused. 2LNC. Patient will be going to tunneled dialysis catheter placement later in the shift. No needs at this time. Will monitor 7a-7p.  Goal: Patient-Specific Goal (Individualized)  Outcome: Ongoing, Progressing  Goal: Absence of Hospital-Acquired Illness or Injury  Outcome: Ongoing, Progressing  Intervention: Identify and Manage Fall Risk  Recent Flowsheet Documentation  Taken 5/9/2022 1404 by Kavitha Red RN  Safety Promotion/Fall Prevention: safety round/check completed  Taken 5/9/2022 1300 by Kavitha Red RN  Safety Promotion/Fall Prevention: safety round/check completed  Taken 5/9/2022 1100 by Kavitha Red RN  Safety Promotion/Fall Prevention: safety round/check completed  Taken 5/9/2022 0900 by Kavitha Red RN  Safety Promotion/Fall Prevention: safety round/check completed  Taken 5/9/2022 0700 by Kavitha Red RN  Safety Promotion/Fall Prevention: safety round/check completed  Intervention: Prevent Skin Injury  Recent Flowsheet Documentation  Taken 5/9/2022 1404 by Kavitha Red RN  Skin Protection: adhesive use limited  Taken 5/9/2022 0800 by Kavitha Red RN  Skin Protection: adhesive use limited  Intervention: Prevent and Manage VTE (Venous Thromboembolism) Risk  Recent Flowsheet Documentation  Taken 5/9/2022 1404 by Kavitha Red RN  Activity Management: activity adjusted per tolerance  Taken 5/9/2022 0800 by Kavitha Red RN  VTE Prevention/Management: (See MAR) other (see comments)  Intervention: Prevent Infection  Recent Flowsheet Documentation  Taken 5/9/2022 1404 by Kavitha Red RN  Infection Prevention: rest/sleep promoted  Taken 5/9/2022 1300 by Kavitha Red RN  Infection Prevention: rest/sleep promoted  Taken 5/9/2022 1100 by Kavitha Red RN  Infection Prevention:  rest/sleep promoted  Taken 5/9/2022 0900 by Kavitha Red RN  Infection Prevention: rest/sleep promoted  Taken 5/9/2022 0800 by Kavitha Red RN  Infection Prevention: rest/sleep promoted  Taken 5/9/2022 0700 by Kavitha Red RN  Infection Prevention: rest/sleep promoted  Goal: Optimal Comfort and Wellbeing  Outcome: Ongoing, Progressing  Intervention: Provide Person-Centered Care  Recent Flowsheet Documentation  Taken 5/9/2022 1404 by Kavitha Red RN  Trust Relationship/Rapport:   care explained   choices provided  Taken 5/9/2022 0800 by Kavitha Red RN  Trust Relationship/Rapport:   care explained   choices provided  Goal: Readiness for Transition of Care  Outcome: Ongoing, Progressing     Problem: Fall Injury Risk  Goal: Absence of Fall and Fall-Related Injury  Outcome: Ongoing, Progressing  Intervention: Identify and Manage Contributors  Recent Flowsheet Documentation  Taken 5/9/2022 1404 by Kavitha Red RN  Medication Review/Management: medications reviewed  Self-Care Promotion:   BADL personal objects within reach   BADL personal routines maintained   safe use of adaptive equipment encouraged  Taken 5/9/2022 1300 by Kavitha Red RN  Medication Review/Management: medications reviewed  Taken 5/9/2022 1100 by Kavitha Red RN  Medication Review/Management: medications reviewed  Taken 5/9/2022 0900 by Kavitha Red RN  Medication Review/Management: medications reviewed  Taken 5/9/2022 0800 by Kavitha Red RN  Medication Review/Management: medications reviewed  Self-Care Promotion:   independence encouraged   BADL personal objects within reach   BADL personal routines maintained   safe use of adaptive equipment encouraged  Taken 5/9/2022 0700 by Kavitha Red RN  Medication Review/Management: medications reviewed  Intervention: Promote Injury-Free Environment  Recent Flowsheet Documentation  Taken 5/9/2022 1404 by Kavitha Red RN  Safety Promotion/Fall Prevention: safety round/check  completed  Taken 5/9/2022 1300 by Kavitha Red RN  Safety Promotion/Fall Prevention: safety round/check completed  Taken 5/9/2022 1100 by Kavitha Red RN  Safety Promotion/Fall Prevention: safety round/check completed  Taken 5/9/2022 0900 by Kavitha Red RN  Safety Promotion/Fall Prevention: safety round/check completed  Taken 5/9/2022 0700 by Kavitha Red RN  Safety Promotion/Fall Prevention: safety round/check completed     Problem: Diabetes Comorbidity  Goal: Blood Glucose Level Within Targeted Range  Outcome: Ongoing, Progressing  Intervention: Monitor and Manage Glycemia  Recent Flowsheet Documentation  Taken 5/9/2022 1404 by Kavitha Red RN  Glycemic Management: blood glucose monitored  Taken 5/9/2022 0800 by Kavitha Red RN  Glycemic Management: blood glucose monitored     Problem: Heart Failure Comorbidity  Goal: Maintenance of Heart Failure Symptom Control  Outcome: Ongoing, Progressing  Intervention: Maintain Heart Failure-Management  Recent Flowsheet Documentation  Taken 5/9/2022 1404 by Kavitha Red RN  Medication Review/Management: medications reviewed  Taken 5/9/2022 1300 by Kavitha Red RN  Medication Review/Management: medications reviewed  Taken 5/9/2022 1100 by Kavitha Red RN  Medication Review/Management: medications reviewed  Taken 5/9/2022 0900 by Kavitha Red RN  Medication Review/Management: medications reviewed  Taken 5/9/2022 0800 by Kavitha Red RN  Medication Review/Management: medications reviewed  Taken 5/9/2022 0700 by Kavitha Red RN  Medication Review/Management: medications reviewed     Problem: Hypertension Comorbidity  Goal: Blood Pressure in Desired Range  Outcome: Ongoing, Progressing  Intervention: Maintain Blood Pressure Management  Recent Flowsheet Documentation  Taken 5/9/2022 1404 by Kavitha Red RN  Medication Review/Management: medications reviewed  Taken 5/9/2022 1300 by Kavitha Red RN  Medication Review/Management: medications  reviewed  Taken 5/9/2022 1100 by Kavitha Red RN  Medication Review/Management: medications reviewed  Taken 5/9/2022 0900 by Kavitha Red RN  Medication Review/Management: medications reviewed  Taken 5/9/2022 0800 by Kavitha Red RN  Medication Review/Management: medications reviewed  Taken 5/9/2022 0700 by Kavitha Red RN  Medication Review/Management: medications reviewed     Problem: Skin Injury Risk Increased  Goal: Skin Health and Integrity  Outcome: Ongoing, Progressing  Intervention: Promote and Optimize Oral Intake  Recent Flowsheet Documentation  Taken 5/9/2022 1404 by Kavitha Red RN  Oral Nutrition Promotion: rest periods promoted  Taken 5/9/2022 0800 by Kavitha Red RN  Oral Nutrition Promotion: rest periods promoted  Intervention: Optimize Skin Protection  Recent Flowsheet Documentation  Taken 5/9/2022 1404 by Kavitha Red RN  Pressure Reduction Techniques:   frequent weight shift encouraged   weight shift assistance provided  Pressure Reduction Devices:   pressure-redistributing mattress utilized   positioning supports utilized  Skin Protection: adhesive use limited  Taken 5/9/2022 0800 by Kavitha Red RN  Pressure Reduction Techniques: frequent weight shift encouraged  Pressure Reduction Devices: pressure-redistributing mattress utilized  Skin Protection: adhesive use limited   Goal Outcome Evaluation:           Progress: no change  Outcome Evaluation: Patient resting in bed. VSS. Alert and confused. 2LNC. Patient will be going to tunneled dialysis catheter placement later in the shift. No needs at this time. Will monitor 7a-7p.

## 2022-05-09 NOTE — PROGRESS NOTES
Assisted By: Kavitha ADAMS as well as patient's     CC: Follow-up on CKD 5 and bradycardia    Interview History/HPI: Patient states he tolerated her breakfast, no nausea no vomiting she denies chest pain or shortness of breath at this time.    ROS:     Vitals:    05/09/22 0840   BP: 175/69   Pulse: 69   Resp:    Temp:    SpO2:          Intake/Output Summary (Last 24 hours) at 5/9/2022 1043  Last data filed at 5/9/2022 0713  Gross per 24 hour   Intake 660 ml   Output 300 ml   Net 360 ml       EXAM: Saturation on 2 L 94%, 97.1, pulse of 71.  Patient is in no distress, awake alert, mood is good skin warm and dry lungs have bilateral breath sounds are clear heart regular rate and rhythm with 2/6 SURESH heard to mid sternal border, abdomen soft, benign, trace edema, strength is symmetric      EKG: Image reviewed from EKG yesterday, sinus bradycardia with a right bundle    Tele: Currently sinus rhythm in the 60s    LABS:     Lab Results (last 48 hours)     Procedure Component Value Units Date/Time    POC Glucose Once [[id number]]  (Abnormal) Collected: 05/09/22 0603    Specimen: Blood Updated: 05/09/22 0614     Glucose 136 mg/dL      Comment: RN Notified Meter: [id number] : [id number] [facility]       Comprehensive Metabolic Panel [[id number]]  (Abnormal) Collected: 05/09/22 0022    Specimen: Blood Updated: 05/09/22 0100     Glucose 121 mg/dL      BUN 72 mg/dL      Creatinine 5.61 mg/dL      Sodium 125 mmol/L      Potassium 5.0 mmol/L      Chloride 93 mmol/L      CO2 12.5 mmol/L      Calcium 8.6 mg/dL      Total Protein 5.9 g/dL      Albumin 2.84 g/dL      ALT (SGPT) 10 U/L      AST (SGOT) 29 U/L      Alkaline Phosphatase 90 U/L      Total Bilirubin 0.5 mg/dL      Globulin 3.1 gm/dL      A/G Ratio 0.9 g/dL      BUN/Creatinine Ratio 12.8     Anion Gap 19.5 mmol/L      eGFR 7.2 mL/min/1.73      Comment: <15 Indicative of kidney failure       Narrative:      GFR Normal >60  Chronic Kidney Disease <60  Kidney Failure <15       CBC & Differential [[id number]]  (Abnormal) Collected: 05/09/22 0022    Specimen: Blood Updated: 05/09/22 0038    Narrative:      The following orders were created for panel order CBC & Differential.  Procedure                               Abnormality         Status                     ---------                               -----------         ------                     CBC Auto Differential[[id number]]        Abnormal            Final result                 Please view results for these tests on the individual orders.    CBC Auto Differential [[id number]]  (Abnormal) Collected: 05/09/22 0022    Specimen: Blood Updated: 05/09/22 0038     WBC 13.61 10*3/mm3      RBC 2.86 10*6/mm3      Hemoglobin 8.2 g/dL      Hematocrit 24.4 %      MCV 85.3 fL      MCH 28.7 pg      MCHC 33.6 g/dL      RDW 15.2 %      RDW-SD 46.8 fl      MPV 10.0 fL      Platelets 223 10*3/mm3      Neutrophil % 87.0 %      Lymphocyte % 6.5 %      Monocyte % 5.2 %      Eosinophil % 0.6 %      Basophil % 0.3 %      Immature Grans % 0.4 %      Neutrophils, Absolute 11.84 10*3/mm3      Lymphocytes, Absolute 0.88 10*3/mm3      Monocytes, Absolute 0.71 10*3/mm3      Eosinophils, Absolute 0.08 10*3/mm3      Basophils, Absolute 0.04 10*3/mm3      Immature Grans, Absolute 0.06 10*3/mm3      nRBC 0.0 /100 WBC     Hemoglobin & Hematocrit, Blood [[id number]]  (Abnormal) Collected: 05/08/22 1855    Specimen: Blood Updated: 05/08/22 1910     Hemoglobin 8.4 g/dL      Comment: Post Transfusion Specimen         Hematocrit 25.1 %     POC Glucose Once [[id number]]  (Abnormal) Collected: 05/08/22 1809    Specimen: Blood Updated: 05/08/22 1816     Glucose 185 mg/dL      Comment: Meter: [id number] : [id number] MAKAYLA MYERS       POC Glucose Once [[id number]]  (Abnormal) Collected: 05/08/22 1110    Specimen: Blood Updated: 05/08/22 1122     Glucose 178 mg/dL      Comment: Meter: [id number] : [id number] SHANA GARCIA       Troponin [[id number]]  (Abnormal) Collected:  05/08/22 0853    Specimen: Blood Updated: 05/08/22 0949     Troponin T 0.122 ng/mL     Narrative:      Troponin T Reference Range:  <= 0.03 ng/mL-   Negative for AMI  >0.03 ng/mL-     Abnormal for myocardial necrosis.  Clinicians would have to utilize clinical acumen, EKG, Troponin and serial changes to determine if it is an Acute Myocardial Infarction or myocardial injury due to an underlying chronic condition.       Results may be falsely decreased if patient taking Biotin.      Hemoglobin & Hematocrit, Blood [[id number]]  (Abnormal) Collected: 05/08/22 0652    Specimen: Blood Updated: 05/08/22 0720     Hemoglobin 6.9 g/dL      Hematocrit 20.2 %     Narrative:      Verified by Repeat Analysis      POC Glucose Once [[id number]]  (Normal) Collected: 05/08/22 0558    Specimen: Blood Updated: 05/08/22 0611     Glucose 128 mg/dL      Comment: Meter: [id number] : [id number] [facility]       CBC & Differential [[id number]]  (Abnormal) Collected: 05/08/22 0027    Specimen: Blood Updated: 05/08/22 0219    Narrative:      The following orders were created for panel order CBC & Differential.  Procedure                               Abnormality         Status                     ---------                               -----------         ------                     CBC Auto Differential[[id number]]        Abnormal            Final result                 Please view results for these tests on the individual orders.    CBC Auto Differential [[id number]]  (Abnormal) Collected: 05/08/22 0027    Specimen: Blood Updated: 05/08/22 0219     WBC 14.90 10*3/mm3      RBC 2.38 10*6/mm3      Hemoglobin 6.9 g/dL      Hematocrit 20.6 %      MCV 86.6 fL      MCH 29.0 pg      MCHC 33.5 g/dL      RDW 14.3 %      RDW-SD 45.0 fl      MPV 10.5 fL      Platelets 239 10*3/mm3      Neutrophil % 89.0 %      Lymphocyte % 4.8 %      Monocyte % 4.7 %      Eosinophil % 0.7 %      Basophil % 0.3 %      Immature Grans % 0.5 %      Neutrophils, Absolute  13.26 10*3/mm3      Lymphocytes, Absolute 0.72 10*3/mm3      Monocytes, Absolute 0.70 10*3/mm3      Eosinophils, Absolute 0.10 10*3/mm3      Basophils, Absolute 0.04 10*3/mm3      Immature Grans, Absolute 0.08 10*3/mm3      nRBC 0.0 /100 WBC     Comprehensive Metabolic Panel [[id number]]  (Abnormal) Collected: 05/08/22 0027    Specimen: Blood Updated: 05/08/22 0104     Glucose 127 mg/dL      BUN 72 mg/dL      Creatinine 5.69 mg/dL      Sodium 128 mmol/L      Potassium 5.1 mmol/L      Chloride 96 mmol/L      CO2 12.5 mmol/L      Calcium 8.1 mg/dL      Total Protein 5.8 g/dL      Albumin 2.79 g/dL      ALT (SGPT) 8 U/L      AST (SGOT) 18 U/L      Alkaline Phosphatase 84 U/L      Total Bilirubin 0.4 mg/dL      Globulin 3.0 gm/dL      A/G Ratio 0.9 g/dL      BUN/Creatinine Ratio 12.7     Anion Gap 19.5 mmol/L      eGFR 7.1 mL/min/1.73      Comment: <15 Indicative of kidney failure       Narrative:      GFR Normal >60  Chronic Kidney Disease <60  Kidney Failure <15      POC Glucose Once [[id number]]  (Abnormal) Collected: 05/07/22 1700    Specimen: Blood Updated: 05/07/22 1729     Glucose 168 mg/dL      Comment: Meter: [id number] : [id number] SYMONE OCHOA       Protein Electrophoresis, 24 Hr Urine - Urine, Clean Catch [[id number]] Collected: 05/07/22 1430    Specimen: Urine, Clean Catch Updated: 05/07/22 1430    Folate [[id number]]  (Abnormal) Collected: 05/07/22 0110    Specimen: Blood Updated: 05/07/22 1418     Folate 3.44 ng/mL     Narrative:      Results may be falsely increased if patient taking Biotin.      Vitamin B12 [[id number]]  (Normal) Collected: 05/07/22 0110    Specimen: Blood Updated: 05/07/22 1418     Vitamin B-12 424 pg/mL     Narrative:      Results may be falsely increased if patient taking Biotin.      PTH, Intact [[id number]]  (Abnormal) Collected: 05/07/22 1214    Specimen: Blood Updated: 05/07/22 1245     PTH, Intact 241.7 pg/mL     Narrative:      Please note the potential for biotin to falsely  depress the PTH result when high levels of biotin surpassing the daily recommended dose are administered.    Results may be falsely decreased if patient taking Biotin.      Phosphorus [[id number]]  (Abnormal) Collected: 05/07/22 1214    Specimen: Blood Updated: 05/07/22 1244     Phosphorus 8.4 mg/dL     POC Glucose Once [[id number]]  (Abnormal) Collected: 05/07/22 1136    Specimen: Blood Updated: 05/07/22 1147     Glucose 141 mg/dL      Comment: Meter: [id number] : [id number] SYMONE OCHOA                  Radiology:    Imaging Results (Last 72 Hours)     ** No results found for the last 72 hours. **      Stress test positive for moderate ischemia inferior wall and mild anterior ischemia    Renal ultrasound without hydronephrosis    Chest x-ray negative    Results for orders placed during the hospital encounter of 05/04/22    Adult Transthoracic Echo Complete w/ Color, Spectral and Contrast if necessary per protocol    Interpretation Summary  · Left ventricular wall thickness is consistent with mild concentric hypertrophy.  · Left ventricular ejection fraction appears to be 66 - 70%. Left ventricular systolic function is normal.  · All left ventricular wall segments contract normally.  · Left ventricular diastolic function is consistent with (grade II w/high LAP) pseudonormalization.  · The left atrial cavity is mildly dilated. Left atrial volume is mildly increased.  · Mild calcific aortic valve stenosis is present.  · Mild mitral valve regurgitation is present.  · Moderate tricuspid valve regurgitation is present.  · Severe pulmonary hypertension is present.  · Estimated right ventricular systolic pressure from tricuspid regurgitation is markedly elevated (>55 mmHg).  · There is a small (<1cm) pericardial effusion adjacent to the left ventricle (seen in parasternal long axis view).      Assessment/Plan:  Stage V chronic kidney disease, although no overt symptoms of uremia she is most likely approaching end-stage  renal disease and I did discuss with nephrology, he thought that the patient needed a cardiac catheterization that we should go ahead and start dialysis consider cardiac catheterization while here.  I did discuss with the patient's cardiologist Dr. Roman who felt like we should proceed on with a cardiac catheterization if possible therefore he is discussed with Dr. Hernández, hopefully dialysis can be started tomorrow and cardiac catheterization on Wednesday.  I have discussed this with patient and her .  She does have an associated metabolic acidosis, she had been receiving a bicarb drip however I did discuss with nephrology, I am starting bicarb orally now.    DVT prophylaxis, subcu heparin    Hypertension, marginal control on current antihypertensives, cardiology following as well, follow    Non-ST elevation myocardial infarction, positive stress test, for cardiac catheterization as above at cardiology discretion, continue DAPT as well as statin, holding beta-blocker secondary to bradycardia.    Bradycardia, possibly related to inferior lesion, patient have cardiac catheterization.    Chronic anemia most likely due to chronic disease, receiving IV iron, patient's folic acid was low B12 normal, I have begun folic acid replacement.    Discussed with patient's son Dylon Carbone ([place]edar) about plans for dialysis and heart cath, he was in agreement if we think that she needs it.  He would like to be kept informed of progress of these procedures.    Disposition Home    Dane Reeves MD

## 2022-05-10 LAB
ANION GAP SERPL CALCULATED.3IONS-SCNC: 17 MMOL/L (ref 5–15)
BUN SERPL-MCNC: 71 MG/DL (ref 8–23)
BUN/CREAT SERPL: 12.6 (ref 7–25)
C-ANCA TITR SER IF: ABNORMAL TITER
CALCIUM SPEC-SCNC: 8.2 MG/DL (ref 8.6–10.5)
CHLORIDE SERPL-SCNC: 94 MMOL/L (ref 98–107)
CO2 SERPL-SCNC: 14 MMOL/L (ref 22–29)
CREAT SERPL-MCNC: 5.63 MG/DL (ref 0.57–1)
EGFRCR SERPLBLD CKD-EPI 2021: 7.2 ML/MIN/1.73
GLUCOSE BLDC GLUCOMTR-MCNC: 108 MG/DL (ref 70–130)
GLUCOSE BLDC GLUCOMTR-MCNC: 139 MG/DL (ref 70–130)
GLUCOSE BLDC GLUCOMTR-MCNC: 168 MG/DL (ref 70–130)
GLUCOSE SERPL-MCNC: 113 MG/DL (ref 65–99)
HAV IGM SERPL QL IA: NORMAL
HBV CORE IGM SERPL QL IA: NORMAL
HBV SURFACE AG SERPL QL IA: NORMAL
HCV AB SER DONR QL: NORMAL
MYELOPEROXIDASE AB SER IA-ACNC: <9 U/ML (ref 0–9)
P-ANCA ATYPICAL TITR SER IF: ABNORMAL TITER
P-ANCA TITR SER IF: ABNORMAL TITER
POTASSIUM SERPL-SCNC: 4.9 MMOL/L (ref 3.5–5.2)
PROTEINASE3 AB SER IA-ACNC: <3.5 U/ML (ref 0–3.5)
SODIUM SERPL-SCNC: 125 MMOL/L (ref 136–145)

## 2022-05-10 PROCEDURE — 94799 UNLISTED PULMONARY SVC/PX: CPT

## 2022-05-10 PROCEDURE — 94761 N-INVAS EAR/PLS OXIMETRY MLT: CPT

## 2022-05-10 PROCEDURE — 5A1D70Z PERFORMANCE OF URINARY FILTRATION, INTERMITTENT, LESS THAN 6 HOURS PER DAY: ICD-10-PCS | Performed by: INTERNAL MEDICINE

## 2022-05-10 PROCEDURE — 99232 SBSQ HOSP IP/OBS MODERATE 35: CPT | Performed by: INTERNAL MEDICINE

## 2022-05-10 PROCEDURE — 25010000002 NA FERRIC GLUC CPLX PER 12.5 MG: Performed by: SURGERY

## 2022-05-10 PROCEDURE — 25010000002 HEPARIN (PORCINE) PER 1000 UNITS: Performed by: SURGERY

## 2022-05-10 PROCEDURE — 80048 BASIC METABOLIC PNL TOTAL CA: CPT | Performed by: SURGERY

## 2022-05-10 PROCEDURE — 82962 GLUCOSE BLOOD TEST: CPT

## 2022-05-10 PROCEDURE — 80074 ACUTE HEPATITIS PANEL: CPT | Performed by: SURGERY

## 2022-05-10 PROCEDURE — 63710000001 INSULIN ASPART PER 5 UNITS: Performed by: SURGERY

## 2022-05-10 RX ADMIN — NIFEDIPINE 60 MG: 30 TABLET, EXTENDED RELEASE ORAL at 08:23

## 2022-05-10 RX ADMIN — SODIUM BICARBONATE TAB 650 MG 650 MG: 650 TAB at 08:23

## 2022-05-10 RX ADMIN — ALBUTEROL SULFATE 2.5 MG: 2.5 SOLUTION RESPIRATORY (INHALATION) at 19:49

## 2022-05-10 RX ADMIN — FOLIC ACID 1 MG: 1 TABLET ORAL at 08:23

## 2022-05-10 RX ADMIN — PAROXETINE HYDROCHLORIDE 10 MG: 20 TABLET, FILM COATED ORAL at 06:08

## 2022-05-10 RX ADMIN — CLOPIDOGREL 75 MG: 75 TABLET, FILM COATED ORAL at 08:23

## 2022-05-10 RX ADMIN — ATORVASTATIN CALCIUM 80 MG: 40 TABLET, FILM COATED ORAL at 21:11

## 2022-05-10 RX ADMIN — ALBUTEROL SULFATE 2.5 MG: 2.5 SOLUTION RESPIRATORY (INHALATION) at 12:20

## 2022-05-10 RX ADMIN — ALBUTEROL SULFATE 2.5 MG: 2.5 SOLUTION RESPIRATORY (INHALATION) at 00:47

## 2022-05-10 RX ADMIN — ALBUTEROL SULFATE 2.5 MG: 2.5 SOLUTION RESPIRATORY (INHALATION) at 06:30

## 2022-05-10 RX ADMIN — Medication 10 ML: at 21:11

## 2022-05-10 RX ADMIN — Medication 10 ML: at 08:23

## 2022-05-10 RX ADMIN — ASPIRIN 81 MG: 81 TABLET, COATED ORAL at 08:23

## 2022-05-10 RX ADMIN — INSULIN ASPART 2 UNITS: 100 INJECTION, SOLUTION INTRAVENOUS; SUBCUTANEOUS at 17:17

## 2022-05-10 RX ADMIN — HEPARIN SODIUM 5000 UNITS: 5000 INJECTION INTRAVENOUS; SUBCUTANEOUS at 13:19

## 2022-05-10 RX ADMIN — SODIUM BICARBONATE TAB 650 MG 650 MG: 650 TAB at 21:11

## 2022-05-10 RX ADMIN — SODIUM CHLORIDE 250 MG: 9 INJECTION, SOLUTION INTRAVENOUS at 11:16

## 2022-05-10 RX ADMIN — HEPARIN SODIUM 5000 UNITS: 5000 INJECTION INTRAVENOUS; SUBCUTANEOUS at 06:09

## 2022-05-10 RX ADMIN — CALCIUM ACETATE 1334 MG: 667 CAPSULE ORAL at 11:16

## 2022-05-10 RX ADMIN — CALCIUM ACETATE 1334 MG: 667 CAPSULE ORAL at 17:07

## 2022-05-10 RX ADMIN — CALCIUM ACETATE 1334 MG: 667 CAPSULE ORAL at 08:23

## 2022-05-10 NOTE — PROGRESS NOTES
"  Patient Identification:  Name:  Sean Chen  Age:  81 y.o.  Sex:  female  :  1940  MRN:  [id number]  Visit Number:  [id number]  Primary care provider:  Ganga Gallardo MD    Reason for follow-up:  NSTEMI with abnormal nuclear stress test    Subjective     Patient denied history of chest pain.  No history of increased shortness of breath.  Intermittent dizziness.  Telemetry showed sinus rhythm with heart rate in 60s.  BP was 160/90 mmHg.    Started hemodialysis since yesterday for renal failure.  Hemoglobin is 8.4.      Medication Review:   albuterol, 2.5 mg, Nebulization, 4x Daily - RT  aspirin, 81 mg, Oral, Daily  atorvastatin, 80 mg, Oral, Nightly  calcium acetate, 1,334 mg, Oral, TID With Meals  clopidogrel, 75 mg, Oral, Daily  ferric gluconate (FERRLECIT) IVPB, 250 mg, Intravenous, Daily  folic acid, 1 mg, Oral, Daily  heparin (porcine), 5,000 Units, Subcutaneous, Q8H  Insulin Aspart, 0-7 Units, Subcutaneous, TID AC  NIFEdipine CC, 60 mg, Oral, Q24H  PARoxetine, 10 mg, Oral, QAM  sodium bicarbonate, 650 mg, Oral, BID  sodium chloride, 10 mL, Intravenous, Q12H          Vital Sign Min/Max for last 24 hours  Temp  Min: 97.6 °F (36.4 °C)  Max: 98.9 °F (37.2 °C)   BP  Min: 122/77  Max: 174/65   Pulse  Min: 57  Max: 76   Resp  Min: 4  Max: 27   SpO2  Min: 85 %  Max: 99 %   No data recorded   Weight  Min: 75 kg (165 lb 6.4 oz)  Max: 75 kg (165 lb 6.4 oz)     Flowsheet Rows    Flowsheet Row First Filed Value   Admission Height 162.6 cm (64\") Documented at 2022 1407   Admission Weight 63.5 kg (140 lb) Documented at 2022 1407          Objective       Physical Exam:     General Appearance:   Alert, cooperative.  No distress.   Head:   Normocephalic, without obvious abnormality.   Eyes:          Lids and lashes normal, conjunctivae and sclerae normal, no   icterus,    Ears:  Ears appear intact with no abnormalities noted   Throat:   No oral lesions, no thrush, oral mucosa moist   Neck: No " adenopathy, supple, trachea midline,  carotid bruit, no JVD   Back:   No significant abnormality   Lungs:   Clear to auscultation,respirations regular, No added sounds    Heart:   Normal heart rate.  Regular rhythm.  Grade 2 x 6 ejection systolic murmur at the aortic area.   Chest Wall:  No abnormalities observed   Abdomen   Soft, nontender, no masses, no organomegaly and bowel sounds are heard.           Extremities:  No pedal edema            Telemetry:  Sinus rhythm      Labs  Results from last 7 days   Lab Units 05/09/22  0022 05/08/22  1855 05/08/22  0652 05/08/22  0027 05/07/22  0110 05/06/22  0022 05/05/22  1754 05/05/22  0732 05/05/22  0222 05/04/22  1504   WBC 10*3/mm3 13.61*  --   --  14.90* 13.40* 12.12*  --   --  8.64 10.40   HEMOGLOBIN g/dL 8.2* 8.4* 6.9* 6.9* 7.2* 7.7* 7.6*   < > 5.8* 8.4*   HEMATOCRIT % 24.4* 25.1* 20.2* 20.6* 21.4* 22.7* 22.9*   < > 17.6* 25.3*   PLATELETS 10*3/mm3 223  --   --  239 248 222  --   --  250 339    < > = values in this interval not displayed.     Results from last 7 days   Lab Units 05/10/22  0037 05/09/22  0022 05/08/22  0027 05/07/22  0110 05/06/22  1344 05/06/22  0022 05/05/22  0106   SODIUM mmol/L 125* 125* 128* 127* 129* 126* 135*   POTASSIUM mmol/L 4.9 5.0 5.1 4.3 4.5 4.7 5.0   CHLORIDE mmol/L 94* 93* 96* 97* 99 106 109*   CO2 mmol/L 14.0* 12.5* 12.5* 14.1* 13.7* 12.0* 12.7*   BUN mg/dL 71* 72* 72* 66* 66* 68* 67*   CREATININE mg/dL 5.63* 5.61* 5.69* 5.40* 5.43* 5.53* 5.67*   CALCIUM mg/dL 8.2* 8.6 8.1* 8.0* 8.4* 7.4* 8.1*   GLUCOSE mg/dL 113* 121* 127* 132* 102* 190* 140*     Results from last 7 days   Lab Units 05/09/22  0022 05/08/22  0027 05/07/22  0110 05/06/22  0022 05/05/22  0106 05/04/22  1504   BILIRUBIN mg/dL 0.5 0.4 0.3 0.3 0.2 0.4   ALK PHOS U/L 90 84 86 90 75 105   AST (SGOT) U/L 29 18 15 10 9 13   ALT (SGPT) U/L 10 8 6 5 5 8     Results from last 7 days   Lab Units 05/07/22  0110 05/06/22  0022 05/05/22  0106 05/04/22  1504   MAGNESIUM mg/dL 2.3 1.5*  1.8 1.6     Results from last 7 days   Lab Units 05/04/22  1504   INR  0.96     Results from last 7 days   Lab Units 05/04/22  1504   CRP mg/dL 0.63*     Results from last 7 days   Lab Units 05/08/22  0853 05/05/22 2110 05/04/22 2019   TROPONIN T ng/mL 0.122* 0.061* 0.058*           Assessment      1.  NSTEMI with abnormal nuclear stress test.  2.  Benign essential hypertension  3.  Episodes of bradycardia-resolved.  4.  PRITI on CKD.  On HD  5.  Anemia of chronic disease    Plan     1.  Consulted _interventional cardiologist to consider diagnostic coronary arteriogram and PCI if appropriate.  I discussed with him.  2.  Continue aspirin, clopidogrel and statin.  No beta-blocker due to history of bradycardia.  3.  On long-acting nifedipine for HTN.  4.  Bradycardia seems to be resolved.  Will closely monitor.      Lexi Roman MD [facility]  05/10/22  08:41 EDT

## 2022-05-10 NOTE — DISCHARGE PLACEMENT REQUEST
"Sean Chen (81 y.o. Female)             Date of Birth   1940    Social Security Number       Address   [address] [place] [id number]    Home Phone   [phone]    MRN   [id number]       Roman Catholic   Unknown    Marital Status                               Admission Date   5/4/22    Admission Type   Emergency    Admitting Provider   Shaw Tomas DO    Attending Provider   Dane Reeves MD    Department, Room/Bed   [facility] CARE, P209/S2       Discharge Date       Discharge Disposition       Discharge Destination                               Attending Provider: Dane Reeves MD    Allergies: Keflex [Cephalexin], Lodine [Etodolac], Penicillins, Sulfa Antibiotics    Isolation: None   Infection: None   Code Status: CPR   Advance Care Planning Activity    Ht: 165.1 cm (65\")   Wt: 75 kg (165 lb 6.4 oz)    Admission Cmt: None   Principal Problem: Hypertensive urgency [I16.0]                 Active Insurance as of 5/4/2022     Primary Coverage     Payor Plan Insurance Group Employer/Plan Group    HUMANA MEDICARE REPLACEMENT HUMANA MEDICARE REPLACEMENT [id number]     Payor Plan Address Payor Plan Phone Number Payor Plan Fax Number Effective Dates    [address] [address] [phone]  1/1/2021 - None Entered    [facility] [id number]       Subscriber Name Subscriber Birth Date Member ID       SEAN CHEN 1940 [id number]           Secondary Coverage     Payor Plan Insurance Group Employer/Plan Group    [place]      Payor Plan Address Payor Plan Phone Number Payor Plan Fax Number Effective Dates    [address]   1/1/2014 - None Entered    [facility] [phone]       Subscriber Name Subscriber Birth Date Member ID       SEAN CHEN 1940 [id number]                 Emergency Contacts      (Rel.) Home Phone Work Phone Mobile Phone    Dylon Chen Jr (Son) [phone] -- [phone]    deysi chen -- -- " [phone]    Dylon Chen Sr (Spouse) [phone] -- --    Dylon Chen J (Grandchild) -- -- [phone]        Nephrology Progress Note      Subjective     No chest pain or shortness of breath.     Objective       Vital signs :     Temp:  [97.6 °F (36.4 °C)-98.9 °F (37.2 °C)] 98.3 °F (36.8 °C)  Heart Rate:  [57-76] 62  Resp:  [4-22] 18  BP: (117-176)/(52-94) 141/52      Intake/Output Summary (Last 24 hours) at 5/10/2022 1354  Last data filed at 5/10/2022 1200  Gross per 24 hour   Intake 770 ml   Output --   Net 770 ml       Physical Exam:    General Appearance : not in acute distress  Lungs : clear to auscultation, respirations regular  Heart :  regular rhythm & normal rate, normal S1, S2 and no murmur, no rub  Abdomen : normal bowel sounds, no masses, no hepatomegaly, no splenomegaly, soft non-tender and no guarding  Extremities : no edema,  Neurologic :   orientated to person, place, time and situation, Grossly no focal deficits      Laboratory Data :     Albumin Albumin   Date Value Ref Range Status   05/09/2022 2.84 (L) 3.50 - 5.20 g/dL Final   05/08/2022 2.79 (L) 3.50 - 5.20 g/dL Final      Magnesium No results found for: MG       PTH                 No results found for: PTH    CBC and coagulation:  Results from last 7 days   Lab Units 05/09/22  0022 05/08/22  1855 05/08/22  0652 05/08/22  0027 05/07/22  0110 05/05/22  0222 05/04/22  1504   CRP mg/dL  --   --   --   --   --   --  0.63*   WBC 10*3/mm3 13.61*  --   --  14.90* 13.40*   < > 10.40   HEMOGLOBIN g/dL 8.2* 8.4* 6.9* 6.9* 7.2*   < > 8.4*   HEMATOCRIT % 24.4* 25.1* 20.2* 20.6* 21.4*   < > 25.3*   MCV fL 85.3  --   --  86.6 85.9   < > 87.2   MCHC g/dL 33.6  --   --  33.5 33.6   < > 33.2   PLATELETS 10*3/mm3 223  --   --  239 248   < > 339   INR   --   --   --   --   --   --  0.96    < > = values in this interval not displayed.     Acid/base balance:      Renal and electrolytes:  Results from last 7 days   Lab Units 05/10/22  0037 05/09/22  0022  05/08/22  0027 05/07/22  1214 05/07/22  0110 05/06/22  1344 05/06/22  0022 05/05/22  0106   SODIUM mmol/L 125* 125* 128*  --  127* 129* 126* 135*   POTASSIUM mmol/L 4.9 5.0 5.1  --  4.3 4.5 4.7 5.0   MAGNESIUM mg/dL  --   --   --   --  2.3  --  1.5* 1.8   CHLORIDE mmol/L 94* 93* 96*  --  97* 99 106 109*   CO2 mmol/L 14.0* 12.5* 12.5*  --  14.1* 13.7* 12.0* 12.7*   BUN mg/dL 71* 72* 72*  --  66* 66* 68* 67*   CREATININE mg/dL 5.63* 5.61* 5.69*  --  5.40* 5.43* 5.53* 5.67*   CALCIUM mg/dL 8.2* 8.6 8.1*  --  8.0* 8.4* 7.4* 8.1*   PHOSPHORUS mg/dL  --   --   --  8.4*  --   --   --   --      Estimated Creatinine Clearance: 8.1 mL/min (A) (by C-G formula based on SCr of 5.63 mg/dL (H)).    Liver and pancreatic function:  Results from last 7 days   Lab Units 05/09/22  0022 05/08/22  0027 05/07/22  0110   ALBUMIN g/dL 2.84* 2.79* 2.89*   BILIRUBIN mg/dL 0.5 0.4 0.3   ALK PHOS U/L 90 84 86   AST (SGOT) U/L 29 18 15   ALT (SGPT) U/L 10 8 6         Cardiac:  Results from last 7 days   Lab Units 05/04/22  1504   PROBNP pg/mL 29,931.0*     Liver and pancreatic function:  Results from last 7 days   Lab Units 05/09/22  0022 05/08/22  0027 05/07/22  0110   ALBUMIN g/dL 2.84* 2.79* 2.89*   BILIRUBIN mg/dL 0.5 0.4 0.3   ALK PHOS U/L 90 84 86   AST (SGOT) U/L 29 18 15   ALT (SGPT) U/L 10 8 6       Medications :     albuterol, 2.5 mg, Nebulization, 4x Daily - RT  aspirin, 81 mg, Oral, Daily  atorvastatin, 80 mg, Oral, Nightly  calcium acetate, 1,334 mg, Oral, TID With Meals  clopidogrel, 75 mg, Oral, Daily  ferric gluconate (FERRLECIT) IVPB, 250 mg, Intravenous, Daily  folic acid, 1 mg, Oral, Daily  heparin (porcine), 5,000 Units, Subcutaneous, Q8H  Insulin Aspart, 0-7 Units, Subcutaneous, TID AC  NIFEdipine CC, 60 mg, Oral, Q24H  PARoxetine, 10 mg, Oral, QAM  sodium bicarbonate, 650 mg, Oral, BID  sodium chloride, 10 mL, Intravenous, Q12H             Assessment/Plan     1. PRITI on CKD vs rapidly worsening CKD, started on dialysis on  5/10/22  2. Metabolic acidosis  3. Anemia  4. 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 DM-ii  6. Essential hypertension  7. Hyperkalemia  8. hyponatremia     TDC has placed, will initiate on dialysis today and ok to proceed to cardiac cath from renal stands point.   Follow up pending serology.   Continue on IV iron.     Unknown baseline Cr, Cr was 1.3 in 2021, 2.9 in 2021, and 3.2 in 2022 and now admitted with 5.3  8G, mild hematuria  sono no obstruction.   Elevated , phos 8.4 and low iron stores all point out towards sever CKD and most likely CKD5.   -IV iron daily for 5 days  -phoslo 2 tab tid with meals  -follow up serology,             History & Physical      Sanjuanita Madsen APRN at 22 1712     Attestation signed by Shaw Tomas DO at 22 5414    I have reviewed this documentation and agree.  Patient seen and examined seperately.  She is pleasant and in no distress but still having significant elevated pressures and we will start nicardipine drip while awaiting reconciliation of home bp meds.  Will try to stick to once daily or twice daily antihypertensives given compliance issues so that family can more easily enforce compliance when they visit to deliver meals.  She has both typical and atypical chest pain features with elevated troponin but now downtrending with improved bp control but also in the setting of cortney on ckd.  Patient also with bradycardia which appears to have been symptomatic at times and therefore will request cardiology consult.  Will also obtain nephrology consult to evaluate as she does have cortney on ckd likely hypertensive mediated. ASA and statin for now, but hold on heparin given downtrending tropes with improving bp control.                     [facility] Medicine [facility]  History & Physical    Patient Identification:  Name:  Sean Chen  Age:  81 y.o.  Sex:  female  :  1940  MRN:  [id number]   Visit Number:  [id number]  Admit  "Date: 5/4/2022   Primary Care Physician:  Ganga Gallardo MD    Subjective     Chief complaint: Abnormal lab, nausea/vomiting    History of presenting illness:      Sean Chen is a 81 y.o. female with past medical history significant for primary essential hypertension, type 2 diabetes mellitis without long-term or current use of insulin, mixed hyperlipidemia, progressive dementia without behavioral disturbance, iron deficiency anemia, vitamin D deficiency, osteoarthritis, Ménière's disease, chronic seasonal allergic rhinitis, and chronic kidney disease stage IIIb. Today, the patient presented to the ED for further evaluation after notification from her PCP, Dr. Gallardo, that her potassium was elevated and that she was in kidney failure according to recent labs obtained. Patient also complains of nausea and vomiting which she states began this past Friday (04/29) shortly after her labs were drawn. Patient reports that the emesis appeared to be undigested food, and then bile-like. She also reports associated left chest pain which she describes as a \"mashing\" feeling. She also admits to mild dizziness with these episodes.The patient states that the chest pain gets worse with activity or exertion and is relieved by rest. She currently denies any chest pain upon exam, nausea, shortness of air, fatigue, weakness, palpitations, and syncopal episodes. Patient states that she \"took something\" for nausea but could not recall what the medication was. She states the medication relieved her nausea and vomiting since then. Please note, the patient is an unreliable historian as she is only oriented to self at this time. Per the patient's son who was present during exam, patient does not take medications as scheduled and is very forgetful. He also states that the patient was recently referred by PCP to cardiology for symptomatic bradycardia and nephrology for management of CKD, and has since followed up with them. " However, he can not recall the name of cardiologist or nephrologist.     Upon arrival to the ED, vital signs were temperature 97.7, pulse 46, respirations 16, blood pressure 218/70 sitting, SPO2 saturation 100% on room air.    Known Emergency Department medications received prior to my evaluation included clonidine 0.1 mg, hydralazine 20 mg IV. Room location at the time of my evaluation was [address]. RN Danelle present during exam as well as patient's son. Discussed case with Dr. Tomas.    ---------------------------------------------------------------------------------------------------------------------   Review of Systems   Constitutional: Negative for activity change, appetite change, chills, diaphoresis and fatigue.   Respiratory: Negative for cough, choking, shortness of breath and wheezing.    Cardiovascular: Positive for chest pain. Negative for palpitations and leg swelling.   Gastrointestinal: Positive for nausea and vomiting. Negative for abdominal pain, constipation and diarrhea.   Genitourinary: Negative for difficulty urinating and dysuria.   Neurological: Positive for dizziness. Negative for syncope, speech difficulty and headaches.   Psychiatric/Behavioral: Positive for confusion. Negative for agitation, behavioral problems and hallucinations.      ---------------------------------------------------------------------------------------------------------------------   Past Medical History:   Diagnosis Date   • Allergic rhinitis    • Elevated liver enzymes    • Extrinsic asthma    • Gastritis    • GERD (gastroesophageal reflux disease)    • Hyperlipidemia    • Hypertension    • Iron deficiency anemia    • Meniere's disease    • Osteoarthritis    • Type 2 diabetes mellitus (HCC)    • Vitamin D deficiency disease      Past Surgical History:   Procedure Laterality Date   • APPENDECTOMY     • CHOLECYSTECTOMY     • HYSTERECTOMY       No family history on file.  Social History     Socioeconomic History   •  Marital status:      Spouse name: moshe   • Number of children: 1   • Years of education: 8   Tobacco Use   • Smoking status: Never Smoker   • Smokeless tobacco: Never Used   Substance and Sexual Activity   • Alcohol use: No   • Drug use: No   • Sexual activity: Defer     ---------------------------------------------------------------------------------------------------------------------   Allergies:  Keflex [cephalexin], Lodine [etodolac], Penicillins, and Sulfa antibiotics  ---------------------------------------------------------------------------------------------------------------------   Home medications:    Medications below are reported home medications pulling from within the system; at this time, these medications have not been reconciled unless otherwise specified and are in the verification process for further verifcation as current home medications.  Medications Prior to Admission   Medication Sig Dispense Refill Last Dose   • albuterol sulfate HFA (ProAir HFA) 108 (90 Base) MCG/ACT inhaler Inhale 2 puffs 4 (Four) Times a Day. 8.5 g 5 5/3/2022 at Unknown time   • amLODIPine (NORVASC) 10 MG tablet Take 1 tablet by mouth Every Night. 30 tablet 5 5/3/2022 at Unknown time   • atorvastatin (LIPITOR) 80 MG tablet Take 1 tablet by mouth Every Night. 30 tablet 5 5/3/2022 at Unknown time   • hydrALAZINE (APRESOLINE) 25 MG tablet Take 1 tablet by mouth 3 (Three) Times a Day. 90 tablet 5 5/3/2022 at Unknown time   • PARoxetine (PAXIL) 10 MG tablet Take 1 tablet by mouth Every Morning. 30 tablet 5 5/3/2022 at Unknown time       Hospital Scheduled Meds:  albuterol, 2.5 mg, Nebulization, 4x Daily - RT  amLODIPine, 10 mg, Oral, Nightly  aspirin, 325 mg, Oral, Once  atorvastatin, 80 mg, Oral, Nightly  heparin (porcine), 5,000 Units, Subcutaneous, Q8H  hydrALAZINE, 25 mg, Oral, TID  magnesium sulfate, 1 g, Intravenous, Once  [START ON 5/5/2022] PARoxetine, 10 mg, Oral, QAM  sodium chloride, 10 mL, Intravenous,  Q12H      niCARdipine, 5-15 mg/hr, Last Rate: 5 mg/hr (05/04/22 1932)        Current listed hospital scheduled medications may not yet reflect those currently placed in orders that are signed and held awaiting patient's arrival to floor.   ---------------------------------------------------------------------------------------------------------------------     Objective     Vital Signs:  Temp:  [97.4 °F (36.3 °C)-97.7 °F (36.5 °C)] 97.4 °F (36.3 °C)  Heart Rate:  [44-48] 45  Resp:  [16] 16  BP: (190-218)/(62-71) 198/66      05/04/22  1407 05/04/22  1815   Weight: 63.5 kg (140 lb) 64.2 kg (141 lb 9.6 oz)     Body mass index is 23.56 kg/m².  ---------------------------------------------------------------------------------------------------------------------       Physical Exam  Vitals reviewed.   Constitutional:       General: She is awake. She is not in acute distress.     Appearance: She is normal weight. She is not diaphoretic.      Comments: Pt alert, oriented only to self.    HENT:      Head: Normocephalic.      Mouth/Throat:      Mouth: Mucous membranes are dry.   Eyes:      Extraocular Movements: Extraocular movements intact.      Pupils: Pupils are equal, round, and reactive to light.   Cardiovascular:      Rate and Rhythm: Regular rhythm. Bradycardia present.      Pulses:           Radial pulses are 2+ on the right side and 2+ on the left side.        Dorsalis pedis pulses are 1+ on the right side and 1+ on the left side.      Heart sounds: Murmur heard.    Systolic murmur is present.  Pulmonary:      Effort: Pulmonary effort is normal. No respiratory distress or retractions.      Breath sounds: Normal breath sounds. No wheezing or rhonchi.   Chest:      Chest wall: No tenderness.   Abdominal:      General: Abdomen is flat. Bowel sounds are normal.      Palpations: Abdomen is soft. There is no mass.      Tenderness: There is no abdominal tenderness. There is no guarding.   Musculoskeletal:         General: No  swelling.      Cervical back: Normal range of motion and neck supple.      Right lower leg: No edema.      Left lower leg: No edema.   Skin:     General: Skin is warm and dry.      Capillary Refill: Capillary refill takes 2 to 3 seconds.      Coloration: Skin is pale.   Neurological:      Mental Status: She is alert. She is disoriented.      Motor: No weakness.   Psychiatric:         Mood and Affect: Mood normal.         Behavior: Behavior normal. Behavior is cooperative.       ---------------------------------------------------------------------------------------------------------------------  EKG: Pending cardiology read; appears sinus bradycardia, rate 40's, with right bundle branch block. QTc 448. See below.    Addendum: EKG later confirmed by Dr. Franco.           Telemetry:  Appears sinus bradycardia 40's upon my exam.    I have personally looked at both the EKG and the telemetry strips.  ---------------------------------------------------------------------------------------------------------------------   Results from last 7 days   Lab Units 05/04/22  1504 04/29/22  1230   CRP mg/dL 0.63*  --    WBC 10*3/mm3 10.40 10.69   HEMOGLOBIN g/dL 8.4* 7.6*   HEMATOCRIT % 25.3* 24.1*   MCV fL 87.2 90.3   MCHC g/dL 33.2 31.5   PLATELETS 10*3/mm3 339 352   INR  0.96  --          Results from last 7 days   Lab Units 05/04/22  1504 04/29/22  1230   SODIUM mmol/L 133* 141   POTASSIUM mmol/L 5.3* 5.8*   MAGNESIUM mg/dL 1.6 1.7   CHLORIDE mmol/L 106 110*   TOTAL CO2 mmol/L  --  13.4*   CO2 mmol/L 13.4*  --    BUN mg/dL 70* 73*   CREATININE mg/dL 5.36* 5.33*   CALCIUM mg/dL 8.7 8.2*   GLUCOSE mg/dL 112* 128*   ALBUMIN g/dL 3.69 3.60   BILIRUBIN mg/dL 0.4 0.3   ALK PHOS U/L 105 94   AST (SGOT) U/L 13 15   ALT (SGPT) U/L 8 12   Estimated Creatinine Clearance: 8.5 mL/min (A) (by C-G formula based on SCr of 5.36 mg/dL (H)).  No results found for: AMMONIA  Results from last 7 days   Lab Units 05/04/22  1716 05/04/22  1504    TROPONIN T ng/mL 0.084* 0.095*     Results from last 7 days   Lab Units 05/04/22  1504   PROBNP pg/mL 29,931.0*     Lab Results   Component Value Date    HGBA1C 5.50 04/29/2022     Lab Results   Component Value Date    TSH 1.750 04/29/2022     No results found for: PREGTESTUR, PREGSERUM, HCG, HCGQUANT  Pain Management Panel    There is no flowsheet data to display.       Results from last 7 days   Lab Units 04/29/22  1230   TRIGLYCERIDES mg/dL 82   HDL CHOL mg/dL 62*   LDL CHOL mg/dL 45     ---------------------------------------------------------------------------------------------------------------------  Imaging Results (Last 7 Days)     Procedure Component Value Units Date/Time    XR Chest 1 View [[id number]] Collected: 05/04/22 1611     Updated: 05/04/22 1613    Narrative:      EXAM:    XR Chest, 1 View     EXAM DATE:    5/4/2022 2:43 PM     CLINICAL HISTORY:    abnormal labs     TECHNIQUE:    Frontal view of the chest.     COMPARISON:    No relevant prior studies available.     FINDINGS:    Lungs:  Unremarkable.  No consolidation.    Pleural space:  Unremarkable.  No pneumothorax.    Heart:  Unremarkable.  No cardiomegaly.    Mediastinum:  Unremarkable.    Bones/joints:  Degenerative changes bilateral shoulders.       Impression:        No acute cardiopulmonary findings identified.     This report was finalized on 5/4/2022 4:11 PM by Dr. Mingo Cardenas MD.             Last echocardiogram: No previous echo on file.       I have personally reviewed the above radiology images and read the final radiology report on 05/04/22  ---------------------------------------------------------------------------------------------------------------------  Assessment / Plan     Active Hospital Problems    Diagnosis  POA   • **Hypertensive urgency [I16.0]  Yes   • Dementia without behavioral disturbance (HCC) [F03.90]  Yes   • Bradycardia [R00.1]  Yes   • Mixed hyperlipidemia [E78.2]  Yes   • Essential hypertension [I10]  Yes    • Type 2 diabetes mellitus without complication, without long-term current use of insulin (HCC) [E11.9]  Yes       ASSESSMENT/PLAN:  -Reports recent chest pain with typical features, POA  -Possible NSTEMI with troponin elevation, POA  -Systolic murmur heard best at apex without known or documented hx of significant valvular disease, POA  -Hypomagnesemia, POA  -Troponin 0.095 initially in ED; trended down to 0.084.  -Cardiology consult; evaluation and input greatly appreciated.  -Continue to trend troponins.  -Patient denies chest pain upon my exam.   -Repeat EKG; see initial EKG above.   -Heparin 5,000 units subcutaneous Q 12 hours for DVT prophylaxis; discussed with Dr. Tomas and decided against initiating heparin gtt at this time.  -Loading dose of ASA.  -Lipitor 80 mg Nightly  -Echo in AM.   -Magnesium 1.6 currently; will add 1g IV magnesium and recheck in AM.     -Hypertensive urgency, POA  -/70 initially; during ED course, SBP ranging from 190-210.   -Clonidine and hydralazine given in ED with some decline in BP noted.  -Initiate cardene gtt to maintain -180.  -Continue PO Hydralazine 25 mg TID.  -Continue PO Norvasc 10 mg Nightly.  -Avoid medications that may worsen bradycardia.    -Acute kidney injury on chronic renal failure, stage IIIb, POA  -Hyperkalemia, POA  -Elevated proBNP 29,931 POA  -Creatinine 5.36 initially; baseline creatinine appears to be around 2.9-3.3.  -Hold nephrotoxins as able.  -Strict I/O.  -K+ 5.3 currently; K+ 5.8 on 04/29/2022 with labs from PCP.  -Dextrose and Insulin.   -Continuous cardiac monitoring.  -Holding IV fluids for now due to elevated proBNP.   -CMP in AM to closely monitor renal function.  -PRITI possibly related to bradycardia and/or persistent uncontrolled HTN.  -Inpatient nephrology consult; input and evaluation greatly appreciated.    -Bradycardia, POA  -SB 40's during ED course with right BBB noted on EKG.  -Continuous telemetry monitoring.  -VS per  hospital policy.   -Patient had been recently referred to cardiology by PCP for symptomatic bradycardia, however patient is unable to relay details due to memory deficits/cognitive decline.   -Patient's son also unable to provide many details.  -Records requested by Dr. Tomas.     -Progressive dementia, POA  -Supportive care; provide reorientation.  -Bed alarm/fall precautions.  -Aspiration precautions.  -Case management consulted for discharge planning.    -Non-insulin dependent type II DM  -Accu checks AC and HS.  -Will initiate low-dose SS insulin coverage if needed.   -Hemoglobin A1C 5.5 on 04/29/2022 indicating adequate control of BG recently.  -Glucose 112 upon initial labs today in ED.  -Hypoglycemia protocol.    -Chronic normocytic anemia  -Currently stable.  -Hemoglobin currently 8.4; improved from 7.6 (04/29).  -No signs of active bleeding; patient denies hematuria, rectal bleeding, coffee-ground emesis.  -CBC in AM.        ----------  -DVT prophylaxis: heparin 5,000 units subcutaneous Q 12 hours   -Activity: up ad tiffani/fall precautions.  -Expected length of stay:   INPATIENT status due to the need for care which can only be reasonably provided in an hospital setting such as aggressive/expedited ancillary services and/or consultation services, the necessity for IV medications, close physician monitoring and/or the possible need for procedures.  In such, I feel patient's risk for adverse outcomes and need for care warrant INPATIENT evaluation and predict the patient's care encounter to likely last beyond 2 midnights.  -Disposition: pending clinical course.     CODE STATUS: FULL; discussed with patient and son.    High risk secondary to hypertensive urgency, bradycardia, possible NSTEMI with troponin elevation and recent chest pain w/ typical features, acute on chronic renal failure stage IIIb, hyperkalemia, progressive dementia, and advanced age.       Sanjuanita Madsen, APRN   05/04/22  19:45 EDT  Pager  #[phone]  ---------------------------------------------------------------------------------------------------------------------       Electronically signed by Shaw Tomas DO at 05/05/22 0731       Vital Signs (last day)     Date/Time Temp Temp src Pulse Resp BP Patient Position SpO2    05/10/22 1326 -- -- 62 -- 141/52 -- 97    05/10/22 1313 -- -- 64 18 136/53 -- --    05/10/22 1300 -- -- 65 12 -- -- 91    05/10/22 1204 -- -- 65 16 117/74 -- 94    05/10/22 1200 98.3 (36.8) Oral -- -- -- -- --    05/10/22 1104 -- -- 60 14 173/60 -- 96    05/10/22 1004 -- -- 57 11 176/62 -- 95    05/10/22 0903 -- -- 59 12 165/65 -- 98    05/10/22 0824 -- -- 59 15 160/87 -- 98    05/10/22 0823 -- -- 60 -- 160/87 -- --    05/10/22 0802 -- -- 76 16 172/57 -- 95    05/10/22 0800 98 (36.7) Oral -- -- -- -- --    05/10/22 0702 -- -- 57 12 172/66 -- 97    05/10/22 0641 -- -- 60 -- -- -- 97    05/10/22 0630 -- -- 63 15 -- -- 95    05/10/22 0602 -- -- 68 10 127/70 Lying 98    05/10/22 0500 -- -- 64 12 165/66 Lying 92    05/10/22 0402 -- -- 58 13 171/69 Lying 91    05/10/22 0400 97.8 (36.6) Oral -- -- -- -- --    05/10/22 0302 -- -- 64 17 143/87 Lying 99    05/10/22 0202 -- -- 65 21 155/94 Lying 96    05/10/22 0102 -- -- 60 14 159/67 Lying 97    05/10/22 0057 -- -- 63 16 -- -- 95    05/10/22 0047 -- -- 57 16 -- -- 95    05/10/22 0002 -- -- 62 12 165/62 Lying 96    05/10/22 0000 97.9 (36.6) Oral -- -- -- -- --    05/09/22 2302 -- -- 60 10 167/76 Lying 96    05/09/22 2202 -- -- 61 10 174/65 Lying 96    05/09/22 2100 -- -- 57 16 153/55 Lying 97    05/09/22 2002 -- -- 58 10 146/54 Lying 98    05/09/22 2000 97.6 (36.4) Oral -- -- -- -- --    05/09/22 1927 -- -- 63 20 -- -- 96    05/09/22 1917 -- -- 63 20 -- -- 95    05/09/22 1902 -- -- 68 19 168/57 Lying 85    05/09/22 1803 -- -- 70 22 171/71 Lying 94    05/09/22 1703 -- -- 62 17 162/61 Lying 98    05/09/22 1625 -- -- 64 11 167/64 Lying 98    05/09/22 1612 98.9 (37.2) Tympanic 66 13 156/61  Lying 95    05/09/22 1607 -- -- 67 4 171/66 Lying 94    05/09/22 1602 -- -- 62 9 163/62 Lying 96    05/09/22 1557 -- -- 65 16 161/62 Lying 96    05/09/22 1552 -- -- 64 11 157/67 Lying 93    05/09/22 1547 -- -- 64 18 149/61 Lying 96    05/09/22 1542 97.6 (36.4) Tympanic 65 11 150/58 Lying 92    05/09/22 1434 -- -- -- -- -- -- 92    05/09/22 1430 -- -- 64 22 167/59 Lying 88    05/09/22 1403 -- -- 57 19 150/52 Lying 97    05/09/22 1304 -- -- 62 18 -- -- 95    05/09/22 1303 -- -- 61 19 160/54 Lying 96    05/09/22 1256 -- -- 63 18 -- -- 96    05/09/22 1203 -- -- 64 27 122/77 Lying 94    05/09/22 1200 98.3 (36.8) Oral -- -- -- -- --    05/09/22 1103 -- -- 57 11 147/55 Lying 96    05/09/22 1003 -- -- 65 16 167/61 Lying 95    05/09/22 0903 -- -- 70 10 152/73 Lying 97    05/09/22 0840 -- -- 69 -- 175/69 -- --    05/09/22 0803 -- -- 69 14 166/72 Lying 96    05/09/22 0800 97.1 (36.2) Axillary -- -- -- -- --    05/09/22 0750 -- -- 74 18 -- -- 96    05/09/22 0742 -- -- 71 18 -- -- 93    05/09/22 0703 -- -- 72 18 170/63 Lying 93    05/09/22 0602 -- -- 68 14 166/63 -- 94    05/09/22 0400 97.5 (36.4) Axillary -- -- -- -- --    05/09/22 0302 -- -- 58 10 164/63 -- 91    05/09/22 0202 -- -- 64 25 165/58 -- 95    05/09/22 0051 97.5 (36.4) Axillary 59 18 -- -- 96    05/09/22 0041 -- -- 58 18 -- -- 93    05/09/22 0002 -- -- 57 18 132/38 -- 90          Intake & Output (last day)       05/09 0701  05/10 0700 05/10 0701 05/11 0700    P.O. 240 480    I.V. (mL/kg) 170.5 (2.3)     Blood      Total Intake(mL/kg) 410.5 (5.5) 480 (6.4)    Urine (mL/kg/hr) 100 (0.1)     Stool      Total Output 100     Net +310.5 +480                Current Facility-Administered Medications   Medication Dose Route Frequency Provider Last Rate Last Admin   • acetaminophen (TYLENOL) tablet 650 mg  650 mg Oral Q6H PRN Hans Coates MD   650 mg at 05/07/22 0911   • albuterol (PROVENTIL) nebulizer solution 0.083% 2.5 mg/3mL  2.5 mg Nebulization 4x Daily - RT Jaxon  Hans ROBLEDO MD   2.5 mg at 05/10/22 1220   • aspirin EC tablet 81 mg  81 mg Oral Daily Hans Coates MD   81 mg at 05/10/22 0823   • atorvastatin (LIPITOR) tablet 80 mg  80 mg Oral Nightly Hans Coates MD   80 mg at 05/09/22 2121   • calcium acetate (PHOS BINDER)) capsule 1,334 mg  1,334 mg Oral TID With Meals Hans Coates MD   1,334 mg at 05/10/22 1116   • clopidogrel (PLAVIX) tablet 75 mg  75 mg Oral Daily Hans Coates MD   75 mg at 05/10/22 0823   • dextrose (D50W) (25 g/50 mL) IV injection 25 g  25 g Intravenous Q15 Min PRN Hans Coates MD       • dextrose (GLUTOSE) oral gel 15 g  15 g Oral Q15 Min PRN Hans Coates MD       • ferric gluconate (FERRLECIT) 250 mg in sodium chloride 0.9 % 120 mL IVPB  250 mg Intravenous Daily Hans Coates  mL/hr at 05/10/22 1116 250 mg at 05/10/22 1116   • folic acid (FOLVITE) tablet 1 mg  1 mg Oral Daily Hans Coates MD   1 mg at 05/10/22 0823   • glucagon (human recombinant) (GLUCAGEN DIAGNOSTIC) injection 1 mg  1 mg Intramuscular Q15 Min PRN Hans Coates MD       • heparin (porcine) 5000 UNIT/ML injection 5,000 Units  5,000 Units Subcutaneous Q8H Hans Coates MD   5,000 Units at 05/10/22 1319   • Insulin Aspart (novoLOG) injection 0-7 Units  0-7 Units Subcutaneous TID AC Hans Coates MD   2 Units at 05/08/22 1815   • NIFEdipine CC (ADALAT CC) 24 hr tablet 60 mg  60 mg Oral Q24H Hans Coates MD   60 mg at 05/10/22 0823   • PARoxetine (PAXIL) tablet 10 mg  10 mg Oral QAM Hans Coates MD   10 mg at 05/10/22 0608   • prochlorperazine (COMPAZINE) injection 5 mg  5 mg Intravenous Q6H PRN Hans Coates MD   5 mg at 05/08/22 0846   • sodium bicarbonate tablet 650 mg  650 mg Oral BID Hans Coates MD   650 mg at 05/10/22 0823   • sodium chloride 0.9 % flush 10 mL  10 mL Intravenous PRN Hans Coates MD       • sodium chloride 0.9 % flush 10 mL  10 mL Intravenous Q12H Hans Coates MD   10 mL at 05/10/22 0823   • sodium  chloride 0.9 % flush 10 mL  10 mL Intravenous Hans Lunsford MD         Lab Results (most recent)     Procedure Component Value Units Date/Time    POC Glucose Once [[id number]]  (Abnormal) Collected: 05/10/22 1020    Specimen: Blood Updated: 05/10/22 1036     Glucose 139 mg/dL      Comment: Meter: [id number] : [id number] blaise ish       POC Glucose Once [[id number]]  (Normal) Collected: 05/10/22 0610    Specimen: Blood Updated: 05/10/22 0620     Glucose 108 mg/dL      Comment: Meter: [id number] : [id number] JONI mth sense       Hepatitis Panel, Acute [[id number]]  (Normal) Collected: 05/10/22 0037    Specimen: Blood Updated: 05/10/22 0128     Hepatitis B Surface Ag Non-Reactive     Hep A IgM Non-Reactive     Hep B C IgM Non-Reactive     Hepatitis C Ab Non-Reactive    Narrative:      Results may be falsely decreased if patient taking Biotin.     Basic Metabolic Panel [[id number]]  (Abnormal) Collected: 05/10/22 0037    Specimen: Blood Updated: 05/10/22 0119     Glucose 113 mg/dL      BUN 71 mg/dL      Creatinine 5.63 mg/dL      Sodium 125 mmol/L      Potassium 4.9 mmol/L      Chloride 94 mmol/L      CO2 14.0 mmol/L      Calcium 8.2 mg/dL      BUN/Creatinine Ratio 12.6     Anion Gap 17.0 mmol/L      eGFR 7.2 mL/min/1.73      Comment: <15 Indicative of kidney failure       Narrative:      GFR Normal >60  Chronic Kidney Disease <60  Kidney Failure <15      Immunoglobulin Free LT Chains Blood [[id number]]  (Abnormal) Collected: 05/06/22 1344    Specimen: Blood Updated: 05/09/22 2307     Free Light Chain, Kappa 115.5 mg/L      Free Lambda Light Chains 67.0 mg/L      Kappa/Lambda Ratio 1.72    Narrative:      Performed at:  [address] [place] [address]  [id number]  : Denis Chavarria PhD, Phone:  [phone]    Protein Elec + Interp, Serum [[id number]]  (Abnormal) Collected: 05/06/22 1344    Specimen: Blood Updated: 05/09/22 1411     Total Protein 5.6 g/dL      Albumin 2.9 g/dL       Alpha-1-Globulin 0.3 g/dL      Alpha-2-Globulin 1.1 g/dL      Beta Globulin 0.7 g/dL      Gamma Globulin 0.7 g/dL      M-Juan Pablo Not Observed g/dL      Globulin 2.7 g/dL      A/G Ratio 1.1     Please note Comment     Comment: Protein electrophoresis scan will follow via computer, mail, or   delivery.        SPE Interpretation Comment     Comment: The SPE pattern demonstrates elevation of regions containing acute  phase proteins suggesting an acute/subacute inflammatory response.  Some conditions in which this pattern has been observed include:  bacterial, viral or parasitic infection; mechanical, physical or  chemical trauma; and cardiac failure. The gamma globulin region is  unremarkable and evidence of monoclonal protein is not apparent.       Narrative:      Performed at:  [address]  [id number]  : Denis Chavarria PhD, Phone:  [phone]    Antinuclear Antibodies Direct [[id number]] Collected: 05/06/22 1344    Specimen: Blood Updated: 05/09/22 1411     LALITO Direct Negative    Narrative:      Performed at:  [address]  [id number]  : Denis Chavarria PhD, Phone:  [phone]    Comprehensive Metabolic Panel [[id number]]  (Abnormal) Collected: 05/09/22 0022    Specimen: Blood Updated: 05/09/22 0100     Glucose 121 mg/dL      BUN 72 mg/dL      Creatinine 5.61 mg/dL      Sodium 125 mmol/L      Potassium 5.0 mmol/L      Chloride 93 mmol/L      CO2 12.5 mmol/L      Calcium 8.6 mg/dL      Total Protein 5.9 g/dL      Albumin 2.84 g/dL      ALT (SGPT) 10 U/L      AST (SGOT) 29 U/L      Alkaline Phosphatase 90 U/L      Total Bilirubin 0.5 mg/dL      Globulin 3.1 gm/dL      A/G Ratio 0.9 g/dL      BUN/Creatinine Ratio 12.8     Anion Gap 19.5 mmol/L      eGFR 7.2 mL/min/1.73      Comment: <15 Indicative of kidney failure       Narrative:      GFR Normal >60  Chronic Kidney Disease <60  Kidney Failure <15      CBC &  Differential [[id number]]  (Abnormal) Collected: 05/09/22 0022    Specimen: Blood Updated: 05/09/22 0038    Narrative:      The following orders were created for panel order CBC & Differential.  Procedure                               Abnormality         Status                     ---------                               -----------         ------                     CBC Auto Differential[[id number]]        Abnormal            Final result                 Please view results for these tests on the individual orders.    CBC Auto Differential [[id number]]  (Abnormal) Collected: 05/09/22 0022    Specimen: Blood Updated: 05/09/22 0038     WBC 13.61 10*3/mm3      RBC 2.86 10*6/mm3      Hemoglobin 8.2 g/dL      Hematocrit 24.4 %      MCV 85.3 fL      MCH 28.7 pg      MCHC 33.6 g/dL      RDW 15.2 %      RDW-SD 46.8 fl      MPV 10.0 fL      Platelets 223 10*3/mm3      Neutrophil % 87.0 %      Lymphocyte % 6.5 %      Monocyte % 5.2 %      Eosinophil % 0.6 %      Basophil % 0.3 %      Immature Grans % 0.4 %      Neutrophils, Absolute 11.84 10*3/mm3      Lymphocytes, Absolute 0.88 10*3/mm3      Monocytes, Absolute 0.71 10*3/mm3      Eosinophils, Absolute 0.08 10*3/mm3      Basophils, Absolute 0.04 10*3/mm3      Immature Grans, Absolute 0.06 10*3/mm3      nRBC 0.0 /100 WBC     Hemoglobin & Hematocrit, Blood [[id number]]  (Abnormal) Collected: 05/08/22 1855    Specimen: Blood Updated: 05/08/22 1910     Hemoglobin 8.4 g/dL      Comment: Post Transfusion Specimen         Hematocrit 25.1 %     Troponin [[id number]]  (Abnormal) Collected: 05/08/22 0853    Specimen: Blood Updated: 05/08/22 0949     Troponin T 0.122 ng/mL     Narrative:      Troponin T Reference Range:  <= 0.03 ng/mL-   Negative for AMI  >0.03 ng/mL-     Abnormal for myocardial necrosis.  Clinicians would have to utilize clinical acumen, EKG, Troponin and serial changes to determine if it is an Acute Myocardial Infarction or myocardial injury due to an underlying chronic  condition.       Results may be falsely decreased if patient taking Biotin.      Hemoglobin & Hematocrit, Blood [[id number]]  (Abnormal) Collected: 05/08/22 0652    Specimen: Blood Updated: 05/08/22 0720     Hemoglobin 6.9 g/dL      Hematocrit 20.2 %     Narrative:      Verified by Repeat Analysis      CBC & Differential [[id number]]  (Abnormal) Collected: 05/08/22 0027    Specimen: Blood Updated: 05/08/22 0219    Narrative:      The following orders were created for panel order CBC & Differential.  Procedure                               Abnormality         Status                     ---------                               -----------         ------                     CBC Auto Differential[[id number]]        Abnormal            Final result                 Please view results for these tests on the individual orders.    CBC Auto Differential [[id number]]  (Abnormal) Collected: 05/08/22 0027    Specimen: Blood Updated: 05/08/22 0219     WBC 14.90 10*3/mm3      RBC 2.38 10*6/mm3      Hemoglobin 6.9 g/dL      Hematocrit 20.6 %      MCV 86.6 fL      MCH 29.0 pg      MCHC 33.5 g/dL      RDW 14.3 %      RDW-SD 45.0 fl      MPV 10.5 fL      Platelets 239 10*3/mm3      Neutrophil % 89.0 %      Lymphocyte % 4.8 %      Monocyte % 4.7 %      Eosinophil % 0.7 %      Basophil % 0.3 %      Immature Grans % 0.5 %      Neutrophils, Absolute 13.26 10*3/mm3      Lymphocytes, Absolute 0.72 10*3/mm3      Monocytes, Absolute 0.70 10*3/mm3      Eosinophils, Absolute 0.10 10*3/mm3      Basophils, Absolute 0.04 10*3/mm3      Immature Grans, Absolute 0.08 10*3/mm3      nRBC 0.0 /100 WBC     Comprehensive Metabolic Panel [[id number]]  (Abnormal) Collected: 05/08/22 0027    Specimen: Blood Updated: 05/08/22 0104     Glucose 127 mg/dL      BUN 72 mg/dL      Creatinine 5.69 mg/dL      Sodium 128 mmol/L      Potassium 5.1 mmol/L      Chloride 96 mmol/L      CO2 12.5 mmol/L      Calcium 8.1 mg/dL      Total Protein 5.8 g/dL      Albumin 2.79 g/dL       ALT (SGPT) 8 U/L      AST (SGOT) 18 U/L      Alkaline Phosphatase 84 U/L      Total Bilirubin 0.4 mg/dL      Globulin 3.0 gm/dL      A/G Ratio 0.9 g/dL      BUN/Creatinine Ratio 12.7     Anion Gap 19.5 mmol/L      eGFR 7.1 mL/min/1.73      Comment: <15 Indicative of kidney failure       Narrative:      GFR Normal >60  Chronic Kidney Disease <60  Kidney Failure <15      Protein Electrophoresis, 24 Hr Urine - Urine, Clean Catch [[id number]] Collected: 05/07/22 1430    Specimen: Urine, Clean Catch Updated: 05/07/22 1430    Folate [[id number]]  (Abnormal) Collected: 05/07/22 0110    Specimen: Blood Updated: 05/07/22 1418     Folate 3.44 ng/mL     Narrative:      Results may be falsely increased if patient taking Biotin.      Vitamin B12 [[id number]]  (Normal) Collected: 05/07/22 0110    Specimen: Blood Updated: 05/07/22 1418     Vitamin B-12 424 pg/mL     Narrative:      Results may be falsely increased if patient taking Biotin.      PTH, Intact [[id number]]  (Abnormal) Collected: 05/07/22 1214    Specimen: Blood Updated: 05/07/22 1245     PTH, Intact 241.7 pg/mL     Narrative:      Please note the potential for biotin to falsely depress the PTH result when high levels of biotin surpassing the daily recommended dose are administered.    Results may be falsely decreased if patient taking Biotin.      Phosphorus [[id number]]  (Abnormal) Collected: 05/07/22 1214    Specimen: Blood Updated: 05/07/22 1244     Phosphorus 8.4 mg/dL     C3 Complement [[id number]]  (Normal) Collected: 05/06/22 1344    Specimen: Blood Updated: 05/07/22 0235     C3 Complement 117.0 mg/dl     C4 Complement [[id number]]  (Normal) Collected: 05/06/22 1344    Specimen: Blood Updated: 05/07/22 0235     C4 Complement 23.0 mg/dl     Magnesium [[id number]]  (Normal) Collected: 05/07/22 0110    Specimen: Blood Updated: 05/07/22 0210     Magnesium 2.3 mg/dL     Iron Profile [[id number]]  (Abnormal) Collected: 05/07/22 0110    Specimen: Blood Updated: 05/07/22  0206     Iron 33 mcg/dL      Iron Saturation 16 %      Transferrin 135 mg/dL      TIBC 201 mcg/dL     Hepatitis B Surface Antigen [[id number]]  (Normal) Collected: 05/06/22 1344    Specimen: Blood Updated: 05/06/22 1431     Hepatitis B Surface Ag Non-Reactive    Antistreptolysin O Screen [[id number]]  (Normal) Collected: 05/06/22 1344    Specimen: Blood Updated: 05/06/22 1426     ASO Negative    Narrative:      A negative result indicates a content of antistreptolysin-O in the serum of less than 200 IU/ml.    Basic Metabolic Panel [[id number]]  (Abnormal) Collected: 05/06/22 1344    Specimen: Blood Updated: 05/06/22 1422     Glucose 102 mg/dL      BUN 66 mg/dL      Creatinine 5.43 mg/dL      Sodium 129 mmol/L      Potassium 4.5 mmol/L      Chloride 99 mmol/L      CO2 13.7 mmol/L      Calcium 8.4 mg/dL      BUN/Creatinine Ratio 12.2     Anion Gap 16.3 mmol/L      eGFR 7.5 mL/min/1.73      Comment: <15 Indicative of kidney failure       Narrative:      GFR Normal >60  Chronic Kidney Disease <60  Kidney Failure <15      ANCA Panel [[id number]] Collected: 05/06/22 1344    Specimen: Blood Updated: 05/06/22 1358    Protein / Creatinine Ratio, Urine - Urine, Clean Catch [[id number]]  (Abnormal) Collected: 05/05/22 1416    Specimen: Urine, Clean Catch Updated: 05/06/22 0247     Protein/Creatinine Ratio, Urine 8,538.6 mg/G Crea      Creatinine, Urine 67.4 mg/dL      Total Protein, Urine 575.5 mg/dL     Microalbumin / Creatinine Urine Ratio - Urine, Clean Catch [[id number]] Collected: 05/05/22 1416    Specimen: Urine, Clean Catch Updated: 05/06/22 0247     Microalbumin/Creatinine Ratio 4,915.4 mg/g      Creatinine, Urine 67.4 mg/dL      Microalbumin, Urine 331.3 mg/dL     Magnesium [[id number]]  (Abnormal) Collected: 05/06/22 0022    Specimen: Blood Updated: 05/06/22 0055     Magnesium 1.5 mg/dL     Troponin [[id number]]  (Abnormal) Collected: 05/05/22 2110    Specimen: Blood Updated: 05/05/22 2151     Troponin T 0.061 ng/mL      Narrative:      Troponin T Reference Range:  <= 0.03 ng/mL-   Negative for AMI  >0.03 ng/mL-     Abnormal for myocardial necrosis.  Clinicians would have to utilize clinical acumen, EKG, Troponin and serial changes to determine if it is an Acute Myocardial Infarction or myocardial injury due to an underlying chronic condition.       Results may be falsely decreased if patient taking Biotin.      Urine Culture - Urine, Urine, Clean Catch [[id number]]  (Normal) Collected: 05/04/22 1457    Specimen: Urine, Clean Catch Updated: 05/05/22 1857     Urine Culture No growth    Lipid Panel [[id number]] Collected: 05/05/22 0106    Specimen: Blood Updated: 05/05/22 0149     Total Cholesterol 98 mg/dL      Triglycerides 65 mg/dL      HDL Cholesterol 50 mg/dL      LDL Cholesterol  34 mg/dL      VLDL Cholesterol 14 mg/dL      LDL/HDL Ratio 0.70    Narrative:      Cholesterol Reference Ranges  (U.S. Department of Health and Human Services ATP III Classifications)    Desirable          <200 mg/dL  Borderline High    200-239 mg/dL  High Risk          >240 mg/dL      Triglyceride Reference Ranges  (U.S. Department of Health and Human Services ATP III Classifications)    Normal           <150 mg/dL  Borderline High  150-199 mg/dL  High             200-499 mg/dL  Very High        >500 mg/dL    HDL Reference Ranges  (U.S. Department of Health and Human Services ATP III Classifications)    Low     <40 mg/dl (major risk factor for CHD)  High    >60 mg/dl ('negative' risk factor for CHD)        LDL Reference Ranges  (U.S. Department of Health and Human Services ATP III Classifications)    Optimal          <100 mg/dL  Near Optimal     100-129 mg/dL  Borderline High  130-159 mg/dL  High             160-189 mg/dL  Very High        >189 mg/dL    [place] Draw [[id number]] Collected: 05/04/22 1504    Specimen: Blood Updated: 05/04/22 1618    Narrative:      The following orders were created for panel order [place] Draw.  Procedure                                Abnormality         Status                     ---------                               -----------         ------                     Green Top (Gel)[[id number]]                                  Final result               Lavender Top[[id number]]                                     Final result               Gold Top - SST[[id number]]                                   Final result               Light Blue Top[[id number]]                                   Final result                 Please view results for these tests on the individual orders.    Lavender Top [[id number]] Collected: 05/04/22 1504    Specimen: Blood Updated: 05/04/22 1618     Extra Tube hold for add-on     Comment: Auto resulted       Light Blue Top [[id number]] Collected: 05/04/22 1504    Specimen: Blood Updated: 05/04/22 1618     Extra Tube hold for add-on     Comment: Auto resulted       Green Top (Gel) [[id number]] Collected: 05/04/22 1504    Specimen: Blood Updated: 05/04/22 1617     Extra Tube Hold for add-ons.     Comment: Auto resulted.       Gold Top - SST [[id number]] Collected: 05/04/22 1504    Specimen: Blood Updated: 05/04/22 1617     Extra Tube Hold for add-ons.     Comment: Auto resulted.       COVID-19 and FLU A/B PCR - Swab, Nasopharynx [[id number]]  (Normal) Collected: 05/04/22 1502    Specimen: Swab from Nasopharynx Updated: 05/04/22 1611     COVID19 Not Detected     Influenza A PCR Not Detected     Influenza B PCR Not Detected    Narrative:      Fact sheet for providers: https://www.fda.gov/media/704970/download    Fact sheet for patients: https://www.fda.gov/media/413295/download    Test performed by PCR.    BNP [[id number]]  (Abnormal) Collected: 05/04/22 1504    Specimen: Blood Updated: 05/04/22 1544     proBNP 29,931.0 pg/mL     Narrative:      Among patients with dyspnea, NT-proBNP is highly sensitive for the detection of acute congestive heart failure. In addition NT-proBNP of <300 pg/ml effectively rules out acute  congestive heart failure with 99% negative predictive value.    Results may be falsely decreased if patient taking Biotin.      C-reactive Protein [[id number]]  (Abnormal) Collected: 05/04/22 1504    Specimen: Blood Updated: 05/04/22 1533     C-Reactive Protein 0.63 mg/dL     Protime-INR [[id number]]  (Normal) Collected: 05/04/22 1504    Specimen: Blood Updated: 05/04/22 1523     Protime 13.0 Seconds      INR 0.96    Narrative:      Suggested INR therapeutic range for stable oral anticoagulant therapy:    Low Intensity therapy:   1.5-2.0  Moderate Intensity therapy:   2.0-3.0  High Intensity therapy:   2.5-4.0    Urinalysis, Microscopic Only - Urine, Clean Catch [[id number]]  (Abnormal) Collected: 05/04/22 1457    Specimen: Urine, Clean Catch Updated: 05/04/22 1520     RBC, UA 3-5 /HPF      WBC, UA 6-12 /HPF      Bacteria, UA None Seen /HPF      Squamous Epithelial Cells, UA 0-2 /HPF      Hyaline Casts, UA 3-6 /LPF      Methodology Automated Microscopy    Urinalysis With Culture If Indicated - Urine, Clean Catch [[id number]]  (Abnormal) Collected: 05/04/22 1457    Specimen: Urine, Clean Catch Updated: 05/04/22 1520     Color, UA Yellow     Appearance, UA Clear     pH, UA 6.0     Specific Gravity, UA 1.013     Glucose,  mg/dL (1+)     Ketones, UA Negative     Bilirubin, UA Negative     Blood, UA Small (1+)     Protein, UA >=300 mg/dL (3+)     Leuk Esterase, UA Trace     Nitrite, UA Negative     Urobilinogen, UA 0.2 E.U./dL             Operative/Procedure Notes (most recent note)      Hans Coates MD at 05/09/22 1521          HEMODIALYSIS CATHETER INSERTION  Procedure Note    Sean Chen  5/9/2022    Pre-op Diagnosis:   Renal insufficiency [N28.9]    Post-op Diagnosis: same        Procedure(s):  HEMODIALYSIS CATHETER INSERTION    Surgeon(s):  Hans Coates MD    Anesthesia: Monitored Anesthesia Care    Staff:   Circulator: Heriberto Watson RN  Radiology Technologist: Joshua Gibson  Assistant:  Alejandra Kowalski    Estimated Blood Loss: minimal    Specimens:                * No orders in the log *      Drains:   [REMOVED] External Urinary Catheter (Removed)   Site Assessment Clean;Skin intact 05/08/22 0210   Application/Removal external catheter applied 05/07/22 2000   Collection Container Standard drainage bag 05/08/22 0210   Securement Method Securing device 05/08/22 0210   Catheter care complete Yes 05/07/22 2000       Procedure: The upper chest and neck was prepped and draped. The right IJ was accessed with the needle and the wire threaded in. The incision was made there and one on the right upper chest. The 19 cm catheter was tunneled up from the chest site to the wire site. The dilator and sheath was placed. The catheter was threaded through the sheath and confirmed with c arm. The ports had good blood return and flushed easily. The upper incision was closed with vicryl and the catheter sutured down with nylon suture.     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  Improved, patient tolerated her dialysis morning, she has no complaints, no chest pain no shortness of breath, she says that the dialysis catheter site was a little sore, she did have a tunneled dialysis catheter placed yesterday.  There was apparently some oozing from the site through the night but this has resolved now.  Plan is for dialysis later today.    ROS:     Vitals:    05/10/22 1004   BP: 176/62   Pulse: 57   Resp: 11   Temp:    SpO2: 95%         Intake/Output Summary (Last 24 hours) at 5/10/2022 1026  Last data filed at 5/10/2022 0801  Gross per 24 hour   Intake 410 ml   Output --    Net 410 ml       EXAM: Temperature is 98, no distress, lungs clear heart is a regular rate and rhythm, abdomen soft benign, no edema, strength is symmetric moves all extremities on command.      EKG:     Tele: Sinus rhythm    LABS:     Lab Results (last 48 hours)     Procedure Component Value Units Date/Time    POC Glucose Once [[id number]]  (Normal) Collected: 05/10/22 0610    Specimen: Blood Updated: 05/10/22 0620     Glucose 108 mg/dL      Comment: Meter: [id number] : [id number] [facility]       Hepatitis Panel, Acute [[id number]]  (Normal) Collected: 05/10/22 0037    Specimen: Blood Updated: 05/10/22 0128     Hepatitis B Surface Ag Non-Reactive     Hep A IgM Non-Reactive     Hep B C IgM Non-Reactive     Hepatitis C Ab Non-Reactive    Narrative:      Results may be falsely decreased if patient taking Biotin.     Basic Metabolic Panel [[id number]]  (Abnormal) Collected: 05/10/22 0037    Specimen: Blood Updated: 05/10/22 0119     Glucose 113 mg/dL      BUN 71 mg/dL      Creatinine 5.63 mg/dL      Sodium 125 mmol/L      Potassium 4.9 mmol/L      Chloride 94 mmol/L      CO2 14.0 mmol/L      Calcium 8.2 mg/dL      BUN/Creatinine Ratio 12.6     Anion Gap 17.0 mmol/L      eGFR 7.2 mL/min/1.73      Comment: <15 Indicative of kidney failure       Narrative:      GFR Normal >60  Chronic Kidney Disease <60  Kidney Failure <15      Immunoglobulin Free LT Chains Blood [[id number]]  (Abnormal) Collected: 05/06/22 1344    Specimen: Blood Updated: 05/09/22 2307     Free Light Chain, Kappa 115.5 mg/L      Free Lambda Light Chains 67.0 mg/L      Kappa/Lambda Ratio 1.72    Narrative:      Performed at:  [address] [place] [address]  [id number]  : Denis Chavarria PhD, Phone:  [phone]    POC Glucose Once [[id number]]  (Abnormal) Collected: 05/09/22 1646    Specimen: Blood Updated: 05/09/22 1726     Glucose 131 mg/dL      Comment: Meter: [id number] : [id number] tomas dow       POC Glucose  Once [[id number]]  (Normal) Collected: 05/09/22 1604    Specimen: Blood Updated: 05/09/22 1610     Glucose 122 mg/dL      Comment: Physician Notified Meter: [id number] : [id number] Julieta GARCIA       Protein Elec + Interp, Serum [[id number]]  (Abnormal) Collected: 05/06/22 1344    Specimen: Blood Updated: 05/09/22 1411     Total Protein 5.6 g/dL      Albumin 2.9 g/dL      Alpha-1-Globulin 0.3 g/dL      Alpha-2-Globulin 1.1 g/dL      Beta Globulin 0.7 g/dL      Gamma Globulin 0.7 g/dL      M-Juan Pablo Not Observed g/dL      Globulin 2.7 g/dL      A/G Ratio 1.1     Please note Comment     Comment: Protein electrophoresis scan will follow via computer, mail, or   delivery.        SPE Interpretation Comment     Comment: The SPE pattern demonstrates elevation of regions containing acute  phase proteins suggesting an acute/subacute inflammatory response.  Some conditions in which this pattern has been observed include:  bacterial, viral or parasitic infection; mechanical, physical or  chemical trauma; and cardiac failure. The gamma globulin region is  unremarkable and evidence of monoclonal protein is not apparent.       Narrative:      Performed at:   - [address]  [id number]  : Denis Chavarria PhD, Phone:  [phone]    Antinuclear Antibodies Direct [[id number]] Collected: 05/06/22 1344    Specimen: Blood Updated: 05/09/22 1411     LALITO Direct Negative    Narrative:      Performed at:  [address] [place] [address]  [id number]  : Denis Chavarria PhD, Phone:  [phone]    POC Glucose Once [[id number]]  (Abnormal) Collected: 05/09/22 1152    Specimen: Blood Updated: 05/09/22 1217     Glucose 131 mg/dL      Comment: Meter: [id number] : [id number] tomas dow       POC Glucose Once [[id number]]  (Abnormal) Collected: 05/09/22 0603    Specimen: Blood Updated: 05/09/22 0614     Glucose 136 mg/dL      Comment: RN Notified Meter: [id number]  : [id number] KANDACE [facility]       Comprehensive Metabolic Panel [[id number]]  (Abnormal) Collected: 05/09/22 0022    Specimen: Blood Updated: 05/09/22 0100     Glucose 121 mg/dL      BUN 72 mg/dL      Creatinine 5.61 mg/dL      Sodium 125 mmol/L      Potassium 5.0 mmol/L      Chloride 93 mmol/L      CO2 12.5 mmol/L      Calcium 8.6 mg/dL      Total Protein 5.9 g/dL      Albumin 2.84 g/dL      ALT (SGPT) 10 U/L      AST (SGOT) 29 U/L      Alkaline Phosphatase 90 U/L      Total Bilirubin 0.5 mg/dL      Globulin 3.1 gm/dL      A/G Ratio 0.9 g/dL      BUN/Creatinine Ratio 12.8     Anion Gap 19.5 mmol/L      eGFR 7.2 mL/min/1.73      Comment: <15 Indicative of kidney failure       Narrative:      GFR Normal >60  Chronic Kidney Disease <60  Kidney Failure <15      CBC & Differential [[id number]]  (Abnormal) Collected: 05/09/22 0022    Specimen: Blood Updated: 05/09/22 0038    Narrative:      The following orders were created for panel order CBC & Differential.  Procedure                               Abnormality         Status                     ---------                               -----------         ------                     CBC Auto Differential[[id number]]        Abnormal            Final result                 Please view results for these tests on the individual orders.    CBC Auto Differential [[id number]]  (Abnormal) Collected: 05/09/22 0022    Specimen: Blood Updated: 05/09/22 0038     WBC 13.61 10*3/mm3      RBC 2.86 10*6/mm3      Hemoglobin 8.2 g/dL      Hematocrit 24.4 %      MCV 85.3 fL      MCH 28.7 pg      MCHC 33.6 g/dL      RDW 15.2 %      RDW-SD 46.8 fl      MPV 10.0 fL      Platelets 223 10*3/mm3      Neutrophil % 87.0 %      Lymphocyte % 6.5 %      Monocyte % 5.2 %      Eosinophil % 0.6 %      Basophil % 0.3 %      Immature Grans % 0.4 %      Neutrophils, Absolute 11.84 10*3/mm3      Lymphocytes, Absolute 0.88 10*3/mm3      Monocytes, Absolute 0.71 10*3/mm3      Eosinophils, Absolute 0.08 10*3/mm3       Basophils, Absolute 0.04 10*3/mm3      Immature Grans, Absolute 0.06 10*3/mm3      nRBC 0.0 /100 WBC     Hemoglobin & Hematocrit, Blood [[id number]]  (Abnormal) Collected: 05/08/22 1855    Specimen: Blood Updated: 05/08/22 1910     Hemoglobin 8.4 g/dL      Comment: Post Transfusion Specimen         Hematocrit 25.1 %     POC Glucose Once [[id number]]  (Abnormal) Collected: 05/08/22 1809    Specimen: Blood Updated: 05/08/22 1816     Glucose 185 mg/dL      Comment: Meter: [id number] : [id number] MAKAYLA MYERS       POC Glucose Once [[id number]]  (Abnormal) Collected: 05/08/22 1110    Specimen: Blood Updated: 05/08/22 1122     Glucose 178 mg/dL      Comment: Meter: [id number] : [id number] SHANA GARCIA                  Radiology:    Imaging Results (Last 72 Hours)     Procedure Component Value Units Date/Time    XR Chest AP [[id number]] Collected: 05/09/22 1613     Updated: 05/09/22 1616    Narrative:      EXAM:    XR Chest, 1 View     EXAM DATE:    5/9/2022 4:01 PM     CLINICAL HISTORY:    HEMO DIALYSIS CATH PLACED.; I16.0-Hypertensive urgency; N17.9-Acute  kidney failure, unspecified; N18.9-Chronic kidney disease, unspecified;  N28.9-Disorder of kidney and ureter, unspecified     TECHNIQUE:    Frontal view of the chest.     COMPARISON:    05/04/2022     FINDINGS:    LUNGS:  Bibasilar and bilateral airspace disease is worse.    PLEURAL SPACE:  Small bilateral pleural effusions.  No pneumothorax.    HEART:  Cardiomegaly.    MEDIASTINUM:  Unremarkable.    BONES/JOINTS:  Unremarkable.    TUBES, LINES AND DEVICES:  Right dialysis catheter noted with tip in  SVC.       Impression:      1.  Small bilateral pleural effusions.  2.  Cardiomegaly.  3.  Bibasilar and bilateral airspace disease is worse.     This report was finalized on 5/9/2022 4:14 PM by Dr. Tano Heck MD.       FL Surgery Fluoro [[id number]] Collected: 05/09/22 1534     Updated: 05/09/22 1549    Narrative:      EXAM:    FL Fluoroscopy < 1 Hour      EXAM DATE:    5/9/2022 2:58 PM     CLINICAL HISTORY:    dialysis cath placement; I16.0-Hypertensive urgency; N17.9-Acute  kidney failure, unspecified; N18.9-Chronic kidney disease, unspecified;  N28.9-Disorder of kidney and ureter, unspecified     TECHNIQUE:    Fluoroscopic images performed in multiple projections.  Fluoroscopic  guidance was provided by a physician. Fluoroscopy exposure time of  approximately 1 minute or less.     COMPARISON:    No relevant prior studies available.     FINDINGS:    OTHER FINDINGS:  Right upper chest region of interest.       Impression:        As above.     This report was finalized on 5/9/2022 3:34 PM by Dr. Tano Heck MD.             Results for orders placed during the hospital encounter of 05/04/22    Adult Transthoracic Echo Complete w/ Color, Spectral and Contrast if necessary per protocol    Interpretation Summary  · Left ventricular wall thickness is consistent with mild concentric hypertrophy.  · Left ventricular ejection fraction appears to be 66 - 70%. Left ventricular systolic function is normal.  · All left ventricular wall segments contract normally.  · Left ventricular diastolic function is consistent with (grade II w/high LAP) pseudonormalization.  · The left atrial cavity is mildly dilated. Left atrial volume is mildly increased.  · Mild calcific aortic valve stenosis is present.  · Mild mitral valve regurgitation is present.  · Moderate tricuspid valve regurgitation is present.  · Severe pulmonary hypertension is present.  · Estimated right ventricular systolic pressure from tricuspid regurgitation is markedly elevated (>55 mmHg).  · There is a small (<1cm) pericardial effusion adjacent to the left ventricle (seen in parasternal long axis view).      Assessment/Plan:   Stage V chronic kidney disease, although no overt symptoms of uremia (she just says she feels tired) she is most likely approaching end-stage renal disease, tunneled dialysis catheter placed  yesterday plans for dialysis today.       DVT prophylaxis, subcu heparin     Hypertension, marginal control on current antihypertensives, cardiology following as well, follow however because patient is beginning dialysis today I am going to hold on further adjustment of nifedipine until I see how she does handle dialysis from a blood pressure standpoint.     Non-ST elevation myocardial infarction, positive stress test, discussed with Dr. Roman, plans for cardiac catheterization tomorrow.    Bradycardia, possibly related to inferior lesion, patient have cardiac catheterization.  Heart rate is acceptable at this time.  Discussed with Dr. Sin     Chronic anemia most likely due to chronic disease, receiving IV iron, patient's folic acid was low, B12 normal, I patient is on folic acid replacement    Functional decline, PT/OT    Hyponatremia, stable, nephrology following, dialysis today    Metabolic acidosis, on oral bicarb now.    Pericardial effusion, small, can be followed up in short-term once patient has been on dialysis to see if resolves.       Disposition Home    Dane Reeves MD       Electronically signed by Dane Reeves MD at 05/10/22 1044          Consult Notes (most recent note)      Hans Coates MD at 22 1244          [facility]   Consult Note    Patient Name: Sean Chen  : 1940  MRN: [id number]  Primary Care Physician:  Ganga Gallardo MD  Referring Physician: No Known Provider  Date of admission: 2022    Consults  Subjective   Subjective     Reason for Consult/ Chief Complaint: tunneled dialysis catheter placement    History of Present Illness  Sean Chen is a 81 y.o. female has had deteriorating renal function and will need dialysis access.     Review of Systems   Constitutional: Negative for activity change, appetite change, chills, fever and unexpected weight change.   HENT: Negative for congestion, facial swelling and sore throat.    Eyes:  Negative for photophobia and visual disturbance.   Respiratory: Negative for chest tightness, shortness of breath and wheezing.    Cardiovascular: Positive for leg swelling. Negative for chest pain and palpitations.   Gastrointestinal: Positive for nausea and vomiting. Negative for abdominal distention, abdominal pain, anal bleeding, blood in stool, constipation, diarrhea and rectal pain.   Endocrine: Negative for cold intolerance, heat intolerance, polydipsia and polyuria.   Genitourinary: Negative for difficulty urinating, dysuria, flank pain and urgency.   Musculoskeletal: Negative for back pain and myalgias.   Skin: Negative for rash and wound.   Allergic/Immunologic: Negative for immunocompromised state.   Neurological: Positive for dizziness. Negative for seizures, syncope, light-headedness, numbness and headaches.   Hematological: Negative for adenopathy. Does not bruise/bleed easily.   Psychiatric/Behavioral: Positive for confusion. Negative for behavioral problems. The patient is not nervous/anxious.         Personal History     Past Medical History:   Diagnosis Date   • Allergic rhinitis    • Elevated liver enzymes    • Extrinsic asthma    • Gastritis    • GERD (gastroesophageal reflux disease)    • Hyperlipidemia    • Hypertension    • Iron deficiency anemia    • Meniere's disease    • Osteoarthritis    • Type 2 diabetes mellitus (HCC)    • Vitamin D deficiency disease        Past Surgical History:   Procedure Laterality Date   • APPENDECTOMY     • CHOLECYSTECTOMY     • HYSTERECTOMY         Family History: family history is not on file. Otherwise pertinent FHx was reviewed and not pertinent to current issue.    Social History:  reports that she has never smoked. She has never used smokeless tobacco. She reports that she does not drink alcohol and does not use drugs.    Home Medications:   PARoxetine, albuterol sulfate HFA, amLODIPine, atorvastatin, and hydrALAZINE    Allergies:  Allergies   Allergen  Reactions   • Keflex [Cephalexin]    • Lodine [Etodolac]    • Penicillins    • Sulfa Antibiotics        Objective    Objective     Vitals:  Temp:  [97.1 °F (36.2 °C)-98.3 °F (36.8 °C)] 98.3 °F (36.8 °C)  Heart Rate:  [57-71] 69  Resp:  [10-26] 18  BP: (123-175)/(38-84) 175/69  Flow (L/min):  [2] 2    Physical Exam  Vitals reviewed.   Constitutional:       General: She is not in acute distress.     Appearance: She is well-developed. She is not ill-appearing.   HENT:      Head: Normocephalic. No laceration. Hair is normal.      Right Ear: Hearing and ear canal normal.      Left Ear: Hearing and ear canal normal.      Nose: Nose normal.      Right Sinus: No maxillary sinus tenderness or frontal sinus tenderness.      Left Sinus: No maxillary sinus tenderness or frontal sinus tenderness.   Eyes:      General: Lids are normal.      Conjunctiva/sclera: Conjunctivae normal.      Pupils: Pupils are equal, round, and reactive to light.   Neck:      Thyroid: No thyroid mass or thyromegaly.      Vascular: No JVD.      Trachea: No tracheal tenderness or tracheal deviation.   Cardiovascular:      Rate and Rhythm: Regular rhythm.      Heart sounds:     No friction rub. No gallop.   Pulmonary:      Effort: Pulmonary effort is normal.      Breath sounds: Normal breath sounds. No stridor. No wheezing.   Chest:      Chest wall: No tenderness.   Breasts:      Right: No supraclavicular adenopathy.      Left: No supraclavicular adenopathy.       Abdominal:      General: Bowel sounds are normal. There is no distension.      Palpations: Abdomen is soft. There is no mass.      Tenderness: There is no abdominal tenderness. There is no guarding or rebound.      Hernia: No hernia is present.   Musculoskeletal:         General: Swelling present. No deformity.      Cervical back: Normal range of motion.   Lymphadenopathy:      Cervical: No cervical adenopathy.      Upper Body:      Right upper body: No supraclavicular adenopathy.      Left  upper body: No supraclavicular adenopathy.   Skin:     General: Skin is warm and dry.      Coloration: Skin is not pale.      Findings: No erythema or rash.   Neurological:      Mental Status: She is alert and oriented to person, place, and time.      Motor: No abnormal muscle tone.   Psychiatric:         Behavior: Behavior normal.         Thought Content: Thought content normal.         Result Review    Result Review:  I have personally reviewed the results from the time of this admission to 5/9/2022 12:44 EDT and agree with these findings:  []  Laboratory  []  Microbiology  []  Radiology  []  EKG/Telemetry   []  Cardiology/Vascular   []  Pathology  []  Old records  []  Other:  Most notable findings include:      Assessment/Plan   Assessment / Plan     Brief Patient Summary:  Sean Chen is a 81 y.o. female who needs dialysis acccess.    Active Hospital Problems:  Active Hospital Problems    Diagnosis    • **Hypertensive urgency    • Hyponatremia    • NSTEMI (non-ST elevated myocardial infarction) (HCC)    • Prolonged Q-T interval on ECG    • Anemia requiring transfusions    • Dementia without behavioral disturbance (HCC)    • Bradycardia    • Mixed hyperlipidemia    • Essential hypertension    • Type 2 diabetes mellitus without complication, without long-term current use of insulin ([facility])      Plan: place a tunneled dialysis catheter      Hans Coates MD    Electronically signed by Hans Coates MD at 05/09/22 6969

## 2022-05-10 NOTE — PLAN OF CARE
Goal Outcome Evaluation:           Progress: improving  Outcome Evaluation: Patient vital signs currently stable and she is on 1L NC and tolerating it well. She is alert but confused. Currently receiving dialysis for the first occurance today. Her blood pressure was slightly elevated this morning but it is now improved. She has ambulated to the bedside commode with assistance today. She is very unsteady. Otherwise, no significant changes noted.

## 2022-05-10 NOTE — PROGRESS NOTES
Assisted By: Delphine ADAMS    CC: Follow-up on CKD 5 and bradycardia    Interview History/HPI: Bradycardia.  Improved, patient tolerated her dialysis morning, she has no complaints, no chest pain no shortness of breath, she says that the dialysis catheter site was a little sore, she did have a tunneled dialysis catheter placed yesterday.  There was apparently some oozing from the site through the night but this has resolved now.  Plan is for dialysis later today.    ROS:     Vitals:    05/10/22 1004   BP: 176/62   Pulse: 57   Resp: 11   Temp:    SpO2: 95%         Intake/Output Summary (Last 24 hours) at 5/10/2022 1026  Last data filed at 5/10/2022 0801  Gross per 24 hour   Intake 410 ml   Output --   Net 410 ml       EXAM: Temperature is 98, no distress, lungs clear heart is a regular rate and rhythm, abdomen soft benign, no edema, strength is symmetric moves all extremities on command.      EKG:     Tele: Sinus rhythm    LABS:     Lab Results (last 48 hours)     Procedure Component Value Units Date/Time    POC Glucose Once [[id number]]  (Normal) Collected: 05/10/22 0610    Specimen: Blood Updated: 05/10/22 0620     Glucose 108 mg/dL      Comment: Meter: [id number] : [id number] JONI [employer]       Hepatitis Panel, Acute [[id number]]  (Normal) Collected: 05/10/22 0037    Specimen: Blood Updated: 05/10/22 0128     Hepatitis B Surface Ag Non-Reactive     Hep A IgM Non-Reactive     Hep B C IgM Non-Reactive     Hepatitis C Ab Non-Reactive    Narrative:      Results may be falsely decreased if patient taking Biotin.     Basic Metabolic Panel [[id number]]  (Abnormal) Collected: 05/10/22 0037    Specimen: Blood Updated: 05/10/22 0119     Glucose 113 mg/dL      BUN 71 mg/dL      Creatinine 5.63 mg/dL      Sodium 125 mmol/L      Potassium 4.9 mmol/L      Chloride 94 mmol/L      CO2 14.0 mmol/L      Calcium 8.2 mg/dL      BUN/Creatinine Ratio 12.6     Anion Gap 17.0 mmol/L      eGFR 7.2 mL/min/1.73      Comment: <15 Indicative of  kidney failure       Narrative:      GFR Normal >60  Chronic Kidney Disease <60  Kidney Failure <15      Immunoglobulin Free LT Chains Blood [[id number]]  (Abnormal) Collected: 05/06/22 1344    Specimen: Blood Updated: 05/09/22 2307     Free Light Chain, Kappa 115.5 mg/L      Free Lambda Light Chains 67.0 mg/L      Kappa/Lambda Ratio 1.72    Narrative:      Performed at:  [address] [place] [address]  [id number]  : eDnis Chavarria PhD, Phone:  [phone]    POC Glucose Once [[id number]]  (Abnormal) Collected: 05/09/22 1646    Specimen: Blood Updated: 05/09/22 1726     Glucose 131 mg/dL      Comment: Meter: [id number] : [id number] [employer]       POC Glucose Once [[id number]]  (Normal) Collected: 05/09/22 1604    Specimen: Blood Updated: 05/09/22 1610     Glucose 122 mg/dL      Comment: Physician Notified Meter: [id number] : [id number] Julieta GARCIA       Protein Elec + Interp, Serum [[id number]]  (Abnormal) Collected: 05/06/22 1344    Specimen: Blood Updated: 05/09/22 1411     Total Protein 5.6 g/dL      Albumin 2.9 g/dL      Alpha-1-Globulin 0.3 g/dL      Alpha-2-Globulin 1.1 g/dL      Beta Globulin 0.7 g/dL      Gamma Globulin 0.7 g/dL      M-Juan Pablo Not Observed g/dL      Globulin 2.7 g/dL      A/G Ratio 1.1     Please note Comment     Comment: Protein electrophoresis scan will follow via computer, mail, or   delivery.        SPE Interpretation Comment     Comment: The SPE pattern demonstrates elevation of regions containing acute  phase proteins suggesting an acute/subacute inflammatory response.  Some conditions in which this pattern has been observed include:  bacterial, viral or parasitic infection; mechanical, physical or  chemical trauma; and cardiac failure. The gamma globulin region is  unremarkable and evidence of monoclonal protein is not apparent.       Narrative:      Performed at:  [address] [place] [address]  [id number]  :  Denis Chavarria PhD, Phone:  [phone]    Antinuclear Antibodies Direct [[id number]] Collected: 05/06/22 1344    Specimen: Blood Updated: 05/09/22 1411     LALITO Direct Negative    Narrative:      Performed at:  [address] [place] [address]  [id number]  : Denis Chavarria PhD, Phone:  [phone]    POC Glucose Once [[id number]]  (Abnormal) Collected: 05/09/22 1152    Specimen: Blood Updated: 05/09/22 1217     Glucose 131 mg/dL      Comment: Meter: [id number] : [id number] tomas dow       POC Glucose Once [[id number]]  (Abnormal) Collected: 05/09/22 0603    Specimen: Blood Updated: 05/09/22 0614     Glucose 136 mg/dL      Comment: RN Notified Meter: [id number] : [id number] [facility]       Comprehensive Metabolic Panel [[id number]]  (Abnormal) Collected: 05/09/22 0022    Specimen: Blood Updated: 05/09/22 0100     Glucose 121 mg/dL      BUN 72 mg/dL      Creatinine 5.61 mg/dL      Sodium 125 mmol/L      Potassium 5.0 mmol/L      Chloride 93 mmol/L      CO2 12.5 mmol/L      Calcium 8.6 mg/dL      Total Protein 5.9 g/dL      Albumin 2.84 g/dL      ALT (SGPT) 10 U/L      AST (SGOT) 29 U/L      Alkaline Phosphatase 90 U/L      Total Bilirubin 0.5 mg/dL      Globulin 3.1 gm/dL      A/G Ratio 0.9 g/dL      BUN/Creatinine Ratio 12.8     Anion Gap 19.5 mmol/L      eGFR 7.2 mL/min/1.73      Comment: <15 Indicative of kidney failure       Narrative:      GFR Normal >60  Chronic Kidney Disease <60  Kidney Failure <15      CBC & Differential [[id number]]  (Abnormal) Collected: 05/09/22 0022    Specimen: Blood Updated: 05/09/22 0038    Narrative:      The following orders were created for panel order CBC & Differential.  Procedure                               Abnormality         Status                     ---------                               -----------         ------                     CBC Auto Differential[[id number]]        Abnormal            Final result                 Please view  results for these tests on the individual orders.    CBC Auto Differential [[id number]]  (Abnormal) Collected: 05/09/22 0022    Specimen: Blood Updated: 05/09/22 0038     WBC 13.61 10*3/mm3      RBC 2.86 10*6/mm3      Hemoglobin 8.2 g/dL      Hematocrit 24.4 %      MCV 85.3 fL      MCH 28.7 pg      MCHC 33.6 g/dL      RDW 15.2 %      RDW-SD 46.8 fl      MPV 10.0 fL      Platelets 223 10*3/mm3      Neutrophil % 87.0 %      Lymphocyte % 6.5 %      Monocyte % 5.2 %      Eosinophil % 0.6 %      Basophil % 0.3 %      Immature Grans % 0.4 %      Neutrophils, Absolute 11.84 10*3/mm3      Lymphocytes, Absolute 0.88 10*3/mm3      Monocytes, Absolute 0.71 10*3/mm3      Eosinophils, Absolute 0.08 10*3/mm3      Basophils, Absolute 0.04 10*3/mm3      Immature Grans, Absolute 0.06 10*3/mm3      nRBC 0.0 /100 WBC     Hemoglobin & Hematocrit, Blood [[id number]]  (Abnormal) Collected: 05/08/22 1855    Specimen: Blood Updated: 05/08/22 1910     Hemoglobin 8.4 g/dL      Comment: Post Transfusion Specimen         Hematocrit 25.1 %     POC Glucose Once [[id number]]  (Abnormal) Collected: 05/08/22 1809    Specimen: Blood Updated: 05/08/22 1816     Glucose 185 mg/dL      Comment: Meter: [id number] : [id number] MAKAYLA MYERS       POC Glucose Once [[id number]]  (Abnormal) Collected: 05/08/22 1110    Specimen: Blood Updated: 05/08/22 1122     Glucose 178 mg/dL      Comment: Meter: [id number] : [id number] SHANA GARCIA                  Radiology:    Imaging Results (Last 72 Hours)     Procedure Component Value Units Date/Time    XR Chest AP [[id number]] Collected: 05/09/22 1613     Updated: 05/09/22 1616    Narrative:      EXAM:    XR Chest, 1 View     EXAM DATE:    5/9/2022 4:01 PM     CLINICAL HISTORY:    HEMO DIALYSIS CATH PLACED.; I16.0-Hypertensive urgency; N17.9-Acute  kidney failure, unspecified; N18.9-Chronic kidney disease, unspecified;  N28.9-Disorder of kidney and ureter, unspecified     TECHNIQUE:    Frontal view of the  chest.     COMPARISON:    05/04/2022     FINDINGS:    LUNGS:  Bibasilar and bilateral airspace disease is worse.    PLEURAL SPACE:  Small bilateral pleural effusions.  No pneumothorax.    HEART:  Cardiomegaly.    MEDIASTINUM:  Unremarkable.    BONES/JOINTS:  Unremarkable.    TUBES, LINES AND DEVICES:  Right dialysis catheter noted with tip in  SVC.       Impression:      1.  Small bilateral pleural effusions.  2.  Cardiomegaly.  3.  Bibasilar and bilateral airspace disease is worse.     This report was finalized on 5/9/2022 4:14 PM by Dr. Tano Heck MD.       [place] [facility] Fluoro [[id number]] Collected: 05/09/22 1534     Updated: 05/09/22 1549    Narrative:      EXAM:    [place] Fluoroscopy < 1 Hour     EXAM DATE:    5/9/2022 2:58 PM     CLINICAL HISTORY:    dialysis cath placement; I16.0-Hypertensive urgency; N17.9-Acute  kidney failure, unspecified; N18.9-Chronic kidney disease, unspecified;  N28.9-Disorder of kidney and ureter, unspecified     TECHNIQUE:    Fluoroscopic images performed in multiple projections.  Fluoroscopic  guidance was provided by a physician. Fluoroscopy exposure time of  approximately 1 minute or less.     COMPARISON:    No relevant prior studies available.     FINDINGS:    OTHER FINDINGS:  Right upper chest region of interest.       Impression:        As above.     This report was finalized on 5/9/2022 3:34 PM by Dr. Tano Heck MD.             Results for orders placed during the hospital encounter of 05/04/22    Adult Transthoracic Echo Complete w/ Color, Spectral and Contrast if necessary per protocol    Interpretation Summary  · Left ventricular wall thickness is consistent with mild concentric hypertrophy.  · Left ventricular ejection fraction appears to be 66 - 70%. Left ventricular systolic function is normal.  · All left ventricular wall segments contract normally.  · Left ventricular diastolic function is consistent with (grade II w/high LAP) pseudonormalization.  · The left atrial  cavity is mildly dilated. Left atrial volume is mildly increased.  · Mild calcific aortic valve stenosis is present.  · Mild mitral valve regurgitation is present.  · Moderate tricuspid valve regurgitation is present.  · Severe pulmonary hypertension is present.  · Estimated right ventricular systolic pressure from tricuspid regurgitation is markedly elevated (>55 mmHg).  · There is a small (<1cm) pericardial effusion adjacent to the left ventricle (seen in parasternal long axis view).      Assessment/Plan:   Stage V chronic kidney disease, although no overt symptoms of uremia (she just says she feels tired) she is most likely approaching end-stage renal disease, tunneled dialysis catheter placed yesterday plans for dialysis today.       DVT prophylaxis, subcu heparin     Hypertension, marginal control on current antihypertensives, cardiology following as well, follow however because patient is beginning dialysis today I am going to hold on further adjustment of nifedipine until I see how she does handle dialysis from a blood pressure standpoint.     Non-ST elevation myocardial infarction, positive stress test, discussed with Dr. Roman, plans for cardiac catheterization tomorrow.    Bradycardia, possibly related to inferior lesion, patient have cardiac catheterization.  Heart rate is acceptable at this time.  Discussed with Dr. Sin     Chronic anemia most likely due to chronic disease, receiving IV iron, patient's folic acid was low, B12 normal, I patient is on folic acid replacement    Functional decline, PT/OT    Hyponatremia, stable, nephrology following, dialysis today    Metabolic acidosis, on oral bicarb now.    Pericardial effusion, small, can be followed up in short-term once patient has been on dialysis to see if resolves.       Disposition Home    Dane Reeves MD

## 2022-05-10 NOTE — PLAN OF CARE
Goal Outcome Evaluation:  Plan of Care Reviewed With: patient           Outcome Evaluation: VSS, pt resting in bed at this time. Upon RN assessment of patient there appears to be an incision directly above the dialysis catheter. The incision was bleeding despite applying pressure. Dressing applied, and dialysis catheter dressing changed due to being blood soaked. Bed in low position with alarm activated.

## 2022-05-10 NOTE — CASE MANAGEMENT/SOCIAL WORK
Discharge Planning Assessment  CHICHO Enriquez     Patient Name: Sean Chen  MRN: [id number]  Today's Date: 5/10/2022    Admit Date: 5/4/2022     Discharge Plan     Row Name 05/10/22 1420       Plan    Plan Pt discussed in multidisciplinary rounds on this date, pt will need outpatient dialsysis chair arranged per MD with closest clinic being [facility].  faxed referral to [phone] and notified Angeli at [facility] of referral, [phone].              DEVONTE Danielson

## 2022-05-10 NOTE — CONSULTS
Consults  Date of Admit: 5/4/2022  Date of Consult: 05/10/22  Provider, No Known  Sean Chen  1940    Consulting Physician: Marcelino Grande MD    Cardiology consultation    Reason for consultation:  Cardiac catheterization    Assessment:  1. Non-STEMI with abnormal nuclear stress test  2. Hypertension  3. Episodes of bradycardia, resolved  4. PRITI on CKD, on hemodialysis  5. Anemia of chronic disease      Recommendations:  1. We will proceed with cardiac catheterization in the a.m.  Risks and benefits were discussed with the patient.  She agreed to proceed.        History of Present Illness    Subjective     Chief Complaint   Patient presents with   • Abnormal Lab       Sean Chen is a 81 y.o. female with past medical history significant for hypertension, dyslipidemia, type 2 diabetes, CKD, new hemodialysis start, gastritis, chronic anemia, vitamin D deficiency, and dementia.  Patient initially presented to the ED on 5/4/2022 due to nausea and vomiting as well as elevated potassium.  Patient did report some recent chest pain.  She was admitted with possible non-STEMI and hypomagnesia.  Cardiology was consulted for non-STEMI.  Interventional cardiology was consulted for abnormal nuclear stress test and possible cardiac catheterization.    Patient was seen and examined.  She reports that she has had about 2 to 3 months of worsening symptoms of chest pain.  She reports that she has chest pain most every day with ambulation.  She denies any radiation of pain.  She states that usually last 3 to 4 minutes, she lays down and when she awakens the pain is gone.    Cardiac risk factors:diabetes mellitus, hypercholesterolemia, hypertension and Sedentary life style    Last Echo: 5/5/2022  Interpretation Summary    · Left ventricular wall thickness is consistent with mild concentric hypertrophy.  · Left ventricular ejection fraction appears to be 66 - 70%. Left ventricular systolic function is normal.  · All left  ventricular wall segments contract normally.  · Left ventricular diastolic function is consistent with (grade II w/high LAP) pseudonormalization.  · The left atrial cavity is mildly dilated. Left atrial volume is mildly increased.  · Mild calcific aortic valve stenosis is present.  · Mild mitral valve regurgitation is present.  · Moderate tricuspid valve regurgitation is present.  · Severe pulmonary hypertension is present.  · Estimated right ventricular systolic pressure from tricuspid regurgitation is markedly elevated (>55 mmHg).  · There is a small (<1cm) pericardial effusion adjacent to the left ventricle (seen in parasternal long axis view).    Last Stress: 5/6/2022  Interpretation Summary    · Myocardial perfusion images showed a moderate ischemia in the inferior wall associated with mild infarction, also noted mild anterior wall ischemia.  · Breast attenuation and diaphragmatic attenuation artifacts are present.  · Findings consistent with a normal ECG stress test.  · Left ventricular ejection fraction is normal. (Calculated EF = 62%).  · Impressions are consistent with an intermediate risk study.          Past Medical History:   Diagnosis Date   • Allergic rhinitis    • Elevated liver enzymes    • Extrinsic asthma    • Gastritis    • GERD (gastroesophageal reflux disease)    • Hyperlipidemia    • Hypertension    • Iron deficiency anemia    • Meniere's disease    • Osteoarthritis    • Type 2 diabetes mellitus (HCC)    • Vitamin D deficiency disease      Past Surgical History:   Procedure Laterality Date   • APPENDECTOMY     • CHOLECYSTECTOMY     • HYSTERECTOMY     • INSERTION HEMODIALYSIS CATHETER N/A 5/9/2022    Procedure: HEMODIALYSIS CATHETER INSERTION;  Surgeon: Hans Coates MD;  Location: [facility];  Service: General;  Laterality: N/A;     History reviewed. No pertinent family history.  Social History     Tobacco Use   • Smoking status: Never Smoker   • Smokeless tobacco: Never Used   Substance Use  Topics   • Alcohol use: No   • Drug use: No     Medications Prior to Admission   Medication Sig Dispense Refill Last Dose   • albuterol sulfate HFA (ProAir HFA) 108 (90 Base) MCG/ACT inhaler Inhale 2 puffs 4 (Four) Times a Day. 8.5 g 5 5/3/2022 at Unknown time   • amLODIPine (NORVASC) 10 MG tablet Take 1 tablet by mouth Every Night. 30 tablet 5 5/3/2022 at Unknown time   • atorvastatin (LIPITOR) 80 MG tablet Take 1 tablet by mouth Every Night. 30 tablet 5 5/3/2022 at Unknown time   • hydrALAZINE (APRESOLINE) 25 MG tablet Take 1 tablet by mouth 3 (Three) Times a Day. 90 tablet 5 5/3/2022 at Unknown time   • PARoxetine (PAXIL) 10 MG tablet Take 1 tablet by mouth Every Morning. 30 tablet 5 5/3/2022 at Unknown time     Allergies:  Keflex [cephalexin], Lodine [etodolac], Penicillins, and Sulfa antibiotics    Review of Systems   Constitutional: Negative for fatigue.   Respiratory: Positive for shortness of breath.    Cardiovascular: Positive for chest pain. Negative for palpitations and leg swelling.   Gastrointestinal: Negative for blood in stool.   Neurological: Negative for dizziness, syncope, weakness and light-headedness.   Hematological: Does not bruise/bleed easily.   All other systems reviewed and are negative.        Objective      Vital Signs  Temp:  [97.6 °F (36.4 °C)-98.3 °F (36.8 °C)] 98.3 °F (36.8 °C)  Heart Rate:  [57-76] 68  Resp:  [10-22] 12  BP: (117-176)/(52-94) 141/58  Body mass index is 27.52 kg/m².    Intake/Output Summary (Last 24 hours) at 5/10/2022 1648  Last data filed at 5/10/2022 1647  Gross per 24 hour   Intake 720 ml   Output 1500 ml   Net -780 ml       Physical Exam  Vitals reviewed.   Constitutional:       Appearance: Normal appearance. She is well-developed.   HENT:      Head: Normocephalic and atraumatic.   Eyes:      Pupils: Pupils are equal, round, and reactive to light.   Neck:      Vascular: No JVD.   Cardiovascular:      Rate and Rhythm: Normal rate and regular rhythm.      Heart  sounds: No murmur heard.    No friction rub. No gallop.   Pulmonary:      Effort: Pulmonary effort is normal. No respiratory distress.      Breath sounds: Normal breath sounds. No wheezing or rales.   Abdominal:      Palpations: Abdomen is soft. There is no mass.      Tenderness: There is no abdominal tenderness.      Hernia: No hernia is present.   Skin:     General: Skin is warm and dry.   Neurological:      Mental Status: She is alert and oriented to person, place, and time.   Psychiatric:         Mood and Affect: Mood normal.         Behavior: Behavior normal.         Telemetry: Sinus 60s    Results Review:   I reviewed the patient's new clinical results.  Results from last 7 days   Lab Units 05/08/22  0853 05/05/22  2110 05/04/22  2019 05/04/22  1716 05/04/22  1504   TROPONIN T ng/mL 0.122* 0.061* 0.058* 0.084* 0.095*     Results from last 7 days   Lab Units 05/09/22  0022 05/08/22  1855 05/08/22  0652 05/08/22  0027 05/07/22  0110 05/06/22  0022 05/05/22  1754 05/05/22  0732 05/05/22  0222 05/04/22  1504   WBC 10*3/mm3 13.61*  --   --  14.90* 13.40* 12.12*  --   --  8.64 10.40   HEMOGLOBIN g/dL 8.2* 8.4* 6.9* 6.9* 7.2* 7.7* 7.6*   < > 5.8* 8.4*   PLATELETS 10*3/mm3 223  --   --  239 248 222  --   --  250 339    < > = values in this interval not displayed.     Results from last 7 days   Lab Units 05/10/22  0037 05/09/22  0022 05/08/22  0027 05/07/22  0110 05/06/22  1344 05/06/22  0022 05/05/22  0106 05/04/22  1504   SODIUM mmol/L 125* 125* 128* 127* 129* 126* 135* 133*   POTASSIUM mmol/L 4.9 5.0 5.1 4.3 4.5 4.7 5.0 5.3*   CHLORIDE mmol/L 94* 93* 96* 97* 99 106 109* 106   CO2 mmol/L 14.0* 12.5* 12.5* 14.1* 13.7* 12.0* 12.7* 13.4*   BUN mg/dL 71* 72* 72* 66* 66* 68* 67* 70*   CREATININE mg/dL 5.63* 5.61* 5.69* 5.40* 5.43* 5.53* 5.67* 5.36*   CALCIUM mg/dL 8.2* 8.6 8.1* 8.0* 8.4* 7.4* 8.1* 8.7   GLUCOSE mg/dL 113* 121* 127* 132* 102* 190* 140* 112*   ALT (SGPT) U/L  --  10 8 6  --  5 5 8   AST (SGOT) U/L  --  29  18 15  --  10 9 13     Lab Results   Component Value Date    INR 0.96 05/04/2022     Lab Results   Component Value Date    MG 2.3 05/07/2022    MG 1.5 (L) 05/06/2022    MG 1.8 05/05/2022     Lab Results   Component Value Date    TSH 1.750 04/29/2022    CHLPL 123 04/29/2022    TRIG 65 05/05/2022    HDL 50 05/05/2022    LDL 34 05/05/2022      No results found for: BNP     EKG:         Imaging Results (Last 72 Hours)     Procedure Component Value Units Date/Time    XR Chest AP [[id number]] Collected: 05/09/22 1613     Updated: 05/09/22 1616    Narrative:      EXAM:    XR Chest, 1 View     EXAM DATE:    5/9/2022 4:01 PM     CLINICAL HISTORY:    HEMO DIALYSIS CATH PLACED.; I16.0-Hypertensive urgency; N17.9-Acute  kidney failure, unspecified; N18.9-Chronic kidney disease, unspecified;  N28.9-Disorder of kidney and ureter, unspecified     TECHNIQUE:    Frontal view of the chest.     COMPARISON:    05/04/2022     FINDINGS:    LUNGS:  Bibasilar and bilateral airspace disease is worse.    PLEURAL SPACE:  Small bilateral pleural effusions.  No pneumothorax.    HEART:  Cardiomegaly.    MEDIASTINUM:  Unremarkable.    BONES/JOINTS:  Unremarkable.    TUBES, LINES AND DEVICES:  Right dialysis catheter noted with tip in  SVC.       Impression:      1.  Small bilateral pleural effusions.  2.  Cardiomegaly.  3.  Bibasilar and bilateral airspace disease is worse.     This report was finalized on 5/9/2022 4:14 PM by Dr. Tano Heck MD.       [place] Surgery Fluoro [[id number]] Collected: 05/09/22 1534     Updated: 05/09/22 1549    Narrative:      EXAM:    [place] Fluoroscopy < 1 Hour     EXAM DATE:    5/9/2022 2:58 PM     CLINICAL HISTORY:    dialysis cath placement; I16.0-Hypertensive urgency; N17.9-Acute  kidney failure, unspecified; N18.9-Chronic kidney disease, unspecified;  N28.9-Disorder of kidney and ureter, unspecified     TECHNIQUE:    Fluoroscopic images performed in multiple projections.  Fluoroscopic  guidance was provided by a  physician. Fluoroscopy exposure time of  approximately 1 minute or less.     COMPARISON:    No relevant prior studies available.     FINDINGS:    OTHER FINDINGS:  Right upper chest region of interest.       Impression:        As above.     This report was finalized on 5/9/2022 3:34 PM by Dr. Tano Heck MD.                Thank you very much for asking us to be involved in this patient's care.  We will follow along with you.    Electronically signed by ELGIN Arriaga, 05/10/22, 4:58 PM EDT.

## 2022-05-10 NOTE — PROGRESS NOTES
Nephrology Progress Note      Subjective     No chest pain or shortness of breath.     Objective       Vital signs :     Temp:  [97.6 °F (36.4 °C)-98.9 °F (37.2 °C)] 97.8 °F (36.6 °C)  Heart Rate:  [57-70] 57  Resp:  [4-27] 12  BP: (122-175)/(52-94) 172/66      Intake/Output Summary (Last 24 hours) at 5/10/2022 0822  Last data filed at 5/10/2022 0801  Gross per 24 hour   Intake 530.49 ml   Output --   Net 530.49 ml       Physical Exam:    General Appearance : not in acute distress  Lungs : clear to auscultation, respirations regular  Heart :  regular rhythm & normal rate, normal S1, S2 and no murmur, no rub  Abdomen : normal bowel sounds, no masses, no hepatomegaly, no splenomegaly, soft non-tender and no guarding  Extremities : no edema,  Neurologic :   orientated to person, place, time and situation, Grossly no focal deficits      Laboratory Data :     Albumin Albumin   Date Value Ref Range Status   05/09/2022 2.84 (L) 3.50 - 5.20 g/dL Final   05/08/2022 2.79 (L) 3.50 - 5.20 g/dL Final      Magnesium No results found for: MG       PTH                 PTH, Intact   Date Value Ref Range Status   05/07/2022 241.7 (H) 15.0 - 65.0 pg/mL Final       CBC and coagulation:  Results from last 7 days   Lab Units 05/09/22  0022 05/08/22  1855 05/08/22  0652 05/08/22  0027 05/07/22  0110 05/05/22  0222 05/04/22  1504   CRP mg/dL  --   --   --   --   --   --  0.63*   WBC 10*3/mm3 13.61*  --   --  14.90* 13.40*   < > 10.40   HEMOGLOBIN g/dL 8.2* 8.4* 6.9* 6.9* 7.2*   < > 8.4*   HEMATOCRIT % 24.4* 25.1* 20.2* 20.6* 21.4*   < > 25.3*   MCV fL 85.3  --   --  86.6 85.9   < > 87.2   MCHC g/dL 33.6  --   --  33.5 33.6   < > 33.2   PLATELETS 10*3/mm3 223  --   --  239 248   < > 339   INR   --   --   --   --   --   --  0.96    < > = values in this interval not displayed.     Acid/base balance:      Renal and electrolytes:  Results from last 7 days   Lab Units 05/10/22  0037 05/09/22  0022 05/08/22  0027 05/07/22  1214 05/07/22  0110  05/06/22  1344 05/06/22  0022 05/05/22  0106   SODIUM mmol/L 125* 125* 128*  --  127* 129* 126* 135*   POTASSIUM mmol/L 4.9 5.0 5.1  --  4.3 4.5 4.7 5.0   MAGNESIUM mg/dL  --   --   --   --  2.3  --  1.5* 1.8   CHLORIDE mmol/L 94* 93* 96*  --  97* 99 106 109*   CO2 mmol/L 14.0* 12.5* 12.5*  --  14.1* 13.7* 12.0* 12.7*   BUN mg/dL 71* 72* 72*  --  66* 66* 68* 67*   CREATININE mg/dL 5.63* 5.61* 5.69*  --  5.40* 5.43* 5.53* 5.67*   CALCIUM mg/dL 8.2* 8.6 8.1*  --  8.0* 8.4* 7.4* 8.1*   PHOSPHORUS mg/dL  --   --   --  8.4*  --   --   --   --      Estimated Creatinine Clearance: 8.1 mL/min (A) (by C-G formula based on SCr of 5.63 mg/dL (H)).    Liver and pancreatic function:  Results from last 7 days   Lab Units 05/09/22  0022 05/08/22  0027 05/07/22  0110   ALBUMIN g/dL 2.84* 2.79* 2.89*   BILIRUBIN mg/dL 0.5 0.4 0.3   ALK PHOS U/L 90 84 86   AST (SGOT) U/L 29 18 15   ALT (SGPT) U/L 10 8 6         Cardiac:  Results from last 7 days   Lab Units 05/04/22  1504   PROBNP pg/mL 29,931.0*     Liver and pancreatic function:  Results from last 7 days   Lab Units 05/09/22  0022 05/08/22  0027 05/07/22  0110   ALBUMIN g/dL 2.84* 2.79* 2.89*   BILIRUBIN mg/dL 0.5 0.4 0.3   ALK PHOS U/L 90 84 86   AST (SGOT) U/L 29 18 15   ALT (SGPT) U/L 10 8 6       Medications :     albuterol, 2.5 mg, Nebulization, 4x Daily - RT  aspirin, 81 mg, Oral, Daily  atorvastatin, 80 mg, Oral, Nightly  calcium acetate, 1,334 mg, Oral, TID With Meals  clopidogrel, 75 mg, Oral, Daily  ferric gluconate (FERRLECIT) IVPB, 250 mg, Intravenous, Daily  folic acid, 1 mg, Oral, Daily  heparin (porcine), 5,000 Units, Subcutaneous, Q8H  Insulin Aspart, 0-7 Units, Subcutaneous, TID AC  NIFEdipine CC, 60 mg, Oral, Q24H  PARoxetine, 10 mg, Oral, QAM  sodium bicarbonate, 650 mg, Oral, BID  sodium chloride, 10 mL, Intravenous, Q12H             Assessment/Plan     1. PRITI on CKD vs rapidly worsening CKD, started on dialysis on 5/10/22  2. Metabolic acidosis  3. Anemia  4.  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 DM-ii  6. Essential hypertension  7. Hyperkalemia  8. hyponatremia     TDC has placed, will initiate on dialysis today and ok to proceed to cardiac cath from renal stands point.   Follow up pending serology.   Continue on IV iron.     Unknown baseline Cr, Cr was 1.3 in Feb 2021, 2.9 in 12/2021, and 3.2 in Jan 2022 and now admitted with 5.3  8G, mild hematuria  sono no obstruction.   Elevated , phos 8.4 and low iron stores all point out towards sever CKD and most likely CKD5.   -IV iron daily for 5 days  -phoslo 2 tab tid with meals  -follow up serology,      Jude Muñoz MD  05/10/22  08:22 EDT

## 2022-05-11 LAB
ALBUMIN 24H MFR UR ELPH: 63.1 %
ALBUMIN SERPL-MCNC: 2.34 G/DL (ref 3.5–5.2)
ALBUMIN/GLOB SERPL: 0.9 G/DL
ALP SERPL-CCNC: 86 U/L (ref 39–117)
ALPHA1 GLOB 24H MFR UR ELPH: 1 %
ALPHA2 GLOB 24H MFR UR ELPH: 8.7 %
ALT SERPL W P-5'-P-CCNC: 10 U/L (ref 1–33)
ANION GAP SERPL CALCULATED.3IONS-SCNC: 15.1 MMOL/L (ref 5–15)
AST SERPL-CCNC: 17 U/L (ref 1–32)
B-GLOBULIN 24H MFR UR ELPH: 13.7 %
BASOPHILS # BLD AUTO: 0.05 10*3/MM3 (ref 0–0.2)
BASOPHILS NFR BLD AUTO: 0.5 % (ref 0–1.5)
BILIRUB SERPL-MCNC: 0.4 MG/DL (ref 0–1.2)
BUN SERPL-MCNC: 40 MG/DL (ref 8–23)
BUN/CREAT SERPL: 11.6 (ref 7–25)
CALCIUM SPEC-SCNC: 7.7 MG/DL (ref 8.6–10.5)
CHLORIDE SERPL-SCNC: 93 MMOL/L (ref 98–107)
CO2 SERPL-SCNC: 20.9 MMOL/L (ref 22–29)
CREAT SERPL-MCNC: 3.46 MG/DL (ref 0.57–1)
DEPRECATED RDW RBC AUTO: 43.9 FL (ref 37–54)
EGFRCR SERPLBLD CKD-EPI 2021: 12.9 ML/MIN/1.73
EOSINOPHIL # BLD AUTO: 0.3 10*3/MM3 (ref 0–0.4)
EOSINOPHIL NFR BLD AUTO: 2.8 % (ref 0.3–6.2)
ERYTHROCYTE [DISTWIDTH] IN BLOOD BY AUTOMATED COUNT: 14.5 % (ref 12.3–15.4)
GAMMA GLOB 24H MFR UR ELPH: 13.5 %
GLOBULIN UR ELPH-MCNC: 2.7 GM/DL
GLUCOSE BLDC GLUCOMTR-MCNC: 106 MG/DL (ref 70–130)
GLUCOSE BLDC GLUCOMTR-MCNC: 112 MG/DL (ref 70–130)
GLUCOSE BLDC GLUCOMTR-MCNC: 135 MG/DL (ref 70–130)
GLUCOSE SERPL-MCNC: 104 MG/DL (ref 65–99)
HCT VFR BLD AUTO: 21.8 % (ref 34–46.6)
HCT VFR BLD AUTO: 23.6 % (ref 34–46.6)
HGB BLD-MCNC: 7.5 G/DL (ref 12–15.9)
HGB BLD-MCNC: 8.1 G/DL (ref 12–15.9)
IMM GRANULOCYTES # BLD AUTO: 0.03 10*3/MM3 (ref 0–0.05)
IMM GRANULOCYTES NFR BLD AUTO: 0.3 % (ref 0–0.5)
LABORATORY COMMENT REPORT: ABNORMAL
LYMPHOCYTES # BLD AUTO: 0.77 10*3/MM3 (ref 0.7–3.1)
LYMPHOCYTES NFR BLD AUTO: 7.1 % (ref 19.6–45.3)
M PROTEIN 24H MFR UR ELPH: ABNORMAL %
MCH RBC QN AUTO: 28.4 PG (ref 26.6–33)
MCHC RBC AUTO-ENTMCNC: 34.4 G/DL (ref 31.5–35.7)
MCV RBC AUTO: 82.6 FL (ref 79–97)
MONOCYTES # BLD AUTO: 1 10*3/MM3 (ref 0.1–0.9)
MONOCYTES NFR BLD AUTO: 9.2 % (ref 5–12)
NEUTROPHILS NFR BLD AUTO: 8.73 10*3/MM3 (ref 1.7–7)
NEUTROPHILS NFR BLD AUTO: 80.1 % (ref 42.7–76)
NRBC BLD AUTO-RTO: 0 /100 WBC (ref 0–0.2)
PLATELET # BLD AUTO: 212 10*3/MM3 (ref 140–450)
PMV BLD AUTO: 9.5 FL (ref 6–12)
POTASSIUM SERPL-SCNC: 3.8 MMOL/L (ref 3.5–5.2)
PROT 24H UR-MRATE: 575 MG/24 HR (ref 30–150)
PROT SERPL-MCNC: 5 G/DL (ref 6–8.5)
PROT UR-MCNC: 575.4 MG/DL
RBC # BLD AUTO: 2.64 10*6/MM3 (ref 3.77–5.28)
SODIUM SERPL-SCNC: 129 MMOL/L (ref 136–145)
WBC NRBC COR # BLD: 10.88 10*3/MM3 (ref 3.4–10.8)

## 2022-05-11 PROCEDURE — 94761 N-INVAS EAR/PLS OXIMETRY MLT: CPT

## 2022-05-11 PROCEDURE — 85018 HEMOGLOBIN: CPT | Performed by: INTERNAL MEDICINE

## 2022-05-11 PROCEDURE — 85025 COMPLETE CBC W/AUTO DIFF WBC: CPT | Performed by: INTERNAL MEDICINE

## 2022-05-11 PROCEDURE — 97166 OT EVAL MOD COMPLEX 45 MIN: CPT

## 2022-05-11 PROCEDURE — B2111ZZ FLUOROSCOPY OF MULTIPLE CORONARY ARTERIES USING LOW OSMOLAR CONTRAST: ICD-10-PCS | Performed by: INTERNAL MEDICINE

## 2022-05-11 PROCEDURE — 93454 CORONARY ARTERY ANGIO S&I: CPT | Performed by: INTERNAL MEDICINE

## 2022-05-11 PROCEDURE — 94799 UNLISTED PULMONARY SVC/PX: CPT

## 2022-05-11 PROCEDURE — 82962 GLUCOSE BLOOD TEST: CPT

## 2022-05-11 PROCEDURE — C1894 INTRO/SHEATH, NON-LASER: HCPCS | Performed by: INTERNAL MEDICINE

## 2022-05-11 PROCEDURE — 99152 MOD SED SAME PHYS/QHP 5/>YRS: CPT | Performed by: INTERNAL MEDICINE

## 2022-05-11 PROCEDURE — 25010000002 NA FERRIC GLUC CPLX PER 12.5 MG: Performed by: SURGERY

## 2022-05-11 PROCEDURE — 99153 MOD SED SAME PHYS/QHP EA: CPT | Performed by: INTERNAL MEDICINE

## 2022-05-11 PROCEDURE — 85014 HEMATOCRIT: CPT | Performed by: INTERNAL MEDICINE

## 2022-05-11 PROCEDURE — C1760 CLOSURE DEV, VASC: HCPCS | Performed by: INTERNAL MEDICINE

## 2022-05-11 PROCEDURE — 25010000002 MIDAZOLAM PER 1 MG: Performed by: INTERNAL MEDICINE

## 2022-05-11 PROCEDURE — 99232 SBSQ HOSP IP/OBS MODERATE 35: CPT | Performed by: INTERNAL MEDICINE

## 2022-05-11 PROCEDURE — 25010000002 FENTANYL CITRATE (PF) 50 MCG/ML SOLUTION: Performed by: INTERNAL MEDICINE

## 2022-05-11 PROCEDURE — C1769 GUIDE WIRE: HCPCS | Performed by: INTERNAL MEDICINE

## 2022-05-11 PROCEDURE — 0 IOPAMIDOL PER 1 ML: Performed by: INTERNAL MEDICINE

## 2022-05-11 PROCEDURE — 4A023N7 MEASUREMENT OF CARDIAC SAMPLING AND PRESSURE, LEFT HEART, PERCUTANEOUS APPROACH: ICD-10-PCS | Performed by: INTERNAL MEDICINE

## 2022-05-11 PROCEDURE — 80053 COMPREHEN METABOLIC PANEL: CPT | Performed by: INTERNAL MEDICINE

## 2022-05-11 RX ORDER — MIDAZOLAM HYDROCHLORIDE 1 MG/ML
INJECTION INTRAMUSCULAR; INTRAVENOUS AS NEEDED
Status: DISCONTINUED | OUTPATIENT
Start: 2022-05-11 | End: 2022-05-11 | Stop reason: HOSPADM

## 2022-05-11 RX ORDER — FENTANYL CITRATE 50 UG/ML
INJECTION, SOLUTION INTRAMUSCULAR; INTRAVENOUS AS NEEDED
Status: DISCONTINUED | OUTPATIENT
Start: 2022-05-11 | End: 2022-05-11 | Stop reason: HOSPADM

## 2022-05-11 RX ORDER — ACETAMINOPHEN 325 MG/1
650 TABLET ORAL EVERY 4 HOURS PRN
Status: DISCONTINUED | OUTPATIENT
Start: 2022-05-11 | End: 2022-05-13 | Stop reason: HOSPADM

## 2022-05-11 RX ORDER — SODIUM CHLORIDE 9 MG/ML
100 INJECTION, SOLUTION INTRAVENOUS ONCE
Status: COMPLETED | OUTPATIENT
Start: 2022-05-11 | End: 2022-05-11

## 2022-05-11 RX ORDER — LIDOCAINE HYDROCHLORIDE 20 MG/ML
INJECTION, SOLUTION INFILTRATION; PERINEURAL AS NEEDED
Status: DISCONTINUED | OUTPATIENT
Start: 2022-05-11 | End: 2022-05-11 | Stop reason: HOSPADM

## 2022-05-11 RX ORDER — SODIUM CHLORIDE 9 MG/ML
INJECTION, SOLUTION INTRAVENOUS CONTINUOUS PRN
Status: COMPLETED | OUTPATIENT
Start: 2022-05-11 | End: 2022-05-11

## 2022-05-11 RX ADMIN — SODIUM BICARBONATE TAB 650 MG 650 MG: 650 TAB at 20:51

## 2022-05-11 RX ADMIN — NIFEDIPINE 60 MG: 30 TABLET, EXTENDED RELEASE ORAL at 08:01

## 2022-05-11 RX ADMIN — SODIUM BICARBONATE TAB 650 MG 650 MG: 650 TAB at 08:01

## 2022-05-11 RX ADMIN — CALCIUM ACETATE 1334 MG: 667 CAPSULE ORAL at 08:01

## 2022-05-11 RX ADMIN — ASPIRIN 81 MG: 81 TABLET, COATED ORAL at 08:01

## 2022-05-11 RX ADMIN — CLOPIDOGREL 75 MG: 75 TABLET, FILM COATED ORAL at 08:00

## 2022-05-11 RX ADMIN — ATORVASTATIN CALCIUM 80 MG: 40 TABLET, FILM COATED ORAL at 20:51

## 2022-05-11 RX ADMIN — Medication 10 ML: at 20:51

## 2022-05-11 RX ADMIN — FOLIC ACID 1 MG: 1 TABLET ORAL at 08:01

## 2022-05-11 RX ADMIN — Medication 10 ML: at 09:21

## 2022-05-11 RX ADMIN — SODIUM CHLORIDE 100 ML/HR: 9 INJECTION, SOLUTION INTRAVENOUS at 17:43

## 2022-05-11 RX ADMIN — ALBUTEROL SULFATE 2.5 MG: 2.5 SOLUTION RESPIRATORY (INHALATION) at 01:26

## 2022-05-11 RX ADMIN — ALBUTEROL SULFATE 2.5 MG: 2.5 SOLUTION RESPIRATORY (INHALATION) at 06:44

## 2022-05-11 RX ADMIN — ALBUTEROL SULFATE 2.5 MG: 2.5 SOLUTION RESPIRATORY (INHALATION) at 13:50

## 2022-05-11 RX ADMIN — SODIUM CHLORIDE 250 MG: 9 INJECTION, SOLUTION INTRAVENOUS at 09:21

## 2022-05-11 RX ADMIN — CALCIUM ACETATE 1334 MG: 667 CAPSULE ORAL at 11:20

## 2022-05-11 NOTE — PLAN OF CARE
Goal Outcome Evaluation:  Plan of Care Reviewed With: patient           Outcome Evaluation: VSS and pt remains on 1L per nasal cannula. Pt was NPO at midnight pending heart cath in the AM - consent signed. PT has been slightly hypertensive this morning. Pt felt she was too weak to ambulate to the bedside commode this shift so a purewick was implemented. No overt changes this shift. Bed in low position with alarm activated.

## 2022-05-11 NOTE — PROGRESS NOTES
Assisted By: Daniel ADAMS, son/    CC: Follow-up on chronic kidney disease as well as non-STEMI    Interview History/HPI: Patient states that she doing okay denies chest pain or shortness of breath.  She is n.p.o. currently for cardiac catheterization, she did have dialysis yesterday.  She seemed to tolerate this and actually does not he remember getting dialysis.    ROS:     Vitals:    05/11/22 0800   BP:    Pulse:    Resp:    Temp: 98.2 °F (36.8 °C)   SpO2:          Intake/Output Summary (Last 24 hours) at 5/11/2022 1002  Last data filed at 5/11/2022 0921  Gross per 24 hour   Intake 718.16 ml   Output 1600 ml   Net -881.84 ml       EXAM: 172/64, 14, 64, 98.2, noted there was a blood pressure actually of 97/83 recorded this morning is 6 but this may have been spurious.  Her mucous membranes appear slightly pale, conjunctiva remarkable neck is supple no JVD lungs about breath sounds are clear, the right tunneled dialysis catheter site looks good mild bruising around this, heart is a regular rate and rhythm/6 SURESH heard abdomen soft, benign strength is symmetric no edema      EKG:     Tele: Sinus rhythm, no significant bradycardia noted.    LABS:     Lab Results (last 48 hours)     Procedure Component Value Units Date/Time    POC Glucose Once [[id number]]  (Normal) Collected: 05/11/22 0623    Specimen: Blood Updated: 05/11/22 0630     Glucose 106 mg/dL      Comment: Meter: [id number] : [id number] JONI Rackwise       Comprehensive Metabolic Panel [[id number]]  (Abnormal) Collected: 05/11/22 0024    Specimen: Blood Updated: 05/11/22 0049     Glucose 104 mg/dL      BUN 40 mg/dL      Creatinine 3.46 mg/dL      Sodium 129 mmol/L      Potassium 3.8 mmol/L      Chloride 93 mmol/L      CO2 20.9 mmol/L      Calcium 7.7 mg/dL      Total Protein 5.0 g/dL      Albumin 2.34 g/dL      ALT (SGPT) 10 U/L      AST (SGOT) 17 U/L      Alkaline Phosphatase 86 U/L      Total Bilirubin 0.4 mg/dL      Globulin 2.7 gm/dL      A/G Ratio  0.9 g/dL      BUN/Creatinine Ratio 11.6     Anion Gap 15.1 mmol/L      eGFR 12.9 mL/min/1.73      Comment: <15 Indicative of kidney failure       Narrative:      GFR Normal >60  Chronic Kidney Disease <60  Kidney Failure <15      CBC & Differential [[id number]]  (Abnormal) Collected: 05/11/22 0024    Specimen: Blood Updated: 05/11/22 0030    Narrative:      The following orders were created for panel order CBC & Differential.  Procedure                               Abnormality         Status                     ---------                               -----------         ------                     CBC Auto Differential[[id number]]        Abnormal            Final result                 Please view results for these tests on the individual orders.    CBC Auto Differential [[id number]]  (Abnormal) Collected: 05/11/22 0024    Specimen: Blood Updated: 05/11/22 0030     WBC 10.88 10*3/mm3      RBC 2.64 10*6/mm3      Hemoglobin 7.5 g/dL      Hematocrit 21.8 %      MCV 82.6 fL      MCH 28.4 pg      MCHC 34.4 g/dL      RDW 14.5 %      RDW-SD 43.9 fl      MPV 9.5 fL      Platelets 212 10*3/mm3      Neutrophil % 80.1 %      Lymphocyte % 7.1 %      Monocyte % 9.2 %      Eosinophil % 2.8 %      Basophil % 0.5 %      Immature Grans % 0.3 %      Neutrophils, Absolute 8.73 10*3/mm3      Lymphocytes, Absolute 0.77 10*3/mm3      Monocytes, Absolute 1.00 10*3/mm3      Eosinophils, Absolute 0.30 10*3/mm3      Basophils, Absolute 0.05 10*3/mm3      Immature Grans, Absolute 0.03 10*3/mm3      nRBC 0.0 /100 WBC     POC Glucose Once [[id number]]  (Abnormal) Collected: 05/10/22 1708    Specimen: Blood Updated: 05/10/22 1725     Glucose 168 mg/dL      Comment: Meter: [id number] : [id number] juan arias       ANCA Panel [[id number]]  (Abnormal) Collected: 05/06/22 1344    Specimen: Blood Updated: 05/10/22 1613     Myeloperoxidase Ab <9.0 U/mL      Antiproteinase 3 (GA-3) <3.5 U/mL      C-ANCA 1:80 titer      P-ANCA <1:20 titer      Comment:  The presence of positive fluorescence exhibiting P-ANCA or C-ANCA  patterns alone is not specific for the diagnosis of Wegener's  Granulomatosis (WG) or microscopic polyangiitis. Decisions about  treatment should not be based solely on ANCA IFA results.  The  International ANCA Group Consensus recommends follow up testing of  positive sera with both NY-3 and MPO-ANCA enzyme immunoassays. As  many as 5% serum samples are positive only by EIA.  Ref. AM J Clin Pathol 1999;111:507-513.        Atypical pANCA <1:20 titer      Comment: The atypical pANCA pattern has been observed in a significant  percentage of patients with ulcerative colitis, primary sclerosing  cholangitis and autoimmune hepatitis.       Narrative:      Performed at:  01 - [facility]  [id number]  : Marvin Moody MD, Phone:  [phone]  Performed at:  02 - [facility]  [id number]  : Denis Chavarria PhD, Phone:  [phone]    POC Glucose Once [[id number]]  (Abnormal) Collected: 05/10/22 1020    Specimen: Blood Updated: 05/10/22 1036     Glucose 139 mg/dL      Comment: Meter: [id number] : [id number] blaise deng       POC Glucose Once [[id number]]  (Normal) Collected: 05/10/22 0610    Specimen: Blood Updated: 05/10/22 0620     Glucose 108 mg/dL      Comment: Meter: [id number] : [id number] JONI HIGGINS       Hepatitis Panel, Acute [[id number]]  (Normal) Collected: 05/10/22 0037    Specimen: Blood Updated: 05/10/22 0128     Hepatitis B Surface Ag Non-Reactive     Hep A IgM Non-Reactive     Hep B C IgM Non-Reactive     Hepatitis C Ab Non-Reactive    Narrative:      Results may be falsely decreased if patient taking Biotin.     Basic Metabolic Panel [[id number]]  (Abnormal) Collected: 05/10/22 0037    Specimen: Blood Updated: 05/10/22 0119     Glucose 113 mg/dL      BUN 71 mg/dL      Creatinine 5.63 mg/dL      Sodium 125 mmol/L      Potassium 4.9 mmol/L       Chloride 94 mmol/L      CO2 14.0 mmol/L      Calcium 8.2 mg/dL      BUN/Creatinine Ratio 12.6     Anion Gap 17.0 mmol/L      eGFR 7.2 mL/min/1.73      Comment: <15 Indicative of kidney failure       Narrative:      GFR Normal >60  Chronic Kidney Disease <60  Kidney Failure <15      Immunoglobulin Free LT Chains Blood [[id number]]  (Abnormal) Collected: 05/06/22 1344    Specimen: Blood Updated: 05/09/22 2307     Free Light Chain, Kappa 115.5 mg/L      Free Lambda Light Chains 67.0 mg/L      Kappa/Lambda Ratio 1.72    Narrative:      Performed at:  [address] [place] [address]  [id number]  : Denis Chavarria PhD, Phone:  [phone]    POC Glucose Once [[id number]]  (Abnormal) Collected: 05/09/22 1646    Specimen: Blood Updated: 05/09/22 1726     Glucose 131 mg/dL      Comment: Meter: [id number] : [id number] tomas dow       POC Glucose Once [[id number]]  (Normal) Collected: 05/09/22 1604    Specimen: Blood Updated: 05/09/22 1610     Glucose 122 mg/dL      Comment: Physician Notified Meter: [id number] : [id number] Julieta GARCIA       Protein Elec + Interp, Serum [[id number]]  (Abnormal) Collected: 05/06/22 1344    Specimen: Blood Updated: 05/09/22 1411     Total Protein 5.6 g/dL      Albumin 2.9 g/dL      Alpha-1-Globulin 0.3 g/dL      Alpha-2-Globulin 1.1 g/dL      Beta Globulin 0.7 g/dL      Gamma Globulin 0.7 g/dL      M-Juan Pablo Not Observed g/dL      Globulin 2.7 g/dL      A/G Ratio 1.1     Please note Comment     Comment: Protein electrophoresis scan will follow via computer, mail, or   delivery.        SPE Interpretation Comment     Comment: The SPE pattern demonstrates elevation of regions containing acute  phase proteins suggesting an acute/subacute inflammatory response.  Some conditions in which this pattern has been observed include:  bacterial, viral or parasitic infection; mechanical, physical or  chemical trauma; and cardiac failure. The gamma globulin region  is  unremarkable and evidence of monoclonal protein is not apparent.       Narrative:      Performed at:  [facility] [id number] [place], [place]  [id number]  : Denis Chavarria PhD, Phone:  [phone]    Antinuclear Antibodies Direct [[id number]] Collected: 05/06/22 1344    Specimen: Blood Updated: 05/09/22 1411     LALITO Direct Negative    Narrative:      Performed at:  [facility] [id number] [place], [place]  [id number]  : Denis Chavarria PhD, Phone:  [phone]    POC Glucose Once [[id number]]  (Abnormal) Collected: 05/09/22 1152    Specimen: Blood Updated: 05/09/22 1217     Glucose 131 mg/dL      Comment: Meter: [id number] : [id number] dow                  Radiology:    Imaging Results (Last 72 Hours)     Procedure Component Value Units Date/Time    XR Chest AP [[id number]] Collected: 05/09/22 1613     Updated: 05/09/22 1616    Narrative:      EXAM:    XR Chest, 1 View     EXAM DATE:    5/9/2022 4:01 PM     CLINICAL HISTORY:    HEMO DIALYSIS CATH PLACED.; I16.0-Hypertensive urgency; N17.9-Acute  kidney failure, unspecified; N18.9-Chronic kidney disease, unspecified;  N28.9-Disorder of kidney and ureter, unspecified     TECHNIQUE:    Frontal view of the chest.     COMPARISON:    05/04/2022     FINDINGS:    LUNGS:  Bibasilar and bilateral airspace disease is worse.    PLEURAL SPACE:  Small bilateral pleural effusions.  No pneumothorax.    HEART:  Cardiomegaly.    MEDIASTINUM:  Unremarkable.    BONES/JOINTS:  Unremarkable.    TUBES, LINES AND DEVICES:  Right dialysis catheter noted with tip in  SVC.       Impression:      1.  Small bilateral pleural effusions.  2.  Cardiomegaly.  3.  Bibasilar and bilateral airspace disease is worse.     This report was finalized on 5/9/2022 4:14 PM by Dr. Tano Heck MD.       FL Surgery Fluoro [[id number]] Collected: 05/09/22 1534     Updated: 05/09/22 1549    Narrative:      EXAM:    FL Fluoroscopy < 1 Hour     EXAM DATE:     5/9/2022 2:58 PM     CLINICAL HISTORY:    dialysis cath placement; I16.0-Hypertensive urgency; N17.9-Acute  kidney failure, unspecified; N18.9-Chronic kidney disease, unspecified;  N28.9-Disorder of kidney and ureter, unspecified     TECHNIQUE:    Fluoroscopic images performed in multiple projections.  Fluoroscopic  guidance was provided by a physician. Fluoroscopy exposure time of  approximately 1 minute or less.     COMPARISON:    No relevant prior studies available.     FINDINGS:    OTHER FINDINGS:  Right upper chest region of interest.       Impression:        As above.     This report was finalized on 5/9/2022 3:34 PM by Dr. Tano Heck MD.             Results for orders placed during the hospital encounter of 05/04/22    Adult Transthoracic Echo Complete w/ Color, Spectral and Contrast if necessary per protocol    Interpretation Summary  · Left ventricular wall thickness is consistent with mild concentric hypertrophy.  · Left ventricular ejection fraction appears to be 66 - 70%. Left ventricular systolic function is normal.  · All left ventricular wall segments contract normally.  · Left ventricular diastolic function is consistent with (grade II w/high LAP) pseudonormalization.  · The left atrial cavity is mildly dilated. Left atrial volume is mildly increased.  · Mild calcific aortic valve stenosis is present.  · Mild mitral valve regurgitation is present.  · Moderate tricuspid valve regurgitation is present.  · Severe pulmonary hypertension is present.  · Estimated right ventricular systolic pressure from tricuspid regurgitation is markedly elevated (>55 mmHg).  · There is a small (<1cm) pericardial effusion adjacent to the left ventricle (seen in parasternal long axis view).      Assessment/Plan:   Stage V chronic kidney disease, now probable ESRD, status post dialysis initiation, patient tolerated this well       DVT prophylaxis, subcu heparin     Hypertension, some wide fluctuations, continue  nifedipine at this time, patient have cardiac catheterization today.     Non-ST elevation myocardial infarction, positive stress test,  as above cardiac catheterization today     Bradycardia, possibly related to inferior lesion, patient have cardiac catheterization.  Heart rate is acceptable at this time.      Chronic anemia most likely due to chronic disease, receiving IV iron, patient's folic acid was low, B12 normal, I patient is on folic acid replacement.  Hemoglobin drifted down further today so I rechecked hemoglobin and she is at 8.1 and essentially stable, follow-up     Functional decline, PT/OT     Hyponatremia, improving, follow     Metabolic acidosis due to renal failure, on oral bicarb now.     Pericardial effusion, small, can be followed up in short-term once patient has been on dialysis to see if resolves.    Disposition Home    Dane Reeves MD

## 2022-05-11 NOTE — THERAPY EVALUATION
Patient Name: Sean Chen  : 1940    MRN: [id number]                              Today's Date: 2022       Admit Date: 2022    Visit Dx:     ICD-10-CM ICD-9-CM   1. Hypertensive urgency  I16.0 401.9   2. Acute renal failure superimposed on chronic kidney disease, unspecified CKD stage, unspecified acute renal failure type (HCC)  N17.9 584.9    N18.9 585.9   3. Renal insufficiency  N28.9 593.9   4. NSTEMI (non-ST elevated myocardial infarction) (HCC)  I21.4 410.70     Patient Active Problem List   Diagnosis   • Iron deficiency anemia   • Type 2 diabetes mellitus without complication, without long-term current use of insulin (HCC)   • Vitamin D deficiency disease   • Extrinsic asthma   • Essential hypertension   • Mixed hyperlipidemia   • Generalized osteoarthritis   • Gastroesophageal reflux disease without esophagitis   • Meniere's disease   • Elevated liver enzymes   • Chronic seasonal allergic rhinitis   • Healthcare maintenance   • Encounter for immunization   • Low serum vitamin B12   • Memory impairment   • Renal insufficiency   • Bradycardia   • Hypertensive urgency   • Dementia without behavioral disturbance (HCC)   • Hyponatremia   • NSTEMI (non-ST elevated myocardial infarction) ([facility])   • Prolonged Q-T interval on ECG   • Anemia requiring transfusions     Past Medical History:   Diagnosis Date   • Allergic rhinitis    • Elevated liver enzymes    • Extrinsic asthma    • Gastritis    • GERD (gastroesophageal reflux disease)    • Hyperlipidemia    • Hypertension    • Iron deficiency anemia    • Meniere's disease    • Osteoarthritis    • Type 2 diabetes mellitus (HCC)    • Vitamin D deficiency disease      Past Surgical History:   Procedure Laterality Date   • APPENDECTOMY     • CHOLECYSTECTOMY     • HYSTERECTOMY     • INSERTION HEMODIALYSIS CATHETER N/A 2022    Procedure: HEMODIALYSIS CATHETER INSERTION;  Surgeon: Hans Coates MD;  Location: [facility];  Service: General;   Laterality: N/A;      General Information     Row Name 05/11/22 [facility]0          OT Time and Intention    Document Type evaluation  -LM     Mode of Treatment occupational therapy  -     Row Name 05/11/22 [facility]0          General Information    Patient Profile Reviewed yes  -LM     Prior Level of Function min assist:;ADL's;transfer;all household mobility;independent:  -LM     Existing Precautions/Restrictions fall;oxygen therapy device and L/min  -LM     Barriers to Rehab medically complex;previous functional deficit;cognitive status  -LM     Row Name 05/11/22 [facility]0          Living Environment    People in Home spouse  son next door and supportive  -     Row Name 05/11/22 [facility]0          Cognition    Orientation Status (Cognition) oriented to;person;verbal cues/prompts needed for orientation  -     Row Name 05/11/22 1110          Safety Issues, Functional Mobility    Safety Issues Affecting Function (Mobility) ability to follow commands;judgment;problem-solving;sequencing abilities;safety precautions follow-through/compliance;insight into deficits/self-awareness  -     Impairments Affecting Function (Mobility) balance;coordination;cognition;endurance/activity tolerance;shortness of breath;strength  -LM     Cognitive Impairments, Mobility Safety/Performance attention;insight into deficits/self-awareness;judgment;problem-solving/reasoning  -LM           User Key  (r) = Recorded By, (t) = Taken By, (c) = Cosigned By    Initials Name Provider Type    LM Monika Modi, OT Occupational Therapist                 Mobility/ADL's     Row Name 05/11/22 1114          Activities of Daily Living    BADL Assessment/Intervention bathing;upper body dressing;lower body dressing;grooming;feeding;toileting  -     Row Name 05/11/22 1114          Bathing Assessment/Intervention    [place] Level (Bathing) maximum assist (25% patient effort)  -     Row Name 05/11/22 [facility]4          Upper Body Dressing Assessment/Training     [place] Level (Upper Body Dressing) minimum assist (75% patient effort)  -LM     Row Name 05/11/22 1114          Grooming Assessment/Training    [place] Level (Grooming) minimum assist (75% patient effort)  -     Row Name 05/11/22 1114          Self-Feeding Assessment/Training    [place] Level (Feeding) set up  -     Row Name 05/11/22 1114          Toileting Assessment/Training    [place] Level (Toileting) dependent (less than 25% patient effort);maximum assist (25% patient effort)  -LM           User Key  (r) = Recorded By, (t) = Taken By, (c) = Cosigned By    Initials Name Provider Type    LM Monika Modi, OT Occupational Therapist               Obj/Interventions     Row Name 05/11/22 1116          Sensory Assessment (Somatosensory)    Sensory Assessment (Somatosensory) sensation intact  -[facility] Name 05/11/22 1116          Vision Assessment/Intervention    Visual Impairment/Limitations WFL  -[facility] Name 05/11/22 1116          Range of Motion Comprehensive    General Range of Motion no range of motion deficits identified  -[facility] Name 05/11/22 1116          Strength Comprehensive (MMT)    General Manual Muscle Testing (MMT) Assessment upper extremity strength deficits identified  -LM     Comment, General Manual Muscle Testing (MMT) Assessment 3/5  -     Row Name 05/11/22 1116          Motor Skills    Motor Skills functional endurance  -LM     Functional Endurance F-  -LM           User Key  (r) = Recorded By, (t) = Taken By, (c) = Cosigned By    Initials Name Provider Type    LM Monika Modi, OT Occupational Therapist               Goals/Plan     Row Name 05/11/22 1119          Transfer Goal 1 (OT)    Activity/Assistive Device (Transfer Goal 1, OT) transfers, all;commode, 3-in-1;walker, rolling  -LM     [place] Level/Cues Needed (Transfer Goal 1, OT) minimum assist (75% or more patient effort)  -     Time Frame (Transfer Goal 1, OT) by discharge  -     Row Name  05/11/22 1119          Bathing Goal 1 (OT)    Activity/Device (Bathing Goal 1, OT) bathing skills, all  -LM     [place] Level/Cues Needed (Bathing Goal 1, OT) minimum assist (75% or more patient effort)  -LM     Time Frame (Bathing Goal 1, OT) by discharge  -LM     Row Name 05/11/22 1119          Therapy Assessment/Plan (OT)    Planned Therapy Interventions (OT) activity tolerance training;adaptive equipment training;BADL retraining;patient/caregiver education/training;transfer/mobility retraining;strengthening exercise;ROM/therapeutic exercise  -LM           User Key  (r) = Recorded By, (t) = Taken By, (c) = Cosigned By    Initials Name Provider Type    Monika Sanchez, OT Occupational Therapist               Clinical Impression     Row Name 05/11/22 1117          Plan of Care Review    Plan of Care Reviewed With patient;spouse;son  -     Row Name 05/11/22 1117          Therapy Assessment/Plan (OT)    Patient/Family Therapy Goal Statement (OT) return home to [facility]  -     Criteria for Skilled Therapeutic Interventions Met (OT) yes;meets criteria;skilled treatment is necessary  family reports patient having decreased fxl activity tolerance and balance, general weakness  -LM     Row Name 05/11/22 1117          Therapy Plan Review/Discharge Plan (OT)    Anticipated Discharge Disposition (OT) home with 24/7 care;home with assist  -LM     Row Name 05/11/22 1117          Positioning and Restraints    Post Treatment Position bed  -LM     In Bed call light within reach;encouraged to call for assist;with family/caregiver;exit alarm on  -LM           User Key  (r) = Recorded By, (t) = Taken By, (c) = Cosigned By    Initials Name Provider Type    Monika Sanchez, OT Occupational Therapist               Outcome Measures    No documentation.                   OT Recommendation and Plan  Planned Therapy Interventions (OT): activity tolerance training, adaptive equipment training, BADL retraining, patient/caregiver  education/training, transfer/mobility retraining, strengthening exercise, ROM/therapeutic exercise  Plan of Care Review  Plan of Care Reviewed With: patient, spouse, son     Time Calculation:     Therapy Charges for Today     Code Description Service Date Service Provider Modifiers Qty    [id number]  OT EVAL MOD COMPLEXITY 4 5/11/2022 Monika Modi, OT GO 1               Monika Modi OT  5/11/2022

## 2022-05-11 NOTE — PROGRESS NOTES
"  Patient Identification:  Name:  Sean Chen  Age:  81 y.o.  Sex:  female  :  1940  MRN:  [id number]  Visit Number:  [id number]  Primary care provider:  Ganga Gallardo MD    Reason for follow-up:  NSTEMI with abnormal nuclear stress test    Subjective     No history of chest pain, dizziness or increased shortness of breath.  She is alert and could answer all my questions appropriately.  Reviewed telemetry data-   sinus rhythm, heart rate in 60s BP is elevated- 170/90 and O2 sat- 97%.  Hemoglobin is 7.5.  She had hemodialysis yesterday.  Scheduled for cardiac catheterization today.      Medication Review:   albuterol, 2.5 mg, Nebulization, 4x Daily - RT  aspirin, 81 mg, Oral, Daily  atorvastatin, 80 mg, Oral, Nightly  calcium acetate, 1,334 mg, Oral, TID With Meals  clopidogrel, 75 mg, Oral, Daily  ferric gluconate (FERRLECIT) IVPB, 250 mg, Intravenous, Daily  folic acid, 1 mg, Oral, Daily  heparin (porcine), 5,000 Units, Subcutaneous, Q8H  Insulin Aspart, 0-7 Units, Subcutaneous, TID AC  NIFEdipine CC, 60 mg, Oral, Q24H  PARoxetine, 10 mg, Oral, QAM  sodium bicarbonate, 650 mg, Oral, BID  sodium chloride, 10 mL, Intravenous, Q12H          Vital Sign Min/Max for last 24 hours  Temp  Min: 98.1 °F (36.7 °C)  Max: 98.3 °F (36.8 °C)   BP  Min: 97/83  Max: 181/66   Pulse  Min: 57  Max: 72   Resp  Min: 10  Max: 18   SpO2  Min: 89 %  Max: 100 %   No data recorded   Weight  Min: 74.9 kg (165 lb 3.2 oz)  Max: 74.9 kg (165 lb 3.2 oz)     Flowsheet Rows    Flowsheet Row First Filed Value   Admission Height 162.6 cm (64\") Documented at 2022 1407   Admission Weight 63.5 kg (140 lb) Documented at 2022 1407          Objective       Physical Exam:       General Appearance:   Alert, cooperative.  No distress.   Head:   Normocephalic, without obvious abnormality.   Eyes:          Lids and lashes normal, conjunctivae and sclerae normal, no   icterus,    Ears:  Ears appear intact with no abnormalities " noted   Throat:   No oral lesions, no thrush, oral mucosa moist   Neck: No adenopathy, supple, trachea midline,  carotid bruit, no JVD   Back:   No significant abnormality   Lungs:   Clear to auscultation,respirations regular, No added sounds    Heart:   Normal heart rate.  Regular rhythm.  Grade 2 x 6 ejection systolic murmur at the aortic area.   Chest Wall:  No abnormalities observed   Abdomen   Soft, nontender, no masses, no organomegaly and bowel sounds are heard.           Extremities:  No pedal edema          Telemetry:  Sinus rhythm      Labs  Results from last 7 days   Lab Units 05/11/22  0024 05/09/22  0022 05/08/22  1855 05/08/22  0652 05/08/22  0027 05/07/22  0110 05/06/22  0022 05/05/22  0732 05/05/22  0222 05/04/22  1504   WBC 10*3/mm3 10.88* 13.61*  --   --  14.90* 13.40* 12.12*  --  8.64 10.40   HEMOGLOBIN g/dL 7.5* 8.2* 8.4* 6.9* 6.9* 7.2* 7.7*   < > 5.8* 8.4*   HEMATOCRIT % 21.8* 24.4* 25.1* 20.2* 20.6* 21.4* 22.7*   < > 17.6* 25.3*   PLATELETS 10*3/mm3 212 223  --   --  239 248 222  --  250 339    < > = values in this interval not displayed.     Results from last 7 days   Lab Units 05/11/22  0024 05/10/22  0037 05/09/22  0022 05/08/22  0027 05/07/22  0110 05/06/22  1344 05/06/22  0022   SODIUM mmol/L 129* 125* 125* 128* 127* 129* 126*   POTASSIUM mmol/L 3.8 4.9 5.0 5.1 4.3 4.5 4.7   CHLORIDE mmol/L 93* 94* 93* 96* 97* 99 106   CO2 mmol/L 20.9* 14.0* 12.5* 12.5* 14.1* 13.7* 12.0*   BUN mg/dL 40* 71* 72* 72* 66* 66* 68*   CREATININE mg/dL 3.46* 5.63* 5.61* 5.69* 5.40* 5.43* 5.53*   CALCIUM mg/dL 7.7* 8.2* 8.6 8.1* 8.0* 8.4* 7.4*   GLUCOSE mg/dL 104* 113* 121* 127* 132* 102* 190*     Results from last 7 days   Lab Units 05/11/22  0024 05/09/22  0022 05/08/22  0027 05/07/22  0110 05/06/22  0022 05/05/22  0106 05/04/22  1504   BILIRUBIN mg/dL 0.4 0.5 0.4 0.3 0.3 0.2 0.4   ALK PHOS U/L 86 90 84 86 90 75 105   AST (SGOT) U/L 17 29 18 15 10 9 13   ALT (SGPT) U/L 10 10 8 6 5 5 8     Results from last 7  days   Lab Units 05/07/22  0110 05/06/22  0022 05/05/22  0106 05/04/22  1504   MAGNESIUM mg/dL 2.3 1.5* 1.8 1.6     Results from last 7 days   Lab Units 05/04/22  1504   INR  0.96     Results from last 7 days   Lab Units 05/04/22  1504   CRP mg/dL 0.63*     Results from last 7 days   Lab Units 05/08/22  0853 05/05/22  2110 05/04/22  2019   TROPONIN T ng/mL 0.122* 0.061* 0.058*           Assessment      1.  NSTEMI with abnormal nuclear stress test.  2.  Benign essential hypertension.  3.  Episodes of bradycardia-resolved.  4.  PRITI on CKD-on HD.  5.  Anemia of chronic disease    Plan     1.  Scheduled for diagnostic coronary arteriogram and PCI if needed-today.  2.  Continue aspirin, clopidogrel and statin.  No beta-blocker due to history of bradycardia.  3.  On nifedipine XL for HTN.  4.  No recurrence of bradycardia episodes.  Will closely monitor.      Lexi Roman MD [facility]  05/11/22  08:10 EDT

## 2022-05-11 NOTE — PLAN OF CARE
Goal Outcome Evaluation:              Outcome Evaluation: Patient is currently lying in bed. She is alert and able to follow commands. VSS. Afebrile. No complaints of chest pain at this time. Bed locked and in low position. Call light within reach. Family at bedside.

## 2022-05-11 NOTE — CASE MANAGEMENT/SOCIAL WORK
Discharge Planning Assessment  CHICHO Enriquez     Patient Name: Sean Chen  MRN: [id number]  Today's Date: 5/11/2022    Admit Date: 5/4/2022     Discharge Plan     Row Name 05/11/22 1127       Plan    Plan SS followed up with pt's son for discharge planning. Plan is still for pt to return home with family at discharge. SS discussed outpatient HD chair with family, family agreeable to [facility]. [facility]. SS following.              DEVONTE Danielson

## 2022-05-12 LAB
ALBUMIN SERPL-MCNC: 2.24 G/DL (ref 3.5–5.2)
ALBUMIN/GLOB SERPL: 0.8 G/DL
ALP SERPL-CCNC: 89 U/L (ref 39–117)
ALT SERPL W P-5'-P-CCNC: 10 U/L (ref 1–33)
ANION GAP SERPL CALCULATED.3IONS-SCNC: 11.2 MMOL/L (ref 5–15)
AST SERPL-CCNC: 15 U/L (ref 1–32)
BASOPHILS # BLD AUTO: 0.05 10*3/MM3 (ref 0–0.2)
BASOPHILS NFR BLD AUTO: 0.5 % (ref 0–1.5)
BILIRUB SERPL-MCNC: 0.3 MG/DL (ref 0–1.2)
BUN SERPL-MCNC: 19 MG/DL (ref 8–23)
BUN/CREAT SERPL: 8.6 (ref 7–25)
CALCIUM SPEC-SCNC: 7.7 MG/DL (ref 8.6–10.5)
CHLORIDE SERPL-SCNC: 91 MMOL/L (ref 98–107)
CO2 SERPL-SCNC: 25.8 MMOL/L (ref 22–29)
CREAT SERPL-MCNC: 2.21 MG/DL (ref 0.57–1)
DEPRECATED RDW RBC AUTO: 43 FL (ref 37–54)
EGFRCR SERPLBLD CKD-EPI 2021: 22 ML/MIN/1.73
EOSINOPHIL # BLD AUTO: 0.24 10*3/MM3 (ref 0–0.4)
EOSINOPHIL NFR BLD AUTO: 2.6 % (ref 0.3–6.2)
ERYTHROCYTE [DISTWIDTH] IN BLOOD BY AUTOMATED COUNT: 14.2 % (ref 12.3–15.4)
GLOBULIN UR ELPH-MCNC: 2.9 GM/DL
GLUCOSE BLDC GLUCOMTR-MCNC: 126 MG/DL (ref 70–130)
GLUCOSE BLDC GLUCOMTR-MCNC: 134 MG/DL (ref 70–130)
GLUCOSE BLDC GLUCOMTR-MCNC: 152 MG/DL (ref 70–130)
GLUCOSE BLDC GLUCOMTR-MCNC: 158 MG/DL (ref 70–130)
GLUCOSE SERPL-MCNC: 123 MG/DL (ref 65–99)
HCT VFR BLD AUTO: 22.2 % (ref 34–46.6)
HGB BLD-MCNC: 7.7 G/DL (ref 12–15.9)
IMM GRANULOCYTES # BLD AUTO: 0.05 10*3/MM3 (ref 0–0.05)
IMM GRANULOCYTES NFR BLD AUTO: 0.5 % (ref 0–0.5)
LYMPHOCYTES # BLD AUTO: 0.62 10*3/MM3 (ref 0.7–3.1)
LYMPHOCYTES NFR BLD AUTO: 6.7 % (ref 19.6–45.3)
MCH RBC QN AUTO: 29.1 PG (ref 26.6–33)
MCHC RBC AUTO-ENTMCNC: 34.7 G/DL (ref 31.5–35.7)
MCV RBC AUTO: 83.8 FL (ref 79–97)
MONOCYTES # BLD AUTO: 0.85 10*3/MM3 (ref 0.1–0.9)
MONOCYTES NFR BLD AUTO: 9.2 % (ref 5–12)
NEUTROPHILS NFR BLD AUTO: 7.38 10*3/MM3 (ref 1.7–7)
NEUTROPHILS NFR BLD AUTO: 80.5 % (ref 42.7–76)
NRBC BLD AUTO-RTO: 0 /100 WBC (ref 0–0.2)
PLATELET # BLD AUTO: 230 10*3/MM3 (ref 140–450)
PMV BLD AUTO: 9.4 FL (ref 6–12)
POTASSIUM SERPL-SCNC: 3.2 MMOL/L (ref 3.5–5.2)
PROT SERPL-MCNC: 5.1 G/DL (ref 6–8.5)
RBC # BLD AUTO: 2.65 10*6/MM3 (ref 3.77–5.28)
SODIUM SERPL-SCNC: 128 MMOL/L (ref 136–145)
WBC NRBC COR # BLD: 9.19 10*3/MM3 (ref 3.4–10.8)

## 2022-05-12 PROCEDURE — 25010000002 PROCHLORPERAZINE 10 MG/2ML SOLUTION: Performed by: INTERNAL MEDICINE

## 2022-05-12 PROCEDURE — 25010000002 NA FERRIC GLUC CPLX PER 12.5 MG: Performed by: INTERNAL MEDICINE

## 2022-05-12 PROCEDURE — 94761 N-INVAS EAR/PLS OXIMETRY MLT: CPT

## 2022-05-12 PROCEDURE — 85025 COMPLETE CBC W/AUTO DIFF WBC: CPT | Performed by: INTERNAL MEDICINE

## 2022-05-12 PROCEDURE — 94664 DEMO&/EVAL PT USE INHALER: CPT

## 2022-05-12 PROCEDURE — 99232 SBSQ HOSP IP/OBS MODERATE 35: CPT | Performed by: INTERNAL MEDICINE

## 2022-05-12 PROCEDURE — 82962 GLUCOSE BLOOD TEST: CPT

## 2022-05-12 PROCEDURE — 80053 COMPREHEN METABOLIC PANEL: CPT | Performed by: INTERNAL MEDICINE

## 2022-05-12 PROCEDURE — 83520 IMMUNOASSAY QUANT NOS NONAB: CPT | Performed by: INTERNAL MEDICINE

## 2022-05-12 PROCEDURE — 94799 UNLISTED PULMONARY SVC/PX: CPT

## 2022-05-12 PROCEDURE — 25010000002 HYDRALAZINE PER 20 MG: Performed by: INTERNAL MEDICINE

## 2022-05-12 RX ORDER — NIFEDIPINE 30 MG/1
30 TABLET, FILM COATED, EXTENDED RELEASE ORAL ONCE
Status: COMPLETED | OUTPATIENT
Start: 2022-05-12 | End: 2022-05-12

## 2022-05-12 RX ORDER — HYDRALAZINE HYDROCHLORIDE 20 MG/ML
10 INJECTION INTRAMUSCULAR; INTRAVENOUS EVERY 6 HOURS PRN
Status: DISCONTINUED | OUTPATIENT
Start: 2022-05-12 | End: 2022-05-13 | Stop reason: HOSPADM

## 2022-05-12 RX ORDER — NIFEDIPINE 30 MG/1
90 TABLET, FILM COATED, EXTENDED RELEASE ORAL
Status: DISCONTINUED | OUTPATIENT
Start: 2022-05-13 | End: 2022-05-13 | Stop reason: HOSPADM

## 2022-05-12 RX ADMIN — CLOPIDOGREL 75 MG: 75 TABLET, FILM COATED ORAL at 08:24

## 2022-05-12 RX ADMIN — Medication 10 ML: at 08:25

## 2022-05-12 RX ADMIN — Medication 10 ML: at 20:11

## 2022-05-12 RX ADMIN — ALBUTEROL SULFATE 2.5 MG: 2.5 SOLUTION RESPIRATORY (INHALATION) at 06:30

## 2022-05-12 RX ADMIN — SODIUM BICARBONATE TAB 650 MG 650 MG: 650 TAB at 08:25

## 2022-05-12 RX ADMIN — PAROXETINE HYDROCHLORIDE 10 MG: 20 TABLET, FILM COATED ORAL at 06:17

## 2022-05-12 RX ADMIN — HYDRALAZINE HYDROCHLORIDE 10 MG: 20 INJECTION INTRAMUSCULAR; INTRAVENOUS at 10:46

## 2022-05-12 RX ADMIN — ALBUTEROL SULFATE 2.5 MG: 2.5 SOLUTION RESPIRATORY (INHALATION) at 12:39

## 2022-05-12 RX ADMIN — NIFEDIPINE 30 MG: 30 TABLET, EXTENDED RELEASE ORAL at 10:26

## 2022-05-12 RX ADMIN — ATORVASTATIN CALCIUM 80 MG: 40 TABLET, FILM COATED ORAL at 20:11

## 2022-05-12 RX ADMIN — ALBUTEROL SULFATE 2.5 MG: 2.5 SOLUTION RESPIRATORY (INHALATION) at 18:33

## 2022-05-12 RX ADMIN — ASPIRIN 81 MG: 81 TABLET, COATED ORAL at 08:24

## 2022-05-12 RX ADMIN — NIFEDIPINE 60 MG: 30 TABLET, EXTENDED RELEASE ORAL at 08:24

## 2022-05-12 RX ADMIN — CALCIUM ACETATE 1334 MG: 667 CAPSULE ORAL at 17:40

## 2022-05-12 RX ADMIN — SODIUM CHLORIDE 250 MG: 9 INJECTION, SOLUTION INTRAVENOUS at 11:03

## 2022-05-12 RX ADMIN — HYDRALAZINE HYDROCHLORIDE 10 MG: 20 INJECTION INTRAMUSCULAR; INTRAVENOUS at 22:42

## 2022-05-12 RX ADMIN — CALCIUM ACETATE 1334 MG: 667 CAPSULE ORAL at 08:24

## 2022-05-12 RX ADMIN — FOLIC ACID 1 MG: 1 TABLET ORAL at 08:25

## 2022-05-12 RX ADMIN — CALCIUM ACETATE 1334 MG: 667 CAPSULE ORAL at 11:04

## 2022-05-12 RX ADMIN — PROCHLORPERAZINE EDISYLATE 5 MG: 5 INJECTION INTRAMUSCULAR; INTRAVENOUS at 06:16

## 2022-05-12 RX ADMIN — SODIUM BICARBONATE TAB 650 MG 650 MG: 650 TAB at 20:11

## 2022-05-12 NOTE — PROGRESS NOTES
"  Patient Identification:  Name:  Sean Chen  Age:  81 y.o.  Sex:  female  :  1940  MRN:  [id number]  Visit Number:  [id number]  Primary care provider:  Ganga Gallardo MD    Reason for follow-up:  NSTEMI    Subjective     Patient is alert.  Denies history of chest pain, dizziness or increased shortness of breath.  In sinus rhythm with a heart rate in 60s.  BP is elevated.  Cardiac catheterization showed no significant CAD.      Medication Review:   albuterol, 2.5 mg, Nebulization, 4x Daily - RT  aspirin, 81 mg, Oral, Daily  atorvastatin, 80 mg, Oral, Nightly  calcium acetate, 1,334 mg, Oral, TID With Meals  [MAR Hold] ferric gluconate (FERRLECIT) IVPB, 250 mg, Intravenous, Daily  folic acid, 1 mg, Oral, Daily  Insulin Aspart, 0-7 Units, Subcutaneous, TID AC  [START ON 2022] NIFEdipine CC, 90 mg, Oral, Q24H  PARoxetine, 10 mg, Oral, QAM  sodium bicarbonate, 650 mg, Oral, BID  sodium chloride, 10 mL, Intravenous, Q12H          Vital Sign Min/Max for last 24 hours  Temp  Min: 97.6 °F (36.4 °C)  Max: 98.3 °F (36.8 °C)   BP  Min: 96/71  Max: 196/102   Pulse  Min: 48  Max: 73   Resp  Min: 10  Max: 18   SpO2  Min: 92 %  Max: 100 %   No data recorded   Weight  Min: 69.3 kg (152 lb 12.8 oz)  Max: 69.3 kg (152 lb 12.8 oz)     Flowsheet Rows    Flowsheet Row First Filed Value   Admission Height 162.6 cm (64\") Documented at 2022 1407   Admission Weight 63.5 kg (140 lb) Documented at 2022 1407          Objective       Physical Exam:     General Appearance:   Alert, cooperative.  No distress.   Head:   Normocephalic, without obvious abnormality.   Eyes:          Lids and lashes normal, conjunctivae and sclerae normal, no   icterus,    Ears:  Ears appear intact with no abnormalities noted   Throat:   No oral lesions, no thrush, oral mucosa moist   Neck: No adenopathy, supple, trachea midline,  carotid bruit, no JVD   Back:   No significant abnormality   Lungs:   Clear to " auscultation,respirations regular, No added sounds    Heart:   Normal heart rate.  Regular rhythm.  Grade 2 x 6 ejection systolic murmur at the aortic area.   Chest Wall:  No abnormalities observed   Abdomen   Soft, nontender, no masses, no organomegaly and bowel sounds are heard.           Extremities:  No pedal edema          Telemetry:  Sinus rhythm      Labs  Results from last 7 days   Lab Units 05/12/22  0055 05/11/22  1003 05/11/22  0024 05/09/22  0022 05/08/22  1855 05/08/22  0652 05/08/22  0027 05/07/22  0110 05/06/22  0022   WBC 10*3/mm3 9.19  --  10.88* 13.61*  --   --  14.90* 13.40* 12.12*   HEMOGLOBIN g/dL 7.7* 8.1* 7.5* 8.2* 8.4* 6.9* 6.9* 7.2* 7.7*   HEMATOCRIT % 22.2* 23.6* 21.8* 24.4* 25.1* 20.2* 20.6* 21.4* 22.7*   PLATELETS 10*3/mm3 230  --  212 223  --   --  239 248 222     Results from last 7 days   Lab Units 05/12/22  0055 05/11/22  0024 05/10/22  0037 05/09/22  0022 05/08/22  0027 05/07/22  0110 05/06/22  1344   SODIUM mmol/L 128* 129* 125* 125* 128* 127* 129*   POTASSIUM mmol/L 3.2* 3.8 4.9 5.0 5.1 4.3 4.5   CHLORIDE mmol/L 91* 93* 94* 93* 96* 97* 99   CO2 mmol/L 25.8 20.9* 14.0* 12.5* 12.5* 14.1* 13.7*   BUN mg/dL 19 40* 71* 72* 72* 66* 66*   CREATININE mg/dL 2.21* 3.46* 5.63* 5.61* 5.69* 5.40* 5.43*   CALCIUM mg/dL 7.7* 7.7* 8.2* 8.6 8.1* 8.0* 8.4*   GLUCOSE mg/dL 123* 104* 113* 121* 127* 132* 102*     Results from last 7 days   Lab Units 05/12/22  0055 05/11/22  0024 05/09/22  0022 05/08/22  0027 05/07/22  0110 05/06/22  0022   BILIRUBIN mg/dL 0.3 0.4 0.5 0.4 0.3 0.3   ALK PHOS U/L 89 86 90 84 86 90   AST (SGOT) U/L 15 17 29 18 15 10   ALT (SGPT) U/L 10 10 10 8 6 5     Results from last 7 days   Lab Units 05/07/22  0110 05/06/22  0022   MAGNESIUM mg/dL 2.3 1.5*             Results from last 7 days   Lab Units 05/08/22  0853 05/05/22  2110   TROPONIN T ng/mL 0.122* 0.061*           Assessment      1.  NSTEMI.  Stable.  2.  No significant obstructive CAD  3.  Benign essential  hypertension-uncontrolled.  4.  Episodes of bradycardia-resolved.  5.  PRITI on CKD.  On HD.  6.  Anemia of chronic disease.    Plan     1.  Continue aspirin and statin.  Clopidogrel  was discontinued as patient did not require PCI or does not have vulnerable plaque to  decrease the risk of bleed this patient with anemia.  No beta-blockers due to history of bradycardia.  2.  Increased nifedipine XL to 90 mg daily as BP is elevated.  3.  As her cardiac status is stable, I will sign off.  Please call me if needed.  Thanks Dr. Reeves for the consult.  4.  Follow-up in my office in [place] in 3 to 4 weeks.      Lexi Roman MD [facility]  05/12/22  08:30 EDT

## 2022-05-12 NOTE — PROGRESS NOTES
Assisted By: Daniel ADAMS    CC: Follow-up on coronary artery disease and renal disease as well as hypertension    Interview History/HPI: Patient sleeping on arrival but easily awakened, she denies any chest pain or shortness of breath, she did eat this morning.  She states she does have oxygen at home if needed, no new complaints.    ROS:     Vitals:    05/12/22 0800   BP:    Pulse:    Resp:    Temp: 97.2 °F (36.2 °C)   SpO2:          Intake/Output Summary (Last 24 hours) at 5/12/2022 0935  Last data filed at 5/12/2022 0847  Gross per 24 hour   Intake 652 ml   Output 3000 ml   Net -2348 ml       EXAM: 183/71, 16, 65, 97.2 saturation 90%, no distress, not use accessory muscles to breathe, lungs have bilateral breath sounds are clear no rhonchi rales or wheezing, heart regular rate and rhythm without murmur or gallop, abdomen soft benign, both are right inguinal site and right wrist site looks good, she has good palpable distal pulses and good capillary refill.      EKG:     Tele: Sinus rhythm    LABS:     Lab Results (last 48 hours)     Procedure Component Value Units Date/Time    POC Glucose Once [[id number]]  (Normal) Collected: 05/12/22 0616    Specimen: Blood Updated: 05/12/22 0627     Glucose 126 mg/dL      Comment: Meter: [id number] : [id number] Angle       Comprehensive Metabolic Panel [[id number]]  (Abnormal) Collected: 05/12/22 0055    Specimen: Blood Updated: 05/12/22 0138     Glucose 123 mg/dL      BUN 19 mg/dL      Creatinine 2.21 mg/dL      Sodium 128 mmol/L      Potassium 3.2 mmol/L      Chloride 91 mmol/L      CO2 25.8 mmol/L      Calcium 7.7 mg/dL      Total Protein 5.1 g/dL      Albumin 2.24 g/dL      ALT (SGPT) 10 U/L      AST (SGOT) 15 U/L      Alkaline Phosphatase 89 U/L      Total Bilirubin 0.3 mg/dL      Globulin 2.9 gm/dL      A/G Ratio 0.8 g/dL      BUN/Creatinine Ratio 8.6     Anion Gap 11.2 mmol/L      eGFR 22.0 mL/min/1.73      Comment: National Kidney Foundation and American  Society of Nephrology (ASN) Task Force recommended calculation based on the Chronic Kidney Disease Epidemiology Collaboration (CKD-EPI) equation refit without adjustment for race.       Narrative:      GFR Normal >60  Chronic Kidney Disease <60  Kidney Failure <15      CBC & Differential [[id number]]  (Abnormal) Collected: 05/12/22 0055    Specimen: Blood Updated: 05/12/22 0110    Narrative:      The following orders were created for panel order CBC & Differential.  Procedure                               Abnormality         Status                     ---------                               -----------         ------                     CBC Auto Differential[[id number]]        Abnormal            Final result                 Please view results for these tests on the individual orders.    CBC Auto Differential [[id number]]  (Abnormal) Collected: 05/12/22 0055    Specimen: Blood Updated: 05/12/22 0110     WBC 9.19 10*3/mm3      RBC 2.65 10*6/mm3      Hemoglobin 7.7 g/dL      Hematocrit 22.2 %      MCV 83.8 fL      MCH 29.1 pg      MCHC 34.7 g/dL      RDW 14.2 %      RDW-SD 43.0 fl      MPV 9.4 fL      Platelets 230 10*3/mm3      Neutrophil % 80.5 %      Lymphocyte % 6.7 %      Monocyte % 9.2 %      Eosinophil % 2.6 %      Basophil % 0.5 %      Immature Grans % 0.5 %      Neutrophils, Absolute 7.38 10*3/mm3      Lymphocytes, Absolute 0.62 10*3/mm3      Monocytes, Absolute 0.85 10*3/mm3      Eosinophils, Absolute 0.24 10*3/mm3      Basophils, Absolute 0.05 10*3/mm3      Immature Grans, Absolute 0.05 10*3/mm3      nRBC 0.0 /100 WBC     POC Glucose Once [[id number]]  (Normal) Collected: 05/11/22 1618    Specimen: Blood Updated: 05/11/22 1632     Glucose 112 mg/dL      Comment: Meter: [id number] : [id number] TWIN MOSQUERA       Protein Electrophoresis, 24 Hr Urine - Urine, Clean Catch [[id number]]  (Abnormal) Collected: 05/07/22 1430    Specimen: Urine, Clean Catch Updated: 05/11/22 1614     Total Protein, Urine 575.4  mg/dL      Comment: Results confirmed on  dilution.        Protein, 24H Urine 575 mg/24 hr      Albumin, U 63.1 %      Alpha-1-Globulin, U 1.0 %      Alpha-2-Globulin, U 8.7 %      Beta Globulin, U 13.7 %      Gamma Globulin, Urine 13.5 %      M-Juan Pablo, % Not Observed %      Please note Comment     Comment: Protein electrophoresis scan will follow via computer, mail, or   delivery.       Narrative:      Performed at:  [address] [place] [address]  [id number]  : Denis Chavarria PhD, Phone:  [phone]    POC Glucose Once [[id number]]  (Abnormal) Collected: 05/11/22 1100    Specimen: Blood Updated: 05/11/22 1110     Glucose 135 mg/dL      Comment: Meter: [id number] : [id number] TYLER BUSTAMANTE       Hemoglobin & Hematocrit, Blood [[id number]]  (Abnormal) Collected: 05/11/22 1003    Specimen: Blood Updated: 05/11/22 1026     Hemoglobin 8.1 g/dL      Hematocrit 23.6 %     POC Glucose Once [[id number]]  (Normal) Collected: 05/11/22 0623    Specimen: Blood Updated: 05/11/22 0630     Glucose 106 mg/dL      Comment: Meter: [id number] : [id number] JONI Outline       Comprehensive Metabolic Panel [[id number]]  (Abnormal) Collected: 05/11/22 0024    Specimen: Blood Updated: 05/11/22 0049     Glucose 104 mg/dL      BUN 40 mg/dL      Creatinine 3.46 mg/dL      Sodium 129 mmol/L      Potassium 3.8 mmol/L      Chloride 93 mmol/L      CO2 20.9 mmol/L      Calcium 7.7 mg/dL      Total Protein 5.0 g/dL      Albumin 2.34 g/dL      ALT (SGPT) 10 U/L      AST (SGOT) 17 U/L      Alkaline Phosphatase 86 U/L      Total Bilirubin 0.4 mg/dL      Globulin 2.7 gm/dL      A/G Ratio 0.9 g/dL      BUN/Creatinine Ratio 11.6     Anion Gap 15.1 mmol/L      eGFR 12.9 mL/min/1.73      Comment: <15 Indicative of kidney failure       Narrative:      GFR Normal >60  Chronic Kidney Disease <60  Kidney Failure <15      CBC & Differential [[id number]]  (Abnormal) Collected: 05/11/22 0024    Specimen: Blood  Updated: 05/11/22 0030    Narrative:      The following orders were created for panel order CBC & Differential.  Procedure                               Abnormality         Status                     ---------                               -----------         ------                     CBC Auto Differential[[id number]]        Abnormal            Final result                 Please view results for these tests on the individual orders.    CBC Auto Differential [[id number]]  (Abnormal) Collected: 05/11/22 0024    Specimen: Blood Updated: 05/11/22 0030     WBC 10.88 10*3/mm3      RBC 2.64 10*6/mm3      Hemoglobin 7.5 g/dL      Hematocrit 21.8 %      MCV 82.6 fL      MCH 28.4 pg      MCHC 34.4 g/dL      RDW 14.5 %      RDW-SD 43.9 fl      MPV 9.5 fL      Platelets 212 10*3/mm3      Neutrophil % 80.1 %      Lymphocyte % 7.1 %      Monocyte % 9.2 %      Eosinophil % 2.8 %      Basophil % 0.5 %      Immature Grans % 0.3 %      Neutrophils, Absolute 8.73 10*3/mm3      Lymphocytes, Absolute 0.77 10*3/mm3      Monocytes, Absolute 1.00 10*3/mm3      Eosinophils, Absolute 0.30 10*3/mm3      Basophils, Absolute 0.05 10*3/mm3      Immature Grans, Absolute 0.03 10*3/mm3      nRBC 0.0 /100 WBC     POC Glucose Once [[id number]]  (Abnormal) Collected: 05/10/22 1708    Specimen: Blood Updated: 05/10/22 1725     Glucose 168 mg/dL      Comment: Meter: [id number] : [id number] [facility]       ANCA Panel [[id number]]  (Abnormal) Collected: 05/06/22 1344    Specimen: Blood Updated: 05/10/22 1613     Myeloperoxidase Ab <9.0 U/mL      Antiproteinase 3 (SD-3) <3.5 U/mL      C-ANCA 1:80 titer      P-ANCA <1:20 titer      Comment: The presence of positive fluorescence exhibiting P-ANCA or C-ANCA  patterns alone is not specific for the diagnosis of Wegener's  Granulomatosis (WG) or microscopic polyangiitis. Decisions about  treatment should not be based solely on ANCA IFA results.  The  International ANCA Group Consensus recommends follow up  testing of  positive sera with both MA-3 and MPO-ANCA enzyme immunoassays. As  many as 5% serum samples are positive only by EIA.  Ref. AM J Clin Pathol 1999;111:507-513.        Atypical pANCA <1:20 titer      Comment: The atypical pANCA pattern has been observed in a significant  percentage of patients with ulcerative colitis, primary sclerosing  cholangitis and autoimmune hepatitis.       Narrative:      Performed at:  01 - [facility]  [id number]  : Marvin Moody MD, Phone:  [phone]  Performed at:  02 - [facility] [address]  [id number]  : Denis Chavarria PhD, Phone:  [phone]    POC Glucose Once [[id number]]  (Abnormal) Collected: 05/10/22 1020    Specimen: Blood Updated: 05/10/22 1036     Glucose 139 mg/dL      Comment: Meter: [id number] : [id number] deng                  Radiology:    Imaging Results (Last 72 Hours)     Procedure Component Value Units Date/Time    XR Chest AP [[id number]] Collected: 05/09/22 1613     Updated: 05/09/22 1616    Narrative:      EXAM:    XR Chest, 1 View     EXAM DATE:    5/9/2022 4:01 PM     CLINICAL HISTORY:    HEMO DIALYSIS CATH PLACED.; I16.0-Hypertensive urgency; N17.9-Acute  kidney failure, unspecified; N18.9-Chronic kidney disease, unspecified;  N28.9-Disorder of kidney and ureter, unspecified     TECHNIQUE:    Frontal view of the chest.     COMPARISON:    05/04/2022     FINDINGS:    LUNGS:  Bibasilar and bilateral airspace disease is worse.    PLEURAL SPACE:  Small bilateral pleural effusions.  No pneumothorax.    HEART:  Cardiomegaly.    MEDIASTINUM:  Unremarkable.    BONES/JOINTS:  Unremarkable.    TUBES, LINES AND DEVICES:  Right dialysis catheter noted with tip in  SVC.       Impression:      1.  Small bilateral pleural effusions.  2.  Cardiomegaly.  3.  Bibasilar and bilateral airspace disease is worse.     This report was finalized on 5/9/2022 4:14 PM by Dr. Freedman  MD Umang.       FL Surgery Fluoro [[id number]] Collected: 05/09/22 1534     Updated: 05/09/22 1549    Narrative:      EXAM:    FL Fluoroscopy < 1 Hour     EXAM DATE:    5/9/2022 2:58 PM     CLINICAL HISTORY:    dialysis cath placement; I16.0-Hypertensive urgency; N17.9-Acute  kidney failure, unspecified; N18.9-Chronic kidney disease, unspecified;  N28.9-Disorder of kidney and ureter, unspecified     TECHNIQUE:    Fluoroscopic images performed in multiple projections.  Fluoroscopic  guidance was provided by a physician. Fluoroscopy exposure time of  approximately 1 minute or less.     COMPARISON:    No relevant prior studies available.     FINDINGS:    OTHER FINDINGS:  Right upper chest region of interest.       Impression:        As above.     This report was finalized on 5/9/2022 3:34 PM by Dr. Tano Hekc MD.             Results for orders placed during the hospital encounter of 05/04/22    Adult Transthoracic Echo Complete w/ Color, Spectral and Contrast if necessary per protocol    Interpretation Summary  · Left ventricular wall thickness is consistent with mild concentric hypertrophy.  · Left ventricular ejection fraction appears to be 66 - 70%. Left ventricular systolic function is normal.  · All left ventricular wall segments contract normally.  · Left ventricular diastolic function is consistent with (grade II w/high LAP) pseudonormalization.  · The left atrial cavity is mildly dilated. Left atrial volume is mildly increased.  · Mild calcific aortic valve stenosis is present.  · Mild mitral valve regurgitation is present.  · Moderate tricuspid valve regurgitation is present.  · Severe pulmonary hypertension is present.  · Estimated right ventricular systolic pressure from tricuspid regurgitation is markedly elevated (>55 mmHg).  · There is a small (<1cm) pericardial effusion adjacent to the left ventricle (seen in parasternal long axis view).      Assessment/Plan:   Stage V chronic kidney disease, now  probable ESRD, status post dialysis initiation, she will need permanent dialysis 3 times a week.  We are awaiting to hear back on a chair from [facility].  Possibly could discharge tomorrow if dialysis chair available.     DVT prophylaxis, subcu heparin     Hypertension,  not well controlled, noted cardiology increased her nifedipine to 90 mg but this is not scheduled to start tomorrow, will give an additional 30 now to her 60 already given and follow-up.     Non-ST elevation myocardial infarction, positive stress test, nonobstructive disease however, medical management for cardiology note     Bradycardia, overall improved, avoiding any agents that would lower heart rate.     Chronic anemia most likely due to chronic disease, receiving IV iron, patient's folic acid was low, B12 normal, I patient is on folic acid replacement.  Allowing for day-to-day variation I feel like hemoglobin is overall stable.     Functional decline, PT/OT      Hyponatremia,  stable     Metabolic acidosis due to renal failure, on oral bicarb now.     Pericardial effusion, small, can be followed up in short-term once patient has been on dialysis to see if resolves.     Will follow for now in PCU to make sure she does not need a Cardene drip otherwise patient may be stable for the floor later today.    Disposition Home    Dane Reeves MD

## 2022-05-12 NOTE — CASE MANAGEMENT/SOCIAL WORK
Discharge Planning Assessment   Mina     Patient Name: Sean Chen  MRN: [id number]  Today's Date: 5/12/2022    Admit Date: 5/4/2022     Discharge Plan     Row Name 05/12/22 1041       Plan    Plan SS followed up with Angeli at [facility] who confirmed they will be able to take pt. [facility] to be contacted with expected discharge date when known. SS following.              DEVONTE Danielson

## 2022-05-12 NOTE — PLAN OF CARE
Goal Outcome Evaluation:  Plan of Care Reviewed With: patient           Outcome Evaluation: VSS. Pt currently resting in bed. No complaints at this time and no overt changes this shift. Bed in low position with alarm audible.

## 2022-05-12 NOTE — PROGRESS NOTES
Nephrology Progress Note      Subjective     No chest pain or shortness of breath     Objective       Vital signs :     Temp:  [97.2 °F (36.2 °C)-98.3 °F (36.8 °C)] 97.2 °F (36.2 °C)  Heart Rate:  [48-73] 65  Resp:  [10-18] 18  BP: ()/() 183/71      Intake/Output Summary (Last 24 hours) at 5/12/2022 0912  Last data filed at 5/12/2022 0847  Gross per 24 hour   Intake 772 ml   Output 3000 ml   Net -2228 ml       Physical Exam:    General Appearance : not in acute distress  Lungs : clear to auscultation, respirations regular  Heart :  regular rhythm & normal rate, normal S1, S2 and no murmur, no rub  Abdomen : normal bowel sounds, no masses, no hepatomegaly, no splenomegaly, soft non-tender and no guarding  Extremities : no edema,  Neurologic :   orientated to person, place, time and situation, Grossly no focal deficits      Laboratory Data :     Albumin Albumin   Date Value Ref Range Status   05/12/2022 2.24 (L) 3.50 - 5.20 g/dL Final   05/11/2022 2.34 (L) 3.50 - 5.20 g/dL Final      Magnesium No results found for: MG       PTH                 No results found for: PTH    CBC and coagulation:  Results from last 7 days   Lab Units 05/12/22  0055 05/11/22  1003 05/11/22  0024 05/09/22  0022   WBC 10*3/mm3 9.19  --  10.88* 13.61*   HEMOGLOBIN g/dL 7.7* 8.1* 7.5* 8.2*   HEMATOCRIT % 22.2* 23.6* 21.8* 24.4*   MCV fL 83.8  --  82.6 85.3   MCHC g/dL 34.7  --  34.4 33.6   PLATELETS 10*3/mm3 230  --  212 223     Acid/base balance:      Renal and electrolytes:  Results from last 7 days   Lab Units 05/12/22  0055 05/11/22  0024 05/10/22  0037 05/09/22  0022 05/08/22  0027 05/07/22  1214 05/07/22  0110 05/06/22  1344 05/06/22  0022   SODIUM mmol/L 128* 129* 125* 125* 128*  --  127*   < > 126*   POTASSIUM mmol/L 3.2* 3.8 4.9 5.0 5.1  --  4.3   < > 4.7   MAGNESIUM mg/dL  --   --   --   --   --   --  2.3  --  1.5*   CHLORIDE mmol/L 91* 93* 94* 93* 96*  --  97*   < > 106   CO2 mmol/L 25.8 20.9* 14.0* 12.5* 12.5*  --   14.1*   < > 12.0*   BUN mg/dL 19 40* 71* 72* 72*  --  66*   < > 68*   CREATININE mg/dL 2.21* 3.46* 5.63* 5.61* 5.69*  --  5.40*   < > 5.53*   CALCIUM mg/dL 7.7* 7.7* 8.2* 8.6 8.1*  --  8.0*   < > 7.4*   PHOSPHORUS mg/dL  --   --   --   --   --  8.4*  --   --   --     < > = values in this interval not displayed.     Estimated Creatinine Clearance: 19.8 mL/min (A) (by C-G formula based on SCr of 2.21 mg/dL (H)).    Liver and pancreatic function:  Results from last 7 days   Lab Units 05/12/22  0055 05/11/22  0024 05/09/22  0022   ALBUMIN g/dL 2.24* 2.34* 2.84*   BILIRUBIN mg/dL 0.3 0.4 0.5   ALK PHOS U/L 89 86 90   AST (SGOT) U/L 15 17 29   ALT (SGPT) U/L 10 10 10         Cardiac:      Liver and pancreatic function:  Results from last 7 days   Lab Units 05/12/22  0055 05/11/22  0024 05/09/22  0022   ALBUMIN g/dL 2.24* 2.34* 2.84*   BILIRUBIN mg/dL 0.3 0.4 0.5   ALK PHOS U/L 89 86 90   AST (SGOT) U/L 15 17 29   ALT (SGPT) U/L 10 10 10       Medications :     albuterol, 2.5 mg, Nebulization, 4x Daily - RT  aspirin, 81 mg, Oral, Daily  atorvastatin, 80 mg, Oral, Nightly  calcium acetate, 1,334 mg, Oral, TID With Meals  [MAR Hold] ferric gluconate (FERRLECIT) IVPB, 250 mg, Intravenous, Daily  folic acid, 1 mg, Oral, Daily  Insulin Aspart, 0-7 Units, Subcutaneous, TID AC  [START ON 5/13/2022] NIFEdipine CC, 90 mg, Oral, Q24H  PARoxetine, 10 mg, Oral, QAM  sodium bicarbonate, 650 mg, Oral, BID  sodium chloride, 10 mL, Intravenous, Q12H             Assessment/Plan     1. PRITI on CKD vs rapidly worsening CKD, started on dialysis on 5/10/22  2. Metabolic acidosis  3. Anemia  4. 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 DM-ii  6. Essential hypertension  7. Hyperkalemia  8. hyponatremia     Dialysis today, patient is getting cardiac cath today as well. Will continue on IHDx 3/WK afterwards.   Follow up pending serology.   Continue on IV iron.     Unknown baseline Cr, Cr was 1.3 in Feb 2021, 2.9 in 12/2021, and 3.2 in Jan 2022 and now  admitted with 5.3  8G, mild hematuria  sono no obstruction.   Elevated , phos 8.4 and low iron stores all point out towards sever CKD and most likely CKD5.   -IV iron daily for 5 days  -phoslo 2 tab tid with meals  -follow up serology,      Jude Muñoz MD  05/12/22  09:12 EDT

## 2022-05-12 NOTE — PLAN OF CARE
Goal Outcome Evaluation:  Plan of Care Reviewed With: patient        Progress: no change  Outcome Evaluation: Patient is currently lying in bed. She is alert and able to follow commands. VSS. Afebrile. Denies pain. Bed locked and in low position. Call light within reach.

## 2022-05-12 NOTE — PROGRESS NOTES
Nephrology Progress Note      Subjective     Pt feeling fine, no complaints.     Objective       Vital signs :     Temp:  [97.2 °F (36.2 °C)-98.3 °F (36.8 °C)] 97.2 °F (36.2 °C)  Heart Rate:  [48-73] 65  Resp:  [10-18] 18  BP: ()/() 183/71      Intake/Output Summary (Last 24 hours) at 5/12/2022 0913  Last data filed at 5/12/2022 0847  Gross per 24 hour   Intake 772 ml   Output 3000 ml   Net -2228 ml       Physical Exam:    General Appearance : not in acute distress  Lungs : clear to auscultation, respirations regular  Heart :  regular rhythm & normal rate, normal S1, S2 and no murmur, no rub  Abdomen : normal bowel sounds, no masses, no hepatomegaly, no splenomegaly, soft non-tender and no guarding  Extremities : no edema,  Neurologic :   orientated to person, place, time and situation, Grossly no focal deficits      Laboratory Data :     Albumin Albumin   Date Value Ref Range Status   05/12/2022 2.24 (L) 3.50 - 5.20 g/dL Final   05/11/2022 2.34 (L) 3.50 - 5.20 g/dL Final      Magnesium No results found for: MG       PTH                 No results found for: PTH    CBC and coagulation:  Results from last 7 days   Lab Units 05/12/22  0055 05/11/22  1003 05/11/22  0024 05/09/22  0022   WBC 10*3/mm3 9.19  --  10.88* 13.61*   HEMOGLOBIN g/dL 7.7* 8.1* 7.5* 8.2*   HEMATOCRIT % 22.2* 23.6* 21.8* 24.4*   MCV fL 83.8  --  82.6 85.3   MCHC g/dL 34.7  --  34.4 33.6   PLATELETS 10*3/mm3 230  --  212 223     Acid/base balance:      Renal and electrolytes:  Results from last 7 days   Lab Units 05/12/22  0055 05/11/22  0024 05/10/22  0037 05/09/22  0022 05/08/22  0027 05/07/22  1214 05/07/22  0110 05/06/22  1344 05/06/22  0022   SODIUM mmol/L 128* 129* 125* 125* 128*  --  127*   < > 126*   POTASSIUM mmol/L 3.2* 3.8 4.9 5.0 5.1  --  4.3   < > 4.7   MAGNESIUM mg/dL  --   --   --   --   --   --  2.3  --  1.5*   CHLORIDE mmol/L 91* 93* 94* 93* 96*  --  97*   < > 106   CO2 mmol/L 25.8 20.9* 14.0* 12.5* 12.5*  --  14.1*   <  > 12.0*   BUN mg/dL 19 40* 71* 72* 72*  --  66*   < > 68*   CREATININE mg/dL 2.21* 3.46* 5.63* 5.61* 5.69*  --  5.40*   < > 5.53*   CALCIUM mg/dL 7.7* 7.7* 8.2* 8.6 8.1*  --  8.0*   < > 7.4*   PHOSPHORUS mg/dL  --   --   --   --   --  8.4*  --   --   --     < > = values in this interval not displayed.     Estimated Creatinine Clearance: 19.8 mL/min (A) (by C-G formula based on SCr of 2.21 mg/dL (H)).    Liver and pancreatic function:  Results from last 7 days   Lab Units 05/12/22  0055 05/11/22  0024 05/09/22  0022   ALBUMIN g/dL 2.24* 2.34* 2.84*   BILIRUBIN mg/dL 0.3 0.4 0.5   ALK PHOS U/L 89 86 90   AST (SGOT) U/L 15 17 29   ALT (SGPT) U/L 10 10 10         Cardiac:      Liver and pancreatic function:  Results from last 7 days   Lab Units 05/12/22  0055 05/11/22  0024 05/09/22  0022   ALBUMIN g/dL 2.24* 2.34* 2.84*   BILIRUBIN mg/dL 0.3 0.4 0.5   ALK PHOS U/L 89 86 90   AST (SGOT) U/L 15 17 29   ALT (SGPT) U/L 10 10 10       Medications :     albuterol, 2.5 mg, Nebulization, 4x Daily - RT  aspirin, 81 mg, Oral, Daily  atorvastatin, 80 mg, Oral, Nightly  calcium acetate, 1,334 mg, Oral, TID With Meals  [MAR Hold] ferric gluconate (FERRLECIT) IVPB, 250 mg, Intravenous, Daily  folic acid, 1 mg, Oral, Daily  Insulin Aspart, 0-7 Units, Subcutaneous, TID AC  [START ON 5/13/2022] NIFEdipine CC, 90 mg, Oral, Q24H  PARoxetine, 10 mg, Oral, QAM  sodium bicarbonate, 650 mg, Oral, BID  sodium chloride, 10 mL, Intravenous, Q12H             Assessment/Plan     1. PRITI on CKD vs rapidly worsening CKD, started on dialysis on 5/10/22  2. Metabolic acidosis  3. Anemia  4. 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 DM-ii  6. Essential hypertension  7. Hyperkalemia  8. hyponatremia     Had dialysis yesterday, did not require PCI.   Continue on dialysis 3/wk and outpatient dialysis chair set up, pt would like to go to [place] dialysis clinic.   Serology is negative, mildly elevated cANCA, no ND-3 was done. Will repeat it.   Continue on IV  iron.     Unknown baseline Cr, Cr was 1.3 in Feb 2021, 2.9 in 12/2021, and 3.2 in Jan 2022 and now admitted with 5.3  8G, mild hematuria  sono no obstruction.   Elevated , phos 8.4 and low iron stores all point out towards sever CKD and most likely CKD5.   -IV iron daily for 5 days  -phoslo 2 tab tid with meals  -follow up serology,      Jude Muñoz MD  05/12/22  09:13 EDT

## 2022-05-13 ENCOUNTER — READMISSION MANAGEMENT (OUTPATIENT)
Dept: CALL CENTER | Facility: HOSPITAL | Age: 82
End: 2022-05-13

## 2022-05-13 VITALS
RESPIRATION RATE: 16 BRPM | WEIGHT: 147.9 LBS | HEIGHT: 65 IN | BODY MASS INDEX: 24.64 KG/M2 | TEMPERATURE: 97.9 F | OXYGEN SATURATION: 100 % | DIASTOLIC BLOOD PRESSURE: 59 MMHG | SYSTOLIC BLOOD PRESSURE: 130 MMHG | HEART RATE: 73 BPM

## 2022-05-13 LAB
ANION GAP SERPL CALCULATED.3IONS-SCNC: 9.4 MMOL/L (ref 5–15)
BASOPHILS # BLD AUTO: 0.04 10*3/MM3 (ref 0–0.2)
BASOPHILS NFR BLD AUTO: 0.4 % (ref 0–1.5)
BUN SERPL-MCNC: 14 MG/DL (ref 8–23)
BUN/CREAT SERPL: 8 (ref 7–25)
CALCIUM SPEC-SCNC: 8 MG/DL (ref 8.6–10.5)
CHLORIDE SERPL-SCNC: 94 MMOL/L (ref 98–107)
CO2 SERPL-SCNC: 27.6 MMOL/L (ref 22–29)
CREAT SERPL-MCNC: 1.74 MG/DL (ref 0.57–1)
DEPRECATED RDW RBC AUTO: 43.4 FL (ref 37–54)
EGFRCR SERPLBLD CKD-EPI 2021: 29.4 ML/MIN/1.73
EOSINOPHIL # BLD AUTO: 0.25 10*3/MM3 (ref 0–0.4)
EOSINOPHIL NFR BLD AUTO: 2.4 % (ref 0.3–6.2)
ERYTHROCYTE [DISTWIDTH] IN BLOOD BY AUTOMATED COUNT: 14.2 % (ref 12.3–15.4)
GLUCOSE BLDC GLUCOMTR-MCNC: 110 MG/DL (ref 70–130)
GLUCOSE SERPL-MCNC: 102 MG/DL (ref 65–99)
HCT VFR BLD AUTO: 23 % (ref 34–46.6)
HGB BLD-MCNC: 7.9 G/DL (ref 12–15.9)
IMM GRANULOCYTES # BLD AUTO: 0.06 10*3/MM3 (ref 0–0.05)
IMM GRANULOCYTES NFR BLD AUTO: 0.6 % (ref 0–0.5)
LYMPHOCYTES # BLD AUTO: 0.91 10*3/MM3 (ref 0.7–3.1)
LYMPHOCYTES NFR BLD AUTO: 8.7 % (ref 19.6–45.3)
MCH RBC QN AUTO: 28.8 PG (ref 26.6–33)
MCHC RBC AUTO-ENTMCNC: 34.3 G/DL (ref 31.5–35.7)
MCV RBC AUTO: 83.9 FL (ref 79–97)
MONOCYTES # BLD AUTO: 1.15 10*3/MM3 (ref 0.1–0.9)
MONOCYTES NFR BLD AUTO: 11 % (ref 5–12)
NEUTROPHILS NFR BLD AUTO: 76.9 % (ref 42.7–76)
NEUTROPHILS NFR BLD AUTO: 8.09 10*3/MM3 (ref 1.7–7)
NRBC BLD AUTO-RTO: 0 /100 WBC (ref 0–0.2)
PLATELET # BLD AUTO: 225 10*3/MM3 (ref 140–450)
PMV BLD AUTO: 9 FL (ref 6–12)
POTASSIUM SERPL-SCNC: 3.3 MMOL/L (ref 3.5–5.2)
RBC # BLD AUTO: 2.74 10*6/MM3 (ref 3.77–5.28)
SODIUM SERPL-SCNC: 131 MMOL/L (ref 136–145)
WBC NRBC COR # BLD: 10.5 10*3/MM3 (ref 3.4–10.8)

## 2022-05-13 PROCEDURE — 94799 UNLISTED PULMONARY SVC/PX: CPT

## 2022-05-13 PROCEDURE — 80048 BASIC METABOLIC PNL TOTAL CA: CPT | Performed by: INTERNAL MEDICINE

## 2022-05-13 PROCEDURE — 82962 GLUCOSE BLOOD TEST: CPT

## 2022-05-13 PROCEDURE — 99239 HOSP IP/OBS DSCHRG MGMT >30: CPT | Performed by: INTERNAL MEDICINE

## 2022-05-13 PROCEDURE — 25010000002 HYDRALAZINE PER 20 MG: Performed by: INTERNAL MEDICINE

## 2022-05-13 PROCEDURE — 85025 COMPLETE CBC W/AUTO DIFF WBC: CPT | Performed by: INTERNAL MEDICINE

## 2022-05-13 RX ORDER — CALCIUM ACETATE 667 MG/1
1334 CAPSULE ORAL
Qty: 180 CAPSULE | Refills: 0 | Status: SHIPPED | OUTPATIENT
Start: 2022-05-13 | End: 2022-07-01 | Stop reason: SDUPTHER

## 2022-05-13 RX ORDER — SODIUM CHLORIDE 0.9 % (FLUSH) 0.9 %
20 SYRINGE (ML) INJECTION AS NEEDED
Status: DISCONTINUED | OUTPATIENT
Start: 2022-05-13 | End: 2022-05-13 | Stop reason: HOSPADM

## 2022-05-13 RX ORDER — ASPIRIN 81 MG/1
81 TABLET ORAL DAILY
Qty: 30 TABLET | Refills: 0 | Status: SHIPPED | OUTPATIENT
Start: 2022-05-14 | End: 2022-07-01 | Stop reason: SDUPTHER

## 2022-05-13 RX ORDER — POTASSIUM CHLORIDE 20 MEQ/1
20 TABLET, EXTENDED RELEASE ORAL DAILY
Status: DISCONTINUED | OUTPATIENT
Start: 2022-05-13 | End: 2022-05-13 | Stop reason: HOSPADM

## 2022-05-13 RX ORDER — FOLIC ACID 1 MG/1
1 TABLET ORAL DAILY
Qty: 30 TABLET | Refills: 0 | Status: SHIPPED | OUTPATIENT
Start: 2022-05-14 | End: 2022-07-01 | Stop reason: SDUPTHER

## 2022-05-13 RX ORDER — SODIUM CHLORIDE 0.9 % (FLUSH) 0.9 %
10 SYRINGE (ML) INJECTION EVERY 12 HOURS SCHEDULED
Status: DISCONTINUED | OUTPATIENT
Start: 2022-05-13 | End: 2022-05-13 | Stop reason: HOSPADM

## 2022-05-13 RX ORDER — HEPARIN SODIUM (PORCINE) LOCK FLUSH IV SOLN 100 UNIT/ML 100 UNIT/ML
5 SOLUTION INTRAVENOUS AS NEEDED
Status: DISCONTINUED | OUTPATIENT
Start: 2022-05-13 | End: 2022-05-13 | Stop reason: HOSPADM

## 2022-05-13 RX ORDER — SODIUM CHLORIDE 0.9 % (FLUSH) 0.9 %
10 SYRINGE (ML) INJECTION AS NEEDED
Status: DISCONTINUED | OUTPATIENT
Start: 2022-05-13 | End: 2022-05-13 | Stop reason: HOSPADM

## 2022-05-13 RX ORDER — NIFEDIPINE 30 MG/1
90 TABLET, FILM COATED, EXTENDED RELEASE ORAL
Qty: 30 TABLET | Refills: 0 | Status: SHIPPED | OUTPATIENT
Start: 2022-05-14 | End: 2022-06-08 | Stop reason: HOSPADM

## 2022-05-13 RX ADMIN — PAROXETINE HYDROCHLORIDE 10 MG: 20 TABLET, FILM COATED ORAL at 06:22

## 2022-05-13 RX ADMIN — POTASSIUM CHLORIDE 20 MEQ: 20 TABLET, EXTENDED RELEASE ORAL at 11:02

## 2022-05-13 RX ADMIN — CALCIUM ACETATE 1334 MG: 667 CAPSULE ORAL at 11:02

## 2022-05-13 RX ADMIN — ASPIRIN 81 MG: 81 TABLET, COATED ORAL at 08:56

## 2022-05-13 RX ADMIN — SODIUM BICARBONATE TAB 650 MG 650 MG: 650 TAB at 08:56

## 2022-05-13 RX ADMIN — HYDRALAZINE HYDROCHLORIDE 10 MG: 20 INJECTION INTRAMUSCULAR; INTRAVENOUS at 04:44

## 2022-05-13 RX ADMIN — NIFEDIPINE 90 MG: 30 TABLET, EXTENDED RELEASE ORAL at 08:56

## 2022-05-13 RX ADMIN — CALCIUM ACETATE 1334 MG: 667 CAPSULE ORAL at 08:56

## 2022-05-13 RX ADMIN — ALBUTEROL SULFATE 2.5 MG: 2.5 SOLUTION RESPIRATORY (INHALATION) at 00:53

## 2022-05-13 RX ADMIN — FOLIC ACID 1 MG: 1 TABLET ORAL at 08:56

## 2022-05-13 RX ADMIN — Medication 10 ML: at 08:56

## 2022-05-13 RX ADMIN — ALBUTEROL SULFATE 2.5 MG: 2.5 SOLUTION RESPIRATORY (INHALATION) at 07:03

## 2022-05-13 NOTE — DISCHARGE PLACEMENT REQUEST
"Sean Chen (81 y.o. Female)             Date of Birth   1940    Social Security Number       Address   [address] [place] [address]    Home Phone   [phone]    MRN   [id number]       Zoroastrianism   Unknown    Marital Status                               Admission Date   5/4/22    Admission Type   Emergency    Admitting Provider   Shaw Tomas DO    Attending Provider   Dane Reeves MD    Department, Room/Bed   [facility] CARE, P209/S2       Discharge Date       Discharge Disposition   Home or Self Care    Discharge Destination                               Attending Provider: Dane Reeves MD    Allergies: Keflex [Cephalexin], Lodine [Etodolac], Penicillins, Sulfa Antibiotics    Isolation: None   Infection: None   Code Status: CPR   Advance Care Planning Activity    Ht: 165.1 cm (65\")   Wt: 67.1 kg (147 lb 14.4 oz)    Admission Cmt: None   Principal Problem: Hypertensive urgency [I16.0]                 Active Insurance as of 5/4/2022     Primary Coverage     Payor Plan Insurance Group Employer/Plan Group    HUMANA MEDICARE REPLACEMENT HUMANA MEDICARE REPLACEMENT [id number]     Payor Plan Address Payor Plan Phone Number Payor Plan Fax Number Effective Dates    [address] [address] [phone]  1/1/2021 - None Entered    [facility] [address]       Subscriber Name Subscriber Birth Date Member ID       SEAN CHEN 1940 [id number]           Secondary Coverage     Payor Plan Insurance Group Employer/Plan Group    [place]      Payor Plan Address Payor Plan Phone Number Payor Plan Fax Number Effective Dates    [address]   1/1/2014 - None Entered    [facility] [address]       Subscriber Name Subscriber Birth Date Member ID       SEAN CHEN 1940 [id number]                 Emergency Contacts      (Rel.) Home Phone Work Phone Mobile Phone    Rodolfo Chenesteban Cline (Son) [phone] -- [phone]    " deysi chen -- -- [phone]    Dylon Chen Sr (Spouse) [phone] -- --    Dylon Chen J (Grandchild) -- -- [phone]        [facility] PROGRESS CARE  [address] [facility] [address]  Phone:  [phone]  Fax:  [phone]        Patient:     Sean Chen MRN:  [id number]   [id number]   [facility] [address] :  1940  SSN:    Phone: [phone] Sex:  F      INSURANCE PAYOR PLAN GROUP # SUBSCRIBER ID   Primary:  Secondary:    HUMANA MEDICARE REPLACEMENT  AETNA [facility] [id number]  [id number]    [id number]  [id number]   Admitting Diagnosis: Hypertensive urgency [I16.0]  Order Date:  May 13, 2022        Discharge Follow-up with Specified Provider: Dialysis as scheduled       (Order ID: [id number])     Diagnosis:         Priority:  Routine Expected Date:   Expiration Date:        Interval:   Count:    To: Dialysis as scheduled     Specimen Type:   Specimen Source:   Specimen Taken Date:   Specimen Taken Time:                  Authorizing Provider:Dane Reeves MD  Authorizing Provider's NPI: [id number]  Order Entered By: Dane Reeves MD 2022 11:22 AM     Electronically signed by: Dane Reeves MD 2022 11:22 AM     Nephrology Progress Note      Subjective     Pt feeling fine, no complaints.     Objective       Vital signs :     Temp:  [97.7 °F (36.5 °C)-98.7 °F (37.1 °C)] 97.7 °F (36.5 °C)  Heart Rate:  [56-68] 59  Resp:  [10-20] 16  BP: (105-177)/() 148/63      Intake/Output Summary (Last 24 hours) at 2022 1124  Last data filed at 2022 0000  Gross per 24 hour   Intake 240 ml   Output 600 ml   Net -360 ml       Physical Exam:    General Appearance : not in acute distress  Lungs : clear to auscultation, respirations regular  Heart :  regular rhythm & normal rate, normal S1, S2 and no murmur, no rub  Abdomen : normal bowel sounds, no masses, no hepatomegaly, no splenomegaly, soft non-tender and no guarding  Extremities : no  edema,  Neurologic :   orientated to person, place, time and situation, Grossly no focal deficits      Laboratory Data :     Albumin Albumin   Date Value Ref Range Status   05/12/2022 2.24 (L) 3.50 - 5.20 g/dL Final   05/11/2022 2.34 (L) 3.50 - 5.20 g/dL Final      Magnesium No results found for: MG       PTH                 No results found for: PTH    CBC and coagulation:  Results from last 7 days   Lab Units 05/13/22  0055 05/12/22  0055 05/11/22  1003 05/11/22  0024   WBC 10*3/mm3 10.50 9.19  --  10.88*   HEMOGLOBIN g/dL 7.9* 7.7* 8.1* 7.5*   HEMATOCRIT % 23.0* 22.2* 23.6* 21.8*   MCV fL 83.9 83.8  --  82.6   MCHC g/dL 34.3 34.7  --  34.4   PLATELETS 10*3/mm3 225 230  --  212     Acid/base balance:      Renal and electrolytes:  Results from last 7 days   Lab Units 05/13/22  0055 05/12/22  0055 05/11/22  0024 05/10/22  0037 05/09/22  0022 05/08/22  0027 05/07/22  1214 05/07/22  0110   SODIUM mmol/L 131* 128* 129* 125* 125*   < >  --  127*   POTASSIUM mmol/L 3.3* 3.2* 3.8 4.9 5.0   < >  --  4.3   MAGNESIUM mg/dL  --   --   --   --   --   --   --  2.3   CHLORIDE mmol/L 94* 91* 93* 94* 93*   < >  --  97*   CO2 mmol/L 27.6 25.8 20.9* 14.0* 12.5*   < >  --  14.1*   BUN mg/dL 14 19 40* 71* 72*   < >  --  66*   CREATININE mg/dL 1.74* 2.21* 3.46* 5.63* 5.61*   < >  --  5.40*   CALCIUM mg/dL 8.0* 7.7* 7.7* 8.2* 8.6   < >  --  8.0*   PHOSPHORUS mg/dL  --   --   --   --   --   --  8.4*  --     < > = values in this interval not displayed.     Estimated Creatinine Clearance: 27.3 mL/min (A) (by C-G formula based on SCr of 1.74 mg/dL (H)).    Liver and pancreatic function:  Results from last 7 days   Lab Units 05/12/22 0055 05/11/22 0024 05/09/22 0022   ALBUMIN g/dL 2.24* 2.34* 2.84*   BILIRUBIN mg/dL 0.3 0.4 0.5   ALK PHOS U/L 89 86 90   AST (SGOT) U/L 15 17 29   ALT (SGPT) U/L 10 10 10         Cardiac:      Liver and pancreatic function:  Results from last 7 days   Lab Units 05/12/22 0055 05/11/22 0024 05/09/22 0022    ALBUMIN g/dL 2.24* 2.34* 2.84*   BILIRUBIN mg/dL 0.3 0.4 0.5   ALK PHOS U/L 89 86 90   AST (SGOT) U/L 15 17 29   ALT (SGPT) U/L 10 10 10       Medications :     albuterol, 2.5 mg, Nebulization, 4x Daily - RT  aspirin, 81 mg, Oral, Daily  atorvastatin, 80 mg, Oral, Nightly  calcium acetate, 1,334 mg, Oral, TID With Meals  folic acid, 1 mg, Oral, Daily  NIFEdipine CC, 90 mg, Oral, Q24H  PARoxetine, 10 mg, Oral, QAM  potassium chloride, 20 mEq, Oral, Daily  sodium bicarbonate, 650 mg, Oral, BID  sodium chloride, 10 mL, Intravenous, Q12H             Assessment/Plan     1. PRITI on CKD vs rapidly worsening CKD, started on dialysis on 5/10/22  2. Metabolic acidosis  3. Anemia  4. 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 DM-ii  6. Essential hypertension  7. Hyperkalemia  8. hyponatremia     Had dialysis yesterday, did not require PCI.   Continue on dialysis 3/wk and outpatient dialysis chair set up, pt would like to go to [place] dialysis clinic.   Serology is negative, mildly elevated cANCA, no PA-3 was done. Will repeat it.   Continue on IV iron.     Unknown baseline Cr, Cr was 1.3 in 2021, 2.9 in 2021, and 3.2 in 2022 and now admitted with 5.3  8G, mild hematuria  sono no obstruction.   Elevated , phos 8.4 and low iron stores all point out towards sever CKD and most likely CKD5.   -IV iron daily for 5 days  -phoslo 2 tab tid with meals  -follow up serology,  [facility] PROGRESS CARE  [facility] [address]  Phone:  [phone]  Fax:  [phone]        Patient:     Sean Chen MRN:  [id number]   [id number]   [place] [address] :  1940  SSN:    Phone: [phone] Sex:  F      INSURANCE PAYOR PLAN GROUP # SUBSCRIBER ID   Primary:  Secondary:    HUMANA MEDICARE REPLACEMENT  AETNA [facility] [id number]  [id number]    [id number]  [id number]   Admitting Diagnosis: Hypertensive urgency [I16.0]  Order Date:  May 13, 2022        Discharge Follow-up with  Specified Provider: Dialysis as scheduled       (Order ID: [id number])     Diagnosis:         Priority:  Routine Expected Date:   Expiration Date:        Interval:   Count:    To: Dialysis as scheduled     Specimen Type:   Specimen Source:   Specimen Taken Date:   Specimen Taken Time:                  Authorizing Provider:Dane Herrmann MD  Authorizing Provider's NPI: [id number]  Order Entered By: Dane Herrmann MD 5/13/2022 11:22 AM     Electronically signed by: Dane Herrmann MD 5/13/2022 11:22 AM         Discharge Order (From admission, onward)     Start     Ordered    05/13/22 1123  Discharge patient  Once        Expected Discharge Date: 05/13/22    Discharge Disposition: Home or Self Care    Physician of Record for Attribution - Please select from Treatment Team: DANE HERRMANN [[id number]]    Review needed by CMO to determine Physician of Record: No       Question Answer Comment   Physician of Record for Attribution - Please select from Treatment Team DANE HERRMANN    Review needed by CMO to determine Physician of Record No        05/13/22 1122

## 2022-05-13 NOTE — CASE MANAGEMENT/SOCIAL WORK
Discharge Planning Assessment  [facility]     Patient Name: Sean Chen  MRN: [id number]  Today's Date: 5/13/2022    Admit Date: 5/4/2022  Discharge Plan       Row Name 05/13/22 1126       Plan    Final Discharge Disposition Code 01 - home or self-care    Final Note Pt is being discharged home on this date. Outpatient HD chair arranged at [facility]. SS faxed final orders and updates to [phone]. Pt and family to fill out paperwork with clinic today. Pt's family to provide transportation via private vehicle.      Discharge Plan     Row Name 05/13/22 0906       Plan    Plan SS notified by Dr. Reeves of possible plan to discharge pt on this date and that pt's dialysis will be due tomorrow. SS spoke with Angeli at [facility] who states if family can complete paperwork by 15:00 today they will be able to start dialysis on pt tomorrow, no one available to admit pt over the weekend. If family unable to complete paperwork today will have to wait until Monday for clinic to admit pt. SS attempted to contact pt's son without success, left voicemail. SS following.     10:07: SS spoke with pt, spouse, and son at bedside. Family agreeable to get paperwork completed before 15:00 today. SS provided contact information for dialysis clinic. Dr. Reeves updated, plan to discharge pt. Dialysis clinic updated per Angeli.               DEVONTE Danielson

## 2022-05-13 NOTE — OUTREACH NOTE
Prep Survey    Flowsheet Row Responses   [facility] facility patient discharged from? [facility]   Is LACE score < 7 ? No   Emergency Room discharge w/ pulse ox? No   Eligibility [facility]   [facility]   Date of Admission 05/04/22   Date of Discharge 05/13/22   Discharge Disposition Home or Self Care   Discharge diagnosis NSTEMI, heart cath - nonobstructive CAD, PRITI on CKD- HD started, DM anemia   Does the patient have one of the following disease processes/diagnoses(primary or secondary)? Acute MI (STEMI,NSTEMI)   Does the patient have Home health ordered? No   Is there a DME ordered? No   General alerts for this patient new HD patient   Prep survey completed? Yes          LORI LOWERY - Registered Nurse

## 2022-05-13 NOTE — DISCHARGE SUMMARY
Date of admission: 5/4/2022  Date of discharge: 5/13/2022    Principal diagnosis: Non-ST elevation myocardial infarction  Secondary diagnoses:  -Nonobstructive coronary artery disease  -Benign hypertension  -Episodes of bradycardia per cardiology thought secondary to medication  -PRITI on worsening CKD starting dialysis 5/10  -Metabolic acidosis  -Mild hyponatremia  -Diabetes, A1c normal  -Chronic anemia microcytic probably chronic disease superimposed iron deficiency, she received IV iron here, folic acid was also low at 3.44 and she was supplemented with this as well.  -Severe pulmonary hypertension by echo  -Less than 1 cm pericardial effusion that can be followed up as an outpatient with Dr. Sin  -History of progressive dementia  -Proteinuria    Consultants:  Dr. Roman general cardiology  Dr. Grande interventional cardiology   nephrology   General surgery    Procedures:  Placement of the tunneled dialysis catheter by Dr. Coates 5/9  Dialysis at nephrology discretion  Cardiac catheterization with Dr. Grande 5/11  Nuclear stress test  Renal ultrasound  2D echocardiogram      Exam: Assisted by Elissa ADAMS on PCU.  Patient was sleeping but easily awakened, no complaints of chest pain, she had dialysis yesterday and tolerated this.  Lungs are clear heart regular rate and rhythm 1-2/6 SURESH at the mid sternal border abdomen soft benign.  No significant edema, vital signs: 130/59, 73, 97.9, 100% saturated on 0 to 1 L    Hospital course: Patient was admitted with chest pain and non-STEMI and elevated creatinine.  She also had bradycardia that was thought to be secondary to medications by cardiology, offending medications held and bradycardia improved.  For chest pain patient underwent a stress test that was positive therefore she underwent a cardiac catheterization but she was found to have nonobstructive disease per Dr. Roman and is being treated medically.  She does have chronic kidney disease and  thought.  Rapidly progressive, dialysis instigated here after tunneled dialysis catheter placed.  Patient tolerated this well.  Patient has been undergoing dialysis, antihypertensives have been adjusted by cardiology and patient is being discharged in satisfactory condition for outpatient follow-up, see chart for full details this visit.  Potassium was 3.3 this morning she was given 20 mill equivalents prior to discharge.  He does have chronic anemia, work-up showed the above and she has received IV iron and is being discharged home on folic acid.  This can be followed up as an outpatient.  Patient did have a vasculitis work-up, she had over 8 g of protein in the urine, we following up with nephrology to follow-up this work-up this is most likely diabetic in nature.  Patient did have a very mildly elevated kappa/lambda ratio but both kappa and lambda chains were elevated, most likely benign but again can be followed up as an outpatient with nephrology and PCP    Disposition: Home with family    Follow-up:  Dialysis tomorrow as scheduled  Dr. Roman 1 with       Recommendation: Follow-up on vasculitis work-up    Diet: Cardiac/renal    Activity: As tolerated    Condition: Stable    Medication:  Albuterol HFA 2 puffs 4 times a day  Aspirin 81 mg a day  Lipitor 80 mg every night  PhosLo 2 tablets 3 times a day  Folic acid 1 mg a day  Nifedipine CC 90 mg daily  Paxil 10 mg a day    Greater than 30-minute discharge    Case discussed with nephrology as well as  confirming dialysis spot

## 2022-05-13 NOTE — PLAN OF CARE
Goal Outcome Evaluation:              Outcome Evaluation: Patient is alert to herself and knows she is in a hospital. Continues to be disoriented to time and situation. Patient is able to follow commands and denies pain. VSS. Bed is locked and in lowest position and call light is within reach. Will continue to monitor for any changes.

## 2022-05-13 NOTE — PROGRESS NOTES
Nephrology Progress Note      Subjective     Pt feeling fine, no complaints.     Objective       Vital signs :     Temp:  [97.2 °F (36.2 °C)-98.7 °F (37.1 °C)] 98.4 °F (36.9 °C)  Heart Rate:  [51-68] 61  Resp:  [10-20] 10  BP: (105-204)/() 168/65      Intake/Output Summary (Last 24 hours) at 5/13/2022 0757  Last data filed at 5/13/2022 0000  Gross per 24 hour   Intake 600 ml   Output 600 ml   Net 0 ml       Physical Exam:    General Appearance : not in acute distress  Lungs : clear to auscultation, respirations regular  Heart :  regular rhythm & normal rate, normal S1, S2 and no murmur, no rub  Abdomen : normal bowel sounds, no masses, no hepatomegaly, no splenomegaly, soft non-tender and no guarding  Extremities : no edema,  Neurologic :   orientated to person, place, time and situation, Grossly no focal deficits      Laboratory Data :     Albumin Albumin   Date Value Ref Range Status   05/12/2022 2.24 (L) 3.50 - 5.20 g/dL Final   05/11/2022 2.34 (L) 3.50 - 5.20 g/dL Final      Magnesium No results found for: MG       PTH                 No results found for: PTH    CBC and coagulation:  Results from last 7 days   Lab Units 05/13/22  0055 05/12/22  0055 05/11/22  1003 05/11/22  0024   WBC 10*3/mm3 10.50 9.19  --  10.88*   HEMOGLOBIN g/dL 7.9* 7.7* 8.1* 7.5*   HEMATOCRIT % 23.0* 22.2* 23.6* 21.8*   MCV fL 83.9 83.8  --  82.6   MCHC g/dL 34.3 34.7  --  34.4   PLATELETS 10*3/mm3 225 230  --  212     Acid/base balance:      Renal and electrolytes:  Results from last 7 days   Lab Units 05/13/22  0055 05/12/22  0055 05/11/22  0024 05/10/22  0037 05/09/22  0022 05/08/22  0027 05/07/22  1214 05/07/22  0110   SODIUM mmol/L 131* 128* 129* 125* 125*   < >  --  127*   POTASSIUM mmol/L 3.3* 3.2* 3.8 4.9 5.0   < >  --  4.3   MAGNESIUM mg/dL  --   --   --   --   --   --   --  2.3   CHLORIDE mmol/L 94* 91* 93* 94* 93*   < >  --  97*   CO2 mmol/L 27.6 25.8 20.9* 14.0* 12.5*   < >  --  14.1*   BUN mg/dL 14 19 40* 71* 72*   < >   --  66*   CREATININE mg/dL 1.74* 2.21* 3.46* 5.63* 5.61*   < >  --  5.40*   CALCIUM mg/dL 8.0* 7.7* 7.7* 8.2* 8.6   < >  --  8.0*   PHOSPHORUS mg/dL  --   --   --   --   --   --  8.4*  --     < > = values in this interval not displayed.     Estimated Creatinine Clearance: 27.3 mL/min (A) (by C-G formula based on SCr of 1.74 mg/dL (H)).    Liver and pancreatic function:  Results from last 7 days   Lab Units 05/12/22 0055 05/11/22  0024 05/09/22  0022   ALBUMIN g/dL 2.24* 2.34* 2.84*   BILIRUBIN mg/dL 0.3 0.4 0.5   ALK PHOS U/L 89 86 90   AST (SGOT) U/L 15 17 29   ALT (SGPT) U/L 10 10 10         Cardiac:      Liver and pancreatic function:  Results from last 7 days   Lab Units 05/12/22 0055 05/11/22  0024 05/09/22  0022   ALBUMIN g/dL 2.24* 2.34* 2.84*   BILIRUBIN mg/dL 0.3 0.4 0.5   ALK PHOS U/L 89 86 90   AST (SGOT) U/L 15 17 29   ALT (SGPT) U/L 10 10 10       Medications :     albuterol, 2.5 mg, Nebulization, 4x Daily - RT  aspirin, 81 mg, Oral, Daily  atorvastatin, 80 mg, Oral, Nightly  calcium acetate, 1,334 mg, Oral, TID With Meals  folic acid, 1 mg, Oral, Daily  NIFEdipine CC, 90 mg, Oral, Q24H  PARoxetine, 10 mg, Oral, QAM  sodium bicarbonate, 650 mg, Oral, BID  sodium chloride, 10 mL, Intravenous, Q12H             Assessment/Plan     1. PRITI on CKD vs rapidly worsening CKD, started on dialysis on 5/10/22  2. Metabolic acidosis  3. Anemia  4. 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 DM-ii  6. Essential hypertension  7. Hypokalemia  8. hyponatremia     Replaced K for mild hypokalemia, now continue on dialysis TTS. She is being discharge today and will have dialysis TTS at [facility]    Serology is negative, mildly elevated cANCA, no MI-3 was done.  Unknown baseline Cr, Cr was 1.3 in Feb 2021, 2.9 in 12/2021, and 3.2 in Jan 2022 and now admitted with 5.3  8G, mild hematuria  sono no obstruction.   Elevated , phos 8.4 and low iron stores all point out towards sever CKD and most likely CKD5.   -IV iron daily  for 5 days  -phoslo 2 tab tid with meals  -follow up serology,      Jude Muñoz MD  05/13/22  07:57 EDT

## 2022-05-14 LAB — PROTEINASE3 AB SER IA-ACNC: <3.5 U/ML (ref 0–3.5)

## 2022-05-14 NOTE — PAYOR COMM NOTE
"[facility]  JILL GANT  PHONE  [phone]  FAX  [phone]  NPI:  [id number]    PATIENT D/C 5/13/2022    Aide Chen (81 y.o. Female)             Date of Birth   1940    Social Security Number       Address   [address] [facility] [address]    Home Phone   [phone]    MRN   [id number]       Buddhist   Unknown    Marital Status                               Admission Date   5/4/22    Admission Type   Emergency    Admitting Provider   Shaw Tomas DO    Attending Provider       Department, Room/Bed   [facility] PROGRESS CARE, P209/S2       Discharge Date   5/13/2022    Discharge Disposition   Home or Self Care    Discharge Destination                               Attending Provider: (none)   Allergies: Keflex [Cephalexin], Lodine [Etodolac], Penicillins, Sulfa Antibiotics    Isolation: None   Infection: None   Code Status: Prior   Advance Care Planning Activity    Ht: 165.1 cm (65\")   Wt: 67.1 kg (147 lb 14.4 oz)    Admission Cmt: None   Principal Problem: Hypertensive urgency [I16.0]                 Active Insurance as of 5/4/2022     Primary Coverage     Payor Plan Insurance Group Employer/Plan Group    MEDICARE MEDICARE A & B      Payor Plan Address Payor Plan Phone Number Payor Plan Fax Number Effective Dates    [address] [phone]  6/1/2006 - 5/12/2022    [facility] [address]       Subscriber Name Subscriber Birth Date Member ID       AIDE CHEN 1940 [id number]           Secondary Coverage     Payor Plan Insurance Group Employer/Plan Group    HUMANA MEDICARE REPLACEMENT HUMANA MEDICARE REPLACEMENT [id number]     Payor Plan Address Payor Plan Phone Number Payor Plan Fax Number Effective Dates    [address] [address] [phone]  1/1/2021 - None Entered    [facility] [address]       Subscriber Name Subscriber Birth Date Member ID       AIDE CHEN 1940 [id number]           Tertiary Coverage     Payor Plan Insurance Group Employer/Plan " Group    [place]      Payor Plan Address Payor Plan Phone Number Payor Plan Fax Number Effective Dates    [address]   1/1/2014 - None Entered    [place] [address]       Subscriber Name Subscriber Birth Date Member ID       AIDE CHEN 1940 [id number]                 Emergency Contacts      (Rel.) Home Phone Work Phone Mobile Phone    Dylon Chen Jr (Son) [phone] -- [phone]    deysi chen -- -- [phone]    Dylon Chen Sr (Spouse) [phone] -- --    Dylon Chen (Grandchild) -- -- [phone]

## 2022-05-16 ENCOUNTER — TRANSITIONAL CARE MANAGEMENT TELEPHONE ENCOUNTER (OUTPATIENT)
Dept: CALL CENTER | Facility: HOSPITAL | Age: 82
End: 2022-05-16

## 2022-05-16 NOTE — OUTREACH NOTE
Call Center TCM Note    Flowsheet Row Responses   [facility] patient discharged from? [facility]   Does the patient have one of the following disease processes/diagnoses(primary or secondary)? Acute MI (STEMI,NSTEMI)   TCM attempt successful? Yes   Call start time 1517   Call end time 1521   General alerts for this patient new HD patient   Discharge diagnosis NSTEMI, heart cath - nonobstructive CAD, PRITI on CKD- HD started, DM anemia   Meds reviewed with patient/caregiver? Yes   Is the patient having any side effects they believe may be caused by any medication additions or changes? No   Does the patient have all prescriptions related to this admission filled (includes statins,anticoagulants,HTN meds,anti-arrhythmia meds) Yes   Is the patient taking all medications as directed (includes completed medication regime)? Yes   Does the patient have a primary care provider?  Yes   Does the patient have an appointment with their PCP,cardiologist,or clinic within 7 days of discharge? Yes   Comments regarding PCP HOSP DC FU 5/20/22 @ 11 am.    Has the patient kept scheduled appointments due by today? Yes   Has home health visited the patient within 72 hours of discharge? N/A   Psychosocial issues? No   Did the patient receive a copy of their discharge instructions? Yes   Nursing interventions Reviewed instructions with patient   What is the patient's perception of their health status since discharge? Improving   Nursing interventions Nurse provided patient education   Is the patient/caregiver able to teach back signs and symptoms of when to call for help immediately: Sudden chest discomfort, Sudden discomfort in arms, back, neck or jaw, Shortness of breath at any time, Sudden sweating or clammy skin, Nausea or vomiting, Dizziness or lightheadedness, Irregular or rapid heart rate   Nursing interventions Nurse provided patient education   Is the patient/caregiver able to teach back lifestyle changes to help prevent MIs Heart  healthy diet, Reducing stress, Managing diabetes   Is the patient/caregiver able to teach back ways to prevent a second heart attack: Take medications, Follow up with MD   If the patient is a current smoker, are they able to teach back resources for cessation? Not a smoker   Is the patient/caregiver able to teach back the hierarchy of who to call/visit for symptoms/problems? PCP, Specialist, Home health nurse, Urgent Care, ED, 911 Yes   Additional teach back comments Reviewed s/s of infection for cath site and when to call Dr. MORALES call completed? Yes   Wrap up additional comments Son reports they are encouraging Pt to take meds as ordered. Pt did have 2nd treatment of Dialysis today.           Concepcion Black RN    5/16/2022, 15:22 EDT

## 2022-05-23 ENCOUNTER — READMISSION MANAGEMENT (OUTPATIENT)
Dept: CALL CENTER | Facility: HOSPITAL | Age: 82
End: 2022-05-23

## 2022-05-23 ENCOUNTER — APPOINTMENT (OUTPATIENT)
Dept: GENERAL RADIOLOGY | Facility: HOSPITAL | Age: 82
End: 2022-05-23

## 2022-05-23 ENCOUNTER — HOSPITAL ENCOUNTER (INPATIENT)
Facility: HOSPITAL | Age: 82
LOS: 16 days | Discharge: HOME HEALTH | End: 2022-06-08
Attending: EMERGENCY MEDICINE | Admitting: INTERNAL MEDICINE

## 2022-05-23 ENCOUNTER — APPOINTMENT (OUTPATIENT)
Dept: CT IMAGING | Facility: HOSPITAL | Age: 82
End: 2022-05-23

## 2022-05-23 DIAGNOSIS — J96.01 ACUTE RESPIRATORY FAILURE WITH HYPOXIA: Primary | ICD-10-CM

## 2022-05-23 DIAGNOSIS — J96.01 ACUTE HYPOXEMIC RESPIRATORY FAILURE: ICD-10-CM

## 2022-05-23 PROBLEM — N28.9 RENAL INSUFFICIENCY: Status: RESOLVED | Noted: 2022-01-27 | Resolved: 2022-05-23

## 2022-05-23 PROBLEM — Z23 ENCOUNTER FOR IMMUNIZATION: Status: RESOLVED | Noted: 2017-01-30 | Resolved: 2022-05-23

## 2022-05-23 LAB
A-A DO2: 65.2 MMHG (ref 0–300)
A-A DO2: 89.7 MMHG (ref 0–300)
ALBUMIN SERPL-MCNC: 3.29 G/DL (ref 3.5–5.2)
ALBUMIN/GLOB SERPL: 1.1 G/DL
ALP SERPL-CCNC: 118 U/L (ref 39–117)
ALT SERPL W P-5'-P-CCNC: 15 U/L (ref 1–33)
AMPHET+METHAMPHET UR QL: NEGATIVE
AMPHETAMINES UR QL: NEGATIVE
ANION GAP SERPL CALCULATED.3IONS-SCNC: 13.9 MMOL/L (ref 5–15)
APTT PPP: 32.3 SECONDS (ref 26.5–34.5)
ARTERIAL PATENCY WRIST A: ABNORMAL
ARTERIAL PATENCY WRIST A: POSITIVE
AST SERPL-CCNC: 21 U/L (ref 1–32)
ATMOSPHERIC PRESS: 728 MMHG
ATMOSPHERIC PRESS: 729 MMHG
BACTERIA UR QL AUTO: ABNORMAL /HPF
BARBITURATES UR QL SCN: NEGATIVE
BASE EXCESS BLDA CALC-SCNC: -1.2 MMOL/L (ref 0–2)
BASE EXCESS BLDA CALC-SCNC: 0.9 MMOL/L (ref 0–2)
BASOPHILS # BLD AUTO: 0.02 10*3/MM3 (ref 0–0.2)
BASOPHILS NFR BLD AUTO: 0.1 % (ref 0–1.5)
BDY SITE: ABNORMAL
BDY SITE: ABNORMAL
BENZODIAZ UR QL SCN: NEGATIVE
BILIRUB SERPL-MCNC: 0.5 MG/DL (ref 0–1.2)
BILIRUB UR QL STRIP: NEGATIVE
BODY TEMPERATURE: 0 C
BODY TEMPERATURE: 0 C
BUN SERPL-MCNC: 33 MG/DL (ref 8–23)
BUN/CREAT SERPL: 9.4 (ref 7–25)
BUPRENORPHINE SERPL-MCNC: NEGATIVE NG/ML
CALCIUM SPEC-SCNC: 8.9 MG/DL (ref 8.6–10.5)
CANNABINOIDS SERPL QL: NEGATIVE
CHLORIDE SERPL-SCNC: 91 MMOL/L (ref 98–107)
CK SERPL-CCNC: 50 U/L (ref 20–180)
CLARITY UR: CLEAR
CO2 BLDA-SCNC: 24.6 MMOL/L (ref 22–33)
CO2 BLDA-SCNC: 25.9 MMOL/L (ref 22–33)
CO2 SERPL-SCNC: 22.1 MMOL/L (ref 22–29)
COCAINE UR QL: NEGATIVE
COHGB MFR BLD: 0.9 % (ref 0–5)
COHGB MFR BLD: 1.2 % (ref 0–5)
COLOR UR: YELLOW
CREAT SERPL-MCNC: 3.52 MG/DL (ref 0.57–1)
CRP SERPL-MCNC: 3.62 MG/DL (ref 0–0.5)
D-LACTATE SERPL-SCNC: 1.7 MMOL/L (ref 0.5–2)
DEPRECATED RDW RBC AUTO: 46.2 FL (ref 37–54)
EGFRCR SERPLBLD CKD-EPI 2021: 12.6 ML/MIN/1.73
EOSINOPHIL # BLD AUTO: 0.03 10*3/MM3 (ref 0–0.4)
EOSINOPHIL NFR BLD AUTO: 0.2 % (ref 0.3–6.2)
ERYTHROCYTE [DISTWIDTH] IN BLOOD BY AUTOMATED COUNT: 14.9 % (ref 12.3–15.4)
ERYTHROCYTE [SEDIMENTATION RATE] IN BLOOD: 26 MM/HR (ref 0–30)
ETHANOL BLD-MCNC: <10 MG/DL (ref 0–10)
ETHANOL UR QL: <0.01 %
FLUAV SUBTYP SPEC NAA+PROBE: NOT DETECTED
FLUBV RNA ISLT QL NAA+PROBE: NOT DETECTED
GAS FLOW AIRWAY: 3 LPM
GLOBULIN UR ELPH-MCNC: 3.1 GM/DL
GLUCOSE SERPL-MCNC: 279 MG/DL (ref 65–99)
GLUCOSE UR STRIP-MCNC: ABNORMAL MG/DL
HCO3 BLDA-SCNC: 23.4 MMOL/L (ref 20–26)
HCO3 BLDA-SCNC: 24.9 MMOL/L (ref 20–26)
HCT VFR BLD AUTO: 28.7 % (ref 34–46.6)
HCT VFR BLD CALC: 24.7 % (ref 38–51)
HCT VFR BLD CALC: 28.5 % (ref 38–51)
HGB BLD-MCNC: 9.6 G/DL (ref 12–15.9)
HGB BLDA-MCNC: 8.1 G/DL (ref 13.5–17.5)
HGB BLDA-MCNC: 9.3 G/DL (ref 13.5–17.5)
HGB UR QL STRIP.AUTO: NEGATIVE
HYALINE CASTS UR QL AUTO: ABNORMAL /LPF
IMM GRANULOCYTES # BLD AUTO: 0.08 10*3/MM3 (ref 0–0.05)
IMM GRANULOCYTES NFR BLD AUTO: 0.5 % (ref 0–0.5)
INHALED O2 CONCENTRATION: 21 %
INHALED O2 CONCENTRATION: 32 %
INR PPP: 0.99 (ref 0.9–1.1)
KETONES UR QL STRIP: NEGATIVE
LEUKOCYTE ESTERASE UR QL STRIP.AUTO: ABNORMAL
LYMPHOCYTES # BLD AUTO: 0.8 10*3/MM3 (ref 0.7–3.1)
LYMPHOCYTES NFR BLD AUTO: 5.1 % (ref 19.6–45.3)
Lab: ABNORMAL
MAGNESIUM SERPL-MCNC: 1.7 MG/DL (ref 1.6–2.4)
MCH RBC QN AUTO: 28.7 PG (ref 26.6–33)
MCHC RBC AUTO-ENTMCNC: 33.4 G/DL (ref 31.5–35.7)
MCV RBC AUTO: 85.9 FL (ref 79–97)
METHADONE UR QL SCN: NEGATIVE
METHGB BLD QL: 0.2 % (ref 0–3)
METHGB BLD QL: 0.4 % (ref 0–3)
MODALITY: ABNORMAL
MODALITY: ABNORMAL
MONOCYTES # BLD AUTO: 0.59 10*3/MM3 (ref 0.1–0.9)
MONOCYTES NFR BLD AUTO: 3.8 % (ref 5–12)
MRSA DNA SPEC QL NAA+PROBE: NEGATIVE
NEUTROPHILS NFR BLD AUTO: 14.11 10*3/MM3 (ref 1.7–7)
NEUTROPHILS NFR BLD AUTO: 90.3 % (ref 42.7–76)
NITRITE UR QL STRIP: NEGATIVE
NOTE: ABNORMAL
NOTE: ABNORMAL
NOTIFIED BY: ABNORMAL
NOTIFIED WHO: ABNORMAL
NRBC BLD AUTO-RTO: 0 /100 WBC (ref 0–0.2)
NT-PROBNP SERPL-MCNC: ABNORMAL PG/ML (ref 0–1800)
OPIATES UR QL: NEGATIVE
OXYCODONE UR QL SCN: NEGATIVE
OXYHGB MFR BLDV: 69.9 % (ref 94–99)
OXYHGB MFR BLDV: 96.2 % (ref 94–99)
PCO2 BLDA: 35.8 MM HG (ref 35–45)
PCO2 BLDA: 37.8 MM HG (ref 35–45)
PCO2 TEMP ADJ BLD: ABNORMAL MM[HG]
PCO2 TEMP ADJ BLD: ABNORMAL MM[HG]
PCP UR QL SCN: NEGATIVE
PH BLDA: 7.4 PH UNITS (ref 7.35–7.45)
PH BLDA: 7.45 PH UNITS (ref 7.35–7.45)
PH UR STRIP.AUTO: 7 [PH] (ref 5–8)
PH, TEMP CORRECTED: ABNORMAL
PH, TEMP CORRECTED: ABNORMAL
PLATELET # BLD AUTO: 330 10*3/MM3 (ref 140–450)
PMV BLD AUTO: 9.7 FL (ref 6–12)
PO2 BLDA: 37.5 MM HG (ref 83–108)
PO2 BLDA: 86.7 MM HG (ref 83–108)
PO2 TEMP ADJ BLD: ABNORMAL MM[HG]
PO2 TEMP ADJ BLD: ABNORMAL MM[HG]
POTASSIUM SERPL-SCNC: 2.8 MMOL/L (ref 3.5–5.2)
PROCALCITONIN SERPL-MCNC: 0.89 NG/ML (ref 0–0.25)
PROPOXYPH UR QL: NEGATIVE
PROT SERPL-MCNC: 6.4 G/DL (ref 6–8.5)
PROT UR QL STRIP: ABNORMAL
PROTHROMBIN TIME: 13.3 SECONDS (ref 12.1–14.7)
RBC # BLD AUTO: 3.34 10*6/MM3 (ref 3.77–5.28)
RBC # UR STRIP: ABNORMAL /HPF
REF LAB TEST METHOD: ABNORMAL
S AUREUS DNA SPEC QL NAA+PROBE: NEGATIVE
SAO2 % BLDCOA: 71 % (ref 94–99)
SAO2 % BLDCOA: 97.3 % (ref 94–99)
SARS-COV-2 RNA PNL SPEC NAA+PROBE: NOT DETECTED
SODIUM SERPL-SCNC: 127 MMOL/L (ref 136–145)
SP GR UR STRIP: 1.02 (ref 1–1.03)
SQUAMOUS #/AREA URNS HPF: ABNORMAL /HPF
T4 FREE SERPL-MCNC: 1.77 NG/DL (ref 0.93–1.7)
TRICYCLICS UR QL SCN: NEGATIVE
TROPONIN T SERPL-MCNC: 0.48 NG/ML (ref 0–0.03)
TROPONIN T SERPL-MCNC: 0.6 NG/ML (ref 0–0.03)
TROPONIN T SERPL-MCNC: 0.61 NG/ML (ref 0–0.03)
TSH SERPL DL<=0.05 MIU/L-ACNC: 0.68 UIU/ML (ref 0.27–4.2)
UROBILINOGEN UR QL STRIP: ABNORMAL
VENTILATOR MODE: ABNORMAL
VENTILATOR MODE: ABNORMAL
WBC # UR STRIP: ABNORMAL /HPF
WBC NRBC COR # BLD: 15.63 10*3/MM3 (ref 3.4–10.8)

## 2022-05-23 PROCEDURE — 71250 CT THORAX DX C-: CPT

## 2022-05-23 PROCEDURE — 82375 ASSAY CARBOXYHB QUANT: CPT

## 2022-05-23 PROCEDURE — 83880 ASSAY OF NATRIURETIC PEPTIDE: CPT | Performed by: EMERGENCY MEDICINE

## 2022-05-23 PROCEDURE — 80053 COMPREHEN METABOLIC PANEL: CPT | Performed by: EMERGENCY MEDICINE

## 2022-05-23 PROCEDURE — 82550 ASSAY OF CK (CPK): CPT | Performed by: EMERGENCY MEDICINE

## 2022-05-23 PROCEDURE — 93005 ELECTROCARDIOGRAM TRACING: CPT | Performed by: EMERGENCY MEDICINE

## 2022-05-23 PROCEDURE — 84443 ASSAY THYROID STIM HORMONE: CPT | Performed by: EMERGENCY MEDICINE

## 2022-05-23 PROCEDURE — 93010 ELECTROCARDIOGRAM REPORT: CPT | Performed by: INTERNAL MEDICINE

## 2022-05-23 PROCEDURE — 86140 C-REACTIVE PROTEIN: CPT | Performed by: EMERGENCY MEDICINE

## 2022-05-23 PROCEDURE — 82805 BLOOD GASES W/O2 SATURATION: CPT

## 2022-05-23 PROCEDURE — 93005 ELECTROCARDIOGRAM TRACING: CPT | Performed by: PHYSICIAN ASSISTANT

## 2022-05-23 PROCEDURE — 84484 ASSAY OF TROPONIN QUANT: CPT | Performed by: PHYSICIAN ASSISTANT

## 2022-05-23 PROCEDURE — 87640 STAPH A DNA AMP PROBE: CPT | Performed by: PHYSICIAN ASSISTANT

## 2022-05-23 PROCEDURE — 85730 THROMBOPLASTIN TIME PARTIAL: CPT | Performed by: EMERGENCY MEDICINE

## 2022-05-23 PROCEDURE — 71250 CT THORAX DX C-: CPT | Performed by: RADIOLOGY

## 2022-05-23 PROCEDURE — 84145 PROCALCITONIN (PCT): CPT | Performed by: EMERGENCY MEDICINE

## 2022-05-23 PROCEDURE — 71045 X-RAY EXAM CHEST 1 VIEW: CPT

## 2022-05-23 PROCEDURE — 80306 DRUG TEST PRSMV INSTRMNT: CPT | Performed by: EMERGENCY MEDICINE

## 2022-05-23 PROCEDURE — 25010000002 HEPARIN (PORCINE) PER 1000 UNITS: Performed by: INTERNAL MEDICINE

## 2022-05-23 PROCEDURE — 85652 RBC SED RATE AUTOMATED: CPT | Performed by: EMERGENCY MEDICINE

## 2022-05-23 PROCEDURE — 99284 EMERGENCY DEPT VISIT MOD MDM: CPT

## 2022-05-23 PROCEDURE — 87636 SARSCOV2 & INF A&B AMP PRB: CPT | Performed by: EMERGENCY MEDICINE

## 2022-05-23 PROCEDURE — 94799 UNLISTED PULMONARY SVC/PX: CPT

## 2022-05-23 PROCEDURE — 25010000002 VANCOMYCIN 5 G RECONSTITUTED SOLUTION: Performed by: EMERGENCY MEDICINE

## 2022-05-23 PROCEDURE — 83735 ASSAY OF MAGNESIUM: CPT | Performed by: EMERGENCY MEDICINE

## 2022-05-23 PROCEDURE — 94760 N-INVAS EAR/PLS OXIMETRY 1: CPT

## 2022-05-23 PROCEDURE — 25010000002 METHYLPREDNISOLONE PER 125 MG: Performed by: EMERGENCY MEDICINE

## 2022-05-23 PROCEDURE — 70450 CT HEAD/BRAIN W/O DYE: CPT

## 2022-05-23 PROCEDURE — 83050 HGB METHEMOGLOBIN QUAN: CPT

## 2022-05-23 PROCEDURE — P9612 CATHETERIZE FOR URINE SPEC: HCPCS

## 2022-05-23 PROCEDURE — 81001 URINALYSIS AUTO W/SCOPE: CPT | Performed by: EMERGENCY MEDICINE

## 2022-05-23 PROCEDURE — 84439 ASSAY OF FREE THYROXINE: CPT | Performed by: EMERGENCY MEDICINE

## 2022-05-23 PROCEDURE — 71045 X-RAY EXAM CHEST 1 VIEW: CPT | Performed by: RADIOLOGY

## 2022-05-23 PROCEDURE — 0 MAGNESIUM SULFATE 4 GM/100ML SOLUTION: Performed by: INTERNAL MEDICINE

## 2022-05-23 PROCEDURE — 99223 1ST HOSP IP/OBS HIGH 75: CPT | Performed by: PHYSICIAN ASSISTANT

## 2022-05-23 PROCEDURE — 84484 ASSAY OF TROPONIN QUANT: CPT | Performed by: EMERGENCY MEDICINE

## 2022-05-23 PROCEDURE — 94761 N-INVAS EAR/PLS OXIMETRY MLT: CPT

## 2022-05-23 PROCEDURE — 85025 COMPLETE CBC W/AUTO DIFF WBC: CPT | Performed by: EMERGENCY MEDICINE

## 2022-05-23 PROCEDURE — 36415 COLL VENOUS BLD VENIPUNCTURE: CPT

## 2022-05-23 PROCEDURE — 82077 ASSAY SPEC XCP UR&BREATH IA: CPT | Performed by: EMERGENCY MEDICINE

## 2022-05-23 PROCEDURE — 83605 ASSAY OF LACTIC ACID: CPT | Performed by: EMERGENCY MEDICINE

## 2022-05-23 PROCEDURE — 87040 BLOOD CULTURE FOR BACTERIA: CPT | Performed by: EMERGENCY MEDICINE

## 2022-05-23 PROCEDURE — 36600 WITHDRAWAL OF ARTERIAL BLOOD: CPT

## 2022-05-23 PROCEDURE — 94640 AIRWAY INHALATION TREATMENT: CPT

## 2022-05-23 PROCEDURE — 87641 MR-STAPH DNA AMP PROBE: CPT | Performed by: PHYSICIAN ASSISTANT

## 2022-05-23 PROCEDURE — 85610 PROTHROMBIN TIME: CPT | Performed by: EMERGENCY MEDICINE

## 2022-05-23 RX ORDER — MAGNESIUM SULFATE HEPTAHYDRATE 40 MG/ML
4 INJECTION, SOLUTION INTRAVENOUS ONCE
Status: COMPLETED | OUTPATIENT
Start: 2022-05-23 | End: 2022-05-24

## 2022-05-23 RX ORDER — CALCIUM ACETATE 667 MG/1
1334 CAPSULE ORAL
Status: DISCONTINUED | OUTPATIENT
Start: 2022-05-23 | End: 2022-06-08 | Stop reason: HOSPADM

## 2022-05-23 RX ORDER — SODIUM CHLORIDE 9 MG/ML
250 INJECTION, SOLUTION INTRAVENOUS CONTINUOUS
Status: DISCONTINUED | OUTPATIENT
Start: 2022-05-23 | End: 2022-05-23

## 2022-05-23 RX ORDER — CALCIUM ACETATE 667 MG/1
1334 CAPSULE ORAL
Status: CANCELLED | OUTPATIENT
Start: 2022-05-23

## 2022-05-23 RX ORDER — ATORVASTATIN CALCIUM 40 MG/1
80 TABLET, FILM COATED ORAL NIGHTLY
Status: DISCONTINUED | OUTPATIENT
Start: 2022-05-23 | End: 2022-05-23

## 2022-05-23 RX ORDER — FOLIC ACID 1 MG/1
1 TABLET ORAL DAILY
Status: DISCONTINUED | OUTPATIENT
Start: 2022-05-24 | End: 2022-06-08 | Stop reason: HOSPADM

## 2022-05-23 RX ORDER — ATORVASTATIN CALCIUM 40 MG/1
80 TABLET, FILM COATED ORAL NIGHTLY
Status: DISCONTINUED | OUTPATIENT
Start: 2022-05-23 | End: 2022-06-08 | Stop reason: HOSPADM

## 2022-05-23 RX ORDER — IPRATROPIUM BROMIDE AND ALBUTEROL SULFATE 2.5; .5 MG/3ML; MG/3ML
3 SOLUTION RESPIRATORY (INHALATION)
Status: DISCONTINUED | OUTPATIENT
Start: 2022-05-23 | End: 2022-06-02

## 2022-05-23 RX ORDER — ALBUTEROL SULFATE 90 UG/1
2 AEROSOL, METERED RESPIRATORY (INHALATION) 4 TIMES DAILY
Status: CANCELLED | OUTPATIENT
Start: 2022-05-23

## 2022-05-23 RX ORDER — PAROXETINE HYDROCHLORIDE 20 MG/1
10 TABLET, FILM COATED ORAL EVERY MORNING
Status: DISCONTINUED | OUTPATIENT
Start: 2022-05-24 | End: 2022-06-06

## 2022-05-23 RX ORDER — SODIUM CHLORIDE 0.9 % (FLUSH) 0.9 %
10 SYRINGE (ML) INJECTION AS NEEDED
Status: DISCONTINUED | OUTPATIENT
Start: 2022-05-23 | End: 2022-06-08 | Stop reason: HOSPADM

## 2022-05-23 RX ORDER — MAGNESIUM SULFATE HEPTAHYDRATE 40 MG/ML
2 INJECTION, SOLUTION INTRAVENOUS AS NEEDED
Status: DISCONTINUED | OUTPATIENT
Start: 2022-05-23 | End: 2022-06-08 | Stop reason: HOSPADM

## 2022-05-23 RX ORDER — ALBUTEROL SULFATE 2.5 MG/3ML
2.5 SOLUTION RESPIRATORY (INHALATION)
Status: DISCONTINUED | OUTPATIENT
Start: 2022-05-23 | End: 2022-05-24

## 2022-05-23 RX ORDER — METHYLPREDNISOLONE SODIUM SUCCINATE 125 MG/2ML
125 INJECTION, POWDER, LYOPHILIZED, FOR SOLUTION INTRAMUSCULAR; INTRAVENOUS ONCE
Status: COMPLETED | OUTPATIENT
Start: 2022-05-23 | End: 2022-05-23

## 2022-05-23 RX ORDER — POTASSIUM CHLORIDE 20 MEQ/1
60 TABLET, EXTENDED RELEASE ORAL ONCE
Status: COMPLETED | OUTPATIENT
Start: 2022-05-23 | End: 2022-05-23

## 2022-05-23 RX ORDER — ASPIRIN 81 MG/1
81 TABLET ORAL DAILY
Status: DISCONTINUED | OUTPATIENT
Start: 2022-05-24 | End: 2022-05-23

## 2022-05-23 RX ORDER — GUAIFENESIN 600 MG/1
600 TABLET, EXTENDED RELEASE ORAL EVERY 12 HOURS SCHEDULED
Status: DISCONTINUED | OUTPATIENT
Start: 2022-05-23 | End: 2022-06-08 | Stop reason: HOSPADM

## 2022-05-23 RX ORDER — FOLIC ACID 1 MG/1
1 TABLET ORAL DAILY
Status: CANCELLED | OUTPATIENT
Start: 2022-05-24

## 2022-05-23 RX ORDER — HEPARIN SODIUM 5000 [USP'U]/ML
5000 INJECTION, SOLUTION INTRAVENOUS; SUBCUTANEOUS EVERY 8 HOURS SCHEDULED
Status: DISCONTINUED | OUTPATIENT
Start: 2022-05-23 | End: 2022-06-08 | Stop reason: HOSPADM

## 2022-05-23 RX ORDER — NIFEDIPINE 30 MG/1
60 TABLET, FILM COATED, EXTENDED RELEASE ORAL
Status: DISCONTINUED | OUTPATIENT
Start: 2022-05-24 | End: 2022-05-26

## 2022-05-23 RX ORDER — ATORVASTATIN CALCIUM 40 MG/1
80 TABLET, FILM COATED ORAL NIGHTLY
Status: CANCELLED | OUTPATIENT
Start: 2022-05-23

## 2022-05-23 RX ORDER — NIFEDIPINE 30 MG/1
90 TABLET, FILM COATED, EXTENDED RELEASE ORAL
Status: CANCELLED | OUTPATIENT
Start: 2022-05-24

## 2022-05-23 RX ORDER — METRONIDAZOLE 500 MG/100ML
500 INJECTION, SOLUTION INTRAVENOUS EVERY 8 HOURS
Status: COMPLETED | OUTPATIENT
Start: 2022-05-23 | End: 2022-05-30

## 2022-05-23 RX ORDER — MAGNESIUM SULFATE HEPTAHYDRATE 40 MG/ML
4 INJECTION, SOLUTION INTRAVENOUS AS NEEDED
Status: DISCONTINUED | OUTPATIENT
Start: 2022-05-23 | End: 2022-06-08 | Stop reason: HOSPADM

## 2022-05-23 RX ORDER — SODIUM CHLORIDE 9 MG/ML
250 INJECTION, SOLUTION INTRAVENOUS CONTINUOUS
Status: CANCELLED | OUTPATIENT
Start: 2022-05-23

## 2022-05-23 RX ORDER — ASPIRIN 81 MG/1
81 TABLET ORAL DAILY
Status: CANCELLED | OUTPATIENT
Start: 2022-05-24

## 2022-05-23 RX ORDER — POTASSIUM CHLORIDE 7.45 MG/ML
10 INJECTION INTRAVENOUS
Status: CANCELLED | OUTPATIENT
Start: 2022-05-23

## 2022-05-23 RX ORDER — SODIUM CHLORIDE 0.9 % (FLUSH) 0.9 %
10 SYRINGE (ML) INJECTION EVERY 12 HOURS SCHEDULED
Status: DISCONTINUED | OUTPATIENT
Start: 2022-05-23 | End: 2022-06-08 | Stop reason: HOSPADM

## 2022-05-23 RX ORDER — PAROXETINE HYDROCHLORIDE 20 MG/1
10 TABLET, FILM COATED ORAL EVERY MORNING
Status: CANCELLED | OUTPATIENT
Start: 2022-05-24

## 2022-05-23 RX ORDER — ASPIRIN 81 MG/1
81 TABLET ORAL DAILY
Status: DISCONTINUED | OUTPATIENT
Start: 2022-05-23 | End: 2022-06-08 | Stop reason: HOSPADM

## 2022-05-23 RX ADMIN — SODIUM CHLORIDE 2 G: 9 INJECTION, SOLUTION INTRAVENOUS at 21:45

## 2022-05-23 RX ADMIN — Medication 10 ML: at 20:20

## 2022-05-23 RX ADMIN — METRONIDAZOLE 500 MG: 500 INJECTION, SOLUTION INTRAVENOUS at 20:17

## 2022-05-23 RX ADMIN — IPRATROPIUM BROMIDE AND ALBUTEROL SULFATE 3 ML: .5; 3 SOLUTION RESPIRATORY (INHALATION) at 20:58

## 2022-05-23 RX ADMIN — SODIUM CHLORIDE 250 ML/HR: 9 INJECTION, SOLUTION INTRAVENOUS at 16:05

## 2022-05-23 RX ADMIN — SODIUM CHLORIDE 2 G: 9 INJECTION, SOLUTION INTRAVENOUS at 12:46

## 2022-05-23 RX ADMIN — HEPARIN SODIUM 5000 UNITS: 5000 INJECTION INTRAVENOUS; SUBCUTANEOUS at 21:45

## 2022-05-23 RX ADMIN — METHYLPREDNISOLONE SODIUM SUCCINATE 125 MG: 125 INJECTION, POWDER, FOR SOLUTION INTRAMUSCULAR; INTRAVENOUS at 12:46

## 2022-05-23 RX ADMIN — POTASSIUM CHLORIDE 60 MEQ: 20 TABLET, EXTENDED RELEASE ORAL at 16:29

## 2022-05-23 RX ADMIN — MAGNESIUM SULFATE HEPTAHYDRATE 4 G: 40 INJECTION, SOLUTION INTRAVENOUS at 20:16

## 2022-05-23 RX ADMIN — VANCOMYCIN HYDROCHLORIDE 1250 MG: 5 INJECTION, POWDER, LYOPHILIZED, FOR SOLUTION INTRAVENOUS at 14:04

## 2022-05-23 NOTE — OUTREACH NOTE
[facility] Week 2 Survey    Flowsheet Row Responses   [facility] discharged from? [facility]   Does the patient have one of the following disease processes/diagnoses(primary or secondary)? Acute MI (STEMI,NSTEMI)   Week 2 attempt successful? No   Revoke Readmitted          NAN MENDEZ - Registered Nurse

## 2022-05-24 ENCOUNTER — APPOINTMENT (OUTPATIENT)
Dept: GENERAL RADIOLOGY | Facility: HOSPITAL | Age: 82
End: 2022-05-24

## 2022-05-24 ENCOUNTER — APPOINTMENT (OUTPATIENT)
Dept: CARDIOLOGY | Facility: HOSPITAL | Age: 82
End: 2022-05-24

## 2022-05-24 LAB
ANION GAP SERPL CALCULATED.3IONS-SCNC: 13.8 MMOL/L (ref 5–15)
BUN SERPL-MCNC: 40 MG/DL (ref 8–23)
BUN/CREAT SERPL: 10.4 (ref 7–25)
CALCIUM SPEC-SCNC: 8.1 MG/DL (ref 8.6–10.5)
CHLORIDE SERPL-SCNC: 95 MMOL/L (ref 98–107)
CO2 SERPL-SCNC: 20.2 MMOL/L (ref 22–29)
CREAT SERPL-MCNC: 3.85 MG/DL (ref 0.57–1)
DEPRECATED RDW RBC AUTO: 45.3 FL (ref 37–54)
EGFRCR SERPLBLD CKD-EPI 2021: 11.3 ML/MIN/1.73
ERYTHROCYTE [DISTWIDTH] IN BLOOD BY AUTOMATED COUNT: 14.6 % (ref 12.3–15.4)
GLUCOSE BLDC GLUCOMTR-MCNC: 124 MG/DL (ref 70–130)
GLUCOSE BLDC GLUCOMTR-MCNC: 188 MG/DL (ref 70–130)
GLUCOSE BLDC GLUCOMTR-MCNC: 246 MG/DL (ref 70–130)
GLUCOSE BLDC GLUCOMTR-MCNC: 246 MG/DL (ref 70–130)
GLUCOSE SERPL-MCNC: 265 MG/DL (ref 65–99)
HAV IGM SERPL QL IA: NORMAL
HBV CORE IGM SERPL QL IA: NORMAL
HBV SURFACE AG SERPL QL IA: NORMAL
HCT VFR BLD AUTO: 24.6 % (ref 34–46.6)
HCV AB SER DONR QL: NORMAL
HGB BLD-MCNC: 8.3 G/DL (ref 12–15.9)
MCH RBC QN AUTO: 29 PG (ref 26.6–33)
MCHC RBC AUTO-ENTMCNC: 33.7 G/DL (ref 31.5–35.7)
MCV RBC AUTO: 86 FL (ref 79–97)
PLATELET # BLD AUTO: 257 10*3/MM3 (ref 140–450)
PMV BLD AUTO: 9.9 FL (ref 6–12)
POTASSIUM SERPL-SCNC: 3.9 MMOL/L (ref 3.5–5.2)
RBC # BLD AUTO: 2.86 10*6/MM3 (ref 3.77–5.28)
SODIUM SERPL-SCNC: 129 MMOL/L (ref 136–145)
TROPONIN T SERPL-MCNC: 0.46 NG/ML (ref 0–0.03)
WBC NRBC COR # BLD: 12.16 10*3/MM3 (ref 3.4–10.8)

## 2022-05-24 PROCEDURE — 92611 MOTION FLUOROSCOPY/SWALLOW: CPT

## 2022-05-24 PROCEDURE — 94799 UNLISTED PULMONARY SVC/PX: CPT

## 2022-05-24 PROCEDURE — 94760 N-INVAS EAR/PLS OXIMETRY 1: CPT

## 2022-05-24 PROCEDURE — 85027 COMPLETE CBC AUTOMATED: CPT | Performed by: INTERNAL MEDICINE

## 2022-05-24 PROCEDURE — 74230 X-RAY XM SWLNG FUNCJ C+: CPT | Performed by: RADIOLOGY

## 2022-05-24 PROCEDURE — 99233 SBSQ HOSP IP/OBS HIGH 50: CPT | Performed by: INTERNAL MEDICINE

## 2022-05-24 PROCEDURE — 71045 X-RAY EXAM CHEST 1 VIEW: CPT | Performed by: RADIOLOGY

## 2022-05-24 PROCEDURE — 25010000002 HEPARIN (PORCINE) PER 1000 UNITS: Performed by: INTERNAL MEDICINE

## 2022-05-24 PROCEDURE — 94761 N-INVAS EAR/PLS OXIMETRY MLT: CPT

## 2022-05-24 PROCEDURE — 82962 GLUCOSE BLOOD TEST: CPT

## 2022-05-24 PROCEDURE — 80048 BASIC METABOLIC PNL TOTAL CA: CPT | Performed by: INTERNAL MEDICINE

## 2022-05-24 PROCEDURE — 84484 ASSAY OF TROPONIN QUANT: CPT | Performed by: PHYSICIAN ASSISTANT

## 2022-05-24 PROCEDURE — 74230 X-RAY XM SWLNG FUNCJ C+: CPT

## 2022-05-24 PROCEDURE — 5A1D70Z PERFORMANCE OF URINARY FILTRATION, INTERMITTENT, LESS THAN 6 HOURS PER DAY: ICD-10-PCS | Performed by: INTERNAL MEDICINE

## 2022-05-24 PROCEDURE — 71045 X-RAY EXAM CHEST 1 VIEW: CPT

## 2022-05-24 PROCEDURE — 80074 ACUTE HEPATITIS PANEL: CPT | Performed by: INTERNAL MEDICINE

## 2022-05-24 RX ADMIN — METRONIDAZOLE 500 MG: 500 INJECTION, SOLUTION INTRAVENOUS at 04:31

## 2022-05-24 RX ADMIN — NIFEDIPINE 60 MG: 30 TABLET, EXTENDED RELEASE ORAL at 08:35

## 2022-05-24 RX ADMIN — AZTREONAM 500 MG: 1 INJECTION, POWDER, LYOPHILIZED, FOR SOLUTION INTRAMUSCULAR; INTRAVENOUS at 17:29

## 2022-05-24 RX ADMIN — IPRATROPIUM BROMIDE AND ALBUTEROL SULFATE 3 ML: .5; 3 SOLUTION RESPIRATORY (INHALATION) at 13:42

## 2022-05-24 RX ADMIN — SODIUM CHLORIDE 2 G: 9 INJECTION, SOLUTION INTRAVENOUS at 05:56

## 2022-05-24 RX ADMIN — ASPIRIN 81 MG: 81 TABLET, COATED ORAL at 08:32

## 2022-05-24 RX ADMIN — HEPARIN SODIUM 5000 UNITS: 5000 INJECTION INTRAVENOUS; SUBCUTANEOUS at 17:31

## 2022-05-24 RX ADMIN — FOLIC ACID 1 MG: 1 TABLET ORAL at 08:33

## 2022-05-24 RX ADMIN — CALCIUM ACETATE 1334 MG: 667 CAPSULE ORAL at 08:33

## 2022-05-24 RX ADMIN — HEPARIN SODIUM 5000 UNITS: 5000 INJECTION INTRAVENOUS; SUBCUTANEOUS at 08:34

## 2022-05-24 RX ADMIN — CALCIUM ACETATE 1334 MG: 667 CAPSULE ORAL at 17:31

## 2022-05-24 RX ADMIN — ATORVASTATIN CALCIUM 80 MG: 40 TABLET, FILM COATED ORAL at 20:52

## 2022-05-24 RX ADMIN — Medication 10 ML: at 08:36

## 2022-05-24 RX ADMIN — PAROXETINE HYDROCHLORIDE 10 MG: 20 TABLET, FILM COATED ORAL at 08:35

## 2022-05-24 RX ADMIN — IPRATROPIUM BROMIDE AND ALBUTEROL SULFATE 3 ML: .5; 3 SOLUTION RESPIRATORY (INHALATION) at 19:11

## 2022-05-24 RX ADMIN — Medication 10 ML: at 20:52

## 2022-05-24 RX ADMIN — GUAIFENESIN 600 MG: 600 TABLET, EXTENDED RELEASE ORAL at 20:52

## 2022-05-24 RX ADMIN — IPRATROPIUM BROMIDE AND ALBUTEROL SULFATE 3 ML: .5; 3 SOLUTION RESPIRATORY (INHALATION) at 01:38

## 2022-05-24 RX ADMIN — GUAIFENESIN 600 MG: 600 TABLET, EXTENDED RELEASE ORAL at 08:34

## 2022-05-24 RX ADMIN — METRONIDAZOLE 500 MG: 500 INJECTION, SOLUTION INTRAVENOUS at 16:21

## 2022-05-24 RX ADMIN — METRONIDAZOLE 500 MG: 500 INJECTION, SOLUTION INTRAVENOUS at 20:52

## 2022-05-24 RX ADMIN — IPRATROPIUM BROMIDE AND ALBUTEROL SULFATE 3 ML: .5; 3 SOLUTION RESPIRATORY (INHALATION) at 07:21

## 2022-05-25 ENCOUNTER — APPOINTMENT (OUTPATIENT)
Dept: CARDIOLOGY | Facility: HOSPITAL | Age: 82
End: 2022-05-25

## 2022-05-25 ENCOUNTER — APPOINTMENT (OUTPATIENT)
Dept: ULTRASOUND IMAGING | Facility: HOSPITAL | Age: 82
End: 2022-05-25

## 2022-05-25 PROBLEM — E44.0 MODERATE MALNUTRITION: Status: ACTIVE | Noted: 2022-05-25

## 2022-05-25 LAB
ANION GAP SERPL CALCULATED.3IONS-SCNC: 8.8 MMOL/L (ref 5–15)
B PARAPERT DNA SPEC QL NAA+PROBE: NOT DETECTED
B PERT DNA SPEC QL NAA+PROBE: NOT DETECTED
BUN SERPL-MCNC: 19 MG/DL (ref 8–23)
BUN/CREAT SERPL: 8.6 (ref 7–25)
C PNEUM DNA NPH QL NAA+NON-PROBE: NOT DETECTED
CALCIUM SPEC-SCNC: 7.7 MG/DL (ref 8.6–10.5)
CHLORIDE SERPL-SCNC: 93 MMOL/L (ref 98–107)
CO2 SERPL-SCNC: 26.2 MMOL/L (ref 22–29)
CREAT SERPL-MCNC: 2.2 MG/DL (ref 0.57–1)
DEPRECATED RDW RBC AUTO: 44.7 FL (ref 37–54)
EGFRCR SERPLBLD CKD-EPI 2021: 22.2 ML/MIN/1.73
ERYTHROCYTE [DISTWIDTH] IN BLOOD BY AUTOMATED COUNT: 14.6 % (ref 12.3–15.4)
FLUAV SUBTYP SPEC NAA+PROBE: NOT DETECTED
FLUBV RNA ISLT QL NAA+PROBE: NOT DETECTED
GLUCOSE BLDC GLUCOMTR-MCNC: 145 MG/DL (ref 70–130)
GLUCOSE BLDC GLUCOMTR-MCNC: 153 MG/DL (ref 70–130)
GLUCOSE BLDC GLUCOMTR-MCNC: 215 MG/DL (ref 70–130)
GLUCOSE BLDC GLUCOMTR-MCNC: 215 MG/DL (ref 70–130)
GLUCOSE SERPL-MCNC: 156 MG/DL (ref 65–99)
HADV DNA SPEC NAA+PROBE: NOT DETECTED
HCOV 229E RNA SPEC QL NAA+PROBE: NOT DETECTED
HCOV HKU1 RNA SPEC QL NAA+PROBE: NOT DETECTED
HCOV NL63 RNA SPEC QL NAA+PROBE: NOT DETECTED
HCOV OC43 RNA SPEC QL NAA+PROBE: NOT DETECTED
HCT VFR BLD AUTO: 21.3 % (ref 34–46.6)
HGB BLD-MCNC: 7.1 G/DL (ref 12–15.9)
HMPV RNA NPH QL NAA+NON-PROBE: NOT DETECTED
HPIV1 RNA ISLT QL NAA+PROBE: NOT DETECTED
HPIV2 RNA SPEC QL NAA+PROBE: NOT DETECTED
HPIV3 RNA NPH QL NAA+PROBE: DETECTED
HPIV4 P GENE NPH QL NAA+PROBE: NOT DETECTED
M PNEUMO IGG SER IA-ACNC: NOT DETECTED
MAXIMAL PREDICTED HEART RATE: 140 BPM
MCH RBC QN AUTO: 28.3 PG (ref 26.6–33)
MCHC RBC AUTO-ENTMCNC: 33.3 G/DL (ref 31.5–35.7)
MCV RBC AUTO: 84.9 FL (ref 79–97)
PLATELET # BLD AUTO: 252 10*3/MM3 (ref 140–450)
PMV BLD AUTO: 9.8 FL (ref 6–12)
POTASSIUM SERPL-SCNC: 3.2 MMOL/L (ref 3.5–5.2)
QT INTERVAL: 482 MS
QT INTERVAL: 502 MS
QTC INTERVAL: 560 MS
QTC INTERVAL: 566 MS
RBC # BLD AUTO: 2.51 10*6/MM3 (ref 3.77–5.28)
RHINOVIRUS RNA SPEC NAA+PROBE: NOT DETECTED
RSV RNA NPH QL NAA+NON-PROBE: NOT DETECTED
S PNEUM AG SPEC QL LA: NEGATIVE
SARS-COV-2 RNA NPH QL NAA+NON-PROBE: NOT DETECTED
SODIUM SERPL-SCNC: 128 MMOL/L (ref 136–145)
STRESS TARGET HR: 119 BPM
WBC NRBC COR # BLD: 12.12 10*3/MM3 (ref 3.4–10.8)

## 2022-05-25 PROCEDURE — 25010000002 HEPARIN (PORCINE) PER 1000 UNITS: Performed by: INTERNAL MEDICINE

## 2022-05-25 PROCEDURE — 93308 TTE F-UP OR LMTD: CPT | Performed by: INTERNAL MEDICINE

## 2022-05-25 PROCEDURE — 94799 UNLISTED PULMONARY SVC/PX: CPT

## 2022-05-25 PROCEDURE — 0202U NFCT DS 22 TRGT SARS-COV-2: CPT | Performed by: INTERNAL MEDICINE

## 2022-05-25 PROCEDURE — 94761 N-INVAS EAR/PLS OXIMETRY MLT: CPT

## 2022-05-25 PROCEDURE — 76775 US EXAM ABDO BACK WALL LIM: CPT

## 2022-05-25 PROCEDURE — 99233 SBSQ HOSP IP/OBS HIGH 50: CPT | Performed by: INTERNAL MEDICINE

## 2022-05-25 PROCEDURE — 92610 EVALUATE SWALLOWING FUNCTION: CPT

## 2022-05-25 PROCEDURE — 93308 TTE F-UP OR LMTD: CPT

## 2022-05-25 PROCEDURE — 97162 PT EVAL MOD COMPLEX 30 MIN: CPT

## 2022-05-25 PROCEDURE — 80048 BASIC METABOLIC PNL TOTAL CA: CPT | Performed by: INTERNAL MEDICINE

## 2022-05-25 PROCEDURE — 76775 US EXAM ABDO BACK WALL LIM: CPT | Performed by: RADIOLOGY

## 2022-05-25 PROCEDURE — 82962 GLUCOSE BLOOD TEST: CPT

## 2022-05-25 PROCEDURE — 85027 COMPLETE CBC AUTOMATED: CPT | Performed by: INTERNAL MEDICINE

## 2022-05-25 RX ADMIN — Medication 10 ML: at 20:14

## 2022-05-25 RX ADMIN — Medication 10 ML: at 08:43

## 2022-05-25 RX ADMIN — GUAIFENESIN 600 MG: 600 TABLET, EXTENDED RELEASE ORAL at 08:42

## 2022-05-25 RX ADMIN — METRONIDAZOLE 500 MG: 500 INJECTION, SOLUTION INTRAVENOUS at 05:23

## 2022-05-25 RX ADMIN — PAROXETINE HYDROCHLORIDE 10 MG: 20 TABLET, FILM COATED ORAL at 08:42

## 2022-05-25 RX ADMIN — HEPARIN SODIUM 5000 UNITS: 5000 INJECTION INTRAVENOUS; SUBCUTANEOUS at 05:23

## 2022-05-25 RX ADMIN — CALCIUM ACETATE 1334 MG: 667 CAPSULE ORAL at 17:41

## 2022-05-25 RX ADMIN — IPRATROPIUM BROMIDE AND ALBUTEROL SULFATE 3 ML: .5; 3 SOLUTION RESPIRATORY (INHALATION) at 06:50

## 2022-05-25 RX ADMIN — METRONIDAZOLE 500 MG: 500 INJECTION, SOLUTION INTRAVENOUS at 20:14

## 2022-05-25 RX ADMIN — FOLIC ACID 1 MG: 1 TABLET ORAL at 08:42

## 2022-05-25 RX ADMIN — AZTREONAM 500 MG: 1 INJECTION, POWDER, LYOPHILIZED, FOR SOLUTION INTRAMUSCULAR; INTRAVENOUS at 10:21

## 2022-05-25 RX ADMIN — NIFEDIPINE 60 MG: 30 TABLET, EXTENDED RELEASE ORAL at 08:42

## 2022-05-25 RX ADMIN — CALCIUM ACETATE 1334 MG: 667 CAPSULE ORAL at 08:42

## 2022-05-25 RX ADMIN — HEPARIN SODIUM 5000 UNITS: 5000 INJECTION INTRAVENOUS; SUBCUTANEOUS at 14:56

## 2022-05-25 RX ADMIN — GUAIFENESIN 600 MG: 600 TABLET, EXTENDED RELEASE ORAL at 20:14

## 2022-05-25 RX ADMIN — CALCIUM ACETATE 1334 MG: 667 CAPSULE ORAL at 12:40

## 2022-05-25 RX ADMIN — ASPIRIN 81 MG: 81 TABLET, COATED ORAL at 08:42

## 2022-05-25 RX ADMIN — IPRATROPIUM BROMIDE AND ALBUTEROL SULFATE 3 ML: .5; 3 SOLUTION RESPIRATORY (INHALATION) at 18:43

## 2022-05-25 RX ADMIN — ATORVASTATIN CALCIUM 80 MG: 40 TABLET, FILM COATED ORAL at 20:14

## 2022-05-25 RX ADMIN — IPRATROPIUM BROMIDE AND ALBUTEROL SULFATE 3 ML: .5; 3 SOLUTION RESPIRATORY (INHALATION) at 01:20

## 2022-05-25 RX ADMIN — IPRATROPIUM BROMIDE AND ALBUTEROL SULFATE 3 ML: .5; 3 SOLUTION RESPIRATORY (INHALATION) at 13:25

## 2022-05-25 RX ADMIN — HEPARIN SODIUM 5000 UNITS: 5000 INJECTION INTRAVENOUS; SUBCUTANEOUS at 21:03

## 2022-05-25 RX ADMIN — METRONIDAZOLE 500 MG: 500 INJECTION, SOLUTION INTRAVENOUS at 12:38

## 2022-05-25 RX ADMIN — AZTREONAM 500 MG: 1 INJECTION, POWDER, LYOPHILIZED, FOR SOLUTION INTRAMUSCULAR; INTRAVENOUS at 17:41

## 2022-05-26 LAB
ANION GAP SERPL CALCULATED.3IONS-SCNC: 11.2 MMOL/L (ref 5–15)
BUN SERPL-MCNC: 29 MG/DL (ref 8–23)
BUN/CREAT SERPL: 10.7 (ref 7–25)
CALCIUM SPEC-SCNC: 7.9 MG/DL (ref 8.6–10.5)
CHLORIDE SERPL-SCNC: 90 MMOL/L (ref 98–107)
CO2 SERPL-SCNC: 24.8 MMOL/L (ref 22–29)
CREAT SERPL-MCNC: 2.72 MG/DL (ref 0.57–1)
DEPRECATED RDW RBC AUTO: 46.2 FL (ref 37–54)
EGFRCR SERPLBLD CKD-EPI 2021: 17.2 ML/MIN/1.73
ERYTHROCYTE [DISTWIDTH] IN BLOOD BY AUTOMATED COUNT: 14.7 % (ref 12.3–15.4)
GLUCOSE BLDC GLUCOMTR-MCNC: 107 MG/DL (ref 70–130)
GLUCOSE BLDC GLUCOMTR-MCNC: 134 MG/DL (ref 70–130)
GLUCOSE BLDC GLUCOMTR-MCNC: 141 MG/DL (ref 70–130)
GLUCOSE BLDC GLUCOMTR-MCNC: 143 MG/DL (ref 70–130)
GLUCOSE SERPL-MCNC: 159 MG/DL (ref 65–99)
HCT VFR BLD AUTO: 22.6 % (ref 34–46.6)
HGB BLD-MCNC: 7.5 G/DL (ref 12–15.9)
MCH RBC QN AUTO: 28.6 PG (ref 26.6–33)
MCHC RBC AUTO-ENTMCNC: 33.2 G/DL (ref 31.5–35.7)
MCV RBC AUTO: 86.3 FL (ref 79–97)
PLATELET # BLD AUTO: 258 10*3/MM3 (ref 140–450)
PMV BLD AUTO: 10.2 FL (ref 6–12)
POTASSIUM SERPL-SCNC: 3.4 MMOL/L (ref 3.5–5.2)
POTASSIUM SERPL-SCNC: 4 MMOL/L (ref 3.5–5.2)
RBC # BLD AUTO: 2.62 10*6/MM3 (ref 3.77–5.28)
SODIUM SERPL-SCNC: 126 MMOL/L (ref 136–145)
WBC NRBC COR # BLD: 11.98 10*3/MM3 (ref 3.4–10.8)

## 2022-05-26 PROCEDURE — 97166 OT EVAL MOD COMPLEX 45 MIN: CPT

## 2022-05-26 PROCEDURE — 99233 SBSQ HOSP IP/OBS HIGH 50: CPT | Performed by: INTERNAL MEDICINE

## 2022-05-26 PROCEDURE — 5A1D70Z PERFORMANCE OF URINARY FILTRATION, INTERMITTENT, LESS THAN 6 HOURS PER DAY: ICD-10-PCS | Performed by: INTERNAL MEDICINE

## 2022-05-26 PROCEDURE — 82962 GLUCOSE BLOOD TEST: CPT

## 2022-05-26 PROCEDURE — 99221 1ST HOSP IP/OBS SF/LOW 40: CPT | Performed by: PHYSICIAN ASSISTANT

## 2022-05-26 PROCEDURE — 25010000002 HEPARIN (PORCINE) PER 1000 UNITS: Performed by: INTERNAL MEDICINE

## 2022-05-26 PROCEDURE — 80048 BASIC METABOLIC PNL TOTAL CA: CPT | Performed by: INTERNAL MEDICINE

## 2022-05-26 PROCEDURE — 94799 UNLISTED PULMONARY SVC/PX: CPT

## 2022-05-26 PROCEDURE — 84132 ASSAY OF SERUM POTASSIUM: CPT | Performed by: INTERNAL MEDICINE

## 2022-05-26 PROCEDURE — 85027 COMPLETE CBC AUTOMATED: CPT | Performed by: INTERNAL MEDICINE

## 2022-05-26 RX ORDER — HEPARIN SODIUM 1000 [USP'U]/ML
4000 INJECTION, SOLUTION INTRAVENOUS; SUBCUTANEOUS ONCE
Status: COMPLETED | OUTPATIENT
Start: 2022-05-26 | End: 2022-05-26

## 2022-05-26 RX ORDER — BENZONATATE 100 MG/1
100 CAPSULE ORAL 3 TIMES DAILY PRN
Status: DISCONTINUED | OUTPATIENT
Start: 2022-05-26 | End: 2022-06-08 | Stop reason: HOSPADM

## 2022-05-26 RX ORDER — POTASSIUM CHLORIDE 1.5 G/1.77G
40 POWDER, FOR SOLUTION ORAL ONCE
Status: COMPLETED | OUTPATIENT
Start: 2022-05-26 | End: 2022-05-26

## 2022-05-26 RX ORDER — CARVEDILOL 6.25 MG/1
6.25 TABLET ORAL 2 TIMES DAILY WITH MEALS
Status: DISCONTINUED | OUTPATIENT
Start: 2022-05-26 | End: 2022-06-08 | Stop reason: HOSPADM

## 2022-05-26 RX ORDER — HEPARIN SODIUM 1000 [USP'U]/ML
3000 INJECTION, SOLUTION INTRAVENOUS; SUBCUTANEOUS ONCE
Status: COMPLETED | OUTPATIENT
Start: 2022-05-26 | End: 2022-05-26

## 2022-05-26 RX ADMIN — AZTREONAM 500 MG: 1 INJECTION, POWDER, LYOPHILIZED, FOR SOLUTION INTRAMUSCULAR; INTRAVENOUS at 06:17

## 2022-05-26 RX ADMIN — CALCIUM ACETATE 1334 MG: 667 CAPSULE ORAL at 07:47

## 2022-05-26 RX ADMIN — ASPIRIN 81 MG: 81 TABLET, COATED ORAL at 08:42

## 2022-05-26 RX ADMIN — IPRATROPIUM BROMIDE AND ALBUTEROL SULFATE 3 ML: .5; 3 SOLUTION RESPIRATORY (INHALATION) at 12:55

## 2022-05-26 RX ADMIN — METRONIDAZOLE 500 MG: 500 INJECTION, SOLUTION INTRAVENOUS at 03:17

## 2022-05-26 RX ADMIN — HEPARIN SODIUM 5000 UNITS: 5000 INJECTION INTRAVENOUS; SUBCUTANEOUS at 21:58

## 2022-05-26 RX ADMIN — METRONIDAZOLE 500 MG: 500 INJECTION, SOLUTION INTRAVENOUS at 20:09

## 2022-05-26 RX ADMIN — POTASSIUM CHLORIDE 40 MEQ: 1.5 POWDER, FOR SOLUTION ORAL at 03:15

## 2022-05-26 RX ADMIN — GUAIFENESIN 600 MG: 600 TABLET, EXTENDED RELEASE ORAL at 08:42

## 2022-05-26 RX ADMIN — HEPARIN SODIUM 5000 UNITS: 5000 INJECTION INTRAVENOUS; SUBCUTANEOUS at 06:17

## 2022-05-26 RX ADMIN — CALCIUM ACETATE 1334 MG: 667 CAPSULE ORAL at 17:17

## 2022-05-26 RX ADMIN — HEPARIN SODIUM 4000 UNITS: 1000 INJECTION, SOLUTION INTRAVENOUS; SUBCUTANEOUS at 13:51

## 2022-05-26 RX ADMIN — IPRATROPIUM BROMIDE AND ALBUTEROL SULFATE 3 ML: .5; 3 SOLUTION RESPIRATORY (INHALATION) at 06:56

## 2022-05-26 RX ADMIN — Medication 10 ML: at 08:42

## 2022-05-26 RX ADMIN — METRONIDAZOLE 500 MG: 500 INJECTION, SOLUTION INTRAVENOUS at 12:14

## 2022-05-26 RX ADMIN — GUAIFENESIN 600 MG: 600 TABLET, EXTENDED RELEASE ORAL at 20:09

## 2022-05-26 RX ADMIN — IPRATROPIUM BROMIDE AND ALBUTEROL SULFATE 3 ML: .5; 3 SOLUTION RESPIRATORY (INHALATION) at 01:07

## 2022-05-26 RX ADMIN — CARVEDILOL 6.25 MG: 6.25 TABLET, FILM COATED ORAL at 17:17

## 2022-05-26 RX ADMIN — Medication 10 ML: at 20:28

## 2022-05-26 RX ADMIN — IPRATROPIUM BROMIDE AND ALBUTEROL SULFATE 3 ML: .5; 3 SOLUTION RESPIRATORY (INHALATION) at 18:55

## 2022-05-26 RX ADMIN — ATORVASTATIN CALCIUM 80 MG: 40 TABLET, FILM COATED ORAL at 20:09

## 2022-05-26 RX ADMIN — HEPARIN SODIUM 3000 UNITS: 1000 INJECTION, SOLUTION INTRAVENOUS; SUBCUTANEOUS at 13:46

## 2022-05-26 RX ADMIN — FOLIC ACID 1 MG: 1 TABLET ORAL at 08:42

## 2022-05-26 RX ADMIN — PAROXETINE HYDROCHLORIDE 10 MG: 20 TABLET, FILM COATED ORAL at 06:17

## 2022-05-26 RX ADMIN — AZTREONAM 500 MG: 1 INJECTION, POWDER, LYOPHILIZED, FOR SOLUTION INTRAMUSCULAR; INTRAVENOUS at 17:15

## 2022-05-26 RX ADMIN — CALCIUM ACETATE 1334 MG: 667 CAPSULE ORAL at 12:14

## 2022-05-27 LAB
ANION GAP SERPL CALCULATED.3IONS-SCNC: 13.1 MMOL/L (ref 5–15)
BUN SERPL-MCNC: 19 MG/DL (ref 8–23)
BUN/CREAT SERPL: 10.2 (ref 7–25)
CALCIUM SPEC-SCNC: 8.1 MG/DL (ref 8.6–10.5)
CHLORIDE SERPL-SCNC: 94 MMOL/L (ref 98–107)
CO2 SERPL-SCNC: 23.9 MMOL/L (ref 22–29)
CREAT SERPL-MCNC: 1.86 MG/DL (ref 0.57–1)
DEPRECATED RDW RBC AUTO: 46.5 FL (ref 37–54)
EGFRCR SERPLBLD CKD-EPI 2021: 27.1 ML/MIN/1.73
ERYTHROCYTE [DISTWIDTH] IN BLOOD BY AUTOMATED COUNT: 15 % (ref 12.3–15.4)
GLUCOSE BLDC GLUCOMTR-MCNC: 109 MG/DL (ref 70–130)
GLUCOSE BLDC GLUCOMTR-MCNC: 155 MG/DL (ref 70–130)
GLUCOSE BLDC GLUCOMTR-MCNC: 169 MG/DL (ref 70–130)
GLUCOSE BLDC GLUCOMTR-MCNC: 195 MG/DL (ref 70–130)
GLUCOSE SERPL-MCNC: 128 MG/DL (ref 65–99)
HCT VFR BLD AUTO: 24 % (ref 34–46.6)
HGB BLD-MCNC: 8 G/DL (ref 12–15.9)
MCH RBC QN AUTO: 29 PG (ref 26.6–33)
MCHC RBC AUTO-ENTMCNC: 33.3 G/DL (ref 31.5–35.7)
MCV RBC AUTO: 87 FL (ref 79–97)
PLATELET # BLD AUTO: 273 10*3/MM3 (ref 140–450)
PMV BLD AUTO: 10.4 FL (ref 6–12)
POTASSIUM SERPL-SCNC: 4.4 MMOL/L (ref 3.5–5.2)
RBC # BLD AUTO: 2.76 10*6/MM3 (ref 3.77–5.28)
SODIUM SERPL-SCNC: 131 MMOL/L (ref 136–145)
WBC NRBC COR # BLD: 8.67 10*3/MM3 (ref 3.4–10.8)

## 2022-05-27 PROCEDURE — 25010000002 HEPARIN (PORCINE) PER 1000 UNITS: Performed by: HOSPITALIST

## 2022-05-27 PROCEDURE — 99232 SBSQ HOSP IP/OBS MODERATE 35: CPT | Performed by: INTERNAL MEDICINE

## 2022-05-27 PROCEDURE — 80048 BASIC METABOLIC PNL TOTAL CA: CPT | Performed by: INTERNAL MEDICINE

## 2022-05-27 PROCEDURE — 94799 UNLISTED PULMONARY SVC/PX: CPT

## 2022-05-27 PROCEDURE — 94761 N-INVAS EAR/PLS OXIMETRY MLT: CPT

## 2022-05-27 PROCEDURE — 82962 GLUCOSE BLOOD TEST: CPT

## 2022-05-27 PROCEDURE — 85027 COMPLETE CBC AUTOMATED: CPT | Performed by: INTERNAL MEDICINE

## 2022-05-27 RX ORDER — SODIUM CHLORIDE 9 MG/ML
100 INJECTION, SOLUTION INTRAVENOUS CONTINUOUS
Status: ACTIVE | OUTPATIENT
Start: 2022-05-27 | End: 2022-05-27

## 2022-05-27 RX ADMIN — PAROXETINE HYDROCHLORIDE 10 MG: 20 TABLET, FILM COATED ORAL at 08:43

## 2022-05-27 RX ADMIN — METRONIDAZOLE 500 MG: 500 INJECTION, SOLUTION INTRAVENOUS at 12:06

## 2022-05-27 RX ADMIN — AZTREONAM 500 MG: 1 INJECTION, POWDER, LYOPHILIZED, FOR SOLUTION INTRAMUSCULAR; INTRAVENOUS at 17:56

## 2022-05-27 RX ADMIN — AZTREONAM 500 MG: 1 INJECTION, POWDER, LYOPHILIZED, FOR SOLUTION INTRAMUSCULAR; INTRAVENOUS at 05:23

## 2022-05-27 RX ADMIN — CARVEDILOL 6.25 MG: 6.25 TABLET, FILM COATED ORAL at 08:43

## 2022-05-27 RX ADMIN — IPRATROPIUM BROMIDE AND ALBUTEROL SULFATE 3 ML: .5; 3 SOLUTION RESPIRATORY (INHALATION) at 13:27

## 2022-05-27 RX ADMIN — IPRATROPIUM BROMIDE AND ALBUTEROL SULFATE 3 ML: .5; 3 SOLUTION RESPIRATORY (INHALATION) at 00:26

## 2022-05-27 RX ADMIN — ATORVASTATIN CALCIUM 80 MG: 40 TABLET, FILM COATED ORAL at 20:36

## 2022-05-27 RX ADMIN — CARVEDILOL 6.25 MG: 6.25 TABLET, FILM COATED ORAL at 17:57

## 2022-05-27 RX ADMIN — HEPARIN SODIUM 5000 UNITS: 5000 INJECTION INTRAVENOUS; SUBCUTANEOUS at 20:36

## 2022-05-27 RX ADMIN — METRONIDAZOLE 500 MG: 500 INJECTION, SOLUTION INTRAVENOUS at 20:36

## 2022-05-27 RX ADMIN — FOLIC ACID 1 MG: 1 TABLET ORAL at 08:44

## 2022-05-27 RX ADMIN — ASPIRIN 81 MG: 81 TABLET, COATED ORAL at 08:44

## 2022-05-27 RX ADMIN — CALCIUM ACETATE 1334 MG: 667 CAPSULE ORAL at 17:57

## 2022-05-27 RX ADMIN — METRONIDAZOLE 500 MG: 500 INJECTION, SOLUTION INTRAVENOUS at 04:10

## 2022-05-27 RX ADMIN — HEPARIN SODIUM 5000 UNITS: 5000 INJECTION INTRAVENOUS; SUBCUTANEOUS at 05:22

## 2022-05-27 RX ADMIN — IPRATROPIUM BROMIDE AND ALBUTEROL SULFATE 3 ML: .5; 3 SOLUTION RESPIRATORY (INHALATION) at 18:42

## 2022-05-27 RX ADMIN — CALCIUM ACETATE 1334 MG: 667 CAPSULE ORAL at 08:43

## 2022-05-27 RX ADMIN — IPRATROPIUM BROMIDE AND ALBUTEROL SULFATE 3 ML: .5; 3 SOLUTION RESPIRATORY (INHALATION) at 07:19

## 2022-05-27 RX ADMIN — Medication 10 ML: at 08:44

## 2022-05-27 RX ADMIN — HEPARIN SODIUM 5000 UNITS: 5000 INJECTION INTRAVENOUS; SUBCUTANEOUS at 14:01

## 2022-05-27 RX ADMIN — SODIUM CHLORIDE 100 ML/HR: 9 INJECTION, SOLUTION INTRAVENOUS at 10:43

## 2022-05-27 RX ADMIN — CALCIUM ACETATE 1334 MG: 667 CAPSULE ORAL at 12:08

## 2022-05-27 RX ADMIN — GUAIFENESIN 600 MG: 600 TABLET, EXTENDED RELEASE ORAL at 20:36

## 2022-05-27 RX ADMIN — GUAIFENESIN 600 MG: 600 TABLET, EXTENDED RELEASE ORAL at 08:44

## 2022-05-28 LAB
ANION GAP SERPL CALCULATED.3IONS-SCNC: 11.6 MMOL/L (ref 5–15)
BACTERIA SPEC AEROBE CULT: NORMAL
BACTERIA SPEC AEROBE CULT: NORMAL
BUN SERPL-MCNC: 34 MG/DL (ref 8–23)
BUN/CREAT SERPL: 14.6 (ref 7–25)
CALCIUM SPEC-SCNC: 7.9 MG/DL (ref 8.6–10.5)
CHLORIDE SERPL-SCNC: 94 MMOL/L (ref 98–107)
CO2 SERPL-SCNC: 22.4 MMOL/L (ref 22–29)
CREAT SERPL-MCNC: 2.33 MG/DL (ref 0.57–1)
DEPRECATED RDW RBC AUTO: 49.1 FL (ref 37–54)
EGFRCR SERPLBLD CKD-EPI 2021: 20.7 ML/MIN/1.73
ERYTHROCYTE [DISTWIDTH] IN BLOOD BY AUTOMATED COUNT: 15.2 % (ref 12.3–15.4)
GLUCOSE BLDC GLUCOMTR-MCNC: 130 MG/DL (ref 70–130)
GLUCOSE BLDC GLUCOMTR-MCNC: 166 MG/DL (ref 70–130)
GLUCOSE BLDC GLUCOMTR-MCNC: 168 MG/DL (ref 70–130)
GLUCOSE BLDC GLUCOMTR-MCNC: 184 MG/DL (ref 70–130)
GLUCOSE SERPL-MCNC: 161 MG/DL (ref 65–99)
HCT VFR BLD AUTO: 24.4 % (ref 34–46.6)
HGB BLD-MCNC: 7.9 G/DL (ref 12–15.9)
MCH RBC QN AUTO: 28.5 PG (ref 26.6–33)
MCHC RBC AUTO-ENTMCNC: 32.4 G/DL (ref 31.5–35.7)
MCV RBC AUTO: 88.1 FL (ref 79–97)
PLATELET # BLD AUTO: 317 10*3/MM3 (ref 140–450)
PMV BLD AUTO: 10.3 FL (ref 6–12)
POTASSIUM SERPL-SCNC: 4 MMOL/L (ref 3.5–5.2)
RBC # BLD AUTO: 2.77 10*6/MM3 (ref 3.77–5.28)
SODIUM SERPL-SCNC: 128 MMOL/L (ref 136–145)
WBC NRBC COR # BLD: 8.88 10*3/MM3 (ref 3.4–10.8)

## 2022-05-28 PROCEDURE — 80048 BASIC METABOLIC PNL TOTAL CA: CPT | Performed by: INTERNAL MEDICINE

## 2022-05-28 PROCEDURE — 94799 UNLISTED PULMONARY SVC/PX: CPT

## 2022-05-28 PROCEDURE — 25010000002 HEPARIN (PORCINE) PER 1000 UNITS: Performed by: HOSPITALIST

## 2022-05-28 PROCEDURE — 99233 SBSQ HOSP IP/OBS HIGH 50: CPT | Performed by: INTERNAL MEDICINE

## 2022-05-28 PROCEDURE — 85027 COMPLETE CBC AUTOMATED: CPT | Performed by: HOSPITALIST

## 2022-05-28 PROCEDURE — 5A1D70Z PERFORMANCE OF URINARY FILTRATION, INTERMITTENT, LESS THAN 6 HOURS PER DAY: ICD-10-PCS | Performed by: INTERNAL MEDICINE

## 2022-05-28 PROCEDURE — 82962 GLUCOSE BLOOD TEST: CPT

## 2022-05-28 PROCEDURE — 94761 N-INVAS EAR/PLS OXIMETRY MLT: CPT

## 2022-05-28 RX ADMIN — METRONIDAZOLE 500 MG: 500 INJECTION, SOLUTION INTRAVENOUS at 21:40

## 2022-05-28 RX ADMIN — CALCIUM ACETATE 1334 MG: 667 CAPSULE ORAL at 15:37

## 2022-05-28 RX ADMIN — METRONIDAZOLE 500 MG: 500 INJECTION, SOLUTION INTRAVENOUS at 03:31

## 2022-05-28 RX ADMIN — CARVEDILOL 6.25 MG: 6.25 TABLET, FILM COATED ORAL at 18:17

## 2022-05-28 RX ADMIN — IPRATROPIUM BROMIDE AND ALBUTEROL SULFATE 3 ML: .5; 3 SOLUTION RESPIRATORY (INHALATION) at 18:52

## 2022-05-28 RX ADMIN — AZTREONAM 500 MG: 1 INJECTION, POWDER, LYOPHILIZED, FOR SOLUTION INTRAMUSCULAR; INTRAVENOUS at 05:14

## 2022-05-28 RX ADMIN — CALCIUM ACETATE 1334 MG: 667 CAPSULE ORAL at 18:17

## 2022-05-28 RX ADMIN — HEPARIN SODIUM 5000 UNITS: 5000 INJECTION INTRAVENOUS; SUBCUTANEOUS at 20:54

## 2022-05-28 RX ADMIN — GUAIFENESIN 600 MG: 600 TABLET, EXTENDED RELEASE ORAL at 20:53

## 2022-05-28 RX ADMIN — CARVEDILOL 6.25 MG: 6.25 TABLET, FILM COATED ORAL at 09:00

## 2022-05-28 RX ADMIN — IPRATROPIUM BROMIDE AND ALBUTEROL SULFATE 3 ML: .5; 3 SOLUTION RESPIRATORY (INHALATION) at 09:03

## 2022-05-28 RX ADMIN — METRONIDAZOLE 500 MG: 500 INJECTION, SOLUTION INTRAVENOUS at 15:37

## 2022-05-28 RX ADMIN — CALCIUM ACETATE 1334 MG: 667 CAPSULE ORAL at 09:00

## 2022-05-28 RX ADMIN — PAROXETINE HYDROCHLORIDE 10 MG: 20 TABLET, FILM COATED ORAL at 05:14

## 2022-05-28 RX ADMIN — HEPARIN SODIUM 5000 UNITS: 5000 INJECTION INTRAVENOUS; SUBCUTANEOUS at 15:37

## 2022-05-28 RX ADMIN — GUAIFENESIN 600 MG: 600 TABLET, EXTENDED RELEASE ORAL at 09:00

## 2022-05-28 RX ADMIN — ASPIRIN 81 MG: 81 TABLET, COATED ORAL at 09:00

## 2022-05-28 RX ADMIN — Medication 10 ML: at 20:54

## 2022-05-28 RX ADMIN — FOLIC ACID 1 MG: 1 TABLET ORAL at 09:00

## 2022-05-28 RX ADMIN — HEPARIN SODIUM 5000 UNITS: 5000 INJECTION INTRAVENOUS; SUBCUTANEOUS at 05:14

## 2022-05-28 RX ADMIN — AZTREONAM 500 MG: 1 INJECTION, POWDER, LYOPHILIZED, FOR SOLUTION INTRAMUSCULAR; INTRAVENOUS at 18:17

## 2022-05-28 RX ADMIN — ATORVASTATIN CALCIUM 80 MG: 40 TABLET, FILM COATED ORAL at 20:53

## 2022-05-28 RX ADMIN — IPRATROPIUM BROMIDE AND ALBUTEROL SULFATE 3 ML: .5; 3 SOLUTION RESPIRATORY (INHALATION) at 01:20

## 2022-05-28 RX ADMIN — Medication 10 ML: at 09:01

## 2022-05-29 ENCOUNTER — APPOINTMENT (OUTPATIENT)
Dept: GENERAL RADIOLOGY | Facility: HOSPITAL | Age: 82
End: 2022-05-29

## 2022-05-29 LAB
ALBUMIN SERPL-MCNC: 2.59 G/DL (ref 3.5–5.2)
ALBUMIN/GLOB SERPL: 1.1 G/DL
ALP SERPL-CCNC: 95 U/L (ref 39–117)
ALT SERPL W P-5'-P-CCNC: 6 U/L (ref 1–33)
ANION GAP SERPL CALCULATED.3IONS-SCNC: 8.4 MMOL/L (ref 5–15)
AST SERPL-CCNC: 11 U/L (ref 1–32)
BASOPHILS # BLD AUTO: 0.05 10*3/MM3 (ref 0–0.2)
BASOPHILS NFR BLD AUTO: 0.5 % (ref 0–1.5)
BILIRUB SERPL-MCNC: 0.3 MG/DL (ref 0–1.2)
BUN SERPL-MCNC: 18 MG/DL (ref 8–23)
BUN/CREAT SERPL: 10.9 (ref 7–25)
CALCIUM SPEC-SCNC: 7.8 MG/DL (ref 8.6–10.5)
CHLORIDE SERPL-SCNC: 93 MMOL/L (ref 98–107)
CO2 SERPL-SCNC: 24.6 MMOL/L (ref 22–29)
CREAT SERPL-MCNC: 1.65 MG/DL (ref 0.57–1)
CRP SERPL-MCNC: 1.76 MG/DL (ref 0–0.5)
DEPRECATED RDW RBC AUTO: 48.6 FL (ref 37–54)
EGFRCR SERPLBLD CKD-EPI 2021: 31.3 ML/MIN/1.73
EOSINOPHIL # BLD AUTO: 0.26 10*3/MM3 (ref 0–0.4)
EOSINOPHIL NFR BLD AUTO: 2.6 % (ref 0.3–6.2)
ERYTHROCYTE [DISTWIDTH] IN BLOOD BY AUTOMATED COUNT: 15.2 % (ref 12.3–15.4)
GLOBULIN UR ELPH-MCNC: 2.4 GM/DL
GLUCOSE BLDC GLUCOMTR-MCNC: 149 MG/DL (ref 70–130)
GLUCOSE BLDC GLUCOMTR-MCNC: 152 MG/DL (ref 70–130)
GLUCOSE BLDC GLUCOMTR-MCNC: 179 MG/DL (ref 70–130)
GLUCOSE SERPL-MCNC: 146 MG/DL (ref 65–99)
HCT VFR BLD AUTO: 22.3 % (ref 34–46.6)
HGB BLD-MCNC: 7.3 G/DL (ref 12–15.9)
IMM GRANULOCYTES # BLD AUTO: 0.09 10*3/MM3 (ref 0–0.05)
IMM GRANULOCYTES NFR BLD AUTO: 0.9 % (ref 0–0.5)
LYMPHOCYTES # BLD AUTO: 1.3 10*3/MM3 (ref 0.7–3.1)
LYMPHOCYTES NFR BLD AUTO: 13.1 % (ref 19.6–45.3)
MAGNESIUM SERPL-MCNC: 1.7 MG/DL (ref 1.6–2.4)
MCH RBC QN AUTO: 28.7 PG (ref 26.6–33)
MCHC RBC AUTO-ENTMCNC: 32.7 G/DL (ref 31.5–35.7)
MCV RBC AUTO: 87.8 FL (ref 79–97)
MONOCYTES # BLD AUTO: 1.09 10*3/MM3 (ref 0.1–0.9)
MONOCYTES NFR BLD AUTO: 11 % (ref 5–12)
NEUTROPHILS NFR BLD AUTO: 7.11 10*3/MM3 (ref 1.7–7)
NEUTROPHILS NFR BLD AUTO: 71.9 % (ref 42.7–76)
NRBC BLD AUTO-RTO: 0 /100 WBC (ref 0–0.2)
PLATELET # BLD AUTO: 306 10*3/MM3 (ref 140–450)
PMV BLD AUTO: 9.9 FL (ref 6–12)
POTASSIUM SERPL-SCNC: 3.7 MMOL/L (ref 3.5–5.2)
PROT SERPL-MCNC: 5 G/DL (ref 6–8.5)
RBC # BLD AUTO: 2.54 10*6/MM3 (ref 3.77–5.28)
SODIUM SERPL-SCNC: 126 MMOL/L (ref 136–145)
WBC NRBC COR # BLD: 9.9 10*3/MM3 (ref 3.4–10.8)

## 2022-05-29 PROCEDURE — 0 MAGNESIUM SULFATE 4 GM/100ML SOLUTION: Performed by: INTERNAL MEDICINE

## 2022-05-29 PROCEDURE — 71045 X-RAY EXAM CHEST 1 VIEW: CPT

## 2022-05-29 PROCEDURE — 83735 ASSAY OF MAGNESIUM: CPT | Performed by: INTERNAL MEDICINE

## 2022-05-29 PROCEDURE — 99232 SBSQ HOSP IP/OBS MODERATE 35: CPT | Performed by: INTERNAL MEDICINE

## 2022-05-29 PROCEDURE — 80053 COMPREHEN METABOLIC PANEL: CPT | Performed by: INTERNAL MEDICINE

## 2022-05-29 PROCEDURE — 71045 X-RAY EXAM CHEST 1 VIEW: CPT | Performed by: RADIOLOGY

## 2022-05-29 PROCEDURE — 25010000002 HEPARIN (PORCINE) PER 1000 UNITS: Performed by: HOSPITALIST

## 2022-05-29 PROCEDURE — 85025 COMPLETE CBC W/AUTO DIFF WBC: CPT | Performed by: INTERNAL MEDICINE

## 2022-05-29 PROCEDURE — 86140 C-REACTIVE PROTEIN: CPT | Performed by: INTERNAL MEDICINE

## 2022-05-29 PROCEDURE — 94799 UNLISTED PULMONARY SVC/PX: CPT

## 2022-05-29 PROCEDURE — 94664 DEMO&/EVAL PT USE INHALER: CPT

## 2022-05-29 PROCEDURE — 82962 GLUCOSE BLOOD TEST: CPT

## 2022-05-29 RX ORDER — MAGNESIUM SULFATE HEPTAHYDRATE 40 MG/ML
4 INJECTION, SOLUTION INTRAVENOUS ONCE
Status: COMPLETED | OUTPATIENT
Start: 2022-05-29 | End: 2022-05-29

## 2022-05-29 RX ORDER — SODIUM CHLORIDE 1000 MG
2 TABLET, SOLUBLE MISCELLANEOUS
Status: DISCONTINUED | OUTPATIENT
Start: 2022-05-29 | End: 2022-05-30

## 2022-05-29 RX ADMIN — CARVEDILOL 6.25 MG: 6.25 TABLET, FILM COATED ORAL at 17:00

## 2022-05-29 RX ADMIN — SODIUM CHLORIDE TAB 1 GM 2 G: 1 TAB at 12:37

## 2022-05-29 RX ADMIN — AZTREONAM 500 MG: 1 INJECTION, POWDER, LYOPHILIZED, FOR SOLUTION INTRAMUSCULAR; INTRAVENOUS at 17:00

## 2022-05-29 RX ADMIN — HEPARIN SODIUM 5000 UNITS: 5000 INJECTION INTRAVENOUS; SUBCUTANEOUS at 13:36

## 2022-05-29 RX ADMIN — SODIUM CHLORIDE TAB 1 GM 2 G: 1 TAB at 17:00

## 2022-05-29 RX ADMIN — FOLIC ACID 1 MG: 1 TABLET ORAL at 08:10

## 2022-05-29 RX ADMIN — ASPIRIN 81 MG: 81 TABLET, COATED ORAL at 08:10

## 2022-05-29 RX ADMIN — GUAIFENESIN 600 MG: 600 TABLET, EXTENDED RELEASE ORAL at 08:10

## 2022-05-29 RX ADMIN — PAROXETINE HYDROCHLORIDE 10 MG: 20 TABLET, FILM COATED ORAL at 06:03

## 2022-05-29 RX ADMIN — IPRATROPIUM BROMIDE AND ALBUTEROL SULFATE 3 ML: .5; 3 SOLUTION RESPIRATORY (INHALATION) at 06:52

## 2022-05-29 RX ADMIN — Medication 10 ML: at 08:11

## 2022-05-29 RX ADMIN — IPRATROPIUM BROMIDE AND ALBUTEROL SULFATE 3 ML: .5; 3 SOLUTION RESPIRATORY (INHALATION) at 18:57

## 2022-05-29 RX ADMIN — IPRATROPIUM BROMIDE AND ALBUTEROL SULFATE 3 ML: .5; 3 SOLUTION RESPIRATORY (INHALATION) at 00:23

## 2022-05-29 RX ADMIN — GUAIFENESIN 600 MG: 600 TABLET, EXTENDED RELEASE ORAL at 20:54

## 2022-05-29 RX ADMIN — CALCIUM ACETATE 1334 MG: 667 CAPSULE ORAL at 11:29

## 2022-05-29 RX ADMIN — METRONIDAZOLE 500 MG: 500 INJECTION, SOLUTION INTRAVENOUS at 11:29

## 2022-05-29 RX ADMIN — METRONIDAZOLE 500 MG: 500 INJECTION, SOLUTION INTRAVENOUS at 04:38

## 2022-05-29 RX ADMIN — METRONIDAZOLE 500 MG: 500 INJECTION, SOLUTION INTRAVENOUS at 21:02

## 2022-05-29 RX ADMIN — CALCIUM ACETATE 1334 MG: 667 CAPSULE ORAL at 08:10

## 2022-05-29 RX ADMIN — IPRATROPIUM BROMIDE AND ALBUTEROL SULFATE 3 ML: .5; 3 SOLUTION RESPIRATORY (INHALATION) at 13:47

## 2022-05-29 RX ADMIN — ATORVASTATIN CALCIUM 80 MG: 40 TABLET, FILM COATED ORAL at 20:54

## 2022-05-29 RX ADMIN — AZTREONAM 500 MG: 1 INJECTION, POWDER, LYOPHILIZED, FOR SOLUTION INTRAMUSCULAR; INTRAVENOUS at 05:25

## 2022-05-29 RX ADMIN — HEPARIN SODIUM 5000 UNITS: 5000 INJECTION INTRAVENOUS; SUBCUTANEOUS at 20:54

## 2022-05-29 RX ADMIN — CALCIUM ACETATE 1334 MG: 667 CAPSULE ORAL at 17:00

## 2022-05-29 RX ADMIN — CARVEDILOL 6.25 MG: 6.25 TABLET, FILM COATED ORAL at 08:10

## 2022-05-29 RX ADMIN — HEPARIN SODIUM 5000 UNITS: 5000 INJECTION INTRAVENOUS; SUBCUTANEOUS at 05:26

## 2022-05-29 RX ADMIN — MAGNESIUM SULFATE HEPTAHYDRATE 4 G: 40 INJECTION, SOLUTION INTRAVENOUS at 10:13

## 2022-05-30 LAB
ALBUMIN SERPL-MCNC: 2.61 G/DL (ref 3.5–5.2)
ALBUMIN/GLOB SERPL: 1 G/DL
ALP SERPL-CCNC: 94 U/L (ref 39–117)
ALT SERPL W P-5'-P-CCNC: 6 U/L (ref 1–33)
ANION GAP SERPL CALCULATED.3IONS-SCNC: 10.3 MMOL/L (ref 5–15)
ANION GAP SERPL CALCULATED.3IONS-SCNC: 9.7 MMOL/L (ref 5–15)
AST SERPL-CCNC: 12 U/L (ref 1–32)
BASOPHILS # BLD AUTO: 0.07 10*3/MM3 (ref 0–0.2)
BASOPHILS NFR BLD AUTO: 0.7 % (ref 0–1.5)
BILIRUB SERPL-MCNC: 0.2 MG/DL (ref 0–1.2)
BUN SERPL-MCNC: 24 MG/DL (ref 8–23)
BUN SERPL-MCNC: 27 MG/DL (ref 8–23)
BUN/CREAT SERPL: 11.6 (ref 7–25)
BUN/CREAT SERPL: 12.6 (ref 7–25)
CALCIUM SPEC-SCNC: 7.8 MG/DL (ref 8.6–10.5)
CALCIUM SPEC-SCNC: 8 MG/DL (ref 8.6–10.5)
CHLORIDE SERPL-SCNC: 89 MMOL/L (ref 98–107)
CHLORIDE SERPL-SCNC: 90 MMOL/L (ref 98–107)
CO2 SERPL-SCNC: 22.7 MMOL/L (ref 22–29)
CO2 SERPL-SCNC: 23.3 MMOL/L (ref 22–29)
CREAT SERPL-MCNC: 2.07 MG/DL (ref 0.57–1)
CREAT SERPL-MCNC: 2.15 MG/DL (ref 0.57–1)
CRP SERPL-MCNC: 1.32 MG/DL (ref 0–0.5)
DEPRECATED RDW RBC AUTO: 48 FL (ref 37–54)
EGFRCR SERPLBLD CKD-EPI 2021: 22.8 ML/MIN/1.73
EGFRCR SERPLBLD CKD-EPI 2021: 23.8 ML/MIN/1.73
EOSINOPHIL # BLD AUTO: 0.17 10*3/MM3 (ref 0–0.4)
EOSINOPHIL NFR BLD AUTO: 1.7 % (ref 0.3–6.2)
ERYTHROCYTE [DISTWIDTH] IN BLOOD BY AUTOMATED COUNT: 15.4 % (ref 12.3–15.4)
GLOBULIN UR ELPH-MCNC: 2.7 GM/DL
GLUCOSE BLDC GLUCOMTR-MCNC: 139 MG/DL (ref 70–130)
GLUCOSE BLDC GLUCOMTR-MCNC: 170 MG/DL (ref 70–130)
GLUCOSE BLDC GLUCOMTR-MCNC: 206 MG/DL (ref 70–130)
GLUCOSE BLDC GLUCOMTR-MCNC: 224 MG/DL (ref 70–130)
GLUCOSE SERPL-MCNC: 147 MG/DL (ref 65–99)
GLUCOSE SERPL-MCNC: 187 MG/DL (ref 65–99)
HCT VFR BLD AUTO: 24.7 % (ref 34–46.6)
HGB BLD-MCNC: 8 G/DL (ref 12–15.9)
IMM GRANULOCYTES # BLD AUTO: 0.07 10*3/MM3 (ref 0–0.05)
IMM GRANULOCYTES NFR BLD AUTO: 0.7 % (ref 0–0.5)
LYMPHOCYTES # BLD AUTO: 1.33 10*3/MM3 (ref 0.7–3.1)
LYMPHOCYTES NFR BLD AUTO: 13.3 % (ref 19.6–45.3)
MAGNESIUM SERPL-MCNC: 2.7 MG/DL (ref 1.6–2.4)
MCH RBC QN AUTO: 28.2 PG (ref 26.6–33)
MCHC RBC AUTO-ENTMCNC: 32.4 G/DL (ref 31.5–35.7)
MCV RBC AUTO: 87 FL (ref 79–97)
MONOCYTES # BLD AUTO: 1.01 10*3/MM3 (ref 0.1–0.9)
MONOCYTES NFR BLD AUTO: 10.1 % (ref 5–12)
NEUTROPHILS NFR BLD AUTO: 7.35 10*3/MM3 (ref 1.7–7)
NEUTROPHILS NFR BLD AUTO: 73.5 % (ref 42.7–76)
NRBC BLD AUTO-RTO: 0 /100 WBC (ref 0–0.2)
PLATELET # BLD AUTO: 319 10*3/MM3 (ref 140–450)
PMV BLD AUTO: 9.9 FL (ref 6–12)
POTASSIUM SERPL-SCNC: 3.9 MMOL/L (ref 3.5–5.2)
POTASSIUM SERPL-SCNC: 4.4 MMOL/L (ref 3.5–5.2)
PROT SERPL-MCNC: 5.3 G/DL (ref 6–8.5)
RBC # BLD AUTO: 2.84 10*6/MM3 (ref 3.77–5.28)
SODIUM SERPL-SCNC: 122 MMOL/L (ref 136–145)
SODIUM SERPL-SCNC: 123 MMOL/L (ref 136–145)
SODIUM SERPL-SCNC: 123 MMOL/L (ref 136–145)
SODIUM SERPL-SCNC: 125 MMOL/L (ref 136–145)
WBC NRBC COR # BLD: 10 10*3/MM3 (ref 3.4–10.8)

## 2022-05-30 PROCEDURE — 85025 COMPLETE CBC W/AUTO DIFF WBC: CPT | Performed by: INTERNAL MEDICINE

## 2022-05-30 PROCEDURE — 83735 ASSAY OF MAGNESIUM: CPT | Performed by: INTERNAL MEDICINE

## 2022-05-30 PROCEDURE — 99232 SBSQ HOSP IP/OBS MODERATE 35: CPT | Performed by: INTERNAL MEDICINE

## 2022-05-30 PROCEDURE — 80053 COMPREHEN METABOLIC PANEL: CPT | Performed by: INTERNAL MEDICINE

## 2022-05-30 PROCEDURE — 84295 ASSAY OF SERUM SODIUM: CPT | Performed by: INTERNAL MEDICINE

## 2022-05-30 PROCEDURE — 82962 GLUCOSE BLOOD TEST: CPT

## 2022-05-30 PROCEDURE — 86140 C-REACTIVE PROTEIN: CPT | Performed by: INTERNAL MEDICINE

## 2022-05-30 PROCEDURE — 94799 UNLISTED PULMONARY SVC/PX: CPT

## 2022-05-30 PROCEDURE — 94664 DEMO&/EVAL PT USE INHALER: CPT

## 2022-05-30 PROCEDURE — 25010000002 HEPARIN (PORCINE) PER 1000 UNITS: Performed by: HOSPITALIST

## 2022-05-30 PROCEDURE — 94761 N-INVAS EAR/PLS OXIMETRY MLT: CPT

## 2022-05-30 RX ORDER — SODIUM CHLORIDE 1000 MG
3 TABLET, SOLUBLE MISCELLANEOUS
Status: DISCONTINUED | OUTPATIENT
Start: 2022-05-30 | End: 2022-05-31

## 2022-05-30 RX ADMIN — Medication 10 ML: at 21:02

## 2022-05-30 RX ADMIN — HEPARIN SODIUM 5000 UNITS: 5000 INJECTION INTRAVENOUS; SUBCUTANEOUS at 05:05

## 2022-05-30 RX ADMIN — CALCIUM ACETATE 1334 MG: 667 CAPSULE ORAL at 08:20

## 2022-05-30 RX ADMIN — CARVEDILOL 6.25 MG: 6.25 TABLET, FILM COATED ORAL at 08:19

## 2022-05-30 RX ADMIN — HEPARIN SODIUM 5000 UNITS: 5000 INJECTION INTRAVENOUS; SUBCUTANEOUS at 15:27

## 2022-05-30 RX ADMIN — IPRATROPIUM BROMIDE AND ALBUTEROL SULFATE 3 ML: .5; 3 SOLUTION RESPIRATORY (INHALATION) at 06:53

## 2022-05-30 RX ADMIN — ATORVASTATIN CALCIUM 80 MG: 40 TABLET, FILM COATED ORAL at 21:02

## 2022-05-30 RX ADMIN — ASPIRIN 81 MG: 81 TABLET, COATED ORAL at 08:19

## 2022-05-30 RX ADMIN — METRONIDAZOLE 500 MG: 500 INJECTION, SOLUTION INTRAVENOUS at 12:00

## 2022-05-30 RX ADMIN — CALCIUM ACETATE 1334 MG: 667 CAPSULE ORAL at 12:00

## 2022-05-30 RX ADMIN — IPRATROPIUM BROMIDE AND ALBUTEROL SULFATE 3 ML: .5; 3 SOLUTION RESPIRATORY (INHALATION) at 12:14

## 2022-05-30 RX ADMIN — IPRATROPIUM BROMIDE AND ALBUTEROL SULFATE 3 ML: .5; 3 SOLUTION RESPIRATORY (INHALATION) at 00:01

## 2022-05-30 RX ADMIN — SODIUM CHLORIDE TAB 1 GM 2 G: 1 TAB at 08:19

## 2022-05-30 RX ADMIN — GUAIFENESIN 600 MG: 600 TABLET, EXTENDED RELEASE ORAL at 21:01

## 2022-05-30 RX ADMIN — AZTREONAM 500 MG: 1 INJECTION, POWDER, LYOPHILIZED, FOR SOLUTION INTRAMUSCULAR; INTRAVENOUS at 05:05

## 2022-05-30 RX ADMIN — METRONIDAZOLE 500 MG: 500 INJECTION, SOLUTION INTRAVENOUS at 03:51

## 2022-05-30 RX ADMIN — SODIUM CHLORIDE TAB 1 GM 3 G: 1 TAB at 12:01

## 2022-05-30 RX ADMIN — IPRATROPIUM BROMIDE AND ALBUTEROL SULFATE 3 ML: .5; 3 SOLUTION RESPIRATORY (INHALATION) at 18:15

## 2022-05-30 RX ADMIN — GUAIFENESIN 600 MG: 600 TABLET, EXTENDED RELEASE ORAL at 08:19

## 2022-05-30 RX ADMIN — FOLIC ACID 1 MG: 1 TABLET ORAL at 08:19

## 2022-05-30 RX ADMIN — BENZONATATE 100 MG: 100 CAPSULE ORAL at 21:01

## 2022-05-30 RX ADMIN — HEPARIN SODIUM 5000 UNITS: 5000 INJECTION INTRAVENOUS; SUBCUTANEOUS at 21:02

## 2022-05-30 RX ADMIN — CALCIUM ACETATE 1334 MG: 667 CAPSULE ORAL at 16:43

## 2022-05-30 RX ADMIN — SODIUM CHLORIDE TAB 1 GM 3 G: 1 TAB at 16:43

## 2022-05-30 RX ADMIN — CARVEDILOL 6.25 MG: 6.25 TABLET, FILM COATED ORAL at 16:43

## 2022-05-30 RX ADMIN — Medication 10 ML: at 08:20

## 2022-05-30 RX ADMIN — PAROXETINE HYDROCHLORIDE 10 MG: 20 TABLET, FILM COATED ORAL at 05:05

## 2022-05-31 LAB
ALBUMIN SERPL-MCNC: 2.7 G/DL (ref 3.5–5.2)
ALBUMIN/GLOB SERPL: 1 G/DL
ALP SERPL-CCNC: 135 U/L (ref 39–117)
ALT SERPL W P-5'-P-CCNC: 7 U/L (ref 1–33)
ANION GAP SERPL CALCULATED.3IONS-SCNC: 11.4 MMOL/L (ref 5–15)
AST SERPL-CCNC: 14 U/L (ref 1–32)
BASOPHILS # BLD AUTO: 0.08 10*3/MM3 (ref 0–0.2)
BASOPHILS NFR BLD AUTO: 0.7 % (ref 0–1.5)
BILIRUB SERPL-MCNC: 0.3 MG/DL (ref 0–1.2)
BUN SERPL-MCNC: 35 MG/DL (ref 8–23)
BUN/CREAT SERPL: 15.2 (ref 7–25)
CALCIUM SPEC-SCNC: 7.8 MG/DL (ref 8.6–10.5)
CHLORIDE SERPL-SCNC: 93 MMOL/L (ref 98–107)
CO2 SERPL-SCNC: 20.6 MMOL/L (ref 22–29)
CREAT SERPL-MCNC: 2.31 MG/DL (ref 0.57–1)
CRP SERPL-MCNC: 1.56 MG/DL (ref 0–0.5)
DEPRECATED RDW RBC AUTO: 49.7 FL (ref 37–54)
EGFRCR SERPLBLD CKD-EPI 2021: 20.9 ML/MIN/1.73
EOSINOPHIL # BLD AUTO: 0.16 10*3/MM3 (ref 0–0.4)
EOSINOPHIL NFR BLD AUTO: 1.3 % (ref 0.3–6.2)
ERYTHROCYTE [DISTWIDTH] IN BLOOD BY AUTOMATED COUNT: 15.5 % (ref 12.3–15.4)
GLOBULIN UR ELPH-MCNC: 2.7 GM/DL
GLUCOSE BLDC GLUCOMTR-MCNC: 173 MG/DL (ref 70–130)
GLUCOSE BLDC GLUCOMTR-MCNC: 243 MG/DL (ref 70–130)
GLUCOSE BLDC GLUCOMTR-MCNC: 294 MG/DL (ref 70–130)
GLUCOSE SERPL-MCNC: 211 MG/DL (ref 65–99)
HCT VFR BLD AUTO: 25.5 % (ref 34–46.6)
HGB BLD-MCNC: 8.2 G/DL (ref 12–15.9)
IMM GRANULOCYTES # BLD AUTO: 0.12 10*3/MM3 (ref 0–0.05)
IMM GRANULOCYTES NFR BLD AUTO: 1 % (ref 0–0.5)
LYMPHOCYTES # BLD AUTO: 1 10*3/MM3 (ref 0.7–3.1)
LYMPHOCYTES NFR BLD AUTO: 8.3 % (ref 19.6–45.3)
MAGNESIUM SERPL-MCNC: 2.5 MG/DL (ref 1.6–2.4)
MCH RBC QN AUTO: 28.6 PG (ref 26.6–33)
MCHC RBC AUTO-ENTMCNC: 32.2 G/DL (ref 31.5–35.7)
MCV RBC AUTO: 88.9 FL (ref 79–97)
MONOCYTES # BLD AUTO: 1.09 10*3/MM3 (ref 0.1–0.9)
MONOCYTES NFR BLD AUTO: 9 % (ref 5–12)
NEUTROPHILS NFR BLD AUTO: 79.7 % (ref 42.7–76)
NEUTROPHILS NFR BLD AUTO: 9.66 10*3/MM3 (ref 1.7–7)
NRBC BLD AUTO-RTO: 0 /100 WBC (ref 0–0.2)
PLATELET # BLD AUTO: 329 10*3/MM3 (ref 140–450)
PMV BLD AUTO: 9.8 FL (ref 6–12)
POTASSIUM SERPL-SCNC: 4.5 MMOL/L (ref 3.5–5.2)
PROT SERPL-MCNC: 5.4 G/DL (ref 6–8.5)
RBC # BLD AUTO: 2.87 10*6/MM3 (ref 3.77–5.28)
SODIUM SERPL-SCNC: 122 MMOL/L (ref 136–145)
SODIUM SERPL-SCNC: 125 MMOL/L (ref 136–145)
SODIUM SERPL-SCNC: 126 MMOL/L (ref 136–145)
SODIUM SERPL-SCNC: 126 MMOL/L (ref 136–145)
WBC NRBC COR # BLD: 12.11 10*3/MM3 (ref 3.4–10.8)

## 2022-05-31 PROCEDURE — 82962 GLUCOSE BLOOD TEST: CPT

## 2022-05-31 PROCEDURE — 80053 COMPREHEN METABOLIC PANEL: CPT | Performed by: INTERNAL MEDICINE

## 2022-05-31 PROCEDURE — 94799 UNLISTED PULMONARY SVC/PX: CPT

## 2022-05-31 PROCEDURE — 86140 C-REACTIVE PROTEIN: CPT | Performed by: INTERNAL MEDICINE

## 2022-05-31 PROCEDURE — 99231 SBSQ HOSP IP/OBS SF/LOW 25: CPT | Performed by: INTERNAL MEDICINE

## 2022-05-31 PROCEDURE — 25010000002 HEPARIN (PORCINE) PER 1000 UNITS: Performed by: HOSPITALIST

## 2022-05-31 PROCEDURE — 84295 ASSAY OF SERUM SODIUM: CPT | Performed by: INTERNAL MEDICINE

## 2022-05-31 PROCEDURE — 94761 N-INVAS EAR/PLS OXIMETRY MLT: CPT

## 2022-05-31 PROCEDURE — 85025 COMPLETE CBC W/AUTO DIFF WBC: CPT | Performed by: INTERNAL MEDICINE

## 2022-05-31 PROCEDURE — 83735 ASSAY OF MAGNESIUM: CPT | Performed by: INTERNAL MEDICINE

## 2022-05-31 RX ADMIN — SODIUM CHLORIDE TAB 1 GM 3 G: 1 TAB at 08:10

## 2022-05-31 RX ADMIN — ASPIRIN 81 MG: 81 TABLET, COATED ORAL at 08:10

## 2022-05-31 RX ADMIN — IPRATROPIUM BROMIDE AND ALBUTEROL SULFATE 3 ML: .5; 3 SOLUTION RESPIRATORY (INHALATION) at 00:14

## 2022-05-31 RX ADMIN — CARVEDILOL 6.25 MG: 6.25 TABLET, FILM COATED ORAL at 08:10

## 2022-05-31 RX ADMIN — GUAIFENESIN 600 MG: 600 TABLET, EXTENDED RELEASE ORAL at 08:10

## 2022-05-31 RX ADMIN — CALCIUM ACETATE 1334 MG: 667 CAPSULE ORAL at 08:10

## 2022-05-31 RX ADMIN — ATORVASTATIN CALCIUM 80 MG: 40 TABLET, FILM COATED ORAL at 20:11

## 2022-05-31 RX ADMIN — GUAIFENESIN 600 MG: 600 TABLET, EXTENDED RELEASE ORAL at 20:11

## 2022-05-31 RX ADMIN — PAROXETINE HYDROCHLORIDE 10 MG: 20 TABLET, FILM COATED ORAL at 06:16

## 2022-05-31 RX ADMIN — IPRATROPIUM BROMIDE AND ALBUTEROL SULFATE 3 ML: .5; 3 SOLUTION RESPIRATORY (INHALATION) at 18:30

## 2022-05-31 RX ADMIN — FOLIC ACID 1 MG: 1 TABLET ORAL at 08:09

## 2022-05-31 RX ADMIN — CALCIUM ACETATE 1334 MG: 667 CAPSULE ORAL at 16:51

## 2022-05-31 RX ADMIN — HEPARIN SODIUM 5000 UNITS: 5000 INJECTION INTRAVENOUS; SUBCUTANEOUS at 06:15

## 2022-05-31 RX ADMIN — IPRATROPIUM BROMIDE AND ALBUTEROL SULFATE 3 ML: .5; 3 SOLUTION RESPIRATORY (INHALATION) at 07:13

## 2022-05-31 RX ADMIN — CALCIUM ACETATE 1334 MG: 667 CAPSULE ORAL at 11:14

## 2022-05-31 RX ADMIN — HEPARIN SODIUM 5000 UNITS: 5000 INJECTION INTRAVENOUS; SUBCUTANEOUS at 20:11

## 2022-05-31 RX ADMIN — CARVEDILOL 6.25 MG: 6.25 TABLET, FILM COATED ORAL at 16:51

## 2022-05-31 RX ADMIN — Medication 10 ML: at 20:13

## 2022-06-01 LAB
ANION GAP SERPL CALCULATED.3IONS-SCNC: 9.2 MMOL/L (ref 5–15)
BASOPHILS # BLD AUTO: 0.06 10*3/MM3 (ref 0–0.2)
BASOPHILS NFR BLD AUTO: 0.6 % (ref 0–1.5)
BUN SERPL-MCNC: 23 MG/DL (ref 8–23)
BUN/CREAT SERPL: 14.1 (ref 7–25)
CALCIUM SPEC-SCNC: 7.4 MG/DL (ref 8.6–10.5)
CHLORIDE SERPL-SCNC: 92 MMOL/L (ref 98–107)
CO2 SERPL-SCNC: 24.8 MMOL/L (ref 22–29)
CREAT SERPL-MCNC: 1.63 MG/DL (ref 0.57–1)
CRP SERPL-MCNC: 1.11 MG/DL (ref 0–0.5)
DEPRECATED RDW RBC AUTO: 50.5 FL (ref 37–54)
EGFRCR SERPLBLD CKD-EPI 2021: 31.8 ML/MIN/1.73
EOSINOPHIL # BLD AUTO: 0.2 10*3/MM3 (ref 0–0.4)
EOSINOPHIL NFR BLD AUTO: 1.9 % (ref 0.3–6.2)
ERYTHROCYTE [DISTWIDTH] IN BLOOD BY AUTOMATED COUNT: 15.9 % (ref 12.3–15.4)
GLUCOSE BLDC GLUCOMTR-MCNC: 165 MG/DL (ref 70–130)
GLUCOSE BLDC GLUCOMTR-MCNC: 228 MG/DL (ref 70–130)
GLUCOSE BLDC GLUCOMTR-MCNC: 258 MG/DL (ref 70–130)
GLUCOSE SERPL-MCNC: 212 MG/DL (ref 65–99)
HCT VFR BLD AUTO: 24 % (ref 34–46.6)
HGB BLD-MCNC: 7.8 G/DL (ref 12–15.9)
IMM GRANULOCYTES # BLD AUTO: 0.08 10*3/MM3 (ref 0–0.05)
IMM GRANULOCYTES NFR BLD AUTO: 0.7 % (ref 0–0.5)
LYMPHOCYTES # BLD AUTO: 1.16 10*3/MM3 (ref 0.7–3.1)
LYMPHOCYTES NFR BLD AUTO: 10.7 % (ref 19.6–45.3)
MCH RBC QN AUTO: 28.7 PG (ref 26.6–33)
MCHC RBC AUTO-ENTMCNC: 32.5 G/DL (ref 31.5–35.7)
MCV RBC AUTO: 88.2 FL (ref 79–97)
MONOCYTES # BLD AUTO: 1.07 10*3/MM3 (ref 0.1–0.9)
MONOCYTES NFR BLD AUTO: 9.9 % (ref 5–12)
NEUTROPHILS NFR BLD AUTO: 76.2 % (ref 42.7–76)
NEUTROPHILS NFR BLD AUTO: 8.23 10*3/MM3 (ref 1.7–7)
NRBC BLD AUTO-RTO: 0 /100 WBC (ref 0–0.2)
PLATELET # BLD AUTO: 307 10*3/MM3 (ref 140–450)
PMV BLD AUTO: 10 FL (ref 6–12)
POTASSIUM SERPL-SCNC: 3.5 MMOL/L (ref 3.5–5.2)
RBC # BLD AUTO: 2.72 10*6/MM3 (ref 3.77–5.28)
SODIUM SERPL-SCNC: 126 MMOL/L (ref 136–145)
SODIUM SERPL-SCNC: 127 MMOL/L (ref 136–145)
WBC NRBC COR # BLD: 10.8 10*3/MM3 (ref 3.4–10.8)

## 2022-06-01 PROCEDURE — 82962 GLUCOSE BLOOD TEST: CPT

## 2022-06-01 PROCEDURE — 86140 C-REACTIVE PROTEIN: CPT | Performed by: INTERNAL MEDICINE

## 2022-06-01 PROCEDURE — 94799 UNLISTED PULMONARY SVC/PX: CPT

## 2022-06-01 PROCEDURE — 85025 COMPLETE CBC W/AUTO DIFF WBC: CPT | Performed by: INTERNAL MEDICINE

## 2022-06-01 PROCEDURE — 99231 SBSQ HOSP IP/OBS SF/LOW 25: CPT | Performed by: INTERNAL MEDICINE

## 2022-06-01 PROCEDURE — 84295 ASSAY OF SERUM SODIUM: CPT | Performed by: INTERNAL MEDICINE

## 2022-06-01 PROCEDURE — 80048 BASIC METABOLIC PNL TOTAL CA: CPT | Performed by: INTERNAL MEDICINE

## 2022-06-01 PROCEDURE — 25010000002 HEPARIN (PORCINE) PER 1000 UNITS: Performed by: HOSPITALIST

## 2022-06-01 RX ADMIN — IPRATROPIUM BROMIDE AND ALBUTEROL SULFATE 3 ML: .5; 3 SOLUTION RESPIRATORY (INHALATION) at 12:16

## 2022-06-01 RX ADMIN — Medication 10 ML: at 08:27

## 2022-06-01 RX ADMIN — Medication 10 ML: at 19:51

## 2022-06-01 RX ADMIN — CARVEDILOL 6.25 MG: 6.25 TABLET, FILM COATED ORAL at 17:02

## 2022-06-01 RX ADMIN — ASPIRIN 81 MG: 81 TABLET, COATED ORAL at 08:26

## 2022-06-01 RX ADMIN — CALCIUM ACETATE 1334 MG: 667 CAPSULE ORAL at 17:02

## 2022-06-01 RX ADMIN — IPRATROPIUM BROMIDE AND ALBUTEROL SULFATE 3 ML: .5; 3 SOLUTION RESPIRATORY (INHALATION) at 18:26

## 2022-06-01 RX ADMIN — CALCIUM ACETATE 1334 MG: 667 CAPSULE ORAL at 11:49

## 2022-06-01 RX ADMIN — PAROXETINE HYDROCHLORIDE 10 MG: 20 TABLET, FILM COATED ORAL at 06:08

## 2022-06-01 RX ADMIN — GUAIFENESIN 600 MG: 600 TABLET, EXTENDED RELEASE ORAL at 08:26

## 2022-06-01 RX ADMIN — GUAIFENESIN 600 MG: 600 TABLET, EXTENDED RELEASE ORAL at 19:51

## 2022-06-01 RX ADMIN — IPRATROPIUM BROMIDE AND ALBUTEROL SULFATE 3 ML: .5; 3 SOLUTION RESPIRATORY (INHALATION) at 00:22

## 2022-06-01 RX ADMIN — HEPARIN SODIUM 5000 UNITS: 5000 INJECTION INTRAVENOUS; SUBCUTANEOUS at 19:51

## 2022-06-01 RX ADMIN — HEPARIN SODIUM 5000 UNITS: 5000 INJECTION INTRAVENOUS; SUBCUTANEOUS at 14:39

## 2022-06-01 RX ADMIN — CALCIUM ACETATE 1334 MG: 667 CAPSULE ORAL at 08:26

## 2022-06-01 RX ADMIN — HEPARIN SODIUM 5000 UNITS: 5000 INJECTION INTRAVENOUS; SUBCUTANEOUS at 06:08

## 2022-06-01 RX ADMIN — CARVEDILOL 6.25 MG: 6.25 TABLET, FILM COATED ORAL at 08:26

## 2022-06-01 RX ADMIN — FOLIC ACID 1 MG: 1 TABLET ORAL at 08:26

## 2022-06-01 RX ADMIN — ATORVASTATIN CALCIUM 80 MG: 40 TABLET, FILM COATED ORAL at 19:51

## 2022-06-01 RX ADMIN — IPRATROPIUM BROMIDE AND ALBUTEROL SULFATE 3 ML: .5; 3 SOLUTION RESPIRATORY (INHALATION) at 06:40

## 2022-06-01 NOTE — PLAN OF CARE
Goal Outcome Evaluation:  Plan of Care Reviewed With: patient        Progress: no change  Outcome Evaluation: Pt has rested well in bed today with her intermittently sitting up on the edge of her bed and lying down.  She denies c/o pain when asked and she is free from non-verbal s/sx of discomfort at this time.  Safety measures in place, call light within reach.  Will continue to monitor.

## 2022-06-01 NOTE — PROGRESS NOTES
[facility] HOSPITALIST PROGRESS NOTE     Patient Identification:  Name:  eSan Chen  Age:  81 y.o.  Sex:  female  :  1940  MRN:  [id number]  Visit Number:  [id number]  ROOM: [address] [place] [address]     Primary Care Provider:  Ganga Gallardo MD    Length of stay in inpatient status:  9    Subjective     Chief Compliant:    Chief Complaint   Patient presents with   • Shortness of Breath       History of Presenting Illness:    Patient denies any new complaints. She noted she is hopeful she gets to go home soon. She noted that she wanted to discussed potential peritoneal dialysis with Dr. Muñoz and get a gamplan first. I updated Dr. Muñoz that they were interested in this conversation.     ROS:  Otherwise 10 point ROS negative other than documented above in HPI.     Objective     Current Hospital Meds:aspirin, 81 mg, Oral, Daily  atorvastatin, 80 mg, Oral, Nightly  calcium acetate, 1,334 mg, Oral, TID With Meals  carvedilol, 6.25 mg, Oral, BID With Meals  folic acid, 1 mg, Oral, Daily  guaiFENesin, 600 mg, Oral, Q12H  heparin (porcine), 5,000 Units, Subcutaneous, Q8H  ipratropium-albuterol, 3 mL, Nebulization, 4x Daily - RT  PARoxetine, 10 mg, Oral, QAM  sodium chloride, 10 mL, Intravenous, Q12H         Current Antimicrobial Therapy:  Anti-Infectives (From admission, onward)    Ordered     Dose/Rate Route Frequency Start Stop    22 0859  aztreonam (AZACTAM) 500 mg in sodium chloride 0.9 % 100 mL IVPB        Ordering Provider: Dylon Mcdonnell MD    500 mg  over 4 Hours Intravenous Every 12 Hours 22 1800 22 0905    22 1826  metroNIDAZOLE (FLAGYL) IVPB 500 mg        Ordering Provider: Dylon Mcdonnell MD    500 mg  over 30 Minutes Intravenous Every 8 Hours 22 2000 22 1230    22 1209  aztreonam (AZACTAM) 2 g in sodium chloride 0.9 % 100 mL IVPB-VTB        Ordering Provider: Jone Schaffer MD    2 g  200 mL/hr over 30 Minutes  Intravenous Once 05/23/22 1211 05/23/22 1404    05/23/22 1208  vancomycin 1250 mg/250 mL 0.9% NS IVPB ([facility])        Ordering Provider: Jone Schaffer MD    20 mg/kg × 66.7 kg  over 60 Minutes Intravenous Once 05/23/22 1210 05/23/22 1605        Current Diuretic Therapy:  Diuretics (From admission, onward)    None        ----------------------------------------------------------------------------------------------------------------------  Vital Signs:  Temp:  [97.8 °F (36.6 °C)-98.3 °F (36.8 °C)] 98.3 °F (36.8 °C)  Heart Rate:  [54-73] 59  Resp:  [12-28] 18  BP: (144-167)/(57-80) 166/74  SpO2:  [93 %-99 %] 99 %  on   ;   Device (Oxygen Therapy): room air  Body mass index is 21.55 kg/m².    Wt Readings from Last 3 Encounters:   05/31/22 57 kg (125 lb 9.6 oz)   05/13/22 67.1 kg (147 lb 14.4 oz)   01/27/22 71.2 kg (157 lb)     Intake & Output (last 3 days)       05/29 0701 05/30 0700 05/30 0701 05/31 0700 05/31 0701  06/01 0700 06/01 0701  06/02 0700    P.O. 720 300 720 720    Total Intake(mL/kg) 720 (12.5) 300 (5.3) 720 (12.6) 720 (12.6)    Urine (mL/kg/hr) 550 (0.4) 240 (0.2) 355 (0.3)     Other   1000     Stool 0 0      Total Output      Net +170 +60 -635 +720            Stool Unmeasured Occurrence 0 x 2 x          Diet Soft Texture; Chopped; Thin; Daily Fluid Restriction; Other; 800  ----------------------------------------------------------------------------------------------------------------------  Physical exam:  Constitutional:  Well-developed and well-nourished.  No respiratory distress.      HENT:  Head:  Normocephalic and atraumatic.  Mouth:  Moist mucous membranes.    Eyes:  Conjunctivae and EOM are normal. No scleral icterus.    Neck:  Neck supple.  No JVD present.    Cardiovascular:  Normal rate, regular rhythm and normal heart sounds with no murmur.  Pulmonary/Chest:  No respiratory distress, no wheezes, no crackles, with normal breath sounds and good air movement.  Abdominal:   Soft.  Bowel sounds are normal.  No distension and no tenderness.   Musculoskeletal:  No edema, no tenderness, and no deformity.  No red or swollen joints anywhere.    Neurological:  Alert and oriented to person, place, and time.  No cranial nerve deficit.  No tongue deviation.  No facial droop.  No slurred speech.   Skin:  Skin is warm and dry. No rash noted. No pallor.   Peripheral vascular:  Pulses in all 4 extremities with no clubbing, no cyanosis, no edema.  ----------------------------------------------------------------------------------------------------------------------  Tele:    ----------------------------------------------------------------------------------------------------------------------  Results from last 7 days   Lab Units 06/01/22  0021 05/31/22  0014 05/30/22  0239   CRP mg/dL 1.11* 1.56* 1.32*   WBC 10*3/mm3 10.80 12.11* 10.00   HEMOGLOBIN g/dL 7.8* 8.2* 8.0*   HEMATOCRIT % 24.0* 25.5* 24.7*   MCV fL 88.2 88.9 87.0   MCHC g/dL 32.5 32.2 32.4   PLATELETS 10*3/mm3 307 329 319         Results from last 7 days   Lab Units 06/01/22  1744 06/01/22  1232 06/01/22  0547 06/01/22  0021 05/31/22  0532 05/31/22  0014 05/30/22  1242 05/30/22  0949 05/30/22  0239 05/29/22  0526   SODIUM mmol/L 126* 126* 127* 126*   < > 125*   < > 122* 123* 126*   POTASSIUM mmol/L  --   --   --  3.5  --  4.5  --  3.9 4.4 3.7   MAGNESIUM mg/dL  --   --   --   --   --  2.5*  --   --  2.7* 1.7   CHLORIDE mmol/L  --   --   --  92*  --  93*  --  89* 90* 93*   CO2 mmol/L  --   --   --  24.8  --  20.6*  --  23.3 22.7 24.6   BUN mg/dL  --   --   --  23  --  35*  --  27* 24* 18   CREATININE mg/dL  --   --   --  1.63*  --  2.31*  --  2.15* 2.07* 1.65*   CALCIUM mg/dL  --   --   --  7.4*  --  7.8*  --  8.0* 7.8* 7.8*   GLUCOSE mg/dL  --   --   --  212*  --  211*  --  187* 147* 146*   ALBUMIN g/dL  --   --   --   --   --  2.70*  --   --  2.61* 2.59*   BILIRUBIN mg/dL  --   --   --   --   --  0.3  --   --  0.2 0.3   ALK PHOS U/L  --    --   --   --   --  135*  --   --  94 95   AST (SGOT) U/L  --   --   --   --   --  14  --   --  12 11   ALT (SGPT) U/L  --   --   --   --   --  7  --   --  6 6    < > = values in this interval not displayed.   Estimated Creatinine Clearance: 24.8 mL/min (A) (by C-G formula based on SCr of 1.63 mg/dL (H)).  No results found for: AMMONIA              Glucose   Date/Time Value Ref Range Status   06/01/2022 1701 228 (H) 70 - 130 mg/dL Final     Comment:     Meter: [id number] : [id number] MAYRALEE DUNCAN   06/01/2022 1110 258 (H) 70 - 130 mg/dL Final     Comment:     Meter: [id number] : [id number] MAYRALEE DUNCAN   06/01/2022 0624 165 (H) 70 - 130 mg/dL Final     Comment:     Meter: [id number] : [id number] Violet Lopez   05/31/2022 2315 243 (H) 70 - 130 mg/dL Final     Comment:     Meter: [id number] : [id number] FELIPE DIOR   05/31/2022 1020 294 (H) 70 - 130 mg/dL Final     Comment:     Meter: [id number] : [id number] deangelo menjivar   05/31/2022 0614 173 (H) 70 - 130 mg/dL Final     Comment:     Meter: [id number] : [id number] Tonia Tarango   05/30/2022 2310 206 (H) 70 - 130 mg/dL Final     Comment:     Meter: [id number] : [id number] HOWARD BERGER   05/30/2022 1619 224 (H) 70 - 130 mg/dL Final     Comment:     Meter: [id number] : [id number] deangelo menjivar     Lab Results   Component Value Date    TSH 0.677 05/23/2022    FREET4 1.77 (H) 05/23/2022     No results found for: PREGTESTUR, PREGSERUM, HCG, HCGQUANT  Pain Management Panel     Pain Management Panel Latest Ref Rng & Units 5/23/2022 5/5/2022    CREATININE UR mg/dL - 67.4    AMPHETAMINES SCREEN, URINE Negative Negative -    BARBITURATES SCREEN Negative Negative -    BENZODIAZEPINE SCREEN, URINE Negative Negative -    BUPRENORPHINEUR Negative Negative -    COCAINE SCREEN, URINE Negative Negative -    METHADONE SCREEN, URINE Negative Negative -    METHAMPHETAMINEUR Negative Negative -        Brief Urine Lab Results  (Last result in the past  365 days)      Color   Clarity   Blood   Leuk Est   Nitrite   Protein   CREAT   Urine HCG        05/23/22 1422 Yellow   Clear   Negative   Trace   Negative   >=300 mg/dL (3+)               No results found for: BLOODCX  No results found for: URINECX  No results found for: WOUNDCX  No results found for: STOOLCX  No results found for: RESPCX  No results found for: AFBCX  Results from last 7 days   Lab Units 06/01/22  0021 05/31/22  0014 05/30/22  0239 05/29/22  1146   CRP mg/dL 1.11* 1.56* 1.32* 1.76*       I have personally looked at the labs and they are summarized above.  ----------------------------------------------------------------------------------------------------------------------  Detailed radiology reports for the last 24 hours:    Imaging Results (Last 24 Hours)     ** No results found for the last 24 hours. **        Assessment & Plan    #Sepsis, Acute Metabolic Encephalopathy & Acute Hypoxic Respiratory Failure due to Pneumonia, Bacterial, treating for MDR Organisms/Aspiration & Parainfluenza Virus in setting of Immunocompromised stated from underlying Rheumatoid Arthritis  - Patient presented after recent hospitalization with increased shortness of breath and new oxygen requirement  - Patient meets SIRS criteria due to respiratory rate > 20, WBC count > 12K, noted to have mental status change from baseline  - Admission labs showed WBC count 15K, CRP 3.62, procalcitonin 0.89, lactate 1.7, Coronavirus/flu negative, MRSA negative, ABG with P02 down to 37.5, blood cultures negative to date, strep pneumo negative, viral respiratory panel + for parainfluenza virus  - CT Head showed no acute intracranial abnormality   - CT Chest showed RML and RLL airspace consolidative disease  - SLP consulted; Evaluated with diet recommendations 5/24, no noted signs and symptoms aspiration though noted mild oral weakness felt due to fatigue and medical condition  - Completed Aztreonam and Flagyl x 7 days  - Continue APAP  PRN for fevers, Guaifenesin for congestion, Duonebs as needed  - Monitor on telemetry, PT/OT consulted and following for weakness/debility     #Nonoliguric Acute Kidney Injury on Chronic Kidney Disease vs ESRD with remote history Acute Kidney Injury requiring iHD in setting of mildly elevated cANCA complicated by proteinuria and mild hematuria - Improved   - Last admission creatinine was up to 5.67, improved to 1.74 at time of discharge, had elevated lambda/kappa, this admission up to 3.85, had started on hemodialysis last admission 5/10; Today now > 2  - Renal ultrasound showed concern for dense vascular calcifications or non-obstructing stones but no evidence of hydronephrosis was seen  - Consult Nephrology; Following for hemodialysis needs, concern for intermittent urinary retention, following creatinine and urine output post iHD 5/28. Family interested in peritoneal dialysis and nephrology to discuss this option.      #Hypertension/Hyperlipidemia/Coronary Artery Disease  #Acute on Chronic HFpEF, diastolic dysfunction complicated by edema and pleural effusions now with noted reduced EF   #Severe Pulmonary Hypertension  #Small Pericardial Effusion  #Valvular Disease - Mild AS, Mild MR, mod TR  #Elevated Troponins, suspected NSTEMI Type II  #Prolonged Qtc (566ms)  - Labs showed Troponins 0.06->0.04, proBNP > 70K  - EKG showed normal sinus rhythm, intermittent PAC's, RBBB, Non-specific ST/T wave abnormality  - Echocardiogram 5/5 showed LVEF 66-70%, mild concentric hypertrophy, diastolic dysfunction, dilated left atrium, mild AS, mild MR, mod TR, severe Pulmonary Hypertension, RVSP > 55mmHg, small <1cm pericardial effusion; Repeat limited echocardiogram showed borderline dilated left ventricle, LVEF 36-40%, diastolic dysfunction    - [facility] 5/11 showed no-obstructive Coronary Artery Disease   - CT Chest showed Congestive Heart Failure/edema with R>L pleural effusion, cardiomegaly, severe coronary calcifications  -  Cardiology consulted; Following, recommended goal directed medical therapy, signed off  - Continue home Aspirin 81, Statin  - Continue home Nifedipine, titrate as indicated   - Continue new Coreg, titrate as indicated   - No home ACEI/ARB/ARNI due to renal failure  - Continue to monitor on telemetry, strict I/O's, trend heart rate and blood pressure     #Non-Insulin Dependent Diabetes Mellitus Type II, controlled, unknown complications  - Hgb A1c = 5.5%  - Holding home oral agents, continue FSBG and SSI, add long acting as indicated.     #Electrolyte Abnormalities  - Acute Mod Hypokalemia - potassium down to 2.8, Replacing per protocol - Resolved   - Borderline Hypomagnesemia - magnesium down to 1.7, Replaced per protocol - Resolved   - Acute Hypervolemic Hyponatremia - sodium continues to vary in 120's. Continue salt and fluid restriction as per nephrology.      #Anxiety/Depression  - Continue home regimen     #Gastroesophageal Reflux Disease  - No home PPI, monitor symptoms     #Dementia  - CT showed generalized cerebral atrophy and nonspecific periventricular hypodensities thought to represent microvascular changes.   - Review home meds and resume pending med rec, supportive care     #Debility  - Consult PT/OT, Social Work if placement needed     #Mod Malnutrition  - Nutrition consulted and following        F: Oral  E: Monitor & Replace PRN  N: Diet Soft Texture; Chopped; Thin; Renal, Cardiac    Ppx: SQH  Code Status (Patient has no pulse and is not breathing): CPR (Attempt to Resuscitate)  Medical Interventions (Patient has pulse or is breathing): Full Support     Dispo: Pending clinical improvement, PT/OT recommended inpatient rehab vs skilled nursing facility. Family currently reporting they want to take her home at discharge. SW to ensure patient has dialysis chair.     VTE Prophylaxis:   Mechanical Order History:     None      Pharmalogical Order History:      Ordered     Dose Route Frequency Stop     05/26/22 1311  heparin (porcine) injection 4,000 Units         4,000 Units IV Once 05/26/22 1351    05/26/22 1313  heparin (porcine) injection 3,000 Units         3,000 Units IV Once 05/26/22 1346    05/23/22 1826  heparin (porcine) 5000 UNIT/ML injection 5,000 Units         5,000 Units SC Every 8 Hours Scheduled --                Jacob Stauffer MD  [facility]  06/01/22  18:25 EDT

## 2022-06-01 NOTE — PROGRESS NOTES
Nephrology Progress Note      Subjective     No chest pain or shortness of breath    Objective       Vital signs :     Temp:  [97.5 °F (36.4 °C)-98.2 °F (36.8 °C)] 98.2 °F (36.8 °C)  Heart Rate:  [62-78] 65  Resp:  [12-18] 12  BP: (144-167)/(57-80) 167/80      Intake/Output Summary (Last 24 hours) at 6/1/2022 0715  Last data filed at 5/31/2022 1720  Gross per 24 hour   Intake 720 ml   Output 1355 ml   Net -635 ml       Physical Exam:    General Appearance :   Lungs : clear to auscultation, respirations regular  Heart :  regular rhythm & normal rate, normal S1, S2 and no murmur, no rub  Abdomen : normal bowel sounds, no masses, no hepatomegaly, no splenomegaly, soft non-tender and no guarding  Extremities : moves extremities well, no edema, no cyanosis and no redness  Neurologic :   Mild disorientation      Laboratory Data :     Albumin Albumin   Date Value Ref Range Status   05/31/2022 2.70 (L) 3.50 - 5.20 g/dL Final   05/30/2022 2.61 (L) 3.50 - 5.20 g/dL Final      Magnesium Magnesium   Date Value Ref Range Status   05/31/2022 2.5 (H) 1.6 - 2.4 mg/dL Final   05/30/2022 2.7 (H) 1.6 - 2.4 mg/dL Final          PTH               No results found for: PTH    CBC and coagulation:  Results from last 7 days   Lab Units 06/01/22  0021 05/31/22  0014 05/30/22  0239   CRP mg/dL 1.11* 1.56* 1.32*   WBC 10*3/mm3 10.80 12.11* 10.00   HEMOGLOBIN g/dL 7.8* 8.2* 8.0*   HEMATOCRIT % 24.0* 25.5* 24.7*   MCV fL 88.2 88.9 87.0   MCHC g/dL 32.5 32.2 32.4   PLATELETS 10*3/mm3 307 329 319     Acid/base balance:      Renal and electrolytes:  Results from last 7 days   Lab Units 06/01/22  0547 06/01/22  0021 05/31/22  1734 05/31/22  1113 05/31/22  0532 05/31/22  0014 05/30/22  1242 05/30/22  0949 05/30/22  0239 05/29/22  0526   SODIUM mmol/L 127* 126* 126* 122* 126* 125*   < > 122* 123* 126*   POTASSIUM mmol/L  --  3.5  --   --   --  4.5  --  3.9 4.4 3.7   MAGNESIUM mg/dL  --   --   --   --   --  2.5*  --   --  2.7* 1.7   CHLORIDE mmol/L   --  92*  --   --   --  93*  --  89* 90* 93*   CO2 mmol/L  --  24.8  --   --   --  20.6*  --  23.3 22.7 24.6   BUN mg/dL  --  23  --   --   --  35*  --  27* 24* 18   CREATININE mg/dL  --  1.63*  --   --   --  2.31*  --  2.15* 2.07* 1.65*   CALCIUM mg/dL  --  7.4*  --   --   --  7.8*  --  8.0* 7.8* 7.8*    < > = values in this interval not displayed.     Estimated Creatinine Clearance: 24.8 mL/min (A) (by C-G formula based on SCr of 1.63 mg/dL (H)).    Liver and pancreatic function:  Results from last 7 days   Lab Units 05/31/22  0014 05/30/22  0239 05/29/22  0526   ALBUMIN g/dL 2.70* 2.61* 2.59*   BILIRUBIN mg/dL 0.3 0.2 0.3   ALK PHOS U/L 135* 94 95   AST (SGOT) U/L 14 12 11   ALT (SGPT) U/L 7 6 6         Cardiac:      Liver and pancreatic function:  Results from last 7 days   Lab Units 05/31/22  0014 05/30/22  0239 05/29/22  0526   ALBUMIN g/dL 2.70* 2.61* 2.59*   BILIRUBIN mg/dL 0.3 0.2 0.3   ALK PHOS U/L 135* 94 95   AST (SGOT) U/L 14 12 11   ALT (SGPT) U/L 7 6 6       Medications :     aspirin, 81 mg, Oral, Daily  atorvastatin, 80 mg, Oral, Nightly  calcium acetate, 1,334 mg, Oral, TID With Meals  carvedilol, 6.25 mg, Oral, BID With Meals  folic acid, 1 mg, Oral, Daily  guaiFENesin, 600 mg, Oral, Q12H  heparin (porcine), 5,000 Units, Subcutaneous, Q8H  ipratropium-albuterol, 3 mL, Nebulization, 4x Daily - RT  PARoxetine, 10 mg, Oral, QAM  sodium chloride, 10 mL, Intravenous, Q12H             Assessment & Plan     1. PRITI on CKD vs rapidly worsening CKD, started on dialysis on 5/10/22  2. Metabolic acidosis  3. Anemia  4. Acute on chronic respiratory failure  5. DM-II  6. Essential hypertension  7. Hypokalemia  8. hyponatremia  9. Metabolic encephalopathy likely multifactorial    Pt had uneventful dialysis, will continue on TTS dialysis. Ongoing discussion with family for goals of care, exploring option of PDx, will talk to family.   Hyponatremia improved with UF.     During last hospitalization, found to have 8G  of proteinuria with mild hematuria  Serology was all negative except mildly elevated cANCA,  Unknown baseline Cr, Cr was 1.3 in Feb 2021, 2.9 in 12/2021, and 3.2 in Jan 2022  Elevated , phos 8.4 and low iron stores     Discussed with RN and the dialysis nurse    Jude Muñoz MD  06/01/22  07:15 EDT

## 2022-06-01 NOTE — PLAN OF CARE
Goal Outcome Evaluation:              Outcome Evaluation: patient has rested well tonight with no complaints.

## 2022-06-02 ENCOUNTER — TELEPHONE (OUTPATIENT)
Dept: FAMILY MEDICINE CLINIC | Facility: CLINIC | Age: 82
End: 2022-06-02

## 2022-06-02 LAB
ANION GAP SERPL CALCULATED.3IONS-SCNC: 8.7 MMOL/L (ref 5–15)
BUN SERPL-MCNC: 31 MG/DL (ref 8–23)
BUN/CREAT SERPL: 15.9 (ref 7–25)
CALCIUM SPEC-SCNC: 8 MG/DL (ref 8.6–10.5)
CHLORIDE SERPL-SCNC: 92 MMOL/L (ref 98–107)
CO2 SERPL-SCNC: 25.3 MMOL/L (ref 22–29)
CREAT SERPL-MCNC: 1.95 MG/DL (ref 0.57–1)
CRP SERPL-MCNC: 0.82 MG/DL (ref 0–0.5)
DEPRECATED RDW RBC AUTO: 51.8 FL (ref 37–54)
EGFRCR SERPLBLD CKD-EPI 2021: 25.6 ML/MIN/1.73
ERYTHROCYTE [DISTWIDTH] IN BLOOD BY AUTOMATED COUNT: 16 % (ref 12.3–15.4)
GLUCOSE BLDC GLUCOMTR-MCNC: 220 MG/DL (ref 70–130)
GLUCOSE BLDC GLUCOMTR-MCNC: 230 MG/DL (ref 70–130)
GLUCOSE SERPL-MCNC: 214 MG/DL (ref 65–99)
HCT VFR BLD AUTO: 24.7 % (ref 34–46.6)
HGB BLD-MCNC: 8 G/DL (ref 12–15.9)
MCH RBC QN AUTO: 28.8 PG (ref 26.6–33)
MCHC RBC AUTO-ENTMCNC: 32.4 G/DL (ref 31.5–35.7)
MCV RBC AUTO: 88.8 FL (ref 79–97)
PLATELET # BLD AUTO: 316 10*3/MM3 (ref 140–450)
PMV BLD AUTO: 9.8 FL (ref 6–12)
POTASSIUM SERPL-SCNC: 3.9 MMOL/L (ref 3.5–5.2)
RBC # BLD AUTO: 2.78 10*6/MM3 (ref 3.77–5.28)
SODIUM SERPL-SCNC: 126 MMOL/L (ref 136–145)
SODIUM SERPL-SCNC: 127 MMOL/L (ref 136–145)
SODIUM SERPL-SCNC: 128 MMOL/L (ref 136–145)
SODIUM SERPL-SCNC: 130 MMOL/L (ref 136–145)
WBC NRBC COR # BLD: 12.65 10*3/MM3 (ref 3.4–10.8)

## 2022-06-02 PROCEDURE — 82962 GLUCOSE BLOOD TEST: CPT

## 2022-06-02 PROCEDURE — 94799 UNLISTED PULMONARY SVC/PX: CPT

## 2022-06-02 PROCEDURE — 25010000002 HEPARIN (PORCINE) PER 1000 UNITS: Performed by: HOSPITALIST

## 2022-06-02 PROCEDURE — 84295 ASSAY OF SERUM SODIUM: CPT | Performed by: INTERNAL MEDICINE

## 2022-06-02 PROCEDURE — 5A1D70Z PERFORMANCE OF URINARY FILTRATION, INTERMITTENT, LESS THAN 6 HOURS PER DAY: ICD-10-PCS | Performed by: INTERNAL MEDICINE

## 2022-06-02 PROCEDURE — 86140 C-REACTIVE PROTEIN: CPT | Performed by: INTERNAL MEDICINE

## 2022-06-02 PROCEDURE — 80048 BASIC METABOLIC PNL TOTAL CA: CPT | Performed by: INTERNAL MEDICINE

## 2022-06-02 PROCEDURE — 97116 GAIT TRAINING THERAPY: CPT

## 2022-06-02 PROCEDURE — 85027 COMPLETE CBC AUTOMATED: CPT | Performed by: HOSPITALIST

## 2022-06-02 PROCEDURE — 97110 THERAPEUTIC EXERCISES: CPT

## 2022-06-02 PROCEDURE — 99231 SBSQ HOSP IP/OBS SF/LOW 25: CPT | Performed by: INTERNAL MEDICINE

## 2022-06-02 PROCEDURE — 94761 N-INVAS EAR/PLS OXIMETRY MLT: CPT

## 2022-06-02 RX ORDER — IPRATROPIUM BROMIDE AND ALBUTEROL SULFATE 2.5; .5 MG/3ML; MG/3ML
3 SOLUTION RESPIRATORY (INHALATION) EVERY 6 HOURS PRN
Status: DISCONTINUED | OUTPATIENT
Start: 2022-06-02 | End: 2022-06-07

## 2022-06-02 RX ADMIN — HEPARIN SODIUM 5000 UNITS: 5000 INJECTION INTRAVENOUS; SUBCUTANEOUS at 05:47

## 2022-06-02 RX ADMIN — Medication 10 ML: at 08:57

## 2022-06-02 RX ADMIN — IPRATROPIUM BROMIDE AND ALBUTEROL SULFATE 3 ML: .5; 3 SOLUTION RESPIRATORY (INHALATION) at 06:52

## 2022-06-02 RX ADMIN — CARVEDILOL 6.25 MG: 6.25 TABLET, FILM COATED ORAL at 17:19

## 2022-06-02 RX ADMIN — CALCIUM ACETATE 1334 MG: 667 CAPSULE ORAL at 08:57

## 2022-06-02 RX ADMIN — ATORVASTATIN CALCIUM 80 MG: 40 TABLET, FILM COATED ORAL at 20:56

## 2022-06-02 RX ADMIN — IPRATROPIUM BROMIDE AND ALBUTEROL SULFATE 3 ML: .5; 3 SOLUTION RESPIRATORY (INHALATION) at 01:11

## 2022-06-02 RX ADMIN — PAROXETINE HYDROCHLORIDE 10 MG: 20 TABLET, FILM COATED ORAL at 05:47

## 2022-06-02 RX ADMIN — HEPARIN SODIUM 5000 UNITS: 5000 INJECTION INTRAVENOUS; SUBCUTANEOUS at 20:57

## 2022-06-02 RX ADMIN — ASPIRIN 81 MG: 81 TABLET, COATED ORAL at 08:56

## 2022-06-02 RX ADMIN — GUAIFENESIN 600 MG: 600 TABLET, EXTENDED RELEASE ORAL at 08:57

## 2022-06-02 RX ADMIN — GUAIFENESIN 600 MG: 600 TABLET, EXTENDED RELEASE ORAL at 20:56

## 2022-06-02 RX ADMIN — FOLIC ACID 1 MG: 1 TABLET ORAL at 08:56

## 2022-06-02 RX ADMIN — CALCIUM ACETATE 1334 MG: 667 CAPSULE ORAL at 17:19

## 2022-06-02 RX ADMIN — Medication 10 ML: at 20:55

## 2022-06-02 RX ADMIN — IPRATROPIUM BROMIDE AND ALBUTEROL SULFATE 3 ML: .5; 3 SOLUTION RESPIRATORY (INHALATION) at 19:24

## 2022-06-02 RX ADMIN — HEPARIN SODIUM 5000 UNITS: 5000 INJECTION INTRAVENOUS; SUBCUTANEOUS at 14:13

## 2022-06-02 NOTE — THERAPY TREATMENT NOTE
Acute Care - Physical Therapy Treatment Note  [facility]     Patient Name: Sean Chen  : 1940  MRN: [id number]  Today's Date: 2022   Onset of Illness/Injury or Date of Surgery: 22  Visit Dx:     ICD-10-CM ICD-9-CM   1. Acute respiratory failure with hypoxia (HCC)  J96.01 518.81     Patient Active Problem List   Diagnosis   • Iron deficiency anemia   • Type 2 diabetes mellitus without complication, without long-term current use of insulin (HCC)   • Vitamin D deficiency disease   • Extrinsic asthma   • Essential hypertension   • Mixed hyperlipidemia   • Generalized osteoarthritis   • Gastroesophageal reflux disease without esophagitis   • Meniere's disease   • Chronic seasonal allergic rhinitis   • Low serum vitamin B12   • Memory impairment   • Bradycardia   • Hypertensive urgency   • Dementia without behavioral disturbance (HCC)   • Hyponatremia   • NSTEMI (non-ST elevated myocardial infarction) (HCC)   • Prolonged Q-T interval on ECG   • Anemia requiring transfusions   • Acute hypoxemic respiratory failure (HCC)   • Moderate malnutrition (HCC)     Past Medical History:   Diagnosis Date   • Allergic rhinitis    • Elevated liver enzymes    • Extrinsic asthma    • Gastritis    • GERD (gastroesophageal reflux disease)    • Hyperlipidemia    • Hypertension    • Iron deficiency anemia    • Meniere's disease    • Osteoarthritis    • Type 2 diabetes mellitus (HCC)    • Vitamin D deficiency disease      Past Surgical History:   Procedure Laterality Date   • APPENDECTOMY     • CARDIAC CATHETERIZATION N/A 2022    Procedure: Left Heart Cath;  Surgeon: Marcelino Grande MD;  Location: [facility] CATH INVASIVE LOCATION;  Service: Cardiology;  Laterality: N/A;   • CHOLECYSTECTOMY     • HYSTERECTOMY     • INSERTION HEMODIALYSIS CATHETER N/A 2022    Procedure: HEMODIALYSIS CATHETER INSERTION;  Surgeon: Hans Coates MD;  Location: [facility] OR;  Service: General;  Laterality: N/A;     PT Assessment (last 12  hours)     PT Evaluation and Treatment     Row Name 06/02/22 1051          Physical Therapy Time and Intention    Subjective Information no complaints  -HR     Document Type therapy note (daily note)  -HR     Mode of Treatment physical therapy  -HR     Patient Effort adequate  -HR     Comment Pt and RN Negar in agreement for PT  -HR     Row Name 06/02/22 1051          General Information    Patient Profile Reviewed yes  -HR     Equipment Currently Used at Home crutches, axillary  -HR     Existing Precautions/Restrictions fall;oxygen therapy device and L/min  -HR     Limitations/Impairments safety/cognitive  -HR     Row Name 06/02/22 1051          Cognition    Affect/Mental Status (Cognition) confused  -HR     Orientation Status (Cognition) oriented to;person  -HR     Follows Commands (Cognition) follows one-step commands  -HR     Personal Safety Interventions fall prevention program maintained;gait belt;nonskid shoes/slippers when out of bed;supervised activity  -HR     Row Name 06/02/22 1051          Bed Mobility    Bed Mobility bed mobility (all) activities  -HR     All Activities, [place] (Bed Mobility) minimum assist (75% patient effort)  -HR     Bed Mobility, Safety Issues decreased use of arms for pushing/pulling;decreased use of legs for bridging/pushing  -HR     Assistive Device (Bed Mobility) bed rails  -HR     Row Name 06/02/22 1051          Transfers    Transfers sit-stand transfer;stand-sit transfer;toilet transfer  -HR     Sit-Stand [place] (Transfers) minimum assist (75% patient effort);contact guard  -HR     Stand-Sit [place] (Transfers) minimum assist (75% patient effort);contact guard  -HR     [place] Level (Toilet Transfer) contact guard;minimum assist (75% patient effort);nonverbal cues (demo/gesture);verbal cues  -HR     Assistive Device (Toilet Transfer) other (see comments)  HHA  -HR     Row Name 06/02/22 1051          Sit-Stand Transfer    Assistive Device (Sit-Stand  Transfers) walker, front-wheeled  -HR     Row Name 06/02/22 1051          Stand-Sit Transfer    Assistive Device (Stand-Sit Transfers) walker, front-wheeled  -HR     Row Name 06/02/22 1051          Toilet Transfer    Type (Toilet Transfer) stand pivot/stand step  -HR     Row Name 06/02/22 1051          Gait/Stairs (Locomotion)    [place] Level (Gait) minimum assist (75% patient effort);contact guard  -HR     Assistive Device (Gait) walker, front-wheeled  -HR     Distance in Feet (Gait) 140'  -HR     Pattern (Gait) step-to  -HR     Deviations/Abnormal Patterns (Gait) gait speed decreased;weight shifting decreased  -HR     Row Name 06/02/22 1051          Motor Skills    Therapeutic Exercise other (see comments)  Sitting: LAQ, march, AP, kneees in/out  -HR     Row Name 06/02/22 1051          Coping    Observed Emotional State calm;cooperative  -HR     Verbalized Emotional State acceptance  -HR     Row Name 06/02/22 1051          Plan of Care Review    Outcome Evaluation Pt does well and is eager to work with PT on this date. Pt walks 140' with RW CGA/min A and completed sitting exercises until fatqiue. BR transfer to [facility] CGA/min A  -HR     Row Name 06/02/22 1051          Positioning and Restraints    Pre-Treatment Position in bed  -HR     Post Treatment Position chair  -HR     In Chair sitting;call light within reach;encouraged to call for assist;exit alarm on;notified nsg  -HR     Row Name 06/02/22 1051          Therapy Assessment/Plan (PT)    Rehab Potential (PT) good, to achieve stated therapy goals  -HR     Criteria for Skilled Interventions Met (PT) yes;skilled treatment is necessary  -HR     Therapy Frequency (PT) 2 times/wk  2-5 times/wk as available  -HR     Activity Limitations Related to Problem List (PT) unable to ambulate safely;unable to transfer safely  -HR     Row Name 06/02/22 1051          Physical Therapy Goals    Bed Mobility Goal Selection (PT) bed mobility, PT goal 1  -HR     Transfer Goal  Selection (PT) transfer, PT goal 1  -HR     Gait Training Goal Selection (PT) gait training, PT goal 1  -HR     Row Name 06/02/22 1051          Bed Mobility Goal 1 (PT)    Activity/Assistive Device (Bed Mobility Goal 1, PT) bed mobility activities, all  -HR     [place] Level/Cues Needed (Bed Mobility Goal 1, PT) contact guard required  -HR     Time Frame (Bed Mobility Goal 1, PT) by discharge  -HR     Row Name 06/02/22 1051          Transfer Goal 1 (PT)    Activity/Assistive Device (Transfer Goal 1, PT) sit-to-stand/stand-to-sit;bed-to-chair/chair-to-bed  -HR     [place] Level/Cues Needed (Transfer Goal 1, PT) contact guard required  -HR     Time Frame (Transfer Goal 1, PT) by discharge  -HR     Row Name 06/02/22 1051          Gait Training Goal 1 (PT)    Activity/Assistive Device (Gait Training Goal 1, PT) gait (walking locomotion);assistive device use  -HR     [place] Level (Gait Training Goal 1, PT) contact guard required  -HR     Distance (Gait Training Goal 1, PT) 10  -HR     Time Frame (Gait Training Goal 1, PT) by discharge  -HR           User Key  (r) = Recorded By, (t) = Taken By, (c) = Cosigned By    Initials Name Provider Type    HR Yeimi Araiza PTA Physical Therapist Assistant                  PT Recommendation and Plan  Anticipated Discharge Disposition (PT): inpatient rehabilitation facility, skilled nursing facility  Therapy Frequency (PT): 2 times/wk (2-5 times/wk as available)  Outcome Evaluation: Pt does well and is eager to work with PT on this date. Pt walks 140' with RW CGA/min A and completed sitting exercises until fatqiue. BR transfer to [facility] CGA/min A       Time Calculation:    PT Charges     Row Name 06/02/22 1058             Time Calculation    PT Received On 06/02/22  -HR              Time Calculation- PT    Total Timed Code Minutes- PT 25 minute(s)  -HR            User Key  (r) = Recorded By, (t) = Taken By, (c) = Cosigned By    Initials Name Provider Type    HR Jose G  KAHLIL Jalloh Physical Therapist Assistant              Therapy Charges for Today     Code Description Service Date Service Provider Modifiers Qty    [id number] HC GAIT TRAINING EA 15 MIN 6/2/2022 Yeimi Araiza PTA GP, CQ 1    [id number]  PT THER PROC EA 15 MIN 6/2/2022 Yeimi rAaiza PTA GP, CQ 1               Yeimi Araiza PTA  6/2/2022

## 2022-06-02 NOTE — PLAN OF CARE
Goal Outcome Evaluation:              Outcome Evaluation: patient has rested well tonight with no complaints. no changes throughout shift.

## 2022-06-02 NOTE — TELEPHONE ENCOUNTER
Caller: Dylon Chen Jr    Relationship: Emergency Contact    Best call back number: [phone]    What medication are you requesting: HOSPITAL BED WITH BED SHEETS, BEDSIDE POTTY, WHEELCHAIR, BED PADS, SHOWER CHAIR, TABLE WITH ROLLERS LIKE THEY HAVE IN THE HOSPITAL, DEPENDS, HOSPITAL GOWN     What are your current symptoms:    How long have you been experiencing symptoms:     Have you had these symptoms before:    [] Yes  [] No    Have you been treated for these symptoms before:   [] Yes  [] No    If a prescription is needed, what is your preferred pharmacy and phone number: PER PCP    Additional notes: PATIENT WILL BE COMING HOME FROM [facility] SOON AND PATIENT'S SON WILL NEED SOME EQUIPMENT TO TAKE CARE OF THE PATIENT. PATIENT'S SON STATES HE AND HIS WIFE WILL BE TAUGHT HOW TO PROVIDE IN HOME DIALYSIS FOR THE PATIENT. PATIENT'S SON WOULD ALSO LIKE TO ASK IF PCP WOULD RECOMMEND ANY OTHER SUPPLIES TO TAKE CARE OF THE PATIENT AS WELL. PATIENT'S SON STATES ONE OF THE HEAD NURSES MENTIONED TO HIM THAT HOME HEALTH COULD ALSO COME CHECK ON THE PATIENT A COUPLE TIMES A WEEK AS WELL.     CALLBACK: YES

## 2022-06-02 NOTE — PLAN OF CARE
Goal Outcome Evaluation:  Plan of Care Reviewed With: patient        Progress: improving  Outcome Evaluation: pt has followed POC well this shift. ambulated in hallway with PT, tolerated well, returned from dialysis doing well, pt eat better. Son and  at bedside earlier. Son stated he is willing to learn whatever care taking skills he needed to learn to take his mom home. Will continue POC.

## 2022-06-02 NOTE — PROGRESS NOTES
Nephrology Progress Note      Subjective     No chest pain or shortness of breath.     Objective       Vital signs :     Temp:  [97.7 °F (36.5 °C)-98.3 °F (36.8 °C)] 97.7 °F (36.5 °C)  Heart Rate:  [54-76] 62  Resp:  [12-28] 24  BP: (146-180)/(66-86) 180/86      Intake/Output Summary (Last 24 hours) at 6/2/2022 0739  Last data filed at 6/1/2022 2007  Gross per 24 hour   Intake 720 ml   Output 350 ml   Net 370 ml       Physical Exam:    General Appearance :   Lungs : clear to auscultation, respirations regular  Heart :  regular rhythm & normal rate, normal S1, S2 and no murmur, no rub  Abdomen : normal bowel sounds, no masses, no hepatomegaly, no splenomegaly, soft non-tender and no guarding  Extremities : moves extremities well, no edema, no cyanosis and no redness  Neurologic :   Mild disorientation      Laboratory Data :     Albumin Albumin   Date Value Ref Range Status   05/31/2022 2.70 (L) 3.50 - 5.20 g/dL Final      Magnesium Magnesium   Date Value Ref Range Status   05/31/2022 2.5 (H) 1.6 - 2.4 mg/dL Final          PTH               No results found for: PTH    CBC and coagulation:  Results from last 7 days   Lab Units 06/02/22  0008 06/01/22  0021 05/31/22  0014   CRP mg/dL 0.82* 1.11* 1.56*   WBC 10*3/mm3 12.65* 10.80 12.11*   HEMOGLOBIN g/dL 8.0* 7.8* 8.2*   HEMATOCRIT % 24.7* 24.0* 25.5*   MCV fL 88.8 88.2 88.9   MCHC g/dL 32.4 32.5 32.2   PLATELETS 10*3/mm3 316 307 329     Acid/base balance:      Renal and electrolytes:  Results from last 7 days   Lab Units 06/02/22  0546 06/02/22  0008 06/01/22  1744 06/01/22  1232 06/01/22  0547 06/01/22  0021 05/31/22  0532 05/31/22  0014 05/30/22  1242 05/30/22  0949 05/30/22  0239 05/29/22  0526   SODIUM mmol/L 127* 126* 126* 126* 127* 126*   < > 125*   < > 122* 123* 126*   POTASSIUM mmol/L  --  3.9  --   --   --  3.5  --  4.5  --  3.9 4.4 3.7   MAGNESIUM mg/dL  --   --   --   --   --   --   --  2.5*  --   --  2.7* 1.7   CHLORIDE mmol/L  --  92*  --   --   --  92*   --  93*  --  89* 90* 93*   CO2 mmol/L  --  25.3  --   --   --  24.8  --  20.6*  --  23.3 22.7 24.6   BUN mg/dL  --  31*  --   --   --  23  --  35*  --  27* 24* 18   CREATININE mg/dL  --  1.95*  --   --   --  1.63*  --  2.31*  --  2.15* 2.07* 1.65*   CALCIUM mg/dL  --  8.0*  --   --   --  7.4*  --  7.8*  --  8.0* 7.8* 7.8*    < > = values in this interval not displayed.     Estimated Creatinine Clearance: 20.7 mL/min (A) (by C-G formula based on SCr of 1.95 mg/dL (H)).    Liver and pancreatic function:  Results from last 7 days   Lab Units 05/31/22  0014 05/30/22  0239 05/29/22  0526   ALBUMIN g/dL 2.70* 2.61* 2.59*   BILIRUBIN mg/dL 0.3 0.2 0.3   ALK PHOS U/L 135* 94 95   AST (SGOT) U/L 14 12 11   ALT (SGPT) U/L 7 6 6         Cardiac:      Liver and pancreatic function:  Results from last 7 days   Lab Units 05/31/22  0014 05/30/22  0239 05/29/22  0526   ALBUMIN g/dL 2.70* 2.61* 2.59*   BILIRUBIN mg/dL 0.3 0.2 0.3   ALK PHOS U/L 135* 94 95   AST (SGOT) U/L 14 12 11   ALT (SGPT) U/L 7 6 6       Medications :     aspirin, 81 mg, Oral, Daily  atorvastatin, 80 mg, Oral, Nightly  calcium acetate, 1,334 mg, Oral, TID With Meals  carvedilol, 6.25 mg, Oral, BID With Meals  folic acid, 1 mg, Oral, Daily  guaiFENesin, 600 mg, Oral, Q12H  heparin (porcine), 5,000 Units, Subcutaneous, Q8H  ipratropium-albuterol, 3 mL, Nebulization, 4x Daily - RT  PARoxetine, 10 mg, Oral, QAM  sodium chloride, 10 mL, Intravenous, Q12H             Assessment & Plan     1. PRITI on CKD vs rapidly worsening CKD, started on dialysis on 5/10/22  2. Metabolic acidosis  3. Anemia  4. Acute on chronic respiratory failure  5. DM-II  6. Essential hypertension  7. Hypokalemia  8. hyponatremia  9. Metabolic encephalopathy likely multifactorial    Pt is lethargic and likely due to excessive UF with dialysis, will give 1L back, if she gets better can be discharged home to continue outpatient follow up in dialysis clinic and watch for recovery    During last  hospitalization, found to have 8G of proteinuria with mild hematuria  Serology was all negative except mildly elevated cANCA,  Unknown baseline Cr, Cr was 1.3 in Feb 2021, 2.9 in 12/2021, and 3.2 in Jan 2022  Elevated , phos 8.4 and low iron stores     Discussed with RN and the dialysis nurse    Jude Muñoz MD  06/02/22  07:39 EDT

## 2022-06-02 NOTE — PROGRESS NOTES
[facility] HOSPITALIST PROGRESS NOTE     Patient Identification:  Name:  Sean Chen  Age:  81 y.o.  Sex:  female  :  1940  MRN:  [id number]  Visit Number:  [id number]  ROOM: [address] [place] [address]     Primary Care Provider:  Ganga Gallardo MD    Length of stay in inpatient status:  10    Subjective     Chief Compliant:    Chief Complaint   Patient presents with   • Shortness of Breath       History of Presenting Illness:    Patient reports feeling well with no new complaints. I hopeful to get to go home soon. Reports being compliant with fluid restriction and diet.     ROS:  Otherwise 10 point ROS negative other than documented above in HPI.     Objective     Current Hospital Meds:aspirin, 81 mg, Oral, Daily  atorvastatin, 80 mg, Oral, Nightly  calcium acetate, 1,334 mg, Oral, TID With Meals  carvedilol, 6.25 mg, Oral, BID With Meals  folic acid, 1 mg, Oral, Daily  guaiFENesin, 600 mg, Oral, Q12H  heparin (porcine), 5,000 Units, Subcutaneous, Q8H  PARoxetine, 10 mg, Oral, QAM  sodium chloride, 10 mL, Intravenous, Q12H         Current Antimicrobial Therapy:  Anti-Infectives (From admission, onward)    Ordered     Dose/Rate Route Frequency Start Stop    22 0859  aztreonam (AZACTAM) 500 mg in sodium chloride 0.9 % 100 mL IVPB        Ordering Provider: Dylon Mcdonnell MD    500 mg  over 4 Hours Intravenous Every 12 Hours 22 1800 22 0905    22 1826  metroNIDAZOLE (FLAGYL) IVPB 500 mg        Ordering Provider: Dylon Mcdonnell MD    500 mg  over 30 Minutes Intravenous Every 8 Hours 22 2000 22 1230    22 1209  aztreonam (AZACTAM) 2 g in sodium chloride 0.9 % 100 mL IVPB-VTB        Ordering Provider: Jone Schaffer MD    2 g  200 mL/hr over 30 Minutes Intravenous Once 22 1211 22 1404    22 1208  vancomycin 1250 mg/250 mL 0.9% NS IVPB ([facility])        Ordering Provider: Jone Schaffer MD    20 mg/kg × 66.7  kg  over 60 Minutes Intravenous Once 05/23/22 1210 05/23/22 1605        Current Diuretic Therapy:  Diuretics (From admission, onward)    None        ----------------------------------------------------------------------------------------------------------------------  Vital Signs:  Temp:  [97.7 °F (36.5 °C)-98.2 °F (36.8 °C)] 98 °F (36.7 °C)  Heart Rate:  [62-76] 76  Resp:  [12-24] 20  BP: (148-180)/(59-86) 158/59  SpO2:  [94 %-99 %] 97 %  on   ;   Device (Oxygen Therapy): room air  Body mass index is 21.55 kg/m².    Wt Readings from Last 3 Encounters:   05/31/22 57 kg (125 lb 9.6 oz)   05/13/22 67.1 kg (147 lb 14.4 oz)   01/27/22 71.2 kg (157 lb)     Intake & Output (last 3 days)       05/30 0701 05/31 0700 05/31 0701 06/01 0700 06/01 0701  06/02 0700 06/02 0701  06/03 0700    P.O. 300 720 720 240    Total Intake(mL/kg) 300 (5.3) 720 (12.6) 720 (12.6) 240 (4.2)    Urine (mL/kg/hr) 240 (0.2) 355 (0.3) 350 (0.3)     Other  1000  3000    Stool 0       Total Output 240 8011 100 2295    Net +60 -635 +370 -2760            Stool Unmeasured Occurrence 2 x           Diet Soft Texture; Chopped; Thin; Daily Fluid Restriction; Other; 800  ----------------------------------------------------------------------------------------------------------------------  Physical exam:  Constitutional:  Well-developed and well-nourished.  No respiratory distress.      HENT:  Head:  Normocephalic and atraumatic.  Mouth:  Moist mucous membranes.    Eyes:  Conjunctivae and EOM are normal. No scleral icterus.    Neck:  Neck supple.  No JVD present.    Cardiovascular:  Normal rate, regular rhythm and normal heart sounds with no murmur.  Pulmonary/Chest:  No respiratory distress, no wheezes, no crackles, with normal breath sounds and good air movement.  Abdominal:  Soft.  Bowel sounds are normal.  No distension and no tenderness.   Musculoskeletal:  No edema, no tenderness, and no deformity.  No red or swollen joints anywhere.    Neurological:   Alert and oriented to person, place, and time.  No cranial nerve deficit.  No tongue deviation.  No facial droop.  No slurred speech.   Skin:  Skin is warm and dry. No rash noted. No pallor.   Peripheral vascular:  Pulses in all 4 extremities with no clubbing, no cyanosis, no edema.  ----------------------------------------------------------------------------------------------------------------------  Tele:    ----------------------------------------------------------------------------------------------------------------------  Results from last 7 days   Lab Units 06/02/22  0008 06/01/22  0021 05/31/22  0014   CRP mg/dL 0.82* 1.11* 1.56*   WBC 10*3/mm3 12.65* 10.80 12.11*   HEMOGLOBIN g/dL 8.0* 7.8* 8.2*   HEMATOCRIT % 24.7* 24.0* 25.5*   MCV fL 88.8 88.2 88.9   MCHC g/dL 32.4 32.5 32.2   PLATELETS 10*3/mm3 316 307 329         Results from last 7 days   Lab Units 06/02/22  1535 06/02/22  0546 06/02/22  0008 06/01/22  0547 06/01/22  0021 05/31/22  0532 05/31/22  0014 05/30/22  0949 05/30/22  0239 05/29/22  0526   SODIUM mmol/L 130* 127* 126*   < > 126*   < > 125*   < > 123* 126*   POTASSIUM mmol/L  --   --  3.9  --  3.5  --  4.5   < > 4.4 3.7   MAGNESIUM mg/dL  --   --   --   --   --   --  2.5*  --  2.7* 1.7   CHLORIDE mmol/L  --   --  92*  --  92*  --  93*   < > 90* 93*   CO2 mmol/L  --   --  25.3  --  24.8  --  20.6*   < > 22.7 24.6   BUN mg/dL  --   --  31*  --  23  --  35*   < > 24* 18   CREATININE mg/dL  --   --  1.95*  --  1.63*  --  2.31*   < > 2.07* 1.65*   CALCIUM mg/dL  --   --  8.0*  --  7.4*  --  7.8*   < > 7.8* 7.8*   GLUCOSE mg/dL  --   --  214*  --  212*  --  211*   < > 147* 146*   ALBUMIN g/dL  --   --   --   --   --   --  2.70*  --  2.61* 2.59*   BILIRUBIN mg/dL  --   --   --   --   --   --  0.3  --  0.2 0.3   ALK PHOS U/L  --   --   --   --   --   --  135*  --  94 95   AST (SGOT) U/L  --   --   --   --   --   --  14  --  12 11   ALT (SGPT) U/L  --   --   --   --   --   --  7  --  6 6    < > = values  in this interval not displayed.   Estimated Creatinine Clearance: 20.7 mL/min (A) (by C-G formula based on SCr of 1.95 mg/dL (H)).  No results found for: AMMONIA              Glucose   Date/Time Value Ref Range Status   06/02/2022 1631 230 (H) 70 - 130 mg/dL Final     Comment:     Meter: [id number] : [id number] INA DUNCAN   06/02/2022 0622 220 (H) 70 - 130 mg/dL Final     Comment:     Meter: [id number] : [id number] Tonia Tarango   06/01/2022 1701 228 (H) 70 - 130 mg/dL Final     Comment:     Meter: [id number] : [id number] MAYRA DUNCAN   06/01/2022 1110 258 (H) 70 - 130 mg/dL Final     Comment:     Meter: [id number] : [id number] MAYRA DUNCAN   06/01/2022 0624 165 (H) 70 - 130 mg/dL Final     Comment:     Meter: [id number] : [id number] Violet Lopez   05/31/2022 2315 243 (H) 70 - 130 mg/dL Final     Comment:     Meter: [id number] : [id number] FELIPE DIOR   05/31/2022 1020 294 (H) 70 - 130 mg/dL Final     Comment:     Meter: [id number] : [id number] deangelo menjivar   05/31/2022 0614 173 (H) 70 - 130 mg/dL Final     Comment:     Meter: [id number] : [id number] Tonia Tarango     Lab Results   Component Value Date    TSH 0.677 05/23/2022    FREET4 1.77 (H) 05/23/2022     No results found for: PREGTESTUR, PREGSERUM, HCG, HCGQUANT  Pain Management Panel     Pain Management Panel Latest Ref Rng & Units 5/23/2022 5/5/2022    CREATININE UR mg/dL - 67.4    AMPHETAMINES SCREEN, URINE Negative Negative -    BARBITURATES SCREEN Negative Negative -    BENZODIAZEPINE SCREEN, URINE Negative Negative -    BUPRENORPHINEUR Negative Negative -    COCAINE SCREEN, URINE Negative Negative -    METHADONE SCREEN, URINE Negative Negative -    METHAMPHETAMINEUR Negative Negative -        Brief Urine Lab Results  (Last result in the past 365 days)      Color   Clarity   Blood   Leuk Est   Nitrite   Protein   CREAT   Urine HCG        05/23/22 1422 Yellow   Clear   Negative   Trace   Negative   >=300 mg/dL  (3+)               No results found for: BLOODCX  No results found for: URINECX  No results found for: WOUNDCX  No results found for: STOOLCX  No results found for: RESPCX  No results found for: AFBCX  Results from last 7 days   Lab Units 06/02/22  0008 06/01/22  0021 05/31/22  0014 05/30/22  0239 05/29/22  1146   CRP mg/dL 0.82* 1.11* 1.56* 1.32* 1.76*       I have personally looked at the labs and they are summarized above.  ----------------------------------------------------------------------------------------------------------------------  Detailed radiology reports for the last 24 hours:    Imaging Results (Last 24 Hours)     ** No results found for the last 24 hours. **        Assessment & Plan    #Sepsis, Acute Metabolic Encephalopathy & Acute Hypoxic Respiratory Failure due to Pneumonia, Bacterial, treating for MDR Organisms/Aspiration & Parainfluenza Virus in setting of Immunocompromised stated from underlying Rheumatoid Arthritis  - Patient presented after recent hospitalization with increased shortness of breath and new oxygen requirement  - Patient meets SIRS criteria due to respiratory rate > 20, WBC count > 12K, noted to have mental status change from baseline  - Admission labs showed WBC count 15K, CRP 3.62, procalcitonin 0.89, lactate 1.7, Coronavirus/flu negative, MRSA negative, ABG with P02 down to 37.5, blood cultures negative to date, strep pneumo negative, viral respiratory panel + for parainfluenza virus  - CT Head showed no acute intracranial abnormality   - CT Chest showed RML and RLL airspace consolidative disease  - SLP consulted; Evaluated with diet recommendations 5/24, no noted signs and symptoms aspiration though noted mild oral weakness felt due to fatigue and medical condition  - Completed Aztreonam and Flagyl x 7 days  - Continue APAP PRN for fevers, Guaifenesin for congestion, Duonebs as needed  - Monitor on telemetry, PT/OT consulted and following for weakness/debility      #Nonoliguric Acute Kidney Injury on Chronic Kidney Disease vs ESRD with remote history Acute Kidney Injury requiring iHD in setting of mildly elevated cANCA complicated by proteinuria and mild hematuria - Improved   - Last admission creatinine was up to 5.67, improved to 1.74 at time of discharge, had elevated lambda/kappa, this admission up to 3.85, had started on hemodialysis last admission 5/10; Today now > 2  - Renal ultrasound showed concern for dense vascular calcifications or non-obstructing stones but no evidence of hydronephrosis was seen  - Consult Nephrology; Following for hemodialysis needs, concern for intermittent urinary retention, following creatinine and urine output post iHD 5/28. Family interested in peritoneal dialysis.      #Hypertension/Hyperlipidemia/Coronary Artery Disease  #Acute on Chronic HFpEF, diastolic dysfunction complicated by edema and pleural effusions now with noted reduced EF   #Severe Pulmonary Hypertension  #Small Pericardial Effusion  #Valvular Disease - Mild AS, Mild MR, mod TR  #Elevated Troponins, suspected NSTEMI Type II  #Prolonged Qtc (566ms)  - Labs showed Troponins 0.06->0.04, proBNP > 70K  - EKG showed normal sinus rhythm, intermittent PAC's, RBBB, Non-specific ST/T wave abnormality  - Echocardiogram 5/5 showed LVEF 66-70%, mild concentric hypertrophy, diastolic dysfunction, dilated left atrium, mild AS, mild MR, mod TR, severe Pulmonary Hypertension, RVSP > 55mmHg, small <1cm pericardial effusion; Repeat limited echocardiogram showed borderline dilated left ventricle, LVEF 36-40%, diastolic dysfunction    - [facility] 5/11 showed no-obstructive Coronary Artery Disease   - CT Chest showed Congestive Heart Failure/edema with R>L pleural effusion, cardiomegaly, severe coronary calcifications  - Cardiology consulted; Following, recommended goal directed medical therapy, signed off  - Continue home Aspirin 81, Statin  - Continue home Nifedipine, titrate as indicated   -  Continue new Coreg, titrate as indicated   - No home ACEI/ARB/ARNI due to renal failure  - Continue to monitor on telemetry, strict I/O's, trend heart rate and blood pressure     #Non-Insulin Dependent Diabetes Mellitus Type II, controlled, unknown complications  - Hgb A1c = 5.5%  - Holding home oral agents, continue FSBG and SSI, add long acting as indicated.     #Electrolyte Abnormalities  - Acute Mod Hypokalemia - potassium down to 2.8, Replacing per protocol - Resolved   - Borderline Hypomagnesemia - magnesium down to 1.7, Replaced per protocol - Resolved   - Acute Hypervolemic Hyponatremia - sodium continues to vary in 120's. Continue salt and fluid restriction as per nephrology. Discussed nephrology, if patient can stay at or above 126 until AM labs, ok to discharge.      #Anxiety/Depression  - Continue home regimen     #Gastroesophageal Reflux Disease  - No home PPI, monitor symptoms     #Dementia  - CT showed generalized cerebral atrophy and nonspecific periventricular hypodensities thought to represent microvascular changes.   - Review home meds and resume pending med rec, supportive care     #Debility  - Consult PT/OT, Social Work if placement needed     #Mod Malnutrition  - Nutrition consulted and following        F: Oral  E: Monitor & Replace PRN  N: Diet Soft Texture; Chopped; Thin; Renal, Cardiac    Ppx: SQH  Code Status (Patient has no pulse and is not breathing): CPR (Attempt to Resuscitate)  Medical Interventions (Patient has pulse or is breathing): Full Support     Dispo: Pending clinical improvement, PT/OT recommended inpatient rehab vs skilled nursing facility. Family and patient opting to go home at discharge. She is to continue iHD MWF at [facility] with plan to potentially switch to pDx at some point down the road.     VTE Prophylaxis:   Mechanical Order History:     None      Pharmalogical Order History:      Ordered     Dose Route Frequency Stop    05/26/22 1311  heparin (porcine)  injection 4,000 Units         4,000 Units IV Once 05/26/22 1351    05/26/22 1313  heparin (porcine) injection 3,000 Units         3,000 Units IV Once 05/26/22 1346    05/23/22 1826  heparin (porcine) 5000 UNIT/ML injection 5,000 Units         5,000 Units SC Every 8 Hours Scheduled --                  Jacob Stauffer MD  [facility]  06/02/22  17:15 EDT

## 2022-06-02 NOTE — CASE MANAGEMENT/SOCIAL WORK
Discharge Planning Assessment   [facility]     Patient Name: Sean Chen  MRN: [id number]  Today's Date: 6/2/2022    Admit Date: 5/23/2022     Discharge Plan     Row Name 06/02/22 1621       Plan    Plan Pt was discussed in [facility] today. Dr. Muñoz plans to discuss peritoneal dialysis with pt's family. Pt was utilizing [facility] for hemodialysis prior to admission on [facility] @ 10:45 am. SS to follow.              Expected Discharge Date and Time     Expected Discharge Date Expected Discharge Time    Fredy 3, 2022         DEVONTE Jacobs

## 2022-06-03 ENCOUNTER — APPOINTMENT (OUTPATIENT)
Dept: GENERAL RADIOLOGY | Facility: HOSPITAL | Age: 82
End: 2022-06-03

## 2022-06-03 LAB
A-A DO2: 26.8 MMHG (ref 0–300)
ANION GAP SERPL CALCULATED.3IONS-SCNC: 5.6 MMOL/L (ref 5–15)
ARTERIAL PATENCY WRIST A: ABNORMAL
ATMOSPHERIC PRESS: 724 MMHG
BASE EXCESS BLDA CALC-SCNC: 5.8 MMOL/L (ref 0–2)
BDY SITE: ABNORMAL
BODY TEMPERATURE: 0 C
BUN SERPL-MCNC: 24 MG/DL (ref 8–23)
BUN/CREAT SERPL: 14.9 (ref 7–25)
CALCIUM SPEC-SCNC: 8.2 MG/DL (ref 8.6–10.5)
CHLORIDE SERPL-SCNC: 94 MMOL/L (ref 98–107)
CO2 BLDA-SCNC: 30.6 MMOL/L (ref 22–33)
CO2 SERPL-SCNC: 27.4 MMOL/L (ref 22–29)
COHGB MFR BLD: 1.8 % (ref 0–5)
CREAT SERPL-MCNC: 1.61 MG/DL (ref 0.57–1)
CRP SERPL-MCNC: 1.14 MG/DL (ref 0–0.5)
DEPRECATED RDW RBC AUTO: 52.8 FL (ref 37–54)
EGFRCR SERPLBLD CKD-EPI 2021: 32.2 ML/MIN/1.73
ERYTHROCYTE [DISTWIDTH] IN BLOOD BY AUTOMATED COUNT: 16.4 % (ref 12.3–15.4)
GLUCOSE BLDC GLUCOMTR-MCNC: 169 MG/DL (ref 70–130)
GLUCOSE BLDC GLUCOMTR-MCNC: 190 MG/DL (ref 70–130)
GLUCOSE BLDC GLUCOMTR-MCNC: 191 MG/DL (ref 70–130)
GLUCOSE BLDC GLUCOMTR-MCNC: 210 MG/DL (ref 70–130)
GLUCOSE SERPL-MCNC: 174 MG/DL (ref 65–99)
HCO3 BLDA-SCNC: 29.5 MMOL/L (ref 20–26)
HCT VFR BLD AUTO: 24.3 % (ref 34–46.6)
HCT VFR BLD CALC: 25.3 % (ref 38–51)
HGB BLD-MCNC: 7.9 G/DL (ref 12–15.9)
HGB BLDA-MCNC: 8.3 G/DL (ref 13.5–17.5)
INHALED O2 CONCENTRATION: 21 %
Lab: ABNORMAL
MCH RBC QN AUTO: 28.9 PG (ref 26.6–33)
MCHC RBC AUTO-ENTMCNC: 32.5 G/DL (ref 31.5–35.7)
MCV RBC AUTO: 89 FL (ref 79–97)
METHGB BLD QL: 0.4 % (ref 0–3)
MODALITY: ABNORMAL
NOTE: ABNORMAL
NOTIFIED BY: ABNORMAL
NOTIFIED WHO: ABNORMAL
OXYHGB MFR BLDV: 94.3 % (ref 94–99)
PCO2 BLDA: 38.2 MM HG (ref 35–45)
PCO2 TEMP ADJ BLD: ABNORMAL MM[HG]
PH BLDA: 7.5 PH UNITS (ref 7.35–7.45)
PH, TEMP CORRECTED: ABNORMAL
PLATELET # BLD AUTO: 287 10*3/MM3 (ref 140–450)
PMV BLD AUTO: 9.6 FL (ref 6–12)
PO2 BLDA: 72.2 MM HG (ref 83–108)
PO2 TEMP ADJ BLD: ABNORMAL MM[HG]
POTASSIUM SERPL-SCNC: 4.4 MMOL/L (ref 3.5–5.2)
QT INTERVAL: 570 MS
QTC INTERVAL: 540 MS
RBC # BLD AUTO: 2.73 10*6/MM3 (ref 3.77–5.28)
SAO2 % BLDCOA: 96.4 % (ref 94–99)
SODIUM SERPL-SCNC: 122 MMOL/L (ref 136–145)
SODIUM SERPL-SCNC: 125 MMOL/L (ref 136–145)
SODIUM SERPL-SCNC: 126 MMOL/L (ref 136–145)
SODIUM SERPL-SCNC: 127 MMOL/L (ref 136–145)
TROPONIN T SERPL-MCNC: 0.2 NG/ML (ref 0–0.03)
VENTILATOR MODE: ABNORMAL
WBC NRBC COR # BLD: 10.47 10*3/MM3 (ref 3.4–10.8)

## 2022-06-03 PROCEDURE — 80048 BASIC METABOLIC PNL TOTAL CA: CPT | Performed by: INTERNAL MEDICINE

## 2022-06-03 PROCEDURE — 84295 ASSAY OF SERUM SODIUM: CPT | Performed by: INTERNAL MEDICINE

## 2022-06-03 PROCEDURE — 71045 X-RAY EXAM CHEST 1 VIEW: CPT | Performed by: RADIOLOGY

## 2022-06-03 PROCEDURE — 93005 ELECTROCARDIOGRAM TRACING: CPT | Performed by: INTERNAL MEDICINE

## 2022-06-03 PROCEDURE — 99231 SBSQ HOSP IP/OBS SF/LOW 25: CPT | Performed by: INTERNAL MEDICINE

## 2022-06-03 PROCEDURE — 25010000002 HEPARIN (PORCINE) PER 1000 UNITS: Performed by: HOSPITALIST

## 2022-06-03 PROCEDURE — 97116 GAIT TRAINING THERAPY: CPT

## 2022-06-03 PROCEDURE — 85027 COMPLETE CBC AUTOMATED: CPT | Performed by: HOSPITALIST

## 2022-06-03 PROCEDURE — 93010 ELECTROCARDIOGRAM REPORT: CPT | Performed by: INTERNAL MEDICINE

## 2022-06-03 PROCEDURE — 94799 UNLISTED PULMONARY SVC/PX: CPT

## 2022-06-03 PROCEDURE — 82805 BLOOD GASES W/O2 SATURATION: CPT

## 2022-06-03 PROCEDURE — 36600 WITHDRAWAL OF ARTERIAL BLOOD: CPT

## 2022-06-03 PROCEDURE — 71045 X-RAY EXAM CHEST 1 VIEW: CPT

## 2022-06-03 PROCEDURE — 94761 N-INVAS EAR/PLS OXIMETRY MLT: CPT

## 2022-06-03 PROCEDURE — 97110 THERAPEUTIC EXERCISES: CPT

## 2022-06-03 PROCEDURE — 84484 ASSAY OF TROPONIN QUANT: CPT | Performed by: INTERNAL MEDICINE

## 2022-06-03 PROCEDURE — 82962 GLUCOSE BLOOD TEST: CPT

## 2022-06-03 PROCEDURE — 97530 THERAPEUTIC ACTIVITIES: CPT

## 2022-06-03 PROCEDURE — 83050 HGB METHEMOGLOBIN QUAN: CPT

## 2022-06-03 PROCEDURE — 94760 N-INVAS EAR/PLS OXIMETRY 1: CPT

## 2022-06-03 PROCEDURE — 86140 C-REACTIVE PROTEIN: CPT | Performed by: INTERNAL MEDICINE

## 2022-06-03 PROCEDURE — 82375 ASSAY CARBOXYHB QUANT: CPT

## 2022-06-03 RX ADMIN — ATORVASTATIN CALCIUM 80 MG: 40 TABLET, FILM COATED ORAL at 20:08

## 2022-06-03 RX ADMIN — PAROXETINE HYDROCHLORIDE 10 MG: 20 TABLET, FILM COATED ORAL at 06:10

## 2022-06-03 RX ADMIN — CALCIUM ACETATE 1334 MG: 667 CAPSULE ORAL at 17:05

## 2022-06-03 RX ADMIN — IPRATROPIUM BROMIDE AND ALBUTEROL SULFATE 3 ML: .5; 3 SOLUTION RESPIRATORY (INHALATION) at 07:09

## 2022-06-03 RX ADMIN — CARVEDILOL 6.25 MG: 6.25 TABLET, FILM COATED ORAL at 17:05

## 2022-06-03 RX ADMIN — SODIUM CHLORIDE 500 ML: 9 INJECTION, SOLUTION INTRAVENOUS at 13:01

## 2022-06-03 RX ADMIN — HEPARIN SODIUM 5000 UNITS: 5000 INJECTION INTRAVENOUS; SUBCUTANEOUS at 14:10

## 2022-06-03 RX ADMIN — PAROXETINE HYDROCHLORIDE 10 MG: 20 TABLET, FILM COATED ORAL at 20:08

## 2022-06-03 RX ADMIN — HEPARIN SODIUM 5000 UNITS: 5000 INJECTION INTRAVENOUS; SUBCUTANEOUS at 20:08

## 2022-06-03 RX ADMIN — IPRATROPIUM BROMIDE AND ALBUTEROL SULFATE 3 ML: .5; 3 SOLUTION RESPIRATORY (INHALATION) at 23:03

## 2022-06-03 RX ADMIN — Medication 10 ML: at 08:57

## 2022-06-03 RX ADMIN — GUAIFENESIN 600 MG: 600 TABLET, EXTENDED RELEASE ORAL at 08:57

## 2022-06-03 RX ADMIN — ASPIRIN 81 MG: 81 TABLET, COATED ORAL at 08:57

## 2022-06-03 RX ADMIN — HEPARIN SODIUM 5000 UNITS: 5000 INJECTION INTRAVENOUS; SUBCUTANEOUS at 06:10

## 2022-06-03 RX ADMIN — FOLIC ACID 1 MG: 1 TABLET ORAL at 08:57

## 2022-06-03 RX ADMIN — CARVEDILOL 6.25 MG: 6.25 TABLET, FILM COATED ORAL at 08:57

## 2022-06-03 RX ADMIN — CALCIUM ACETATE 1334 MG: 667 CAPSULE ORAL at 13:01

## 2022-06-03 RX ADMIN — GUAIFENESIN 600 MG: 600 TABLET, EXTENDED RELEASE ORAL at 20:08

## 2022-06-03 RX ADMIN — CALCIUM ACETATE 1334 MG: 667 CAPSULE ORAL at 08:57

## 2022-06-03 NOTE — THERAPY TREATMENT NOTE
Acute Care - Occupational Therapy Treatment Note  [facility]     Patient Name: Sean Chen  : 1940  MRN: [id number]  Today's Date: 6/3/2022  Onset of Illness/Injury or Date of Surgery: 22     Referring Physician: Kecia    Admit Date: 2022       ICD-10-CM ICD-9-CM   1. Acute respiratory failure with hypoxia ([facility])  J96.01 518.81     Patient Active Problem List   Diagnosis   • Iron deficiency anemia   • Type 2 diabetes mellitus without complication, without long-term current use of insulin (HCC)   • Vitamin D deficiency disease   • Extrinsic asthma   • Essential hypertension   • Mixed hyperlipidemia   • Generalized osteoarthritis   • Gastroesophageal reflux disease without esophagitis   • Meniere's disease   • Chronic seasonal allergic rhinitis   • Low serum vitamin B12   • Memory impairment   • Bradycardia   • Hypertensive urgency   • Dementia without behavioral disturbance (HCC)   • Hyponatremia   • NSTEMI (non-ST elevated myocardial infarction) ([facility])   • Prolonged Q-T interval on ECG   • Anemia requiring transfusions   • Acute hypoxemic respiratory failure (HCC)   • Moderate malnutrition (HCC)     Past Medical History:   Diagnosis Date   • Allergic rhinitis    • Elevated liver enzymes    • Extrinsic asthma    • Gastritis    • GERD (gastroesophageal reflux disease)    • Hyperlipidemia    • Hypertension    • Iron deficiency anemia    • Meniere's disease    • Osteoarthritis    • Type 2 diabetes mellitus (HCC)    • Vitamin D deficiency disease      Past Surgical History:   Procedure Laterality Date   • APPENDECTOMY     • CARDIAC CATHETERIZATION N/A 2022    Procedure: Left Heart Cath;  Surgeon: Marcelino Grande MD;  Location: [facility] INVASIVE LOCATION;  Service: Cardiology;  Laterality: N/A;   • CHOLECYSTECTOMY     • HYSTERECTOMY     • INSERTION HEMODIALYSIS CATHETER N/A 2022    Procedure: HEMODIALYSIS CATHETER INSERTION;  Surgeon: Hans Coates MD;  Location: [facility] OR;  Service:  General;  Laterality: N/A;         OT ASSESSMENT FLOWSHEET (last 12 hours)     OT Evaluation and Treatment     Row Name 06/03/22 1251                   OT Time and Intention    Subjective Information complains of  SOA; RN aware  -        Document Type therapy note (daily note)  -HB        Mode of Treatment individual therapy;occupational therapy  -HB        Patient Effort fair  -HB                  General Information    Patient Profile Reviewed yes  -HB        Patient/Family/Caregiver Comments/Observations Patient and RN okd OT this date. BRYAN Leach reports pt has been SOA but is ok to be treated in bed.  -HB        General Observations of Patient Patient tolerated OT fair with no adverse reactions. Pt demo slight SOA but not in distress during tx.  -HB                  Pain Assessment    Pretreatment Pain Rating 0/10 - no pain  -HB        Posttreatment Pain Rating 0/10 - no pain  -HB                  Cognition    Affect/Mental Status (Cognition) confused;low arousal/lethargic;flat/blunted affect  -HB                  Motor Skills    Motor Skills coordination;functional endurance;motor control/coordination interventions  -        Motor Control/Coordination Interventions therapeutic exercise/ROM  -HB        Therapeutic Exercise shoulder;hand;wrist;elbow/forearm  -        Additional Documentation --  AAROM to BUE 2 sets 10 reps  -HB                  Positioning and Restraints    Pre-Treatment Position in bed  -HB        Post Treatment Position bed  -HB        In Bed notified nsg;encouraged to call for assist;call light within reach  BRYAN leach informed of pts disposition.  -              User Key  (r) = Recorded By, (t) = Taken By, (c) = Cosigned By    Initials Name Effective Dates     Agnes Freeman, OT 05/25/21 -                        OT Recommendation and Plan              Time Calculation:    Time Calculation- OT     Row Name 06/03/22 9687             Time Calculation- OT    Total Timed Code Minutes- OT  15 minute(s)  -            User Key  (r) = Recorded By, (t) = Taken By, (c) = Cosigned By    Initials Name Provider Type     Agnes Freeman OT Occupational Therapist              Therapy Charges for Today     Code Description Service Date Service Provider Modifiers Qty    [id number]  OT THERAPEUTIC ACT EA 15 MIN 6/3/2022 Agnes Freeman OT GO 1               Agnes Freeman OT  6/3/2022

## 2022-06-03 NOTE — PLAN OF CARE
Goal Outcome Evaluation:              Outcome Evaluation: Pt resting in bed at this time. Periods of confusion this shift. VSS. No complaints of pain or discomfort. Will continue to monitor.

## 2022-06-03 NOTE — PROGRESS NOTES
"Palliative Care Daily Progress Note     S: Clinically, the patient is not doing very well and is complaining of back pain as well as shortness of breath.  Oxygen saturations is in the high 90s, and the nurse will be contacting the attending for further evaluation.  The patient is awake, able to answer some simple questions, but does look to be uncomfortable.    O:   Palliative Performance Scale Score:     /79 (BP Location: Right arm, Patient Position: Lying)   Pulse 60   Temp 97.8 °F (36.6 °C) (Oral)   Resp 16   Ht 162.6 cm (64.02\")   Wt 57 kg (125 lb 9.6 oz)   SpO2 96%   BMI 21.55 kg/m²     Intake/Output Summary (Last 24 hours) at 6/3/2022 1348  Last data filed at 6/3/2022 0249  Gross per 24 hour   Intake 680 ml   Output 350 ml   Net 330 ml       PE:  General Appearance:     ill appearing, alert, cooperative, NAD   HEENT:    NC/AT, dry mucous membranes   Neck:   supple   Lungs:    Poor inspiratory effort with few bibasilar rales    Heart:    RRR   Abdomen:     Soft, nondistended   Extremities:   Moves all extremities, no edema   Pulses:   Pulses palpable    Skin:  Dusky in color   Neurologic:  Awake, answering simple questions, generalized weakness   Psych:  Appears uncomfortable         Meds: Reviewed     Labs:   Results from last 7 days   Lab Units 06/03/22  0303   WBC 10*3/mm3 10.47   HEMOGLOBIN g/dL 7.9*   HEMATOCRIT % 24.3*   PLATELETS 10*3/mm3 287     Results from last 7 days   Lab Units 06/03/22  0932 06/03/22  0303   SODIUM mmol/L 126* 127*   POTASSIUM mmol/L  --  4.4   CHLORIDE mmol/L  --  94*   CO2 mmol/L  --  27.4   BUN mg/dL  --  24*   CREATININE mg/dL  --  1.61*   GLUCOSE mg/dL  --  174*   CALCIUM mg/dL  --  8.2*     Results from last 7 days   Lab Units 06/03/22  0932 06/03/22  0303 05/31/22  0532 05/31/22  0014   SODIUM mmol/L 126* 127*   < > 125*   POTASSIUM mmol/L  --  4.4   < > 4.5   CHLORIDE mmol/L  --  94*   < > 93*   CO2 mmol/L  --  27.4   < > 20.6*   BUN mg/dL  --  24*   < > 35* "   CREATININE mg/dL  --  1.61*   < > 2.31*   CALCIUM mg/dL  --  8.2*   < > 7.8*   BILIRUBIN mg/dL  --   --   --  0.3   ALK PHOS U/L  --   --   --  135*   ALT (SGPT) U/L  --   --   --  7   AST (SGOT) U/L  --   --   --  14   GLUCOSE mg/dL  --  174*   < > 211*    < > = values in this interval not displayed.     Imaging Results (Last 72 Hours)     Procedure Component Value Units Date/Time    XR Chest 1 View [[id number]] Collected: 06/03/22 1147     Updated: 06/03/22 1208    Narrative:      EXAM:    XR Chest, 1 View     EXAM DATE:    6/3/2022 11:20 AM     CLINICAL HISTORY:    SOB; J96.01-Acute respiratory failure with hypoxia     TECHNIQUE:    Frontal view of the chest.     COMPARISON:    05/29/2022     FINDINGS:    LUNGS:  Atelectasis in the lung bases.    PLEURAL SPACE:  Small right pleural effusion and tiny left pleural  effusions are again noted.  No pneumothorax.    HEART:  Cardiomegaly again noted.    MEDIASTINUM:  Unremarkable.    BONES/JOINTS:  Unremarkable.    TUBES, LINES AND DEVICES:  Right central line with tip in SVC.       Impression:      1.  Small right pleural effusion and tiny left pleural effusions are  again noted.  2.  Cardiomegaly again noted.     This report was finalized on 6/3/2022 11:47 AM by Dr. Tano Heck MD.               Diagnostics: Reviewed    A: Clinically, the patient is struggling at the moment with some back pain and shortness of breath but saturating well.  She was repositioned to make her more comfortable.      P: Continue present management.  Attending is being notified of the patient's condition change.  We will continue to monitor and follow.      We will continue to follow along. Please do not hesitate to contact us regarding further sx mgmt or GOC needs, including after hours or on weekends via our on call provider at [phone].     Beau Delgado MD    6/3/2022

## 2022-06-03 NOTE — CASE MANAGEMENT/SOCIAL WORK
Discharge Planning Assessment   [facility]     Patient Name: Sean Chen  MRN: [id number]  Today's Date: 6/3/2022    Admit Date: 5/23/2022       Discharge Plan     Row Name 06/03/22 1652       Plan    Plan Pt was admitted on 05/23/22. SS spoke with Pt at bedside on this date.  Pt lives with her spouse and son lives next door. Pt and family plans for Pt to return home at discharge. Pt does not utilize home health or DME services. Pt may benefit from home health servives, bedside commode, wheelchair and rolling walker at discharge. Pt and family do not have a preference of home health or DME agency. Pt attends [facility] at 10:45am. Pt may need EMS to transport. SS to follow.              Kavitha Gatica

## 2022-06-03 NOTE — PROGRESS NOTES
[facility] HOSPITALIST PROGRESS NOTE     Patient Identification:  Name:  Sean Chen  Age:  81 y.o.  Sex:  female  :  1940  MRN:  [id number]  Visit Number:  [id number]  ROOM: [address] [place] [address]     Primary Care Provider:  Ganga Gallardo MD    Length of stay in inpatient status:  11    Subjective     Chief Compliant:    Chief Complaint   Patient presents with   • Shortness of Breath       History of Presenting Illness:    Patient was confused this morning and more drowsy than normal. She reported not resting well and was able to hold a conversation but was only oriented to person. Later in the morning she developed shortness of breath. As the day progressed nursing staff reported SOB and confusion resolved and patient back to her baseline, oriented, asking to return home.     ROS:  Otherwise 10 point ROS negative other than documented above in HPI.     Objective     Current Hospital Meds:aspirin, 81 mg, Oral, Daily  atorvastatin, 80 mg, Oral, Nightly  calcium acetate, 1,334 mg, Oral, TID With Meals  carvedilol, 6.25 mg, Oral, BID With Meals  folic acid, 1 mg, Oral, Daily  guaiFENesin, 600 mg, Oral, Q12H  heparin (porcine), 5,000 Units, Subcutaneous, Q8H  PARoxetine, 10 mg, Oral, QAM  sodium chloride, 500 mL, Intravenous, Once  sodium chloride, 10 mL, Intravenous, Q12H         Current Antimicrobial Therapy:  Anti-Infectives (From admission, onward)    Ordered     Dose/Rate Route Frequency Start Stop    22 0859  aztreonam (AZACTAM) 500 mg in sodium chloride 0.9 % 100 mL IVPB        Ordering Provider: Dylon Mcdonnell MD    500 mg  over 4 Hours Intravenous Every 12 Hours 22 1800 22 0905    22 1826  metroNIDAZOLE (FLAGYL) IVPB 500 mg        Ordering Provider: Dylon Mcdonnell MD    500 mg  over 30 Minutes Intravenous Every 8 Hours 22 2000 22 1230    22 1209  aztreonam (AZACTAM) 2 g in sodium chloride 0.9 % 100 mL IVPB-VTB        Ordering  Provider: Jone Schaffer MD    2 g  200 mL/hr over 30 Minutes Intravenous Once 05/23/22 1211 05/23/22 1404    05/23/22 1208  vancomycin 1250 mg/250 mL 0.9% NS IVPB ([facility])        Ordering Provider: Jone Schaffer MD    20 mg/kg × 66.7 kg  over 60 Minutes Intravenous Once 05/23/22 1210 05/23/22 1605        Current Diuretic Therapy:  Diuretics (From admission, onward)    None        ----------------------------------------------------------------------------------------------------------------------  Vital Signs:  Temp:  [97.6 °F (36.4 °C)-97.9 °F (36.6 °C)] 97.7 °F (36.5 °C)  Heart Rate:  [51-71] 51  Resp:  [16-20] 16  BP: (142-177)/(63-79) 142/63  SpO2:  [94 %-98 %] 96 %  on   ;   Device (Oxygen Therapy): room air  Body mass index is 21.55 kg/m².    Wt Readings from Last 3 Encounters:   05/31/22 57 kg (125 lb 9.6 oz)   05/13/22 67.1 kg (147 lb 14.4 oz)   01/27/22 71.2 kg (157 lb)     Intake & Output (last 3 days)       05/31 0701 06/01 0700 06/01 0701 06/02 0700 06/02 0701  06/03 0700 06/03 0701  06/04 0700    P.O. 720 720 680 480    Total Intake(mL/kg) 720 (12.6) 720 (12.6) 680 (11.9) 480 (8.4)    Urine (mL/kg/hr) 355 (0.3) 350 (0.3) 350 (0.3)     Other 1000  3000     Stool        Total Output 7507 840 7261     Net -635 +370 -2670 +480            Stool Unmeasured Occurrence    2 x        Diet Soft Texture; Chopped; Thin; Daily Fluid Restriction; Other; 800  ----------------------------------------------------------------------------------------------------------------------  Physical exam:  Constitutional:  Well-developed and well-nourished.  No respiratory distress.      HENT:  Head:  Normocephalic and atraumatic.  Mouth:  Moist mucous membranes.    Eyes:  Conjunctivae and EOM are normal. No scleral icterus.    Neck:  Neck supple.  No JVD present.    Cardiovascular:  Normal rate, regular rhythm and normal heart sounds with no murmur.  Pulmonary/Chest:  No respiratory distress, no wheezes,  no crackles, with normal breath sounds and good air movement.  Abdominal:  Soft.  Bowel sounds are normal.  No distension and no tenderness.   Musculoskeletal:  No edema, no tenderness, and no deformity.  No red or swollen joints anywhere.    Neurological:  Alert and oriented to person only.   Skin:  Skin is warm and dry. No rash noted. No pallor.   Peripheral vascular:  Pulses in all 4 extremities with no clubbing, no cyanosis, no edema.  ----------------------------------------------------------------------------------------------------------------------  Tele:    ----------------------------------------------------------------------------------------------------------------------  Results from last 7 days   Lab Units 06/03/22  0303 06/02/22  0008 06/01/22  0021   CRP mg/dL 1.14* 0.82* 1.11*   WBC 10*3/mm3 10.47 12.65* 10.80   HEMOGLOBIN g/dL 7.9* 8.0* 7.8*   HEMATOCRIT % 24.3* 24.7* 24.0*   MCV fL 89.0 88.8 88.2   MCHC g/dL 32.5 32.4 32.5   PLATELETS 10*3/mm3 287 316 307     Results from last 7 days   Lab Units 06/03/22  1141   PH, ARTERIAL pH units 7.495*   PO2 ART mm Hg 72.2*   PCO2, ARTERIAL mm Hg 38.2   HCO3 ART mmol/L 29.5*     Results from last 7 days   Lab Units 06/03/22  0932 06/03/22  0303 06/02/22  2125 06/02/22  0546 06/02/22  0008 06/01/22  0547 06/01/22  0021 05/31/22  0532 05/31/22  0014 05/30/22  0949 05/30/22  0239 05/29/22  0526   SODIUM mmol/L 126* 127* 128*   < > 126*   < > 126*   < > 125*   < > 123* 126*   POTASSIUM mmol/L  --  4.4  --   --  3.9  --  3.5  --  4.5   < > 4.4 3.7   MAGNESIUM mg/dL  --   --   --   --   --   --   --   --  2.5*  --  2.7* 1.7   CHLORIDE mmol/L  --  94*  --   --  92*  --  92*  --  93*   < > 90* 93*   CO2 mmol/L  --  27.4  --   --  25.3  --  24.8  --  20.6*   < > 22.7 24.6   BUN mg/dL  --  24*  --   --  31*  --  23  --  35*   < > 24* 18   CREATININE mg/dL  --  1.61*  --   --  1.95*  --  1.63*  --  2.31*   < > 2.07* 1.65*   CALCIUM mg/dL  --  8.2*  --   --  8.0*  --   7.4*  --  7.8*   < > 7.8* 7.8*   GLUCOSE mg/dL  --  174*  --   --  214*  --  212*  --  211*   < > 147* 146*   ALBUMIN g/dL  --   --   --   --   --   --   --   --  2.70*  --  2.61* 2.59*   BILIRUBIN mg/dL  --   --   --   --   --   --   --   --  0.3  --  0.2 0.3   ALK PHOS U/L  --   --   --   --   --   --   --   --  135*  --  94 95   AST (SGOT) U/L  --   --   --   --   --   --   --   --  14  --  12 11   ALT (SGPT) U/L  --   --   --   --   --   --   --   --  7  --  6 6    < > = values in this interval not displayed.   Estimated Creatinine Clearance: 25.1 mL/min (A) (by C-G formula based on SCr of 1.61 mg/dL (H)).  No results found for: AMMONIA  Results from last 7 days   Lab Units 06/03/22  1213   TROPONIN T ng/mL 0.198*             Glucose   Date/Time Value Ref Range Status   06/03/2022 1048 191 (H) 70 - 130 mg/dL Final     Comment:     Meter: [id number] : [id number] olivares hany   06/03/2022 0629 190 (H) 70 - 130 mg/dL Final     Comment:     Meter: [id number] : [id number] Tonia Tarango   06/03/2022 0121 169 (H) 70 - 130 mg/dL Final     Comment:     Meter: [id number] : [id number] LETITIA SOSA   06/02/2022 1631 230 (H) 70 - 130 mg/dL Final     Comment:     Meter: [id number] : [id number] INA DUNCAN   06/02/2022 0622 220 (H) 70 - 130 mg/dL Final     Comment:     Meter: [id number] : [id number] Tonia Tarango   06/01/2022 1701 228 (H) 70 - 130 mg/dL Final     Comment:     Meter: [id number] : [id number] MAYRA DUNCAN   06/01/2022 1110 258 (H) 70 - 130 mg/dL Final     Comment:     Meter: [id number] : [id number] MAYRA DUNCAN   06/01/2022 0624 165 (H) 70 - 130 mg/dL Final     Comment:     Meter: [id number] : [id number] Violet [place]     Lab Results   Component Value Date    TSH 0.677 05/23/2022    FREET4 1.77 (H) 05/23/2022     No results found for: PREGTESTUR, PREGSERUM, HCG, HCGQUANT  Pain Management Panel     Pain Management Panel Latest Ref Rng & Units 5/23/2022 5/5/2022     CREATININE UR mg/dL - 67.4    AMPHETAMINES SCREEN, URINE Negative Negative -    BARBITURATES SCREEN Negative Negative -    BENZODIAZEPINE SCREEN, URINE Negative Negative -    BUPRENORPHINEUR Negative Negative -    COCAINE SCREEN, URINE Negative Negative -    METHADONE SCREEN, URINE Negative Negative -    METHAMPHETAMINEUR Negative Negative -        Brief Urine Lab Results  (Last result in the past 365 days)      Color   Clarity   Blood   Leuk Est   Nitrite   Protein   CREAT   Urine HCG        05/23/22 1422 Yellow   Clear   Negative   Trace   Negative   >=300 mg/dL (3+)               No results found for: BLOODCX  No results found for: URINECX  No results found for: WOUNDCX  No results found for: STOOLCX  No results found for: RESPCX  No results found for: AFBCX  Results from last 7 days   Lab Units 06/03/22  0303 06/02/22  0008 06/01/22  0021 05/31/22  0014 05/30/22  0239 05/29/22  1146   CRP mg/dL 1.14* 0.82* 1.11* 1.56* 1.32* 1.76*       I have personally looked at the labs and they are summarized above.  ----------------------------------------------------------------------------------------------------------------------  Detailed radiology reports for the last 24 hours:    Imaging Results (Last 24 Hours)     Procedure Component Value Units Date/Time    XR Chest 1 View [[id number]] Collected: 06/03/22 1147     Updated: 06/03/22 1208    Narrative:      EXAM:    XR Chest, 1 View     EXAM DATE:    6/3/2022 11:20 AM     CLINICAL HISTORY:    SOB; J96.01-Acute respiratory failure with hypoxia     TECHNIQUE:    Frontal view of the chest.     COMPARISON:    05/29/2022     FINDINGS:    LUNGS:  Atelectasis in the lung bases.    PLEURAL SPACE:  Small right pleural effusion and tiny left pleural  effusions are again noted.  No pneumothorax.    HEART:  Cardiomegaly again noted.    MEDIASTINUM:  Unremarkable.    BONES/JOINTS:  Unremarkable.    TUBES, LINES AND DEVICES:  Right central line with tip in SVC.       Impression:       1.  Small right pleural effusion and tiny left pleural effusions are  again noted.  2.  Cardiomegaly again noted.     This report was finalized on 6/3/2022 11:47 AM by Dr. Tano Heck MD.           Assessment & Plan    #Dyspnea  - Unclear etiology. ABG and saturations without hypoxia or hypercapnia.   - Chest x-ray without acute abnormality   - Troponin much improved in setting of recent ischemic w/u   - Resolved     #Delerium   - Suspect hospital acquired in setting of multiple potential metabolic causes  - Mental status back to baseline.     #Sepsis, Acute Metabolic Encephalopathy & Acute Hypoxic Respiratory Failure due to Pneumonia, Bacterial, treating for MDR Organisms/Aspiration & Parainfluenza Virus in setting of Immunocompromised stated from underlying Rheumatoid Arthritis  - Patient presented after recent hospitalization with increased shortness of breath and new oxygen requirement  - Patient meets SIRS criteria due to respiratory rate > 20, WBC count > 12K, noted to have mental status change from baseline  - Admission labs showed WBC count 15K, CRP 3.62, procalcitonin 0.89, lactate 1.7, Coronavirus/flu negative, MRSA negative, ABG with P02 down to 37.5, blood cultures negative to date, strep pneumo negative, viral respiratory panel + for parainfluenza virus  - CT Head showed no acute intracranial abnormality   - CT Chest showed RML and RLL airspace consolidative disease  - SLP consulted; Evaluated with diet recommendations 5/24, no noted signs and symptoms aspiration though noted mild oral weakness felt due to fatigue and medical condition  - Completed Aztreonam and Flagyl x 7 days  - Continue APAP PRN for fevers, Guaifenesin for congestion, Duonebs as needed  - Monitor on telemetry, PT/OT consulted and following for weakness/debility     #Nonoliguric Acute Kidney Injury on Chronic Kidney Disease vs ESRD with remote history Acute Kidney Injury requiring iHD in setting of mildly elevated cANCA complicated  by proteinuria and mild hematuria - Improved   - Last admission creatinine was up to 5.67, improved to 1.74 at time of discharge, had elevated lambda/kappa, this admission up to 3.85, had started on hemodialysis last admission 5/10; Today now > 2  - Renal ultrasound showed concern for dense vascular calcifications or non-obstructing stones but no evidence of hydronephrosis was seen  - Consult Nephrology; Following for hemodialysis needs, concern for intermittent urinary retention, following creatinine and urine output post iHD 5/28. Family interested in peritoneal dialysis.      #Hypertension/Hyperlipidemia/Coronary Artery Disease  #Acute on Chronic HFpEF, diastolic dysfunction complicated by edema and pleural effusions now with noted reduced EF   #Severe Pulmonary Hypertension  #Small Pericardial Effusion  #Valvular Disease - Mild AS, Mild MR, mod TR  #Elevated Troponins, suspected NSTEMI Type II  #Prolonged Qtc (566ms)  - Labs showed Troponins 0.06->0.04, proBNP > 70K  - EKG showed normal sinus rhythm, intermittent PAC's, RBBB, Non-specific ST/T wave abnormality  - Echocardiogram 5/5 showed LVEF 66-70%, mild concentric hypertrophy, diastolic dysfunction, dilated left atrium, mild AS, mild MR, mod TR, severe Pulmonary Hypertension, RVSP > 55mmHg, small <1cm pericardial effusion; Repeat limited echocardiogram showed borderline dilated left ventricle, LVEF 36-40%, diastolic dysfunction    - [facility] 5/11 showed no-obstructive Coronary Artery Disease   - CT Chest showed Congestive Heart Failure/edema with R>L pleural effusion, cardiomegaly, severe coronary calcifications  - Cardiology consulted; Following, recommended goal directed medical therapy, signed off  - Continue home Aspirin 81, Statin  - Continue home Nifedipine, titrate as indicated   - Continue new Coreg, titrate as indicated   - No home ACEI/ARB/ARNI due to renal failure  - Continue to monitor on telemetry, strict I/O's, trend heart rate and blood  pressure     #Non-Insulin Dependent Diabetes Mellitus Type II, controlled, unknown complications  - Hgb A1c = 5.5%  - Holding home oral agents, continue FSBG and SSI, add long acting as indicated.     #Electrolyte Abnormalities  - Acute Mod Hypokalemia - potassium down to 2.8, Replacing per protocol - Resolved   - Borderline Hypomagnesemia - magnesium down to 1.7, Replaced per protocol - Resolved   - Acute Hypervolemic Hyponatremia - sodium continues to vary in 120's. Continue salt and fluid restriction as per nephrology. Discussed nephrology, if patient can stay at or above 126, safe for discharge form their perspective.     #Anxiety/Depression  - Continue home regimen     #Gastroesophageal Reflux Disease  - No home PPI, monitor symptoms     #Dementia  - CT showed generalized cerebral atrophy and nonspecific periventricular hypodensities thought to represent microvascular changes.   - Review home meds and resume pending med rec, supportive care     #Debility  - Consult PT/OT, Social Work if placement needed     #Mod Malnutrition  - Nutrition consulted and following        F: Oral  E: Monitor & Replace PRN  N: Diet Soft Texture; Chopped; Thin; Renal, Cardiac    Ppx: SQH  Code Status (Patient has no pulse and is not breathing): CPR (Attempt to Resuscitate)  Medical Interventions (Patient has pulse or is breathing): Full Support     Dispo: Pending clinical improvement, PT/OT recommended inpatient rehab vs skilled nursing facility. Family and patient opting to go home at discharge. She is to continue iHD MWF at [facility] with plan to potentially switch to pDx at some point down the road.     VTE Prophylaxis:   Mechanical Order History:     None      Pharmalogical Order History:      Ordered     Dose Route Frequency Stop    05/26/22 1311  heparin (porcine) injection 4,000 Units         4,000 Units IV Once 05/26/22 1351    05/26/22 1313  heparin (porcine) injection 3,000 Units         3,000 Units IV Once  05/26/22 1346    05/23/22 1826  heparin (porcine) 5000 UNIT/ML injection 5,000 Units         5,000 Units SC Every 8 Hours Scheduled --                    Jacob Stauffer MD  [facility]  06/03/22  15:05 EDT

## 2022-06-03 NOTE — CASE MANAGEMENT/SOCIAL WORK
Continued Stay Note  [facility]IC[facility] Enriquez     Patient Name: Sean Chen  MRN: [id number]  Today's Date: 6/3/2022    Admit Date: 5/23/2022     Discharge Plan     Plan     Row Name 06/03/22 1224    Plan Comments 6/3: Sodium up to 130 continue 800ml fluid rest. Abx complete, ambulated 24ft with PT, resumed dialysis.                     Discharge Codes    No documentation.               Expected Discharge Date and Time     Expected Discharge Date Expected Discharge Time    Jun 6, 2022             Emeterio Coles RN

## 2022-06-03 NOTE — PLAN OF CARE
Goal Outcome Evaluation:           Progress: improving  Outcome Evaluation: Patient had episode of fatigue and confusion this AM MD made aware and orders placed, patient is now more alert and able to hold a conversation and voices no complaints or concerns, VSS, patient remains on room air.

## 2022-06-03 NOTE — THERAPY TREATMENT NOTE
Acute Care - Physical Therapy Treatment Note  [facility]     Patient Name: Sean Chen  : 1940  MRN: [id number]  Today's Date: 6/3/2022   Onset of Illness/Injury or Date of Surgery: 22  Visit Dx:     ICD-10-CM ICD-9-CM   1. Acute respiratory failure with hypoxia (HCC)  J96.01 518.81     Patient Active Problem List   Diagnosis   • Iron deficiency anemia   • Type 2 diabetes mellitus without complication, without long-term current use of insulin (HCC)   • Vitamin D deficiency disease   • Extrinsic asthma   • Essential hypertension   • Mixed hyperlipidemia   • Generalized osteoarthritis   • Gastroesophageal reflux disease without esophagitis   • Meniere's disease   • Chronic seasonal allergic rhinitis   • Low serum vitamin B12   • Memory impairment   • Bradycardia   • Hypertensive urgency   • Dementia without behavioral disturbance (HCC)   • Hyponatremia   • NSTEMI (non-ST elevated myocardial infarction) (HCC)   • Prolonged Q-T interval on ECG   • Anemia requiring transfusions   • Acute hypoxemic respiratory failure (HCC)   • Moderate malnutrition (HCC)     Past Medical History:   Diagnosis Date   • Allergic rhinitis    • Elevated liver enzymes    • Extrinsic asthma    • Gastritis    • GERD (gastroesophageal reflux disease)    • Hyperlipidemia    • Hypertension    • Iron deficiency anemia    • Meniere's disease    • Osteoarthritis    • Type 2 diabetes mellitus (HCC)    • Vitamin D deficiency disease      Past Surgical History:   Procedure Laterality Date   • APPENDECTOMY     • CARDIAC CATHETERIZATION N/A 2022    Procedure: Left Heart Cath;  Surgeon: Marcelino Grande MD;  Location: [facility] CATH INVASIVE LOCATION;  Service: Cardiology;  Laterality: N/A;   • CHOLECYSTECTOMY     • HYSTERECTOMY     • INSERTION HEMODIALYSIS CATHETER N/A 2022    Procedure: HEMODIALYSIS CATHETER INSERTION;  Surgeon: Hans Coates MD;  Location: [facility] OR;  Service: General;  Laterality: N/A;     PT Assessment (last 12  hours)     PT Evaluation and Treatment     Row Name 06/03/22 1030          Physical Therapy Time and Intention    Subjective Information complains of;fatigue;weakness  -HR     Document Type therapy note (daily note)  -HR     Mode of Treatment physical therapy  -HR     Patient Effort adequate  -HR     Comment Pt asleep upon entering room, but agrees to complete what she can with Rx. Pt is much more fatiqued and is difficult to arousal on this date. Notified BRYAN Nichols of finding while in room.  -HR     Row Name 06/03/22 1030          General Information    Patient Profile Reviewed yes  -HR     Equipment Currently Used at Home crutches, axillary  -HR     Existing Precautions/Restrictions fall;oxygen therapy device and L/min  -HR     Limitations/Impairments safety/cognitive  -HR     Row Name 06/03/22 1030          Cognition    Affect/Mental Status (Cognition) confused;low arousal/lethargic;flat/blunted affect  -HR     Orientation Status (Cognition) oriented to;person  -HR     Follows Commands (Cognition) follows one-step commands  -HR     Personal Safety Interventions fall prevention program maintained;gait belt;nonskid shoes/slippers when out of bed;supervised activity  -HR     Row Name 06/03/22 1030          Bed Mobility    Bed Mobility bed mobility (all) activities  -HR     All Activities, [place] (Bed Mobility) minimum assist (75% patient effort);moderate assist (50% patient effort)  -HR     Bed Mobility, Safety Issues decreased use of arms for pushing/pulling;decreased use of legs for bridging/pushing  -HR     Assistive Device (Bed Mobility) bed rails  -HR     Row Name 06/03/22 1030          Transfers    Transfers sit-stand transfer;stand-sit transfer;toilet transfer  -HR     Sit-Stand [place] (Transfers) minimum assist (75% patient effort);contact guard;nonverbal cues (demo/gesture);verbal cues  -HR     Stand-Sit [place] (Transfers) minimum assist (75% patient effort);contact guard;nonverbal cues  (demo/gesture);verbal cues  -HR     [place] Level (Toilet Transfer) minimum assist (75% patient effort);nonverbal cues (demo/gesture);verbal cues  -HR     Assistive Device (Toilet Transfer) other (see comments)  HHA  -HR     Row Name 06/03/22 1030          Sit-Stand Transfer    Assistive Device (Sit-Stand Transfers) walker, front-wheeled  -HR     Row Name 06/03/22 1030          Stand-Sit Transfer    Assistive Device (Stand-Sit Transfers) walker, front-wheeled  -HR     Row Name 06/03/22 1030          Toilet Transfer    Type (Toilet Transfer) stand pivot/stand step  -HR     Row Name 06/03/22 1030          Gait/Stairs (Locomotion)    [place] Level (Gait) minimum assist (75% patient effort);contact guard  -HR     Assistive Device (Gait) walker, front-wheeled  -HR     Distance in Feet (Gait) 24'  -HR     Pattern (Gait) step-to  -HR     Deviations/Abnormal Patterns (Gait) gait speed decreased;weight shifting decreased  -HR     Row Name 06/03/22 1030          Motor Skills    Therapeutic Exercise other (see comments)  Sitting: AP, LAQ  -HR     Row Name 06/03/22 1030          Coping    Observed Emotional State calm;cooperative  -HR     Verbalized Emotional State acceptance  -HR     Row Name 06/03/22 1030          Plan of Care Review    Outcome Evaluation Pt is much more fatiqued and significantly weaker than yesterday. Pt did complete a few EOB exercises and preform a BR transfer. Pt only walked 24' with RW CGA/ min A, Pt reports she feels heavy and like her legs are going to give out.  -HR     Row Name 06/03/22 1030          Positioning and Restraints    Pre-Treatment Position in bed  -HR     Post Treatment Position bed  -HR     In Bed notified nsg;fowlers;call light within reach;encouraged to call for assist;exit alarm on;side rails up x2  -HR     Row Name 06/03/22 1030          Therapy Assessment/Plan (PT)    Rehab Potential (PT) good, to achieve stated therapy goals  -HR     Criteria for Skilled Interventions  Met (PT) yes;skilled treatment is necessary  -HR     Therapy Frequency (PT) 2 times/wk  2-5 times/wk as available  -HR     Activity Limitations Related to Problem List (PT) unable to ambulate safely;unable to transfer safely  -HR     Row Name 06/03/22 1030          Physical Therapy Goals    Bed Mobility Goal Selection (PT) bed mobility, PT goal 1  -HR     Transfer Goal Selection (PT) transfer, PT goal 1  -HR     Gait Training Goal Selection (PT) gait training, PT goal 1  -HR     Row Name 06/03/22 1030          Bed Mobility Goal 1 (PT)    Activity/Assistive Device (Bed Mobility Goal 1, PT) bed mobility activities, all  -HR     [place] Level/Cues Needed (Bed Mobility Goal 1, PT) contact guard required  -HR     Time Frame (Bed Mobility Goal 1, PT) by discharge  -HR     Row Name 06/03/22 1030          Transfer Goal 1 (PT)    Activity/Assistive Device (Transfer Goal 1, PT) sit-to-stand/stand-to-sit;bed-to-chair/chair-to-bed  -HR     [place] Level/Cues Needed (Transfer Goal 1, PT) contact guard required  -HR     Time Frame (Transfer Goal 1, PT) by discharge  -HR     Row Name 06/03/22 1030          Gait Training Goal 1 (PT)    Activity/Assistive Device (Gait Training Goal 1, PT) gait (walking locomotion);assistive device use  -HR     [place] Level (Gait Training Goal 1, PT) contact guard required  -HR     Distance (Gait Training Goal 1, PT) 10  -HR     Time Frame (Gait Training Goal 1, PT) by discharge  -HR           User Key  (r) = Recorded By, (t) = Taken By, (c) = Cosigned By    Initials Name Provider Type    HR Yeimi Araiza PTA Physical Therapist Assistant                  PT Recommendation and Plan  Anticipated Discharge Disposition (PT): inpatient rehabilitation facility, skilled nursing facility  Therapy Frequency (PT): 2 times/wk (2-5 times/wk as available)  Outcome Evaluation: Pt is much more fatiqued and significantly weaker than yesterday. Pt did complete a few EOB exercises and preform a BR  transfer. Pt only walked 24' with RW CGA/ min A, Pt reports she feels heavy and like her legs are going to give out.       Time Calculation:    PT Charges     Row Name 06/03/22 1038             Time Calculation    PT Received On 06/03/22  -HR              Time Calculation- PT    Total Timed Code Minutes- PT 25 minute(s)  -HR            User Key  (r) = Recorded By, (t) = Taken By, (c) = Cosigned By    Initials Name Provider Type    HR Yeimi Araiza PTA Physical Therapist Assistant              Therapy Charges for Today     Code Description Service Date Service Provider Modifiers Qty    88765484357 HC GAIT TRAINING EA 15 MIN 6/2/2022 Yeimi Araiza PTA GP, CQ 1    15725926604 HC PT THER PROC EA 15 MIN 6/2/2022 Yeimi Araiza PTA GP, CQ 1    22777566120 HC GAIT TRAINING EA 15 MIN 6/3/2022 Yeimi Araiza PTA GP, CQ 1    20361505649 HC PT THER PROC EA 15 MIN 6/3/2022 Yeimi Araiza PTA GP, CQ 1               Yeimi Araiza PTA  6/3/2022

## 2022-06-04 LAB
ANION GAP SERPL CALCULATED.3IONS-SCNC: 10 MMOL/L (ref 5–15)
ANION GAP SERPL CALCULATED.3IONS-SCNC: 7.6 MMOL/L (ref 5–15)
BUN SERPL-MCNC: 24 MG/DL (ref 8–23)
BUN SERPL-MCNC: 31 MG/DL (ref 8–23)
BUN/CREAT SERPL: 11.5 (ref 7–25)
BUN/CREAT SERPL: 15.5 (ref 7–25)
CALCIUM SPEC-SCNC: 8.1 MG/DL (ref 8.6–10.5)
CALCIUM SPEC-SCNC: 8.6 MG/DL (ref 8.6–10.5)
CHLORIDE SERPL-SCNC: 89 MMOL/L (ref 98–107)
CHLORIDE SERPL-SCNC: 91 MMOL/L (ref 98–107)
CO2 SERPL-SCNC: 24 MMOL/L (ref 22–29)
CO2 SERPL-SCNC: 25.4 MMOL/L (ref 22–29)
CREAT SERPL-MCNC: 2 MG/DL (ref 0.57–1)
CREAT SERPL-MCNC: 2.09 MG/DL (ref 0.57–1)
CRP SERPL-MCNC: 1.14 MG/DL (ref 0–0.5)
DEPRECATED RDW RBC AUTO: 52.6 FL (ref 37–54)
EGFRCR SERPLBLD CKD-EPI 2021: 23.6 ML/MIN/1.73
EGFRCR SERPLBLD CKD-EPI 2021: 24.8 ML/MIN/1.73
ERYTHROCYTE [DISTWIDTH] IN BLOOD BY AUTOMATED COUNT: 16.1 % (ref 12.3–15.4)
GLUCOSE BLDC GLUCOMTR-MCNC: 192 MG/DL (ref 70–130)
GLUCOSE BLDC GLUCOMTR-MCNC: 207 MG/DL (ref 70–130)
GLUCOSE BLDC GLUCOMTR-MCNC: 240 MG/DL (ref 70–130)
GLUCOSE BLDC GLUCOMTR-MCNC: 265 MG/DL (ref 70–130)
GLUCOSE SERPL-MCNC: 182 MG/DL (ref 65–99)
GLUCOSE SERPL-MCNC: 250 MG/DL (ref 65–99)
HCT VFR BLD AUTO: 27.2 % (ref 34–46.6)
HGB BLD-MCNC: 9 G/DL (ref 12–15.9)
MCH RBC QN AUTO: 29.6 PG (ref 26.6–33)
MCHC RBC AUTO-ENTMCNC: 33.1 G/DL (ref 31.5–35.7)
MCV RBC AUTO: 89.5 FL (ref 79–97)
PLATELET # BLD AUTO: 284 10*3/MM3 (ref 140–450)
PMV BLD AUTO: 9.7 FL (ref 6–12)
POTASSIUM SERPL-SCNC: 4.7 MMOL/L (ref 3.5–5.2)
POTASSIUM SERPL-SCNC: 5.3 MMOL/L (ref 3.5–5.2)
RBC # BLD AUTO: 3.04 10*6/MM3 (ref 3.77–5.28)
SODIUM SERPL-SCNC: 120 MMOL/L (ref 136–145)
SODIUM SERPL-SCNC: 122 MMOL/L (ref 136–145)
SODIUM SERPL-SCNC: 125 MMOL/L (ref 136–145)
SODIUM SERPL-SCNC: 125 MMOL/L (ref 136–145)
WBC NRBC COR # BLD: 11.6 10*3/MM3 (ref 3.4–10.8)

## 2022-06-04 PROCEDURE — 80048 BASIC METABOLIC PNL TOTAL CA: CPT | Performed by: INTERNAL MEDICINE

## 2022-06-04 PROCEDURE — 94799 UNLISTED PULMONARY SVC/PX: CPT

## 2022-06-04 PROCEDURE — 25010000002 HEPARIN (PORCINE) PER 1000 UNITS: Performed by: HOSPITALIST

## 2022-06-04 PROCEDURE — 99231 SBSQ HOSP IP/OBS SF/LOW 25: CPT | Performed by: INTERNAL MEDICINE

## 2022-06-04 PROCEDURE — 86140 C-REACTIVE PROTEIN: CPT | Performed by: INTERNAL MEDICINE

## 2022-06-04 PROCEDURE — 82962 GLUCOSE BLOOD TEST: CPT

## 2022-06-04 PROCEDURE — 84295 ASSAY OF SERUM SODIUM: CPT | Performed by: INTERNAL MEDICINE

## 2022-06-04 PROCEDURE — 85027 COMPLETE CBC AUTOMATED: CPT | Performed by: HOSPITALIST

## 2022-06-04 RX ORDER — SODIUM CHLORIDE 9 MG/ML
100 INJECTION, SOLUTION INTRAVENOUS CONTINUOUS
Status: ACTIVE | OUTPATIENT
Start: 2022-06-04 | End: 2022-06-05

## 2022-06-04 RX ADMIN — IPRATROPIUM BROMIDE AND ALBUTEROL SULFATE 3 ML: .5; 3 SOLUTION RESPIRATORY (INHALATION) at 07:30

## 2022-06-04 RX ADMIN — CARVEDILOL 6.25 MG: 6.25 TABLET, FILM COATED ORAL at 08:17

## 2022-06-04 RX ADMIN — CALCIUM ACETATE 1334 MG: 667 CAPSULE ORAL at 13:06

## 2022-06-04 RX ADMIN — SODIUM CHLORIDE 100 ML/HR: 9 INJECTION, SOLUTION INTRAVENOUS at 16:26

## 2022-06-04 RX ADMIN — GUAIFENESIN 600 MG: 600 TABLET, EXTENDED RELEASE ORAL at 08:17

## 2022-06-04 RX ADMIN — FOLIC ACID 1 MG: 1 TABLET ORAL at 08:17

## 2022-06-04 RX ADMIN — PAROXETINE HYDROCHLORIDE 10 MG: 20 TABLET, FILM COATED ORAL at 05:39

## 2022-06-04 RX ADMIN — CALCIUM ACETATE 1334 MG: 667 CAPSULE ORAL at 16:26

## 2022-06-04 RX ADMIN — CALCIUM ACETATE 1334 MG: 667 CAPSULE ORAL at 08:17

## 2022-06-04 RX ADMIN — IPRATROPIUM BROMIDE AND ALBUTEROL SULFATE 3 ML: .5; 3 SOLUTION RESPIRATORY (INHALATION) at 18:41

## 2022-06-04 RX ADMIN — CARVEDILOL 6.25 MG: 6.25 TABLET, FILM COATED ORAL at 06:35

## 2022-06-04 RX ADMIN — GUAIFENESIN 600 MG: 600 TABLET, EXTENDED RELEASE ORAL at 21:35

## 2022-06-04 RX ADMIN — ATORVASTATIN CALCIUM 80 MG: 40 TABLET, FILM COATED ORAL at 21:35

## 2022-06-04 RX ADMIN — ASPIRIN 81 MG: 81 TABLET, COATED ORAL at 08:17

## 2022-06-04 RX ADMIN — HEPARIN SODIUM 5000 UNITS: 5000 INJECTION INTRAVENOUS; SUBCUTANEOUS at 21:35

## 2022-06-04 RX ADMIN — HEPARIN SODIUM 5000 UNITS: 5000 INJECTION INTRAVENOUS; SUBCUTANEOUS at 05:40

## 2022-06-04 RX ADMIN — HEPARIN SODIUM 5000 UNITS: 5000 INJECTION INTRAVENOUS; SUBCUTANEOUS at 13:06

## 2022-06-04 NOTE — PLAN OF CARE
Goal Outcome Evaluation:  Plan of Care Reviewed With: patient        Progress: improving    No acute changes this shift, pt has had no complaints.

## 2022-06-04 NOTE — NURSING NOTE
Removed cotton ball and tape from above pt IV where cannula of iv is located. Iv still functioning at this time.

## 2022-06-04 NOTE — PROGRESS NOTES
[facility] HOSPITALIST PROGRESS NOTE     Patient Identification:  Name:  Sean Chen  Age:  81 y.o.  Sex:  female  :  1940  MRN:  [id number]  Visit Number:  [id number]  ROOM: [address] [place] [address]     Primary Care Provider:  Ganga Gallardo MD    Length of stay in inpatient status:  12    Subjective     Chief Compliant:    Chief Complaint   Patient presents with   • Shortness of Breath       History of Presenting Illness:    Patient reports feeling better and looks more comfortable today. She was no longer confused. Continues to voice the desire to go home soon.     ROS:  Otherwise 10 point ROS negative other than documented above in HPI.     Objective     Current Hospital Meds:aspirin, 81 mg, Oral, Daily  atorvastatin, 80 mg, Oral, Nightly  calcium acetate, 1,334 mg, Oral, TID With Meals  carvedilol, 6.25 mg, Oral, BID With Meals  folic acid, 1 mg, Oral, Daily  guaiFENesin, 600 mg, Oral, Q12H  heparin (porcine), 5,000 Units, Subcutaneous, Q8H  PARoxetine, 10 mg, Oral, QAM  sodium chloride, 10 mL, Intravenous, Q12H    sodium chloride, 100 mL/hr, Last Rate: 100 mL/hr (22 1626)        Current Antimicrobial Therapy:  Anti-Infectives (From admission, onward)    Ordered     Dose/Rate Route Frequency Start Stop    22 0859  aztreonam (AZACTAM) 500 mg in sodium chloride 0.9 % 100 mL IVPB        Ordering Provider: Dylon Mcdonnell MD    500 mg  over 4 Hours Intravenous Every 12 Hours 22 1800 22 0905    22 1826  metroNIDAZOLE (FLAGYL) IVPB 500 mg        Ordering Provider: Dylon Mcdonnell MD    500 mg  over 30 Minutes Intravenous Every 8 Hours 22 2000 22 1230    22 1209  aztreonam (AZACTAM) 2 g in sodium chloride 0.9 % 100 mL IVPB-VTB        Ordering Provider: Jone Schaffer MD    2 g  200 mL/hr over 30 Minutes Intravenous Once 22 1211 22 1404    22 1208  vancomycin 1250 mg/250 mL 0.9% NS IVPB ([facility])        Ordering  Provider: Jone Schaffer MD    20 mg/kg × 66.7 kg  over 60 Minutes Intravenous Once 05/23/22 1210 05/23/22 1605        Current Diuretic Therapy:  Diuretics (From admission, onward)    None        ----------------------------------------------------------------------------------------------------------------------  Vital Signs:  Temp:  [97.5 °F (36.4 °C)-98 °F (36.7 °C)] 98 °F (36.7 °C)  Heart Rate:  [56-86] 58  Resp:  [16-20] 16  BP: (142-192)/(56-90) 152/68  SpO2:  [93 %-97 %] 93 %  on   ;   Device (Oxygen Therapy): room air  Body mass index is 21.55 kg/m².    Wt Readings from Last 3 Encounters:   05/31/22 57 kg (125 lb 9.6 oz)   05/13/22 67.1 kg (147 lb 14.4 oz)   01/27/22 71.2 kg (157 lb)     Intake & Output (last 3 days)       06/02 0701  06/03 0700 06/03 0701  06/04 0700 06/04 0701  06/05 0700    P.O. 680 720 300    I.V. (mL/kg)  313 (5.5)     Total Intake(mL/kg) 680 (11.9) 1033 (18.1) 300 (5.3)    Urine (mL/kg/hr) 350 (0.3) 350 (0.3) 300 (0.4)    Other 3000      Stool  0     Total Output 3350 350 300    Net -2670 +683 0           Stool Unmeasured Occurrence  3 x         Diet Soft Texture; Chopped; Thin; Daily Fluid Restriction; Other; 800  ----------------------------------------------------------------------------------------------------------------------  Physical exam:  Constitutional:  Well-developed and well-nourished.  No respiratory distress.      HENT:  Head:  Normocephalic and atraumatic.  Mouth:  Moist mucous membranes.    Eyes:  Conjunctivae and EOM are normal. No scleral icterus.    Neck:  Neck supple.  No JVD present.    Cardiovascular:  Normal rate, regular rhythm and normal heart sounds with no murmur.  Pulmonary/Chest:  No respiratory distress, no wheezes, no crackles, with normal breath sounds and good air movement.  Abdominal:  Soft.  Bowel sounds are normal.  No distension and no tenderness.   Musculoskeletal:  No edema, no tenderness, and no deformity.  No red or swollen joints  anywhere.    Neurological:  Alert and oriented to person, place, and time.  No cranial nerve deficit.  No tongue deviation.  No facial droop.  No slurred speech.   Skin:  Skin is warm and dry. No rash noted. No pallor.   Peripheral vascular:  Pulses in all 4 extremities with no clubbing, no cyanosis, no edema.  ----------------------------------------------------------------------------------------------------------------------  Tele:    ----------------------------------------------------------------------------------------------------------------------  Results from last 7 days   Lab Units 06/04/22  0411 06/03/22  0303 06/02/22  0008   CRP mg/dL 1.14* 1.14* 0.82*   WBC 10*3/mm3 11.60* 10.47 12.65*   HEMOGLOBIN g/dL 9.0* 7.9* 8.0*   HEMATOCRIT % 27.2* 24.3* 24.7*   MCV fL 89.5 89.0 88.8   MCHC g/dL 33.1 32.5 32.4   PLATELETS 10*3/mm3 284 287 316     Results from last 7 days   Lab Units 06/03/22  1141   PH, ARTERIAL pH units 7.495*   PO2 ART mm Hg 72.2*   PCO2, ARTERIAL mm Hg 38.2   HCO3 ART mmol/L 29.5*     Results from last 7 days   Lab Units 06/04/22  1713 06/04/22  1239 06/04/22  0949 06/04/22  0411 06/03/22  0932 06/03/22  0303 05/31/22  0532 05/31/22  0014 05/30/22  0949 05/30/22  0239 05/29/22  0526   SODIUM mmol/L 125* 122* 120* 125*   < > 127*   < > 125*   < > 123* 126*   POTASSIUM mmol/L  --  5.3*  --  4.7  --  4.4   < > 4.5   < > 4.4 3.7   MAGNESIUM mg/dL  --   --   --   --   --   --   --  2.5*  --  2.7* 1.7   CHLORIDE mmol/L  --  89*  --  91*  --  94*   < > 93*   < > 90* 93*   CO2 mmol/L  --  25.4  --  24.0  --  27.4   < > 20.6*   < > 22.7 24.6   BUN mg/dL  --  24*  --  31*  --  24*   < > 35*   < > 24* 18   CREATININE mg/dL  --  2.09*  --  2.00*  --  1.61*   < > 2.31*   < > 2.07* 1.65*   CALCIUM mg/dL  --  8.1*  --  8.6  --  8.2*   < > 7.8*   < > 7.8* 7.8*   GLUCOSE mg/dL  --  250*  --  182*  --  174*   < > 211*   < > 147* 146*   ALBUMIN g/dL  --   --   --   --   --   --   --  2.70*  --  2.61* 2.59*    BILIRUBIN mg/dL  --   --   --   --   --   --   --  0.3  --  0.2 0.3   ALK PHOS U/L  --   --   --   --   --   --   --  135*  --  94 95   AST (SGOT) U/L  --   --   --   --   --   --   --  14  --  12 11   ALT (SGPT) U/L  --   --   --   --   --   --   --  7  --  6 6    < > = values in this interval not displayed.   Estimated Creatinine Clearance: 19.3 mL/min (A) (by C-G formula based on SCr of 2.09 mg/dL (H)).  No results found for: AMMONIA  Results from last 7 days   Lab Units 06/03/22  1213   TROPONIN T ng/mL 0.198*             Glucose   Date/Time Value Ref Range Status   06/04/2022 1621 265 (H) 70 - 130 mg/dL Final     Comment:     Meter: [id number] : [id number] Nikolay Costelloa L   06/04/2022 1008 240 (H) 70 - 130 mg/dL Final     Comment:     Meter: [id number] : [id number] Nikolay Costelloa L   06/04/2022 0629 207 (H) 70 - 130 mg/dL Final     Comment:     Meter: [id number] : [id number] mayne mary   06/04/2022 0021 192 (H) 70 - 130 mg/dL Final     Comment:     Meter: [id number] : [id number] Lowell GOSS   06/03/2022 1710 210 (H) 70 - 130 mg/dL Final     Comment:     Meter: [id number] : [id number] elise leach   06/03/2022 1048 191 (H) 70 - 130 mg/dL Final     Comment:     Meter: [id number] : [id number] elise leach   06/03/2022 0629 190 (H) 70 - 130 mg/dL Final     Comment:     Meter: [id number] : [id number] Tonia Tarango   06/03/2022 0121 169 (H) 70 - 130 mg/dL Final     Comment:     Meter: [id number] : [id number] LETITIA SOSA     Lab Results   Component Value Date    TSH 0.677 05/23/2022    FREET4 1.77 (H) 05/23/2022     No results found for: PREGTESTUR, PREGSERUM, HCG, HCGQUANT  Pain Management Panel     Pain Management Panel Latest Ref Rng & Units 5/23/2022 5/5/2022    CREATININE UR mg/dL - 67.4    AMPHETAMINES SCREEN, URINE Negative Negative -    BARBITURATES SCREEN Negative Negative -    BENZODIAZEPINE SCREEN, URINE Negative Negative -    BUPRENORPHINEUR Negative Negative -     COCAINE SCREEN, URINE Negative Negative -    METHADONE SCREEN, URINE Negative Negative -    METHAMPHETAMINEUR Negative Negative -        Brief Urine Lab Results  (Last result in the past 365 days)      Color   Clarity   Blood   Leuk Est   Nitrite   Protein   CREAT   Urine HCG        05/23/22 1422 Yellow   Clear   Negative   Trace   Negative   >=300 mg/dL (3+)               No results found for: BLOODCX  No results found for: URINECX  No results found for: WOUNDCX  No results found for: STOOLCX  No results found for: RESPCX  No results found for: AFBCX  Results from last 7 days   Lab Units 06/04/22  0411 06/03/22  0303 06/02/22  0008 06/01/22  0021 05/31/22  0014 05/30/22  0239 05/29/22  1146   CRP mg/dL 1.14* 1.14* 0.82* 1.11* 1.56* 1.32* 1.76*       I have personally looked at the labs and they are summarized above.  ----------------------------------------------------------------------------------------------------------------------  Detailed radiology reports for the last 24 hours:    Imaging Results (Last 24 Hours)     ** No results found for the last 24 hours. **        Assessment & Plan      #Acute Hypervolemic Hyponatremia   - sodium has dropped again. Nephrology thinks patient has hypovolemic hyponatremia and are holding dialysis with 1L of fluid. Continue to monitor.     #Dyspnea  - Unclear etiology. ABG and saturations without hypoxia or hypercapnia.   - Chest x-ray without acute abnormality   - Troponin much improved in setting of recent ischemic w/u   - Resolved      #Delerium   - Suspect hospital acquired in setting of multiple potential metabolic causes  - Mental status back to baseline.      #Sepsis, Acute Metabolic Encephalopathy & Acute Hypoxic Respiratory Failure due to Pneumonia, Bacterial, treating for MDR Organisms/Aspiration & Parainfluenza Virus in setting of Immunocompromised stated from underlying Rheumatoid Arthritis  - Patient presented after recent hospitalization with increased shortness  of breath and new oxygen requirement  - Patient meets SIRS criteria due to respiratory rate > 20, WBC count > 12K, noted to have mental status change from baseline  - Admission labs showed WBC count 15K, CRP 3.62, procalcitonin 0.89, lactate 1.7, Coronavirus/flu negative, MRSA negative, ABG with P02 down to 37.5, blood cultures negative to date, strep pneumo negative, viral respiratory panel + for parainfluenza virus  - CT Head showed no acute intracranial abnormality   - CT Chest showed RML and RLL airspace consolidative disease  - SLP consulted; Evaluated with diet recommendations 5/24, no noted signs and symptoms aspiration though noted mild oral weakness felt due to fatigue and medical condition  - Completed Aztreonam and Flagyl x 7 days  - Continue APAP PRN for fevers, Guaifenesin for congestion, Duonebs as needed  - Monitor on telemetry, PT/OT consulted and following for weakness/debility     #Nonoliguric Acute Kidney Injury on Chronic Kidney Disease vs ESRD with remote history Acute Kidney Injury requiring iHD in setting of mildly elevated cANCA complicated by proteinuria and mild hematuria - Improved   - Last admission creatinine was up to 5.67, improved to 1.74 at time of discharge, had elevated lambda/kappa, this admission up to 3.85, had started on hemodialysis last admission 5/10; Today now > 2  - Renal ultrasound showed concern for dense vascular calcifications or non-obstructing stones but no evidence of hydronephrosis was seen  - Consult Nephrology; Following for hemodialysis needs, concern for intermittent urinary retention, following creatinine and urine output post iHD 5/28. Family interested in peritoneal dialysis.      #Hypertension/Hyperlipidemia/Coronary Artery Disease  #Acute on Chronic HFpEF, diastolic dysfunction complicated by edema and pleural effusions now with noted reduced EF   #Severe Pulmonary Hypertension  #Small Pericardial Effusion  #Valvular Disease - Mild AS, Mild MR, mod  TR  #Elevated Troponins, suspected NSTEMI Type II  #Prolonged Qtc (566ms)  - Labs showed Troponins 0.06->0.04, proBNP > 70K  - EKG showed normal sinus rhythm, intermittent PAC's, RBBB, Non-specific ST/T wave abnormality  - Echocardiogram 5/5 showed LVEF 66-70%, mild concentric hypertrophy, diastolic dysfunction, dilated left atrium, mild AS, mild MR, mod TR, severe Pulmonary Hypertension, RVSP > 55mmHg, small <1cm pericardial effusion; Repeat limited echocardiogram showed borderline dilated left ventricle, LVEF 36-40%, diastolic dysfunction    - [facility] 5/11 showed no-obstructive Coronary Artery Disease   - CT Chest showed Congestive Heart Failure/edema with R>L pleural effusion, cardiomegaly, severe coronary calcifications  - Cardiology consulted; Following, recommended goal directed medical therapy, signed off  - Continue home Aspirin 81, Statin  - Continue home Nifedipine, titrate as indicated   - Continue new Coreg, titrate as indicated   - No home ACEI/ARB/ARNI due to renal failure  - Continue to monitor on telemetry, strict I/O's, trend heart rate and blood pressure     #Non-Insulin Dependent Diabetes Mellitus Type II, controlled, unknown complications  - Hgb A1c = 5.5%  - Holding home oral agents, continue FSBG and SSI, add long acting as indicated.     #Electrolyte Abnormalities  - Acute Mod Hypokalemia - potassium down to 2.8, Replacing per protocol - Resolved   - Borderline Hypomagnesemia - magnesium down to 1.7, Replaced per protocol - Resolved        #Anxiety/Depression  - Continue home regimen     #Gastroesophageal Reflux Disease  - No home PPI, monitor symptoms     #Dementia  - CT showed generalized cerebral atrophy and nonspecific periventricular hypodensities thought to represent microvascular changes.   - Review home meds and resume pending med rec, supportive care     #Debility  - Consult PT/OT, Social Work if placement needed     #Mod Malnutrition  - Nutrition consulted and following        F:  Oral  E: Monitor & Replace PRN  N: Diet Soft Texture; Chopped; Thin; Renal, Cardiac    Ppx: SQH  Code Status (Patient has no pulse and is not breathing): CPR (Attempt to Resuscitate)  Medical Interventions (Patient has pulse or is breathing): Full Support     Dispo: Pending clinical improvement, PT/OT recommended inpatient rehab vs skilled nursing facility. Family and patient opting to go home at discharge. She is to continue iHD MWF at [facility] with plan to potentially switch to pDx at some point down the road.        VTE Prophylaxis:   Mechanical Order History:     None      Pharmalogical Order History:      Ordered     Dose Route Frequency Stop    05/26/22 1311  heparin (porcine) injection 4,000 Units         4,000 Units IV Once 05/26/22 1351    05/26/22 1313  heparin (porcine) injection 3,000 Units         3,000 Units IV Once 05/26/22 1346    05/23/22 1826  heparin (porcine) 5000 UNIT/ML injection 5,000 Units         5,000 Units SC Every 8 Hours Scheduled --                  Jacob Stauffer MD  [facility]  06/04/22  19:17 EDT

## 2022-06-04 NOTE — PLAN OF CARE
Goal Outcome Evaluation:               Pt resting with no complaints at this time. Pt did not have dialysis this shift per dialysis nurse.

## 2022-06-04 NOTE — PROGRESS NOTES
Nephrology Progress Note      Subjective     Pt is better today, more awake and alert no chest pain or shortness of breath.     Objective       Vital signs :     Temp:  [97.5 °F (36.4 °C)-97.9 °F (36.6 °C)] 97.7 °F (36.5 °C)  Heart Rate:  [51-86] 69  Resp:  [16-20] 20  BP: (140-192)/(62-90) 192/90      Intake/Output Summary (Last 24 hours) at 6/4/2022 1006  Last data filed at 6/4/2022 0300  Gross per 24 hour   Intake 793.01 ml   Output 350 ml   Net 443.01 ml       Physical Exam:    General Appearance :   Lungs : clear to auscultation, respirations regular  Heart :  regular rhythm & normal rate, normal S1, S2 and no murmur, no rub  Abdomen : normal bowel sounds, no masses, no hepatomegaly, no splenomegaly, soft non-tender and no guarding  Extremities : moves extremities well, no edema, no cyanosis and no redness  Neurologic :   Mild disorientation      Laboratory Data :     Albumin No results found for: ALBUMIN   Magnesium No results found for: MG       PTH               No results found for: PTH    CBC and coagulation:  Results from last 7 days   Lab Units 06/04/22  0411 06/03/22  0303 06/02/22  0008   CRP mg/dL 1.14* 1.14* 0.82*   WBC 10*3/mm3 11.60* 10.47 12.65*   HEMOGLOBIN g/dL 9.0* 7.9* 8.0*   HEMATOCRIT % 27.2* 24.3* 24.7*   MCV fL 89.5 89.0 88.8   MCHC g/dL 33.1 32.5 32.4   PLATELETS 10*3/mm3 284 287 316     Acid/base balance:  Results from last 7 days   Lab Units 06/03/22  1141   PH, ARTERIAL pH units 7.495*   PO2 ART mm Hg 72.2*   PCO2, ARTERIAL mm Hg 38.2   HCO3 ART mmol/L 29.5*     Renal and electrolytes:  Results from last 7 days   Lab Units 06/04/22  0411 06/03/22  2138 06/03/22  1556 06/03/22  0932 06/03/22  0303 06/02/22  0546 06/02/22  0008 06/01/22  0547 06/01/22  0021 05/31/22  0532 05/31/22  0014 05/30/22  0949 05/30/22  0239 05/29/22  0526   SODIUM mmol/L 125* 125* 122* 126* 127*   < > 126*   < > 126*   < > 125*   < > 123* 126*   POTASSIUM mmol/L 4.7  --   --   --  4.4  --  3.9  --  3.5  --  4.5    < > 4.4 3.7   MAGNESIUM mg/dL  --   --   --   --   --   --   --   --   --   --  2.5*  --  2.7* 1.7   CHLORIDE mmol/L 91*  --   --   --  94*  --  92*  --  92*  --  93*   < > 90* 93*   CO2 mmol/L 24.0  --   --   --  27.4  --  25.3  --  24.8  --  20.6*   < > 22.7 24.6   BUN mg/dL 31*  --   --   --  24*  --  31*  --  23  --  35*   < > 24* 18   CREATININE mg/dL 2.00*  --   --   --  1.61*  --  1.95*  --  1.63*  --  2.31*   < > 2.07* 1.65*   CALCIUM mg/dL 8.6  --   --   --  8.2*  --  8.0*  --  7.4*  --  7.8*   < > 7.8* 7.8*    < > = values in this interval not displayed.     Estimated Creatinine Clearance: 20.2 mL/min (A) (by C-G formula based on SCr of 2 mg/dL (H)).    Liver and pancreatic function:  Results from last 7 days   Lab Units 05/31/22 0014 05/30/22  0239 05/29/22  0526   ALBUMIN g/dL 2.70* 2.61* 2.59*   BILIRUBIN mg/dL 0.3 0.2 0.3   ALK PHOS U/L 135* 94 95   AST (SGOT) U/L 14 12 11   ALT (SGPT) U/L 7 6 6         Cardiac:      Liver and pancreatic function:  Results from last 7 days   Lab Units 05/31/22  0014 05/30/22  0239 05/29/22  0526   ALBUMIN g/dL 2.70* 2.61* 2.59*   BILIRUBIN mg/dL 0.3 0.2 0.3   ALK PHOS U/L 135* 94 95   AST (SGOT) U/L 14 12 11   ALT (SGPT) U/L 7 6 6       Medications :     aspirin, 81 mg, Oral, Daily  atorvastatin, 80 mg, Oral, Nightly  calcium acetate, 1,334 mg, Oral, TID With Meals  carvedilol, 6.25 mg, Oral, BID With Meals  folic acid, 1 mg, Oral, Daily  guaiFENesin, 600 mg, Oral, Q12H  heparin (porcine), 5,000 Units, Subcutaneous, Q8H  PARoxetine, 10 mg, Oral, QAM  sodium chloride, 10 mL, Intravenous, Q12H             Assessment & Plan     1. PRITI on CKD vs rapidly worsening CKD, started on dialysis on 5/10/22  2. Metabolic acidosis  3. Anemia  4. Acute on chronic respiratory failure  5. DM-II  6. Essential hypertension  7. Hypokalemia  8. hyponatremia  9. Metabolic encephalopathy likely multifactorial    Pt mental status is better, clinically dehydrated still and with very poor  oral intake and likely hyponatremia is hypovoulmic  Will repeat BMP and likely to replace 1L of fluid today as well.   Hold dialysis today    During last hospitalization, found to have 8G of proteinuria with mild hematuria  Serology was all negative except mildly elevated cANCA,  Unknown baseline Cr, Cr was 1.3 in Feb 2021, 2.9 in 12/2021, and 3.2 in Jan 2022  Elevated , phos 8.4 and low iron stores     Discussed with RN and the dialysis nurse    Jude Muñoz MD  06/04/22  10:06 EDT

## 2022-06-05 ENCOUNTER — APPOINTMENT (OUTPATIENT)
Dept: CT IMAGING | Facility: HOSPITAL | Age: 82
End: 2022-06-05

## 2022-06-05 ENCOUNTER — APPOINTMENT (OUTPATIENT)
Dept: GENERAL RADIOLOGY | Facility: HOSPITAL | Age: 82
End: 2022-06-05

## 2022-06-05 LAB
A-A DO2: 22.6 MMHG (ref 0–300)
ANION GAP SERPL CALCULATED.3IONS-SCNC: 10.9 MMOL/L (ref 5–15)
ANION GAP SERPL CALCULATED.3IONS-SCNC: 11.8 MMOL/L (ref 5–15)
ARTERIAL PATENCY WRIST A: ABNORMAL
ATMOSPHERIC PRESS: 722 MMHG
BASE EXCESS BLDA CALC-SCNC: 1.2 MMOL/L (ref 0–2)
BASOPHILS # BLD AUTO: 0.06 10*3/MM3 (ref 0–0.2)
BASOPHILS # BLD AUTO: 0.09 10*3/MM3 (ref 0–0.2)
BASOPHILS NFR BLD AUTO: 0.5 % (ref 0–1.5)
BASOPHILS NFR BLD AUTO: 0.7 % (ref 0–1.5)
BDY SITE: ABNORMAL
BODY TEMPERATURE: 0 C
BUN SERPL-MCNC: 36 MG/DL (ref 8–23)
BUN SERPL-MCNC: 36 MG/DL (ref 8–23)
BUN/CREAT SERPL: 16.1 (ref 7–25)
BUN/CREAT SERPL: 16.1 (ref 7–25)
CALCIUM SPEC-SCNC: 8.8 MG/DL (ref 8.6–10.5)
CALCIUM SPEC-SCNC: 9 MG/DL (ref 8.6–10.5)
CHLORIDE SERPL-SCNC: 91 MMOL/L (ref 98–107)
CHLORIDE SERPL-SCNC: 93 MMOL/L (ref 98–107)
CO2 BLDA-SCNC: 27 MMOL/L (ref 22–33)
CO2 SERPL-SCNC: 19.2 MMOL/L (ref 22–29)
CO2 SERPL-SCNC: 21.1 MMOL/L (ref 22–29)
COHGB MFR BLD: 1.8 % (ref 0–5)
CREAT SERPL-MCNC: 2.24 MG/DL (ref 0.57–1)
CREAT SERPL-MCNC: 2.24 MG/DL (ref 0.57–1)
CRP SERPL-MCNC: 1.78 MG/DL (ref 0–0.5)
D-LACTATE SERPL-SCNC: 1.1 MMOL/L (ref 0.5–2)
DEPRECATED RDW RBC AUTO: 53.2 FL (ref 37–54)
DEPRECATED RDW RBC AUTO: 53.3 FL (ref 37–54)
EGFRCR SERPLBLD CKD-EPI 2021: 21.7 ML/MIN/1.73
EGFRCR SERPLBLD CKD-EPI 2021: 21.7 ML/MIN/1.73
EOSINOPHIL # BLD AUTO: 0.05 10*3/MM3 (ref 0–0.4)
EOSINOPHIL # BLD AUTO: 0.16 10*3/MM3 (ref 0–0.4)
EOSINOPHIL NFR BLD AUTO: 0.4 % (ref 0.3–6.2)
EOSINOPHIL NFR BLD AUTO: 1.3 % (ref 0.3–6.2)
ERYTHROCYTE [DISTWIDTH] IN BLOOD BY AUTOMATED COUNT: 16 % (ref 12.3–15.4)
ERYTHROCYTE [DISTWIDTH] IN BLOOD BY AUTOMATED COUNT: 16.1 % (ref 12.3–15.4)
GAS FLOW AIRWAY: 2 LPM
GLUCOSE BLDC GLUCOMTR-MCNC: 245 MG/DL (ref 70–130)
GLUCOSE BLDC GLUCOMTR-MCNC: 284 MG/DL (ref 70–130)
GLUCOSE BLDC GLUCOMTR-MCNC: 299 MG/DL (ref 70–130)
GLUCOSE BLDC GLUCOMTR-MCNC: 345 MG/DL (ref 70–130)
GLUCOSE SERPL-MCNC: 243 MG/DL (ref 65–99)
GLUCOSE SERPL-MCNC: 247 MG/DL (ref 65–99)
HCO3 BLDA-SCNC: 25.7 MMOL/L (ref 20–26)
HCT VFR BLD AUTO: 28.6 % (ref 34–46.6)
HCT VFR BLD AUTO: 29.5 % (ref 34–46.6)
HCT VFR BLD CALC: 25.9 % (ref 38–51)
HGB BLD-MCNC: 9.1 G/DL (ref 12–15.9)
HGB BLD-MCNC: 9.4 G/DL (ref 12–15.9)
HGB BLDA-MCNC: 8.5 G/DL (ref 13.5–17.5)
IMM GRANULOCYTES # BLD AUTO: 0.05 10*3/MM3 (ref 0–0.05)
IMM GRANULOCYTES # BLD AUTO: 0.08 10*3/MM3 (ref 0–0.05)
IMM GRANULOCYTES NFR BLD AUTO: 0.4 % (ref 0–0.5)
IMM GRANULOCYTES NFR BLD AUTO: 0.7 % (ref 0–0.5)
INHALED O2 CONCENTRATION: 28 %
LYMPHOCYTES # BLD AUTO: 0.76 10*3/MM3 (ref 0.7–3.1)
LYMPHOCYTES # BLD AUTO: 1.2 10*3/MM3 (ref 0.7–3.1)
LYMPHOCYTES NFR BLD AUTO: 6.4 % (ref 19.6–45.3)
LYMPHOCYTES NFR BLD AUTO: 9.7 % (ref 19.6–45.3)
Lab: ABNORMAL
MCH RBC QN AUTO: 28.9 PG (ref 26.6–33)
MCH RBC QN AUTO: 29.1 PG (ref 26.6–33)
MCHC RBC AUTO-ENTMCNC: 31.8 G/DL (ref 31.5–35.7)
MCHC RBC AUTO-ENTMCNC: 31.9 G/DL (ref 31.5–35.7)
MCV RBC AUTO: 90.8 FL (ref 79–97)
MCV RBC AUTO: 91.4 FL (ref 79–97)
METHGB BLD QL: 0.1 % (ref 0–3)
MODALITY: ABNORMAL
MONOCYTES # BLD AUTO: 0.63 10*3/MM3 (ref 0.1–0.9)
MONOCYTES # BLD AUTO: 0.93 10*3/MM3 (ref 0.1–0.9)
MONOCYTES NFR BLD AUTO: 5.3 % (ref 5–12)
MONOCYTES NFR BLD AUTO: 7.5 % (ref 5–12)
NEUTROPHILS NFR BLD AUTO: 10 10*3/MM3 (ref 1.7–7)
NEUTROPHILS NFR BLD AUTO: 10.24 10*3/MM3 (ref 1.7–7)
NEUTROPHILS NFR BLD AUTO: 80.4 % (ref 42.7–76)
NEUTROPHILS NFR BLD AUTO: 86.7 % (ref 42.7–76)
NOTE: ABNORMAL
NRBC BLD AUTO-RTO: 0 /100 WBC (ref 0–0.2)
NRBC BLD AUTO-RTO: 0 /100 WBC (ref 0–0.2)
OXYHGB MFR BLDV: 97.9 % (ref 94–99)
PCO2 BLDA: 39.8 MM HG (ref 35–45)
PCO2 TEMP ADJ BLD: ABNORMAL MM[HG]
PH BLDA: 7.42 PH UNITS (ref 7.35–7.45)
PH, TEMP CORRECTED: ABNORMAL
PLATELET # BLD AUTO: 284 10*3/MM3 (ref 140–450)
PLATELET # BLD AUTO: 294 10*3/MM3 (ref 140–450)
PMV BLD AUTO: 10 FL (ref 6–12)
PMV BLD AUTO: 9.9 FL (ref 6–12)
PO2 BLDA: 122 MM HG (ref 83–108)
PO2 TEMP ADJ BLD: ABNORMAL MM[HG]
POTASSIUM SERPL-SCNC: 4.9 MMOL/L (ref 3.5–5.2)
POTASSIUM SERPL-SCNC: 5.2 MMOL/L (ref 3.5–5.2)
PROCALCITONIN SERPL-MCNC: 0.16 NG/ML (ref 0–0.25)
QT INTERVAL: 468 MS
QTC INTERVAL: 519 MS
RBC # BLD AUTO: 3.13 10*6/MM3 (ref 3.77–5.28)
RBC # BLD AUTO: 3.25 10*6/MM3 (ref 3.77–5.28)
SAO2 % BLDCOA: >99.2 % (ref 94–99)
SODIUM SERPL-SCNC: 123 MMOL/L (ref 136–145)
SODIUM SERPL-SCNC: 123 MMOL/L (ref 136–145)
SODIUM SERPL-SCNC: 124 MMOL/L (ref 136–145)
SODIUM SERPL-SCNC: 125 MMOL/L (ref 136–145)
SODIUM SERPL-SCNC: 125 MMOL/L (ref 136–145)
TROPONIN T SERPL-MCNC: 0.13 NG/ML (ref 0–0.03)
VENTILATOR MODE: ABNORMAL
WBC NRBC COR # BLD: 11.82 10*3/MM3 (ref 3.4–10.8)
WBC NRBC COR # BLD: 12.43 10*3/MM3 (ref 3.4–10.8)

## 2022-06-05 PROCEDURE — 71045 X-RAY EXAM CHEST 1 VIEW: CPT

## 2022-06-05 PROCEDURE — 84484 ASSAY OF TROPONIN QUANT: CPT | Performed by: INTERNAL MEDICINE

## 2022-06-05 PROCEDURE — 25010000002 HEPARIN (PORCINE) PER 1000 UNITS: Performed by: HOSPITALIST

## 2022-06-05 PROCEDURE — 94799 UNLISTED PULMONARY SVC/PX: CPT

## 2022-06-05 PROCEDURE — 81001 URINALYSIS AUTO W/SCOPE: CPT | Performed by: INTERNAL MEDICINE

## 2022-06-05 PROCEDURE — 80053 COMPREHEN METABOLIC PANEL: CPT | Performed by: INTERNAL MEDICINE

## 2022-06-05 PROCEDURE — 83605 ASSAY OF LACTIC ACID: CPT | Performed by: INTERNAL MEDICINE

## 2022-06-05 PROCEDURE — 84295 ASSAY OF SERUM SODIUM: CPT | Performed by: INTERNAL MEDICINE

## 2022-06-05 PROCEDURE — 99222 1ST HOSP IP/OBS MODERATE 55: CPT | Performed by: PSYCHIATRY & NEUROLOGY

## 2022-06-05 PROCEDURE — 82375 ASSAY CARBOXYHB QUANT: CPT

## 2022-06-05 PROCEDURE — 94761 N-INVAS EAR/PLS OXIMETRY MLT: CPT

## 2022-06-05 PROCEDURE — 93005 ELECTROCARDIOGRAM TRACING: CPT | Performed by: INTERNAL MEDICINE

## 2022-06-05 PROCEDURE — 83050 HGB METHEMOGLOBIN QUAN: CPT

## 2022-06-05 PROCEDURE — 99233 SBSQ HOSP IP/OBS HIGH 50: CPT | Performed by: INTERNAL MEDICINE

## 2022-06-05 PROCEDURE — 82962 GLUCOSE BLOOD TEST: CPT

## 2022-06-05 PROCEDURE — 93010 ELECTROCARDIOGRAM REPORT: CPT | Performed by: INTERNAL MEDICINE

## 2022-06-05 PROCEDURE — 87086 URINE CULTURE/COLONY COUNT: CPT | Performed by: INTERNAL MEDICINE

## 2022-06-05 PROCEDURE — 85025 COMPLETE CBC W/AUTO DIFF WBC: CPT | Performed by: INTERNAL MEDICINE

## 2022-06-05 PROCEDURE — 86140 C-REACTIVE PROTEIN: CPT | Performed by: INTERNAL MEDICINE

## 2022-06-05 PROCEDURE — 82805 BLOOD GASES W/O2 SATURATION: CPT

## 2022-06-05 PROCEDURE — 36600 WITHDRAWAL OF ARTERIAL BLOOD: CPT

## 2022-06-05 PROCEDURE — 84145 PROCALCITONIN (PCT): CPT | Performed by: INTERNAL MEDICINE

## 2022-06-05 PROCEDURE — 70450 CT HEAD/BRAIN W/O DYE: CPT

## 2022-06-05 RX ADMIN — HEPARIN SODIUM 5000 UNITS: 5000 INJECTION INTRAVENOUS; SUBCUTANEOUS at 21:29

## 2022-06-05 RX ADMIN — HEPARIN SODIUM 5000 UNITS: 5000 INJECTION INTRAVENOUS; SUBCUTANEOUS at 16:18

## 2022-06-05 RX ADMIN — IPRATROPIUM BROMIDE AND ALBUTEROL SULFATE 3 ML: .5; 3 SOLUTION RESPIRATORY (INHALATION) at 13:37

## 2022-06-05 RX ADMIN — GUAIFENESIN 600 MG: 600 TABLET, EXTENDED RELEASE ORAL at 21:28

## 2022-06-05 RX ADMIN — FOLIC ACID 1 MG: 1 TABLET ORAL at 08:38

## 2022-06-05 RX ADMIN — ASPIRIN 81 MG: 81 TABLET, COATED ORAL at 08:38

## 2022-06-05 RX ADMIN — CALCIUM ACETATE 1334 MG: 667 CAPSULE ORAL at 08:39

## 2022-06-05 RX ADMIN — Medication 10 ML: at 21:29

## 2022-06-05 RX ADMIN — SODIUM ZIRCONIUM CYCLOSILICATE 10 G: 10 POWDER, FOR SUSPENSION ORAL at 10:46

## 2022-06-05 RX ADMIN — CARVEDILOL 6.25 MG: 6.25 TABLET, FILM COATED ORAL at 08:38

## 2022-06-05 RX ADMIN — IPRATROPIUM BROMIDE AND ALBUTEROL SULFATE 3 ML: .5; 3 SOLUTION RESPIRATORY (INHALATION) at 00:28

## 2022-06-05 RX ADMIN — ATORVASTATIN CALCIUM 80 MG: 40 TABLET, FILM COATED ORAL at 21:28

## 2022-06-05 RX ADMIN — PAROXETINE HYDROCHLORIDE 10 MG: 20 TABLET, FILM COATED ORAL at 08:39

## 2022-06-05 RX ADMIN — IPRATROPIUM BROMIDE AND ALBUTEROL SULFATE 3 ML: .5; 3 SOLUTION RESPIRATORY (INHALATION) at 05:40

## 2022-06-05 RX ADMIN — CARVEDILOL 6.25 MG: 6.25 TABLET, FILM COATED ORAL at 16:17

## 2022-06-05 RX ADMIN — GUAIFENESIN 600 MG: 600 TABLET, EXTENDED RELEASE ORAL at 08:38

## 2022-06-05 RX ADMIN — CALCIUM ACETATE 1334 MG: 667 CAPSULE ORAL at 16:17

## 2022-06-05 RX ADMIN — HEPARIN SODIUM 5000 UNITS: 5000 INJECTION INTRAVENOUS; SUBCUTANEOUS at 06:34

## 2022-06-05 RX ADMIN — CALCIUM ACETATE 1334 MG: 667 CAPSULE ORAL at 11:58

## 2022-06-05 NOTE — NURSING NOTE
Spoke with patient's son to let him know of change in patient condition; he said that he is coming to visit.

## 2022-06-05 NOTE — CONSULTS
Stroke Consult Note    Patient Name: Sean Chen   MRN: [id number]  Age: 81 y.o.  Sex: female  : 1940    Primary Care Physician: Ganga Gallardo MD  Referring Physician:  Provider, No Known    TIME STROKE TEAM CALLED: 0754 EST     TIME PATIENT SEEN: 0800 EST      Race: R    Chief Complaint/Reason for Consultation: AMS    HPI: This is a 81-year-old female patient with medical history of dementia, hypertension, dyslipidemia, end-stage renal disease on dialysis, admitted for hyponatremia and found to have parainfluenza pneumonia, been in the hospital for few days on antibiotics open (Flagyl and aztreonam), stroke code was called today given increased altered mental status.  Patient had similar episode on Friday where she had altered mental status in the morning.    She has some lab abnormalities including still hyponatremia at 125, elevated troponin at 0.13.    Last Known Normal Date/Time: 22 2359 EST     Review of Systems   Past Medical History:   Diagnosis Date   • Allergic rhinitis    • Elevated liver enzymes    • Extrinsic asthma    • Gastritis    • GERD (gastroesophageal reflux disease)    • Hyperlipidemia    • Hypertension    • Iron deficiency anemia    • Meniere's disease    • Osteoarthritis    • Type 2 diabetes mellitus (HCC)    • Vitamin D deficiency disease      Past Surgical History:   Procedure Laterality Date   • APPENDECTOMY     • CARDIAC CATHETERIZATION N/A 2022    Procedure: Left Heart Cath;  Surgeon: Marcelino Grande MD;  Location: [facility] CATH INVASIVE LOCATION;  Service: Cardiology;  Laterality: N/A;   • CHOLECYSTECTOMY     • HYSTERECTOMY     • INSERTION HEMODIALYSIS CATHETER N/A 2022    Procedure: HEMODIALYSIS CATHETER INSERTION;  Surgeon: Hans Coates MD;  Location: [facility] OR;  Service: General;  Laterality: N/A;     History reviewed. No pertinent family history.  Social History     Socioeconomic History   • Marital status:      Spouse name: moshe   •  Number of children: 1   • Years of education: 8   Tobacco Use   • Smoking status: Never Smoker   • Smokeless tobacco: Never Used   Substance and Sexual Activity   • Alcohol use: No   • Drug use: No   • Sexual activity: Defer     Allergies   Allergen Reactions   • Keflex [Cephalexin]    • Lodine [Etodolac]    • Penicillins    • Sulfa Antibiotics      Prior to Admission medications    Medication Sig Start Date End Date Taking? Authorizing Provider   aspirin 81 MG EC tablet Take 1 tablet by mouth Daily. 5/14/22  Yes Dane Reeves MD   atorvastatin (LIPITOR) 80 MG tablet Take 1 tablet by mouth Every Night. 4/30/22  Yes Ganga Gallardo MD   calcium acetate (PHOS BINDER,) 667 MG capsule capsule Take 2 capsules by mouth 3 (Three) Times a Day With Meals. 5/13/22  Yes Dane Reeves MD   folic acid (FOLVITE) 1 MG tablet Take 1 tablet by mouth Daily. 5/14/22  Yes Dane Reeves MD   NIFEdipine CC (ADALAT CC) 30 MG 24 hr tablet Take 3 tablets by mouth Daily. 5/14/22  Yes Dane Reeves MD   PARoxetine (PAXIL) 10 MG tablet Take 1 tablet by mouth Every Morning. 4/30/22  Yes Ganga Gallardo MD   albuterol sulfate HFA (ProAir HFA) 108 (90 Base) MCG/ACT inhaler Inhale 2 puffs 4 (Four) Times a Day. 4/30/22   Ganga Gallardo MD         Temp:  [97.1 °F (36.2 °C)-98 °F (36.7 °C)] 97.2 °F (36.2 °C)  Heart Rate:  [50-83] 50  Resp:  [16-24] 18  BP: (135-182)/(56-86) 169/78  Neurological Exam    Physical Exam    Acute Stroke Data    Alteplase (tPA) Inclusion / Exclusion Criteria: Not a stroke      Hospital Meds:  Scheduled- aspirin, 81 mg, Oral, Daily  atorvastatin, 80 mg, Oral, Nightly  calcium acetate, 1,334 mg, Oral, TID With Meals  carvedilol, 6.25 mg, Oral, BID With Meals  folic acid, 1 mg, Oral, Daily  guaiFENesin, 600 mg, Oral, Q12H  heparin (porcine), 5,000 Units, Subcutaneous, Q8H  PARoxetine, 10 mg, Oral, QAM  sodium chloride, 10 mL, Intravenous, Q12H      Infusions-     PRNs- benzonatate  •   ipratropium-albuterol  •  magnesium sulfate **OR** magnesium sulfate **OR** magnesium sulfate  •  [COMPLETED] Insert peripheral IV **AND** sodium chloride  •  sodium chloride    Functional Status Prior to Current Stroke/[place] Score: 2    NIH Stroke Scale  Time: 08:13 EDT  Person Administering Scale: Gulshan Barnard MD    [facility] time: 0805    1a Level of consciousness 2  1b LOC questions 2  1c LOC commands 0  2 Best Gaze 0  3 Visual 0  4 Facial Palsy 0  5a Motor left arm 1  5b Motor right arm 1  6a Motor left leg 1  6b Motor right leg 1  7 Limb ataxia 0  8 Sensory 0  9 Best language 0  10 Dysarthria 1  11 Extinction and inattention 0   Total 9      Results Reviewed:  I have personally reviewed current lab, radiology, and data and agree with results.    Results for orders placed during the hospital encounter of 05/23/22    Adult Transthoracic Echo Limited W/ Cont if Necessary Per Protocol    Interpretation Summary  · The left ventricular cavity is borderline dilated.  · Left ventricular ejection fraction appears to be 36 - 40%.  · Left ventricular diastolic function was normal.  · There is calcification of the aortic valve.       Assessment/Plan:    Toxic/metabolic encephalopathy      - Please obtain noncontrast head CT scan.  - If negative then no need for further neurological investigations at this point.        Neurology team will sign off, please contact us for any questions.        Gulshan Barnard MD  June 5, 2022  08:13 EDT

## 2022-06-05 NOTE — PROGRESS NOTES
[facility] HOSPITALIST PROGRESS NOTE     Patient Identification:  Name:  Sean Chen  Age:  81 y.o.  Sex:  female  :  1940  MRN:  [id number]  Visit Number:  [id number]  ROOM: [address] [place] [address]     Primary Care Provider:  Ganga Gallardo MD    Length of stay in inpatient status:  13    Subjective     Chief Compliant:    Chief Complaint   Patient presents with   • Shortness of Breath       History of Presenting Illness:    I was notified by night team that patient was found lethargic this morning. Work-up ordered already included ABG that was unrevealing. I went to evaluate patient who was lethargic and having difficulty talking to me. Patient has had episodes of confusion but did not have the difficulty talking. Code stroke called. Patient's symptoms have slowly improved. I called and updated patient's son who was on his way to see his mother.     ROS:  Otherwise 10 point ROS negative other than documented above in HPI.     Objective     Current Hospital Meds:aspirin, 81 mg, Oral, Daily  atorvastatin, 80 mg, Oral, Nightly  calcium acetate, 1,334 mg, Oral, TID With Meals  carvedilol, 6.25 mg, Oral, BID With Meals  folic acid, 1 mg, Oral, Daily  guaiFENesin, 600 mg, Oral, Q12H  heparin (porcine), 5,000 Units, Subcutaneous, Q8H  PARoxetine, 10 mg, Oral, QAM  sodium chloride, 10 mL, Intravenous, Q12H         Current Antimicrobial Therapy:  Anti-Infectives (From admission, onward)    Ordered     Dose/Rate Route Frequency Start Stop    22 0859  aztreonam (AZACTAM) 500 mg in sodium chloride 0.9 % 100 mL IVPB        Ordering Provider: Dylon Mcdonnell MD    500 mg  over 4 Hours Intravenous Every 12 Hours 22 1800 22 0905    22 1826  metroNIDAZOLE (FLAGYL) IVPB 500 mg        Ordering Provider: Dylon Mcdonnell MD    500 mg  over 30 Minutes Intravenous Every 8 Hours 22 2000 22 1230    22 1209  aztreonam (AZACTAM) 2 g in sodium chloride 0.9 % 100 mL  IVPB-VTB        Ordering Provider: Jone Schaffer MD    2 g  200 mL/hr over 30 Minutes Intravenous Once 05/23/22 1211 05/23/22 1404    05/23/22 1208  vancomycin 1250 mg/250 mL 0.9% NS IVPB ([facility])        Ordering Provider: Jone Schaffer MD    20 mg/kg × 66.7 kg  over 60 Minutes Intravenous Once 05/23/22 1210 05/23/22 1605        Current Diuretic Therapy:  Diuretics (From admission, onward)    None        ----------------------------------------------------------------------------------------------------------------------  Vital Signs:  Temp:  [97.1 °F (36.2 °C)-98 °F (36.7 °C)] 97.2 °F (36.2 °C)  Heart Rate:  [50-83] 50  Resp:  [16-24] 18  BP: (135-182)/(56-86) 169/78  SpO2:  [93 %-99 %] 99 %  on  Flow (L/min):  [2] 2;   Device (Oxygen Therapy): nasal cannula  Body mass index is 21.55 kg/m².    Wt Readings from Last 3 Encounters:   05/31/22 57 kg (125 lb 9.6 oz)   05/13/22 67.1 kg (147 lb 14.4 oz)   01/27/22 71.2 kg (157 lb)     Intake & Output (last 3 days)       06/02 0701 06/03 0700 06/03 0701 06/04 0700 06/04 0701  06/05 0700 06/05 0701  06/06 0700    P.O. 680 720 420     I.V. (mL/kg)  313 (5.5) 1004.9 (17.6)     Total Intake(mL/kg) 680 (11.9) 1033 (18.1) 1424.9 (25)     Urine (mL/kg/hr) 350 (0.3) 350 (0.3) 510 (0.4)     Other 3000       Stool  0 0     Total Output 3350 350 510     Net -2670 +683 +914.9             Urine Unmeasured Occurrence   1 x     Stool Unmeasured Occurrence  3 x 1 x         Diet Soft Texture; Chopped; Thin; Daily Fluid Restriction; Other; 800  ----------------------------------------------------------------------------------------------------------------------  Physical exam:  Constitutional:  Lethargic.   HENT:  Head:  Normocephalic and atraumatic.  Mouth:  Moist mucous membranes.    Eyes:  Conjunctivae and EOM are normal. No scleral icterus.    Neck:  Neck supple.  No JVD present.    Cardiovascular:  Normal rate, regular rhythm and normal heart sounds with no  murmur.  Pulmonary/Chest:  No respiratory distress, no wheezes, no crackles, with normal breath sounds and good air movement.  Abdominal:  Soft.  Bowel sounds are normal.  No distension and no tenderness.   Musculoskeletal:  No edema, no tenderness, and no deformity.  No red or swollen joints anywhere.    Neurological:  Lethargic. Only able to mumble. Did follow commands with all extremities. Weak.   Skin:  Skin is warm and dry. No rash noted. No pallor.   Peripheral vascular:  Pulses in all 4 extremities with no clubbing, no cyanosis, no edema.  ----------------------------------------------------------------------------------------------------------------------  Tele:    ----------------------------------------------------------------------------------------------------------------------  Results from last 7 days   Lab Units 06/05/22  0806 06/05/22  0658 06/05/22  0012 06/04/22  0411 06/03/22  0303   CRP mg/dL  --   --  1.78* 1.14* 1.14*   LACTATE mmol/L  --  1.1  --   --   --    WBC 10*3/mm3 11.82*  --  12.43* 11.60* 10.47   HEMOGLOBIN g/dL 9.1*  --  9.4* 9.0* 7.9*   HEMATOCRIT % 28.6*  --  29.5* 27.2* 24.3*   MCV fL 91.4  --  90.8 89.5 89.0   MCHC g/dL 31.8  --  31.9 33.1 32.5   PLATELETS 10*3/mm3 284  --  294 284 287     Results from last 7 days   Lab Units 06/05/22  0724   PH, ARTERIAL pH units 7.419   PO2 ART mm Hg 122.0*   PCO2, ARTERIAL mm Hg 39.8   HCO3 ART mmol/L 25.7     Results from last 7 days   Lab Units 06/05/22  0808 06/05/22  0549 06/05/22  0012 06/04/22  1713 06/04/22  1239 05/31/22  0532 05/31/22  0014 05/30/22  0949 05/30/22  0239   SODIUM mmol/L 124* 125* 123*   < > 122*   < > 125*   < > 123*   POTASSIUM mmol/L 5.2  --  4.9  --  5.3*   < > 4.5   < > 4.4   MAGNESIUM mg/dL  --   --   --   --   --   --  2.5*  --  2.7*   CHLORIDE mmol/L 93*  --  91*  --  89*   < > 93*   < > 90*   CO2 mmol/L 19.2*  --  21.1*  --  25.4   < > 20.6*   < > 22.7   BUN mg/dL 36*  --  36*  --  24*   < > 35*   < > 24*    CREATININE mg/dL 2.24*  --  2.24*  --  2.09*   < > 2.31*   < > 2.07*   CALCIUM mg/dL 8.8  --  9.0  --  8.1*   < > 7.8*   < > 7.8*   GLUCOSE mg/dL 243*  --  247*  --  250*   < > 211*   < > 147*   ALBUMIN g/dL  --   --   --   --   --   --  2.70*  --  2.61*   BILIRUBIN mg/dL  --   --   --   --   --   --  0.3  --  0.2   ALK PHOS U/L  --   --   --   --   --   --  135*  --  94   AST (SGOT) U/L  --   --   --   --   --   --  14  --  12   ALT (SGPT) U/L  --   --   --   --   --   --  7  --  6    < > = values in this interval not displayed.   Estimated Creatinine Clearance: 18 mL/min (A) (by C-G formula based on SCr of 2.24 mg/dL (H)).  No results found for: AMMONIA  Results from last 7 days   Lab Units 06/05/22  0630 06/03/22  1213   TROPONIN T ng/mL 0.131* 0.198*             Glucose   Date/Time Value Ref Range Status   06/05/2022 0624 245 (H) 70 - 130 mg/dL Final     Comment:     Meter: [id number] : [id number] [facility]   06/04/2022 2356 284 (H) 70 - 130 mg/dL Final     Comment:     Meter: [id number] : [id number] matt lerner   06/04/2022 1621 265 (H) 70 - 130 mg/dL Final     Comment:     Meter: [id number] : [id number] Nikolay Rincon    06/04/2022 1008 240 (H) 70 - 130 mg/dL Final     Comment:     Meter: [id number] : [id number] Nikolay Costelloa L   06/04/2022 0629 207 (H) 70 - 130 mg/dL Final     Comment:     Meter: [id number] : [id number] mayne mary   06/04/2022 0021 192 (H) 70 - 130 mg/dL Final     Comment:     Meter: [id number] : [id number] Lowell GOSS   06/03/2022 1710 210 (H) 70 - 130 mg/dL Final     Comment:     Meter: [id number] : [id number] elise leach   06/03/2022 1048 191 (H) 70 - 130 mg/dL Final     Comment:     Meter: [id number] : [id number] elise leach     Lab Results   Component Value Date    TSH 0.677 05/23/2022    FREET4 1.77 (H) 05/23/2022     No results found for: PREGTESTUR, PREGSERUM, HCG, HCGQUANT  Pain Management Panel     Pain Management Panel Latest Ref Rng &  Units 5/23/2022 5/5/2022    CREATININE UR mg/dL - 67.4    AMPHETAMINES SCREEN, URINE Negative Negative -    BARBITURATES SCREEN Negative Negative -    BENZODIAZEPINE SCREEN, URINE Negative Negative -    BUPRENORPHINEUR Negative Negative -    COCAINE SCREEN, URINE Negative Negative -    METHADONE SCREEN, URINE Negative Negative -    METHAMPHETAMINEUR Negative Negative -        Brief Urine Lab Results  (Last result in the past 365 days)      Color   Clarity   Blood   Leuk Est   Nitrite   Protein   CREAT   Urine HCG        05/23/22 1422 Yellow   Clear   Negative   Trace   Negative   >=300 mg/dL (3+)               No results found for: BLOODCX  No results found for: URINECX  No results found for: WOUNDCX  No results found for: STOOLCX  No results found for: RESPCX  No results found for: AFBCX  Results from last 7 days   Lab Units 06/05/22  0658 06/05/22  0012 06/04/22  0411 06/03/22  0303 06/02/22  0008 06/01/22  0021 05/31/22  0014 05/30/22  0239   LACTATE mmol/L 1.1  --   --   --   --   --   --   --    CRP mg/dL  --  1.78* 1.14* 1.14* 0.82* 1.11* 1.56* 1.32*       I have personally looked at the labs and they are summarized above.  ----------------------------------------------------------------------------------------------------------------------  Detailed radiology reports for the last 24 hours:    Imaging Results (Last 24 Hours)     Procedure Component Value Units Date/Time    CT Head Without Contrast [[id number]] Collected: 06/05/22 0828     Updated: 06/05/22 0830    Narrative:      CT Head WO    HISTORY:   Acute onset of altered mental status    TECHNIQUE:   Axial unenhanced head CT. Radiation dose reduction techniques included automated exposure control or exposure modulation based on body size. Count of known CT and cardiac nuc med studies performed in previous 12 months: 2.     Time of scan: 8:22 AM    COMPARISON:   May 23, 2022    FINDINGS:     The ventricles remain generous in size. Cortical sulci are  correspondingly prominent. No extraaxial fluid collections are seen.    No parenchymal or subarachnoid hemorrhage is present. Prominent periventricular hypodensities are again demonstrated which are nonspecific but likely represent white matter microvascular change in a patient of this age. No CT evidence of mass or acute  infarct.    The mastoid air cells are clear. The visualized paranasal sinuses demonstrate inflammatory changes in the sphenoid sinus.  Orbital structures demonstrate no acute findings.      Impression:      Impression:  1. No clearly acute intracranial pathology demonstrated.  2. Generalized cerebral atrophy and nonspecific periventricular hypodensities which likely represent white matter microvascular change.  3. Inflammatory change in the sphenoid sinus.    Signer Name: Glen Ryees MD   Signed: 6/5/2022 8:28 AM   Workstation Name: [facility]    [facility]    XR Chest 1 View [[id number]] Collected: 06/05/22 0806     Updated: 06/05/22 0808    Narrative:      CR Chest 1 Vw    INDICATION:   Acute respiratory failure with hypoxia     COMPARISON:    Shivani 3, 2022    FINDINGS:  Portable AP view(s) of the chest. The patient is rotated to the right.    Large-bore central venous catheter demonstrated on the right side with the tip overlying the expected location of the superior vena cava.    Bibasilar atelectatic/infiltrative change with bilateral pleural effusions, right side greater than left. No pneumothorax.       Impression:      Bibasilar atelectatic/infiltrative change, right side greater than left with bilateral pleural effusions. Slight worsening from prior study.    Signer Name: Glen Reyes MD   Signed: 6/5/2022 8:06 AM   Workstation Name: [facility]    [facility]        Assessment & Plan      #Encephalopathy   #Dysphasia   #Dementia    - Patient had episode of lethargy and dysphasia in AM on 6/5. Otherwise non-focal but dysphasia new.    -  Code stroke called. Patient appears to be slowly improving. Tele neuro evaluated   - CT head w/o contrast unrevealing   - Patient afebrile. WBC count stable at 11. Will still do infectious w/u. Will get UA. Will get procal.   - Chest x-ray revealed worsening airspace disease but I suspect it is volume   - Possibly metabolic and multifactorial, in setting of hyponatremia, dementia, and ESRD.   - Low threshold to start abx     #Acute Hypervolemic Hyponatremia   - Sodium still in mid 120's this AM.   - Nephrology thinks patient has hypovolemic hyponatremia and held dialysis yesterday      #Sepsis, Acute Metabolic Encephalopathy & Acute Hypoxic Respiratory Failure due to Pneumonia, Bacterial, treating for MDR Organisms/Aspiration & Parainfluenza Virus in setting of Immunocompromised stated from underlying Rheumatoid Arthritis  - Patient presented after recent hospitalization with increased shortness of breath and new oxygen requirement  - Patient meets SIRS criteria due to respiratory rate > 20, WBC count > 12K, noted to have mental status change from baseline  - Admission labs showed WBC count 15K, CRP 3.62, procalcitonin 0.89, lactate 1.7, Coronavirus/flu negative, MRSA negative, ABG with P02 down to 37.5, blood cultures negative to date, strep pneumo negative, viral respiratory panel + for parainfluenza virus  - CT Head showed no acute intracranial abnormality   - CT Chest showed RML and RLL airspace consolidative disease  - SLP consulted; Evaluated with diet recommendations 5/24, no noted signs and symptoms aspiration though noted mild oral weakness felt due to fatigue and medical condition  - Completed Aztreonam and Flagyl x 7 days  - Continue APAP PRN for fevers, Guaifenesin for congestion, Duonebs as needed  - Monitor on telemetry, PT/OT consulted and following for weakness/debility  - Repeat procal pending given above findings      #Nonoliguric Acute Kidney Injury on Chronic Kidney Disease vs ESRD with remote  history Acute Kidney Injury requiring iHD in setting of mildly elevated cANCA complicated by proteinuria and mild hematuria - Improved   - Last admission creatinine was up to 5.67, improved to 1.74 at time of discharge, had elevated lambda/kappa, this admission up to 3.85, had started on hemodialysis last admission 5/10; Today now > 2  - Renal ultrasound showed concern for dense vascular calcifications or non-obstructing stones but no evidence of hydronephrosis was seen  - Consult Nephrology; Following for hemodialysis needs, concern for intermittent urinary retention, following creatinine and urine output post iHD 5/28. Family interested in peritoneal dialysis.      #Hypertension/Hyperlipidemia/Coronary Artery Disease  #Acute on Chronic HFpEF, diastolic dysfunction complicated by edema and pleural effusions now with noted reduced EF   #Severe Pulmonary Hypertension  #Small Pericardial Effusion  #Valvular Disease - Mild AS, Mild MR, mod TR  #Elevated Troponins, suspected NSTEMI Type II  #Prolonged Qtc (566ms)  - Labs showed Troponins 0.06->0.04, proBNP > 70K  - EKG showed normal sinus rhythm, intermittent PAC's, RBBB, Non-specific ST/T wave abnormality  - Echocardiogram 5/5 showed LVEF 66-70%, mild concentric hypertrophy, diastolic dysfunction, dilated left atrium, mild AS, mild MR, mod TR, severe Pulmonary Hypertension, RVSP > 55mmHg, small <1cm pericardial effusion; Repeat limited echocardiogram showed borderline dilated left ventricle, LVEF 36-40%, diastolic dysfunction    - [facility] 5/11 showed no-obstructive Coronary Artery Disease   - CT Chest showed Congestive Heart Failure/edema with R>L pleural effusion, cardiomegaly, severe coronary calcifications  - Cardiology consulted; Following, recommended goal directed medical therapy, signed off  - Continue home Aspirin 81, Statin  - Continue home Nifedipine, titrate as indicated   - Continue new Coreg, titrate as indicated   - No home ACEI/ARB/ARNI due to renal  failure  - Continue to monitor on telemetry, strict I/O's, trend heart rate and blood pressure     #Non-Insulin Dependent Diabetes Mellitus Type II, controlled, unknown complications  - Hgb A1c = 5.5%  - Holding home oral agents, continue FSBG and SSI, add long acting as indicated.     #Electrolyte Abnormalities  - Acute Mod Hypokalemia - potassium down to 2.8, Replacing per protocol - Resolved   - Borderline Hypomagnesemia - magnesium down to 1.7, Replaced per protocol - Resolved         #Anxiety/Depression  - Continue home regimen     #Gastroesophageal Reflux Disease  - No home PPI, monitor symptoms     #Dementia  - CT showed generalized cerebral atrophy and nonspecific periventricular hypodensities thought to represent microvascular changes.   - Review home meds and resume pending med rec, supportive care     #Debility  - Consult PT/OT, Social Work if placement needed     #Mod Malnutrition  - Nutrition consulted and following        F: Oral  E: Monitor & Replace PRN  N: Diet Soft Texture; Chopped; Thin; Renal, Cardiac    Ppx: SQ  Code Status (Patient has no pulse and is not breathing): CPR (Attempt to Resuscitate)  Medical Interventions (Patient has pulse or is breathing): Full Support     Dispo: Pending clinical improvement, PT/OT recommended inpatient rehab vs skilled nursing facility. Family and patient opting to go home at discharge. She is to continue iHD MWF at [facility] with plan to potentially switch to pDx at some point down the road.           VTE Prophylaxis:   Mechanical Order History:     None      Pharmalogical Order History:      Ordered     Dose Route Frequency Stop    05/26/22 1311  heparin (porcine) injection 4,000 Units         4,000 Units IV Once 05/26/22 1351    05/26/22 1313  heparin (porcine) injection 3,000 Units         3,000 Units IV Once 05/26/22 1346    05/23/22 1826  heparin (porcine) 5000 UNIT/ML injection 5,000 Units         5,000 Units SC Every 8 Hours Scheduled --                 Jacob Stauffer MD  [facility] Hospitalist  06/05/22  08:43 EDT

## 2022-06-05 NOTE — PLAN OF CARE
Goal Outcome Evaluation:  Plan of Care Reviewed With: patient        Progress: no change  Outcome Evaluation: patient seems a little confused to time of day, did bladder scan and was 222ml, will continue to monitor

## 2022-06-05 NOTE — PROGRESS NOTES
Nephrology Progress Note      Subjective     Pt had altered mental status this morning, code stroke was announced, CT head was normal and now she is back to her baseline mental status. Answering questions, family at bed side, answered all questions.    Objective       Vital signs :     Temp:  [97.1 °F (36.2 °C)-98 °F (36.7 °C)] 97.2 °F (36.2 °C)  Heart Rate:  [50-83] 50  Resp:  [16-24] 18  BP: (135-182)/(56-86) 169/78      Intake/Output Summary (Last 24 hours) at 6/5/2022 0933  Last data filed at 6/5/2022 0600  Gross per 24 hour   Intake 1244.91 ml   Output 510 ml   Net 734.91 ml       Physical Exam:    General Appearance :   Lungs : clear to auscultation, respirations regular  Heart :  regular rhythm & normal rate, normal S1, S2 and no murmur, no rub  Abdomen : normal bowel sounds, no masses, no hepatomegaly, no splenomegaly, soft non-tender and no guarding  Extremities : moves extremities well, no edema, no cyanosis and no redness  Neurologic :   Mild disorientation      Laboratory Data :     Albumin No results found for: ALBUMIN   Magnesium No results found for: MG       PTH               No results found for: PTH    CBC and coagulation:  Results from last 7 days   Lab Units 06/05/22  0808 06/05/22  0806 06/05/22  0658 06/05/22  0012 06/04/22  0411 06/03/22  0303   PROCALCITONIN ng/mL 0.16  --   --   --   --   --    LACTATE mmol/L  --   --  1.1  --   --   --    CRP mg/dL  --   --   --  1.78* 1.14* 1.14*   WBC 10*3/mm3  --  11.82*  --  12.43* 11.60* 10.47   HEMOGLOBIN g/dL  --  9.1*  --  9.4* 9.0* 7.9*   HEMATOCRIT %  --  28.6*  --  29.5* 27.2* 24.3*   MCV fL  --  91.4  --  90.8 89.5 89.0   MCHC g/dL  --  31.8  --  31.9 33.1 32.5   PLATELETS 10*3/mm3  --  284  --  294 284 287     Acid/base balance:  Results from last 7 days   Lab Units 06/05/22  0724 06/03/22  1141   PH, ARTERIAL pH units 7.419 7.495*   PO2 ART mm Hg 122.0* 72.2*   PCO2, ARTERIAL mm Hg 39.8 38.2   HCO3 ART mmol/L 25.7 29.5*     Renal and  electrolytes:  Results from last 7 days   Lab Units 06/05/22  0808 06/05/22  0549 06/05/22  0012 06/04/22  1713 06/04/22  1239 06/04/22  0949 06/04/22  0411 06/03/22  0932 06/03/22  0303 05/31/22  0532 05/31/22  0014 05/30/22  0949 05/30/22  0239   SODIUM mmol/L 124* 125* 123* 125* 122*   < > 125*   < > 127*   < > 125*   < > 123*   POTASSIUM mmol/L 5.2  --  4.9  --  5.3*  --  4.7  --  4.4   < > 4.5   < > 4.4   MAGNESIUM mg/dL  --   --   --   --   --   --   --   --   --   --  2.5*  --  2.7*   CHLORIDE mmol/L 93*  --  91*  --  89*  --  91*  --  94*   < > 93*   < > 90*   CO2 mmol/L 19.2*  --  21.1*  --  25.4  --  24.0  --  27.4   < > 20.6*   < > 22.7   BUN mg/dL 36*  --  36*  --  24*  --  31*  --  24*   < > 35*   < > 24*   CREATININE mg/dL 2.24*  --  2.24*  --  2.09*  --  2.00*  --  1.61*   < > 2.31*   < > 2.07*   CALCIUM mg/dL 8.8  --  9.0  --  8.1*  --  8.6  --  8.2*   < > 7.8*   < > 7.8*    < > = values in this interval not displayed.     Estimated Creatinine Clearance: 18 mL/min (A) (by C-G formula based on SCr of 2.24 mg/dL (H)).    Liver and pancreatic function:  Results from last 7 days   Lab Units 05/31/22  0014 05/30/22  0239   ALBUMIN g/dL 2.70* 2.61*   BILIRUBIN mg/dL 0.3 0.2   ALK PHOS U/L 135* 94   AST (SGOT) U/L 14 12   ALT (SGPT) U/L 7 6         Cardiac:      Liver and pancreatic function:  Results from last 7 days   Lab Units 05/31/22  0014 05/30/22  0239   ALBUMIN g/dL 2.70* 2.61*   BILIRUBIN mg/dL 0.3 0.2   ALK PHOS U/L 135* 94   AST (SGOT) U/L 14 12   ALT (SGPT) U/L 7 6       Medications :     aspirin, 81 mg, Oral, Daily  atorvastatin, 80 mg, Oral, Nightly  calcium acetate, 1,334 mg, Oral, TID With Meals  carvedilol, 6.25 mg, Oral, BID With Meals  folic acid, 1 mg, Oral, Daily  guaiFENesin, 600 mg, Oral, Q12H  heparin (porcine), 5,000 Units, Subcutaneous, Q8H  PARoxetine, 10 mg, Oral, QAM  sodium chloride, 10 mL, Intravenous, Q12H             Assessment & Plan     1. PRITI on CKD vs rapidly worsening  CKD, started on dialysis on 5/10/22  2. Metabolic acidosis  3. Anemia  4. Acute on chronic respiratory failure  5. DM-II  6. Essential hypertension  7. Hypokalemia  8. hyponatremia  9. Metabolic encephalopathy likely multifactorial    No emergent indications for dialysis today, continue holding. Mildly worsening hypervolemia and hyperkalemia, will watch and ok to give 80mg of lasix at end of day today if respiratory status gets worse.   I have ordered lokelma 10G daily. Discussed with Dr. Stauffer, family and answered all questions.     During last hospitalization, found to have 8G of proteinuria with mild hematuria  Serology was all negative except mildly elevated cANCA,  Unknown baseline Cr, Cr was 1.3 in Feb 2021, 2.9 in 12/2021, and 3.2 in Jan 2022  Elevated , phos 8.4 and low iron stores     Discussed with RN and the dialysis nurse    Jude Muñoz MD  06/05/22  09:33 EDT

## 2022-06-05 NOTE — PLAN OF CARE
Goal Outcome Evaluation:               Pt resting with no complaints at this time. Family at bedside. Pt unresponsive this am at shift change. Labs and code stroke was done. Pt now responsive awake talking eating and visiting with family. Pt had similar episode on 6/3/22.

## 2022-06-06 LAB
ALBUMIN SERPL-MCNC: 2.95 G/DL (ref 3.5–5.2)
ALBUMIN/GLOB SERPL: 1 G/DL
ALP SERPL-CCNC: 143 U/L (ref 39–117)
ALT SERPL W P-5'-P-CCNC: 9 U/L (ref 1–33)
ANION GAP SERPL CALCULATED.3IONS-SCNC: 12.3 MMOL/L (ref 5–15)
AST SERPL-CCNC: 14 U/L (ref 1–32)
BACTERIA UR QL AUTO: ABNORMAL /HPF
BASOPHILS # BLD AUTO: 0.06 10*3/MM3 (ref 0–0.2)
BASOPHILS NFR BLD AUTO: 0.4 % (ref 0–1.5)
BILIRUB SERPL-MCNC: 0.4 MG/DL (ref 0–1.2)
BILIRUB UR QL STRIP: NEGATIVE
BUN SERPL-MCNC: 41 MG/DL (ref 8–23)
BUN/CREAT SERPL: 16.6 (ref 7–25)
CALCIUM SPEC-SCNC: 9.2 MG/DL (ref 8.6–10.5)
CHLORIDE SERPL-SCNC: 91 MMOL/L (ref 98–107)
CLARITY UR: CLEAR
CO2 SERPL-SCNC: 21.7 MMOL/L (ref 22–29)
COLOR UR: YELLOW
CREAT SERPL-MCNC: 2.47 MG/DL (ref 0.57–1)
CRP SERPL-MCNC: 1.49 MG/DL (ref 0–0.5)
DEPRECATED RDW RBC AUTO: 52.2 FL (ref 37–54)
EGFRCR SERPLBLD CKD-EPI 2021: 19.3 ML/MIN/1.73
EOSINOPHIL # BLD AUTO: 0.04 10*3/MM3 (ref 0–0.4)
EOSINOPHIL NFR BLD AUTO: 0.3 % (ref 0.3–6.2)
ERYTHROCYTE [DISTWIDTH] IN BLOOD BY AUTOMATED COUNT: 15.8 % (ref 12.3–15.4)
GLOBULIN UR ELPH-MCNC: 3.1 GM/DL
GLUCOSE BLDC GLUCOMTR-MCNC: 220 MG/DL (ref 70–130)
GLUCOSE BLDC GLUCOMTR-MCNC: 282 MG/DL (ref 70–130)
GLUCOSE BLDC GLUCOMTR-MCNC: 291 MG/DL (ref 70–130)
GLUCOSE BLDC GLUCOMTR-MCNC: 319 MG/DL (ref 70–130)
GLUCOSE SERPL-MCNC: 289 MG/DL (ref 65–99)
GLUCOSE UR STRIP-MCNC: ABNORMAL MG/DL
HCT VFR BLD AUTO: 27 % (ref 34–46.6)
HGB BLD-MCNC: 8.8 G/DL (ref 12–15.9)
HGB UR QL STRIP.AUTO: ABNORMAL
HYALINE CASTS UR QL AUTO: ABNORMAL /LPF
IMM GRANULOCYTES # BLD AUTO: 0.05 10*3/MM3 (ref 0–0.05)
IMM GRANULOCYTES NFR BLD AUTO: 0.4 % (ref 0–0.5)
KETONES UR QL STRIP: NEGATIVE
LEUKOCYTE ESTERASE UR QL STRIP.AUTO: NEGATIVE
LYMPHOCYTES # BLD AUTO: 0.75 10*3/MM3 (ref 0.7–3.1)
LYMPHOCYTES NFR BLD AUTO: 5.6 % (ref 19.6–45.3)
MCH RBC QN AUTO: 29.1 PG (ref 26.6–33)
MCHC RBC AUTO-ENTMCNC: 32.6 G/DL (ref 31.5–35.7)
MCV RBC AUTO: 89.4 FL (ref 79–97)
MONOCYTES # BLD AUTO: 0.75 10*3/MM3 (ref 0.1–0.9)
MONOCYTES NFR BLD AUTO: 5.6 % (ref 5–12)
NEUTROPHILS NFR BLD AUTO: 11.78 10*3/MM3 (ref 1.7–7)
NEUTROPHILS NFR BLD AUTO: 87.7 % (ref 42.7–76)
NITRITE UR QL STRIP: NEGATIVE
NRBC BLD AUTO-RTO: 0 /100 WBC (ref 0–0.2)
PH UR STRIP.AUTO: 6.5 [PH] (ref 5–8)
PLATELET # BLD AUTO: 256 10*3/MM3 (ref 140–450)
PMV BLD AUTO: 10.7 FL (ref 6–12)
POTASSIUM SERPL-SCNC: 5.4 MMOL/L (ref 3.5–5.2)
PROT SERPL-MCNC: 6 G/DL (ref 6–8.5)
PROT UR QL STRIP: ABNORMAL
RBC # BLD AUTO: 3.02 10*6/MM3 (ref 3.77–5.28)
RBC # UR STRIP: ABNORMAL /HPF
REF LAB TEST METHOD: ABNORMAL
SODIUM SERPL-SCNC: 119 MMOL/L (ref 136–145)
SODIUM SERPL-SCNC: 123 MMOL/L (ref 136–145)
SODIUM SERPL-SCNC: 124 MMOL/L (ref 136–145)
SODIUM SERPL-SCNC: 125 MMOL/L (ref 136–145)
SODIUM SERPL-SCNC: 128 MMOL/L (ref 136–145)
SP GR UR STRIP: 1.02 (ref 1–1.03)
SQUAMOUS #/AREA URNS HPF: ABNORMAL /HPF
UROBILINOGEN UR QL STRIP: ABNORMAL
WBC # UR STRIP: ABNORMAL /HPF
WBC NRBC COR # BLD: 13.43 10*3/MM3 (ref 3.4–10.8)
YEAST URNS QL MICRO: ABNORMAL /HPF

## 2022-06-06 PROCEDURE — 82962 GLUCOSE BLOOD TEST: CPT

## 2022-06-06 PROCEDURE — 84295 ASSAY OF SERUM SODIUM: CPT | Performed by: INTERNAL MEDICINE

## 2022-06-06 PROCEDURE — 99221 1ST HOSP IP/OBS SF/LOW 40: CPT | Performed by: PSYCHIATRY & NEUROLOGY

## 2022-06-06 PROCEDURE — 99231 SBSQ HOSP IP/OBS SF/LOW 25: CPT | Performed by: INTERNAL MEDICINE

## 2022-06-06 PROCEDURE — 97110 THERAPEUTIC EXERCISES: CPT

## 2022-06-06 PROCEDURE — 94799 UNLISTED PULMONARY SVC/PX: CPT

## 2022-06-06 PROCEDURE — 94761 N-INVAS EAR/PLS OXIMETRY MLT: CPT

## 2022-06-06 PROCEDURE — 97116 GAIT TRAINING THERAPY: CPT

## 2022-06-06 PROCEDURE — 25010000002 HEPARIN (PORCINE) PER 1000 UNITS: Performed by: HOSPITALIST

## 2022-06-06 RX ORDER — LIDOCAINE 50 MG/G
2 PATCH TOPICAL
Status: DISCONTINUED | OUTPATIENT
Start: 2022-06-06 | End: 2022-06-06

## 2022-06-06 RX ORDER — NIFEDIPINE 30 MG/1
30 TABLET, FILM COATED, EXTENDED RELEASE ORAL
Status: DISCONTINUED | OUTPATIENT
Start: 2022-06-06 | End: 2022-06-07

## 2022-06-06 RX ORDER — 3% SODIUM CHLORIDE 3 G/100ML
50 INJECTION, SOLUTION INTRAVENOUS CONTINUOUS
Status: DISPENSED | OUTPATIENT
Start: 2022-06-06 | End: 2022-06-06

## 2022-06-06 RX ORDER — ACETAMINOPHEN 325 MG/1
650 TABLET ORAL EVERY 6 HOURS PRN
Status: DISCONTINUED | OUTPATIENT
Start: 2022-06-06 | End: 2022-06-08 | Stop reason: HOSPADM

## 2022-06-06 RX ORDER — LIDOCAINE 50 MG/G
2 PATCH TOPICAL
Status: DISCONTINUED | OUTPATIENT
Start: 2022-06-06 | End: 2022-06-08 | Stop reason: HOSPADM

## 2022-06-06 RX ADMIN — PAROXETINE HYDROCHLORIDE 10 MG: 20 TABLET, FILM COATED ORAL at 05:12

## 2022-06-06 RX ADMIN — CARVEDILOL 6.25 MG: 6.25 TABLET, FILM COATED ORAL at 17:19

## 2022-06-06 RX ADMIN — Medication 10 ML: at 08:50

## 2022-06-06 RX ADMIN — CALCIUM ACETATE 1334 MG: 667 CAPSULE ORAL at 17:19

## 2022-06-06 RX ADMIN — HEPARIN SODIUM 5000 UNITS: 5000 INJECTION INTRAVENOUS; SUBCUTANEOUS at 15:06

## 2022-06-06 RX ADMIN — CALCIUM ACETATE 1334 MG: 667 CAPSULE ORAL at 12:00

## 2022-06-06 RX ADMIN — LIDOCAINE 2 PATCH: 50 PATCH CUTANEOUS at 03:10

## 2022-06-06 RX ADMIN — SODIUM CHLORIDE 50 ML/HR: 3 INJECTION, SOLUTION INTRAVENOUS at 15:07

## 2022-06-06 RX ADMIN — CARVEDILOL 6.25 MG: 6.25 TABLET, FILM COATED ORAL at 08:49

## 2022-06-06 RX ADMIN — SODIUM ZIRCONIUM CYCLOSILICATE 10 G: 10 POWDER, FOR SUSPENSION ORAL at 08:49

## 2022-06-06 RX ADMIN — HEPARIN SODIUM 5000 UNITS: 5000 INJECTION INTRAVENOUS; SUBCUTANEOUS at 20:50

## 2022-06-06 RX ADMIN — GUAIFENESIN 600 MG: 600 TABLET, EXTENDED RELEASE ORAL at 20:49

## 2022-06-06 RX ADMIN — GUAIFENESIN 600 MG: 600 TABLET, EXTENDED RELEASE ORAL at 08:49

## 2022-06-06 RX ADMIN — ACETAMINOPHEN 650 MG: 325 TABLET ORAL at 03:10

## 2022-06-06 RX ADMIN — HEPARIN SODIUM 5000 UNITS: 5000 INJECTION INTRAVENOUS; SUBCUTANEOUS at 05:12

## 2022-06-06 RX ADMIN — NIFEDIPINE 30 MG: 30 TABLET, EXTENDED RELEASE ORAL at 09:54

## 2022-06-06 RX ADMIN — ASPIRIN 81 MG: 81 TABLET, COATED ORAL at 08:49

## 2022-06-06 RX ADMIN — CALCIUM ACETATE 1334 MG: 667 CAPSULE ORAL at 08:49

## 2022-06-06 RX ADMIN — FOLIC ACID 1 MG: 1 TABLET ORAL at 08:49

## 2022-06-06 RX ADMIN — Medication 10 ML: at 20:49

## 2022-06-06 RX ADMIN — SODIUM ZIRCONIUM CYCLOSILICATE 10 G: 10 POWDER, FOR SUSPENSION ORAL at 20:49

## 2022-06-06 RX ADMIN — IPRATROPIUM BROMIDE AND ALBUTEROL SULFATE 3 ML: .5; 3 SOLUTION RESPIRATORY (INHALATION) at 18:32

## 2022-06-06 RX ADMIN — ATORVASTATIN CALCIUM 80 MG: 40 TABLET, FILM COATED ORAL at 20:49

## 2022-06-06 NOTE — PLAN OF CARE
Goal Outcome Evaluation:              Outcome Evaluation: Pt worked with PT this shift. Pt ambulated in [place]. Pt is resting in bed at this time. No acute changes noted. Will continue to monitor.

## 2022-06-06 NOTE — PROGRESS NOTES
Nephrology Progress Note      Subjective     No chest pain or shortness of breath    Objective       Vital signs :     Temp:  [97.4 °F (36.3 °C)-98.1 °F (36.7 °C)] 97.5 °F (36.4 °C)  Heart Rate:  [50-77] 50  Resp:  [18-22] 20  BP: (155-193)/(51-94) 169/79      Intake/Output Summary (Last 24 hours) at 6/6/2022 0751  Last data filed at 6/6/2022 0300  Gross per 24 hour   Intake 410 ml   Output --   Net 410 ml       Physical Exam:    General Appearance :   Lungs : clear to auscultation, respirations regular  Heart :  regular rhythm & normal rate, normal S1, S2 and no murmur, no rub  Abdomen : normal bowel sounds, no masses, no hepatomegaly, no splenomegaly, soft non-tender and no guarding  Extremities : moves extremities well, no edema, no cyanosis and no redness  Neurologic :  No apparent focal deficit      Laboratory Data :     Albumin Albumin   Date Value Ref Range Status   06/05/2022 2.95 (L) 3.50 - 5.20 g/dL Final      Magnesium No results found for: MG       PTH               No results found for: PTH    CBC and coagulation:  Results from last 7 days   Lab Units 06/05/22  2351 06/05/22  0808 06/05/22  0806 06/05/22  0658 06/05/22  0012 06/04/22  0411   PROCALCITONIN ng/mL  --  0.16  --   --   --   --    LACTATE mmol/L  --   --   --  1.1  --   --    CRP mg/dL 1.49*  --   --   --  1.78* 1.14*   WBC 10*3/mm3 13.43*  --  11.82*  --  12.43* 11.60*   HEMOGLOBIN g/dL 8.8*  --  9.1*  --  9.4* 9.0*   HEMATOCRIT % 27.0*  --  28.6*  --  29.5* 27.2*   MCV fL 89.4  --  91.4  --  90.8 89.5   MCHC g/dL 32.6  --  31.8  --  31.9 33.1   PLATELETS 10*3/mm3 256  --  284  --  294 284     Acid/base balance:  Results from last 7 days   Lab Units 06/05/22  0724 06/03/22  1141   PH, ARTERIAL pH units 7.419 7.495*   PO2 ART mm Hg 122.0* 72.2*   PCO2, ARTERIAL mm Hg 39.8 38.2   HCO3 ART mmol/L 25.7 29.5*     Renal and electrolytes:  Results from last 7 days   Lab Units 06/06/22  0536 06/05/22  2351 06/05/22  1741 06/05/22  1138  06/05/22  0808 06/05/22  0549 06/05/22  0012 06/04/22  1713 06/04/22  1239 06/04/22  0949 06/04/22  0411 05/31/22  0532 05/31/22  0014   SODIUM mmol/L 124* 125* 123* 125* 124*   < > 123*   < > 122*   < > 125*   < > 125*   POTASSIUM mmol/L  --  5.4*  --   --  5.2  --  4.9  --  5.3*  --  4.7   < > 4.5   MAGNESIUM mg/dL  --   --   --   --   --   --   --   --   --   --   --   --  2.5*   CHLORIDE mmol/L  --  91*  --   --  93*  --  91*  --  89*  --  91*   < > 93*   CO2 mmol/L  --  21.7*  --   --  19.2*  --  21.1*  --  25.4  --  24.0   < > 20.6*   BUN mg/dL  --  41*  --   --  36*  --  36*  --  24*  --  31*   < > 35*   CREATININE mg/dL  --  2.47*  --   --  2.24*  --  2.24*  --  2.09*  --  2.00*   < > 2.31*   CALCIUM mg/dL  --  9.2  --   --  8.8  --  9.0  --  8.1*  --  8.6   < > 7.8*    < > = values in this interval not displayed.     Estimated Creatinine Clearance: 16.3 mL/min (A) (by C-G formula based on SCr of 2.47 mg/dL (H)).    Liver and pancreatic function:  Results from last 7 days   Lab Units 06/05/22 2351 05/31/22  0014   ALBUMIN g/dL 2.95* 2.70*   BILIRUBIN mg/dL 0.4 0.3   ALK PHOS U/L 143* 135*   AST (SGOT) U/L 14 14   ALT (SGPT) U/L 9 7         Cardiac:      Liver and pancreatic function:  Results from last 7 days   Lab Units 06/05/22 2351 05/31/22  0014   ALBUMIN g/dL 2.95* 2.70*   BILIRUBIN mg/dL 0.4 0.3   ALK PHOS U/L 143* 135*   AST (SGOT) U/L 14 14   ALT (SGPT) U/L 9 7       Medications :     aspirin, 81 mg, Oral, Daily  atorvastatin, 80 mg, Oral, Nightly  calcium acetate, 1,334 mg, Oral, TID With Meals  carvedilol, 6.25 mg, Oral, BID With Meals  folic acid, 1 mg, Oral, Daily  guaiFENesin, 600 mg, Oral, Q12H  heparin (porcine), 5,000 Units, Subcutaneous, Q8H  lidocaine, 2 patch, Transdermal, Q24H  PARoxetine, 10 mg, Oral, QAM  sodium chloride, 10 mL, Intravenous, Q12H  sodium zirconium cyclosilicate, 10 g, Oral, Daily             Assessment & Plan     1. PRITI on CKD vs rapidly worsening CKD, started on  dialysis on 5/10/22  2. Metabolic acidosis  3. Anemia  4. Acute on chronic respiratory failure  5. DM-II  6. Essential hypertension  7. Hypokalemia  8. hyponatremia  9. Metabolic encephalopathy likely multifactorial    Persistent hyponatremia despite all the stratigies, likely due to PARoxetine, 10 mg, Oral, QAM, will ask psych to evaluate for alternative medications. Continue holding dialysis today as well, and repeat Lokelma 10G two doses today.     During last hospitalization, found to have 8G of proteinuria with mild hematuria  Serology was all negative except mildly elevated cANCA,  Unknown baseline Cr, Cr was 1.3 in Feb 2021, 2.9 in 12/2021, and 3.2 in Jan 2022  Elevated , phos 8.4 and low iron stores     Discussed with RN and the dialysis nurse    Jude Muñoz MD  06/06/22  07:50 EDT

## 2022-06-06 NOTE — CONSULTS
Referring Provider: Dr. Muñoz  Reason for Consultation: Hyponatremia      Chief complaint/Focus of Exam: SSRI with Hyponatremia    Subjective .     History of present illness: Patient is an 81-year-old female with a history of dementia, hypertension, dyslipidemia, end-stage renal disease on hemodialysis who was admitted for hyponatremia and was found to have parainfluenza pneumonia.  Psychiatry was consulted due to the patient's significant hyponatremia and possible contribution of her antidepressant, Paxil.  Patient was somewhat of a poor historian and often got off topic but reported that she did not note any major mood or anxiety symptoms currently.  She was oriented to place and self but had some difficulty with situation as well as time.  She was resting comfortably.  She could not explain why Paxil was initially prescribed nor could she remember how long she had been on the medication.  She denied SI/HI/AVH.    Review of Systems  Pertinent items are noted in HPI    History  Past Medical History:   Diagnosis Date   • Allergic rhinitis    • Elevated liver enzymes    • Extrinsic asthma    • Gastritis    • GERD (gastroesophageal reflux disease)    • Hyperlipidemia    • Hypertension    • Iron deficiency anemia    • Meniere's disease    • Osteoarthritis    • Type 2 diabetes mellitus (HCC)    • Vitamin D deficiency disease    ,   Past Surgical History:   Procedure Laterality Date   • APPENDECTOMY     • CARDIAC CATHETERIZATION N/A 5/11/2022    Procedure: Left Heart Cath;  Surgeon: Marcelino Grande MD;  Location: [facility] CATH INVASIVE LOCATION;  Service: Cardiology;  Laterality: N/A;   • CHOLECYSTECTOMY     • HYSTERECTOMY     • INSERTION HEMODIALYSIS CATHETER N/A 5/9/2022    Procedure: HEMODIALYSIS CATHETER INSERTION;  Surgeon: Hans Coates MD;  Location: [facility] OR;  Service: General;  Laterality: N/A;   , History reviewed. No pertinent family history.,   Social History     Socioeconomic History   • Marital  status:      Spouse name: moshe   • Number of children: 1   • Years of education: 8   Tobacco Use   • Smoking status: Never Smoker   • Smokeless tobacco: Never Used   Substance and Sexual Activity   • Alcohol use: No   • Drug use: No   • Sexual activity: Defer     E-cigarette/Vaping     E-cigarette/Vaping Substances     E-cigarette/Vaping Devices       ,   Medications Prior to Admission   Medication Sig Dispense Refill Last Dose   • aspirin 81 MG EC tablet Take 1 tablet by mouth Daily. 30 tablet 0 5/23/2022 at Unknown time   • atorvastatin (LIPITOR) 80 MG tablet Take 1 tablet by mouth Every Night. 30 tablet 5 5/22/2022 at Unknown time   • calcium acetate (PHOS BINDER,) 667 MG capsule capsule Take 2 capsules by mouth 3 (Three) Times a Day With Meals. 180 capsule 0 5/23/2022 at Unknown time   • folic acid (FOLVITE) 1 MG tablet Take 1 tablet by mouth Daily. 30 tablet 0 5/23/2022 at Unknown time   • NIFEdipine CC (ADALAT CC) 30 MG 24 hr tablet Take 3 tablets by mouth Daily. 30 tablet 0 5/23/2022 at Unknown time   • PARoxetine (PAXIL) 10 MG tablet Take 1 tablet by mouth Every Morning. 30 tablet 5 5/23/2022 at Unknown time   • albuterol sulfate HFA (ProAir HFA) 108 (90 Base) MCG/ACT inhaler Inhale 2 puffs 4 (Four) Times a Day. 8.5 g 5 Unknown at Unknown time   , Scheduled Meds:  aspirin, 81 mg, Oral, Daily  atorvastatin, 80 mg, Oral, Nightly  calcium acetate, 1,334 mg, Oral, TID With Meals  carvedilol, 6.25 mg, Oral, BID With Meals  folic acid, 1 mg, Oral, Daily  guaiFENesin, 600 mg, Oral, Q12H  heparin (porcine), 5,000 Units, Subcutaneous, Q8H  lidocaine, 2 patch, Transdermal, Q24H  NIFEdipine CC, 30 mg, Oral, Q24H  PARoxetine, 10 mg, Oral, QAM  sodium chloride, 10 mL, Intravenous, Q12H  sodium zirconium cyclosilicate, 10 g, Oral, BID    , Continuous Infusions:   , PRN Meds:  •  acetaminophen  •  benzonatate  •  ipratropium-albuterol  •  magnesium sulfate **OR** magnesium sulfate **OR** magnesium sulfate  •   [COMPLETED] Insert peripheral IV **AND** sodium chloride  •  sodium chloride and Allergies:  Keflex [cephalexin], Lodine [etodolac], Penicillins, and Sulfa antibiotics    Objective     Vital Signs   Temp:  [97.4 °F (36.3 °C)-98.1 °F (36.7 °C)] 97.5 °F (36.4 °C)  Heart Rate:  [50-77] 71  Resp:  [18-22] 18  BP: (146-193)/(51-94) 146/67    Mental Status Exam:   Mental Status Exam:    Hygiene:   good  Cooperation:  Cooperative  Eye Contact:  Fair  Psychomotor Behavior:  Slow  Affect:  Full range  Hopelessness: Denies  Speech:  Normal  Thought Progress:  Tangential  Thought Content:  Mood congruent  Suicidal:  None  Homicidal:  None  Hallucinations:  Not demonstrated today  Delusion:  None  Memory:  Deficits  Orientation:  Person and Place  Reliability:  poor  Insight:  Poor  Judgement:  Fair  Impulse Control:  Fair    Results Review:   I reviewed the patient's new clinical results.  Lab Results (last 24 hours)     Procedure Component Value Units Date/Time    Sodium [[id number]] Collected: 06/06/22 1141    Specimen: Blood Updated: 06/06/22 1234    POC Glucose Once [[id number]]  (Abnormal) Collected: 06/06/22 1045    Specimen: Blood Updated: 06/06/22 1051     Glucose 282 mg/dL      Comment: Meter: [id number] : [id number] AMANDA BHATTI       POC Glucose Once [[id number]]  (Abnormal) Collected: 06/06/22 0607    Specimen: Blood Updated: 06/06/22 0623     Glucose 220 mg/dL      Comment: Meter: [id number] : [id number] mayne mary       Sodium [[id number]]  (Abnormal) Collected: 06/06/22 0536    Specimen: Blood Updated: 06/06/22 0558     Sodium 124 mmol/L     Comprehensive Metabolic Panel [[id number]]  (Abnormal) Collected: 06/05/22 2351    Specimen: Blood Updated: 06/06/22 0101     Glucose 289 mg/dL      BUN 41 mg/dL      Creatinine 2.47 mg/dL      Sodium 125 mmol/L      Potassium 5.4 mmol/L      Chloride 91 mmol/L      CO2 21.7 mmol/L      Calcium 9.2 mg/dL      Total Protein 6.0 g/dL      Albumin 2.95 g/dL      ALT  (SGPT) 9 U/L      AST (SGOT) 14 U/L      Alkaline Phosphatase 143 U/L      Total Bilirubin 0.4 mg/dL      Globulin 3.1 gm/dL      A/G Ratio 1.0 g/dL      BUN/Creatinine Ratio 16.6     Anion Gap 12.3 mmol/L      eGFR 19.3 mL/min/1.73      Comment: National Kidney Foundation and American Society of Nephrology (ASN) Task Force recommended calculation based on the Chronic Kidney Disease Epidemiology Collaboration (CKD-EPI) equation refit without adjustment for race.       Narrative:      GFR Normal >60  Chronic Kidney Disease <60  Kidney Failure <15      C-reactive Protein [[id number]]  (Abnormal) Collected: 06/05/22 2351    Specimen: Blood Updated: 06/06/22 0101     C-Reactive Protein 1.49 mg/dL     CBC & Differential [[id number]]  (Abnormal) Collected: 06/05/22 2351    Specimen: Blood Updated: 06/06/22 0038    Narrative:      The following orders were created for panel order CBC & Differential.  Procedure                               Abnormality         Status                     ---------                               -----------         ------                     CBC Auto Differential[[id number]]        Abnormal            Final result                 Please view results for these tests on the individual orders.    CBC Auto Differential [[id number]]  (Abnormal) Collected: 06/05/22 2351    Specimen: Blood Updated: 06/06/22 0038     WBC 13.43 10*3/mm3      RBC 3.02 10*6/mm3      Hemoglobin 8.8 g/dL      Hematocrit 27.0 %      MCV 89.4 fL      MCH 29.1 pg      MCHC 32.6 g/dL      RDW 15.8 %      RDW-SD 52.2 fl      MPV 10.7 fL      Platelets 256 10*3/mm3      Neutrophil % 87.7 %      Lymphocyte % 5.6 %      Monocyte % 5.6 %      Eosinophil % 0.3 %      Basophil % 0.4 %      Immature Grans % 0.4 %      Neutrophils, Absolute 11.78 10*3/mm3      Lymphocytes, Absolute 0.75 10*3/mm3      Monocytes, Absolute 0.75 10*3/mm3      Eosinophils, Absolute 0.04 10*3/mm3      Basophils, Absolute 0.06 10*3/mm3      Immature Grans,  Absolute 0.05 10*3/mm3      nRBC 0.0 /100 WBC     Urinalysis With Culture If Indicated - Urine, Random Void [[id number]]  (Abnormal) Collected: 06/05/22 2320    Specimen: Urine, Random Void Updated: 06/06/22 0012     Color, UA Yellow     Appearance, UA Clear     pH, UA 6.5     Specific Gravity, UA 1.023     Glucose, UA >=1000 mg/dL (3+)     Ketones, UA Negative     Bilirubin, UA Negative     Blood, UA Trace     Protein, UA >=300 mg/dL (3+)     Leuk Esterase, UA Negative     Nitrite, UA Negative     Urobilinogen, UA 0.2 E.U./dL    Narrative:      In absence of clinical symptoms, the presence of pyuria, bacteria, and/or nitrites on the urinalysis result does not correlate with infection.    Urinalysis, Microscopic Only - Urine, Random Void [[id number]]  (Abnormal) Collected: 06/05/22 2320    Specimen: Urine, Random Void Updated: 06/06/22 0012     RBC, UA 3-5 /HPF      WBC, UA 6-12 /HPF      Bacteria, UA Trace /HPF      Squamous Epithelial Cells, UA 3-6 /HPF      Yeast, UA       Moderate/2+ Budding Yeast w/Hyphae     /HPF     Hyaline Casts, UA 3-6 /LPF      Methodology Manual Light Microscopy    Urine Culture - Urine, Urine, Random Void [[id number]] Collected: 06/05/22 2320    Specimen: Urine, Random Void Updated: 06/06/22 0012    Sodium [[id number]]  (Abnormal) Collected: 06/05/22 1741    Specimen: Blood Updated: 06/05/22 1804     Sodium 123 mmol/L     POC Glucose Once [[id number]]  (Abnormal) Collected: 06/05/22 1619    Specimen: Blood Updated: 06/05/22 1626     Glucose 345 mg/dL      Comment: Meter: [id number] : [id number] Nikolay KENNEDY           Imaging Results (Last 24 Hours)     ** No results found for the last 24 hours. **            Assessment & Plan     Hyponatremia  -Unclear history given by patient as to why Paxil was prescribed and she currently denies any major mood or anxiety symptoms.  Paxil is not ideal in the elderly due to significantly increased risk of hyponatremia as well as worsening outcomes  with dementia when compared to other antidepressants due to it's anticholinergic properties.  Paxil's levels are relatively unchanged by hemodialysis due to being heavily protein bound and with patient's current estimated creatinine clearance, she likely has 4 times the normal level for someone on her dose.  -Recommend discontinuing Paxil, normally at this dose could likely just stop the medication, however, given her poor kidney function, she likely has a much larger level and therefore I would recommend tapering by half for a few days, then potentially every other day for a few days before stopping the medication to avoid discontinuation syndrome.   -Given her significant hyponatremia, unclear history for need of Paxil, and current mental state, I would recommend avoiding any other antidepressants at this time as they are cannot carry the risk of hyponatremia, though if an antidepressant is needed for mood or anxiety symptoms could consider mirtazapine at very low dose which is much less likely to cause hyponatremia than other antidepressants.    I discussed the patients findings and my recommendations with patient    Edis Feng MD  06/06/22  13:01 EDT

## 2022-06-06 NOTE — THERAPY TREATMENT NOTE
Acute Care - Physical Therapy Treatment Note  [facility]     Patient Name: Sean Chen  : 1940  MRN: [id number]  Today's Date: 2022   Onset of Illness/Injury or Date of Surgery: 22  Visit Dx:     ICD-10-CM ICD-9-CM   1. Acute respiratory failure with hypoxia (HCC)  J96.01 518.81     Patient Active Problem List   Diagnosis   • Iron deficiency anemia   • Type 2 diabetes mellitus without complication, without long-term current use of insulin (HCC)   • Vitamin D deficiency disease   • Extrinsic asthma   • Essential hypertension   • Mixed hyperlipidemia   • Generalized osteoarthritis   • Gastroesophageal reflux disease without esophagitis   • Meniere's disease   • Chronic seasonal allergic rhinitis   • Low serum vitamin B12   • Memory impairment   • Bradycardia   • Hypertensive urgency   • Dementia without behavioral disturbance (HCC)   • Hyponatremia   • NSTEMI (non-ST elevated myocardial infarction) (HCC)   • Prolonged Q-T interval on ECG   • Anemia requiring transfusions   • Acute hypoxemic respiratory failure (HCC)   • Moderate malnutrition (HCC)     Past Medical History:   Diagnosis Date   • Allergic rhinitis    • Elevated liver enzymes    • Extrinsic asthma    • Gastritis    • GERD (gastroesophageal reflux disease)    • Hyperlipidemia    • Hypertension    • Iron deficiency anemia    • Meniere's disease    • Osteoarthritis    • Type 2 diabetes mellitus (HCC)    • Vitamin D deficiency disease      Past Surgical History:   Procedure Laterality Date   • APPENDECTOMY     • CARDIAC CATHETERIZATION N/A 2022    Procedure: Left Heart Cath;  Surgeon: Marcelino Grande MD;  Location: [facility] CATH INVASIVE LOCATION;  Service: Cardiology;  Laterality: N/A;   • CHOLECYSTECTOMY     • HYSTERECTOMY     • INSERTION HEMODIALYSIS CATHETER N/A 2022    Procedure: HEMODIALYSIS CATHETER INSERTION;  Surgeon: Hans Coates MD;  Location: [facility] OR;  Service: General;  Laterality: N/A;     PT Assessment (last 12  hours)     PT Evaluation and Treatment     Row Name 06/06/22 1310          Physical Therapy Time and Intention    Subjective Information complains of;fatigue  -HR     Document Type therapy note (daily note)  -HR     Mode of Treatment physical therapy  -HR     Patient Effort adequate  -HR     Comment Pt and BRYAN Grider in agreement for PT  -HR     Row Name 06/06/22 1310          General Information    Patient Profile Reviewed yes  -HR     Equipment Currently Used at Home crutches, axillary  -HR     Existing Precautions/Restrictions fall;oxygen therapy device and L/min  -HR     Limitations/Impairments safety/cognitive  -HR     Row Name 06/06/22 1310          Cognition    Affect/Mental Status (Cognition) confused;low arousal/lethargic;flat/blunted affect  -HR     Orientation Status (Cognition) oriented to;person  -HR     Follows Commands (Cognition) follows one-step commands  -HR     Personal Safety Interventions fall prevention program maintained;gait belt;nonskid shoes/slippers when out of bed;supervised activity  -HR     Row Name 06/06/22 1310          Bed Mobility    Bed Mobility bed mobility (all) activities  -HR     All Activities, Evangeline (Bed Mobility) minimum assist (75% patient effort);nonverbal cues (demo/gesture);verbal cues;contact guard  -HR     Bed Mobility, Safety Issues decreased use of arms for pushing/pulling;decreased use of legs for bridging/pushing  -HR     Assistive Device (Bed Mobility) bed rails  -HR     Row Name 06/06/22 1310          Transfers    Transfers sit-stand transfer;stand-sit transfer;toilet transfer  -HR     Sit-Stand Evangeline (Transfers) minimum assist (75% patient effort);contact guard;nonverbal cues (demo/gesture);verbal cues  -HR     Stand-Sit Evangeline (Transfers) minimum assist (75% patient effort);contact guard;nonverbal cues (demo/gesture);verbal cues  -HR     Assistive Device (Toilet Transfer) --  HHA  -HR     Row Name 06/06/22 1310          Sit-Stand Transfer     Assistive Device (Sit-Stand Transfers) walker, front-wheeled  -HR     Row Name 06/06/22 1310          Stand-Sit Transfer    Assistive Device (Stand-Sit Transfers) walker, front-wheeled  -HR     Row Name 06/06/22 1310          Gait/Stairs (Locomotion)    Baldwin Level (Gait) minimum assist (75% patient effort);contact guard  -HR     Assistive Device (Gait) walker, front-wheeled  -HR     Distance in Feet (Gait) 135'  -HR     Pattern (Gait) step-to  -HR     Deviations/Abnormal Patterns (Gait) gait speed decreased;weight shifting decreased  -HR     Row Name 06/06/22 1310          Motor Skills    Therapeutic Exercise --  EOB: LAQ, March, AP  -HR     Row Name 06/06/22 1310          Coping    Observed Emotional State calm;cooperative  -HR     Verbalized Emotional State acceptance  -HR     Row Name 06/06/22 1310          Plan of Care Review    Outcome Evaluation Pt still fatiqued on this date, but much improved compared to last Rx. Pt walks 135' in messina with RW CGA/ min A with 1 sitting rest break. EOB exercises until fatique.  -HR     Row Name 06/06/22 1310          Positioning and Restraints    Pre-Treatment Position in bed  -HR     Post Treatment Position bed  -HR     In Bed fowlers;call light within reach;encouraged to call for assist;exit alarm on;side rails up x2;notified nsg  -HR     Row Name 06/06/22 1310          Therapy Assessment/Plan (PT)    Rehab Potential (PT) good, to achieve stated therapy goals  -HR     Criteria for Skilled Interventions Met (PT) yes;skilled treatment is necessary  -HR     Therapy Frequency (PT) 2 times/wk  2-5 times/wk as available  -HR     Activity Limitations Related to Problem List (PT) unable to ambulate safely;unable to transfer safely  -HR     Row Name 06/06/22 1310          Physical Therapy Goals    Bed Mobility Goal Selection (PT) bed mobility, PT goal 1  -HR     Transfer Goal Selection (PT) transfer, PT goal 1  -HR     Gait Training Goal Selection (PT) gait training, PT goal  1  -HR     Row Name 06/06/22 1310          Bed Mobility Goal 1 (PT)    Activity/Assistive Device (Bed Mobility Goal 1, PT) bed mobility activities, all  -HR     Ramsey Level/Cues Needed (Bed Mobility Goal 1, PT) contact guard required  -HR     Time Frame (Bed Mobility Goal 1, PT) by discharge  -HR     Row Name 06/06/22 1310          Transfer Goal 1 (PT)    Activity/Assistive Device (Transfer Goal 1, PT) sit-to-stand/stand-to-sit;bed-to-chair/chair-to-bed  -HR     Ramsey Level/Cues Needed (Transfer Goal 1, PT) contact guard required  -HR     Time Frame (Transfer Goal 1, PT) by discharge  -HR     Row Name 06/06/22 1310          Gait Training Goal 1 (PT)    Activity/Assistive Device (Gait Training Goal 1, PT) gait (walking locomotion);assistive device use  -HR     Ramsey Level (Gait Training Goal 1, PT) contact guard required  -HR     Distance (Gait Training Goal 1, PT) 10  -HR     Time Frame (Gait Training Goal 1, PT) by discharge  -HR           User Key  (r) = Recorded By, (t) = Taken By, (c) = Cosigned By    Initials Name Provider Type    HR Yeimi Araiza PTA Physical Therapist Assistant                  PT Recommendation and Plan  Anticipated Discharge Disposition (PT): inpatient rehabilitation facility, skilled nursing facility  Therapy Frequency (PT): 2 times/wk (2-5 times/wk as available)  Outcome Evaluation: Pt still fatiqued on this date, but much improved compared to last Rx. Pt walks 135' in messina with RW CGA/ min A with 1 sitting rest break. EOB exercises until fatique.       Time Calculation:    PT Charges     Row Name 06/06/22 1312             Time Calculation    PT Received On 06/06/22  -HR              Time Calculation- PT    Total Timed Code Minutes- PT 25 minute(s)  -HR            User Key  (r) = Recorded By, (t) = Taken By, (c) = Cosigned By    Initials Name Provider Type    HR Yeimi Araiza PTA Physical Therapist Assistant              Therapy Charges for Today     Code Description  Service Date Service Provider Modifiers Qty    [id number]  GAIT TRAINING EA 15 MIN 6/6/2022 Yeimi Araiza, KAHLIL GP, CQ 1    [id number]  PT THER PROC EA 15 MIN 6/6/2022 Yeimi Araiza PTA GP, CQ 1               Yeimi Araiza, KAHLIL  6/6/2022

## 2022-06-06 NOTE — CASE MANAGEMENT/SOCIAL WORK
Discharge Planning Assessment   [place]     Patient Name: Sean Chen  MRN: [id number]  Today's Date: 6/6/2022    Admit Date: 5/23/2022           Discharge Plan     Row Name 06/06/22 1413       Plan    Plan Pt was admitted on 05/23/22. SS spoke with Pt's son, Heri on this date.  Pt lives with her spouse and son lives next door. Pt's daughter-in-law to help assist with her care. Pt and family plans for Pt to return home at discharge. Pt does not utilize home health or DME services. Pt may benefit from home health servives, bedside commode, wheelchair and rolling walker at discharge. Pt and family do not have a preference of home health or DME agency. Pt attends [facility] at 10:45am. Pt will need EMS to transport. SS to follow.                  Kavitha Gatica

## 2022-06-06 NOTE — PLAN OF CARE
Goal Outcome Evaluation:  Plan of Care Reviewed With: patient        Progress: no change  Outcome Evaluation: Pt resting in bed during assessment. No complaints at this time. Will continue to monitor.

## 2022-06-06 NOTE — PROGRESS NOTES
[facility] HOSPITALIST PROGRESS NOTE     Patient Identification:  Name:  Sean Chen  Age:  81 y.o.  Sex:  female  :  1940  MRN:  [id number]  Visit Number:  [id number]  ROOM: [address] [place] [address]     Primary Care Provider:  Ganga Gallardo MD    Length of stay in inpatient status:  14    Subjective     Chief Compliant:    Chief Complaint   Patient presents with   • Shortness of Breath     History of Presenting Illness:    Patient remains ill but stable today, no acute events overnight, no new complaints, denies any fevers or chills, family interested in taking home soon, will call this afternoon, Nephrology consulted and following, have asked psych to evaluate alternative for Paroxetine as remained persistently hyponatremic.   Objective     Current Hospital Meds:  aspirin, 81 mg, Oral, Daily  atorvastatin, 80 mg, Oral, Nightly  calcium acetate, 1,334 mg, Oral, TID With Meals  carvedilol, 6.25 mg, Oral, BID With Meals  folic acid, 1 mg, Oral, Daily  guaiFENesin, 600 mg, Oral, Q12H  heparin (porcine), 5,000 Units, Subcutaneous, Q8H  lidocaine, 2 patch, Transdermal, Q24H  NIFEdipine CC, 30 mg, Oral, Q24H  PARoxetine, 10 mg, Oral, QAM  sodium chloride, 10 mL, Intravenous, Q12H  sodium zirconium cyclosilicate, 10 g, Oral, BID    ----------------------------------------------------------------------------------------------------------------------  Vital Signs:  Temp:  [97.4 °F (36.3 °C)-98.1 °F (36.7 °C)] 97.5 °F (36.4 °C)  Heart Rate:  [50-77] 71  Resp:  [18-22] 18  BP: (146-193)/(51-94) 146/67  SpO2:  [91 %-99 %] 92 %  on  Flow (L/min):  [2] 2;   Device (Oxygen Therapy): room air  Body mass index is 21.55 kg/m².      Intake/Output Summary (Last 24 hours) at 2022 1256  Last data filed at 2022 0900  Gross per 24 hour   Intake 350 ml   Output --   Net 350 ml      ----------------------------------------------------------------------------------------------------------------------  Physical  exam:  Constitutional:  Elderly, No acute distress.      HENT:  Head:  Normocephalic and atraumatic.  Mouth:  Moist mucous membranes.    Eyes:  Conjunctivae and EOM are normal. No scleral icterus.    Neck:  Neck supple.  No JVD present.    Cardiovascular:  regular rate, regular rhythm and normal heart sounds with no murmur.  Pulmonary/Chest:  No respiratory distress, no wheezes, on room air  Abdominal:  Soft. No distension and no tenderness.   Musculoskeletal:  No tenderness, and no deformity.    Neurological:  Alert and oriented to person, place, and time.  No gross focal deficits   Skin:  Skin is warm and dry. No rash noted. No pallor.   Peripheral vascular:  No clubbing, no cyanosis, no edema.  Psychiatric: Appropriate mood and affect    *Examination stable today 6/6    Edited by: Pavan Durant MD at 6/6/2022 1256  ----------------------------------------------------------------------------------------------------------------------  CBC - WBC/Hgb/Hct/Plts: 13.43*/8.8*/27.0*/256 (06/05 2351)  CHEM - BUN/Cr/glu/ALT/AST/amyl/lip:41*/2.47*/289*/9/14/--/-- (06/05 2351)  LYTES - Na/K/Cl/CO2: 124*/--/--/-- (06/06 0536)     No results found for: URINECX  No results found for: BLOODCX    I have personally looked at the labs and they are summarized above.  ----------------------------------------------------------------------------------------------------------------------  Detailed radiology reports for the last 24 hours:  CT Head Without Contrast    Result Date: 6/5/2022  Impression: 1. No clearly acute intracranial pathology demonstrated. 2. Generalized cerebral atrophy and nonspecific periventricular hypodensities which likely represent white matter microvascular change. 3. Inflammatory change in the sphenoid sinus. Signer Name: Glen Reyes MD  Signed: 6/5/2022 8:28 AM  Workstation Name: [id number]  [facility]    XR Chest 1 View    Result Date: 6/5/2022  Bibasilar atelectatic/infiltrative  change, right side greater than left with bilateral pleural effusions. Slight worsening from prior study. Signer Name: Glen Reyes MD  Signed: 6/5/2022 8:06 AM  Workstation Name: [id number]  [facility]    Assessment & Plan    81F PMH GERD, Meniere's Disease, Chronic Anemia, Progressive Dementia, Hypertension, Hyperlipidemia, Chronic Kidney Disease, Diabetes Mellitus Type II, Rheumatoid Arthritis, severe Pulmonary Hypertension, recently admitted 5/4-5/13/2022 for NSTEMI and non-obstructive Coronary Artery Disease on [facility], also notable Acute Kidney Injury on Chronic Kidney Disease with iHD, presented 5/23 with complaints of shortness of breath.      #Sepsis, Acute Metabolic Encephalopathy & Acute Hypoxic Respiratory Failure due to Pneumonia, Bacterial, treating for MDR Organisms/Aspiration & Parainfluenza Virus in setting of Immunocompromised stated from underlying Rheumatoid Arthritis  - Patient presented after recent hospitalization with increased shortness of breath and new oxygen requirement.  Patient meets SIRS criteria due to respiratory rate > 20, WBC count > 12K, noted to have mental status change from baseline.  Admission labs showed WBC count 15K, CRP 3.62, procalcitonin 0.89, lactate 1.7, Coronavirus/flu negative, MRSA negative, ABG with P02 down to 37.5, blood cultures negative to date, strep pneumo negative, viral respiratory panel + for parainfluenza virus. CT Head showed no acute intracranial abnormality. CT Chest showed RML and RLL airspace consolidative disease  - Has completed 7 days Aztreonam and Flagyl  - SLP consulted; Evaluated with diet recommendations 5/24, no noted signs and symptoms aspiration though noted mild oral weakness felt due to fatigue and medical condition  - Continue APAP PRN for fevers, Guaifenesin for congestion, Duonebs as needed  - Monitor on telemetry, PT/OT consulted and following for weakness/debility    #Nonoliguric Acute Kidney Injury on Chronic  Kidney Disease vs ESRD with remote history Acute Kidney Injury requiring iHD in setting of mildly elevated cANCA complicated by proteinuria and mild hematuria - Improved   - Last admission creatinine was up to 5.67, improved to 1.74 at time of discharge, had elevated lambda/kappa, this admission up to 3.85, had started on hemodialysis last admission 5/10; Today 2.47  - Renal ultrasound showed concern for dense vascular calcifications or non-obstructing stones but no evidence of hydronephrosis was seen  - Consult Nephrology; Following for hemodialysis needs, holding dialysis today, trending sodium, status post dose of Lokelma today    #Hypertension/Hyperlipidemia/Coronary Artery Disease  #Acute on Chronic HFpEF, diastolic dysfunction complicated by edema and pleural effusions now with noted reduced EF   #Severe Pulmonary Hypertension  #Small Pericardial Effusion  #Valvular Disease - Mild AS, Mild MR, mod TR  #Elevated Troponins, suspected NSTEMI Type II  #Prolonged Qtc (566ms)  - Labs showed Troponins 0.06->0.04, proBNP > 70K  - EKG showed normal sinus rhythm, intermittent PAC's, RBBB, Non-specific ST/T wave abnormality  - Echocardiogram 5/5 showed LVEF 66-70%, mild concentric hypertrophy, diastolic dysfunction, dilated left atrium, mild AS, mild MR, mod TR, severe Pulmonary Hypertension, RVSP > 55mmHg, small <1cm pericardial effusion; Repeat limited echocardiogram showed borderline dilated left ventricle, LVEF 36-40%, diastolic dysfunction    - [facility] 5/11 showed no-obstructive Coronary Artery Disease   - CT Chest showed Congestive Heart Failure/edema with R>L pleural effusion, cardiomegaly, severe coronary calcifications  - Cardiology consulted; Following, recommended goal directed medical therapy, signed off  - Continue home Aspirin 81, Statin  - Continue home Nifedipine, titrate as indicated   - Continue new Coreg, titrate as indicated   - No home ACEI/ARB/ARNI due to renal failure  - Continue to monitor on  telemetry, strict I/O's, trend heart rate and blood pressure    #Non-Insulin Dependent Diabetes Mellitus Type II, controlled, unknown complications  - Hgb A1c = 5.5%  - Holding home oral agents, continue FSBG and SSI, add long acting as indicated.    #Electrolyte Abnormalities  - Acute Mod Hypokalemia - potassium down to 2.8, Replacing per protocol - Resolved   - Borderline Hypomagnesemia - magnesium down to 1.7, Replaced per protocol - Resolved   - Acute Hypervolemic Hyponatremia - sodium corrected for glucose about 130-131, trending, address volume status as per above, psych consulted to evaluate paroxetine for alternative    #Anxiety/Depression  - Continue home regimen    #Gastroesophageal Reflux Disease  - No home PPI, monitor symptoms    #Dementia  - CT showed generalized cerebral atrophy and nonspecific periventricular hypodensities thought to represent microvascular changes.   - Review home meds, supportive care    #Debility  - Consult PT/OT, Social Work if placement needed    #Mod Malnutrition  - Nutrition consulted and following        F: Oral  E: Monitor & Replace PRN  N: Diet Soft Texture; Chopped; Thin; Daily Fluid Restriction; Other; 800   Ppx: SQH  Code Status (Patient has no pulse and is not breathing): No CPR (Do Not Attempt to Resuscitate)  Medical Interventions (Patient has pulse or is breathing): Limited Support  Medical Intervention Limits: NO intubation (DNI)       Dispo: Pending clinical improvement, PT/OT recommended inpatient rehab vs skilled nursing facility, family considering taking home soon though.    *This patient is considered high risk due to sepsis, acute respiratory failure, Pneumonia, Acute Kidney Injury on Chronic Kidney Disease, Congestive Heart Failure exacerbation     Edited by: Pavan Durant MD at 6/6/2022 [facility]  [facility]

## 2022-06-07 LAB
ANION GAP SERPL CALCULATED.3IONS-SCNC: 12.5 MMOL/L (ref 5–15)
BACTERIA SPEC AEROBE CULT: NORMAL
BUN SERPL-MCNC: 48 MG/DL (ref 8–23)
BUN/CREAT SERPL: 16.9 (ref 7–25)
CALCIUM SPEC-SCNC: 8.7 MG/DL (ref 8.6–10.5)
CHLORIDE SERPL-SCNC: 88 MMOL/L (ref 98–107)
CO2 SERPL-SCNC: 19.5 MMOL/L (ref 22–29)
CREAT SERPL-MCNC: 2.84 MG/DL (ref 0.57–1)
CRP SERPL-MCNC: 1.09 MG/DL (ref 0–0.5)
DEPRECATED RDW RBC AUTO: 52.3 FL (ref 37–54)
EGFRCR SERPLBLD CKD-EPI 2021: 16.3 ML/MIN/1.73
ERYTHROCYTE [DISTWIDTH] IN BLOOD BY AUTOMATED COUNT: 15.9 % (ref 12.3–15.4)
GLUCOSE BLDC GLUCOMTR-MCNC: 210 MG/DL (ref 70–130)
GLUCOSE BLDC GLUCOMTR-MCNC: 225 MG/DL (ref 70–130)
GLUCOSE BLDC GLUCOMTR-MCNC: 270 MG/DL (ref 70–130)
GLUCOSE BLDC GLUCOMTR-MCNC: 304 MG/DL (ref 70–130)
GLUCOSE SERPL-MCNC: 249 MG/DL (ref 65–99)
HCT VFR BLD AUTO: 23.1 % (ref 34–46.6)
HGB BLD-MCNC: 7.5 G/DL (ref 12–15.9)
MCH RBC QN AUTO: 29.4 PG (ref 26.6–33)
MCHC RBC AUTO-ENTMCNC: 32.5 G/DL (ref 31.5–35.7)
MCV RBC AUTO: 90.6 FL (ref 79–97)
PLATELET # BLD AUTO: 183 10*3/MM3 (ref 140–450)
PMV BLD AUTO: 10.9 FL (ref 6–12)
POTASSIUM SERPL-SCNC: 5.1 MMOL/L (ref 3.5–5.2)
RBC # BLD AUTO: 2.55 10*6/MM3 (ref 3.77–5.28)
SODIUM SERPL-SCNC: 120 MMOL/L (ref 136–145)
SODIUM SERPL-SCNC: 125 MMOL/L (ref 136–145)
SODIUM SERPL-SCNC: 125 MMOL/L (ref 136–145)
SODIUM SERPL-SCNC: 126 MMOL/L (ref 136–145)
WBC NRBC COR # BLD: 10.43 10*3/MM3 (ref 3.4–10.8)

## 2022-06-07 PROCEDURE — 80048 BASIC METABOLIC PNL TOTAL CA: CPT | Performed by: INTERNAL MEDICINE

## 2022-06-07 PROCEDURE — 84295 ASSAY OF SERUM SODIUM: CPT | Performed by: INTERNAL MEDICINE

## 2022-06-07 PROCEDURE — 85027 COMPLETE CBC AUTOMATED: CPT | Performed by: HOSPITALIST

## 2022-06-07 PROCEDURE — 86140 C-REACTIVE PROTEIN: CPT | Performed by: INTERNAL MEDICINE

## 2022-06-07 PROCEDURE — 97110 THERAPEUTIC EXERCISES: CPT

## 2022-06-07 PROCEDURE — 94761 N-INVAS EAR/PLS OXIMETRY MLT: CPT

## 2022-06-07 PROCEDURE — 5A1D70Z PERFORMANCE OF URINARY FILTRATION, INTERMITTENT, LESS THAN 6 HOURS PER DAY: ICD-10-PCS | Performed by: INTERNAL MEDICINE

## 2022-06-07 PROCEDURE — 25010000002 HEPARIN (PORCINE) PER 1000 UNITS: Performed by: HOSPITALIST

## 2022-06-07 PROCEDURE — 94799 UNLISTED PULMONARY SVC/PX: CPT

## 2022-06-07 PROCEDURE — 97116 GAIT TRAINING THERAPY: CPT

## 2022-06-07 PROCEDURE — 99231 SBSQ HOSP IP/OBS SF/LOW 25: CPT | Performed by: INTERNAL MEDICINE

## 2022-06-07 PROCEDURE — 82962 GLUCOSE BLOOD TEST: CPT

## 2022-06-07 RX ORDER — NIFEDIPINE 30 MG/1
60 TABLET, FILM COATED, EXTENDED RELEASE ORAL
Status: DISCONTINUED | OUTPATIENT
Start: 2022-06-08 | End: 2022-06-08 | Stop reason: HOSPADM

## 2022-06-07 RX ORDER — IPRATROPIUM BROMIDE AND ALBUTEROL SULFATE 2.5; .5 MG/3ML; MG/3ML
3 SOLUTION RESPIRATORY (INHALATION) EVERY 4 HOURS PRN
Status: DISCONTINUED | OUTPATIENT
Start: 2022-06-07 | End: 2022-06-08 | Stop reason: HOSPADM

## 2022-06-07 RX ORDER — HYDRALAZINE HYDROCHLORIDE 10 MG/1
10 TABLET, FILM COATED ORAL EVERY 6 HOURS PRN
Status: DISCONTINUED | OUTPATIENT
Start: 2022-06-07 | End: 2022-06-08 | Stop reason: HOSPADM

## 2022-06-07 RX ADMIN — IPRATROPIUM BROMIDE AND ALBUTEROL SULFATE 3 ML: .5; 3 SOLUTION RESPIRATORY (INHALATION) at 07:36

## 2022-06-07 RX ADMIN — CARVEDILOL 6.25 MG: 6.25 TABLET, FILM COATED ORAL at 17:14

## 2022-06-07 RX ADMIN — CALCIUM ACETATE 1334 MG: 667 CAPSULE ORAL at 12:40

## 2022-06-07 RX ADMIN — SODIUM ZIRCONIUM CYCLOSILICATE 10 G: 10 POWDER, FOR SUSPENSION ORAL at 08:05

## 2022-06-07 RX ADMIN — Medication 10 ML: at 08:07

## 2022-06-07 RX ADMIN — HEPARIN SODIUM 5000 UNITS: 5000 INJECTION INTRAVENOUS; SUBCUTANEOUS at 20:31

## 2022-06-07 RX ADMIN — Medication 10 ML: at 20:31

## 2022-06-07 RX ADMIN — HEPARIN SODIUM 5000 UNITS: 5000 INJECTION INTRAVENOUS; SUBCUTANEOUS at 05:17

## 2022-06-07 RX ADMIN — CALCIUM ACETATE 1334 MG: 667 CAPSULE ORAL at 17:14

## 2022-06-07 RX ADMIN — ASPIRIN 81 MG: 81 TABLET, COATED ORAL at 08:06

## 2022-06-07 RX ADMIN — LIDOCAINE 2 PATCH: 50 PATCH CUTANEOUS at 08:06

## 2022-06-07 RX ADMIN — ATORVASTATIN CALCIUM 80 MG: 40 TABLET, FILM COATED ORAL at 20:31

## 2022-06-07 RX ADMIN — FOLIC ACID 1 MG: 1 TABLET ORAL at 08:06

## 2022-06-07 RX ADMIN — GUAIFENESIN 600 MG: 600 TABLET, EXTENDED RELEASE ORAL at 08:06

## 2022-06-07 RX ADMIN — CALCIUM ACETATE 1334 MG: 667 CAPSULE ORAL at 08:06

## 2022-06-07 RX ADMIN — GUAIFENESIN 600 MG: 600 TABLET, EXTENDED RELEASE ORAL at 20:31

## 2022-06-07 RX ADMIN — SODIUM ZIRCONIUM CYCLOSILICATE 10 G: 10 POWDER, FOR SUSPENSION ORAL at 20:31

## 2022-06-07 RX ADMIN — IPRATROPIUM BROMIDE AND ALBUTEROL SULFATE 3 ML: .5; 3 SOLUTION RESPIRATORY (INHALATION) at 04:08

## 2022-06-07 RX ADMIN — ACETAMINOPHEN 650 MG: 325 TABLET ORAL at 23:52

## 2022-06-07 RX ADMIN — HEPARIN SODIUM 5000 UNITS: 5000 INJECTION INTRAVENOUS; SUBCUTANEOUS at 14:30

## 2022-06-07 NOTE — SIGNIFICANT NOTE
06/07/22 1040   OTHER   Discipline physical therapy assistant   Rehab Time/Intention   Session Not Performed patient unavailable for treatment;other (see comments)  (In dialysis in AM will follow)

## 2022-06-07 NOTE — NURSING NOTE
"Pt is adamant on being placed in a private room and is threatening to leave out AMA. The pt was told that there were no private rooms available. The pt stated that she was not concerned with the patient that was in the room next to her, but her visitors. She stated that she is concerned that because of her pneumonia that she will catch COVID. The patient was then again told that there were no private rooms available and that there were only semi-private rooms available. The pt then stated that \"if her accommodations were not met to the fullest then she shouldn't be here\". The pt was told that she could move to the semi-private room that is currently empty but it was not a guarantee that another patient would not be placed there. The pt voices agreement and would liked to be moved the to the semi-private room. Lead RN notified.   "

## 2022-06-07 NOTE — PROGRESS NOTES
Nephrology Progress Note      Subjective     Pt was seen on dialysis, c/o shortness of breath, no chest pain    Objective       Vital signs :     Temp:  [97.4 °F (36.3 °C)-98 °F (36.7 °C)] 97.4 °F (36.3 °C)  Heart Rate:  [51-71] 56  Resp:  [16-20] 20  BP: ()/(44-98) 153/64      Intake/Output Summary (Last 24 hours) at 6/7/2022 0822  Last data filed at 6/7/2022 0300  Gross per 24 hour   Intake 720 ml   Output --   Net 720 ml       Physical Exam:    General Appearance :   Lungs : clear to auscultation, respirations regular  Heart :  regular rhythm & normal rate, normal S1, S2 and no murmur, no rub  Abdomen : normal bowel sounds, no masses, no hepatomegaly, no splenomegaly, soft non-tender and no guarding  Extremities : moves extremities well, no edema, no cyanosis and no redness  Neurologic :  No apparent focal deficit      Laboratory Data :     Albumin Albumin   Date Value Ref Range Status   06/05/2022 2.95 (L) 3.50 - 5.20 g/dL Final      Magnesium No results found for: MG       PTH               No results found for: PTH    CBC and coagulation:  Results from last 7 days   Lab Units 06/07/22  0024 06/05/22  2351 06/05/22  0808 06/05/22  0806 06/05/22  0658 06/05/22  0012   PROCALCITONIN ng/mL  --   --  0.16  --   --   --    LACTATE mmol/L  --   --   --   --  1.1  --    CRP mg/dL 1.09* 1.49*  --   --   --  1.78*   WBC 10*3/mm3 10.43 13.43*  --  11.82*  --  12.43*   HEMOGLOBIN g/dL 7.5* 8.8*  --  9.1*  --  9.4*   HEMATOCRIT % 23.1* 27.0*  --  28.6*  --  29.5*   MCV fL 90.6 89.4  --  91.4  --  90.8   MCHC g/dL 32.5 32.6  --  31.8  --  31.9   PLATELETS 10*3/mm3 183 256  --  284  --  294     Acid/base balance:  Results from last 7 days   Lab Units 06/05/22  0724 06/03/22  1141   PH, ARTERIAL pH units 7.419 7.495*   PO2 ART mm Hg 122.0* 72.2*   PCO2, ARTERIAL mm Hg 39.8 38.2   HCO3 ART mmol/L 25.7 29.5*     Renal and electrolytes:  Results from last 7 days   Lab Units 06/07/22  0558 06/07/22  0024 06/06/22  1735  06/06/22  1622 06/06/22  1141 06/06/22  0536 06/05/22  2351 06/05/22  1138 06/05/22  0808 06/05/22  0549 06/05/22  0012 06/04/22  1713 06/04/22  1239   SODIUM mmol/L 125* 120* 123* 128* 119*   < > 125*   < > 124*   < > 123*   < > 122*   POTASSIUM mmol/L  --  5.1  --   --   --   --  5.4*  --  5.2  --  4.9  --  5.3*   CHLORIDE mmol/L  --  88*  --   --   --   --  91*  --  93*  --  91*  --  89*   CO2 mmol/L  --  19.5*  --   --   --   --  21.7*  --  19.2*  --  21.1*  --  25.4   BUN mg/dL  --  48*  --   --   --   --  41*  --  36*  --  36*  --  24*   CREATININE mg/dL  --  2.84*  --   --   --   --  2.47*  --  2.24*  --  2.24*  --  2.09*   CALCIUM mg/dL  --  8.7  --   --   --   --  9.2  --  8.8  --  9.0  --  8.1*    < > = values in this interval not displayed.     Estimated Creatinine Clearance: 14.2 mL/min (A) (by C-G formula based on SCr of 2.84 mg/dL (H)).    Liver and pancreatic function:  Results from last 7 days   Lab Units 06/05/22  2351   ALBUMIN g/dL 2.95*   BILIRUBIN mg/dL 0.4   ALK PHOS U/L 143*   AST (SGOT) U/L 14   ALT (SGPT) U/L 9         Cardiac:      Liver and pancreatic function:  Results from last 7 days   Lab Units 06/05/22  2351   ALBUMIN g/dL 2.95*   BILIRUBIN mg/dL 0.4   ALK PHOS U/L 143*   AST (SGOT) U/L 14   ALT (SGPT) U/L 9       Medications :     aspirin, 81 mg, Oral, Daily  atorvastatin, 80 mg, Oral, Nightly  calcium acetate, 1,334 mg, Oral, TID With Meals  carvedilol, 6.25 mg, Oral, BID With Meals  folic acid, 1 mg, Oral, Daily  guaiFENesin, 600 mg, Oral, Q12H  heparin (porcine), 5,000 Units, Subcutaneous, Q8H  lidocaine, 2 patch, Transdermal, Q24H  NIFEdipine CC, 30 mg, Oral, Q24H  sodium chloride, 10 mL, Intravenous, Q12H  sodium zirconium cyclosilicate, 10 g, Oral, BID             Assessment & Plan     1. PRITI on CKD vs rapidly worsening CKD, started on dialysis on 5/10/22  2. Metabolic acidosis  3. Anemia  4. Acute on chronic respiratory failure  5. DM-II  6. Essential hypertension  7.  Hypokalemia  8. hyponatremia  9. Metabolic encephalopathy likely multifactorial    Pt developed shortness of breath, with fluid overload, worsening hypervolemic hyponatremia with oliguria, so started on dialysis.   Planned to do 2-3L UF today, repeat BMP in evening to follow up sodium, if sodium remains stable, can be discharged home to continue dialysis at [facility] an to watch for renal recovery, if no recovery, planned to switch to PDx after discussion with patient family.      During last hospitalization, found to have 8G of proteinuria with mild hematuria  Serology was all negative except mildly elevated cANCA,  Unknown baseline Cr, Cr was 1.3 in Feb 2021, 2.9 in 12/2021, and 3.2 in Jan 2022  Elevated , phos 8.4 and low iron stores     Discussed with RN and the dialysis nurse    Jude Muñoz MD  06/07/22  08:22 EDT

## 2022-06-07 NOTE — PLAN OF CARE
Goal Outcome Evaluation:  Plan of Care Reviewed With: patient        Progress: no change  Outcome Evaluation: Pt resting in bed. No changes noted at this time

## 2022-06-07 NOTE — PLAN OF CARE
Goal Outcome Evaluation:              Outcome Evaluation: Pt is resting in bed at this time. Pt worked with PT and went to dialysis this shift.. No acute changes noted at this time. Will continue to monitor.

## 2022-06-07 NOTE — CASE MANAGEMENT/SOCIAL WORK
Discharge Planning Assessment   [place]     Patient Name: Sean Chen  MRN: [id number]  Today's Date: 6/7/2022    Admit Date: 5/23/2022           Discharge Plan     Row Name 06/07/22 1508       Plan    Plan Pt was discussed in [facility] Rounds on this date. Physician requested SS to call son and discuss discharge planning. SS spoke wiht Pt's son, Dylon on this date. Pt lives with her spouse and son lives next door. Pt's son and daughter-in-law to help assist with her care. Pt and family plans for Pt to return home at discharge. Pt does not utilize home health or DME services. Pt's son has spoken with PCP and obtained orders for hospital bed, wheelchair, bedside commode, and side table to be delivered to Pt's home. Pt attends [facility] Mon, Wed, and Friday at  10:45am. Son is agreeable to Pt attending HD, but is interested in Pt doing PD if a candidate. Pt's son requested home health services for skilled nursing. Son does not have a preference of home health agency. Pt's family would like to transport Pt home via private vehicle. SS notified Dr. Durant. SS to follow.                               Kavitha Gatica

## 2022-06-07 NOTE — PROGRESS NOTES
[facility] HOSPITALIST PROGRESS NOTE     Patient Identification:  Name:  Sean Chen  Age:  81 y.o.  Sex:  female  :  1940  MRN:  [id number]  Visit Number:  [id number]  ROOM: [address] [place] [address]     Primary Care Provider:  Ganga Gallardo MD    Length of stay in inpatient status:  15    Subjective     Chief Compliant:    Chief Complaint   Patient presents with   • Shortness of Breath     History of Presenting Illness:    Patient remains ill but stable today, no acute events overnight, no new complaints, denies any fevers or chills, discussed case with Nephrology today and plan or hemodialysis and to return home on hemodialysis, OK per nephro to discharge home later today, called to discuss with family but have not been able to reach son at home, will attempt again later today.  Objective     Current Hospital Meds:  aspirin, 81 mg, Oral, Daily  atorvastatin, 80 mg, Oral, Nightly  calcium acetate, 1,334 mg, Oral, TID With Meals  carvedilol, 6.25 mg, Oral, BID With Meals  folic acid, 1 mg, Oral, Daily  guaiFENesin, 600 mg, Oral, Q12H  heparin (porcine), 5,000 Units, Subcutaneous, Q8H  lidocaine, 2 patch, Transdermal, Q24H  NIFEdipine CC, 30 mg, Oral, Q24H  sodium chloride, 10 mL, Intravenous, Q12H  sodium zirconium cyclosilicate, 10 g, Oral, BID    ----------------------------------------------------------------------------------------------------------------------  Vital Signs:  Temp:  [97.4 °F (36.3 °C)-98 °F (36.7 °C)] 97.4 °F (36.3 °C)  Heart Rate:  [51-63] 56  Resp:  [16-20] 20  BP: ()/(44-98) 153/64  SpO2:  [94 %-99 %] 94 %  on  Flow (L/min):  [2] 2;   Device (Oxygen Therapy): room air  Body mass index is 21.55 kg/m².      Intake/Output Summary (Last 24 hours) at 2022 1205  Last data filed at 2022 0300  Gross per 24 hour   Intake 660 ml   Output --   Net 660 ml       ----------------------------------------------------------------------------------------------------------------------  Physical exam:  Constitutional:  Elderly, No acute distress.      HENT:  Head:  Normocephalic and atraumatic.  Mouth:  Moist mucous membranes.    Eyes:  Conjunctivae and EOM are normal. No scleral icterus.    Neck:  Neck supple.  No JVD present.    Cardiovascular:  regular rate, regular rhythm and normal heart sounds with no murmur.  Pulmonary/Chest:  No respiratory distress, no wheezes, on minimal NC now  Abdominal:  Soft. No distension and no tenderness.   Musculoskeletal:  No tenderness, and no deformity.    Neurological:  Alert and oriented to person, place, and time.  No gross focal deficits   Skin:  Skin is warm and dry. No rash noted. No pallor.   Peripheral vascular:  No clubbing, no cyanosis, no edema.  Psychiatric: Appropriate mood and affect    *Examination stable today 6/7    Edited by: Pavan Durant MD at 6/6/2022 1256  ----------------------------------------------------------------------------------------------------------------------  CBC - WBC/Hgb/Hct/Plts: 10.43/7.5*/23.1*/183 (06/07 0024)  CHEM - BUN/Cr/glu/ALT/AST/amyl/lip:48*/2.84*/249*/--/--/--/-- (06/07 0024)  DEBORA - Na/K/Cl/CO2: 125*/--/--/-- (06/07 0558)     No results found for: URINECX  No results found for: BLOODCX    I have personally looked at the labs and they are summarized above.  ----------------------------------------------------------------------------------------------------------------------  Detailed radiology reports for the last 24 hours:  No radiology results for the last day  Assessment & Plan    81F PM GERD, Meniere's Disease, Chronic Anemia, Progressive Dementia, Hypertension, Hyperlipidemia, Chronic Kidney Disease, Diabetes Mellitus Type II, Rheumatoid Arthritis, severe Pulmonary Hypertension, recently admitted 5/4-5/13/2022 for NSTEMI and non-obstructive Coronary Artery Disease on [facility], also  notable Acute Kidney Injury on Chronic Kidney Disease with iHD, presented 5/23 with complaints of shortness of breath.      #Sepsis, Acute Metabolic Encephalopathy & Acute Hypoxic Respiratory Failure due to Pneumonia, Bacterial, treating for MDR Organisms/Aspiration & Parainfluenza Virus in setting of Immunocompromised stated from underlying Rheumatoid Arthritis  - Patient presented after recent hospitalization with increased shortness of breath and new oxygen requirement.  Patient meets SIRS criteria due to respiratory rate > 20, WBC count > 12K, noted to have mental status change from baseline.  Admission labs showed WBC count 15K, CRP 3.62, procalcitonin 0.89, lactate 1.7, Coronavirus/flu negative, MRSA negative, ABG with P02 down to 37.5, blood cultures negative to date, strep pneumo negative, viral respiratory panel + for parainfluenza virus. CT Head showed no acute intracranial abnormality. CT Chest showed RML and RLL airspace consolidative disease  - Has completed 7 days Aztreonam and Flagyl  - SLP consulted; Evaluated with diet recommendations 5/24, no noted signs and symptoms aspiration though noted mild oral weakness felt due to fatigue and medical condition  - Continue APAP PRN for fevers, Guaifenesin for congestion, Duonebs as needed  - Monitor on telemetry, PT/OT consulted and following for weakness/debility, family looking at taking home    #Nonoliguric Acute Kidney Injury on Chronic Kidney Disease vs ESRD with remote history Acute Kidney Injury requiring iHD in setting of mildly elevated cANCA complicated by proteinuria and mild hematuria - Improved   - Last admission creatinine was up to 5.67, improved to 1.74 at time of discharge, had elevated lambda/kappa, this admission up to 3.85, had started on hemodialysis last admission 5/10; Today 2.84  - Renal ultrasound showed concern for dense vascular calcifications or non-obstructing stones but no evidence of hydronephrosis was seen  - Consult  Nephrology; Following for hemodialysis needs, plan for dialysis today and continued dialysis at home    #Hypertension/Hyperlipidemia/Coronary Artery Disease  #Acute on Chronic HFpEF, diastolic dysfunction complicated by edema and pleural effusions now with noted reduced EF   #Severe Pulmonary Hypertension  #Small Pericardial Effusion  #Valvular Disease - Mild AS, Mild MR, mod TR  #Elevated Troponins, suspected NSTEMI Type II  #Prolonged Qtc (566ms)  - Labs showed Troponins 0.06->0.04, proBNP > 70K  - EKG showed normal sinus rhythm, intermittent PAC's, RBBB, Non-specific ST/T wave abnormality  - Echocardiogram 5/5 showed LVEF 66-70%, mild concentric hypertrophy, diastolic dysfunction, dilated left atrium, mild AS, mild MR, mod TR, severe Pulmonary Hypertension, RVSP > 55mmHg, small <1cm pericardial effusion; Repeat limited echocardiogram showed borderline dilated left ventricle, LVEF 36-40%, diastolic dysfunction    - [facility] 5/11 showed no-obstructive Coronary Artery Disease   - CT Chest showed Congestive Heart Failure/edema with R>L pleural effusion, cardiomegaly, severe coronary calcifications  - Cardiology consulted; Following, recommended goal directed medical therapy, signed off  - Continue home Aspirin 81, Statin  - Continue home Nifedipine, titrate as indicated   - Continue new Coreg, titrate as indicated   - No home ACEI/ARB/ARNI due to renal failure  - Continue to monitor on telemetry, strict I/O's, trend heart rate and blood pressure    #Non-Insulin Dependent Diabetes Mellitus Type II, controlled, unknown complications  - Hgb A1c = 5.5%  - Holding home oral agents, continue FSBG and SSI, add long acting as indicated.    #Electrolyte Abnormalities  - Acute Mod Hypokalemia - potassium down to 2.8, Replacing per protocol - Resolved   - Borderline Hypomagnesemia - magnesium down to 1.7, Replaced per protocol - Resolved   - Acute Hypervolemic Hyponatremia - sodium remains low, holding home paxil, trending sodium  daily    #Anxiety/Depression  - Continue home regimen    #Gastroesophageal Reflux Disease  - No home PPI, monitor symptoms    #Dementia  - CT showed generalized cerebral atrophy and nonspecific periventricular hypodensities thought to represent microvascular changes.   - Review home meds, supportive care    #Debility  - Consult PT/OT, Social Work if placement needed    #Mod Malnutrition  - Nutrition consulted and following        F: Oral  E: Monitor & Replace PRN  N: Diet Soft Texture; Chopped; Thin; Daily Fluid Restriction; Other; 800   Ppx: SQH  Code Status (Patient has no pulse and is not breathing): No CPR (Do Not Attempt to Resuscitate)  Medical Interventions (Patient has pulse or is breathing): Limited Support  Medical Intervention Limits: NO intubation (DNI)       Dispo: Pending clinical improvement, PT/OT recommended inpatient rehab vs skilled nursing facility, family considering taking home soon though, per Nephrology will plan for continued hemodialysis at home.    *This patient is considered high risk due to sepsis, acute respiratory failure, Pneumonia, Acute Kidney Injury on Chronic Kidney Disease, Congestive Heart Failure exacerbation     Edited by: Pavan Durant MD at 6/7/2022 1208  [facility] Hospitalist

## 2022-06-07 NOTE — PROGRESS NOTES
"Palliative Care Daily Progress Note     S:   Clinically, the patient remains very weak and somnolent.  She is unable to keep her eyes open nor was she answer any questions today.  She appears comfortable.    O:   Palliative Performance Scale Score:     BP 96/50 (BP Location: Right arm, Patient Position: Sitting)   Pulse 54   Temp 97.5 °F (36.4 °C) (Oral)   Resp 20   Ht 162.6 cm (64.02\")   Wt 57 kg (125 lb 9.6 oz)   SpO2 97%   BMI 21.55 kg/m²     Intake/Output Summary (Last 24 hours) at 6/6/2022 2207  Last data filed at 6/6/2022 1719  Gross per 24 hour   Intake 710 ml   Output --   Net 710 ml       PE:  General Appearance:    Chronically ill appearing,somnolent, NAD   HEENT:    NC/AT,anicteric    Neck:   supple   Lungs:      Poor inspiratory effort    Heart:   RRR   Abdomen:     Soft,  Non distended   Extremities:   1+ edema   Pulses:   Pulses palpable    Skin:   Warm, dry   Neurologic:   Somnolent, unable to stay awake, generalized weakness   Psych:   Calm         Meds: Reviewed    Labs:   Results from last 7 days   Lab Units 06/05/22  2351   WBC 10*3/mm3 13.43*   HEMOGLOBIN g/dL 8.8*   HEMATOCRIT % 27.0*   PLATELETS 10*3/mm3 256     Results from last 7 days   Lab Units 06/06/22 1731 06/06/22 0536 06/05/22  2351   SODIUM mmol/L 123*   < > 125*   POTASSIUM mmol/L  --   --  5.4*   CHLORIDE mmol/L  --   --  91*   CO2 mmol/L  --   --  21.7*   BUN mg/dL  --   --  41*   CREATININE mg/dL  --   --  2.47*   GLUCOSE mg/dL  --   --  289*   CALCIUM mg/dL  --   --  9.2    < > = values in this interval not displayed.     Results from last 7 days   Lab Units 06/06/22 1731 06/06/22 0536 06/05/22  2351   SODIUM mmol/L 123*   < > 125*   POTASSIUM mmol/L  --   --  5.4*   CHLORIDE mmol/L  --   --  91*   CO2 mmol/L  --   --  21.7*   BUN mg/dL  --   --  41*   CREATININE mg/dL  --   --  2.47*   CALCIUM mg/dL  --   --  9.2   BILIRUBIN mg/dL  --   --  0.4   ALK PHOS U/L  --   --  143*   ALT (SGPT) U/L  --   --  9   AST (SGOT) U/L "  --   --  14   GLUCOSE mg/dL  --   --  289*    < > = values in this interval not displayed.     Imaging Results (Last 72 Hours)     Procedure Component Value Units Date/Time    CT Head Without Contrast [[id number]] Collected: 06/05/22 0828     Updated: 06/05/22 0830    Narrative:      CT Head WO    HISTORY:   Acute onset of altered mental status    TECHNIQUE:   Axial unenhanced head CT. Radiation dose reduction techniques included automated exposure control or exposure modulation based on body size. Count of known CT and cardiac nuc med studies performed in previous 12 months: 2.     Time of scan: 8:22 AM    COMPARISON:   May 23, 2022    FINDINGS:     The ventricles remain generous in size. Cortical sulci are correspondingly prominent. No extraaxial fluid collections are seen.    No parenchymal or subarachnoid hemorrhage is present. Prominent periventricular hypodensities are again demonstrated which are nonspecific but likely represent white matter microvascular change in a patient of this age. No CT evidence of mass or acute  infarct.    The mastoid air cells are clear. The visualized paranasal sinuses demonstrate inflammatory changes in the sphenoid sinus.  Orbital structures demonstrate no acute findings.      Impression:      Impression:  1. No clearly acute intracranial pathology demonstrated.  2. Generalized cerebral atrophy and nonspecific periventricular hypodensities which likely represent white matter microvascular change.  3. Inflammatory change in the sphenoid sinus.    Signer Name: Glen Reyes MD   Signed: 6/5/2022 8:28 AM   Workstation Name: [id number]    [facility]    XR Chest 1 View [[id number]] Collected: 06/05/22 0806     Updated: 06/05/22 0808    Narrative:      CR Chest 1 Vw    INDICATION:   Acute respiratory failure with hypoxia     COMPARISON:    Shivani 3, 2022    FINDINGS:  Portable AP view(s) of the chest. The patient is rotated to the right.    Large-bore central  venous catheter demonstrated on the right side with the tip overlying the expected location of the superior vena cava.    Bibasilar atelectatic/infiltrative change with bilateral pleural effusions, right side greater than left. No pneumothorax.       Impression:      Bibasilar atelectatic/infiltrative change, right side greater than left with bilateral pleural effusions. Slight worsening from prior study.    Signer Name: Glen Reyes MD   Signed: 6/5/2022 8:06 AM   Workstation Name: [facility]-    [facility]            Diagnostics: Reviewed    A:  Clinically, is approaching her baseline.  She is somnolent today.    P:  Continue present management.  Assist with discharge planning and provide support.      We will continue to follow along. Please do not hesitate to contact us regarding further sx mgmt or GOC needs, including after hours or on weekends via our on call provider at [phone].     Beau Delgado MD    6/6/2022

## 2022-06-07 NOTE — THERAPY TREATMENT NOTE
Acute Care - Physical Therapy Treatment Note  [facility]     Patient Name: Sean Chen  : 1940  MRN: [id number]  Today's Date: 2022   Onset of Illness/Injury or Date of Surgery: 22  Visit Dx:     ICD-10-CM ICD-9-CM   1. Acute respiratory failure with hypoxia (HCC)  J96.01 518.81     Patient Active Problem List   Diagnosis   • Iron deficiency anemia   • Type 2 diabetes mellitus without complication, without long-term current use of insulin (HCC)   • Vitamin D deficiency disease   • Extrinsic asthma   • Essential hypertension   • Mixed hyperlipidemia   • Generalized osteoarthritis   • Gastroesophageal reflux disease without esophagitis   • Meniere's disease   • Chronic seasonal allergic rhinitis   • Low serum vitamin B12   • Memory impairment   • Bradycardia   • Hypertensive urgency   • Dementia without behavioral disturbance (HCC)   • Hyponatremia   • NSTEMI (non-ST elevated myocardial infarction) (HCC)   • Prolonged Q-T interval on ECG   • Anemia requiring transfusions   • Acute hypoxemic respiratory failure (HCC)   • Moderate malnutrition (HCC)     Past Medical History:   Diagnosis Date   • Allergic rhinitis    • Elevated liver enzymes    • Extrinsic asthma    • Gastritis    • GERD (gastroesophageal reflux disease)    • Hyperlipidemia    • Hypertension    • Iron deficiency anemia    • Meniere's disease    • Osteoarthritis    • Type 2 diabetes mellitus (HCC)    • Vitamin D deficiency disease      Past Surgical History:   Procedure Laterality Date   • APPENDECTOMY     • CARDIAC CATHETERIZATION N/A 2022    Procedure: Left Heart Cath;  Surgeon: Marcelino Grande MD;  Location: [facility] CATH INVASIVE LOCATION;  Service: Cardiology;  Laterality: N/A;   • CHOLECYSTECTOMY     • HYSTERECTOMY     • INSERTION HEMODIALYSIS CATHETER N/A 2022    Procedure: HEMODIALYSIS CATHETER INSERTION;  Surgeon: Hans Coates MD;  Location: [facility] OR;  Service: General;  Laterality: N/A;     PT Assessment (last 12  hours)     PT Evaluation and Treatment     Row Name 06/07/22 1443          Physical Therapy Time and Intention    Subjective Information complains of;weakness  -HR     Document Type therapy note (daily note)  -HR     Mode of Treatment physical therapy  -HR     Patient Effort adequate  -HR     Comment Pt and BRYAN Grider in agreement for PT on this date  -HR     Row Name 06/07/22 1443          General Information    Patient Profile Reviewed yes  -HR     Equipment Currently Used at Home crutches, axillary  -HR     Existing Precautions/Restrictions fall;oxygen therapy device and L/min  -HR     Limitations/Impairments safety/cognitive  -HR     Row Name 06/07/22 1443          Cognition    Affect/Mental Status (Cognition) confused;low arousal/lethargic;flat/blunted affect  -HR     Orientation Status (Cognition) oriented to;person  -HR     Follows Commands (Cognition) follows one-step commands  -HR     Personal Safety Interventions fall prevention program maintained;gait belt;nonskid shoes/slippers when out of bed  -HR     Row Name 06/07/22 1443          Bed Mobility    Bed Mobility bed mobility (all) activities  -HR     All Activities, [place] (Bed Mobility) nonverbal cues (demo/gesture);verbal cues;contact guard  -HR     Bed Mobility, Safety Issues decreased use of arms for pushing/pulling;decreased use of legs for bridging/pushing  -HR     Assistive Device (Bed Mobility) bed rails  -HR     Row Name 06/07/22 1443          Transfers    Transfers sit-stand transfer;stand-sit transfer;toilet transfer  -HR     Sit-Stand [place] (Transfers) contact guard;nonverbal cues (demo/gesture);verbal cues  -HR     Stand-Sit [place] (Transfers) contact guard;nonverbal cues (demo/gesture);verbal cues  -HR     [place] Level (Toilet Transfer) minimum assist (75% patient effort);contact guard;verbal cues;nonverbal cues (demo/gesture)  -HR     Assistive Device (Toilet Transfer) --  HHA  -HR     Row Name 06/07/22 1441           Sit-Stand Transfer    Assistive Device (Sit-Stand Transfers) walker, front-wheeled  -HR     Row Name 06/07/22 1443          Stand-Sit Transfer    Assistive Device (Stand-Sit Transfers) walker, front-wheeled  -HR     Row Name 06/07/22 1443          Toilet Transfer    Type (Toilet Transfer) stand pivot/stand step  -HR     Row Name 06/07/22 1443          Gait/Stairs (Locomotion)    [place] Level (Gait) contact guard;nonverbal cues (demo/gesture);verbal cues  -HR     Assistive Device (Gait) walker, front-wheeled  -HR     Distance in Feet (Gait) 40'  -HR     Pattern (Gait) step-to  -HR     Deviations/Abnormal Patterns (Gait) gait speed decreased;weight shifting decreased  -HR     Row Name 06/07/22 1443          Motor Skills    Therapeutic Exercise other (see comments)  EOB: AP, march, LAQ  -HR     Row Name 06/07/22 1443          Coping    Observed Emotional State calm;cooperative  -HR     Verbalized Emotional State acceptance  -HR     Row Name 06/07/22 1443          Plan of Care Review    Outcome Evaluation Pt is a little fatiqued on this date and walk 40' with RW CGA. BR transfer and EOB exercises until fatique.  -HR     Row Name 06/07/22 1443          Positioning and Restraints    Pre-Treatment Position in bed  -HR     Post Treatment Position bed  -HR     In Bed with nsg  -HR     Row Name 06/07/22 1443          Therapy Assessment/Plan (PT)    Rehab Potential (PT) good, to achieve stated therapy goals  -HR     Criteria for Skilled Interventions Met (PT) yes;skilled treatment is necessary  -HR     Therapy Frequency (PT) 2 times/wk  2-5 times/wk as available  -HR     Activity Limitations Related to Problem List (PT) unable to ambulate safely;unable to transfer safely  -HR     Row Name 06/07/22 1443          Physical Therapy Goals    Bed Mobility Goal Selection (PT) bed mobility, PT goal 1  -HR     Transfer Goal Selection (PT) transfer, PT goal 1  -HR     Gait Training Goal Selection (PT) gait training, PT goal 1  -HR      Row Name 06/07/22 1443          Bed Mobility Goal 1 (PT)    Activity/Assistive Device (Bed Mobility Goal 1, PT) bed mobility activities, all  -HR     [place] Level/Cues Needed (Bed Mobility Goal 1, PT) contact guard required  -HR     Time Frame (Bed Mobility Goal 1, PT) by discharge  -HR     Row Name 06/07/22 1443          Transfer Goal 1 (PT)    Activity/Assistive Device (Transfer Goal 1, PT) sit-to-stand/stand-to-sit;bed-to-chair/chair-to-bed  -HR     [place] Level/Cues Needed (Transfer Goal 1, PT) contact guard required  -HR     Time Frame (Transfer Goal 1, PT) by discharge  -HR     Row Name 06/07/22 1443          Gait Training Goal 1 (PT)    Activity/Assistive Device (Gait Training Goal 1, PT) gait (walking locomotion);assistive device use  -HR     [place] Level (Gait Training Goal 1, PT) contact guard required  -HR     Distance (Gait Training Goal 1, PT) 10  -HR     Time Frame (Gait Training Goal 1, PT) by discharge  -HR           User Key  (r) = Recorded By, (t) = Taken By, (c) = Cosigned By    Initials Name Provider Type    HR Yeimi Araiza PTA Physical Therapist Assistant                  PT Recommendation and Plan  Anticipated Discharge Disposition (PT): inpatient rehabilitation facility, skilled nursing facility  Therapy Frequency (PT): 2 times/wk (2-5 times/wk as available)  Outcome Evaluation: Pt is a little fatiqued on this date and walk 40' with RW CGA. BR transfer and EOB exercises until fatique.       Time Calculation:    PT Charges     Row Name 06/07/22 1445             Time Calculation    PT Received On 06/07/22  -HR              Time Calculation- PT    Total Timed Code Minutes- PT 24 minute(s)  -HR            User Key  (r) = Recorded By, (t) = Taken By, (c) = Cosigned By    Initials Name Provider Type    HR Yeimi Araiza PTA Physical Therapist Assistant              Therapy Charges for Today     Code Description Service Date Service Provider Modifiers Qty    25858323999 HC GAIT  TRAINING EA 15 MIN 6/6/2022 Yeimi Araiza, KAHLIL GP, CQ 1    35097012535 HC PT THER PROC EA 15 MIN 6/6/2022 Yeimi Araiza, PTA GP, CQ 1    [id number] HC GAIT TRAINING EA 15 MIN 6/7/2022 Yeimi Araiza, PTA GP, CQ 1    28434064469 HC PT THER PROC EA 15 MIN 6/7/2022 Yeimi Araiza PTA GP, CQ 1               Yeimi Araiza, KAHLIL  6/7/2022

## 2022-06-08 ENCOUNTER — READMISSION MANAGEMENT (OUTPATIENT)
Dept: CALL CENTER | Facility: HOSPITAL | Age: 82
End: 2022-06-08

## 2022-06-08 VITALS
OXYGEN SATURATION: 97 % | DIASTOLIC BLOOD PRESSURE: 54 MMHG | HEART RATE: 57 BPM | BODY MASS INDEX: 21.44 KG/M2 | SYSTOLIC BLOOD PRESSURE: 106 MMHG | RESPIRATION RATE: 18 BRPM | WEIGHT: 125.6 LBS | TEMPERATURE: 97.5 F | HEIGHT: 64 IN

## 2022-06-08 LAB
ANION GAP SERPL CALCULATED.3IONS-SCNC: 9.9 MMOL/L (ref 5–15)
BUN SERPL-MCNC: 33 MG/DL (ref 8–23)
BUN/CREAT SERPL: 17 (ref 7–25)
CALCIUM SPEC-SCNC: 8.5 MG/DL (ref 8.6–10.5)
CHLORIDE SERPL-SCNC: 91 MMOL/L (ref 98–107)
CO2 SERPL-SCNC: 24.1 MMOL/L (ref 22–29)
CREAT SERPL-MCNC: 1.94 MG/DL (ref 0.57–1)
CRP SERPL-MCNC: 0.94 MG/DL (ref 0–0.5)
DEPRECATED RDW RBC AUTO: 54.4 FL (ref 37–54)
EGFRCR SERPLBLD CKD-EPI 2021: 25.8 ML/MIN/1.73
ERYTHROCYTE [DISTWIDTH] IN BLOOD BY AUTOMATED COUNT: 15.9 % (ref 12.3–15.4)
GLUCOSE BLDC GLUCOMTR-MCNC: 252 MG/DL (ref 70–130)
GLUCOSE BLDC GLUCOMTR-MCNC: 326 MG/DL (ref 70–130)
GLUCOSE SERPL-MCNC: 222 MG/DL (ref 65–99)
HCT VFR BLD AUTO: 25.9 % (ref 34–46.6)
HGB BLD-MCNC: 8.1 G/DL (ref 12–15.9)
MCH RBC QN AUTO: 29.1 PG (ref 26.6–33)
MCHC RBC AUTO-ENTMCNC: 31.3 G/DL (ref 31.5–35.7)
MCV RBC AUTO: 93.2 FL (ref 79–97)
PLATELET # BLD AUTO: 218 10*3/MM3 (ref 140–450)
PMV BLD AUTO: 10 FL (ref 6–12)
POTASSIUM SERPL-SCNC: 4.1 MMOL/L (ref 3.5–5.2)
RBC # BLD AUTO: 2.78 10*6/MM3 (ref 3.77–5.28)
SODIUM SERPL-SCNC: 123 MMOL/L (ref 136–145)
SODIUM SERPL-SCNC: 125 MMOL/L (ref 136–145)
SODIUM SERPL-SCNC: 127 MMOL/L (ref 136–145)
WBC NRBC COR # BLD: 10.84 10*3/MM3 (ref 3.4–10.8)

## 2022-06-08 PROCEDURE — 84295 ASSAY OF SERUM SODIUM: CPT | Performed by: INTERNAL MEDICINE

## 2022-06-08 PROCEDURE — 25010000002 HEPARIN (PORCINE) PER 1000 UNITS: Performed by: HOSPITALIST

## 2022-06-08 PROCEDURE — 86140 C-REACTIVE PROTEIN: CPT | Performed by: INTERNAL MEDICINE

## 2022-06-08 PROCEDURE — 85027 COMPLETE CBC AUTOMATED: CPT | Performed by: HOSPITALIST

## 2022-06-08 PROCEDURE — 97110 THERAPEUTIC EXERCISES: CPT

## 2022-06-08 PROCEDURE — 94799 UNLISTED PULMONARY SVC/PX: CPT

## 2022-06-08 PROCEDURE — 82962 GLUCOSE BLOOD TEST: CPT

## 2022-06-08 PROCEDURE — 94761 N-INVAS EAR/PLS OXIMETRY MLT: CPT

## 2022-06-08 PROCEDURE — 99239 HOSP IP/OBS DSCHRG MGMT >30: CPT | Performed by: INTERNAL MEDICINE

## 2022-06-08 PROCEDURE — 97116 GAIT TRAINING THERAPY: CPT

## 2022-06-08 PROCEDURE — 80048 BASIC METABOLIC PNL TOTAL CA: CPT | Performed by: INTERNAL MEDICINE

## 2022-06-08 RX ORDER — CARVEDILOL 6.25 MG/1
6.25 TABLET ORAL 2 TIMES DAILY WITH MEALS
Qty: 60 TABLET | Refills: 0 | Status: SHIPPED | OUTPATIENT
Start: 2022-06-08 | End: 2022-07-01 | Stop reason: SDUPTHER

## 2022-06-08 RX ORDER — NIFEDIPINE 30 MG/1
60 TABLET, FILM COATED, EXTENDED RELEASE ORAL
Qty: 60 TABLET | Refills: 0 | Status: SHIPPED | OUTPATIENT
Start: 2022-06-09 | End: 2022-07-01 | Stop reason: SDUPTHER

## 2022-06-08 RX ORDER — LIDOCAINE 50 MG/G
2 PATCH TOPICAL
Qty: 28 PATCH | Refills: 0 | Status: SHIPPED | OUTPATIENT
Start: 2022-06-09 | End: 2022-06-23

## 2022-06-08 RX ADMIN — HEPARIN SODIUM 5000 UNITS: 5000 INJECTION INTRAVENOUS; SUBCUTANEOUS at 05:57

## 2022-06-08 RX ADMIN — CARVEDILOL 6.25 MG: 6.25 TABLET, FILM COATED ORAL at 08:02

## 2022-06-08 RX ADMIN — ASPIRIN 81 MG: 81 TABLET, COATED ORAL at 08:02

## 2022-06-08 RX ADMIN — HYDRALAZINE HYDROCHLORIDE 10 MG: 10 TABLET, FILM COATED ORAL at 06:25

## 2022-06-08 RX ADMIN — LIDOCAINE 2 PATCH: 50 PATCH CUTANEOUS at 08:03

## 2022-06-08 RX ADMIN — Medication 10 ML: at 08:03

## 2022-06-08 RX ADMIN — GUAIFENESIN 600 MG: 600 TABLET, EXTENDED RELEASE ORAL at 08:02

## 2022-06-08 RX ADMIN — FOLIC ACID 1 MG: 1 TABLET ORAL at 08:02

## 2022-06-08 RX ADMIN — NIFEDIPINE 60 MG: 30 TABLET, EXTENDED RELEASE ORAL at 08:02

## 2022-06-08 RX ADMIN — CALCIUM ACETATE 1334 MG: 667 CAPSULE ORAL at 11:19

## 2022-06-08 RX ADMIN — CALCIUM ACETATE 1334 MG: 667 CAPSULE ORAL at 08:02

## 2022-06-08 RX ADMIN — ACETAMINOPHEN 650 MG: 325 TABLET ORAL at 05:57

## 2022-06-08 RX ADMIN — SODIUM ZIRCONIUM CYCLOSILICATE 10 G: 10 POWDER, FOR SUSPENSION ORAL at 08:02

## 2022-06-08 NOTE — DISCHARGE PLACEMENT REQUEST
"Aide Chen (81 y.o. Female)             Date of Birth   1940    Social Security Number       Address   [address] [place] [address]    Home Phone   [phone]    MRN   [id number]       Jewish   Unknown    Marital Status                               Admission Date   5/23/22    Admission Type   Emergency    Admitting Provider   Pavan Durant MD    Attending Provider   Pavan Durant MD    Department, Room/Bed   [address], [facility]/       Discharge Date       Discharge Disposition   Home or Self Care    Discharge Destination                               Attending Provider: Pavan Durant MD    Allergies: Keflex [Cephalexin], Lodine [Etodolac], Penicillins, Sulfa Antibiotics    Isolation: None   Infection: None   Code Status: No CPR   Advance Care Planning Activity    Ht: 162.6 cm (64.02\")   Wt: 57 kg (125 lb 9.6 oz)    Admission Cmt: None   Principal Problem: Acute hypoxemic respiratory failure (HCC) [J96.01]                 Active Insurance as of 5/23/2022     Primary Coverage     Payor Plan Insurance Group Employer/Plan Group    HUMANA MEDICARE REPLACEMENT HUMANA MEDICARE REPLACEMENT [id number]     Payor Plan Address Payor Plan Phone Number Payor Plan Fax Number Effective Dates    [address] [address] [phone]  1/1/2021 - None Entered    [facility] [phone]       Subscriber Name Subscriber Birth Date Member ID       AIDE CHEN 1940 [id number]           Secondary Coverage     Payor Plan Insurance Group Employer/Plan Group    [place]      Payor Plan Address Payor Plan Phone Number Payor Plan Fax Number Effective Dates    [address]   1/1/2014 - None Entered    [facility] [phone]       Subscriber Name Subscriber Birth Date Member ID       AIDE CHEN 1940 [id number]                 Emergency Contacts      (Rel.) Home Phone Work Phone Mobile Phone    Dylon Chen Jr (Son) [phone] -- " [phone]    deysi chen -- -- [phone]    Dylon Chen Sr (Spouse) [phone] -- --    Dylon Chen (Grandchild) -- -- [phone]               History & Physical      Venus Mcdonnell PA at 22 7820     Attestation signed by Pavan Durant MD at 22 1126    I have evaluated the patient independently, I have reviewed H&P, all labs and images from today and evaluated the patient at bedside.  I have discussed with JIE Mcdonnell and agree.  Hold fluids, continue antibiotics, follow up cultures, follow up Nephrology recommendations and need for hemodialysis, weaning 02 as able, follow up labs and clinical status in AM.                         [facility] HOSPITALIST HISTORY AND PHYSICAL    Patient Identification:  Name:  Sean Chen  Age:  81 y.o.  Sex:  female  :  1940  MRN:  [id number]   Visit Number:  [id number]  Admit Date: 2022   Primary Care Physician:  Ganga Gallardo MD     2022 11:09 AM    Subjective     Chief complaint:   Chief Complaint   Patient presents with   • Shortness of Breath     History of presenting illness:   Sean Chen is a 81 y.o. female with past medical history significant for progressive dementia, CKD, type 2 diabetes mellitus, HTN, HLD, chronic anemia, rheumatoid arthritis, severe pulmonary hypertension, Meniere's disease and GERD. She presented to the emergency department of [facility] on 2022 with complaints of shortness of breath.     The patient was just recently admitted to our facility from 2022-2022 with acute NSTEMI with reported non-obstructive CAD on [facility]. She was also noted to have intermittent bradycardia felt to be second to medications, PRITI on worsening CKD HD catheter placement and initiation of hemodialysis, and a <1cm pericardial effusion noted on echocardiogram.  She was discharged home under the care of her family.  She was to follow-up with her PCP, cardiologist,  and nephrology as an outpatient.  Vasculitis work-up was pending at the time of discharge.     She presented back to the ED today with increased shortness of breath which has been gradually worsening since her recent discharge.  She has had cough with intermittent sputum production.  No fever or chills. No chest pains or pressure. No increase in edema of the lower extremities.  She has had worsening generalized weakness and decreased p.o. intake.  When she eats, she gets sick to her stomach and at times does have episodes of vomiting.  With vomiting, she has had choking spells.  She also feels like she gets choked when she eats at times.  She underwent HD on 5/14, 5/16, and 5/18.  She went back for another session on 5/20, however, due to her feeling poorly she did not have HD that day.  She went back for HD this morning and was noted to have decreased O2 saturations with dyspnea noted.  She was also reported to be pale.  She was sent to the ED for evaluation.    Upon arrival to the ED, vitals were /77, respirations 28, heart rate 79, SPO2 79% on room air, afebrile.  ABG on room air showed hypoxia with PO2 of 37.5 with arterial O2 saturation of 71%.  Sodium 127 with glucose of 279.  Potassium 2.8.  Creatinine 3.52 with BUN of 33.  Lactic acid within normal limits.  CRP 3.62.  Procalcitonin level elevated 0.89.  Troponin 0.604 and 0.611 consecutively.  proBNP >70,000.  WBC count 15.63.  UA shows trace of bacteria with only 0-2 WBCs and negative nitrite.  There is 2+ glucose and 3+ protein appreciated.  Urine toxicology screen is negative.  CT chest without contrast shows CHF/edema with right greater than left small pleural effusions.  Right middle lobe and right lower lobe consolidative airspace disease most consistent with pneumonia.  Cardiomegaly and severe coronary artery calcifications appreciated.    In the ED, she received treatment with IV vancomycin, aztreonam, the prednisone is 125 mg x 1, and  potassium chloride 60 mEq p.o. x1. Patient has been admitted to the progressive care unit of [facility] for further evaluation and therapy.   ---------------------------------------------------------------------------------------------------------------------   Review of Systems   Constitutional: Positive for activity change, appetite change and fatigue. Negative for chills, diaphoresis and fever.   HENT: Negative for congestion, rhinorrhea, sinus pressure and sinus pain.    Respiratory: Positive for cough, choking and shortness of breath. Negative for chest tightness and wheezing.    Cardiovascular: Negative for chest pain and leg swelling.   Gastrointestinal: Positive for nausea. Negative for abdominal pain, anal bleeding, blood in stool, constipation and diarrhea.   Genitourinary: Negative for dysuria and hematuria.   Musculoskeletal: Positive for arthralgias, gait problem (Weak, SOA with walking. ) and joint swelling (Chronic with RA. ).   Skin: Positive for color change and pallor. Negative for rash.   Psychiatric/Behavioral: Positive for confusion (At times with known dementia. Always confused to year. ). Negative for agitation and behavioral problems.      ---------------------------------------------------------------------------------------------------------------------   Past Medical History:   Diagnosis Date   • Allergic rhinitis    • Elevated liver enzymes    • Extrinsic asthma    • Gastritis    • GERD (gastroesophageal reflux disease)    • Hyperlipidemia    • Hypertension    • Iron deficiency anemia    • Meniere's disease    • Osteoarthritis    • Type 2 diabetes mellitus (HCC)    • Vitamin D deficiency disease      Past Surgical History:   Procedure Laterality Date   • APPENDECTOMY     • CHOLECYSTECTOMY     • HYSTERECTOMY     • INSERTION HEMODIALYSIS CATHETER N/A 5/9/2022    Procedure: HEMODIALYSIS CATHETER INSERTION;  Surgeon: Hans Coates MD;  Location: [facility];  Service: General;   Laterality: N/A;     No family history on file.  Social History     Socioeconomic History   • Marital status:      Spouse name: moshe   • Number of children: 1   • Years of education: 8   Tobacco Use   • Smoking status: Never Smoker   • Smokeless tobacco: Never Used   Substance and Sexual Activity   • Alcohol use: No   • Drug use: No   • Sexual activity: Defer     ---------------------------------------------------------------------------------------------------------------------   Allergies:  Keflex [cephalexin], Lodine [etodolac], Penicillins, and Sulfa antibiotics  ---------------------------------------------------------------------------------------------------------------------   Prior to Admission Medications     Prescriptions Last Dose Informant Patient Reported? Taking?    albuterol sulfate HFA (ProAir HFA) 108 (90 Base) MCG/ACT inhaler  Pharmacy No No    Inhale 2 puffs 4 (Four) Times a Day.    aspirin 81 MG EC tablet   No No    Take 1 tablet by mouth Daily.    atorvastatin (LIPITOR) 80 MG tablet  Pharmacy No No    Take 1 tablet by mouth Every Night.    calcium acetate (PHOS BINDER,) 667 MG capsule capsule   No No    Take 2 capsules by mouth 3 (Three) Times a Day With Meals.    folic acid (FOLVITE) 1 MG tablet   No No    Take 1 tablet by mouth Daily.    NIFEdipine CC (ADALAT CC) 30 MG 24 hr tablet   No No    Take 3 tablets by mouth Daily.    PARoxetine (PAXIL) 10 MG tablet  Pharmacy No No    Take 1 tablet by mouth Every Morning.        ---------------------------------------------------------------------------------------------------------------------  Objective   Hospital Scheduled Meds:  potassium chloride, 60 mEq, Oral, Once      sodium chloride, 250 mL/hr, Last Rate: 250 mL/hr (05/23/22 0095)      ---------------------------------------------------------------------------------------------------------------------   Vital Signs:  Temp:  [97.5 °F (36.4 °C)-97.8 °F (36.6 °C)] 97.8 °F (36.6  °C)  Heart Rate:  [70-86] 71  Resp:  [28] 28  BP: (131-149)/(72-83) 135/79  Mean Arterial Pressure (Non-Invasive) for the past 24 hrs (Last 3 readings):   Noninvasive MAP (mmHg)   22 1448 104   22 1403 104   22 1343 98     SpO2 Percentage    22 1343 22 1403 22 1448   SpO2: 93% 91% 92%     SpO2:  [79 %-95 %] 92 %  on   ;     Body mass index is 25.23 kg/m².  Wt Readings from Last 3 Encounters:   22 66.7 kg (147 lb)   22 67.1 kg (147 lb 14.4 oz)   22 71.2 kg (157 lb)     ---------------------------------------------------------------------------------------------------------------------   Physical Exam:  Constitutional:  Well-developed and well-nourished.  No respiratory distress on 3.5L NC. She appears pale.      HENT:  Head: Normocephalic and atraumatic.  Mouth:  Moist mucous membranes.    Eyes:  Conjunctivae and EOM are normal. No scleral icterus. No erythema or drainage.   Neck:  Neck supple.   Cardiovascular:  Normal rate, regular rhythm and normal heart sounds with no significant murmur appreciated.   Pulmonary/Chest:  No respiratory distress on NC. Few coarse breath sounds noted on the right. Reduced air movement at the bases bilaterally.  Hemodialysis catheter in right chest.  No surrounding erythema or edema.   Abdominal:  Soft.  Bowel sounds are present x4.  No distension and no tenderness.   Musculoskeletal:  No edema, no tenderness, and no deformity.  No red or swollen joints anywhere.    Neurological:  Alert and oriented to person, place, and . Confused to year (baseline). No tongue deviation.  No facial droop.  No slurred speech.   Skin:  Skin is warm and dry.  No rash noted. She appears pale.   Psychiatric:  Normal mood and affect.  Behavior is normal.  Judgment and thought content normal.   Peripheral vascular:  No edema and 2+ pedal pulses bilaterally.   Genitourinary: No triana catheter in  place.  ---------------------------------------------------------------------------------------------------------------------  EKG:  Pending cardiology interpretation. Per my read, reveals sinus rhythm with intermittent PACs.  83 bpm.  RBBB.  Nonspecific ST/T wave abnormalities with some baseline artifact.  QTc 566 ms.    I have personally reviewed the EKG.  ---------------------------------------------------------------------------------------------------------------------   Results from last 7 days   Lab Units 05/23/22  1401 05/23/22  1119   CK TOTAL U/L  --  50   TROPONIN T ng/mL 0.611* 0.604*     Results from last 7 days   Lab Units 05/23/22  1119   PROBNP pg/mL >70,000.0*       Results from last 7 days   Lab Units 05/23/22  1117   PH, ARTERIAL pH units 7.450   PO2 ART mm Hg 37.5*   PCO2, ARTERIAL mm Hg 35.8   HCO3 ART mmol/L 24.9     Results from last 7 days   Lab Units 05/23/22  1119   CRP mg/dL 3.62*   LACTATE mmol/L 1.7   WBC 10*3/mm3 15.63*   HEMOGLOBIN g/dL 9.6*   HEMATOCRIT % 28.7*   MCV fL 85.9   MCHC g/dL 33.4   PLATELETS 10*3/mm3 330   INR  0.99     Results from last 7 days   Lab Units 05/23/22  1119   SODIUM mmol/L 127*   POTASSIUM mmol/L 2.8*   MAGNESIUM mg/dL 1.7   CHLORIDE mmol/L 91*   CO2 mmol/L 22.1   BUN mg/dL 33*   CREATININE mg/dL 3.52*   CALCIUM mg/dL 8.9   GLUCOSE mg/dL 279*   ALBUMIN g/dL 3.29*   BILIRUBIN mg/dL 0.5   ALK PHOS U/L 118*   AST (SGOT) U/L 21   ALT (SGPT) U/L 15   Estimated Creatinine Clearance: 12 mL/min (A) (by C-G formula based on SCr of 3.52 mg/dL (H)).  No results found for: AMMONIA    No results found for: HGBA1C, POCGLU  Lab Results   Component Value Date    HGBA1C 5.50 04/29/2022     Lab Results   Component Value Date    TSH 0.677 05/23/2022    FREET4 1.77 (H) 05/23/2022     No results found for: BLOODCX  No results found for: URINECX  No results found for: WOUNDCX  No results found for: STOOLCX  No results found for: RESPCX  Pain Management Panel     Pain Management  Panel Latest Ref Rng & Units 5/23/2022 5/5/2022    CREATININE UR mg/dL - 67.4    AMPHETAMINES SCREEN, URINE Negative Negative -    BARBITURATES SCREEN Negative Negative -    BENZODIAZEPINE SCREEN, URINE Negative Negative -    BUPRENORPHINEUR Negative Negative -    COCAINE SCREEN, URINE Negative Negative -    METHADONE SCREEN, URINE Negative Negative -    METHAMPHETAMINEUR Negative Negative -        I have personally reviewed the above laboratory results.   ---------------------------------------------------------------------------------------------------------------------  Imaging Results (Last 7 Days)     Procedure Component Value Units Date/Time    CT Chest Without Contrast Diagnostic [[id number]] Resulted: 05/23/22 1535     Updated: 05/23/22 1557    XR Chest 1 View [[id number]] Collected: 05/23/22 1210     Updated: 05/23/22 1213    Narrative:      EXAM:    XR Chest, 1 View     EXAM DATE:    5/23/2022 11:30 AM     CLINICAL HISTORY:    sob     TECHNIQUE:    Frontal view of the chest.     COMPARISON:    05/09/2022     FINDINGS:    Lungs:  Improvement in bilateral airspace disease likely improvement  in edema.  Evolving airspace disease of the right infrahilar aspect  possibly representing pneumonia.  Minimal left basilar airspace disease  noted.    Pleural space:  Trace effusions.  No pneumothorax.    Heart:  Unremarkable.  No cardiomegaly.    Mediastinum:  Unremarkable.    Bones/joints:  Bony structures are stable.    Tubes, lines and devices:  Right tunneled dialysis catheter  positioning is stable.       Impression:      1.  Development of right infrahilar airspace disease which may represent  pneumonia.  2.  Marked improvement in CHF/edema.  3.  Support devices as above.     This report was finalized on 5/23/2022 12:10 PM by Dr. Mingo Cardenas MD.             ---------------------------------------------------------------------------------------------------------------------  Assessment & Plan      Acute Hospital  Problems:    -Sepsis, present on admission, (RR >20, hypoxia, WBC 15.63) with acute hypoxic respiratory failure 2/2 right middle and lower lobe likely aspiration versus hospital-acquired pneumonia  -Recent nausea, vomiting with episodes of choking  -Dysphagia  · Blood cultures obtained x2 in the ED.  · Broad-spectrum antibiotic therapy with aztreonam, Flagyl, and vancomycin.  · Continue n.p.o. status for now and consult SLP.  · Obtain sputum cultures and sensitivity.  · Obtain MRSA PCR screen.  · Add scheduled duonebs every 6 hours.   · Mucinex BID.   · Wean O2 as tolerated to baseline of room air, currently maintaining O2 saturations on 3.5L NC.  · CBC in AM.    -Acute on chronic HFpEF  -Grade 2 diastolic dysfunction  -Severe pulmonary hypertension  -ESRD with recent initiation of HD  · ProBNP >70,000.  · Small bilateral pleural effusions noted on CT chest.  · Likely 2/2 having skipped HD sessions on 5/20 and today, 5/23, as the HD clinic felt she was too ill to undergo sessions on both days.   · Consult nephrology to resume HD, appreciate their input.    -NSTEMI, likely type II, 2/2 ESRD, however, increased compared to recent values  -Dyslipidemia   · Patient denies any recent chest pains or discomfort.  · Recent [facility] 5/11/2022 reported to have nonobstructive CAD, however, report appears to be incomplete at this time.  · Overall flat trend so far.  Continue to follow-up on serial troponins.  · Initial EKG shows sinus rhythm with intermittent PACs.  83 bpm.  RBBB.  Nonspecific ST/T wave abnormalities with some baseline artifact.  QTc 566 ms.  Follow-up on repeat.  · Recent TTE report reviewed most above, EF 66 to 70% with grade 2 diastolic dysfunction, mild to moderate valvular dysfunction, and severe pulmonary hypertension.  There was a small less than 1 cm pericardial effusion adjacent to the left ventricle.  · Monitor on telemetry.  · Resume home aspirin, statin.   · No beta-blocker secondary to recent reported  bradycardia    -Prolonged QTC, 566ms   -Acute hypokalemia  -Borderline hypomagnesemia  · Baseline RBBB noted.   · Potassium borderline low and has been replaced in the ED.  · Magnesium borderline low, will add low dose supplementation.   · Repeat EKG pending.  · Monitor on telemetry.  · Avoid QT prolonging agents.    -Type 2 diabetes mellitus non-insulin-requiring, uncontrolled with hyperglycemia  · Patient will be n.p.o. status for now until evaluated by SLP.  · Recent hemoglobin A1c 5.5%.  · Monitor every 6 hour Accu-Cheks and if glucose remains elevated, will consider adding low-dose SSI as needed.    -Acute hyponatremia, likely hypervolemic  · Corrected sodium for hyperglycemia is 130-131.  · Nephrology consulted to reinitiate HD.  · Repeat BMP in AM.    -Chronic normocytic anemia   · H/H actually improved compared to recent discharge.  · No recent bleeding reported.  · Patient does appear pale on exam, monitor H/H closely.  · Repeat CBC in AM.    -Chronic, reportedly progressive dementia  -Generalized weakness/debility  -Poor p.o. intake  · Patient is currently oriented to person, place, .  Confused to year which is reported to be her baseline per her son at bedside.  He states she always believes that is the . Intermittent confusion reported at home.  · Patient is able to ambulate only a couple feet before becoming dyspneic and weak at this point.  · Reorient as needed.  Maintain day/night orientation as much as possible.  · Consult nutrition in AM.  · Consult PT/OT in AM.  · Consult case management to assist with discharge planning.     -DVT Prophylaxis  • Subcu heparin.    -Admission COVID-19 testing  • Negative.     Chronic Medical Problems:    · Recent bradycardia felt to be 2/2 medications: Monitor on telemetry.  Avoid negative chronotropic medications.  · Rheumatoid arthritis: Does not appear to be on any DMARDs or chronic steroids from home  · GERD  · Ménière's disease    The patient is  considered to be a high risk patient due to: pneumonia, respiratory failure.     Code Status: FULL CODE.     I have discussed the patient's assessment and plan with the patient and admitting physician, Dr. Durant.     Disposition: Patient lives at home with her , plans to return home on discharge.  Has support from her son next-door.    Expected length of stay: INPATIENT status due to the need for care which can only be reasonably provided in an hospital setting, such as aggressive/expedited ancillary services and/or consultation services, the necessity for IV medications, close physician monitoring and/or the possible need for procedures.  In such, I feel the patient’s risk for adverse outcomes and need for care warrant INPATIENT evaluation and predict the patient’s care encounter to likely last beyond 2 midnights.    Venus Mcdonnell PA-C  05/23/22  16:29 EDT  ---------------------------------------------------------------------------------------------------------------------       Electronically signed by Pavan Durant MD at 05/23/22 6792       Oxygen Therapy (last 3 days)     Date/Time SpO2 Device (Oxygen Therapy) Flow (L/min) Oxygen Concentration (%) ETCO2 (mmHg)    06/08/22 0800 -- room air -- -- --    06/08/22 0644 94 room air -- -- --    06/08/22 0625 96 -- -- -- --    06/08/22 0300 95 -- -- -- --    06/07/22 2035 96 nasal cannula 2 -- --    06/07/22 2031 -- nasal cannula 2 -- --    06/07/22 1900 95 -- -- -- --    06/07/22 1400 95 -- -- -- --    06/07/22 0806 -- room air -- -- --    06/07/22 0736 94 nasal cannula 2 -- --    06/07/22 0600 99 -- -- -- --    06/07/22 0408 99 nasal cannula 2 -- --    06/07/22 0300 99 -- -- -- --    06/06/22 2049 -- nasal cannula 2 -- --    06/06/22 1900 97 -- -- -- --    06/06/22 1842 97 room air -- -- --    06/06/22 1832 97 room air -- -- --    06/06/22 1400 94 -- -- -- --    06/06/22 1000 92 -- -- -- --    06/06/22 0849 -- room air -- -- --    06/06/22 0723 99 nasal  "cannula 2 -- --    22 0600 99 -- -- -- --    22 0300 91 -- -- -- --    22 2128 -- room air -- -- --    22 1900 97 -- -- -- --    22 1853 97 nasal cannula 2 -- --    22 1400 99 nasal cannula 2 -- --    22 1337 99 nasal cannula 2 -- --    22 0815 -- nasal cannula 2 -- --    22 0710 99 nasal cannula 2 -- --    22 0600 99 -- -- -- --    22 0540 93 room air -- -- --    22 0300 98 -- -- -- --    22 0038 97 room air -- -- --    22 0028 95 room air -- -- --        Discharge Order (From admission, onward)     Start     Ordered    22 0917  Discharge patient  Once        Expected Discharge Date: 22    Expected Discharge Time: Morning    Discharge Disposition: Home or Self Care    Physician of Record for Attribution - Please select from Treatment Team: THADDEUS PERDOMO [[id number]]    Review needed by CMO to determine Physician of Record: No       Question Answer Comment   Physician of Record for Attribution - Please select from Treatment Team THADDEUS PERDOMO    Review needed by CMO to determine Physician of Record No        22              [address] [address]  Dept. Phone:  [phone]  Dept. Fax:  [phone] Date Ordered: 2022         Patient:  Sean Chen MRN:  [id number]   [address]   [place] [address] :  1940  SSN:    Phone: [phone] Sex:  F     Weight: 57 kg (125 lb 9.6 oz)         Ht Readings from Last 1 Encounters:   22 162.6 cm (64.02\")         Home Oxygen Therapy          (Order ID: [id number])    Diagnosis:  Acute respiratory failure with hypoxia (HCC) (J96.01 [ICD-10-CM] 518.81 [ICD-9-CM])   Quantity:  1     Delivery Modality: Nasal Cannula  Liters Per Minute: 2  Duration: Continuous  Equipment:  Oxygen Concentrator &  &  Portable Gaseous Oxygen System & Portable Oxygen Contents Gaseous &  Conserving " Regulator  Length of Need (99 Months = Lifetime): 99 Months = Lifetime        Authorizing Provider's Phone: [phone]  Authorizing Provider:Pavan Durant MD  Authorizing Provider's NPI: [id number]  Order Entered By: Pavan Durant MD 6/8/2022  9:21 AM     Electronically signed by: Pavan Durant MD 6/8/2022  9:21 AM     Reanna Gibson, RN    Registered Nurse      Nursing Note       Signed   Date of Service:  06/08/22 1026   Creation Time:  06/08/22 1026              Signed              Show:Clear all  [x]Manual[]Template[]Copied    Added by:  [x]Reanna Gibson, RN      []Hover for details    Pt's O2 sat dropped to 87 while ambulating. O2 sat 94% when placed on 2L NC.

## 2022-06-08 NOTE — PLAN OF CARE
Goal Outcome Evaluation:  Plan of Care Reviewed With: patient        Progress: no change  Outcome Evaluation: Pt resting in bed. Complains of back pain, prn med given. VSS

## 2022-06-08 NOTE — DISCHARGE SUMMARY
[facility] DISCHARGE SUMMARY    Patient Identification:  Name:  Sean Chen  Age:  81 y.o.  Sex:  female  :  1940  MRN:  [id number]  Visit Number:  [id number]    Date of Admission: 2022  Date of Discharge:  2022     PCP: Ganga Gallardo MD    DISCHARGE DIAGNOSIS  Sepsis - Resolved   Acute Metabolic Encephalopathy - Resolved   Acute Hypoxic Respiratory Failure - Stable   Pneumonia, Bacterial, treating for MDR Organisms/Aspiration - Resolved  Parainfluenza Virus - Resolved   Immunocompromised stated from underlying Rheumatoid Arthritis  Nonoliguric Acute Kidney Injury on Chronic Kidney Disease now ESRD with remote history Acute Kidney Injury requiring iHD in setting of mildly elevated cANCA complicated by proteinuria and mild hematuria - Stable   Acute on Chronic HFpEF, diastolic dysfunction complicated by edema and pleural effusions now with noted reduced EF - Acute Resolved   Acute Mod Hypokalemia - Resolved   Borderline Hypomagnesemia - Resolved   Acute Hypervolemic Hyponatremia - Improved   Hypertension  Hyperlipidemia  Coronary Artery Disease  Severe Pulmonary Hypertension  Small Pericardial Effusion  Valvular Disease - Mild AS, Mild MR, mod TR  Elevated Troponins, suspected NSTEMI Type II  Prolonged Qtc (566ms)  Non-Insulin Dependent Diabetes Mellitus Type II, controlled, unknown complications  Anxiety  Depression  Gastroesophageal Reflux Disease  Dementia  Debility  Mod Malnutrition    CONSULTS   Nephrology  Cardiology   Psychiatry   Neurology   Palliative Care     PROCEDURES PERFORMED  None     HOSPITAL COURSE  81F PMH GERD, Meniere's Disease, Chronic Anemia, Progressive Dementia, Hypertension, Hyperlipidemia, Chronic Kidney Disease, Diabetes Mellitus Type II, Rheumatoid Arthritis, severe Pulmonary Hypertension, recently admitted -2022 for NSTEMI and non-obstructive Coronary Artery Disease on [facility], also notable Acute Kidney Injury on Chronic  Kidney Disease with iHD, presented 5/23 with complaints of shortness of breath.      #Sepsis, Acute Metabolic Encephalopathy & Acute Hypoxic Respiratory Failure due to Pneumonia, Bacterial, treating for MDR Organisms/Aspiration & Parainfluenza Virus in setting of Immunocompromised stated from underlying Rheumatoid Arthritis  Patient presented after recent hospitalization with increased shortness of breath and new oxygen requirement.  Patient meets SIRS criteria due to respiratory rate > 20, WBC count > 12K, noted to have mental status change from baseline.  Admission labs showed WBC count 15K, CRP 3.62, procalcitonin 0.89, lactate 1.7, Coronavirus/flu negative, MRSA negative, ABG with P02 down to 37.5, blood cultures negative to date, strep pneumo negative, viral respiratory panel + for parainfluenza virus. CT Head showed no acute intracranial abnormality. CT Chest showed RML and RLL airspace consolidative disease.  Has completed 7 days Aztreonam and Flagyl.  SLP consulted evaluated with diet recommendations 5/24, no noted signs and symptoms aspiration though noted mild oral weakness felt due to fatigue and medical condition.  Patient remained weak though family electing to take back home, have ordered follow up with PCP 1 week, have ordered home health, home 02, patient's PCP has ordered hospital bed and other DME already.    #Nonoliguric Acute Kidney Injury on Chronic Kidney Disease vs ESRD with remote history Acute Kidney Injury requiring iHD in setting of mildly elevated cANCA complicated by proteinuria and mild hematuria - Improved   Last admission creatinine was up to 5.67, improved to 1.74 at time of discharge, had elevated lambda/kappa, this admission up to 3.85, had started on hemodialysis last admission 5/10. Renal ultrasound showed concern for dense vascular calcifications or non-obstructing stones but no evidence of hydronephrosis was seen.  Nephrology consulted and followed, per conversations with   Wanda will continue outpatient hemodialysis upon discharge.    #Hypertension/Hyperlipidemia/Coronary Artery Disease  #Acute on Chronic HFpEF, diastolic dysfunction complicated by edema and pleural effusions now with noted reduced EF   #Severe Pulmonary Hypertension  #Small Pericardial Effusion  #Valvular Disease - Mild AS, Mild MR, mod TR  #Elevated Troponins, suspected NSTEMI Type II  #Prolonged Qtc (566ms)  Labs showed Troponins 0.06->0.04, proBNP > 70K. EKG showed normal sinus rhythm, intermittent PAC's, RBBB, Non-specific ST/T wave abnormality. Echocardiogram 5/5 showed LVEF 66-70%, mild concentric hypertrophy, diastolic dysfunction, dilated left atrium, mild AS, mild MR, mod TR, severe Pulmonary Hypertension, RVSP > 55mmHg, small <1cm pericardial effusion; Repeat limited echocardiogram showed borderline dilated left ventricle, LVEF 36-40%, diastolic dysfunction.  LHC 5/11 showed no-obstructive Coronary Artery Disease.  CT Chest showed Congestive Heart Failure/edema with R>L pleural effusion, cardiomegaly, severe coronary calcifications.  Cardiology consulted and followed, recommended goal directed medical therapy, continue home Aspirin 81, statin, titrated home Nifedipine dose, added new Coreg, not a candidate for ACEI/ARB/ARNI due to renal failure. Follow up Cardiology outpatient 4 weeks.    #Non-Insulin Dependent Diabetes Mellitus Type II, controlled, unknown complications  Hgb A1c = 5.5%.  No home oral agents, treated FSBG and SSI, did not require long acting insulin.    #Electrolyte Abnormalities  Acute Mod Hypokalemia - potassium down to 2.8, Replacing per protocol - Resolved   Borderline Hypomagnesemia - magnesium down to 1.7, Replaced per protocol - Resolved   Acute Hypervolemic Hyponatremia - sodium improved, held home Paxil at discharge    #Anxiety/Depression  Continued home regimen    #Gastroesophageal Reflux Disease  No home PPI, monitored symptoms    #Dementia  CT showed generalized cerebral atrophy  and nonspecific periventricular hypodensities thought to represent microvascular changes. Supportive care.    #Debility  Consulted PT/OT    #Mod Malnutrition  Nutrition consulted and followed     Edited by: Pavan Durant MD at 6/8/2022 0930    VITAL SIGNS:  Temp:  [97.5 °F (36.4 °C)-97.8 °F (36.6 °C)] 97.5 °F (36.4 °C)  Heart Rate:  [] 66  Resp:  [18-20] 18  BP: (147-185)/(43-94) 180/85  SpO2:  [94 %-96 %] 94 %  on  Flow (L/min):  [2] 2;   Device (Oxygen Therapy): room air    Body mass index is 21.55 kg/m².  Wt Readings from Last 3 Encounters:   05/31/22 57 kg (125 lb 9.6 oz)   05/13/22 67.1 kg (147 lb 14.4 oz)   01/27/22 71.2 kg (157 lb)     PHYSICAL EXAM:  Constitutional:  Elderly, No acute distress.      HENT:  Head:  Normocephalic and atraumatic.  Mouth:  Moist mucous membranes.    Eyes:  Conjunctivae and EOM are normal. No scleral icterus.    Neck:  Neck supple.  No JVD present.    Cardiovascular:  regular rate, regular rhythm and normal heart sounds with no murmur.  Pulmonary/Chest:  No respiratory distress, no wheezes, on 2LNC  Abdominal:  Soft. No distension and no tenderness.   Musculoskeletal:  No tenderness, and no deformity.    Neurological:  Alert and oriented to person, place, and time.  No gross focal deficits   Skin:  Skin is warm and dry. No rash noted. No pallor.   Peripheral vascular:  No clubbing, no cyanosis, no edema.  Psychiatric: Appropriate mood and affect    *Examination stable today 6/8    Edited by: Pavan Durant MD at 6/8/2022 0919    DISCHARGE DISPOSITION   Stable    DISCHARGE MEDICATIONS:     Discharge Medications      New Medications      Instructions Start Date   carvedilol 6.25 MG tablet  Commonly known as: COREG   6.25 mg, Oral, 2 Times Daily With Meals      lidocaine 5 %  Commonly known as: LIDODERM   2 patches, Transdermal, Every 24 Hours Scheduled, Remove & Discard patch within 12 hours or as directed by MD   Start Date: June 9, 2022        Changes to Medications       Instructions Start Date   NIFEdipine CC 30 MG 24 hr tablet  Commonly known as: ADALAT CC  What changed: how much to take   60 mg, Oral, Every 24 Hours Scheduled   Start Date: June 9, 2022        Continue These Medications      Instructions Start Date   albuterol sulfate  (90 Base) MCG/ACT inhaler  Commonly known as: ProAir HFA   2 puffs, Inhalation, 4 Times Daily      aspirin 81 MG EC tablet   81 mg, Oral, Daily      atorvastatin 80 MG tablet  Commonly known as: LIPITOR   80 mg, Oral, Nightly      calcium acetate 667 MG capsule capsule  Commonly known as: PHOS BINDER)   1,334 mg, Oral, 3 Times Daily With Meals      folic acid 1 MG tablet  Commonly known as: FOLVITE   1 mg, Oral, Daily         Stop These Medications    PARoxetine 10 MG tablet  Commonly known as: PAXIL            Activity Instructions     Activity as Tolerated          Additional Instructions for the Follow-ups that You Need to Schedule     Ambulatory Referral to Home Health   As directed      Face to Face Visit Date: 6/8/2022    Follow-up provider for Plan of Care?: I treated the patient in an acute care facility and will not continue treatment after discharge.    Follow-up provider: GANGA GREER [[id number]]    Reason/Clinical Findings: ESRD, CHF, PNA    Describe mobility limitations that make leaving home difficult: debility    Nursing/Therapeutic Services Requested: Skilled Nursing Physical Therapy    Skilled nursing orders: CHF management Cardiopulmonary assessments    PT orders: Therapeutic exercise Strengthening    Frequency: 1 Week 1         Discharge Follow-up with PCP   As directed       Currently Documented PCP:    Ganga Greer MD    PCP Phone Number:    [phone]     Follow Up Details: 1 week post hospital discharge         Discharge Follow-up with Specified Provider: Cardiology 4 weeks   As directed      To: Cardiology 4 weeks         Discharge Follow-up with Specified Provider: Nephrology 2 weeks, continue home  dialysis schedule at [place]   As directed      To: Nephrology 2 weeks, continue home dialysis schedule at [place]            Follow-up Information     Ganga Gallardo MD .    Specialty: Family Medicine  Why: 1 week post hospital discharge  Contact information:  [address] [facility] [id number] [phone]                        TEST  RESULTS PENDING AT DISCHARGE  None     CODE STATUS  Code Status and Medical Interventions:   Ordered at: 05/31/22 1520     Medical Intervention Limits:    NO intubation (DNI)     Level Of Support Discussed With:    Patient    Next of Kin (If No Surrogate)     Code Status (Patient has no pulse and is not breathing):    No CPR (Do Not Attempt to Resuscitate)     Medical Interventions (Patient has pulse or is breathing):    Limited Support     Comments:    patient son and  agree no CPR or vent       The ASCVD Risk score ([place] DC Jr., et al., 2013) failed to calculate for the following reasons:    The 2013 ASCVD risk score is only valid for ages 40 to 79    The patient has a prior MI or stroke diagnosis     Pavan Durant MD  [facility] Hospital[facility]  06/08/22  09:30 EDT    Please note that this discharge summary required more than 30 minutes to complete.

## 2022-06-08 NOTE — CASE MANAGEMENT/SOCIAL WORK
Discharge Planning Assessment  CHICHO Enriquez     Patient Name: Sean Chen  MRN: [id number]  Today's Date: 6/8/2022    Admit Date: 5/23/2022           Discharge Plan     Row Name 06/08/22 1010       Plan    Final Discharge Disposition Code 06 - home with home health care    Final Note Pt is being discharged home with Physician ordered home health services. Pt and Pt's son are aware and agreeable for discharge. Pt's son did not have a preference of home health agency.  faxed  referral to [facility] fax [phone]. Pt's family to provide transportation.                Kavitha Gatica

## 2022-06-08 NOTE — OUTREACH NOTE
Prep Survey    Flowsheet Row Responses   Worship facility patient discharged from? [facility]   Is LACE score < 7 ? No   Emergency Room discharge w/ pulse ox? No   Eligibility [facility]   Date of Admission 05/23/22   Date of Discharge 06/08/22   Discharge Disposition Home or Self Care   Discharge diagnosis Acute hypoxemic respiratory failure Sepsis - Dementia Pneumonia  Acute Kidney Injury    Does the patient have one of the following disease processes/diagnoses(primary or secondary)? COPD/Pneumonia   Does the patient have Home health ordered? Yes   What is the Home health agency?  [facility]    Is there a DME ordered? Yes   What DME was ordered?  portable tank & concentrator to home today  [facility]   Prep survey completed? Yes          SYMONE DUFFY - Registered Nurse

## 2022-06-08 NOTE — DISCHARGE PLACEMENT REQUEST
"Aide Chen (81 y.o. Female)             Date of Birth   1940    Social Security Number       Address   [address] [place] [address]    Home Phone   [phone]    MRN   [id number]       Baptism   Unknown    Marital Status                               Admission Date   5/23/22    Admission Type   Emergency    Admitting Provider   Pavan Durant MD    Attending Provider   Pavan Durant MD    Department, Room/Bed   [address], [facility], [facility]/       Discharge Date       Discharge Disposition   Home or Self Care    Discharge Destination                               Attending Provider: Pavan Durant MD    Allergies: Keflex [Cephalexin], Lodine [Etodolac], Penicillins, Sulfa Antibiotics    Isolation: None   Infection: None   Code Status: No CPR   Advance Care Planning Activity    Ht: 162.6 cm (64.02\")   Wt: 57 kg (125 lb 9.6 oz)    Admission Cmt: None   Principal Problem: Acute hypoxemic respiratory failure (HCC) [J96.01]                 Active Insurance as of 5/23/2022     Primary Coverage     Payor Plan Insurance Group Employer/Plan Group    HUMANA MEDICARE REPLACEMENT HUMANA MEDICARE REPLACEMENT [id number]     Payor Plan Address Payor Plan Phone Number Payor Plan Fax Number Effective Dates    [address] [address] [phone]  1/1/2021 - None Entered    [facility] [phone]       Subscriber Name Subscriber Birth Date Member ID       AIDE CHEN 1940 [id number]           Secondary Coverage     Payor Plan Insurance Group Employer/Plan Group    [place]      Payor Plan Address Payor Plan Phone Number Payor Plan Fax Number Effective Dates    [address]   1/1/2014 - None Entered    [facility] [phone]       Subscriber Name Subscriber Birth Date Member ID       AIDE CHEN 1940 [id number]                 Emergency Contacts      (Rel.) Home Phone Work Phone Mobile Phone    Dylon Chen Jr (Son) [phone] -- " [phone]    enmadeysi -- -- [phone]    Dylon Chen Sr (Spouse) [phone] -- --    Dylon Chen (Grandchild) -- -- [phone]        [address] [place] [address]  Phone:  [phone]  Fax:  [phone] Date: 2022      Ambulatory Referral to Home Health     Patient:  Sean Chen MRN:  [id number]   [address]   [place] [address] :  1940  SSN:    Phone: [phone] Sex:  F      INSURANCE PAYOR PLAN GROUP # SUBSCRIBER ID   Primary:  Secondary:    HUMANA MEDICARE REPLACEMENT  [employer] [place] [id number]  [id number]    [id number]  [id number]      Referring Provider Information:  THADDEUS PERDOMO Phone: [phone] Fax: [phone]       Referral Information:   # Visits:  999 Referral Type: Home Health [42]   Urgency:  Routine Referral Reason: Specialty Services Required   Start Date: 2022 End Date:  To be determined by Insurer   Diagnosis: Acute hypoxemic respiratory failure (HCC) (J96.01 [ICD-10-CM] 518.81 [ICD-9-CM])      Refer to Dept:   Refer to Provider:   Refer to Provider Phone:   Refer to Facility:       Face to Face Visit Date: 2022  Follow-up provider for Plan of Care? I treated the patient in an acute care facility and will not continue treatment after discharge.  Follow-up provider: SARAN GREER [[id number]]  Reason/Clinical Findings: ESRD, CHF, PNA  Describe mobility limitations that make leaving home difficult: debility  Nursing/Therapeutic Services Requested: Skilled Nursing  Nursing/Therapeutic Services Requested: Physical Therapy  Skilled nursing orders: CHF management  Skilled nursing orders: Cardiopulmonary assessments  PT orders: Therapeutic exercise  PT orders: Strengthening  Frequency: 1 Week 1     This document serves as a request of services and does not constitute Insurance authorization or approval of services.  To determine eligibility, please contact the members Insurance carrier  to verify and review coverage.     If you have medical questions regarding this request for services. Please contact [address] at [phone] during normal business hours.        Authorizing Provider:Pavan Durant MD  Authorizing Provider's NPI: [id number]  Order Entered By: Pavan Durant MD 2022  9:21 AM     Electronically signed by: Pavan Durant MD 2022  9:21 AM            History & Physical      Venus Mcdonnell PA at 22 1629     Attestation signed by Pavan Durant MD at 22 8127    I have evaluated the patient independently, I have reviewed H&P, all labs and images from today and evaluated the patient at bedside.  I have discussed with JIE Mcdonnell and agree.  Hold fluids, continue antibiotics, follow up cultures, follow up Nephrology recommendations and need for hemodialysis, weaning 02 as able, follow up labs and clinical status in AM.                         [facility] HOSPITALIST HISTORY AND PHYSICAL    Patient Identification:  Name:  Sean Chen  Age:  81 y.o.  Sex:  female  :  1940  MRN:  [id number]   Visit Number:  [id number]  Admit Date: 2022   Primary Care Physician:  Ganga Gallardo MD     2022 11:09 AM    Subjective     Chief complaint:   Chief Complaint   Patient presents with   • Shortness of Breath     History of presenting illness:   Sean Chen is a 81 y.o. female with past medical history significant for progressive dementia, CKD, type 2 diabetes mellitus, HTN, HLD, chronic anemia, rheumatoid arthritis, severe pulmonary hypertension, Meniere's disease and GERD. She presented to the emergency department of [facility] on 2022 with complaints of shortness of breath.     The patient was just recently admitted to our facility from 2022-2022 with acute NSTEMI with reported non-obstructive CAD on [facility]. She was also noted to have intermittent bradycardia felt to be second to  medications, PRITI on worsening CKD HD catheter placement and initiation of hemodialysis, and a <1cm pericardial effusion noted on echocardiogram.  She was discharged home under the care of her family.  She was to follow-up with her PCP, cardiologist, and nephrology as an outpatient.  Vasculitis work-up was pending at the time of discharge.     She presented back to the ED today with increased shortness of breath which has been gradually worsening since her recent discharge.  She has had cough with intermittent sputum production.  No fever or chills. No chest pains or pressure. No increase in edema of the lower extremities.  She has had worsening generalized weakness and decreased p.o. intake.  When she eats, she gets sick to her stomach and at times does have episodes of vomiting.  With vomiting, she has had choking spells.  She also feels like she gets choked when she eats at times.  She underwent HD on 5/14, 5/16, and 5/18.  She went back for another session on 5/20, however, due to her feeling poorly she did not have HD that day.  She went back for HD this morning and was noted to have decreased O2 saturations with dyspnea noted.  She was also reported to be pale.  She was sent to the ED for evaluation.    Upon arrival to the ED, vitals were /77, respirations 28, heart rate 79, SPO2 79% on room air, afebrile.  ABG on room air showed hypoxia with PO2 of 37.5 with arterial O2 saturation of 71%.  Sodium 127 with glucose of 279.  Potassium 2.8.  Creatinine 3.52 with BUN of 33.  Lactic acid within normal limits.  CRP 3.62.  Procalcitonin level elevated 0.89.  Troponin 0.604 and 0.611 consecutively.  proBNP >70,000.  WBC count 15.63.  UA shows trace of bacteria with only 0-2 WBCs and negative nitrite.  There is 2+ glucose and 3+ protein appreciated.  Urine toxicology screen is negative.  CT chest without contrast shows CHF/edema with right greater than left small pleural effusions.  Right middle lobe and right  lower lobe consolidative airspace disease most consistent with pneumonia.  Cardiomegaly and severe coronary artery calcifications appreciated.    In the ED, she received treatment with IV vancomycin, aztreonam, the prednisone is 125 mg x 1, and potassium chloride 60 mEq p.o. x1. Patient has been admitted to the progressive care unit of [facility] for further evaluation and therapy.   ---------------------------------------------------------------------------------------------------------------------   Review of Systems   Constitutional: Positive for activity change, appetite change and fatigue. Negative for chills, diaphoresis and fever.   HENT: Negative for congestion, rhinorrhea, sinus pressure and sinus pain.    Respiratory: Positive for cough, choking and shortness of breath. Negative for chest tightness and wheezing.    Cardiovascular: Negative for chest pain and leg swelling.   Gastrointestinal: Positive for nausea. Negative for abdominal pain, anal bleeding, blood in stool, constipation and diarrhea.   Genitourinary: Negative for dysuria and hematuria.   Musculoskeletal: Positive for arthralgias, gait problem (Weak, SOA with walking. ) and joint swelling (Chronic with RA. ).   Skin: Positive for color change and pallor. Negative for rash.   Psychiatric/Behavioral: Positive for confusion (At times with known dementia. Always confused to year. ). Negative for agitation and behavioral problems.      ---------------------------------------------------------------------------------------------------------------------   Past Medical History:   Diagnosis Date   • Allergic rhinitis    • Elevated liver enzymes    • Extrinsic asthma    • Gastritis    • GERD (gastroesophageal reflux disease)    • Hyperlipidemia    • Hypertension    • Iron deficiency anemia    • Meniere's disease    • Osteoarthritis    • Type 2 diabetes mellitus (HCC)    • Vitamin D deficiency disease      Past Surgical History:   Procedure  Laterality Date   • APPENDECTOMY     • CHOLECYSTECTOMY     • HYSTERECTOMY     • INSERTION HEMODIALYSIS CATHETER N/A 5/9/2022    Procedure: HEMODIALYSIS CATHETER INSERTION;  Surgeon: Hans Coates MD;  Location: [facility];  Service: General;  Laterality: N/A;     No family history on file.  Social History     Socioeconomic History   • Marital status:      Spouse name: moshe   • Number of children: 1   • Years of education: 8   Tobacco Use   • Smoking status: Never Smoker   • Smokeless tobacco: Never Used   Substance and Sexual Activity   • Alcohol use: No   • Drug use: No   • Sexual activity: Defer     ---------------------------------------------------------------------------------------------------------------------   Allergies:  Keflex [cephalexin], Lodine [etodolac], Penicillins, and Sulfa antibiotics  ---------------------------------------------------------------------------------------------------------------------   Prior to Admission Medications     Prescriptions Last Dose Informant Patient Reported? Taking?    albuterol sulfate HFA (ProAir HFA) 108 (90 Base) MCG/ACT inhaler  Pharmacy No No    Inhale 2 puffs 4 (Four) Times a Day.    aspirin 81 MG EC tablet   No No    Take 1 tablet by mouth Daily.    atorvastatin (LIPITOR) 80 MG tablet  Pharmacy No No    Take 1 tablet by mouth Every Night.    calcium acetate (PHOS BINDER,) 667 MG capsule capsule   No No    Take 2 capsules by mouth 3 (Three) Times a Day With Meals.    folic acid (FOLVITE) 1 MG tablet   No No    Take 1 tablet by mouth Daily.    NIFEdipine CC (ADALAT CC) 30 MG 24 hr tablet   No No    Take 3 tablets by mouth Daily.    PARoxetine (PAXIL) 10 MG tablet  Pharmacy No No    Take 1 tablet by mouth Every Morning.        ---------------------------------------------------------------------------------------------------------------------  Objective   Hospital Scheduled Meds:  potassium chloride, 60 mEq, Oral, Once      sodium chloride, 250  mL/hr, Last Rate: 250 mL/hr (22 1605)      ---------------------------------------------------------------------------------------------------------------------   Vital Signs:  Temp:  [97.5 °F (36.4 °C)-97.8 °F (36.6 °C)] 97.8 °F (36.6 °C)  Heart Rate:  [70-86] 71  Resp:  [28] 28  BP: (131-149)/(72-83) 135/79  Mean Arterial Pressure (Non-Invasive) for the past 24 hrs (Last 3 readings):   Noninvasive MAP (mmHg)   22 1448 104   22 1403 104   22 1343 98     SpO2 Percentage    22 1343 22 1403 22 1448   SpO2: 93% 91% 92%     SpO2:  [79 %-95 %] 92 %  on   ;     Body mass index is 25.23 kg/m².  Wt Readings from Last 3 Encounters:   22 66.7 kg (147 lb)   22 67.1 kg (147 lb 14.4 oz)   22 71.2 kg (157 lb)     ---------------------------------------------------------------------------------------------------------------------   Physical Exam:  Constitutional:  Well-developed and well-nourished.  No respiratory distress on 3.5L NC. She appears pale.      HENT:  Head: Normocephalic and atraumatic.  Mouth:  Moist mucous membranes.    Eyes:  Conjunctivae and EOM are normal. No scleral icterus. No erythema or drainage.   Neck:  Neck supple.   Cardiovascular:  Normal rate, regular rhythm and normal heart sounds with no significant murmur appreciated.   Pulmonary/Chest:  No respiratory distress on NC. Few coarse breath sounds noted on the right. Reduced air movement at the bases bilaterally.  Hemodialysis catheter in right chest.  No surrounding erythema or edema.   Abdominal:  Soft.  Bowel sounds are present x4.  No distension and no tenderness.   Musculoskeletal:  No edema, no tenderness, and no deformity.  No red or swollen joints anywhere.    Neurological:  Alert and oriented to person, place, and . Confused to year (baseline). No tongue deviation.  No facial droop.  No slurred speech.   Skin:  Skin is warm and dry.  No rash noted. She appears pale.   Psychiatric:   Normal mood and affect.  Behavior is normal.  Judgment and thought content normal.   Peripheral vascular:  No edema and 2+ pedal pulses bilaterally.   Genitourinary: No triana catheter in place.  ---------------------------------------------------------------------------------------------------------------------  EKG:  Pending cardiology interpretation. Per my read, reveals sinus rhythm with intermittent PACs.  83 bpm.  RBBB.  Nonspecific ST/T wave abnormalities with some baseline artifact.  QTc 566 ms.    I have personally reviewed the EKG.  ---------------------------------------------------------------------------------------------------------------------   Results from last 7 days   Lab Units 05/23/22  1401 05/23/22  1119   CK TOTAL U/L  --  50   TROPONIN T ng/mL 0.611* 0.604*     Results from last 7 days   Lab Units 05/23/22  1119   PROBNP pg/mL >70,000.0*       Results from last 7 days   Lab Units 05/23/22  1117   PH, ARTERIAL pH units 7.450   PO2 ART mm Hg 37.5*   PCO2, ARTERIAL mm Hg 35.8   HCO3 ART mmol/L 24.9     Results from last 7 days   Lab Units 05/23/22  1119   CRP mg/dL 3.62*   LACTATE mmol/L 1.7   WBC 10*3/mm3 15.63*   HEMOGLOBIN g/dL 9.6*   HEMATOCRIT % 28.7*   MCV fL 85.9   MCHC g/dL 33.4   PLATELETS 10*3/mm3 330   INR  0.99     Results from last 7 days   Lab Units 05/23/22  1119   SODIUM mmol/L 127*   POTASSIUM mmol/L 2.8*   MAGNESIUM mg/dL 1.7   CHLORIDE mmol/L 91*   CO2 mmol/L 22.1   BUN mg/dL 33*   CREATININE mg/dL 3.52*   CALCIUM mg/dL 8.9   GLUCOSE mg/dL 279*   ALBUMIN g/dL 3.29*   BILIRUBIN mg/dL 0.5   ALK PHOS U/L 118*   AST (SGOT) U/L 21   ALT (SGPT) U/L 15   Estimated Creatinine Clearance: 12 mL/min (A) (by C-G formula based on SCr of 3.52 mg/dL (H)).  No results found for: AMMONIA    No results found for: HGBA1C, POCGLU  Lab Results   Component Value Date    HGBA1C 5.50 04/29/2022     Lab Results   Component Value Date    TSH 0.677 05/23/2022    FREET4 1.77 (H) 05/23/2022     No results  found for: BLOODCX  No results found for: URINECX  No results found for: WOUNDCX  No results found for: STOOLCX  No results found for: RESPCX  Pain Management Panel     Pain Management Panel Latest Ref Rng & Units 5/23/2022 5/5/2022    CREATININE UR mg/dL - 67.4    AMPHETAMINES SCREEN, URINE Negative Negative -    BARBITURATES SCREEN Negative Negative -    BENZODIAZEPINE SCREEN, URINE Negative Negative -    BUPRENORPHINEUR Negative Negative -    COCAINE SCREEN, URINE Negative Negative -    METHADONE SCREEN, URINE Negative Negative -    METHAMPHETAMINEUR Negative Negative -        I have personally reviewed the above laboratory results.   ---------------------------------------------------------------------------------------------------------------------  Imaging Results (Last 7 Days)     Procedure Component Value Units Date/Time    CT Chest Without Contrast Diagnostic [[id number]] Resulted: 05/23/22 1535     Updated: 05/23/22 1557    XR Chest 1 View [[id number]] Collected: 05/23/22 1210     Updated: 05/23/22 1213    Narrative:      EXAM:    XR Chest, 1 View     EXAM DATE:    5/23/2022 11:30 AM     CLINICAL HISTORY:    sob     TECHNIQUE:    Frontal view of the chest.     COMPARISON:    05/09/2022     FINDINGS:    Lungs:  Improvement in bilateral airspace disease likely improvement  in edema.  Evolving airspace disease of the right infrahilar aspect  possibly representing pneumonia.  Minimal left basilar airspace disease  noted.    Pleural space:  Trace effusions.  No pneumothorax.    Heart:  Unremarkable.  No cardiomegaly.    Mediastinum:  Unremarkable.    Bones/joints:  Bony structures are stable.    Tubes, lines and devices:  Right tunneled dialysis catheter  positioning is stable.       Impression:      1.  Development of right infrahilar airspace disease which may represent  pneumonia.  2.  Marked improvement in CHF/edema.  3.  Support devices as above.     This report was finalized on 5/23/2022 12:10 PM by   Mingo Cardenas MD.             ---------------------------------------------------------------------------------------------------------------------  Assessment & Plan      Acute Hospital Problems:    -Sepsis, present on admission, (RR >20, hypoxia, WBC 15.63) with acute hypoxic respiratory failure 2/2 right middle and lower lobe likely aspiration versus hospital-acquired pneumonia  -Recent nausea, vomiting with episodes of choking  -Dysphagia  · Blood cultures obtained x2 in the ED.  · Broad-spectrum antibiotic therapy with aztreonam, Flagyl, and vancomycin.  · Continue n.p.o. status for now and consult SLP.  · Obtain sputum cultures and sensitivity.  · Obtain MRSA PCR screen.  · Add scheduled duonebs every 6 hours.   · Mucinex BID.   · Wean O2 as tolerated to baseline of room air, currently maintaining O2 saturations on 3.5L NC.  · CBC in AM.    -Acute on chronic HFpEF  -Grade 2 diastolic dysfunction  -Severe pulmonary hypertension  -ESRD with recent initiation of HD  · ProBNP >70,000.  · Small bilateral pleural effusions noted on CT chest.  · Likely 2/2 having skipped HD sessions on 5/20 and today, 5/23, as the HD clinic felt she was too ill to undergo sessions on both days.   · Consult nephrology to resume HD, appreciate their input.    -NSTEMI, likely type II, 2/2 ESRD, however, increased compared to recent values  -Dyslipidemia   · Patient denies any recent chest pains or discomfort.  · Recent [facility] 5/11/2022 reported to have nonobstructive CAD, however, report appears to be incomplete at this time.  · Overall flat trend so far.  Continue to follow-up on serial troponins.  · Initial EKG shows sinus rhythm with intermittent PACs.  83 bpm.  RBBB.  Nonspecific ST/T wave abnormalities with some baseline artifact.  QTc 566 ms.  Follow-up on repeat.  · Recent TTE report reviewed most above, EF 66 to 70% with grade 2 diastolic dysfunction, mild to moderate valvular dysfunction, and severe pulmonary hypertension.   There was a small less than 1 cm pericardial effusion adjacent to the left ventricle.  · Monitor on telemetry.  · Resume home aspirin, statin.   · No beta-blocker secondary to recent reported bradycardia    -Prolonged QTC, 566ms   -Acute hypokalemia  -Borderline hypomagnesemia  · Baseline RBBB noted.   · Potassium borderline low and has been replaced in the ED.  · Magnesium borderline low, will add low dose supplementation.   · Repeat EKG pending.  · Monitor on telemetry.  · Avoid QT prolonging agents.    -Type 2 diabetes mellitus non-insulin-requiring, uncontrolled with hyperglycemia  · Patient will be n.p.o. status for now until evaluated by SLP.  · Recent hemoglobin A1c 5.5%.  · Monitor every 6 hour Accu-Cheks and if glucose remains elevated, will consider adding low-dose SSI as needed.    -Acute hyponatremia, likely hypervolemic  · Corrected sodium for hyperglycemia is 130-131.  · Nephrology consulted to reinitiate HD.  · Repeat BMP in AM.    -Chronic normocytic anemia   · H/H actually improved compared to recent discharge.  · No recent bleeding reported.  · Patient does appear pale on exam, monitor H/H closely.  · Repeat CBC in AM.    -Chronic, reportedly progressive dementia  -Generalized weakness/debility  -Poor p.o. intake  · Patient is currently oriented to person, place, .  Confused to year which is reported to be her baseline per her son at bedside.  He states she always believes that is the . Intermittent confusion reported at home.  · Patient is able to ambulate only a couple feet before becoming dyspneic and weak at this point.  · Reorient as needed.  Maintain day/night orientation as much as possible.  · Consult nutrition in AM.  · Consult PT/OT in AM.  · Consult case management to assist with discharge planning.     -DVT Prophylaxis  • Subcu heparin.    -Admission COVID-19 testing  • Negative.     Chronic Medical Problems:    · Recent bradycardia felt to be 2/2 medications: Monitor on  telemetry.  Avoid negative chronotropic medications.  · Rheumatoid arthritis: Does not appear to be on any DMARDs or chronic steroids from home  · GERD  · Ménière's disease    The patient is considered to be a high risk patient due to: pneumonia, respiratory failure.     Code Status: FULL CODE.     I have discussed the patient's assessment and plan with the patient and admitting physician, Dr. Durant.     Disposition: Patient lives at home with her , plans to return home on discharge.  Has support from her son next-door.    Expected length of stay: INPATIENT status due to the need for care which can only be reasonably provided in an hospital setting, such as aggressive/expedited ancillary services and/or consultation services, the necessity for IV medications, close physician monitoring and/or the possible need for procedures.  In such, I feel the patient’s risk for adverse outcomes and need for care warrant INPATIENT evaluation and predict the patient’s care encounter to likely last beyond 2 midnights.    Venus Mcdonnell PA-C  22  16:29 EDT  ---------------------------------------------------------------------------------------------------------------------       Electronically signed by Pavan Durant MD at 22 6972          Physician Progress Notes (most recent note)      Pavan Durant MD at 22 1205              [facility] ERIST PROGRESS NOTE     Patient Identification:  Name:  Sean Chen  Age:  81 y.o.  Sex:  female  :  1940  MRN:  [id number]  Visit Number:  [id number]  ROOM: [address] [place] [address]     Primary Care Provider:  Ganga Gallardo MD    Length of stay in inpatient status:  15    Subjective     Chief Compliant:    Chief Complaint   Patient presents with   • Shortness of Breath     History of Presenting Illness:    Patient remains ill but stable today, no acute events overnight, no new complaints, denies any fevers or chills, discussed case with  Nephrology today and plan or hemodialysis and to return home on hemodialysis, OK per nephro to discharge home later today, called to discuss with family but have not been able to reach son at home, will attempt again later today.  Objective     Current Hospital Meds:  aspirin, 81 mg, Oral, Daily  atorvastatin, 80 mg, Oral, Nightly  calcium acetate, 1,334 mg, Oral, TID With Meals  carvedilol, 6.25 mg, Oral, BID With Meals  folic acid, 1 mg, Oral, Daily  guaiFENesin, 600 mg, Oral, Q12H  heparin (porcine), 5,000 Units, Subcutaneous, Q8H  lidocaine, 2 patch, Transdermal, Q24H  NIFEdipine CC, 30 mg, Oral, Q24H  sodium chloride, 10 mL, Intravenous, Q12H  sodium zirconium cyclosilicate, 10 g, Oral, BID    ----------------------------------------------------------------------------------------------------------------------  Vital Signs:  Temp:  [97.4 °F (36.3 °C)-98 °F (36.7 °C)] 97.4 °F (36.3 °C)  Heart Rate:  [51-63] 56  Resp:  [16-20] 20  BP: ()/(44-98) 153/64  SpO2:  [94 %-99 %] 94 %  on  Flow (L/min):  [2] 2;   Device (Oxygen Therapy): room air  Body mass index is 21.55 kg/m².      Intake/Output Summary (Last 24 hours) at 6/7/2022 1205  Last data filed at 6/7/2022 0300  Gross per 24 hour   Intake 660 ml   Output --   Net 660 ml      ----------------------------------------------------------------------------------------------------------------------  Physical exam:  Constitutional:  Elderly, No acute distress.      HENT:  Head:  Normocephalic and atraumatic.  Mouth:  Moist mucous membranes.    Eyes:  Conjunctivae and EOM are normal. No scleral icterus.    Neck:  Neck supple.  No JVD present.    Cardiovascular:  regular rate, regular rhythm and normal heart sounds with no murmur.  Pulmonary/Chest:  No respiratory distress, no wheezes, on minimal NC now  Abdominal:  Soft. No distension and no tenderness.   Musculoskeletal:  No tenderness, and no deformity.    Neurological:  Alert and oriented to person, place, and  time.  No gross focal deficits   Skin:  Skin is warm and dry. No rash noted. No pallor.   Peripheral vascular:  No clubbing, no cyanosis, no edema.  Psychiatric: Appropriate mood and affect    *Examination stable today 6/7    Edited by: Pavan Durant MD at 6/6/2022 1256  ----------------------------------------------------------------------------------------------------------------------  CBC - WBC/Hgb/Hct/Plts: 10.43/7.5*/23.1*/183 (06/07 0024)  CHEM - BUN/Cr/glu/ALT/AST/amyl/lip:48*/2.84*/249*/--/--/--/-- (06/07 0024)  LYTES - Na/K/Cl/CO2: 125*/--/--/-- (06/07 0558)     No results found for: URINECX  No results found for: BLOODCX    I have personally looked at the labs and they are summarized above.  ----------------------------------------------------------------------------------------------------------------------  Detailed radiology reports for the last 24 hours:  No radiology results for the last day  Assessment & Plan    81F [facility] GERD, Meniere's Disease, Chronic Anemia, Progressive Dementia, Hypertension, Hyperlipidemia, Chronic Kidney Disease, Diabetes Mellitus Type II, Rheumatoid Arthritis, severe Pulmonary Hypertension, recently admitted 5/4-5/13/2022 for NSTEMI and non-obstructive Coronary Artery Disease on [facility], also notable Acute Kidney Injury on Chronic Kidney Disease with iHD, presented 5/23 with complaints of shortness of breath.      #Sepsis, Acute Metabolic Encephalopathy & Acute Hypoxic Respiratory Failure due to Pneumonia, Bacterial, treating for MDR Organisms/Aspiration & Parainfluenza Virus in setting of Immunocompromised stated from underlying Rheumatoid Arthritis  - Patient presented after recent hospitalization with increased shortness of breath and new oxygen requirement.  Patient meets SIRS criteria due to respiratory rate > 20, WBC count > 12K, noted to have mental status change from baseline.  Admission labs showed WBC count 15K, CRP 3.62, procalcitonin 0.89, lactate 1.7, Coronavirus/flu  negative, MRSA negative, ABG with P02 down to 37.5, blood cultures negative to date, strep pneumo negative, viral respiratory panel + for parainfluenza virus. CT Head showed no acute intracranial abnormality. CT Chest showed RML and RLL airspace consolidative disease  - Has completed 7 days Aztreonam and Flagyl  - SLP consulted; Evaluated with diet recommendations 5/24, no noted signs and symptoms aspiration though noted mild oral weakness felt due to fatigue and medical condition  - Continue APAP PRN for fevers, Guaifenesin for congestion, Duonebs as needed  - Monitor on telemetry, PT/OT consulted and following for weakness/debility, family looking at taking home    #Nonoliguric Acute Kidney Injury on Chronic Kidney Disease vs ESRD with remote history Acute Kidney Injury requiring iHD in setting of mildly elevated cANCA complicated by proteinuria and mild hematuria - Improved   - Last admission creatinine was up to 5.67, improved to 1.74 at time of discharge, had elevated lambda/kappa, this admission up to 3.85, had started on hemodialysis last admission 5/10; Today 2.84  - Renal ultrasound showed concern for dense vascular calcifications or non-obstructing stones but no evidence of hydronephrosis was seen  - Consult Nephrology; Following for hemodialysis needs, plan for dialysis today and continued dialysis at home    #Hypertension/Hyperlipidemia/Coronary Artery Disease  #Acute on Chronic HFpEF, diastolic dysfunction complicated by edema and pleural effusions now with noted reduced EF   #Severe Pulmonary Hypertension  #Small Pericardial Effusion  #Valvular Disease - Mild AS, Mild MR, mod TR  #Elevated Troponins, suspected NSTEMI Type II  #Prolonged Qtc (566ms)  - Labs showed Troponins 0.06->0.04, proBNP > 70K  - EKG showed normal sinus rhythm, intermittent PAC's, RBBB, Non-specific ST/T wave abnormality  - Echocardiogram 5/5 showed LVEF 66-70%, mild concentric hypertrophy, diastolic dysfunction, dilated left  atrium, mild AS, mild MR, mod TR, severe Pulmonary Hypertension, RVSP > 55mmHg, small <1cm pericardial effusion; Repeat limited echocardiogram showed borderline dilated left ventricle, LVEF 36-40%, diastolic dysfunction    - [facility] 5/11 showed no-obstructive Coronary Artery Disease   - CT Chest showed Congestive Heart Failure/edema with R>L pleural effusion, cardiomegaly, severe coronary calcifications  - Cardiology consulted; Following, recommended goal directed medical therapy, signed off  - Continue home Aspirin 81, Statin  - Continue home Nifedipine, titrate as indicated   - Continue new Coreg, titrate as indicated   - No home ACEI/ARB/ARNI due to renal failure  - Continue to monitor on telemetry, strict I/O's, trend heart rate and blood pressure    #Non-Insulin Dependent Diabetes Mellitus Type II, controlled, unknown complications  - Hgb A1c = 5.5%  - Holding home oral agents, continue FSBG and SSI, add long acting as indicated.    #Electrolyte Abnormalities  - Acute Mod Hypokalemia - potassium down to 2.8, Replacing per protocol - Resolved   - Borderline Hypomagnesemia - magnesium down to 1.7, Replaced per protocol - Resolved   - Acute Hypervolemic Hyponatremia - sodium remains low, holding home paxil, trending sodium daily    #Anxiety/Depression  - Continue home regimen    #Gastroesophageal Reflux Disease  - No home PPI, monitor symptoms    #Dementia  - CT showed generalized cerebral atrophy and nonspecific periventricular hypodensities thought to represent microvascular changes.   - Review home meds, supportive care    #Debility  - Consult PT/OT, Social Work if placement needed    #Mod Malnutrition  - Nutrition consulted and following        F: Oral  E: Monitor & Replace PRN  N: Diet Soft Texture; Chopped; Thin; Daily Fluid Restriction; Other; 800   Ppx: SQH  Code Status (Patient has no pulse and is not breathing): No CPR (Do Not Attempt to Resuscitate)  Medical Interventions (Patient has pulse or is  breathing): Limited Support  Medical Intervention Limits: NO intubation (DNI)       Dispo: Pending clinical improvement, PT/OT recommended inpatient rehab vs skilled nursing facility, family considering taking home soon though, per Nephrology will plan for continued hemodialysis at home.    *This patient is considered high risk due to sepsis, acute respiratory failure, Pneumonia, Acute Kidney Injury on Chronic Kidney Disease, Congestive Heart Failure exacerbation     Edited by: Pavan Durant MD at 6/7/2022 1205  [facility] Hospitalist        Electronically signed by Pavan Durant MD at 06/07/22 1206          Consult Notes (most recent note)      Edis Feng MD at 06/06/22 1300      Consult Orders    1. Inpatient Psychiatrist Consult [[id number]] ordered by Jude Muñoz MD at 06/06/22 0750                   Referring Provider: Dr. Muñoz  Reason for Consultation: Hyponatremia      Chief complaint/Focus of Exam: SSRI with Hyponatremia    Subjective .     History of present illness: Patient is an 81-year-old female with a history of dementia, hypertension, dyslipidemia, end-stage renal disease on hemodialysis who was admitted for hyponatremia and was found to have parainfluenza pneumonia.  Psychiatry was consulted due to the patient's significant hyponatremia and possible contribution of her antidepressant, Paxil.  Patient was somewhat of a poor historian and often got off topic but reported that she did not note any major mood or anxiety symptoms currently.  She was oriented to place and self but had some difficulty with situation as well as time.  She was resting comfortably.  She could not explain why Paxil was initially prescribed nor could she remember how long she had been on the medication.  She denied SI/HI/AVH.    Review of Systems  Pertinent items are noted in HPI    History  Past Medical History:   Diagnosis Date   • Allergic rhinitis    • Elevated liver enzymes    • Extrinsic asthma    •  Gastritis    • GERD (gastroesophageal reflux disease)    • Hyperlipidemia    • Hypertension    • Iron deficiency anemia    • Meniere's disease    • Osteoarthritis    • Type 2 diabetes mellitus (HCC)    • Vitamin D deficiency disease    ,   Past Surgical History:   Procedure Laterality Date   • APPENDECTOMY     • CARDIAC CATHETERIZATION N/A 5/11/2022    Procedure: Left Heart Cath;  Surgeon: Marcelino Grande MD;  Location:  [facility] CATH INVASIVE [facility];  Service: Cardiology;  Laterality: N/A;   • CHOLECYSTECTOMY     • HYSTERECTOMY     • INSERTION HEMODIALYSIS CATHETER N/A 5/9/2022    Procedure: HEMODIALYSIS CATHETER INSERTION;  Surgeon: Hans Coates MD;  Location:  [facility] OR;  Service: General;  Laterality: N/A;   , History reviewed. No pertinent family history.,   Social History     Socioeconomic History   • Marital status:      Spouse name: moshe   • Number of children: 1   • Years of education: 8   Tobacco Use   • Smoking status: Never Smoker   • Smokeless tobacco: Never Used   Substance and Sexual Activity   • Alcohol use: No   • Drug use: No   • Sexual activity: Defer     E-cigarette/Vaping     E-cigarette/Vaping Substances     E-cigarette/Vaping Devices       ,   Medications Prior to Admission   Medication Sig Dispense Refill Last Dose   • aspirin 81 MG EC tablet Take 1 tablet by mouth Daily. 30 tablet 0 5/23/2022 at Unknown time   • atorvastatin (LIPITOR) 80 MG tablet Take 1 tablet by mouth Every Night. 30 tablet 5 5/22/2022 at Unknown time   • calcium acetate (PHOS BINDER,) 667 MG capsule capsule Take 2 capsules by mouth 3 (Three) Times a Day With Meals. 180 capsule 0 5/23/2022 at Unknown time   • folic acid (FOLVITE) 1 MG tablet Take 1 tablet by mouth Daily. 30 tablet 0 5/23/2022 at Unknown time   • NIFEdipine CC (ADALAT CC) 30 MG 24 hr tablet Take 3 tablets by mouth Daily. 30 tablet 0 5/23/2022 at Unknown time   • PARoxetine (PAXIL) 10 MG tablet Take 1 tablet by mouth Every Morning. 30 tablet 5  5/23/2022 at Unknown time   • albuterol sulfate HFA (ProAir HFA) 108 (90 Base) MCG/ACT inhaler Inhale 2 puffs 4 (Four) Times a Day. 8.5 g 5 Unknown at Unknown time   , Scheduled Meds:  aspirin, 81 mg, Oral, Daily  atorvastatin, 80 mg, Oral, Nightly  calcium acetate, 1,334 mg, Oral, TID With Meals  carvedilol, 6.25 mg, Oral, BID With Meals  folic acid, 1 mg, Oral, Daily  guaiFENesin, 600 mg, Oral, Q12H  heparin (porcine), 5,000 Units, Subcutaneous, Q8H  lidocaine, 2 patch, Transdermal, Q24H  NIFEdipine CC, 30 mg, Oral, Q24H  PARoxetine, 10 mg, Oral, QAM  sodium chloride, 10 mL, Intravenous, Q12H  sodium zirconium cyclosilicate, 10 g, Oral, BID    , Continuous Infusions:   , PRN Meds:  •  acetaminophen  •  benzonatate  •  ipratropium-albuterol  •  magnesium sulfate **OR** magnesium sulfate **OR** magnesium sulfate  •  [COMPLETED] Insert peripheral IV **AND** sodium chloride  •  sodium chloride and Allergies:  Keflex [cephalexin], Lodine [etodolac], Penicillins, and Sulfa antibiotics    Objective     Vital Signs   Temp:  [97.4 °F (36.3 °C)-98.1 °F (36.7 °C)] 97.5 °F (36.4 °C)  Heart Rate:  [50-77] 71  Resp:  [18-22] 18  BP: (146-193)/(51-94) 146/67    Mental Status Exam:   Mental Status Exam:    Hygiene:   good  Cooperation:  Cooperative  Eye Contact:  Fair  Psychomotor Behavior:  Slow  Affect:  Full range  Hopelessness: Denies  Speech:  Normal  Thought Progress:  Tangential  Thought Content:  Mood congruent  Suicidal:  None  Homicidal:  None  Hallucinations:  Not demonstrated today  Delusion:  None  Memory:  Deficits  Orientation:  Person and Place  Reliability:  poor  Insight:  Poor  Judgement:  Fair  Impulse Control:  Fair    Results Review:   I reviewed the patient's new clinical results.  Lab Results (last 24 hours)     Procedure Component Value Units Date/Time    Sodium [[id number]] Collected: 06/06/22 1141    Specimen: Blood Updated: 06/06/22 1234    POC Glucose Once [[id number]]  (Abnormal) Collected: 06/06/22  1045    Specimen: Blood Updated: 06/06/22 1051     Glucose 282 mg/dL      Comment: Meter: [id number] : [id number] AMANDA BHATTI       POC Glucose Once [[id number]]  (Abnormal) Collected: 06/06/22 0607    Specimen: Blood Updated: 06/06/22 0623     Glucose 220 mg/dL      Comment: Meter: [id number] : [id number] mayne mary       Sodium [[id number]]  (Abnormal) Collected: 06/06/22 0536    Specimen: Blood Updated: 06/06/22 0558     Sodium 124 mmol/L     Comprehensive Metabolic Panel [[id number]]  (Abnormal) Collected: 06/05/22 2351    Specimen: Blood Updated: 06/06/22 0101     Glucose 289 mg/dL      BUN 41 mg/dL      Creatinine 2.47 mg/dL      Sodium 125 mmol/L      Potassium 5.4 mmol/L      Chloride 91 mmol/L      CO2 21.7 mmol/L      Calcium 9.2 mg/dL      Total Protein 6.0 g/dL      Albumin 2.95 g/dL      ALT (SGPT) 9 U/L      AST (SGOT) 14 U/L      Alkaline Phosphatase 143 U/L      Total Bilirubin 0.4 mg/dL      Globulin 3.1 gm/dL      A/G Ratio 1.0 g/dL      BUN/Creatinine Ratio 16.6     Anion Gap 12.3 mmol/L      eGFR 19.3 mL/min/1.73      Comment: National Kidney Foundation and American Society of Nephrology (ASN) Task Force recommended calculation based on the Chronic Kidney Disease Epidemiology Collaboration (CKD-EPI) equation refit without adjustment for race.       Narrative:      GFR Normal >60  Chronic Kidney Disease <60  Kidney Failure <15      C-reactive Protein [[id number]]  (Abnormal) Collected: 06/05/22 2351    Specimen: Blood Updated: 06/06/22 0101     C-Reactive Protein 1.49 mg/dL     CBC & Differential [[id number]]  (Abnormal) Collected: 06/05/22 2351    Specimen: Blood Updated: 06/06/22 0038    Narrative:      The following orders were created for panel order CBC & Differential.  Procedure                               Abnormality         Status                     ---------                               -----------         ------                     CBC Auto Differential[[id number]]         Abnormal            Final result                 Please view results for these tests on the individual orders.    CBC Auto Differential [[id number]]  (Abnormal) Collected: 06/05/22 2351    Specimen: Blood Updated: 06/06/22 0038     WBC 13.43 10*3/mm3      RBC 3.02 10*6/mm3      Hemoglobin 8.8 g/dL      Hematocrit 27.0 %      MCV 89.4 fL      MCH 29.1 pg      MCHC 32.6 g/dL      RDW 15.8 %      RDW-SD 52.2 fl      MPV 10.7 fL      Platelets 256 10*3/mm3      Neutrophil % 87.7 %      Lymphocyte % 5.6 %      Monocyte % 5.6 %      Eosinophil % 0.3 %      Basophil % 0.4 %      Immature Grans % 0.4 %      Neutrophils, Absolute 11.78 10*3/mm3      Lymphocytes, Absolute 0.75 10*3/mm3      Monocytes, Absolute 0.75 10*3/mm3      Eosinophils, Absolute 0.04 10*3/mm3      Basophils, Absolute 0.06 10*3/mm3      Immature Grans, Absolute 0.05 10*3/mm3      nRBC 0.0 /100 WBC     Urinalysis With Culture If Indicated - Urine, Random Void [[id number]]  (Abnormal) Collected: 06/05/22 2320    Specimen: Urine, Random Void Updated: 06/06/22 0012     Color, UA Yellow     Appearance, UA Clear     pH, UA 6.5     Specific Gravity, UA 1.023     Glucose, UA >=1000 mg/dL (3+)     Ketones, UA Negative     Bilirubin, UA Negative     Blood, UA Trace     Protein, UA >=300 mg/dL (3+)     Leuk Esterase, UA Negative     Nitrite, UA Negative     Urobilinogen, UA 0.2 E.U./dL    Narrative:      In absence of clinical symptoms, the presence of pyuria, bacteria, and/or nitrites on the urinalysis result does not correlate with infection.    Urinalysis, Microscopic Only - Urine, Random Void [[id number]]  (Abnormal) Collected: 06/05/22 2320    Specimen: Urine, Random Void Updated: 06/06/22 0012     RBC, UA 3-5 /HPF      WBC, UA 6-12 /HPF      Bacteria, UA Trace /HPF      Squamous Epithelial Cells, UA 3-6 /HPF      Yeast, UA       Moderate/2+ Budding Yeast w/Hyphae     /HPF     Hyaline Casts, UA 3-6 /LPF      Methodology Manual Light Microscopy    Urine Culture -  Urine, Urine, Random Void [[id number]] Collected: 06/05/22 2320    Specimen: Urine, Random Void Updated: 06/06/22 0012    Sodium [[id number]]  (Abnormal) Collected: 06/05/22 1741    Specimen: Blood Updated: 06/05/22 1804     Sodium 123 mmol/L     POC Glucose Once [[id number]]  (Abnormal) Collected: 06/05/22 1619    Specimen: Blood Updated: 06/05/22 1626     Glucose 345 mg/dL      Comment: Meter: [id number] : [id number] Nikolay KENNEDY           Imaging Results (Last 24 Hours)     ** No results found for the last 24 hours. **            Assessment & Plan     Hyponatremia  -Unclear history given by patient as to why Paxil was prescribed and she currently denies any major mood or anxiety symptoms.  Paxil is not ideal in the elderly due to significantly increased risk of hyponatremia as well as worsening outcomes with dementia when compared to other antidepressants due to it's anticholinergic properties.  Paxil's levels are relatively unchanged by hemodialysis due to being heavily protein bound and with patient's current estimated creatinine clearance, she likely has 4 times the normal level for someone on her dose.  -Recommend discontinuing Paxil, normally at this dose could likely just stop the medication, however, given her poor kidney function, she likely has a much larger level and therefore I would recommend tapering by half for a few days, then potentially every other day for a few days before stopping the medication to avoid discontinuation syndrome.   -Given her significant hyponatremia, unclear history for need of Paxil, and current mental state, I would recommend avoiding any other antidepressants at this time as they are cannot carry the risk of hyponatremia, though if an antidepressant is needed for mood or anxiety symptoms could consider mirtazapine at very low dose which is much less likely to cause hyponatremia than other antidepressants.    I discussed the patients findings and my recommendations with  patient    Edis Feng MD  22  13:01 EDT          Electronically signed by Edis Feng MD at 22 1313          Physical Therapy Notes (most recent note)      Yeimi Araiza PTA at 22 1446  Version 1 of 1         Acute Care - Physical Therapy Treatment Note   [facility]     Patient Name: Sean Chen  : 1940  MRN: [id number]  Today's Date: 2022   Onset of Illness/Injury or Date of Surgery: 22  Visit Dx:     ICD-10-CM ICD-9-CM   1. Acute respiratory failure with hypoxia (HCC)  J96.01 518.81     Patient Active Problem List   Diagnosis   • Iron deficiency anemia   • Type 2 diabetes mellitus without complication, without long-term current use of insulin (HCC)   • Vitamin D deficiency disease   • Extrinsic asthma   • Essential hypertension   • Mixed hyperlipidemia   • Generalized osteoarthritis   • Gastroesophageal reflux disease without esophagitis   • Meniere's disease   • Chronic seasonal allergic rhinitis   • Low serum vitamin B12   • Memory impairment   • Bradycardia   • Hypertensive urgency   • Dementia without behavioral disturbance (HCC)   • Hyponatremia   • NSTEMI (non-ST elevated myocardial infarction) (HCC)   • Prolonged Q-T interval on ECG   • Anemia requiring transfusions   • Acute hypoxemic respiratory failure (HCC)   • Moderate malnutrition (HCC)     Past Medical History:   Diagnosis Date   • Allergic rhinitis    • Elevated liver enzymes    • Extrinsic asthma    • Gastritis    • GERD (gastroesophageal reflux disease)    • Hyperlipidemia    • Hypertension    • Iron deficiency anemia    • Meniere's disease    • Osteoarthritis    • Type 2 diabetes mellitus (HCC)    • Vitamin D deficiency disease      Past Surgical History:   Procedure Laterality Date   • APPENDECTOMY     • CARDIAC CATHETERIZATION N/A 2022    Procedure: Left Heart Cath;  Surgeon: Marcelino Grande MD;  [facility];  Service: Cardiology;  Laterality: N/A;   •  CHOLECYSTECTOMY     • HYSTERECTOMY     • INSERTION HEMODIALYSIS CATHETER N/A 5/9/2022    Procedure: HEMODIALYSIS CATHETER INSERTION;  Surgeon: Hans Coates MD;  Location: [facility];  Service: General;  Laterality: N/A;     PT Assessment (last 12 hours)     PT Evaluation and Treatment     Row Name 06/07/22 1443          Physical Therapy Time and Intention    Subjective Information complains of;weakness  -HR     Document Type therapy note (daily note)  -HR     Mode of Treatment physical therapy  -HR     Patient Effort adequate  -HR     Comment Pt and RN Marni in agreement for PT on this date  -HR     Row Name 06/07/22 1443          General Information    Patient Profile Reviewed yes  -HR     Equipment Currently Used at Home crutches, axillary  -HR     Existing Precautions/Restrictions fall;oxygen therapy device and L/min  -HR     Limitations/Impairments safety/cognitive  -HR     Row Name 06/07/22 1443          Cognition    Affect/Mental Status (Cognition) confused;low arousal/lethargic;flat/blunted affect  -HR     Orientation Status (Cognition) oriented to;person  -HR     Follows Commands (Cognition) follows one-step commands  -HR     Personal Safety Interventions fall prevention program maintained;gait belt;nonskid shoes/slippers when out of bed  -HR     Row Name 06/07/22 1443          Bed Mobility    Bed Mobility bed mobility (all) activities  -HR     All Activities, Hill (Bed Mobility) nonverbal cues (demo/gesture);verbal cues;contact guard  -HR     Bed Mobility, Safety Issues decreased use of arms for pushing/pulling;decreased use of legs for bridging/pushing  -HR     Assistive Device (Bed Mobility) bed rails  -HR     Row Name 06/07/22 1443          Transfers    Transfers sit-stand transfer;stand-sit transfer;toilet transfer  -HR     Sit-Stand Hill (Transfers) contact guard;nonverbal cues (demo/gesture);verbal cues  -HR     Stand-Sit Hill (Transfers) contact guard;nonverbal cues  (demo/gesture);verbal cues  -HR     [place] Level (Toilet Transfer) minimum assist (75% patient effort);contact guard;verbal cues;nonverbal cues (demo/gesture)  -HR     Assistive Device (Toilet Transfer) --  HHA  -HR     Row Name 06/07/22 1443          Sit-Stand Transfer    Assistive Device (Sit-Stand Transfers) walker, front-wheeled  -HR     Row Name 06/07/22 1443          Stand-Sit Transfer    Assistive Device (Stand-Sit Transfers) walker, front-wheeled  -HR     Row Name 06/07/22 1443          Toilet Transfer    Type (Toilet Transfer) stand pivot/stand step  -HR     Row Name 06/07/22 1443          Gait/Stairs (Locomotion)    [place] Level (Gait) contact guard;nonverbal cues (demo/gesture);verbal cues  -HR     Assistive Device (Gait) walker, front-wheeled  -HR     Distance in Feet (Gait) 40'  -HR     Pattern (Gait) step-to  -HR     Deviations/Abnormal Patterns (Gait) gait speed decreased;weight shifting decreased  -HR     Row Name 06/07/22 1443          Motor Skills    Therapeutic Exercise other (see comments)  EOB: AP, march, LAQ  -HR     Row Name 06/07/22 1443          Coping    Observed Emotional State calm;cooperative  -HR     Verbalized Emotional State acceptance  -HR     Row Name 06/07/22 [facility]3          Plan of Care Review    Outcome Evaluation Pt is a little fatiqued on this date and walk 40' with RW CGA. BR transfer and EOB exercises until fatique.  -HR     Row Name 06/07/22 1443          Positioning and Restraints    Pre-Treatment Position in bed  -HR     Post Treatment Position bed  -HR     In Bed with nsg  -HR     Row Name 06/07/22 1443          Therapy Assessment/Plan (PT)    Rehab Potential (PT) good, to achieve stated therapy goals  -HR     Criteria for Skilled Interventions Met (PT) yes;skilled treatment is necessary  -HR     Therapy Frequency (PT) 2 times/wk  2-5 times/wk as available  -HR     Activity Limitations Related to Problem List (PT) unable to ambulate safely;unable to transfer  safely  -HR     Row Name 06/07/22 1443          Physical Therapy Goals    Bed Mobility Goal Selection (PT) bed mobility, PT goal 1  -HR     Transfer Goal Selection (PT) transfer, PT goal 1  -HR     Gait Training Goal Selection (PT) gait training, PT goal 1  -HR     Row Name 06/07/22 1443          Bed Mobility Goal 1 (PT)    Activity/Assistive Device (Bed Mobility Goal 1, PT) bed mobility activities, all  -HR     [place] Level/Cues Needed (Bed Mobility Goal 1, PT) contact guard required  -HR     Time Frame (Bed Mobility Goal 1, PT) by discharge  -HR     Row Name 06/07/22 1443          Transfer Goal 1 (PT)    Activity/Assistive Device (Transfer Goal 1, PT) sit-to-stand/stand-to-sit;bed-to-chair/chair-to-bed  -HR     [place] Level/Cues Needed (Transfer Goal 1, PT) contact guard required  -HR     Time Frame (Transfer Goal 1, PT) by discharge  -HR     Row Name 06/07/22 1443          Gait Training Goal 1 (PT)    Activity/Assistive Device (Gait Training Goal 1, PT) gait (walking locomotion);assistive device use  -HR     [place] Level (Gait Training Goal 1, PT) contact guard required  -HR     Distance (Gait Training Goal 1, PT) 10  -HR     Time Frame (Gait Training Goal 1, PT) by discharge  -HR           User Key  (r) = Recorded By, (t) = Taken By, (c) = Cosigned By    Initials Name Provider Type    HR Yeimi Araiza PTA Physical Therapist Assistant                  PT Recommendation and Plan  Anticipated Discharge Disposition (PT): inpatient rehabilitation facility, skilled nursing facility  Therapy Frequency (PT): 2 times/wk (2-5 times/wk as available)  Outcome Evaluation: Pt is a little fatiqued on this date and walk 40' with RW CGA. BR transfer and EOB exercises until fatique.       Time Calculation:    PT Charges     Row Name 06/07/22 1445             Time Calculation    PT Received On 06/07/22  -HR              Time Calculation- PT    Total Timed Code Minutes- PT 24 minute(s)  -HR            User Key   (r) = Recorded By, (t) = Taken By, (c) = Cosigned By    Initials Name Provider Type    HR Yeimi Araiza PTA Physical Therapist Assistant              Therapy Charges for Today     Code Description Service Date Service Provider Modifiers Qty    96788533273 HC GAIT TRAINING EA 15 MIN 2022 Yeimi Araiza, KAHLIL GP, CQ 1    64704837270 HC PT THER PROC EA 15 MIN 2022 Yeimi Araiza PTA GP, CQ 1    75574292630 HC GAIT TRAINING EA 15 MIN 2022 Yeimi Araiza PTA GP, CQ 1    72921154072 HC PT THER PROC EA 15 MIN 2022 Yeimi Araiza, KAHLIL GP, CQ 1               Yeimi Araiza PTA  2022      Electronically signed by Yeimi Araiza PTA at 22 1446          Occupational Therapy Notes (most recent note)      Agnes Freeman, OT at 22 1259          Acute Care - Occupational Therapy Treatment Note  CHICHO Enriquez     Patient Name: Sean Chen  : 1940  MRN: [id number]  Today's Date: 6/3/2022  Onset of Illness/Injury or Date of Surgery: 22     Referring Physician: Kecia    Admit Date: 2022       ICD-10-CM ICD-9-CM   1. Acute respiratory failure with hypoxia (HCC)  J96.01 518.81     Patient Active Problem List   Diagnosis   • Iron deficiency anemia   • Type 2 diabetes mellitus without complication, without long-term current use of insulin (HCC)   • Vitamin D deficiency disease   • Extrinsic asthma   • Essential hypertension   • Mixed hyperlipidemia   • Generalized osteoarthritis   • Gastroesophageal reflux disease without esophagitis   • Meniere's disease   • Chronic seasonal allergic rhinitis   • Low serum vitamin B12   • Memory impairment   • Bradycardia   • Hypertensive urgency   • Dementia without behavioral disturbance (HCC)   • Hyponatremia   • NSTEMI (non-ST elevated myocardial infarction) (HCC)   • Prolonged Q-T interval on ECG   • Anemia requiring transfusions   • Acute hypoxemic respiratory failure (HCC)   • Moderate malnutrition (HCC)     Past Medical History:   Diagnosis Date   •  Allergic rhinitis    • Elevated liver enzymes    • Extrinsic asthma    • Gastritis    • GERD (gastroesophageal reflux disease)    • Hyperlipidemia    • Hypertension    • Iron deficiency anemia    • Meniere's disease    • Osteoarthritis    • Type 2 diabetes mellitus (HCC)    • Vitamin D deficiency disease      Past Surgical History:   Procedure Laterality Date   • APPENDECTOMY     • CARDIAC CATHETERIZATION N/A 5/11/2022    Procedure: Left Heart Cath;  Surgeon: Marcelino Grande MD;  Location: [facility] CATH INVASIVE LOCATION;  Service: Cardiology;  Laterality: N/A;   • CHOLECYSTECTOMY     • HYSTERECTOMY     • INSERTION HEMODIALYSIS CATHETER N/A 5/9/2022    Procedure: HEMODIALYSIS CATHETER INSERTION;  Surgeon: Hans Coates MD;  Location: [facility] OR;  Service: General;  Laterality: N/A;         OT ASSESSMENT FLOWSHEET (last 12 hours)     OT Evaluation and Treatment     Row Name 06/03/22 125                   OT Time and Intention    Subjective Information complains of  SOA; RN aware  -HB        Document Type therapy note (daily note)  -HB        Mode of Treatment individual therapy;occupational therapy  -HB        Patient Effort fair  -HB                  General Information    Patient Profile Reviewed yes  -HB        Patient/Family/Caregiver Comments/Observations Patient and RN okd OT this date. BRYAN Nichols reports pt has been SOA but is ok to be treated in bed.  -HB        General Observations of Patient Patient tolerated OT fair with no adverse reactions. Pt demo slight SOA but not in distress during tx.  -HB                  Pain Assessment    Pretreatment Pain Rating 0/10 - no pain  -HB        Posttreatment Pain Rating 0/10 - no pain  -HB                  Cognition    Affect/Mental Status (Cognition) confused;low arousal/lethargic;flat/blunted affect  -HB                  Motor Skills    Motor Skills coordination;functional endurance;motor control/coordination interventions  -HB        Motor Control/Coordination  Interventions therapeutic exercise/ROM  -HB        Therapeutic Exercise shoulder;hand;wrist;elbow/forearm  -HB        Additional Documentation --  AAROM to BUE 2 sets 10 reps  -HB                  Positioning and Restraints    Pre-Treatment Position in bed  -HB        Post Treatment Position bed  -HB        In Bed notified nsg;encouraged to call for assist;call light within reach  BRYAN leach informed of pts disposition.  -HB              User Key  (r) = Recorded By, (t) = Taken By, (c) = Cosigned By    Initials Name Effective Dates    HB Agnes Freeman OT 21 -                        OT Recommendation and Plan              Time Calculation:    Time Calculation- OT     Row Name 22 1259             Time Calculation- OT    Total Timed Code Minutes- OT 15 minute(s)  -HB            User Key  (r) = Recorded By, (t) = Taken By, (c) = Cosigned By    Initials Name Provider Type    HB Agnes Freeman OT Occupational Therapist              Therapy Charges for Today     Code Description Service Date Service Provider Modifiers Qty    92880286587 HC OT THERAPEUTIC ACT EA 15 MIN 6/3/2022 Agnes Freeman OT GO 1               Agnes Fremean OT  6/3/2022    Electronically signed by Agnes Freeman OT at 22 1300          Discharge Summary      Pavan Durant MD at 22 0930              [facility] DISCHARGE SUMMARY    Patient Identification:  Name:  Sean Chen  Age:  81 y.o.  Sex:  female  :  1940  MRN:  [id number]  Visit Number:  [id number]    Date of Admission: 2022  Date of Discharge:  2022     PCP: Ganga Gallardo MD    DISCHARGE DIAGNOSIS  Sepsis - Resolved   Acute Metabolic Encephalopathy - Resolved   Acute Hypoxic Respiratory Failure - Stable   Pneumonia, Bacterial, treating for MDR Organisms/Aspiration - Resolved  Parainfluenza Virus - Resolved   Immunocompromised stated from underlying Rheumatoid Arthritis  Nonoliguric Acute Kidney Injury on Chronic  Kidney Disease now ESRD with remote history Acute Kidney Injury requiring iHD in setting of mildly elevated cANCA complicated by proteinuria and mild hematuria - Stable   Acute on Chronic HFpEF, diastolic dysfunction complicated by edema and pleural effusions now with noted reduced EF - Acute Resolved   Acute Mod Hypokalemia - Resolved   Borderline Hypomagnesemia - Resolved   Acute Hypervolemic Hyponatremia - Improved   Hypertension  Hyperlipidemia  Coronary Artery Disease  Severe Pulmonary Hypertension  Small Pericardial Effusion  Valvular Disease - Mild AS, Mild MR, mod TR  Elevated Troponins, suspected NSTEMI Type II  Prolonged Qtc (566ms)  Non-Insulin Dependent Diabetes Mellitus Type II, controlled, unknown complications  Anxiety  Depression  Gastroesophageal Reflux Disease  Dementia  Debility  Mod Malnutrition    CONSULTS   Nephrology  Cardiology   Psychiatry   Neurology   Palliative Care     PROCEDURES PERFORMED  None     HOSPITAL COURSE  81F PMH GERD, Meniere's Disease, Chronic Anemia, Progressive Dementia, Hypertension, Hyperlipidemia, Chronic Kidney Disease, Diabetes Mellitus Type II, Rheumatoid Arthritis, severe Pulmonary Hypertension, recently admitted 5/4-5/13/2022 for NSTEMI and non-obstructive Coronary Artery Disease on [facility], also notable Acute Kidney Injury on Chronic Kidney Disease with iHD, presented 5/23 with complaints of shortness of breath.      #Sepsis, Acute Metabolic Encephalopathy & Acute Hypoxic Respiratory Failure due to Pneumonia, Bacterial, treating for MDR Organisms/Aspiration & Parainfluenza Virus in setting of Immunocompromised stated from underlying Rheumatoid Arthritis  Patient presented after recent hospitalization with increased shortness of breath and new oxygen requirement.  Patient meets SIRS criteria due to respiratory rate > 20, WBC count > 12K, noted to have mental status change from baseline.  Admission labs showed WBC count 15K, CRP 3.62, procalcitonin 0.89, lactate 1.7,  Coronavirus/flu negative, MRSA negative, ABG with P02 down to 37.5, blood cultures negative to date, strep pneumo negative, viral respiratory panel + for parainfluenza virus. CT Head showed no acute intracranial abnormality. CT Chest showed RML and RLL airspace consolidative disease.  Has completed 7 days Aztreonam and Flagyl.  SLP consulted evaluated with diet recommendations 5/24, no noted signs and symptoms aspiration though noted mild oral weakness felt due to fatigue and medical condition.  Patient remained weak though family electing to take back home, have ordered follow up with PCP 1 week, have ordered home health, home 02, patient's PCP has ordered hospital bed and other DME already.    #Nonoliguric Acute Kidney Injury on Chronic Kidney Disease vs ESRD with remote history Acute Kidney Injury requiring iHD in setting of mildly elevated cANCA complicated by proteinuria and mild hematuria - Improved   Last admission creatinine was up to 5.67, improved to 1.74 at time of discharge, had elevated lambda/kappa, this admission up to 3.85, had started on hemodialysis last admission 5/10. Renal ultrasound showed concern for dense vascular calcifications or non-obstructing stones but no evidence of hydronephrosis was seen.  Nephrology consulted and followed, per conversations with Dr. Muñoz will continue outpatient hemodialysis upon discharge.    #Hypertension/Hyperlipidemia/Coronary Artery Disease  #Acute on Chronic HFpEF, diastolic dysfunction complicated by edema and pleural effusions now with noted reduced EF   #Severe Pulmonary Hypertension  #Small Pericardial Effusion  #Valvular Disease - Mild AS, Mild MR, mod TR  #Elevated Troponins, suspected NSTEMI Type II  #Prolonged Qtc (566ms)  Labs showed Troponins 0.06->0.04, proBNP > 70K. EKG showed normal sinus rhythm, intermittent PAC's, RBBB, Non-specific ST/T wave abnormality. Echocardiogram 5/5 showed LVEF 66-70%, mild concentric hypertrophy, diastolic dysfunction,  dilated left atrium, mild AS, mild MR, mod TR, severe Pulmonary Hypertension, RVSP > 55mmHg, small <1cm pericardial effusion; Repeat limited echocardiogram showed borderline dilated left ventricle, LVEF 36-40%, diastolic dysfunction.  [facility] 5/11 showed no-obstructive Coronary Artery Disease.  CT Chest showed Congestive Heart Failure/edema with R>L pleural effusion, cardiomegaly, severe coronary calcifications.  Cardiology consulted and followed, recommended goal directed medical therapy, continue home Aspirin 81, statin, titrated home Nifedipine dose, added new Coreg, not a candidate for ACEI/ARB/ARNI due to renal failure. Follow up Cardiology outpatient 4 weeks.    #Non-Insulin Dependent Diabetes Mellitus Type II, controlled, unknown complications  Hgb A1c = 5.5%.  No home oral agents, treated FSBG and SSI, did not require long acting insulin.    #Electrolyte Abnormalities  Acute Mod Hypokalemia - potassium down to 2.8, Replacing per protocol - Resolved   Borderline Hypomagnesemia - magnesium down to 1.7, Replaced per protocol - Resolved   Acute Hypervolemic Hyponatremia - sodium improved, held home Paxil at discharge    #Anxiety/Depression  Continued home regimen    #Gastroesophageal Reflux Disease  No home PPI, monitored symptoms    #Dementia  CT showed generalized cerebral atrophy and nonspecific periventricular hypodensities thought to represent microvascular changes. Supportive care.    #Debility  Consulted PT/OT    #Mod Malnutrition  Nutrition consulted and followed     Edited by: Pavan Durant MD at 6/8/2022 0930    VITAL SIGNS:  Temp:  [97.5 °F (36.4 °C)-97.8 °F (36.6 °C)] 97.5 °F (36.4 °C)  Heart Rate:  [] 66  Resp:  [18-20] 18  BP: (147-185)/(43-94) 180/85  SpO2:  [94 %-96 %] 94 %  on  Flow (L/min):  [2] 2;   Device (Oxygen Therapy): room air    Body mass index is 21.55 kg/m².  Wt Readings from Last 3 Encounters:   05/31/22 57 kg (125 lb 9.6 oz)   05/13/22 67.1 kg (147 lb 14.4 oz)   01/27/22 71.2 kg  (157 lb)     PHYSICAL EXAM:  Constitutional:  Elderly, No acute distress.      HENT:  Head:  Normocephalic and atraumatic.  Mouth:  Moist mucous membranes.    Eyes:  Conjunctivae and EOM are normal. No scleral icterus.    Neck:  Neck supple.  No JVD present.    Cardiovascular:  regular rate, regular rhythm and normal heart sounds with no murmur.  Pulmonary/Chest:  No respiratory distress, no wheezes, on 2LNC  Abdominal:  Soft. No distension and no tenderness.   Musculoskeletal:  No tenderness, and no deformity.    Neurological:  Alert and oriented to person, place, and time.  No gross focal deficits   Skin:  Skin is warm and dry. No rash noted. No pallor.   Peripheral vascular:  No clubbing, no cyanosis, no edema.  Psychiatric: Appropriate mood and affect    *Examination stable today 6/8    Edited by: Pavan Durant MD at 6/8/2022 0919    DISCHARGE DISPOSITION   Stable    DISCHARGE MEDICATIONS:     Discharge Medications      New Medications      Instructions Start Date   carvedilol 6.25 MG tablet  Commonly known as: COREG   6.25 mg, Oral, 2 Times Daily With Meals      lidocaine 5 %  Commonly known as: LIDODERM   2 patches, Transdermal, Every 24 Hours Scheduled, Remove & Discard patch within 12 hours or as directed by MD   Start Date: June 9, 2022        Changes to Medications      Instructions Start Date   NIFEdipine CC 30 MG 24 hr tablet  Commonly known as: ADALAT CC  What changed: how much to take   60 mg, Oral, Every 24 Hours Scheduled   Start Date: June 9, 2022        Continue These Medications      Instructions Start Date   albuterol sulfate  (90 Base) MCG/ACT inhaler  Commonly known as: ProAir HFA   2 puffs, Inhalation, 4 Times Daily      aspirin 81 MG EC tablet   81 mg, Oral, Daily      atorvastatin 80 MG tablet  Commonly known as: LIPITOR   80 mg, Oral, Nightly      calcium acetate 667 MG capsule capsule  Commonly known as: PHOS BINDER)   1,334 mg, Oral, 3 Times Daily With Meals      folic acid 1 MG  tablet  Commonly known as: FOLVITE   1 mg, Oral, Daily         Stop These Medications    PARoxetine 10 MG tablet  Commonly known as: PAXIL            Activity Instructions     Activity as Tolerated          Additional Instructions for the Follow-ups that You Need to Schedule     Ambulatory Referral to Home Health   As directed      Face to Face Visit Date: 6/8/2022    Follow-up provider for Plan of Care?: I treated the patient in an acute care facility and will not continue treatment after discharge.    Follow-up provider: GANGA GREER [[id number]]    Reason/Clinical Findings: ESRD, CHF, PNA    Describe mobility limitations that make leaving home difficult: debility    Nursing/Therapeutic Services Requested: Skilled Nursing Physical Therapy    Skilled nursing orders: CHF management Cardiopulmonary assessments    PT orders: Therapeutic exercise Strengthening    Frequency: 1 Week 1         Discharge Follow-up with PCP   As directed       Currently Documented PCP:    Ganga Greer MD    PCP Phone Number:    [phone]     Follow Up Details: 1 week post hospital discharge         Discharge Follow-up with Specified Provider: Cardiology 4 weeks   As directed      To: Cardiology 4 weeks         Discharge Follow-up with Specified Provider: Nephrology 2 weeks, continue home dialysis schedule at [place]   As directed      To: Nephrology 2 weeks, continue home dialysis schedule at [place]            Follow-up Information     Ganga Greer MD .    Specialty: Family Medicine  Why: 1 week post hospital discharge  Contact information:  [address] [facility] [place] [address] [phone]                        TEST  RESULTS PENDING AT DISCHARGE  None     CODE STATUS  Code Status and Medical Interventions:   Ordered at: 05/31/22 1520     Medical Intervention Limits:    NO intubation (DNI)     Level Of Support Discussed With:    Patient    Next of Kin (If No Surrogate)     Code Status (Patient has no  pulse and is not breathing):    No CPR (Do Not Attempt to Resuscitate)     Medical Interventions (Patient has pulse or is breathing):    Limited Support     Comments:    patient son and  agree no CPR or vent       The ASCVD Risk score (Leta DC Jr., et al., 2013) failed to calculate for the following reasons:    The 2013 ASCVD risk score is only valid for ages 40 to 79    The patient has a prior MI or stroke diagnosis     Thaddeus Durant MD  [facility]  06/08/22  09:30 EDT    Please note that this discharge summary required more than 30 minutes to complete.        Electronically signed by Thaddeus Durant MD at 06/08/22 0936       Discharge Order (From admission, onward)     Start     Ordered    06/08/22 0917  Discharge patient  Once        Expected Discharge Date: 06/08/22    Expected Discharge Time: Morning    Discharge Disposition: Home or Self Care    Physician of Record for Attribution - Please select from Treatment Team: THADDEUS DURANT [[id number]]    Review needed by CMO to determine Physician of Record: No       Question Answer Comment   Physician of Record for Attribution - Please select from Treatment Team THADDEUS DURANT    Review needed by CMO to determine Physician of Record No        06/08/22 0921

## 2022-06-08 NOTE — CASE MANAGEMENT/SOCIAL WORK
Continued Stay Note   Mina     Patient Name: Sean Chen  MRN: [id number]  Today's Date: 6/8/2022    Admit Date: 5/23/2022     Discharge Plan     Row Name 06/08/22 1045       Plan    Final Note SS provided RN with [facility] with report number [phone].    Row Name 06/08/22 1037       Plan    Plan CM faxed order for O2 and spoke with Jolene at [facility] who states they will deliever portable tank & concentrator to home today.    Row Name 06/08/22 1030       Plan    Plan CM phoned patient's son Dylon to see what Home Health & DME company he prefers and to make sure that he will be transporting patient home at discharge.  Son has no preference for HH and states that they prefer [facility] for DME purposes.  SS to arrange transportation with ambulance at discharge and son is to be called at [phone] when patient is ready, RN notified.    Provided Post Acute Provider List? Refused    Refused Provider List Comment Patient's  already utilizes [facility].    Provided Post Acute Provider Quality & Resource List? Yes    Post Acute Provider Quality and Resource List Home Health    Delivered To Support Person    Support Person Son Dylon-He has no preference between [facility] or [facility]    Method of Delivery Telephone    Row Name 06/08/22 1010       Plan    Final Discharge Disposition Code 06 - home with home health care    Final Note Pt is being discharged home with Physician ordered home health services. Pt and Pt's son are aware and agreeable for discharge. Pt's son did not have a preference of home health agency. SS faxed HH referral to [facility] fax [phone]. SS to arrange EMS for transport.               Discharge Codes    No documentation.               Expected Discharge Date and Time     Expected Discharge Date Expected Discharge Time    Jun 8, 2022             Leonor Palacios, BRYAN

## 2022-06-08 NOTE — THERAPY TREATMENT NOTE
Acute Care - Physical Therapy Treatment Note   [place]     Patient Name: Sean Chen  : 1940  MRN: [id number]  Today's Date: 2022   Onset of Illness/Injury or Date of Surgery: 22  Visit Dx:     ICD-10-CM ICD-9-CM   1. Acute respiratory failure with hypoxia (HCC)  J96.01 518.81   2. Acute hypoxemic respiratory failure (HCC)  J96.01 518.81     Patient Active Problem List   Diagnosis   • Iron deficiency anemia   • Type 2 diabetes mellitus without complication, without long-term current use of insulin (HCC)   • Vitamin D deficiency disease   • Extrinsic asthma   • Essential hypertension   • Mixed hyperlipidemia   • Generalized osteoarthritis   • Gastroesophageal reflux disease without esophagitis   • Meniere's disease   • Chronic seasonal allergic rhinitis   • Low serum vitamin B12   • Memory impairment   • Bradycardia   • Hypertensive urgency   • Dementia without behavioral disturbance (HCC)   • Hyponatremia   • NSTEMI (non-ST elevated myocardial infarction) (HCC)   • Prolonged Q-T interval on ECG   • Anemia requiring transfusions   • Acute hypoxemic respiratory failure (HCC)   • Moderate malnutrition (HCC)     Past Medical History:   Diagnosis Date   • Allergic rhinitis    • Elevated liver enzymes    • Extrinsic asthma    • Gastritis    • GERD (gastroesophageal reflux disease)    • Hyperlipidemia    • Hypertension    • Iron deficiency anemia    • Meniere's disease    • Osteoarthritis    • Type 2 diabetes mellitus (HCC)    • Vitamin D deficiency disease      Past Surgical History:   Procedure Laterality Date   • APPENDECTOMY     • CARDIAC CATHETERIZATION N/A 2022    Procedure: Left Heart Cath;  Surgeon: Marcelino Grande MD;  Location: [facility] INVASIVE LOCATION;  Service: Cardiology;  Laterality: N/A;   • CHOLECYSTECTOMY     • HYSTERECTOMY     • INSERTION HEMODIALYSIS CATHETER N/A 2022    Procedure: HEMODIALYSIS CATHETER INSERTION;  Surgeon: Hans Coates MD;  Location: [facility] OR;   Service: General;  Laterality: N/A;     PT Assessment (last 12 hours)     PT Evaluation and Treatment     Row Name 06/08/22 1010          Physical Therapy Time and Intention    Subjective Information complains of;fatigue  -HR     Document Type therapy note (daily note)  -HR     Mode of Treatment physical therapy  -HR     Patient Effort adequate  -HR     Comment Pt and BRYAN Forte in agreement for PT  -HR     Row Name 06/08/22 1010          General Information    Patient Profile Reviewed yes  -HR     Equipment Currently Used at Home crutches, axillary  -HR     Existing Precautions/Restrictions fall;oxygen therapy device and L/min  -HR     Limitations/Impairments safety/cognitive  -HR     Row Name 06/08/22 1010          Cognition    Affect/Mental Status (Cognition) confused;low arousal/lethargic;flat/blunted affect  -HR     Orientation Status (Cognition) oriented to;person  -HR     Follows Commands (Cognition) follows one-step commands  -HR     Personal Safety Interventions fall prevention program maintained;gait belt;supervised activity;nonskid shoes/slippers when out of bed  -HR     Row Name 06/08/22 1010          Bed Mobility    Bed Mobility bed mobility (all) activities  -HR     All Activities, [place] (Bed Mobility) nonverbal cues (demo/gesture);verbal cues;contact guard;minimum assist (75% patient effort)  -HR     Bed Mobility, Safety Issues decreased use of arms for pushing/pulling;decreased use of legs for bridging/pushing  -HR     Assistive Device (Bed Mobility) bed rails  -HR     Row Name 06/08/22 1010          Transfers    Transfers sit-stand transfer;stand-sit transfer;toilet transfer  -HR     Sit-Stand [place] (Transfers) contact guard;nonverbal cues (demo/gesture);verbal cues  -HR     Stand-Sit [place] (Transfers) contact guard;nonverbal cues (demo/gesture);verbal cues  -HR     Row Name 06/08/22 1010          Sit-Stand Transfer    Assistive Device (Sit-Stand Transfers) walker, front-wheeled   -HR     Row Name 06/08/22 1010          Stand-Sit Transfer    Assistive Device (Stand-Sit Transfers) walker, front-wheeled  -HR     Row Name 06/08/22 1010          Gait/Stairs (Locomotion)    [place] Level (Gait) contact guard;nonverbal cues (demo/gesture);verbal cues;minimum assist (75% patient effort)  -HR     Assistive Device (Gait) walker, front-wheeled  -HR     Distance in Feet (Gait) 190'  -HR     Pattern (Gait) step-to  -HR     Deviations/Abnormal Patterns (Gait) gait speed decreased;weight shifting decreased  -HR     Right Sided Gait Deviations knee buckling, right side  toward end of walk secondary to fatique  -HR     Row Name 06/08/22 1010          Motor Skills    Therapeutic Exercise other (see comments)  Supine: AP, Inversion/eversion, SLR, heel slide  -HR     Row Name 06/08/22 1010          Coping    Observed Emotional State calm;cooperative  -HR     Verbalized Emotional State acceptance  -HR     Row Name 06/08/22 1010          Plan of Care Review    Outcome Evaluation Pt walks 190' with RW CGA, Pt requires min A at end of walk secondary to increased fatique and R knee starting to buckle. Pt takes one standing rest break during walk. Supine exercises at end of Rx.  -HR     Row Name 06/08/22 1010          Positioning and Restraints    Pre-Treatment Position in bed  -HR     Post Treatment Position bed  -HR     In Bed fowlers;call light within reach;encouraged to call for assist;exit alarm on;side rails up x2  -HR     Row Name 06/08/22 1010          Therapy Assessment/Plan (PT)    Rehab Potential (PT) good, to achieve stated therapy goals  -HR     Criteria for Skilled Interventions Met (PT) yes;skilled treatment is necessary  -HR     Therapy Frequency (PT) 2 times/wk  2-5 times/wk as available  -HR     Activity Limitations Related to Problem List (PT) unable to ambulate safely;unable to transfer safely  -HR     Row Name 06/08/22 1010          Physical Therapy Goals    Bed Mobility Goal Selection (PT)  bed mobility, PT goal 1  -HR     Transfer Goal Selection (PT) transfer, PT goal 1  -HR     Gait Training Goal Selection (PT) gait training, PT goal 1  -HR     Row Name 06/08/22 1010          Bed Mobility Goal 1 (PT)    Activity/Assistive Device (Bed Mobility Goal 1, PT) bed mobility activities, all  -HR     [place] Level/Cues Needed (Bed Mobility Goal 1, PT) contact guard required  -HR     Time Frame (Bed Mobility Goal 1, PT) by discharge  -HR     Row Name 06/08/22 1010          Transfer Goal 1 (PT)    Activity/Assistive Device (Transfer Goal 1, PT) sit-to-stand/stand-to-sit;bed-to-chair/chair-to-bed  -HR     [place] Level/Cues Needed (Transfer Goal 1, PT) contact guard required  -HR     Time Frame (Transfer Goal 1, PT) by discharge  -HR     Row Name 06/08/22 1010          Gait Training Goal 1 (PT)    Activity/Assistive Device (Gait Training Goal 1, PT) gait (walking locomotion);assistive device use  -HR     [place] Level (Gait Training Goal 1, PT) contact guard required  -HR     Distance (Gait Training Goal 1, PT) 10  -HR     Time Frame (Gait Training Goal 1, PT) by discharge  -HR           User Key  (r) = Recorded By, (t) = Taken By, (c) = Cosigned By    Initials Name Provider Type    HR Yeimi Araiza PTA Physical Therapist Assistant                  PT Recommendation and Plan  Anticipated Discharge Disposition (PT): inpatient rehabilitation facility, skilled nursing facility  Therapy Frequency (PT): 2 times/wk (2-5 times/wk as available)  Outcome Evaluation: Pt walks 190' with RW CGA, Pt requires min A at end of walk secondary to increased fatique and R knee starting to buckle. Pt takes one standing rest break during walk. Supine exercises at end of Rx.       Time Calculation:    PT Charges     Row Name 06/08/22 1013             Time Calculation    PT Received On 06/08/22  -HR              Time Calculation- PT    Total Timed Code Minutes- PT 25 minute(s)  -HR            User Key  (r) = Recorded By,  (t) = Taken By, (c) = Cosigned By    Initials Name Provider Type    HR Yeimi Araiza, KAHLIL Physical Therapist Assistant              Therapy Charges for Today     Code Description Service Date Service Provider Modifiers Qty    80469830512 HC GAIT TRAINING EA 15 MIN 6/7/2022 Yeimi Araiza, KAHLIL GP, CQ 1    35477482618 HC PT THER PROC EA 15 MIN 6/7/2022 Yeimi Araiza PTA GP, CQ 1    56336569462 HC GAIT TRAINING EA 15 MIN 6/8/2022 Yeimi Araiza PTA GP, CQ 1    17891124393 HC PT THER PROC EA 15 MIN 6/8/2022 Yeimi Araiza PTA GP, CQ 1               Yeimi Araiza PTA  6/8/2022

## 2022-06-08 NOTE — PROGRESS NOTES
Nephrology Progress Note      Subjective     Seen while patient was having PT, denied any chest pain or shortness of breath.     Objective       Vital signs :     Temp:  [97.5 °F (36.4 °C)-97.8 °F (36.6 °C)] 97.5 °F (36.4 °C)  Heart Rate:  [] 66  Resp:  [18-20] 18  BP: (147-185)/(43-94) 180/85      Intake/Output Summary (Last 24 hours) at 6/8/2022 0751  Last data filed at 6/7/2022 1714  Gross per 24 hour   Intake 780 ml   Output 2800 ml   Net -2020 ml       Physical Exam:    General Appearance :   Lungs : clear to auscultation, respirations regular  Heart :  regular rhythm & normal rate, normal S1, S2 and no murmur, no rub  Abdomen : normal bowel sounds, no masses, no hepatomegaly, no splenomegaly, soft non-tender and no guarding  Extremities : moves extremities well, no edema, no cyanosis and no redness  Neurologic :  No apparent focal deficit      Laboratory Data :     Albumin Albumin   Date Value Ref Range Status   06/05/2022 2.95 (L) 3.50 - 5.20 g/dL Final      Magnesium No results found for: MG       PTH               No results found for: PTH    CBC and coagulation:  Results from last 7 days   Lab Units 06/08/22  0019 06/07/22  0024 06/05/22  2351 06/05/22  0808 06/05/22  0806 06/05/22  0658   PROCALCITONIN ng/mL  --   --   --  0.16  --   --    LACTATE mmol/L  --   --   --   --   --  1.1   CRP mg/dL 0.94* 1.09* 1.49*  --   --   --    WBC 10*3/mm3 10.84* 10.43 13.43*  --    < >  --    HEMOGLOBIN g/dL 8.1* 7.5* 8.8*  --    < >  --    HEMATOCRIT % 25.9* 23.1* 27.0*  --    < >  --    MCV fL 93.2 90.6 89.4  --    < >  --    MCHC g/dL 31.3* 32.5 32.6  --    < >  --    PLATELETS 10*3/mm3 218 183 256  --    < >  --     < > = values in this interval not displayed.     Acid/base balance:  Results from last 7 days   Lab Units 06/05/22  0724 06/03/22  1141   PH, ARTERIAL pH units 7.419 7.495*   PO2 ART mm Hg 122.0* 72.2*   PCO2, ARTERIAL mm Hg 39.8 38.2   HCO3 ART mmol/L 25.7 29.5*     Renal and  electrolytes:  Results from last 7 days   Lab Units 06/08/22  0547 06/08/22  0019 06/07/22  1852 06/07/22  1309 06/07/22  0558 06/07/22  0024 06/06/22  0536 06/05/22  2351 06/05/22  1138 06/05/22  0808 06/05/22  0549 06/05/22  0012   SODIUM mmol/L 127* 125* 126* 125* 125* 120*   < > 125*   < > 124*   < > 123*   POTASSIUM mmol/L  --  4.1  --   --   --  5.1  --  5.4*  --  5.2  --  4.9   CHLORIDE mmol/L  --  91*  --   --   --  88*  --  91*  --  93*  --  91*   CO2 mmol/L  --  24.1  --   --   --  19.5*  --  21.7*  --  19.2*  --  21.1*   BUN mg/dL  --  33*  --   --   --  48*  --  41*  --  36*  --  36*   CREATININE mg/dL  --  1.94*  --   --   --  2.84*  --  2.47*  --  2.24*  --  2.24*   CALCIUM mg/dL  --  8.5*  --   --   --  8.7  --  9.2  --  8.8  --  9.0    < > = values in this interval not displayed.     Estimated Creatinine Clearance: 20.8 mL/min (A) (by C-G formula based on SCr of 1.94 mg/dL (H)).    Liver and pancreatic function:  Results from last 7 days   Lab Units 06/05/22  2351   ALBUMIN g/dL 2.95*   BILIRUBIN mg/dL 0.4   ALK PHOS U/L 143*   AST (SGOT) U/L 14   ALT (SGPT) U/L 9         Cardiac:      Liver and pancreatic function:  Results from last 7 days   Lab Units 06/05/22  2351   ALBUMIN g/dL 2.95*   BILIRUBIN mg/dL 0.4   ALK PHOS U/L 143*   AST (SGOT) U/L 14   ALT (SGPT) U/L 9       Medications :     aspirin, 81 mg, Oral, Daily  atorvastatin, 80 mg, Oral, Nightly  calcium acetate, 1,334 mg, Oral, TID With Meals  carvedilol, 6.25 mg, Oral, BID With Meals  folic acid, 1 mg, Oral, Daily  guaiFENesin, 600 mg, Oral, Q12H  heparin (porcine), 5,000 Units, Subcutaneous, Q8H  lidocaine, 2 patch, Transdermal, Q24H  NIFEdipine CC, 60 mg, Oral, Q24H  sodium chloride, 10 mL, Intravenous, Q12H  sodium zirconium cyclosilicate, 10 g, Oral, BID             Assessment & Plan     1. PRITI on CKD vs rapidly worsening CKD, started on dialysis on 5/10/22  2. Metabolic acidosis  3. Anemia  4. Acute on chronic respiratory failure  5.  DM-II  6. Essential hypertension  7. Hypokalemia  8. hyponatremia  9. Metabolic encephalopathy likely multifactorial    Pt had dialysis yesterday, uneventful. Will continue on dialysis. Hyponatremia is stable and is slightly better with UF.   Pt can be discharged home to continue dialysis at [facility] an to watch for renal recovery, if no recovery, planned to switch to PDx after discussion with patient family.      During last hospitalization, found to have 8G of proteinuria with mild hematuria  Serology was all negative except mildly elevated cANCA,  Unknown baseline Cr, Cr was 1.3 in Feb 2021, 2.9 in 12/2021, and 3.2 in Jan 2022  Elevated , phos 8.4 and low iron stores     Discussed with RN and the dialysis nurse    Jude Muñoz MD  06/08/22  07:51 EDT

## 2022-06-08 NOTE — CASE MANAGEMENT/SOCIAL WORK
Discharge Planning Assessment   [facility]     Patient Name: Sean Chen  MRN: [id number]  Today's Date: 6/8/2022    Admit Date: 5/23/2022                                                           Plan    Final Discharge Disposition Code 06 - home with home health care    Final Note Pt is being discharged home with Physician ordered home health services. Pt and Pt's son are aware and agreeable for discharge. Pt's son did not have a preference of home health agency. SS faxed  referral to [facility] fax [phone]. SS to arrange EMS for transport.    10:42am:  provided RN with [facility] with report number [phone].     12:08pm: SS scheduled transport with [facility] per Dispatcher Chao.               Kavitha Gatica

## 2022-06-09 ENCOUNTER — TRANSITIONAL CARE MANAGEMENT TELEPHONE ENCOUNTER (OUTPATIENT)
Dept: CALL CENTER | Facility: HOSPITAL | Age: 82
End: 2022-06-09

## 2022-06-09 NOTE — OUTREACH NOTE
Call Center TCM Note    Flowsheet Row Responses   [facility] patient discharged from? [facility]   Does the patient have one of the following disease processes/diagnoses(primary or secondary)? COPD/Pneumonia   Was the primary reason for admission: Pneumonia   TCM attempt successful? No   Unsuccessful attempts Attempt 2          Concepcion Black RN    6/9/2022, 16:27 EDT

## 2022-06-09 NOTE — OUTREACH NOTE
Call Center TCM Note    Flowsheet Row Responses   [facility] patient discharged from? [facility]   Does the patient have one of the following disease processes/diagnoses(primary or secondary)? COPD/Pneumonia   Was the primary reason for admission: Pneumonia   TCM attempt successful? No  [no verbal release but CM notes indicate aj Osborne active in care]   Unsuccessful attempts Attempt 1  [Spoke with Mr. Gustavo Matthews and he requests a call back this afternoon to son--]   Comments regarding PCP Hospital PCP FOLLOW UP APPOINTMENT IS 6/17/22@1130am          Angeli Nolen RN    6/9/2022, 10:20 EDT

## 2022-06-10 ENCOUNTER — TRANSITIONAL CARE MANAGEMENT TELEPHONE ENCOUNTER (OUTPATIENT)
Dept: CALL CENTER | Facility: HOSPITAL | Age: 82
End: 2022-06-10

## 2022-06-10 NOTE — OUTREACH NOTE
Call Center TCM Note    Flowsheet Row Responses   [facility] patient discharged from? [facility]   Does the patient have one of the following disease processes/diagnoses(primary or secondary)? COPD/Pneumonia   Was the primary reason for admission: Pneumonia   TCM attempt successful? Yes   Call start time 1154   Call end time 1157   General alerts for this patient new HD patient   Discharge diagnosis Acute hypoxemic respiratory failure Sepsis - Dementia Pneumonia  Acute Kidney Injury    Person spoke with today (if not patient) and relationship Mr. Chen   Does the patient have all medications ordered at discharge? Yes   Is the patient taking all medications as directed (includes completed medication regime)? Yes   Medication comments  reports that son assists with medications   Does the patient have a primary care provider?  Yes   Does the patient have an appointment with their PCP or specialist within 7 days of discharge? Greater than 7 days   Comments regarding PCP Hospital PCP FOLLOW UP APPOINTMENT IS 6/17/22@1130am   Nursing Interventions Verified appointment date/time/provider   Has the patient kept scheduled appointments due by today? N/A   What is the Home health agency?  [facility]    Has home health visited the patient within 72 hours of discharge? Yes   What DME was ordered?  portable tank & concentrator to home today  [facility]   Has all DME been delivered? Yes   Psychosocial issues? No   Did the patient receive a copy of their discharge instructions? Yes   Nursing interventions Reviewed instructions with patient  [with ]   What is the patient's perception of their health status since discharge? Same  [Spoke with  briefly who was only able to confirm that son had taken her to her HD appt today, HH had visited with patient and that O2 had been delivered.  Reminded of appt with PCP-- reports son assists with appts,medications.  ]   Is the  patient/caregiver able to teach back the hierarchy of who to call/visit for symptoms/problems? PCP, Specialist, Home health nurse, Urgent Care, ED, 911 Yes   TCM call completed? Yes          Angeli Nolen RN    6/10/2022, 12:02 EDT

## 2022-06-16 ENCOUNTER — READMISSION MANAGEMENT (OUTPATIENT)
Dept: CALL CENTER | Facility: HOSPITAL | Age: 82
End: 2022-06-16

## 2022-06-16 NOTE — OUTREACH NOTE
COPD/PN Week 2 Survey    Flowsheet Row Responses   [facility] patient discharged from? [facility]   Does the patient have one of the following disease processes/diagnoses(primary or secondary)? COPD/Pneumonia   Was the primary reason for admission: Pneumonia   Week 2 attempt successful? Yes   Call start time 0945   Call end time 0947   General alerts for this patient new HD patient   Discharge diagnosis Acute hypoxemic respiratory failure Sepsis - Dementia Pneumonia  Acute Kidney Injury    Is patient permission given to speak with other caregiver? Yes   List who call center can speak with daughter in law   Person spoke with today (if not patient) and relationship daugher in law   Is the patient taking all medications as directed (includes completed medication regime)? Yes   Does the patient have a primary care provider?  Yes   Comments regarding PCP will see PCP Dr. Gallardo today (6/17)   Has the patient kept scheduled appointments due by today? N/A   What [facility] the [facility] health agency[facility]    Home health comments home health is still coming   What is the patient's perception of their health status since discharge? Improving   Is the patient/caregiver able to teach back the hierarchy of who to call/visit for symptoms/problems? PCP, Specialist, Home health nurse, Urgent Care, ED, 911 Yes   Is the patient/caregiver able to teach back signs and symptoms of worsening condition: Fever/chills, Shortness of breath, Chest pain   Week 2 call completed? Yes   Wrap up additional comments Per daughter in law, patient is doing some better, she is going to see her PCP  today.          FRANKI ROBLEDO - Registered Nurse

## 2022-06-23 ENCOUNTER — TELEPHONE (OUTPATIENT)
Dept: FAMILY MEDICINE CLINIC | Facility: CLINIC | Age: 82
End: 2022-06-23

## 2022-06-23 ENCOUNTER — OFFICE VISIT (OUTPATIENT)
Dept: FAMILY MEDICINE CLINIC | Facility: CLINIC | Age: 82
End: 2022-06-23

## 2022-06-23 VITALS
HEART RATE: 57 BPM | SYSTOLIC BLOOD PRESSURE: 115 MMHG | HEIGHT: 64 IN | OXYGEN SATURATION: 97 % | BODY MASS INDEX: 21.55 KG/M2 | TEMPERATURE: 97.4 F | DIASTOLIC BLOOD PRESSURE: 60 MMHG

## 2022-06-23 DIAGNOSIS — E83.42 HYPOMAGNESEMIA: ICD-10-CM

## 2022-06-23 DIAGNOSIS — R00.1 BRADYCARDIA: ICD-10-CM

## 2022-06-23 DIAGNOSIS — Z92.89 HISTORY OF RECENT HOSPITALIZATION: Primary | ICD-10-CM

## 2022-06-23 DIAGNOSIS — E11.9 TYPE 2 DIABETES MELLITUS WITHOUT COMPLICATION, WITHOUT LONG-TERM CURRENT USE OF INSULIN: Chronic | ICD-10-CM

## 2022-06-23 DIAGNOSIS — I10 ESSENTIAL HYPERTENSION: Chronic | ICD-10-CM

## 2022-06-23 DIAGNOSIS — D50.8 OTHER IRON DEFICIENCY ANEMIA: ICD-10-CM

## 2022-06-23 DIAGNOSIS — R60.9 PERIPHERAL EDEMA: ICD-10-CM

## 2022-06-23 DIAGNOSIS — E87.1 HYPONATREMIA: ICD-10-CM

## 2022-06-23 PROCEDURE — 99214 OFFICE O/P EST MOD 30 MIN: CPT | Performed by: NURSE PRACTITIONER

## 2022-06-23 NOTE — PROGRESS NOTES
History of Present Illness   Sean Chen is a 81 y.o. female who presents to the office today accompanied by her son who provides most of the history for Zoe.    Sean was admitted to [facility] on May 23, 2022 and discharged on June 8, 2022.  Discharge diagnoses included:  Sepsis - Resolved   Acute Metabolic Encephalopathy - Resolved   Acute Hypoxic Respiratory Failure - Stable   Pneumonia, Bacterial, treating for MDR Organisms/Aspiration - Resolved  Parainfluenza Virus - Resolved   Immunocompromised stated from underlying Rheumatoid Arthritis  Nonoliguric Acute Kidney Injury on Chronic Kidney Disease now ESRD with remote history Acute Kidney Injury requiring iHD in setting of mildly elevated cANCA complicated by proteinuria and mild hematuria - Stable   Acute on Chronic HFpEF, diastolic dysfunction complicated by edema and pleural effusions now with noted reduced EF - Acute Resolved   Acute Mod Hypokalemia - Resolved   Borderline Hypomagnesemia - Resolved   Acute Hypervolemic Hyponatremia - Improved   Hypertension  Hyperlipidemia  Coronary Artery Disease  Severe Pulmonary Hypertension  Small Pericardial Effusion  Valvular Disease - Mild AS, Mild MR, mod TR  Elevated Troponins, suspected NSTEMI Type II  Prolonged Qtc (566ms)  Diabetes Mellitus Type II, controlled  Anxiety  Depression  Gastroesophageal Reflux Disease  Dementia  Debility  Mod Malnutrition   Since her discharge on June 8, 2022, she has been followed by Nephrology and Home Health.  She had been receiving dialysis 3 days a week at the [facility] dialysis center until this week which has been reduced to 2 days a week Monday and Friday per the nephrologist.  Her son reports increasing bilateral lower extremity edema with right worse than left.    Leg Swelling  The problem occurs daily. Exacerbated by: Sitting in her wheelchair during the day. She has tried position changes for the symptoms. The treatment provided mild relief.     The  following portions of the patient's history were reviewed and updated as appropriate: allergies, current medications, past family history, past medical history, past social history, past surgical history and problem list.    Current Outpatient Medications:   •  albuterol sulfate HFA (ProAir HFA) 108 (90 Base) MCG/ACT inhaler, Inhale 2 puffs 4 (Four) Times a Day., Disp: 8.5 g, Rfl: 5  •  aspirin 81 MG EC tablet, Take 1 tablet by mouth Daily., Disp: 30 tablet, Rfl: 0  •  atorvastatin (LIPITOR) 80 MG tablet, Take 1 tablet by mouth Every Night., Disp: 30 tablet, Rfl: 5  •  calcium acetate (PHOS BINDER,) 667 MG capsule capsule, Take 2 capsules by mouth 3 (Three) Times a Day With Meals., Disp: 180 capsule, Rfl: 0  •  carvedilol (COREG) 6.25 MG tablet, Take 1 tablet by mouth 2 (Two) Times a Day With Meals., Disp: 60 tablet, Rfl: 0  •  folic acid (FOLVITE) 1 MG tablet, Take 1 tablet by mouth Daily., Disp: 30 tablet, Rfl: 0  •  NIFEdipine CC (ADALAT CC) 30 MG 24 hr tablet, Take 2 tablets by mouth Daily., Disp: 60 tablet, Rfl: 0    Allergies   Allergen Reactions   • Keflex [Cephalexin]    • Lodine [Etodolac]    • Penicillins    • Sulfa Antibiotics      Review of Systems   Constitutional: Positive for activity change, appetite change and fatigue. Negative for chills, fever and unexpected weight change.   HENT: Positive for hearing loss.    Respiratory: Negative for cough, shortness of breath and wheezing.    Cardiovascular: Positive for leg swelling. Negative for chest pain and palpitations.   Endocrine: Negative for cold intolerance, heat intolerance, polydipsia, polyphagia and polyuria.   Genitourinary:        Dialysis patient   Musculoskeletal: Positive for arthralgias and gait problem.   Skin: Negative for color change and rash.   Allergic/Immunologic: Positive for environmental allergies.   Neurological: Positive for weakness. Negative for dizziness, tremors, light-headedness and headaches.   Hematological: Negative for  "adenopathy.   Psychiatric/Behavioral: Positive for confusion. Negative for decreased concentration. The patient is not nervous/anxious.    All other systems reviewed and are negative.    Visit Vitals  /60   Pulse 57   Temp 97.4 °F (36.3 °C)   Ht 162.6 cm (64.02\")   SpO2 97%   BMI 21.55 kg/m²     Physical Exam  Vitals and nursing note reviewed.   Constitutional:       General: She is not in acute distress.     Appearance: She is well-developed.      Comments: Week, elderly female who is resting comfortably sitting in a wheelchair.  Accompanied by her son   HENT:      Head: Normocephalic.   Eyes:      General: No scleral icterus.        Right eye: No discharge.         Left eye: No discharge.      Conjunctiva/sclera: Conjunctivae normal.   Neck:      Thyroid: No thyromegaly.      Vascular: No JVD.   Cardiovascular:      Rate and Rhythm: Regular rhythm. Bradycardia present.      Heart sounds: Normal heart sounds. No murmur heard.    No friction rub.   Pulmonary:      Effort: Pulmonary effort is normal. No respiratory distress.      Breath sounds: Decreased breath sounds present. No wheezing, rhonchi or rales.   Abdominal:      General: There is no distension.      Palpations: Abdomen is soft.      Tenderness: There is no abdominal tenderness. There is no guarding.   Musculoskeletal:      Cervical back: Neck supple.      Right lower le+ Edema present.      Left lower le+ Edema present.   Lymphadenopathy:      Cervical: No cervical adenopathy.   Skin:     General: Skin is warm and dry.      Capillary Refill: Capillary refill takes less than 2 seconds.      Findings: Erythema (Right foot) present. No rash.   Neurological:      Mental Status: She is alert.      Motor: Weakness present.   Psychiatric:         Mood and Affect: Mood normal.         Behavior: Behavior is cooperative.         Cognition and Memory: Cognition is impaired. Memory is impaired.      Comments: Participates in visit intermittently "       Assessment & Plan   Diagnoses and all orders for this visit:    1. History of recent hospitalization (Primary)  Comments:  Reviewed hospitalization at [facility] from May 23, 2022 to June 8, 2022    2. Peripheral edema  Comments:  Treatment options discussed with son.  Sean does sit in her wheelchair a great deal of the day with her extremities not elevated    3. Bradycardia  Comments:  Prescribed carvedilol 6.25 MG which she has been taking at the same time.  Encouraged  to divide dose twice daily    4. Essential hypertension  Comments:  Continue to monitor.  Continue carvedilol and Nifedipine which is a Calcium Channel blocker     5. Type 2 diabetes mellitus without complication, without long-term current use of insulin (HCC)  Comments:  Continue to monitor    6. Hyponatremia  Comments:  Discussed with Dialysis Center     Findings and recommendations discussed with Sean and her son.  Reviewed [facility]'s recent hospitalization.  Discussed with them the dilemma of prescribing either diuretics or antibiotics for her due to her renal situation.  Due to her appointment time, unable to obtain labs today.  Will discuss with dialysis center in the morning her visit today.  Strongly encourage them to elevate her lower extremities above the heart during the day.  Counseled regarding supportive care measures including cool cloths to her lower extremities.  Signs and symptoms of concern reviewed and if occur to seek further medical evaluation or if symptoms worsen or do not improve.          This document has been electronically signed by ELGIN Sierra, SIVAN-BC, CDE  Addendum:  On 6/24/2022, discussed with Angeli, dialysis manager, Zoe's presentation today.  Requested the nephrologist to provide guidance for her.  Angeli reports the nephrologist was not present in the clinic but she did send photos of Zoe's lower extremities.  His recommendation was no antibiotics or diuretics  but to continue elevation.

## 2022-06-23 NOTE — TELEPHONE ENCOUNTER
Caller: Dylon Chen Jr    Relationship: Emergency Contact    Best call back number: [phone]     What is the best time to reach you: ANYTIME    Who are you requesting to speak with (clinical staff, provider,  specific staff member): CLINICAL STAFF    Do you know the name of the person who called: DYLON CHEN JR    What was the call regarding:     PATIENT'S HEALTH CONDITIONS    Do you require a callback: YES    ADDITIONAL INFO    BROOKE CHEN JR (BH VERBAL VERIFIED)    CALLER ADVISED THAT PATIENT HAD A RECENT HOSPITAL VISIT AND WAS DISCHARGED AND SENT HOME. CALLER ADVISED THAT PATIENT IS ON DIALYSIS. PATIENTS FEET ARE SWOLLEN AND RED WITH BLISTERS THAT ARE LEAKING. PATIENT DOES EAT AND DRINK SOME WATER AS STATED BY THE CALLER. LAYING DOWN OR SITTING UP DOES NOT HELP WITH THE SWELLING IN PATIENTS FEET.

## 2022-06-24 ENCOUNTER — READMISSION MANAGEMENT (OUTPATIENT)
Dept: CALL CENTER | Facility: HOSPITAL | Age: 82
End: 2022-06-24

## 2022-06-24 NOTE — OUTREACH NOTE
COPD/PN Week 3 Survey    Flowsheet Row Responses   [facility] patient discharged from? [facility]   Does the patient have one of the following disease processes/diagnoses(primary or secondary)? COPD/Pneumonia   Was the primary reason for admission: Pneumonia   Week 3 attempt successful? Yes   Call start time 0902   Call end time 0917   Discharge diagnosis Acute hypoxemic respiratory failure Sepsis - Dementia Pneumonia  Acute Kidney Injury    Person spoke with today (if not patient) and relationship  Dylon Anne reviewed with patient/caregiver? Yes   Is the patient having any side effects they believe may be caused by any medication additions or changes? No   Does the patient have all medications ordered at discharge? Yes   Is the patient taking all medications as directed (includes completed medication regime)? Yes   Does the patient have a primary care provider?  Yes   Does the patient have an appointment with their PCP or specialist within 7 days of discharge? Yes   Has the patient kept scheduled appointments due by today? Yes   What is the Home health agency?  [facility]    Has home health visited the patient within 72 hours of discharge? Yes   Psychosocial issues? No   Did the patient receive a copy of their discharge instructions? Yes   Nursing interventions Reviewed instructions with patient   What is the patient's perception of their health status since discharge? New symptoms unrelated to diagnosis  [Having alot of edema in feet. She has dialysis today and will discuss with kidney doctor. ]   Nursing Interventions Nurse provided patient education   If the patient is a current smoker, are they able to teach back resources for cessation? Not a smoker   Is the patient/caregiver able to teach back the hierarchy of who to call/visit for symptoms/problems? PCP, Specialist, Home health nurse, Urgent Care, ED, 911 Yes   Is the patient/caregiver able to teach back signs and  symptoms of worsening condition: Fever/chills, Shortness of breath, Chest pain   Is the patient/caregiver able to teach back importance of completing antibiotic course of treatment? Yes   Week 3 call completed? Yes   Wrap up additional comments Having edema in feet. Will discuss with kidney doctor today.           JUDITH DUFFY - Registered Nurse

## 2022-06-27 ENCOUNTER — TELEPHONE (OUTPATIENT)
Dept: FAMILY MEDICINE CLINIC | Facility: CLINIC | Age: 82
End: 2022-06-27

## 2022-06-27 NOTE — TELEPHONE ENCOUNTER
Home health is aware of this information. Left a message for the patient's son to call us back.       ----- Message from ELGIN Castillo sent at 6/26/2022  1:09 PM EDT -----  Regarding: Home health labs  I ordered labs for Sean to have done by home health.  If you would call them to let them know.  Also if you would check the status on her lower extremities

## 2022-06-28 ENCOUNTER — OFFICE VISIT (OUTPATIENT)
Dept: FAMILY MEDICINE CLINIC | Facility: CLINIC | Age: 82
End: 2022-06-28

## 2022-06-28 DIAGNOSIS — K21.9 GASTROESOPHAGEAL REFLUX DISEASE WITHOUT ESOPHAGITIS: ICD-10-CM

## 2022-06-28 DIAGNOSIS — E78.2 MIXED HYPERLIPIDEMIA: ICD-10-CM

## 2022-06-28 DIAGNOSIS — E11.9 TYPE 2 DIABETES MELLITUS WITHOUT COMPLICATION, WITHOUT LONG-TERM CURRENT USE OF INSULIN: ICD-10-CM

## 2022-06-28 DIAGNOSIS — J45.40 MODERATE PERSISTENT EXTRINSIC ASTHMA WITHOUT COMPLICATION: ICD-10-CM

## 2022-06-28 DIAGNOSIS — M15.9 GENERALIZED OSTEOARTHRITIS: ICD-10-CM

## 2022-06-28 DIAGNOSIS — Z23 ENCOUNTER FOR IMMUNIZATION: ICD-10-CM

## 2022-06-28 DIAGNOSIS — N18.4 CHRONIC RENAL FAILURE, STAGE 4 (SEVERE): ICD-10-CM

## 2022-06-28 DIAGNOSIS — I10 ESSENTIAL HYPERTENSION: Primary | ICD-10-CM

## 2022-06-28 DIAGNOSIS — E53.8 LOW SERUM VITAMIN B12: ICD-10-CM

## 2022-06-28 DIAGNOSIS — I21.4 NSTEMI (NON-ST ELEVATED MYOCARDIAL INFARCTION): ICD-10-CM

## 2022-06-28 DIAGNOSIS — E55.9 VITAMIN D DEFICIENCY DISEASE: ICD-10-CM

## 2022-06-28 DIAGNOSIS — F03.90 DEMENTIA WITHOUT BEHAVIORAL DISTURBANCE, UNSPECIFIED DEMENTIA TYPE: Chronic | ICD-10-CM

## 2022-06-28 PROBLEM — D64.9 ANEMIA REQUIRING TRANSFUSIONS: Status: RESOLVED | Noted: 2022-05-06 | Resolved: 2022-06-28

## 2022-06-28 PROBLEM — R00.1 BRADYCARDIA: Status: RESOLVED | Noted: 2022-01-27 | Resolved: 2022-06-28

## 2022-06-28 PROBLEM — J96.01 ACUTE HYPOXEMIC RESPIRATORY FAILURE (HCC): Status: RESOLVED | Noted: 2022-05-23 | Resolved: 2022-06-28

## 2022-06-28 PROBLEM — I16.0 HYPERTENSIVE URGENCY: Status: RESOLVED | Noted: 2022-05-04 | Resolved: 2022-06-28

## 2022-06-28 PROBLEM — E87.1 HYPONATREMIA: Status: RESOLVED | Noted: 2022-05-06 | Resolved: 2022-06-28

## 2022-06-28 PROBLEM — R41.3 MEMORY IMPAIRMENT: Status: RESOLVED | Noted: 2021-03-16 | Resolved: 2022-06-28

## 2022-06-28 PROBLEM — E44.0 MODERATE MALNUTRITION (HCC): Status: RESOLVED | Noted: 2022-05-25 | Resolved: 2022-06-28

## 2022-06-28 PROCEDURE — 0053A COVID-19 (PFIZER) 12+ YRS: CPT | Performed by: GENERAL PRACTICE

## 2022-06-28 PROCEDURE — 91305 COVID-19 (PFIZER) 12+ YRS: CPT | Performed by: GENERAL PRACTICE

## 2022-06-28 PROCEDURE — 99214 OFFICE O/P EST MOD 30 MIN: CPT | Performed by: GENERAL PRACTICE

## 2022-06-28 RX ORDER — LANCETS 30 GAUGE
EACH MISCELLANEOUS
Qty: 50 EACH | Refills: 5 | Status: SHIPPED | OUTPATIENT
Start: 2022-06-28 | End: 2022-07-02

## 2022-06-28 RX ORDER — BLOOD-GLUCOSE METER
1 KIT MISCELLANEOUS ONCE
Qty: 1 EACH | Refills: 0 | Status: SHIPPED | OUTPATIENT
Start: 2022-06-28 | End: 2022-06-28

## 2022-06-28 RX ORDER — GLUCOSAMINE HCL/CHONDROITIN SU 500-400 MG
CAPSULE ORAL
Qty: 50 EACH | Refills: 5 | Status: SHIPPED | OUTPATIENT
Start: 2022-06-28 | End: 2022-07-02

## 2022-06-28 NOTE — PROGRESS NOTES
Subjective   Sean Chen is a 82 y.o. female.     Chief Complaint  She returns for a scheduled reassessment of multiple medical problems including chronic renal failure, type 2 diabetes mellitus, hyperlipidemia, essential hypertension, asthma, and dementia    History of Present Illness     Chronic Renal Failure  She is currently receiving hemodialysis twice weekly through a right subclavian line.  She has bilateral lower extremity swelling with some skin breakdown and drainage.  She is reluctant to elevate her legs and is walking very little at present.  Unna paste boots are currently being applied by home health and she was recently started on doxycycline by nephrology.  There is no history of any shortness of breath, abdominal distention, or pruritus and she has had no fever or chills.  She is on calcium acetate.    Type 2 Diabetes Mellitus  Current symptoms include: fatigue.  She continues to deny any paresthesias of the feet, visual disturbances, polydipsia, or polyuria.  She is on no medication at present    Hyperlipidemia  She is taking atorvastatin with no apparent side effects    Essential Hypertension  Home blood pressure readings: not doing. Associated signs and symptoms: peripheral edema.  She continues to deny any chest pain, palpitations, dyspnea, orthopnea or paroxysmal nocturnal dyspnea.  She is currently prescribed nifedipine CC and carvedilol    Asthma  She has a history of intermittent dyspnea and non-productive cough.  She is on no medication for these at present.  She continues to deny any chest pain or hemoptysis. Suspected precipitants include upper respiratory infection and allergens.     Dementia  She has a several year history of progressive memory impairment and functional decline.  She is generally orientated to place and person but not time.  She requires some assistance with most ADLs.  Her  is able to help her some.  Her son lives next door and is doing more more    The  following portions of the patient's history were reviewed and updated as appropriate: allergies, current medications, past medical history, past social history and problem list.    Review of Systems   Constitutional: Positive for fatigue. Negative for appetite change, chills, fever and unexpected weight change.   HENT: Positive for congestion, hearing loss, sneezing and tinnitus. Negative for ear pain, postnasal drip, rhinorrhea and sore throat.    Eyes: Negative for visual disturbance.   Respiratory: Negative for cough, shortness of breath and wheezing.    Cardiovascular: Positive for leg swelling. Negative for chest pain and palpitations.   Gastrointestinal: Negative for abdominal pain, blood in stool, constipation, diarrhea, nausea and vomiting.   Endocrine: Negative for polydipsia and polyuria.   Genitourinary: Negative for dysuria, frequency, hematuria and urgency.   Musculoskeletal: Positive for arthralgias and back pain. Negative for myalgias.   Skin: Negative for rash.   Neurological: Negative for weakness, light-headedness and numbness.   Psychiatric/Behavioral: Positive for confusion (memory impairment). Negative for dysphoric mood and sleep disturbance. The patient is not nervous/anxious.      Objective   Physical Exam  Constitutional:       General: She is not in acute distress.     Appearance: Normal appearance. She is well-developed. She is not diaphoretic.      Comments: Accompanied by her son.  Bright and in fair spirits. Sitting in a wheelchair.  No apparent distress. Pale. No jaundice, diaphoresis, or cyanosis.   HENT:      Head: Atraumatic.      Right Ear: Tympanic membrane, ear canal and external ear normal.      Left Ear: Tympanic membrane, ear canal and external ear normal.   Eyes:      Conjunctiva/sclera: Conjunctivae normal.   Neck:      Thyroid: No thyroid mass or thyromegaly.      Vascular: No carotid bruit or JVD.      Trachea: Trachea normal. No tracheal deviation.   Cardiovascular:       Rate and Rhythm: Regular rhythm. Bradycardia present.      Heart sounds: Normal heart sounds, S1 normal and S2 normal. No murmur heard.    No gallop.      Comments: Both lower extremities wrapped  Pulmonary:      Effort: Pulmonary effort is normal.      Breath sounds: Examination of the right-lower field reveals decreased breath sounds. Examination of the left-lower field reveals decreased breath sounds. Decreased breath sounds present. No wheezing or rales.   Chest:   Breasts:      Right: No supraclavicular adenopathy.      Left: No supraclavicular adenopathy.        Comments: Right subclavian line  Abdominal:      General: Bowel sounds are normal. There is no distension or abdominal bruit.      Palpations: Abdomen is soft. There is no hepatomegaly, splenomegaly or mass.      Tenderness: There is no abdominal tenderness.      Hernia: No hernia is present.   Musculoskeletal:      Right lower le+ Edema present.      Left lower le+ Edema present.   Lymphadenopathy:      Head:      Right side of head: No submental, submandibular, tonsillar, preauricular, posterior auricular or occipital adenopathy.      Left side of head: No submental, submandibular, tonsillar, preauricular, posterior auricular or occipital adenopathy.      Cervical: No cervical adenopathy.      Upper Body:      Right upper body: No supraclavicular adenopathy.      Left upper body: No supraclavicular adenopathy.   Skin:     General: Skin is warm.      Coloration: Skin is not cyanotic, jaundiced or pale.      Findings: No rash.      Nails: There is no clubbing.   Neurological:      Mental Status: She is alert and oriented to person, place, and time.      Cranial Nerves: No cranial nerve deficit.      Motor: No tremor.      Coordination: Coordination normal.   Psychiatric:         Attention and Perception: Attention normal.         Mood and Affect: Mood normal.         Speech: Speech normal.         Behavior: Behavior normal.         Thought  Content: Thought content normal.         Cognition and Memory: She exhibits impaired recent memory and impaired remote memory.       Assessment & Plan   Problems Addressed this Visit        Cardiac and Vasculature    Essential hypertension   Hypertension: BP somewhat low. Evidence of target organ damage: chronic kidney disease.  Continue current medication for now  If blood pressure remains as good we will consider reducing nifedipine as it is likely contributing to her lower extremity edema    Mixed hyperlipidemia  Continue current medication    NSTEMI (non-ST elevated myocardial infarction) ([facility])       Endocrine and Metabolic    Low serum vitamin B12    Type 2 diabetes mellitus without complication, without long-term current use of insulin ([facility])  Diabetes mellitus Type II, under unknown control.   Prescription written for a new glucometer and supplies  Son will check her glucose once daily alternating between before breakfast and after supper    Relevant Medications    Lancets misc    Glucose Blood (Blood Glucose Test) strip    Vitamin D deficiency disease       Gastrointestinal Abdominal     Gastroesophageal reflux disease without esophagitis       Genitourinary and Reproductive     Chronic renal failure, stage 4 (severe) ([facility])  Reminded to avoid any NSAIDs prescription or OTC  Follow up with nephrology        Musculoskeletal and Injuries    Generalized osteoarthritis       Neuro    Dementia without behavioral disturbance ([facility]) (Chronic)  Supportive therapy       Pulmonary and Pneumonias    Extrinsic asthma  Doing well off medication at present  Encouraged to report if this should change.       Other    Encounter for immunization  Pfizer COVID-19 booster administered  Recommended a Prevnar 20 with her flu shot this fall    Relevant Orders    COVID-19 Vaccine (Pfizer) Gray Cap (Completed)      Diagnoses       Codes Comments    Essential hypertension    -  Primary ICD-10-CM: I10  ICD-9-CM: 401.9     Mixed  hyperlipidemia     ICD-10-CM: E78.2  ICD-9-CM: 272.2     NSTEMI (non-ST elevated myocardial infarction) (HCC)     ICD-10-CM: I21.4  ICD-9-CM: 410.70     Low serum vitamin B12     ICD-10-CM: E53.8  ICD-9-CM: 266.2     Type 2 diabetes mellitus without complication, without long-term current use of insulin (HCC)     ICD-10-CM: E11.9  ICD-9-CM: 250.00     Vitamin D deficiency disease     ICD-10-CM: E55.9  ICD-9-CM: 268.9     Gastroesophageal reflux disease without esophagitis     ICD-10-CM: K21.9  ICD-9-CM: 530.81     Generalized osteoarthritis     ICD-10-CM: M15.9  ICD-9-CM: 715.00     Dementia without behavioral disturbance, unspecified dementia type (HCC)     ICD-10-CM: F03.90  ICD-9-CM: 294.20     Moderate persistent extrinsic asthma without complication     ICD-10-CM: J45.40  ICD-9-CM: 493.00     Chronic renal failure, stage 4 (severe) (HCC)     ICD-10-CM: N18.4  ICD-9-CM: 585.4     Encounter for immunization     ICD-10-CM: Z23  ICD-9-CM: V03.89

## 2022-06-29 VITALS
SYSTOLIC BLOOD PRESSURE: 106 MMHG | RESPIRATION RATE: 16 BRPM | BODY MASS INDEX: 21.55 KG/M2 | HEART RATE: 56 BPM | HEIGHT: 64 IN | OXYGEN SATURATION: 99 % | TEMPERATURE: 98.6 F | DIASTOLIC BLOOD PRESSURE: 52 MMHG

## 2022-07-01 ENCOUNTER — READMISSION MANAGEMENT (OUTPATIENT)
Dept: CALL CENTER | Facility: HOSPITAL | Age: 82
End: 2022-07-01

## 2022-07-01 RX ORDER — CARVEDILOL 6.25 MG/1
6.25 TABLET ORAL 2 TIMES DAILY WITH MEALS
Qty: 60 TABLET | Refills: 0 | Status: SHIPPED | OUTPATIENT
Start: 2022-07-01

## 2022-07-01 RX ORDER — ASPIRIN 81 MG/1
81 TABLET ORAL DAILY
Qty: 30 TABLET | Refills: 0 | Status: SHIPPED | OUTPATIENT
Start: 2022-07-01

## 2022-07-01 RX ORDER — FOLIC ACID 1 MG/1
1 TABLET ORAL DAILY
Qty: 30 TABLET | Refills: 0 | Status: SHIPPED | OUTPATIENT
Start: 2022-07-01

## 2022-07-01 RX ORDER — CALCIUM ACETATE 667 MG/1
1334 CAPSULE ORAL
Qty: 180 CAPSULE | Refills: 0 | Status: SHIPPED | OUTPATIENT
Start: 2022-07-01 | End: 2022-07-13 | Stop reason: HOSPADM

## 2022-07-01 RX ORDER — NIFEDIPINE 30 MG/1
60 TABLET, FILM COATED, EXTENDED RELEASE ORAL
Qty: 60 TABLET | Refills: 0 | Status: SHIPPED | OUTPATIENT
Start: 2022-07-01

## 2022-07-01 NOTE — TELEPHONE ENCOUNTER
Caller: Dylon Chen Jr    Relationship: Emergency Contact    Best call back number: [phone]    Requested Prescriptions:   Requested Prescriptions     Pending Prescriptions Disp Refills   • aspirin 81 MG EC tablet 30 tablet 0     Sig: Take 1 tablet by mouth Daily.   • folic acid (FOLVITE) 1 MG tablet 30 tablet 0     Sig: Take 1 tablet by mouth Daily.   • carvedilol (COREG) 6.25 MG tablet 60 tablet 0     Sig: Take 1 tablet by mouth 2 (Two) Times a Day With Meals.   • calcium acetate (PHOS BINDER,) 667 MG capsule capsule 180 capsule 0     Sig: Take 2 capsules by mouth 3 (Three) Times a Day With Meals.   • NIFEdipine CC (ADALAT CC) 30 MG 24 hr tablet 60 tablet 0     Sig: Take 2 tablets by mouth Daily.        Pharmacy where request should be sent: [facility] [address] [place] [address] [phone]  - [phone]     Additional details provided by patient: DYLON STATED THAT HE THOUGHT THESE ALL GOT REFILLED WHEN THEY WERE IN OFFICE LAST BUT HAS NOT BEEN CALLED INTO PHARMACY FOR PATIENT AND SHE WILL BE OUT OVER WEEKEND    PLEASE ADVISE    Does the patient have less than a 3 day supply:  [x] Yes  [] No    Lor Douglas   07/01/22 09:40 EDT

## 2022-07-01 NOTE — OUTREACH NOTE
COPD/PN Week 4 Survey    Flowsheet Row Responses   [facility] patient discharged from? [facility]   Does the patient have one of the following disease processes/diagnoses(primary or secondary)? COPD/Pneumonia   Was the primary reason for admission: Pneumonia   Week 4 attempt successful? Yes   Call start time 1311   Call end time 1317   Discharge diagnosis Acute hypoxemic respiratory failure Sepsis - Dementia Pneumonia  Acute Kidney Injury    List who call center can speak with Chad   Person spoke with today (if not patient) and relationship Chad   Meds reviewed with patient/caregiver? Yes   Is the patient having any side effects they believe may be caused by any medication additions or changes? No   Is the patient taking all medications as directed (includes completed medication regime)? Yes   Has the patient kept scheduled appointments due by today? Yes   Is the patient still receiving Home Health Services? Yes   Home health comments home health is still coming   Pulse Ox monitoring None   Psychosocial issues? No   What is the patient's perception of their health status since discharge? New symptoms unrelated to diagnosis  [Patient still having some swelling and weeping of lower legs]   Nursing Interventions Nurse provided patient education   If the patient is a current smoker, are they able to teach back resources for cessation? Not a smoker   Is the patient/caregiver able to teach back the hierarchy of who to call/visit for symptoms/problems? PCP, Specialist, Home health nurse, Urgent Care, ED, 911 Yes   Is the patient/caregiver able to teach back signs and symptoms of worsening condition: Fever/chills, Shortness of breath, Chest pain   Is the patient/caregiver able to teach back importance of completing antibiotic course of treatment? Yes   Week 4 call completed? Yes   Would the patient like one additional call? Yes          DEVI HENDRICKSON - Registered Nurse

## 2022-07-02 ENCOUNTER — HOSPITAL ENCOUNTER (INPATIENT)
Facility: HOSPITAL | Age: 82
LOS: 11 days | Discharge: HOSPICE | End: 2022-07-13
Attending: EMERGENCY MEDICINE | Admitting: STUDENT IN AN ORGANIZED HEALTH CARE EDUCATION/TRAINING PROGRAM

## 2022-07-02 ENCOUNTER — APPOINTMENT (OUTPATIENT)
Dept: CT IMAGING | Facility: HOSPITAL | Age: 82
End: 2022-07-02

## 2022-07-02 ENCOUNTER — APPOINTMENT (OUTPATIENT)
Dept: GENERAL RADIOLOGY | Facility: HOSPITAL | Age: 82
End: 2022-07-02

## 2022-07-02 ENCOUNTER — READMISSION MANAGEMENT (OUTPATIENT)
Dept: CALL CENTER | Facility: HOSPITAL | Age: 82
End: 2022-07-02

## 2022-07-02 DIAGNOSIS — E16.2 HYPOGLYCEMIA: Primary | ICD-10-CM

## 2022-07-02 DIAGNOSIS — Z51.5 HOSPICE CARE PATIENT: ICD-10-CM

## 2022-07-02 LAB
A-A DO2: 69.5 MMHG (ref 0–300)
ALBUMIN SERPL-MCNC: 3.17 G/DL (ref 3.5–5.2)
ALBUMIN/GLOB SERPL: 1 G/DL
ALP SERPL-CCNC: 113 U/L (ref 39–117)
ALT SERPL W P-5'-P-CCNC: 7 U/L (ref 1–33)
AMMONIA BLD-SCNC: <10 UMOL/L (ref 11–51)
AMPHET+METHAMPHET UR QL: NEGATIVE
AMPHETAMINES UR QL: NEGATIVE
ANION GAP SERPL CALCULATED.3IONS-SCNC: 13.7 MMOL/L (ref 5–15)
APTT PPP: 36 SECONDS (ref 26.5–34.5)
ARTERIAL PATENCY WRIST A: POSITIVE
AST SERPL-CCNC: 16 U/L (ref 1–32)
ATMOSPHERIC PRESS: 727 MMHG
BACTERIA UR QL AUTO: ABNORMAL /HPF
BACTERIA UR QL AUTO: ABNORMAL /HPF
BARBITURATES UR QL SCN: NEGATIVE
BASE EXCESS BLDA CALC-SCNC: 3 MMOL/L (ref 0–2)
BASOPHILS # BLD AUTO: 0.01 10*3/MM3 (ref 0–0.2)
BASOPHILS # BLD AUTO: 0.03 10*3/MM3 (ref 0–0.2)
BASOPHILS NFR BLD AUTO: 0.1 % (ref 0–1.5)
BASOPHILS NFR BLD AUTO: 0.3 % (ref 0–1.5)
BDY SITE: ABNORMAL
BENZODIAZ UR QL SCN: NEGATIVE
BILIRUB SERPL-MCNC: 0.4 MG/DL (ref 0–1.2)
BILIRUB UR QL STRIP: NEGATIVE
BILIRUB UR QL STRIP: NEGATIVE
BODY TEMPERATURE: 0 C
BUN SERPL-MCNC: 21 MG/DL (ref 8–23)
BUN/CREAT SERPL: 8.4 (ref 7–25)
BUPRENORPHINE SERPL-MCNC: NEGATIVE NG/ML
CALCIUM SPEC-SCNC: 8.5 MG/DL (ref 8.6–10.5)
CANNABINOIDS SERPL QL: NEGATIVE
CHLORIDE SERPL-SCNC: 94 MMOL/L (ref 98–107)
CK SERPL-CCNC: 41 U/L (ref 20–180)
CLARITY UR: ABNORMAL
CLARITY UR: ABNORMAL
CO2 BLDA-SCNC: 28.5 MMOL/L (ref 22–33)
CO2 SERPL-SCNC: 24.3 MMOL/L (ref 22–29)
COCAINE UR QL: NEGATIVE
COHGB MFR BLD: 1.4 % (ref 0–5)
COLOR UR: YELLOW
COLOR UR: YELLOW
CREAT SERPL-MCNC: 2.49 MG/DL (ref 0.57–1)
CRP SERPL-MCNC: 1.96 MG/DL (ref 0–0.5)
D-LACTATE SERPL-SCNC: 0.7 MMOL/L (ref 0.5–2)
DEPRECATED RDW RBC AUTO: 48 FL (ref 37–54)
DEPRECATED RDW RBC AUTO: 48.5 FL (ref 37–54)
EGFRCR SERPLBLD CKD-EPI 2021: 19 ML/MIN/1.73
EOSINOPHIL # BLD AUTO: 0 10*3/MM3 (ref 0–0.4)
EOSINOPHIL # BLD AUTO: 0 10*3/MM3 (ref 0–0.4)
EOSINOPHIL NFR BLD AUTO: 0 % (ref 0.3–6.2)
EOSINOPHIL NFR BLD AUTO: 0 % (ref 0.3–6.2)
ERYTHROCYTE [DISTWIDTH] IN BLOOD BY AUTOMATED COUNT: 14.6 % (ref 12.3–15.4)
ERYTHROCYTE [DISTWIDTH] IN BLOOD BY AUTOMATED COUNT: 14.6 % (ref 12.3–15.4)
ERYTHROCYTE [SEDIMENTATION RATE] IN BLOOD: 40 MM/HR (ref 0–30)
ETHANOL BLD-MCNC: <10 MG/DL (ref 0–10)
ETHANOL UR QL: <0.01 %
FLUAV SUBTYP SPEC NAA+PROBE: NOT DETECTED
FLUBV RNA ISLT QL NAA+PROBE: NOT DETECTED
GAS FLOW AIRWAY: 2 LPM
GLOBULIN UR ELPH-MCNC: 3.1 GM/DL
GLUCOSE BLDC GLUCOMTR-MCNC: 114 MG/DL (ref 70–130)
GLUCOSE BLDC GLUCOMTR-MCNC: 88 MG/DL (ref 70–130)
GLUCOSE SERPL-MCNC: 42 MG/DL (ref 65–99)
GLUCOSE UR STRIP-MCNC: NEGATIVE MG/DL
GLUCOSE UR STRIP-MCNC: NEGATIVE MG/DL
HCO3 BLDA-SCNC: 27.3 MMOL/L (ref 20–26)
HCT VFR BLD AUTO: 30.4 % (ref 34–46.6)
HCT VFR BLD AUTO: 32.7 % (ref 34–46.6)
HCT VFR BLD CALC: 33 % (ref 38–51)
HGB BLD-MCNC: 11 G/DL (ref 12–15.9)
HGB BLD-MCNC: 9.9 G/DL (ref 12–15.9)
HGB BLDA-MCNC: 10.8 G/DL (ref 13.5–17.5)
HGB UR QL STRIP.AUTO: ABNORMAL
HGB UR QL STRIP.AUTO: ABNORMAL
HYALINE CASTS UR QL AUTO: ABNORMAL /LPF
HYALINE CASTS UR QL AUTO: ABNORMAL /LPF
IMM GRANULOCYTES # BLD AUTO: 0.01 10*3/MM3 (ref 0–0.05)
IMM GRANULOCYTES # BLD AUTO: 0.04 10*3/MM3 (ref 0–0.05)
IMM GRANULOCYTES NFR BLD AUTO: 0.1 % (ref 0–0.5)
IMM GRANULOCYTES NFR BLD AUTO: 0.4 % (ref 0–0.5)
INHALED O2 CONCENTRATION: 28 %
INR PPP: 0.93 (ref 0.9–1.1)
KETONES UR QL STRIP: NEGATIVE
KETONES UR QL STRIP: NEGATIVE
LEUKOCYTE ESTERASE UR QL STRIP.AUTO: ABNORMAL
LEUKOCYTE ESTERASE UR QL STRIP.AUTO: ABNORMAL
LYMPHOCYTES # BLD AUTO: 0.54 10*3/MM3 (ref 0.7–3.1)
LYMPHOCYTES # BLD AUTO: 0.6 10*3/MM3 (ref 0.7–3.1)
LYMPHOCYTES NFR BLD AUTO: 5.9 % (ref 19.6–45.3)
LYMPHOCYTES NFR BLD AUTO: 7.7 % (ref 19.6–45.3)
Lab: ABNORMAL
MCH RBC QN AUTO: 29.5 PG (ref 26.6–33)
MCH RBC QN AUTO: 30.1 PG (ref 26.6–33)
MCHC RBC AUTO-ENTMCNC: 32.6 G/DL (ref 31.5–35.7)
MCHC RBC AUTO-ENTMCNC: 33.6 G/DL (ref 31.5–35.7)
MCV RBC AUTO: 89.3 FL (ref 79–97)
MCV RBC AUTO: 90.5 FL (ref 79–97)
METHADONE UR QL SCN: NEGATIVE
METHGB BLD QL: 0.2 % (ref 0–3)
MODALITY: ABNORMAL
MONOCYTES # BLD AUTO: 0.37 10*3/MM3 (ref 0.1–0.9)
MONOCYTES # BLD AUTO: 0.61 10*3/MM3 (ref 0.1–0.9)
MONOCYTES NFR BLD AUTO: 5.2 % (ref 5–12)
MONOCYTES NFR BLD AUTO: 6 % (ref 5–12)
NEUTROPHILS NFR BLD AUTO: 6.12 10*3/MM3 (ref 1.7–7)
NEUTROPHILS NFR BLD AUTO: 8.9 10*3/MM3 (ref 1.7–7)
NEUTROPHILS NFR BLD AUTO: 86.9 % (ref 42.7–76)
NEUTROPHILS NFR BLD AUTO: 87.4 % (ref 42.7–76)
NITRITE UR QL STRIP: POSITIVE
NITRITE UR QL STRIP: POSITIVE
NOTE: ABNORMAL
NOTIFIED BY: ABNORMAL
NOTIFIED WHO: ABNORMAL
NRBC BLD AUTO-RTO: 0 /100 WBC (ref 0–0.2)
NRBC BLD AUTO-RTO: 0 /100 WBC (ref 0–0.2)
NT-PROBNP SERPL-MCNC: ABNORMAL PG/ML (ref 0–1800)
OPIATES UR QL: NEGATIVE
OXYCODONE UR QL SCN: NEGATIVE
OXYHGB MFR BLDV: 95.4 % (ref 94–99)
PCO2 BLDA: 39.5 MM HG (ref 35–45)
PCO2 TEMP ADJ BLD: ABNORMAL MM[HG]
PCP UR QL SCN: NEGATIVE
PH BLDA: 7.45 PH UNITS (ref 7.35–7.45)
PH UR STRIP.AUTO: 8 [PH] (ref 5–8)
PH UR STRIP.AUTO: 8 [PH] (ref 5–8)
PH, TEMP CORRECTED: ABNORMAL
PLATELET # BLD AUTO: 241 10*3/MM3 (ref 140–450)
PLATELET # BLD AUTO: 259 10*3/MM3 (ref 140–450)
PMV BLD AUTO: 9.5 FL (ref 6–12)
PMV BLD AUTO: 9.7 FL (ref 6–12)
PO2 BLDA: 76.8 MM HG (ref 83–108)
PO2 TEMP ADJ BLD: ABNORMAL MM[HG]
POTASSIUM SERPL-SCNC: 3.5 MMOL/L (ref 3.5–5.2)
PROCALCITONIN SERPL-MCNC: 0.16 NG/ML (ref 0–0.25)
PROPOXYPH UR QL: NEGATIVE
PROT SERPL-MCNC: 6.3 G/DL (ref 6–8.5)
PROT UR QL STRIP: ABNORMAL
PROT UR QL STRIP: ABNORMAL
PROTHROMBIN TIME: 12.7 SECONDS (ref 12.1–14.7)
RBC # BLD AUTO: 3.36 10*6/MM3 (ref 3.77–5.28)
RBC # BLD AUTO: 3.66 10*6/MM3 (ref 3.77–5.28)
RBC # UR STRIP: ABNORMAL /HPF
RBC # UR STRIP: ABNORMAL /HPF
REF LAB TEST METHOD: ABNORMAL
REF LAB TEST METHOD: ABNORMAL
SAO2 % BLDCOA: 97 % (ref 94–99)
SARS-COV-2 RNA PNL SPEC NAA+PROBE: NOT DETECTED
SODIUM SERPL-SCNC: 132 MMOL/L (ref 136–145)
SP GR UR STRIP: 1.01 (ref 1–1.03)
SP GR UR STRIP: 1.01 (ref 1–1.03)
SQUAMOUS #/AREA URNS HPF: ABNORMAL /HPF
SQUAMOUS #/AREA URNS HPF: ABNORMAL /HPF
T4 FREE SERPL-MCNC: 1.54 NG/DL (ref 0.93–1.7)
TRANS CELLS #/AREA URNS HPF: ABNORMAL /HPF
TRICYCLICS UR QL SCN: NEGATIVE
TROPONIN T SERPL-MCNC: 0.06 NG/ML (ref 0–0.03)
TROPONIN T SERPL-MCNC: 0.06 NG/ML (ref 0–0.03)
TSH SERPL DL<=0.05 MIU/L-ACNC: 1.39 UIU/ML (ref 0.27–4.2)
UROBILINOGEN UR QL STRIP: ABNORMAL
UROBILINOGEN UR QL STRIP: ABNORMAL
VENTILATOR MODE: ABNORMAL
WBC # UR STRIP: ABNORMAL /HPF
WBC # UR STRIP: ABNORMAL /HPF
WBC CASTS #/AREA URNS LPF: ABNORMAL /LPF
WBC NRBC COR # BLD: 10.18 10*3/MM3 (ref 3.4–10.8)
WBC NRBC COR # BLD: 7.05 10*3/MM3 (ref 3.4–10.8)

## 2022-07-02 PROCEDURE — 82550 ASSAY OF CK (CPK): CPT | Performed by: EMERGENCY MEDICINE

## 2022-07-02 PROCEDURE — 83880 ASSAY OF NATRIURETIC PEPTIDE: CPT | Performed by: EMERGENCY MEDICINE

## 2022-07-02 PROCEDURE — 87077 CULTURE AEROBIC IDENTIFY: CPT | Performed by: STUDENT IN AN ORGANIZED HEALTH CARE EDUCATION/TRAINING PROGRAM

## 2022-07-02 PROCEDURE — 36415 COLL VENOUS BLD VENIPUNCTURE: CPT

## 2022-07-02 PROCEDURE — 81001 URINALYSIS AUTO W/SCOPE: CPT | Performed by: STUDENT IN AN ORGANIZED HEALTH CARE EDUCATION/TRAINING PROGRAM

## 2022-07-02 PROCEDURE — 82962 GLUCOSE BLOOD TEST: CPT

## 2022-07-02 PROCEDURE — 93005 ELECTROCARDIOGRAM TRACING: CPT | Performed by: STUDENT IN AN ORGANIZED HEALTH CARE EDUCATION/TRAINING PROGRAM

## 2022-07-02 PROCEDURE — 25010000002 FUROSEMIDE PER 20 MG: Performed by: EMERGENCY MEDICINE

## 2022-07-02 PROCEDURE — 87040 BLOOD CULTURE FOR BACTERIA: CPT | Performed by: EMERGENCY MEDICINE

## 2022-07-02 PROCEDURE — 83605 ASSAY OF LACTIC ACID: CPT | Performed by: EMERGENCY MEDICINE

## 2022-07-02 PROCEDURE — 86140 C-REACTIVE PROTEIN: CPT | Performed by: EMERGENCY MEDICINE

## 2022-07-02 PROCEDURE — 71045 X-RAY EXAM CHEST 1 VIEW: CPT

## 2022-07-02 PROCEDURE — 99223 1ST HOSP IP/OBS HIGH 75: CPT | Performed by: STUDENT IN AN ORGANIZED HEALTH CARE EDUCATION/TRAINING PROGRAM

## 2022-07-02 PROCEDURE — 81001 URINALYSIS AUTO W/SCOPE: CPT | Performed by: EMERGENCY MEDICINE

## 2022-07-02 PROCEDURE — 82077 ASSAY SPEC XCP UR&BREATH IA: CPT | Performed by: EMERGENCY MEDICINE

## 2022-07-02 PROCEDURE — 84145 PROCALCITONIN (PCT): CPT | Performed by: EMERGENCY MEDICINE

## 2022-07-02 PROCEDURE — 70450 CT HEAD/BRAIN W/O DYE: CPT

## 2022-07-02 PROCEDURE — 83050 HGB METHEMOGLOBIN QUAN: CPT

## 2022-07-02 PROCEDURE — 84443 ASSAY THYROID STIM HORMONE: CPT | Performed by: EMERGENCY MEDICINE

## 2022-07-02 PROCEDURE — 83036 HEMOGLOBIN GLYCOSYLATED A1C: CPT | Performed by: STUDENT IN AN ORGANIZED HEALTH CARE EDUCATION/TRAINING PROGRAM

## 2022-07-02 PROCEDURE — 85025 COMPLETE CBC W/AUTO DIFF WBC: CPT | Performed by: STUDENT IN AN ORGANIZED HEALTH CARE EDUCATION/TRAINING PROGRAM

## 2022-07-02 PROCEDURE — 84439 ASSAY OF FREE THYROXINE: CPT | Performed by: EMERGENCY MEDICINE

## 2022-07-02 PROCEDURE — 71250 CT THORAX DX C-: CPT

## 2022-07-02 PROCEDURE — 87081 CULTURE SCREEN ONLY: CPT | Performed by: STUDENT IN AN ORGANIZED HEALTH CARE EDUCATION/TRAINING PROGRAM

## 2022-07-02 PROCEDURE — 36600 WITHDRAWAL OF ARTERIAL BLOOD: CPT

## 2022-07-02 PROCEDURE — 82375 ASSAY CARBOXYHB QUANT: CPT

## 2022-07-02 PROCEDURE — 82140 ASSAY OF AMMONIA: CPT | Performed by: EMERGENCY MEDICINE

## 2022-07-02 PROCEDURE — 85652 RBC SED RATE AUTOMATED: CPT | Performed by: EMERGENCY MEDICINE

## 2022-07-02 PROCEDURE — 87186 SC STD MICRODIL/AGAR DIL: CPT | Performed by: STUDENT IN AN ORGANIZED HEALTH CARE EDUCATION/TRAINING PROGRAM

## 2022-07-02 PROCEDURE — 80053 COMPREHEN METABOLIC PANEL: CPT | Performed by: STUDENT IN AN ORGANIZED HEALTH CARE EDUCATION/TRAINING PROGRAM

## 2022-07-02 PROCEDURE — 84484 ASSAY OF TROPONIN QUANT: CPT | Performed by: EMERGENCY MEDICINE

## 2022-07-02 PROCEDURE — 85730 THROMBOPLASTIN TIME PARTIAL: CPT | Performed by: EMERGENCY MEDICINE

## 2022-07-02 PROCEDURE — 87636 SARSCOV2 & INF A&B AMP PRB: CPT | Performed by: EMERGENCY MEDICINE

## 2022-07-02 PROCEDURE — 82805 BLOOD GASES W/O2 SATURATION: CPT

## 2022-07-02 PROCEDURE — 85610 PROTHROMBIN TIME: CPT | Performed by: EMERGENCY MEDICINE

## 2022-07-02 PROCEDURE — 93005 ELECTROCARDIOGRAM TRACING: CPT | Performed by: EMERGENCY MEDICINE

## 2022-07-02 PROCEDURE — 87086 URINE CULTURE/COLONY COUNT: CPT | Performed by: STUDENT IN AN ORGANIZED HEALTH CARE EDUCATION/TRAINING PROGRAM

## 2022-07-02 PROCEDURE — 74176 CT ABD & PELVIS W/O CONTRAST: CPT

## 2022-07-02 PROCEDURE — 80306 DRUG TEST PRSMV INSTRMNT: CPT | Performed by: EMERGENCY MEDICINE

## 2022-07-02 PROCEDURE — 25010000002 HEPARIN (PORCINE) PER 1000 UNITS: Performed by: STUDENT IN AN ORGANIZED HEALTH CARE EDUCATION/TRAINING PROGRAM

## 2022-07-02 PROCEDURE — 85025 COMPLETE CBC W/AUTO DIFF WBC: CPT | Performed by: EMERGENCY MEDICINE

## 2022-07-02 PROCEDURE — 84484 ASSAY OF TROPONIN QUANT: CPT | Performed by: STUDENT IN AN ORGANIZED HEALTH CARE EDUCATION/TRAINING PROGRAM

## 2022-07-02 PROCEDURE — 99284 EMERGENCY DEPT VISIT MOD MDM: CPT

## 2022-07-02 PROCEDURE — 80053 COMPREHEN METABOLIC PANEL: CPT | Performed by: EMERGENCY MEDICINE

## 2022-07-02 RX ORDER — FUROSEMIDE 10 MG/ML
60 INJECTION INTRAMUSCULAR; INTRAVENOUS ONCE
Status: COMPLETED | OUTPATIENT
Start: 2022-07-02 | End: 2022-07-02

## 2022-07-02 RX ORDER — ATORVASTATIN CALCIUM 40 MG/1
80 TABLET, FILM COATED ORAL NIGHTLY
Status: DISCONTINUED | OUTPATIENT
Start: 2022-07-02 | End: 2022-07-13 | Stop reason: HOSPADM

## 2022-07-02 RX ORDER — ASPIRIN 81 MG/1
81 TABLET ORAL DAILY
Status: DISCONTINUED | OUTPATIENT
Start: 2022-07-03 | End: 2022-07-13 | Stop reason: HOSPADM

## 2022-07-02 RX ORDER — DEXTROSE MONOHYDRATE 25 G/50ML
INJECTION, SOLUTION INTRAVENOUS
Status: COMPLETED
Start: 2022-07-02 | End: 2022-07-02

## 2022-07-02 RX ORDER — ACETAMINOPHEN 325 MG/1
650 TABLET ORAL EVERY 6 HOURS PRN
Status: DISCONTINUED | OUTPATIENT
Start: 2022-07-02 | End: 2022-07-08

## 2022-07-02 RX ORDER — SODIUM CHLORIDE 0.9 % (FLUSH) 0.9 %
10 SYRINGE (ML) INJECTION AS NEEDED
Status: DISCONTINUED | OUTPATIENT
Start: 2022-07-02 | End: 2022-07-13 | Stop reason: HOSPADM

## 2022-07-02 RX ORDER — INSULIN ASPART 100 [IU]/ML
0-7 INJECTION, SOLUTION INTRAVENOUS; SUBCUTANEOUS
Status: DISCONTINUED | OUTPATIENT
Start: 2022-07-03 | End: 2022-07-03

## 2022-07-02 RX ORDER — NIFEDIPINE 30 MG/1
30 TABLET, FILM COATED, EXTENDED RELEASE ORAL
Status: DISCONTINUED | OUTPATIENT
Start: 2022-07-03 | End: 2022-07-03

## 2022-07-02 RX ORDER — HYDRALAZINE HYDROCHLORIDE 20 MG/ML
10 INJECTION INTRAMUSCULAR; INTRAVENOUS EVERY 6 HOURS PRN
Status: DISCONTINUED | OUTPATIENT
Start: 2022-07-02 | End: 2022-07-13 | Stop reason: HOSPADM

## 2022-07-02 RX ORDER — FOLIC ACID 1 MG/1
1 TABLET ORAL DAILY
Status: DISCONTINUED | OUTPATIENT
Start: 2022-07-02 | End: 2022-07-05

## 2022-07-02 RX ORDER — NICOTINE POLACRILEX 4 MG
15 LOZENGE BUCCAL
Status: DISCONTINUED | OUTPATIENT
Start: 2022-07-02 | End: 2022-07-08

## 2022-07-02 RX ORDER — HEPARIN SODIUM 5000 [USP'U]/ML
5000 INJECTION, SOLUTION INTRAVENOUS; SUBCUTANEOUS EVERY 8 HOURS SCHEDULED
Status: DISCONTINUED | OUTPATIENT
Start: 2022-07-02 | End: 2022-07-08

## 2022-07-02 RX ORDER — CALCIUM ACETATE 667 MG/1
1334 CAPSULE ORAL
Status: DISCONTINUED | OUTPATIENT
Start: 2022-07-02 | End: 2022-07-08

## 2022-07-02 RX ORDER — SODIUM CHLORIDE 0.9 % (FLUSH) 0.9 %
10 SYRINGE (ML) INJECTION EVERY 12 HOURS SCHEDULED
Status: DISCONTINUED | OUTPATIENT
Start: 2022-07-02 | End: 2022-07-13 | Stop reason: HOSPADM

## 2022-07-02 RX ORDER — NIFEDIPINE 30 MG/1
30 TABLET, FILM COATED, EXTENDED RELEASE ORAL
Status: DISCONTINUED | OUTPATIENT
Start: 2022-07-02 | End: 2022-07-02

## 2022-07-02 RX ORDER — INSULIN GLARGINE 100 [IU]/ML
20 INJECTION, SOLUTION SUBCUTANEOUS 2 TIMES DAILY PRN
COMMUNITY
End: 2022-07-13 | Stop reason: HOSPADM

## 2022-07-02 RX ORDER — CARVEDILOL 6.25 MG/1
6.25 TABLET ORAL 2 TIMES DAILY WITH MEALS
Status: DISCONTINUED | OUTPATIENT
Start: 2022-07-02 | End: 2022-07-13 | Stop reason: HOSPADM

## 2022-07-02 RX ORDER — DEXTROSE MONOHYDRATE 25 G/50ML
25 INJECTION, SOLUTION INTRAVENOUS ONCE
Status: COMPLETED | OUTPATIENT
Start: 2022-07-02 | End: 2022-07-02

## 2022-07-02 RX ORDER — DEXTROSE MONOHYDRATE 25 G/50ML
25 INJECTION, SOLUTION INTRAVENOUS
Status: DISCONTINUED | OUTPATIENT
Start: 2022-07-02 | End: 2022-07-08

## 2022-07-02 RX ADMIN — FUROSEMIDE 60 MG: 10 INJECTION, SOLUTION INTRAMUSCULAR; INTRAVENOUS at 19:02

## 2022-07-02 RX ADMIN — FOLIC ACID 1 MG: 1 TABLET ORAL at 23:22

## 2022-07-02 RX ADMIN — DEXTROSE MONOHYDRATE 25 G: 25 INJECTION, SOLUTION INTRAVENOUS at 17:35

## 2022-07-02 RX ADMIN — CALCIUM ACETATE 1334 MG: 667 CAPSULE ORAL at 23:22

## 2022-07-02 RX ADMIN — ACETAMINOPHEN 650 MG: 325 TABLET ORAL at 23:53

## 2022-07-02 RX ADMIN — CARVEDILOL 6.25 MG: 6.25 TABLET, FILM COATED ORAL at 23:22

## 2022-07-02 RX ADMIN — ATORVASTATIN CALCIUM 80 MG: 40 TABLET, FILM COATED ORAL at 23:22

## 2022-07-02 RX ADMIN — HEPARIN SODIUM 5000 UNITS: 5000 INJECTION INTRAVENOUS; SUBCUTANEOUS at 23:22

## 2022-07-02 RX ADMIN — SODIUM CHLORIDE 2 G: 9 INJECTION, SOLUTION INTRAVENOUS at 18:13

## 2022-07-02 RX ADMIN — Medication 10 ML: at 23:23

## 2022-07-02 NOTE — ED PROVIDER NOTES
Subjective     History provided by:  Relative   used: No    Altered Mental Status  Presenting symptoms: confusion    Presenting symptoms: no behavior changes, no combativeness, no disorientation, no lethargy, no memory loss, no partial responsiveness and no unresponsiveness    Severity:  Mild  Most recent episode:  More than 2 days ago  Episode history:  Continuous  Timing:  Constant  Progression:  Waxing and waning  Chronicity:  New  Context: not alcohol use, not dementia, not drug use, not head injury, not homeless, taking medications as prescribed, not nursing home resident, not recent change in medication, not recent illness and not recent infection    Associated symptoms: no abdominal pain, normal movement, no agitation, no bladder incontinence, no decreased appetite, no depression, no difficulty breathing, no eye deviation, no fever, no hallucinations, no headaches, no light-headedness, no nausea, no palpitations, no rash, no seizures, no slurred speech, no suicidal behavior, no visual change, no vomiting and no weakness        Review of Systems   Constitutional: Negative for activity change, appetite change, chills, decreased appetite, diaphoresis, fatigue and fever.   HENT: Negative for congestion, ear pain and sore throat.    Eyes: Negative for redness.   Respiratory: Negative for cough, chest tightness, shortness of breath and wheezing.    Cardiovascular: Negative for chest pain, palpitations and leg swelling.   Gastrointestinal: Negative for abdominal pain, diarrhea, nausea and vomiting.   Genitourinary: Negative for bladder incontinence, dysuria and urgency.   Musculoskeletal: Negative for arthralgias, back pain, myalgias and neck pain.   Skin: Negative for pallor, rash and wound.   Neurological: Negative for dizziness, seizures, speech difficulty, weakness, light-headedness and headaches.   Psychiatric/Behavioral: Positive for confusion. Negative for agitation, behavioral problems,  decreased concentration, hallucinations and memory loss.   All other systems reviewed and are negative.      Past Medical History:   Diagnosis Date   • Allergic rhinitis    • Elevated liver enzymes    • Extrinsic asthma    • Gastritis    • GERD (gastroesophageal reflux disease)    • Hyperlipidemia    • Hypertension    • Iron deficiency anemia    • Meniere's disease    • Osteoarthritis    • Type 2 diabetes mellitus (HCC)    • Vitamin D deficiency disease        Allergies   Allergen Reactions   • Keflex [Cephalexin]    • Lodine [Etodolac]    • Penicillins    • Sulfa Antibiotics        Past Surgical History:   Procedure Laterality Date   • APPENDECTOMY     • CARDIAC CATHETERIZATION N/A 5/11/2022    Procedure: Left Heart Cath;  Surgeon: Marcelino Grande MD;  Location: [facility] CATH INVASIVE LOCATION;  Service: Cardiology;  Laterality: N/A;   • CHOLECYSTECTOMY     • HYSTERECTOMY     • INSERTION HEMODIALYSIS CATHETER N/A 5/9/2022    Procedure: HEMODIALYSIS CATHETER INSERTION;  Surgeon: Hans Coates MD;  Location: [facility] OR;  Service: General;  Laterality: N/A;       History reviewed. No pertinent family history.    Social History     Socioeconomic History   • Marital status:      Spouse name: moshe   • Number of children: 1   • Years of education: 8   Tobacco Use   • Smoking status: Never Smoker   • Smokeless tobacco: Never Used   Substance and Sexual Activity   • Alcohol use: No   • Drug use: No   • Sexual activity: Defer           Objective   Physical Exam  Vitals and nursing note reviewed.   Constitutional:       General: She is not in acute distress.     Appearance: Normal appearance. She is well-developed. She is ill-appearing. She is not toxic-appearing or diaphoretic.   HENT:      Head: Normocephalic and atraumatic.      Right Ear: External ear normal.      Left Ear: External ear normal.      Nose: Nose normal.      Mouth/Throat:      Pharynx: No oropharyngeal exudate.      Tonsils: No tonsillar exudate.    Eyes:      General: Lids are normal.      Conjunctiva/sclera: Conjunctivae normal.      Pupils: Pupils are equal, round, and reactive to light.   Neck:      Thyroid: No thyromegaly.   Cardiovascular:      Rate and Rhythm: Normal rate and regular rhythm.      Pulses: Normal pulses.      Heart sounds: Normal heart sounds, S1 normal and S2 normal.   Pulmonary:      Effort: Pulmonary effort is normal. No tachypnea or respiratory distress.      Breath sounds: Normal breath sounds. No decreased breath sounds, wheezing or rales.   Chest:      Chest wall: No tenderness.   Abdominal:      General: Bowel sounds are normal. There is no distension.      Palpations: Abdomen is soft.      Tenderness: There is no abdominal tenderness. There is no guarding or rebound.   Musculoskeletal:         General: No tenderness or deformity. Normal range of motion.      Cervical back: Full passive range of motion without pain, normal range of motion and neck supple.   Lymphadenopathy:      Cervical: No cervical adenopathy.   Skin:     General: Skin is warm and dry.      Coloration: Skin is not pale.      Findings: No erythema or rash.   Neurological:      Mental Status: She is alert and oriented to person, place, and time.      GCS: GCS eye subscore is 4. GCS verbal subscore is 5. GCS motor subscore is 6.      Cranial Nerves: No cranial nerve deficit.      Sensory: No sensory deficit.   Psychiatric:         Speech: Speech normal.         Behavior: Behavior normal.         Thought Content: Thought content normal.         Judgment: Judgment normal.         Procedures           ED Course  ED Course as of 07/03/22 0738   Sat Jul 02, 2022   1618 ECG 12 Lead  Vent. rate 68 BPM  DC interval 160 ms  QRS duration 138 ms  QT/QTcB 536/569 ms  P-R-T axes -27 152 256  Normal sinus rhythm  Right bundle branch block  T wave abnormality, consider inferolateral ischemia  Abnormal ECG  When compared with ECG of 05-JUN-2022 06:18,  DC interval has decreased  [ES]   1703 XR Chest 1 View  IMPRESSION:     1. Bilateral effusions, relatively stable on the right and improved on the left.  2. Vascular congestion without definite pulmonary edema.  3. Bibasilar infiltrate or atelectasis, right greater than left. [ES]   1755 CT Head Without Contrast  IMPRESSION:     1.  Presumed chronic microvascular ischemic changes, advanced. No definite superimposed acute process. [ES]   1755 CT Chest Without Contrast Diagnostic  IMPRESSION:  1.  Large right and moderate left pleural effusions. Areas of consolidative change and nodularity seen adjacent to the pleural effusions within the dependent lungs. Some scattered reticular nodularity in the right apex. Findings favor volume overload or  multifocal pneumonia with bilateral pleural effusions.  2.  Cardiomegaly.  3.  Severe coronary artery atherosclerosis. [ES]   1800 CT Abdomen Pelvis Without Contrast  IMPRESSION:     Evaluation of the solid viscera is compromised by lack of IV contrast. Additionally, the patient's arms are within the gantry which produces significant artifact which further compromises evaluation of the abdomen and pelvis. Exam is moderately to  severely degraded. Allowing for this:     1.  Diffuse body wall edema.  2.  See separate CT chest.  3.  Extensive vascular calcifications.  4.  No definite acute process in the abdomen or pelvis by noncontrast CT. [ES]      ED Course User Index  [ES] Jone Schaffer MD                                           MDM  Number of Diagnoses or Management Options     Amount and/or Complexity of Data Reviewed  Clinical lab tests: reviewed and ordered  Tests in the radiology section of CPT®: reviewed and ordered  Tests in the medicine section of CPT®: reviewed and ordered  Review and summarize past medical records: yes  Independent visualization of images, tracings, or specimens: yes    Risk of Complications, Morbidity, and/or Mortality  Presenting problems: moderate  Diagnostic  procedures: moderate  Management options: moderate    Patient Progress  Patient progress: stable      Final diagnoses:   Hypoglycemia       ED Disposition  ED Disposition     ED Disposition   Decision to Admit    Condition   --    Comment   Level of Care: Telemetry [5]   Diagnosis: Hypoglycemia [107599]   Certification: I Certify That Inpatient Hospital Services Are Medically Necessary For Greater Than 2 Midnights               No follow-up provider specified.       Medication List      No changes were made to your prescriptions during this visit.          Jone Schaffer MD  07/03/22 0772

## 2022-07-02 NOTE — H&P
"     [facility] Medicine Services  HISTORY & PHYSICAL    Patient Identification:  Name:  Sean Chen  Age:  82 y.o.  Sex:  female  :  1940  MRN:  [id number]   Visit Number:  [id number]  Admit Date: 2022   Primary Care Physician:  Ganga Gallardo MD     Subjective     Chief complaint:   Chief Complaint   Patient presents with   • Leg Swelling   • Fatigue     History of presenting illness:   Patient is a 82 y.o. female with past medical history significant for progressive dementia, type 2 diabetes mellitus, ESRD on HD, essential hypertension, hyperlipidemia, rheumatoid arthritis, severe pulmonary hypertension, GERD, that presented to the [facility] emergency department for evaluation of bilateral leg edema and fatigue.    Evaluated the patient at bedside with attending, Dr. Tomas.  provided the entire history of presenting illness. Patient is awake but moaning and not able to provide an extensive supplemental history at this time. She is alert and oriented to the location and will respond to her name although she was not able to verbally tell us her name. Per the  the patient was doing very well and had a good appetite yesterday. She also received dialysis yesterday and had \"some fluid taken off.\" He states they monitor her urine output at home and depending on the amount, she may skip a dose of dialysis. She receives dialysis on Monday, Wednesday and Friday schedule. The  states that this morning the patient was very lethargic and refusing to eat. As the day progressed her symptoms worsened. He admits to giving her a dose of insulin last night as her glucose of around 220. The  reports their PCP told them to give the insulin shot if her glucose was >200. The  denies the patient having any fever, chills, diaphoresis, abdominal pain, nausea or vomiting. She has had a difficult time ambulating secondary to generalized weakness " however. He further endorses that the patient has an open wound on her left lower posterior leg that is being monitored in the outpatient setting and she was recently stated on antibiotics for a possible infection. He states she was received unna boots  but does not like to wear them and will often tear them off. The patient currently lives at home with her  and father in law who both assist in her care.     Upon arrival to the ED, vitals were temperature 98.8 °F, pulse 65, respirations 18, /84, SPO2 100% on 2 L NC.  Troponin T 0.061.  proBNP greater than 70,000.  CMP of glucose 42, sodium 132, chloride 94, creatinine 2.49, calcium 8.5, GFR 19, albumin 3.17.  CBC with RBC 3.66, hemoglobin 11.0, hematocrit 32.7, otherwise unremarkable.  UA with cloudy appearance, 1+ blood, positive nitrites, moderate leukocytes, 3+ protein, 13-20 WBC, 2+ bacteria.  Blood cultures pending x2.  UDS negative.  Blood ethanol unremarkable.  Chest x-ray shows bilateral effusions are relatively stable in the right and improved on the left, vascular congestion without definite pulmonary edema, bibasilar infiltrate or atelectasis, right greater than left.  CT of the abdomen/pelvis with contrast shows a significantly degraded study due to lack of IV contrast and artifact.  CT of the chest without contrast shows a large right and moderate left pleural effusion, areas of consolidative change and nodularity seen adjacent to the pleural effusions with dependent lungs, some scattered reticular nodularity in the right apex, cardiomegaly, severe coronary artery atherosclerosis.  EKG with normal sinus rhythm, right bundle branch block, heart rate 68, QTc 569 MS.    Patient has been admitted to the telemetry floor for further evaluation and treatment    Patient's  and attending Dr. Tomas at bedside during exam  ---------------------------------------------------------------------------------------------------------------------    Review of Systems   Reason unable to perform ROS: provided by .   Constitutional: Positive for appetite change and fatigue. Negative for chills, diaphoresis and fever.   HENT: Negative for congestion, sinus pain and sore throat.    Respiratory: Negative for cough, shortness of breath and wheezing.    Cardiovascular: Positive for leg swelling. Negative for chest pain and palpitations.   Gastrointestinal: Negative for abdominal pain, constipation, diarrhea, nausea and vomiting.   Genitourinary: Negative for dysuria and frequency.   Musculoskeletal: Negative for gait problem and myalgias.   Skin: Positive for wound (left lower leg).   Neurological: Positive for weakness (generalized ). Negative for dizziness and light-headedness.   Psychiatric/Behavioral: Negative for confusion.      ---------------------------------------------------------------------------------------------------------------------   Past Medical History:   Diagnosis Date   • Allergic rhinitis    • Elevated liver enzymes    • Extrinsic asthma    • Gastritis    • GERD (gastroesophageal reflux disease)    • Hyperlipidemia    • Hypertension    • Iron deficiency anemia    • Meniere's disease    • Osteoarthritis    • Type 2 diabetes mellitus (HCC)    • Vitamin D deficiency disease      Past Surgical History:   Procedure Laterality Date   • APPENDECTOMY     • CARDIAC CATHETERIZATION N/A 5/11/2022    Procedure: Left Heart Cath;  Surgeon: Marcelino Grande MD;  Location: [facility] INVASIVE LOCATION;  Service: Cardiology;  Laterality: N/A;   • CHOLECYSTECTOMY     • HYSTERECTOMY     • INSERTION HEMODIALYSIS CATHETER N/A 5/9/2022    Procedure: HEMODIALYSIS CATHETER INSERTION;  Surgeon: Hans Coates MD;  Location: [facility] OR;  Service: General;  Laterality: N/A;     History reviewed. No pertinent family history.  Social History     Socioeconomic History   • Marital status:      Spouse name: moshe   • Number of children: 1   • Years of  education: 8   Tobacco Use   • Smoking status: Never Smoker   • Smokeless tobacco: Never Used   Substance and Sexual Activity   • Alcohol use: No   • Drug use: No   • Sexual activity: Defer     ---------------------------------------------------------------------------------------------------------------------   Allergies:  Keflex [cephalexin], Lodine [etodolac], Penicillins, and Sulfa antibiotics  ---------------------------------------------------------------------------------------------------------------------   Medications below are reported home medications pulling from within the system; at this time, these medications have not been reconciled unless otherwise specified and are in the verification process for further verifcation as current home medications.    Prior to Admission Medications     Prescriptions Last Dose Informant Patient Reported? Taking?    albuterol sulfate HFA (ProAir HFA) 108 (90 Base) MCG/ACT inhaler  Pharmacy No No    Inhale 2 puffs 4 (Four) Times a Day.    aspirin 81 MG EC tablet   No No    Take 1 tablet by mouth Daily.    atorvastatin (LIPITOR) 80 MG tablet  Pharmacy No No    Take 1 tablet by mouth Every Night.    calcium acetate (PHOS BINDER,) 667 MG capsule capsule   No No    Take 2 capsules by mouth 3 (Three) Times a Day With Meals.    carvedilol (COREG) 6.25 MG tablet   No No    Take 1 tablet by mouth 2 (Two) Times a Day With Meals.    folic acid (FOLVITE) 1 MG tablet   No No    Take 1 tablet by mouth Daily.    Glucose Blood (Blood Glucose Test) strip   No No    1 daily    Lancets misc   No No    1 daily    NIFEdipine CC (ADALAT CC) 30 MG 24 hr tablet   No No    Take 2 tablets by mouth Daily.        ---------------------------------------------------------------------------------------------------------------------    Objective     Hospital Scheduled Meds:  furosemide, 60 mg, Intravenous, Once           Current listed hospital scheduled medications may not yet reflect those  currently placed in orders that are signed and held, awaiting patient's arrival to floor/unit.    ---------------------------------------------------------------------------------------------------------------------   Vital Signs:  Temp:  [98.8 °F (37.1 °C)] 98.8 °F (37.1 °C)  Heart Rate:  [65-67] 65  Resp:  [18] 18  BP: (159)/(84) 159/84  Mean Arterial Pressure (Non-Invasive) for the past 24 hrs (Last 3 readings):   Noninvasive MAP (mmHg)   07/02/22 1604 129     SpO2 Percentage    07/02/22 1604 07/02/22 1606   SpO2: 98% 100%     SpO2:  [98 %-100 %] 100 %  on  Flow (L/min):  [2] 2;   Device (Oxygen Therapy): nasal cannula    Body mass index is 20.05 kg/m².  Wt Readings from Last 3 Encounters:   07/02/22 58.1 kg (128 lb)   05/31/22 57 kg (125 lb 9.6 oz)   05/13/22 67.1 kg (147 lb 14.4 oz)     ---------------------------------------------------------------------------------------------------------------------   Physical Exam:  Physical Exam  Constitutional:       General: She is awake.      Appearance: Normal appearance. She is well-developed and normal weight. She is ill-appearing (chronically ).      Interventions: Nasal cannula in place.      Comments: 2L NC   HENT:      Head: Normocephalic and atraumatic.   Eyes:      General: Lids are normal.   Cardiovascular:      Rate and Rhythm: Normal rate and regular rhythm.      Pulses: Normal pulses.           Dorsalis pedis pulses are 2+ on the right side and 2+ on the left side.        Posterior tibial pulses are 2+ on the right side and 2+ on the left side.      Heart sounds: Normal heart sounds. No murmur heard.    No friction rub. No gallop.   Pulmonary:      Effort: Pulmonary effort is normal. No tachypnea.      Breath sounds: Decreased breath sounds (diffusely diminished ) present.      Comments: Crackles in bilateral lower lobes   Abdominal:      General: Bowel sounds are decreased.      Palpations: Abdomen is soft.      Tenderness: There is no abdominal  tenderness.   Musculoskeletal:      Right lower le+ Edema present.      Left lower le+ Edema present.   Skin:     Comments: Skin tear on left lower posterior leg    Neurological:      General: No focal deficit present.      Mental Status: She is alert. She is disoriented.   Psychiatric:         Speech: Speech normal.         Behavior: Behavior is cooperative.         Cognition and Memory: Cognition normal.       ---------------------------------------------------------------------------------------------------------------------  EKG:    Normal sinus rhythm, right bundle branch block, heart rate 68, QTc 569 MS.    Telemetry:    NSR, HR 84 bpm, SpO2 98% on 2L NC.     I have personally reviewed the EKG/Telemetry strip  ---------------------------------------------------------------------------------------------------------------------   Results from last 7 days   Lab Units 22  1626   CK TOTAL U/L 41   TROPONIN T ng/mL 0.061*     Results from last 7 days   Lab Units 22  1626   PROBNP pg/mL >70,000.0*         Results from last 7 days   Lab Units 22  1626   CRP mg/dL 1.96*   LACTATE mmol/L 0.7   WBC 10*3/mm3 7.05   HEMOGLOBIN g/dL 11.0*   HEMATOCRIT % 32.7*   MCV fL 89.3   MCHC g/dL 33.6   PLATELETS 10*3/mm3 259   INR  0.93     Results from last 7 days   Lab Units 07/02/22  1626   SODIUM mmol/L 132*   POTASSIUM mmol/L 3.5   CHLORIDE mmol/L 94*   CO2 mmol/L 24.3   BUN mg/dL 21   CREATININE mg/dL 2.49*   CALCIUM mg/dL 8.5*   GLUCOSE mg/dL 42*   ALBUMIN g/dL 3.17*   BILIRUBIN mg/dL 0.4   ALK PHOS U/L 113   AST (SGOT) U/L 16   ALT (SGPT) U/L 7   Estimated Creatinine Clearance: 16.3 mL/min (A) (by C-G formula based on SCr of 2.49 mg/dL (H)).  Ammonia   Date Value Ref Range Status   2022 <10 (L) 11 - 51 umol/L Final       Glucose   Date/Time Value Ref Range Status   2022 1802 114 70 - 130 mg/dL Final     Comment:     Meter: [id number] : SHWETA MARKS     Lab Results    Component Value Date    HGBA1C 5.50 04/29/2022     Lab Results   Component Value Date    TSH 1.390 07/02/2022    FREET4 1.54 07/02/2022     Pain Management Panel     Pain Management Panel Latest Ref Rng & Units 7/2/2022 5/23/2022    CREATININE UR mg/dL - -    AMPHETAMINES SCREEN, URINE Negative Negative Negative    BARBITURATES SCREEN Negative Negative Negative    BENZODIAZEPINE SCREEN, URINE Negative Negative Negative    BUPRENORPHINEUR Negative Negative Negative    COCAINE SCREEN, URINE Negative Negative Negative    METHADONE SCREEN, URINE Negative Negative Negative    METHAMPHETAMINEUR Negative Negative Negative        I have personally reviewed the above laboratory results.   ---------------------------------------------------------------------------------------------------------------------  Imaging Results (Last 7 Days)     Procedure Component Value Units Date/Time    CT Abdomen Pelvis Without Contrast [[id number]] Collected: 07/02/22 1757     Updated: 07/02/22 1759    Narrative:      CT Abdomen Pelvis WO    INDICATION:   Delirium, abdominal pain, COPD exacerbation    TECHNIQUE:   CT of the abdomen and pelvis without IV contrast. Coronal and sagittal reconstructions were obtained.  Radiation dose reduction techniques included automated exposure control or exposure modulation based on body size. Radiation audit for CT and nuclear  cardiology exams in the last 12 months: 4.     COMPARISON:   None available.    FINDINGS:    See separate CT chest. No destructive osseous lesion.    Evaluation of the solid viscera is compromised by lack of IV contrast. Additionally, the patient's arms are within the gantry which produces significant artifact which further compromises evaluation of the abdomen and pelvis. Allowing for this:    Diffuse body wall edema.    Normal noncontrast appearance of the liver, pancreas, spleen and both adrenal glands. Favor the gallbladder to be surgically absent.    The kidneys are symmetric in  size. Evaluation for solid renal lesion is largely precluded by lack of IV contrast. No hydronephrosis. No renal or ureteral calculus. Likely cyst at the lower pole the right kidney.    Urinary bladder is distended.    No evidence of bowel obstruction. No localizing perienteric inflammatory change.    Normal caliber of the abdominal aorta. Extensive vascular calcifications. No lymphadenopathy within the abdomen or pelvis by size criteria.      Impression:        Evaluation of the solid viscera is compromised by lack of IV contrast. Additionally, the patient's arms are within the gantry which produces significant artifact which further compromises evaluation of the abdomen and pelvis. Exam is moderately to  severely degraded. Allowing for this:    1.  Diffuse body wall edema.  2.  See separate CT chest.  3.  Extensive vascular calcifications.  4.  No definite acute process in the abdomen or pelvis by noncontrast CT.        Signer Name: ADAMS VOSS MD   Signed: 7/2/2022 5:57 PM   Workstation Name: [email or web address]    [facility]    CT Chest Without Contrast Diagnostic [[id number]] Collected: 07/02/22 1752     Updated: 07/02/22 1754    Narrative:      CT Chest WO    INDICATION:   Shortness of air, COPD exacerbation    TECHNIQUE:   CT of the chest without IV contrast. Coronal and sagittal reformatted images. Radiation dose reduction techniques included automated exposure control or exposure modulation based on body size. Radiation audit for CT and nuclear cardiology exams in the  last 12 months: 4.    COMPARISON:   May 23, 2022    FINDINGS:     Large right and moderate left pleural effusions. Areas of consolidative change and nodularity seen adjacent to the pleural effusions within the dependent lungs. Some scattered reticular nodularity in the right apex. Findings favor volume overload or  multifocal pneumonia with bilateral pleural effusions.    Cardiomegaly. Severe coronary artery atherosclerosis.  Evaluation of the soft tissues of the chest are otherwise compromised by lack of IV contrast. No overt lymphadenopathy.    No destructive bony lesion.    See separately dictated CT abdomen/pelvis for findings below the diaphragm.      Impression:      1.  Large right and moderate left pleural effusions. Areas of consolidative change and nodularity seen adjacent to the pleural effusions within the dependent lungs. Some scattered reticular nodularity in the right apex. Findings favor volume overload or  multifocal pneumonia with bilateral pleural effusions.  2.  Cardiomegaly.  3.  Severe coronary artery atherosclerosis.    Signer Name: ADAMS VOSS MD   Signed: 7/2/2022 5:52 PM   Workstation Name: [email or web address]    [facility]    CT Head Without Contrast [[id number]] Collected: 07/02/22 1749     Updated: 07/02/22 1751    Narrative:      CT Head WO    HISTORY:   Delirium    TECHNIQUE:   Routine noncontrast head CT. Radiation dose reduction techniques included automated exposure control or exposure modulation based on body size. Radiation audit for CT and nuclear cardiology exams in the last 12 months: 4.    COMPARISON:   June 5, 2022    FINDINGS:       No acute intracranial hemorrhage, mass lesion, or abnormal extra-axial fluid collection. No midline shift or focal mass effect. Ventricular system is normal in size and configuration. .    The gray-white matter differentiation is preserved. There are scattered ill-defined and patchy hypoattenuating foci within the bilateral cerebral and deep white matter which are nonspecific but most commonly associated with chronic microvascular ischemic  change. These are advanced.    Visualized paranasal sinuses are clear. Visualized mastoid air cells are clear.    No acute osseous abnormality.        Impression:        1.  Presumed chronic microvascular ischemic changes, advanced. No definite superimposed acute process.    Signer Name: ADAMS VOSS MD   Signed:  7/2/2022 5:49 PM   Workstation Name: [email or web address]    [facility]    XR Chest 1 View [[id number]] Collected: 07/02/22 1625     Updated: 07/02/22 1627    Narrative:      CR Chest 1 Vw    INDICATION:   Pulmonary edema.     COMPARISON:    6/5/2022    FINDINGS:  Portable AP view(s) of the chest.  Central venous catheter tip remains in the SVC. Heart size is stable. There are bilateral pleural effusions, right larger than left. Right side is relatively stable, and the left appears slightly improved. There is some  vascular congestion. There is infiltrate or atelectasis in the bases, right greater than left. Sergio pulmonary edema is not definitely identified.      Impression:        1. Bilateral effusions, relatively stable on the right and improved on the left.  2. Vascular congestion without definite pulmonary edema.  3. Bibasilar infiltrate or atelectasis, right greater than left.    Signer Name: Tomas Hoffman MD   Signed: 7/2/2022 4:25 PM   Workstation Name: [email or web address]    [facility]        I have personally reviewed the above radiology results.     Last Echocardiogram:  Results for orders placed during the hospital encounter of 05/23/22    Adult Transthoracic Echo Limited W/ Cont if Necessary Per Protocol    Interpretation Summary  · The left ventricular cavity is borderline dilated.  · Left ventricular ejection fraction appears to be 36 - 40%.  · Left ventricular diastolic function was normal.  · There is calcification of the aortic valve.    ---------------------------------------------------------------------------------------------------------------------    Assessment & Plan      There are no active hospital problems to display for this patient.  #Acute on chronic combined systolic and diastolic CHF exacerbation  #ESRD on HD (M,W,F)  #Chronic bilateral pleural effusion  #Valvular disease-mild AS, mild MR, moderate TR  -Imaging reviewed  -IV Lasix 60 mg ordered;  monitor diuresis with strict I's and O's; obtain daily weights  -Echo from 5/25/2022 reviewed, EF 36 to 40%  -Nephrology has been consulted for assistance with HD, further input/assistance is much appreciated  -Bilateral pleural effusions are currently stable  -O2 saturation is around 98% on 2 L NC  -Continue to monitor closely    #Hypoglycemia suspect medication induced   #Type 2 diabetes mellitus  -Glucose upon arrival to the ED 42; received IV dextrose 25 g in the ED with improvement in glucose to 114  -Suspect hypoglycemia medication induced from insulin given not prior  -Discussed with attending, we will start low-dose sliding scale insulin with Accu-Cheks 3 times daily before meals  -Hypoglycemia protocol in place  -Review home medications once reconciled per pharmacy    #Acute UTI POA  -UA grossly unremarkable  -Urine culture ordered and pending  -Start IV aztreonam  -Repeat a.m. CBC    #Abrasion on left lower posterior leg   -Wound care consulted    #Prolonged QTC (569 MS)  -Corrected QTC 4 right bundle branch block is 559 MS  -Potassium borderline low at 3.5; obtain magnesium     #Chronic troponinemia  #History of coronary artery disease  #Hyperlipidemia  #Essential hypertension  -Has been denies the patient complaining of any chest pain or discomfort  -Recent left heart cath from 5/11/2022 reported to have nonobstructive coronary artery disease  -Overall flat trend so far; continue trend troponin and obtain repeat a.m. EKG  -Admission EKG with normal sinus rhythm, no acute ischemic changes, chronic right bundle branch block  -Plan to resume home aspirin and statin  -Review home medications once available; plan to continue antihypertensive agents as BP is currently somewhat uncontrolled    #Chronic, reported progressive dementia  #Generalized weakness/debility  #Poor p.o. intake  -Currently alert and oriented to location and herself  - reports generalized weakness and poor appetite since this  morning  -Patient is to be up with assistance, fall precautions in place  -PT/OT consult  -SLP consulted  -Aspiration precautions in place      F/E/N: No IV fluids. Replace electrolytes as necessary. Consistent carb, cardiac 1500 mL fluid restriction diet.   ---------------------------------------------------  DVT Prophylaxis: Subq heparin  GI Prophylaxis: Protonix   Activity: Up with assistance, fall precautions     The patient is considered to be a high risk patient due to:     INPATIENT status due to the need for care which can only be reasonably provided in an hospital setting such as aggressive/expedited ancillary services and/or consultation services, the necessity for IV medications, close physician monitoring and/or the possible need for procedures.  In such, I feel patient’s risk for adverse outcomes and need for care warrant INPATIENT evaluation and predict the patient’s care encounter to likely last beyond 2 midnights.    Code Status: FULL CODE     Disposition/Discharge planning: Plan for home at discharge.  Pending clinical course    I have discussed the patient's assessment and plan with the patient, nursing staff, and attending physician      Hiral Fox PA-C  Hospitalist Service -- [facility]       07/02/22  18:49 EDT    Attending Physician: Shaw Tomas MD

## 2022-07-02 NOTE — PROGRESS NOTES
Discharge Planning Assessment   [place]     Patient Name: Sean Chen  MRN: [id number]  Today's Date: 7/2/2022    Admit Date: 7/2/2022     Discharge Needs Assessment     Row Name 07/02/22 1914       Living Environment    People in Home spouse    Name(s) of People in Home live with spouse Dylon    Current Living Arrangements home    Primary Care Provided by spouse/significant other;child(thomas);homecare agency    Family Caregiver if Needed child(thomas), adult;spouse    Quality of Family Relationships helpful;involved;supportive    Able to Return to Prior Arrangements yes       Resource/Environmental Concerns    Resource/Environmental Concerns none       Transition Planning    Patient/Family Anticipates Transition to home with family    Patient/Family Anticipated Services at Transition none    Transportation Anticipated family or friend will provide       Discharge Needs Assessment    Readmission Within the Last 30 Days current reason for admission unrelated to previous admission    Current Outpatient/Agency/Support Group homecare agency  [facility]    Equipment Currently Used at Home hospital bed;oxygen;wheelchair;walker, standard;other (see comments)  unna boots    Concerns to be Addressed discharge planning    Anticipated Changes Related to Illness none    Equipment Needed After Discharge none    Outpatient/Agency/Support Group Needs homecare agency               Discharge Plan     Row Name 07/02/22 1916       Plan    Plan Pt lives at home with spouse and plans to return home at discharge, son to provide transportation. Her son Dylon is at bedside and is providing information on pt today. Pcp is Dr Jacobson, she uses [facility] and has Medicare a+b. Pt goes to [facility] M/W/F and per son she sometimes does not have to go if labs are ok. Pt has [facility]. Pt uses o2 2 liters , port o2, hospital bed, wheelchair, unna boots and walker at home from [facility].     Patient/Family in Agreement with Plan yes              Continued Care and Services - Admitted Since 7/2/2022    Coordination has not been started for this encounter.     Selected Continued Care - Prior Encounters Includes selections from prior encounters from 4/3/2022 to 7/2/2022    Discharged on 6/8/2022 Admission date: 5/23/2022 - Discharge disposition: Home-Health Care Svc    Durable Medical Equipment     Service Provider Selected Services Address Phone Fax Patient Preferred    [facility]  Durable Medical Equipment [id number] [place] [address] [phone] --          Home Medical Care     Service Provider Selected Services Address Phone Fax Patient Preferred    [facility]  Home Health Services ,  Home Rehabilitation ,  Home Nursing [address] [place] [address] [facility] [id number] [phone] --                       Demographic Summary     Row Name 07/02/22 1911       General Information    Admission Type inpatient    Arrived From home    Referral Source emergency department    Reason for Consult discharge planning               Functional Status     Row Name 07/02/22 1911       Functional Status    Usual Activity Tolerance fair    Current Activity Tolerance poor               Psychosocial    No documentation.                Abuse/Neglect    No documentation.                Legal    No documentation.                Substance Abuse    No documentation.                Patient Forms    No documentation.                   Alanna Santiago, RN

## 2022-07-03 LAB
ABSOLUTE LUNG FLUID CONTENT: 52 % (ref 20–35)
ALBUMIN SERPL-MCNC: 2.71 G/DL (ref 3.5–5.2)
ALBUMIN/GLOB SERPL: 1 G/DL
ALP SERPL-CCNC: 94 U/L (ref 39–117)
ALT SERPL W P-5'-P-CCNC: <5 U/L (ref 1–33)
ANION GAP SERPL CALCULATED.3IONS-SCNC: 12 MMOL/L (ref 5–15)
AST SERPL-CCNC: 14 U/L (ref 1–32)
BILIRUB SERPL-MCNC: 0.4 MG/DL (ref 0–1.2)
BUN SERPL-MCNC: 24 MG/DL (ref 8–23)
BUN/CREAT SERPL: 9.5 (ref 7–25)
CALCIUM SPEC-SCNC: 7.7 MG/DL (ref 8.6–10.5)
CHLORIDE SERPL-SCNC: 97 MMOL/L (ref 98–107)
CO2 SERPL-SCNC: 24 MMOL/L (ref 22–29)
CREAT SERPL-MCNC: 2.52 MG/DL (ref 0.57–1)
EGFRCR SERPLBLD CKD-EPI 2021: 18.7 ML/MIN/1.73
GLOBULIN UR ELPH-MCNC: 2.8 GM/DL
GLUCOSE BLDC GLUCOMTR-MCNC: 116 MG/DL (ref 70–130)
GLUCOSE BLDC GLUCOMTR-MCNC: 119 MG/DL (ref 70–130)
GLUCOSE BLDC GLUCOMTR-MCNC: 146 MG/DL (ref 70–130)
GLUCOSE BLDC GLUCOMTR-MCNC: 60 MG/DL (ref 70–130)
GLUCOSE BLDC GLUCOMTR-MCNC: 96 MG/DL (ref 70–130)
GLUCOSE BLDC GLUCOMTR-MCNC: 98 MG/DL (ref 70–130)
GLUCOSE SERPL-MCNC: 81 MG/DL (ref 65–99)
HBA1C MFR BLD: 5.5 % (ref 4.8–5.6)
POTASSIUM SERPL-SCNC: 3.9 MMOL/L (ref 3.5–5.2)
PROT SERPL-MCNC: 5.5 G/DL (ref 6–8.5)
QT INTERVAL: 536 MS
QTC INTERVAL: 569 MS
SODIUM SERPL-SCNC: 133 MMOL/L (ref 136–145)
TROPONIN T SERPL-MCNC: 0.07 NG/ML (ref 0–0.03)

## 2022-07-03 PROCEDURE — 99232 SBSQ HOSP IP/OBS MODERATE 35: CPT | Performed by: STUDENT IN AN ORGANIZED HEALTH CARE EDUCATION/TRAINING PROGRAM

## 2022-07-03 PROCEDURE — 82962 GLUCOSE BLOOD TEST: CPT

## 2022-07-03 PROCEDURE — 93005 ELECTROCARDIOGRAM TRACING: CPT | Performed by: PHYSICIAN ASSISTANT

## 2022-07-03 PROCEDURE — 94799 UNLISTED PULMONARY SVC/PX: CPT

## 2022-07-03 PROCEDURE — 94761 N-INVAS EAR/PLS OXIMETRY MLT: CPT

## 2022-07-03 PROCEDURE — 94726 PLETHYSMOGRAPHY LUNG VOLUMES: CPT

## 2022-07-03 PROCEDURE — 93005 ELECTROCARDIOGRAM TRACING: CPT | Performed by: STUDENT IN AN ORGANIZED HEALTH CARE EDUCATION/TRAINING PROGRAM

## 2022-07-03 PROCEDURE — 25010000002 HEPARIN (PORCINE) PER 1000 UNITS: Performed by: STUDENT IN AN ORGANIZED HEALTH CARE EDUCATION/TRAINING PROGRAM

## 2022-07-03 RX ORDER — NIFEDIPINE 30 MG/1
60 TABLET, FILM COATED, EXTENDED RELEASE ORAL
Status: DISCONTINUED | OUTPATIENT
Start: 2022-07-03 | End: 2022-07-08

## 2022-07-03 RX ORDER — HYDROXYZINE HYDROCHLORIDE 25 MG/1
25 TABLET, FILM COATED ORAL NIGHTLY PRN
Status: DISCONTINUED | OUTPATIENT
Start: 2022-07-03 | End: 2022-07-04

## 2022-07-03 RX ADMIN — AZTREONAM 1 G: 1 INJECTION, POWDER, LYOPHILIZED, FOR SOLUTION INTRAMUSCULAR; INTRAVENOUS at 08:55

## 2022-07-03 RX ADMIN — Medication 10 ML: at 08:55

## 2022-07-03 RX ADMIN — ATORVASTATIN CALCIUM 80 MG: 40 TABLET, FILM COATED ORAL at 21:06

## 2022-07-03 RX ADMIN — AZTREONAM 1 G: 1 INJECTION, POWDER, LYOPHILIZED, FOR SOLUTION INTRAMUSCULAR; INTRAVENOUS at 16:59

## 2022-07-03 RX ADMIN — CALCIUM ACETATE 1334 MG: 667 CAPSULE ORAL at 08:54

## 2022-07-03 RX ADMIN — FOLIC ACID 1 MG: 1 TABLET ORAL at 08:54

## 2022-07-03 RX ADMIN — CALCIUM ACETATE 1334 MG: 667 CAPSULE ORAL at 17:52

## 2022-07-03 RX ADMIN — CARVEDILOL 6.25 MG: 6.25 TABLET, FILM COATED ORAL at 08:54

## 2022-07-03 RX ADMIN — AZTREONAM 1 G: 1 INJECTION, POWDER, LYOPHILIZED, FOR SOLUTION INTRAMUSCULAR; INTRAVENOUS at 01:22

## 2022-07-03 RX ADMIN — HEPARIN SODIUM 5000 UNITS: 5000 INJECTION INTRAVENOUS; SUBCUTANEOUS at 05:40

## 2022-07-03 RX ADMIN — NIFEDIPINE 60 MG: 30 TABLET, EXTENDED RELEASE ORAL at 08:54

## 2022-07-03 RX ADMIN — HYDROXYZINE HYDROCHLORIDE 25 MG: 25 TABLET ORAL at 23:04

## 2022-07-03 RX ADMIN — ACETAMINOPHEN 650 MG: 325 TABLET ORAL at 06:28

## 2022-07-03 RX ADMIN — ASPIRIN 81 MG: 81 TABLET, COATED ORAL at 08:54

## 2022-07-03 RX ADMIN — HEPARIN SODIUM 5000 UNITS: 5000 INJECTION INTRAVENOUS; SUBCUTANEOUS at 21:07

## 2022-07-03 RX ADMIN — CALCIUM ACETATE 1334 MG: 667 CAPSULE ORAL at 11:23

## 2022-07-03 RX ADMIN — CARVEDILOL 6.25 MG: 6.25 TABLET, FILM COATED ORAL at 17:52

## 2022-07-03 RX ADMIN — Medication 10 ML: at 21:07

## 2022-07-03 RX ADMIN — DEXTROSE MONOHYDRATE 25 G: 25 INJECTION, SOLUTION INTRAVENOUS at 03:33

## 2022-07-03 RX ADMIN — HEPARIN SODIUM 5000 UNITS: 5000 INJECTION INTRAVENOUS; SUBCUTANEOUS at 13:48

## 2022-07-03 NOTE — PAYOR COMM NOTE
"[facility]  JILL GANT  PHONE  [phone]  FAX  [phone]  NPI:  [id number]    REQUEST FOR INPATIENT AUTH    Aide Chen (82 y.o. Female)             Date of Birth   1940    Social Security Number       Address   [address] [place] [address] [address] [place] [id number]    Home Phone   [phone]    MRN   [id number]       Yarsanism   Unknown    Marital Status                               Admission Date   7/2/22    Admission Type   Emergency    Admitting Provider   Shaw Tomas DO    Attending Provider   Shaw Tomas DO    Department, Room/Bed   [address], 3309/2S       Discharge Date       Discharge Disposition       Discharge Destination                               Attending Provider: Shaw Tomas DO    Allergies: Keflex [Cephalexin], Lodine [Etodolac], Penicillins, Sulfa Antibiotics    Isolation: None   Infection: None   Code Status: CPR   Advance Care Planning Activity    Ht: 170.2 cm (67\")   Wt: 70.9 kg (156 lb 3.2 oz)    Admission Cmt: None   Principal Problem: None                Active Insurance as of 7/2/2022     Primary Coverage     Payor Plan Insurance Group Employer/Plan Group    HUMANA MEDICARE REPLACEMENT HUMANA MEDICARE REPLACEMENT [id number]     Payor Plan Address Payor Plan Phone Number Payor Plan Fax Number Effective Dates    [address] [address] [phone]  1/1/2021 - None Entered    [facility] [address]       Subscriber Name Subscriber Birth Date Member ID       AIDE CHEN 1940 [id number]           Secondary Coverage     Payor Plan Insurance Group Employer/Plan Group    [facility] [place] [facility] [place]      Payor Plan Address Payor Plan Phone Number Payor Plan Fax Number Effective Dates    [address]   1/1/2014 - None Entered    [facility] [phone]       Subscriber Name Subscriber Birth Date Member ID       AIDE CHEN 1940 [id number]                 Emergency Contacts      (Rel.) Home Phone Work Phone " Mobile Phone    Dylon Chen Jr (Son) [phone] -- [phone]    deysi chen -- -- [phone]    Dylon Chen Sr (Spouse) [phone] -- --    Dylon Chen J (Grandchild) -- -- [phone]               History & Physical      Hiral Fox PA-C at 22 1819     Attestation signed by Shaw Tomas DO at 22 1905    I have reviewed this documentation and agree.  Patient seen and evaluated with pertinent CR Ryan MASON at bedside.  Patient with some lower extremity edema and imaging consistent with mild anasarca and volume overload.  She has had intermittent hemodialysis and outpatient dialysis and has been skipping dialysis sessions on an as-needed basis based on urine output however son does admit that they are not always compliant with fluid restriction.  There is possible UTI present and for now we will continue aztreonam and await urine culture.  Her mostly altered mental status is likely secondary to hypoglycemia which is likely secondary to her subcu insulin administered last night.  Patient's son reports that they have been instructed to give her her long-acting insulin on an as-needed basis if her glucose is greater than 200 at night.  For now we will admit to the telemetry floor placed on diet fluid restrict and have nephrology see for hemodialysis.  In the meantime did instruct the emergency department to give 60 mg IV Lasix to try to aid in further volume output.                       [facility] Medicine Services  HISTORY & PHYSICAL    Patient Identification:  Name:  Sean Chen  Age:  82 y.o.  Sex:  female  :  1940  MRN:  [id number]   Visit Number:  [id number]  Admit Date: 2022   Primary Care Physician:  Ganga Gallardo MD     Subjective     Chief complaint:   Chief Complaint   Patient presents with   • Leg Swelling   • Fatigue     History of presenting illness:   Patient is a 82 y.o. female with past medical history significant  "for progressive dementia, type 2 diabetes mellitus, ESRD on HD, essential hypertension, hyperlipidemia, rheumatoid arthritis, severe pulmonary hypertension, GERD, that presented to the [facility] emergency department for evaluation of bilateral leg edema and fatigue.    Evaluated the patient at bedside with attending, Dr. Tomas.  provided the entire history of presenting illness. Patient is awake but moaning and not able to provide an extensive supplemental history at this time. She is alert and oriented to the location and will respond to her name although she was not able to verbally tell us her name. Per the  the patient was doing very well and had a good appetite yesterday. She also received dialysis yesterday and had \"some fluid taken off.\" He states they monitor her urine output at home and depending on the amount, she may skip a dose of dialysis. She receives dialysis on Monday, Wednesday and Friday schedule. The  states that this morning the patient was very lethargic and refusing to eat. As the day progressed her symptoms worsened. He admits to giving her a dose of insulin last night as her glucose of around 220. The  reports their PCP told them to give the insulin shot if her glucose was >200. The  denies the patient having any fever, chills, diaphoresis, abdominal pain, nausea or vomiting. She has had a difficult time ambulating secondary to generalized weakness however. He further endorses that the patient has an open wound on her left lower posterior leg that is being monitored in the outpatient setting and she was recently stated on antibiotics for a possible infection. He states she was received unna boots  but does not like to wear them and will often tear them off. The patient currently lives at home with her  and father in law who both assist in her care.     Upon arrival to the ED, vitals were temperature 98.8 °F, pulse 65, respirations 18, BP " 159/84, SPO2 100% on 2 L NC.  Troponin T 0.061.  proBNP greater than 70,000.  CMP of glucose 42, sodium 132, chloride 94, creatinine 2.49, calcium 8.5, GFR 19, albumin 3.17.  CBC with RBC 3.66, hemoglobin 11.0, hematocrit 32.7, otherwise unremarkable.  UA with cloudy appearance, 1+ blood, positive nitrites, moderate leukocytes, 3+ protein, 13-20 WBC, 2+ bacteria.  Blood cultures pending x2.  UDS negative.  Blood ethanol unremarkable.  Chest x-ray shows bilateral effusions are relatively stable in the right and improved on the left, vascular congestion without definite pulmonary edema, bibasilar infiltrate or atelectasis, right greater than left.  CT of the abdomen/pelvis with contrast shows a significantly degraded study due to lack of IV contrast and artifact.  CT of the chest without contrast shows a large right and moderate left pleural effusion, areas of consolidative change and nodularity seen adjacent to the pleural effusions with dependent lungs, some scattered reticular nodularity in the right apex, cardiomegaly, severe coronary artery atherosclerosis.  EKG with normal sinus rhythm, right bundle branch block, heart rate 68, QTc 569 MS.    Patient has been admitted to the telemetry floor for further evaluation and treatment    Patient's  and attending Dr. Tomas at bedside during exam  ---------------------------------------------------------------------------------------------------------------------   Review of Systems   Reason unable to perform ROS: provided by .   Constitutional: Positive for appetite change and fatigue. Negative for chills, diaphoresis and fever.   HENT: Negative for congestion, sinus pain and sore throat.    Respiratory: Negative for cough, shortness of breath and wheezing.    Cardiovascular: Positive for leg swelling. Negative for chest pain and palpitations.   Gastrointestinal: Negative for abdominal pain, constipation, diarrhea, nausea and vomiting.   Genitourinary:  Negative for dysuria and frequency.   Musculoskeletal: Negative for gait problem and myalgias.   Skin: Positive for wound (left lower leg).   Neurological: Positive for weakness (generalized ). Negative for dizziness and light-headedness.   Psychiatric/Behavioral: Negative for confusion.      ---------------------------------------------------------------------------------------------------------------------   Past Medical History:   Diagnosis Date   • Allergic rhinitis    • Elevated liver enzymes    • Extrinsic asthma    • Gastritis    • GERD (gastroesophageal reflux disease)    • Hyperlipidemia    • Hypertension    • Iron deficiency anemia    • Meniere's disease    • Osteoarthritis    • Type 2 diabetes mellitus (HCC)    • Vitamin D deficiency disease      Past Surgical History:   Procedure Laterality Date   • APPENDECTOMY     • CARDIAC CATHETERIZATION N/A 5/11/2022    Procedure: Left Heart Cath;  Surgeon: Marcelino Grande MD;  Location: [facility] CATH INVASIVE LOCATION;  Service: Cardiology;  Laterality: N/A;   • CHOLECYSTECTOMY     • HYSTERECTOMY     • INSERTION HEMODIALYSIS CATHETER N/A 5/9/2022    Procedure: HEMODIALYSIS CATHETER INSERTION;  Surgeon: Hans Coates MD;  Location: [facility] OR;  Service: General;  Laterality: N/A;     History reviewed. No pertinent family history.  Social History     Socioeconomic History   • Marital status:      Spouse name: moshe   • Number of children: 1   • Years of education: 8   Tobacco Use   • Smoking status: Never Smoker   • Smokeless tobacco: Never Used   Substance and Sexual Activity   • Alcohol use: No   • Drug use: No   • Sexual activity: Defer     ---------------------------------------------------------------------------------------------------------------------   Allergies:  Keflex [cephalexin], Lodine [etodolac], Penicillins, and Sulfa  antibiotics  ---------------------------------------------------------------------------------------------------------------------   Medications below are reported home medications pulling from within the system; at this time, these medications have not been reconciled unless otherwise specified and are in the verification process for further verifcation as current home medications.    Prior to Admission Medications     Prescriptions Last Dose Informant Patient Reported? Taking?    albuterol sulfate HFA (ProAir HFA) 108 (90 Base) MCG/ACT inhaler  Pharmacy No No    Inhale 2 puffs 4 (Four) Times a Day.    aspirin 81 MG EC tablet   No No    Take 1 tablet by mouth Daily.    atorvastatin (LIPITOR) 80 MG tablet  Pharmacy No No    Take 1 tablet by mouth Every Night.    calcium acetate (PHOS BINDER,) 667 MG capsule capsule   No No    Take 2 capsules by mouth 3 (Three) Times a Day With Meals.    carvedilol (COREG) 6.25 MG tablet   No No    Take 1 tablet by mouth 2 (Two) Times a Day With Meals.    folic acid (FOLVITE) 1 MG tablet   No No    Take 1 tablet by mouth Daily.    Glucose Blood (Blood Glucose Test) strip   No No    1 daily    Lancets misc   No No    1 daily    NIFEdipine CC (ADALAT CC) 30 MG 24 hr tablet   No No    Take 2 tablets by mouth Daily.        ---------------------------------------------------------------------------------------------------------------------    Objective     Hospital Scheduled Meds:  furosemide, 60 mg, Intravenous, Once           Current listed hospital scheduled medications may not yet reflect those currently placed in orders that are signed and held, awaiting patient's arrival to floor/unit.    ---------------------------------------------------------------------------------------------------------------------   Vital Signs:  Temp:  [98.8 °F (37.1 °C)] 98.8 °F (37.1 °C)  Heart Rate:  [65-67] 65  Resp:  [18] 18  BP: (159)/(84) 159/84  Mean Arterial Pressure (Non-Invasive) for the past 24  hrs (Last 3 readings):   Noninvasive MAP (mmHg)   22 1604 129     SpO2 Percentage    22 1604 22 1606   SpO2: 98% 100%     SpO2:  [98 %-100 %] 100 %  on  Flow (L/min):  [2] 2;   Device (Oxygen Therapy): nasal cannula    Body mass index is 20.05 kg/m².  Wt Readings from Last 3 Encounters:   22 58.1 kg (128 lb)   22 57 kg (125 lb 9.6 oz)   22 67.1 kg (147 lb 14.4 oz)     ---------------------------------------------------------------------------------------------------------------------   Physical Exam:  Physical Exam  Constitutional:       General: She is awake.      Appearance: Normal appearance. She is well-developed and normal weight. She is ill-appearing (chronically ).      Interventions: Nasal cannula in place.      Comments: 2L NC   HENT:      Head: Normocephalic and atraumatic.   Eyes:      General: Lids are normal.   Cardiovascular:      Rate and Rhythm: Normal rate and regular rhythm.      Pulses: Normal pulses.           Dorsalis pedis pulses are 2+ on the right side and 2+ on the left side.        Posterior tibial pulses are 2+ on the right side and 2+ on the left side.      Heart sounds: Normal heart sounds. No murmur heard.    No friction rub. No gallop.   Pulmonary:      Effort: Pulmonary effort is normal. No tachypnea.      Breath sounds: Decreased breath sounds (diffusely diminished ) present.      Comments: Crackles in bilateral lower lobes   Abdominal:      General: Bowel sounds are decreased.      Palpations: Abdomen is soft.      Tenderness: There is no abdominal tenderness.   Musculoskeletal:      Right lower le+ Edema present.      Left lower le+ Edema present.   Skin:     Comments: Skin tear on left lower posterior leg    Neurological:      General: No focal deficit present.      Mental Status: She is alert. She is disoriented.   Psychiatric:         Speech: Speech normal.         Behavior: Behavior is cooperative.         Cognition and Memory:  Cognition normal.       ---------------------------------------------------------------------------------------------------------------------  EKG:    Normal sinus rhythm, right bundle branch block, heart rate 68, QTc 569 MS.    Telemetry:    NSR, HR 84 bpm, SpO2 98% on 2L NC.     I have personally reviewed the EKG/Telemetry strip  ---------------------------------------------------------------------------------------------------------------------   Results from last 7 days   Lab Units 07/02/22  1626   CK TOTAL U/L 41   TROPONIN T ng/mL 0.061*     Results from last 7 days   Lab Units 07/02/22  1626   PROBNP pg/mL >70,000.0*         Results from last 7 days   Lab Units 07/02/22  1626   CRP mg/dL 1.96*   LACTATE mmol/L 0.7   WBC 10*3/mm3 7.05   HEMOGLOBIN g/dL 11.0*   HEMATOCRIT % 32.7*   MCV fL 89.3   MCHC g/dL 33.6   PLATELETS 10*3/mm3 259   INR  0.93     Results from last 7 days   Lab Units 07/02/22  1626   SODIUM mmol/L 132*   POTASSIUM mmol/L 3.5   CHLORIDE mmol/L 94*   CO2 mmol/L 24.3   BUN mg/dL 21   CREATININE mg/dL 2.49*   CALCIUM mg/dL 8.5*   GLUCOSE mg/dL 42*   ALBUMIN g/dL 3.17*   BILIRUBIN mg/dL 0.4   ALK PHOS U/L 113   AST (SGOT) U/L 16   ALT (SGPT) U/L 7   Estimated Creatinine Clearance: 16.3 mL/min (A) (by C-G formula based on SCr of 2.49 mg/dL (H)).  Ammonia   Date Value Ref Range Status   07/02/2022 <10 (L) 11 - 51 umol/L Final       Glucose   Date/Time Value Ref Range Status   07/02/2022 1802 114 70 - 130 mg/dL Final     Comment:     Meter: [id number] : SHWETA MARKS     Lab Results   Component Value Date    HGBA1C 5.50 04/29/2022     Lab Results   Component Value Date    TSH 1.390 07/02/2022    FREET4 1.54 07/02/2022     Pain Management Panel     Pain Management Panel Latest Ref Rng & Units 7/2/2022 5/23/2022    CREATININE UR mg/dL - -    AMPHETAMINES SCREEN, URINE Negative Negative Negative    BARBITURATES SCREEN Negative Negative Negative    BENZODIAZEPINE SCREEN, URINE  Negative Negative Negative    BUPRENORPHINEUR Negative Negative Negative    COCAINE SCREEN, URINE Negative Negative Negative    METHADONE SCREEN, URINE Negative Negative Negative    METHAMPHETAMINEUR Negative Negative Negative        I have personally reviewed the above laboratory results.   ---------------------------------------------------------------------------------------------------------------------  Imaging Results (Last 7 Days)     Procedure Component Value Units Date/Time    CT Abdomen Pelvis Without Contrast [[id number]] Collected: 07/02/22 1757     Updated: 07/02/22 1759    Narrative:      CT Abdomen Pelvis WO    INDICATION:   Delirium, abdominal pain, COPD exacerbation    TECHNIQUE:   CT of the abdomen and pelvis without IV contrast. Coronal and sagittal reconstructions were obtained.  Radiation dose reduction techniques included automated exposure control or exposure modulation based on body size. Radiation audit for CT and nuclear  cardiology exams in the last 12 months: 4.     COMPARISON:   None available.    FINDINGS:    See separate CT chest. No destructive osseous lesion.    Evaluation of the solid viscera is compromised by lack of IV contrast. Additionally, the patient's arms are within the gantry which produces significant artifact which further compromises evaluation of the abdomen and pelvis. Allowing for this:    Diffuse body wall edema.    Normal noncontrast appearance of the liver, pancreas, spleen and both adrenal glands. Favor the gallbladder to be surgically absent.    The kidneys are symmetric in size. Evaluation for solid renal lesion is largely precluded by lack of IV contrast. No hydronephrosis. No renal or ureteral calculus. Likely cyst at the lower pole the right kidney.    Urinary bladder is distended.    No evidence of bowel obstruction. No localizing perienteric inflammatory change.    Normal caliber of the abdominal aorta. Extensive vascular calcifications. No lymphadenopathy  within the abdomen or pelvis by size criteria.      Impression:        Evaluation of the solid viscera is compromised by lack of IV contrast. Additionally, the patient's arms are within the gantry which produces significant artifact which further compromises evaluation of the abdomen and pelvis. Exam is moderately to  severely degraded. Allowing for this:    1.  Diffuse body wall edema.  2.  See separate CT chest.  3.  Extensive vascular calcifications.  4.  No definite acute process in the abdomen or pelvis by noncontrast CT.        Signer Name: ADAMS VOSS MD   Signed: 7/2/2022 5:57 PM   Workstation Name: [email or web address]    [facility]    CT Chest Without Contrast Diagnostic [[id number]] Collected: 07/02/22 1752     Updated: 07/02/22 1754    Narrative:      CT Chest WO    INDICATION:   Shortness of air, COPD exacerbation    TECHNIQUE:   CT of the chest without IV contrast. Coronal and sagittal reformatted images. Radiation dose reduction techniques included automated exposure control or exposure modulation based on body size. Radiation audit for CT and nuclear cardiology exams in the  last 12 months: 4.    COMPARISON:   May 23, 2022    FINDINGS:     Large right and moderate left pleural effusions. Areas of consolidative change and nodularity seen adjacent to the pleural effusions within the dependent lungs. Some scattered reticular nodularity in the right apex. Findings favor volume overload or  multifocal pneumonia with bilateral pleural effusions.    Cardiomegaly. Severe coronary artery atherosclerosis. Evaluation of the soft tissues of the chest are otherwise compromised by lack of IV contrast. No overt lymphadenopathy.    No destructive bony lesion.    See separately dictated CT abdomen/pelvis for findings below the diaphragm.      Impression:      1.  Large right and moderate left pleural effusions. Areas of consolidative change and nodularity seen adjacent to the pleural effusions within  the dependent lungs. Some scattered reticular nodularity in the right apex. Findings favor volume overload or  multifocal pneumonia with bilateral pleural effusions.  2.  Cardiomegaly.  3.  Severe coronary artery atherosclerosis.    Signer Name: ADAMS VOSS MD   Signed: 7/2/2022 5:52 PM   Workstation Name: [facility]    [facility]    CT Head Without Contrast [[id number]] Collected: 07/02/22 1749     Updated: 07/02/22 1751    Narrative:      CT Head WO    HISTORY:   Delirium    TECHNIQUE:   Routine noncontrast head CT. Radiation dose reduction techniques included automated exposure control or exposure modulation based on body size. Radiation audit for CT and nuclear cardiology exams in the last 12 months: 4.    COMPARISON:   June 5, 2022    FINDINGS:       No acute intracranial hemorrhage, mass lesion, or abnormal extra-axial fluid collection. No midline shift or focal mass effect. Ventricular system is normal in size and configuration. .    The gray-white matter differentiation is preserved. There are scattered ill-defined and patchy hypoattenuating foci within the bilateral cerebral and deep white matter which are nonspecific but most commonly associated with chronic microvascular ischemic  change. These are advanced.    Visualized paranasal sinuses are clear. Visualized mastoid air cells are clear.    No acute osseous abnormality.        Impression:        1.  Presumed chronic microvascular ischemic changes, advanced. No definite superimposed acute process.    Signer Name: ADAMS VOSS MD   Signed: 7/2/2022 5:49 PM   Workstation Name: [facility]    [facility]    XR Chest 1 View [[id number]] Collected: 07/02/22 1625     Updated: 07/02/22 1627    Narrative:      CR Chest 1 Vw    INDICATION:   Pulmonary edema.     COMPARISON:    6/5/2022    FINDINGS:  Portable AP view(s) of the chest.  Central venous catheter tip remains in the SVC. Heart size is stable. There are  bilateral pleural effusions, right larger than left. Right side is relatively stable, and the left appears slightly improved. There is some  vascular congestion. There is infiltrate or atelectasis in the bases, right greater than left. Sergio pulmonary edema is not definitely identified.      Impression:        1. Bilateral effusions, relatively stable on the right and improved on the left.  2. Vascular congestion without definite pulmonary edema.  3. Bibasilar infiltrate or atelectasis, right greater than left.    Signer Name: Tomas Hoffman MD   Signed: 7/2/2022 4:25 PM   Workstation [facility]: [email or web address]    [facility]        I have personally reviewed the above radiology results.     Last Echocardiogram:  Results for orders placed during the hospital encounter of 05/23/22    Adult Transthoracic Echo Limited W/ Cont if Necessary Per Protocol    Interpretation Summary  · The left ventricular cavity is borderline dilated.  · Left ventricular ejection fraction appears to be 36 - 40%.  · Left ventricular diastolic function was normal.  · There is calcification of the aortic valve.    ---------------------------------------------------------------------------------------------------------------------    Assessment & Plan      There are no active hospital problems to display for this patient.  #Acute on chronic combined systolic and diastolic CHF exacerbation  #ESRD on HD (M,W,F)  #Chronic bilateral pleural effusion  #Valvular disease-mild AS, mild MR, moderate TR  -Imaging reviewed  -IV Lasix 60 mg ordered; monitor diuresis with strict I's and O's; obtain daily weights  -Echo from 5/25/2022 reviewed, EF 36 to 40%  -Nephrology has been consulted for assistance with HD, further input/assistance is much appreciated  -Bilateral pleural effusions are currently stable  -O2 saturation is around 98% on 2 L NC  -Continue to monitor closely    #Hypoglycemia suspect medication induced   #Type 2 diabetes  mellitus  -Glucose upon arrival to the ED 42; received IV dextrose 25 g in the ED with improvement in glucose to 114  -Suspect hypoglycemia medication induced from insulin given not prior  -Discussed with attending, we will start low-dose sliding scale insulin with Accu-Cheks 3 times daily before meals  -Hypoglycemia protocol in place  -Review home medications once reconciled per pharmacy    #Acute UTI POA  -UA grossly unremarkable  -Urine culture ordered and pending  -Start IV aztreonam  -Repeat a.m. CBC    #Abrasion on left lower posterior leg   -Wound care consulted    #Prolonged QTC (569 MS)  -Corrected QTC 4 right bundle branch block is 559 MS  -Potassium borderline low at 3.5; obtain magnesium     #Chronic troponinemia  #History of coronary artery disease  #Hyperlipidemia  #Essential hypertension  -Has been denies the patient complaining of any chest pain or discomfort  -Recent left heart cath from 5/11/2022 reported to have nonobstructive coronary artery disease  -Overall flat trend so far; continue trend troponin and obtain repeat a.m. EKG  -Admission EKG with normal sinus rhythm, no acute ischemic changes, chronic right bundle branch block  -Plan to resume home aspirin and statin  -Review home medications once available; plan to continue antihypertensive agents as BP is currently somewhat uncontrolled    #Chronic, reported progressive dementia  #Generalized weakness/debility  #Poor p.o. intake  -Currently alert and oriented to location and herself  - reports generalized weakness and poor appetite since this morning  -Patient is to be up with assistance, fall precautions in place  -PT/OT consult  -SLP consulted  -Aspiration precautions in place      F/E/N: No IV fluids. Replace electrolytes as necessary. Consistent carb, cardiac 1500 mL fluid restriction diet.   ---------------------------------------------------  DVT Prophylaxis: Subq heparin  GI Prophylaxis: Protonix   Activity: Up with assistance,  fall precautions     The patient is considered to be a high risk patient due to:     INPATIENT status due to the need for care which can only be reasonably provided in an hospital setting such as aggressive/expedited ancillary services and/or consultation services, the necessity for IV medications, close physician monitoring and/or the possible need for procedures.  In such, I feel patient’s risk for adverse outcomes and need for care warrant INPATIENT evaluation and predict the patient’s care encounter to likely last beyond 2 midnights.    Code Status: FULL CODE     Disposition/Discharge planning: Plan for home at discharge.  Pending clinical course    I have discussed the patient's assessment and plan with the patient, nursing staff, and attending physician      Hiral Fox PA-C  Hospitalist Service -- [facility]       07/02/22  18:49 EDT    Attending Physician: Shaw Tomas MD      Electronically signed by Shaw Tomas DO at 07/02/22 1905          Emergency Department Notes      Jone Schaffer MD at 07/02/22 1753          Subjective     History provided by:  Relative   used: No    Altered Mental Status  Presenting symptoms: confusion    Presenting symptoms: no behavior changes, no combativeness, no disorientation, no lethargy, no memory loss, no partial responsiveness and no unresponsiveness    Severity:  Mild  Most recent episode:  More than 2 days ago  Episode history:  Continuous  Timing:  Constant  Progression:  Waxing and waning  Chronicity:  New  Context: not alcohol use, not dementia, not drug use, not head injury, not homeless, taking medications as prescribed, not nursing home resident, not recent change in medication, not recent illness and not recent infection    Associated symptoms: no abdominal pain, normal movement, no agitation, no bladder incontinence, no decreased appetite, no depression, no difficulty breathing, no eye deviation, no fever,  no hallucinations, no headaches, no light-headedness, no nausea, no palpitations, no rash, no seizures, no slurred speech, no suicidal behavior, no visual change, no vomiting and no weakness        Review of Systems   Constitutional: Negative for activity change, appetite change, chills, decreased appetite, diaphoresis, fatigue and fever.   HENT: Negative for congestion, ear pain and sore throat.    Eyes: Negative for redness.   Respiratory: Negative for cough, chest tightness, shortness of breath and wheezing.    Cardiovascular: Negative for chest pain, palpitations and leg swelling.   Gastrointestinal: Negative for abdominal pain, diarrhea, nausea and vomiting.   Genitourinary: Negative for bladder incontinence, dysuria and urgency.   Musculoskeletal: Negative for arthralgias, back pain, myalgias and neck pain.   Skin: Negative for pallor, rash and wound.   Neurological: Negative for dizziness, seizures, speech difficulty, weakness, light-headedness and headaches.   Psychiatric/Behavioral: Positive for confusion. Negative for agitation, behavioral problems, decreased concentration, hallucinations and memory loss.   All other systems reviewed and are negative.      Past Medical History:   Diagnosis Date   • Allergic rhinitis    • Elevated liver enzymes    • Extrinsic asthma    • Gastritis    • GERD (gastroesophageal reflux disease)    • Hyperlipidemia    • Hypertension    • Iron deficiency anemia    • Meniere's disease    • Osteoarthritis    • Type 2 diabetes mellitus (HCC)    • Vitamin D deficiency disease        Allergies   Allergen Reactions   • Keflex [Cephalexin]    • Lodine [Etodolac]    • Penicillins    • Sulfa Antibiotics        Past Surgical History:   Procedure Laterality Date   • APPENDECTOMY     • CARDIAC CATHETERIZATION N/A 5/11/2022    Procedure: Left Heart Cath;  Surgeon: Marcelino Grande MD;  Location: [facility] INVASIVE LOCATION;  Service: Cardiology;  Laterality: N/A;   • CHOLECYSTECTOMY     •  HYSTERECTOMY     • INSERTION HEMODIALYSIS CATHETER N/A 5/9/2022    Procedure: HEMODIALYSIS CATHETER INSERTION;  Surgeon: Hans Coates MD;  Location: [facility];  Service: General;  Laterality: N/A;       History reviewed. No pertinent family history.    Social History     Socioeconomic History   • Marital status:      Spouse name: moshe   • Number of children: 1   • Years of education: 8   Tobacco Use   • Smoking status: Never Smoker   • Smokeless tobacco: Never Used   Substance and Sexual Activity   • Alcohol use: No   • Drug use: No   • Sexual activity: Defer           Objective   Physical Exam  Vitals and nursing note reviewed.   Constitutional:       General: She is not in acute distress.     Appearance: Normal appearance. She is well-developed. She is ill-appearing. She is not toxic-appearing or diaphoretic.   HENT:      Head: Normocephalic and atraumatic.      Right Ear: External ear normal.      Left Ear: External ear normal.      Nose: Nose normal.      Mouth/Throat:      Pharynx: No oropharyngeal exudate.      Tonsils: No tonsillar exudate.   Eyes:      General: Lids are normal.      Conjunctiva/sclera: Conjunctivae normal.      Pupils: Pupils are equal, round, and reactive to light.   Neck:      Thyroid: No thyromegaly.   Cardiovascular:      Rate and Rhythm: Normal rate and regular rhythm.      Pulses: Normal pulses.      Heart sounds: Normal heart sounds, S1 normal and S2 normal.   Pulmonary:      Effort: Pulmonary effort is normal. No tachypnea or respiratory distress.      Breath sounds: Normal breath sounds. No decreased breath sounds, wheezing or rales.   Chest:      Chest wall: No tenderness.   Abdominal:      General: Bowel sounds are normal. There is no distension.      Palpations: Abdomen is soft.      Tenderness: There is no abdominal tenderness. There is no guarding or rebound.   Musculoskeletal:         General: No tenderness or deformity. Normal range of motion.      Cervical  back: Full passive range of motion without pain, normal range of motion and neck supple.   Lymphadenopathy:      Cervical: No cervical adenopathy.   Skin:     General: Skin is warm and dry.      Coloration: Skin is not pale.      Findings: No erythema or rash.   Neurological:      Mental Status: She is alert and oriented to person, place, and time.      GCS: GCS eye subscore is 4. GCS verbal subscore is 5. GCS motor subscore is 6.      Cranial Nerves: No cranial nerve deficit.      Sensory: No sensory deficit.   Psychiatric:         Speech: Speech normal.         Behavior: Behavior normal.         Thought Content: Thought content normal.         Judgment: Judgment normal.         Procedures          ED Course  ED Course as of 07/03/22 0738   Sat Jul 02, 2022   1618 ECG 12 Lead  Vent. rate 68 BPM  MT interval 160 ms  QRS duration 138 ms  QT/QTcB 536/569 ms  P-R-T axes -27 152 256  Normal sinus rhythm  Right bundle branch block  T wave abnormality, consider inferolateral ischemia  Abnormal ECG  When compared with ECG of 05-JUN-2022 06:18,  MT interval has decreased [ES]   1703 XR Chest 1 View  IMPRESSION:     1. Bilateral effusions, relatively stable on the right and improved on the left.  2. Vascular congestion without definite pulmonary edema.  3. Bibasilar infiltrate or atelectasis, right greater than left. [ES]   1755 CT Head Without Contrast  IMPRESSION:     1.  Presumed chronic microvascular ischemic changes, advanced. No definite superimposed acute process. [ES]   1755 CT Chest Without Contrast Diagnostic  IMPRESSION:  1.  Large right and moderate left pleural effusions. Areas of consolidative change and nodularity seen adjacent to the pleural effusions within the dependent lungs. Some scattered reticular nodularity in the right apex. Findings favor volume overload or  multifocal pneumonia with bilateral pleural effusions.  2.  Cardiomegaly.  3.  Severe coronary artery atherosclerosis. [ES]   1800 CT Abdomen  Pelvis Without Contrast  IMPRESSION:     Evaluation of the solid viscera is compromised by lack of IV contrast. Additionally, the patient's arms are within the gantry which produces significant artifact which further compromises evaluation of the abdomen and pelvis. Exam is moderately to  severely degraded. Allowing for this:     1.  Diffuse body wall edema.  2.  See separate CT chest.  3.  Extensive vascular calcifications.  4.  No definite acute process in the abdomen or pelvis by noncontrast CT. [ES]      ED Course User Index  [ES] Jone Schaffer MD                                           MDM  Number of Diagnoses or Management Options     Amount and/or Complexity of Data Reviewed  Clinical lab tests: reviewed and ordered  Tests in the radiology section of CPT®: reviewed and ordered  Tests in the medicine section of CPT®: reviewed and ordered  Review and summarize past medical records: yes  Independent visualization of images, tracings, or specimens: yes    Risk of Complications, Morbidity, and/or Mortality  Presenting problems: moderate  Diagnostic procedures: moderate  Management options: moderate    Patient Progress  Patient progress: stable      Final diagnoses:   Hypoglycemia       ED Disposition  ED Disposition     ED Disposition   Decision to Admit    Condition   --    Comment   Level of Care: Telemetry [5]   Diagnosis: Hypoglycemia [561059]   Certification: I Certify That Inpatient Hospital Services Are Medically Necessary For Greater Than 2 Midnights               No follow-up provider specified.       Medication List      No changes were made to your prescriptions during this visit.          Jone Schaffer MD  07/03/22 0738      Electronically signed by Jone Schaffer MD at 07/03/22 0738         Current Facility-Administered Medications   Medication Dose Route Frequency Provider Last Rate Last Admin   • acetaminophen (TYLENOL) tablet 650 mg  650 mg Oral Q6H PRN  Yoon Belle PA   650 mg at 07/03/22 0628   • aspirin EC tablet 81 mg  81 mg Oral Daily Shaw Tomas DO   81 mg at 07/03/22 0854   • atorvastatin (LIPITOR) tablet 80 mg  80 mg Oral Nightly Shaw Tomas DO   80 mg at 07/02/22 2322   • aztreonam (AZACTAM) 1 g in sodium chloride 0.9 % 100 mL IVPB-VTB  1 g Intravenous Q8H Shaw Tomas DO   1 g at 07/03/22 0855   • calcium acetate (PHOS BINDER)) capsule 1,334 mg  1,334 mg Oral TID With Meals Shaw Tomas DO   1,334 mg at 07/03/22 0854   • carvedilol (COREG) tablet 6.25 mg  6.25 mg Oral BID With Meals Shaw Tomas DO   6.25 mg at 07/03/22 0854   • dextrose (D50W) (25 g/50 mL) IV injection 25 g  25 g Intravenous Q15 Min PRN Shaw Tomas DO   25 g at 07/03/22 0333   • dextrose (GLUTOSE) oral gel 15 g  15 g Oral Q15 Min PRN Shaw Tomas DO       • folic acid (FOLVITE) tablet 1 mg  1 mg Oral Daily Shaw Tomas DO   1 mg at 07/03/22 0854   • glucagon (human recombinant) (GLUCAGEN DIAGNOSTIC) injection 1 mg  1 mg Intramuscular Q15 Min PRN Shaw Tomas DO       • heparin (porcine) 5000 UNIT/ML injection 5,000 Units  5,000 Units Subcutaneous Q8H Sahw Tomas DO   5,000 Units at 07/03/22 0540   • hydrALAZINE (APRESOLINE) injection 10 mg  10 mg Intravenous Q6H PRN Yoon Belle PA       • NIFEdipine CC (ADALAT CC) 24 hr tablet 60 mg  60 mg Oral Q24H Shaw Tomas DO   60 mg at 07/03/22 0854   • sodium chloride 0.9 % flush 10 mL  10 mL Intravenous Q12H Shaw Tomas DO   10 mL at 07/03/22 0855   • sodium chloride 0.9 % flush 10 mL  10 mL Intravenous PRN Shaw Tomas DO         Orders (last 24 hrs)      Start     Ordered    07/03/22 0930  NIFEdipine CC (ADALAT CC) 24 hr tablet 60 mg  Every 24 Hours Scheduled         07/03/22 0750    07/03/22 0900  aspirin EC tablet 81 mg  Daily         07/02/22 1855 07/03/22 0900  NIFEdipine CC (ADALAT CC) 24 hr tablet 30 mg  Every 24 Hours Scheduled,   Status:  Discontinued         07/02/22 5134     07/03/22 0800  Oral Care  2 Times Daily       07/02/22 2022 07/03/22 0730  Insulin Aspart (novoLOG) injection 0-7 Units  3 Times Daily Before Meals,   Status:  Discontinued         07/02/22 2022 07/03/22 0702  POC Glucose Once  PROCEDURE ONCE         07/03/22 0626    07/03/22 0700  POC Glucose TID AC  3 Times Daily Before Meals       07/02/22 2022 07/03/22 0637  ECG 12 Lead  STAT         07/03/22 0636    07/03/22 0600  CBC Auto Differential  Morning Draw         07/02/22 2022 07/03/22 0600  Comprehensive Metabolic Panel  Morning Draw         07/02/22 2022 07/03/22 0416  POC Glucose Once  PROCEDURE ONCE         07/03/22 0409    07/03/22 0326  POC Glucose Once  PROCEDURE ONCE         07/03/22 0319    07/03/22 0100  aztreonam (AZACTAM) 1 g in sodium chloride 0.9 % 100 mL IVPB-VTB  Every 8 Hours         07/02/22 2022 07/03/22 0100  ECG 12 Lead  Once         07/03/22 0001    07/03/22 0008  Wound Ostomy Eval & Treat  Once         07/03/22 0008    07/03/22 0000  Vital Signs  Every 4 Hours       07/02/22 2022 07/02/22 2343  acetaminophen (TYLENOL) tablet 650 mg  Every 6 Hours PRN         07/02/22 2343    07/02/22 2324  ECG 12 Lead  STAT         07/02/22 2323    07/02/22 2324  Troponin  STAT         07/02/22 2323    07/02/22 2243  Urine Culture - Urine, Urine, Random Void  Once        Comments: Please ensure 'Use Existing Specimen' is selected if this order is for urine culture add-on.  If no button appears, please contact the lab for assistance.      07/02/22 2242 07/02/22 2237  Urinalysis, Microscopic Only - Urine, Random Void  Once        Comments: Please ensure 'Use Existing Specimen' is selected if this order is for urine culture add-on.  If no button appears, please contact the lab for assistance.      07/02/22 2236 07/02/22 2200  heparin (porcine) 5000 UNIT/ML injection 5,000 Units  Every 8 Hours Scheduled         07/02/22 2022 07/02/22 2115  sodium chloride 0.9 % flush 10 mL  Every 12  Hours Scheduled         07/02/22 2022 07/02/22 2100  atorvastatin (LIPITOR) tablet 80 mg  Nightly         07/02/22 1855 07/02/22 2055  hydrALAZINE (APRESOLINE) injection 10 mg  Every 6 Hours PRN         07/02/22 2055 07/02/22 2042  POC Glucose Once  PROCEDURE ONCE         07/02/22 2032 07/02/22 2042  Apply Warming Adams  Once         07/02/22 2041 07/02/22 2023  Intake & Output  Every Shift       07/02/22 2022 07/02/22 2023  Weigh Patient  Once         07/02/22 2022 07/02/22 2023  Oxygen Therapy- Nasal Cannula; Titrate for SPO2: 90% - 95%  Continuous         07/02/22 2022 07/02/22 2023  Insert Peripheral IV  Once         07/02/22 2022 07/02/22 2023  Saline Lock & Maintain IV Access  Continuous         07/02/22 2022 07/02/22 2023  Pulse Oximetry, Continuous  Continuous         07/02/22 2022 07/02/22 2023  Cardiac Monitoring  Continuous         07/02/22 2022 07/02/22 2023  Diet Regular; Consistent Carbohydrate, Cardiac, Daily Fluid Restriction; 1500 mL Fluid Per Day  Diet Effective Now         07/02/22 2022 07/02/22 2023  Inpatient Nephrology Consult  Once        Specialty:  Nephrology  Provider:  John Jesus MD    07/02/22 2022 07/02/22 2023  PT Consult: Eval & Treat Functional Mobility Below Baseline  Once         07/02/22 2022 07/02/22 2023  OT Consult: Eval & Treat  Once         07/02/22 2022 07/02/22 2023  Do NOT Hold Basal or Correction Scale Insulin When Patient is NPO, Hold Scheduled Mealtime (Bolus) Insulin if NPO  Continuous         07/02/22 2022 07/02/22 2023  Follow [facility] Hypoglycemia Standing Orders For Blood Glucose Less Than 70 mg/dL  Until Discontinued        Comments: ALERT PATIENT - NOT NPO & CAN SAFELY SWALLOW  Administer 4 oz Fruit Juice OR 4 oz Regular Soda OR 8 oz Milk OR 15-30 grams (1 tube) of Glucose Gel.  Recheck Blood Glucose Approximately 15 Minutes After Ingestion, Repeat Treatment & Continue to Recheck Blood Sugar Approximately  Every 15 Minutes Until Blood Glucose is 70 or Higher.  Once Blood Glucose is 70 or Higher & if It Will Be More Than 60 Minutes Until Next Meal, Provide Appropriate Snack (Including Carbohydrate Food) Based on Meal Plan Orde piter. Give Meal Tray As Soon As Possible.    PATIENT HAS IV ACCESS - UNRESPONSIVE, NPO OR UNABLE TO SAFELY SWALLOW  Administer 25g (50ml) D50W IV Push.  Recheck Blood Glucose Approximately 15 Minutes After Administration, if Blood Glucose Remains Less Than 70, Repeat Treatment   Recheck Blood Glucose Approximately 15 Minutes After 2nd Administration, if Blood Glucose Remains Less Than 70 After 2nd Dose of D50W, Contact Provider for Further Treatment Orders & Consider Adding IVF With D5W for [facility]    PATIENT WITHOUT IV ACCESS - UNRESPONSIVE, NPO OR UNABLE TO SAFELY SWALLOW  Administer 1mg Glucagon SQ & Establish IV Access.  Turn Patient on Side - Nausea / Vomiting May Occur.  Recheck Blood Glucose Approximately 15 Minutes After Administration.  If Blood Glucose Remains Less Than 70, Administer 25g D50W IV Push (50ml).  Recheck Blood Glucose Approximately 15 Minutes After Administration of D50W, if Blood Glucose Remains Less Than 70, Contact Provider for Further Treatment Orders & C  Consider Adding IVF With D5 for Maintenance    Document Event & Patient Response to Interventions in EMR, Document Medications on MAR  Notify Provider if Hypoglycemia Treatment Needed    07/02/22 2022 07/02/22 2023  ReDs Vest  Once         07/02/22 2022 07/02/22 2023  Urinalysis With Culture If Indicated - Urine, Random Void  Once        Comments: Please ensure 'Use Existing Specimen' is selected if this order is for urine culture add-on.  If no button appears, please contact the lab for assistance.      07/02/22 2022 07/02/22 2022  dextrose (GLUTOSE) oral gel 15 g  Every 15 Minutes PRN         07/02/22 2022 07/02/22 2022  dextrose (D50W) (25 g/50 mL) IV injection 25 g  Every 15 Minutes PRN          07/02/22 2022 07/02/22 2022  glucagon (human recombinant) (GLUCAGEN DIAGNOSTIC) injection 1 mg  Every 15 Minutes PRN         07/02/22 2022 07/02/22 2022  sodium chloride 0.9 % flush 10 mL  As Needed         07/02/22 2022 07/02/22 2000  calcium acetate (PHOS BINDER)) capsule 1,334 mg  3 Times Daily With Meals         07/02/22 1855 07/02/22 2000  carvedilol (COREG) tablet 6.25 mg  2 Times Daily With Meals         07/02/22 1855 07/02/22 2000  folic acid (FOLVITE) tablet 1 mg  Daily         07/02/22 1855 07/02/22 1924  Case Management  Consult  Once        Provider:  (Not yet assigned)    07/02/22 1923 07/02/22 1915  COVID-19 and FLU A/B PCR - Swab, Nasopharynx  Once         07/02/22 1914 07/02/22 1857  NIFEdipine CC (ADALAT CC) 24 hr tablet 30 mg  Every 24 Hours Scheduled,   Status:  Discontinued         07/02/22 1855 07/02/22 1853  Blood Gas, Arterial With Co-Ox  PROCEDURE ONCE         07/02/22 1851    07/02/22 1846  Inpatient Admission  Once         07/02/22 1854    07/02/22 1846  Code Status and Medical Interventions:  Continuous         07/02/22 1854    07/02/22 1817  furosemide (LASIX) injection 60 mg  Once         07/02/22 1815    07/02/22 1812  POC Glucose Once  PROCEDURE ONCE         07/02/22 1802    07/02/22 1807  POC Glucose STAT  STAT         07/02/22 1806    07/02/22 1803  aztreonam (AZACTAM) 2 g in sodium chloride 0.9 % 100 mL IVPB-VTB  Once         07/02/22 1801    07/02/22 1741  Urinalysis, Microscopic Only - Urine, Clean Catch  Once         07/02/22 1740    07/02/22 1738  sodium chloride 0.9 % bolus 1,000 mL  Once,   Status:  Discontinued         07/02/22 1736    07/02/22 1737  Blood Gas, Arterial -With Co-Ox Panel: Yes  Once         07/02/22 1736    07/02/22 1732  dextrose (D50W) (25 g/50 mL) IV injection 25 g  Once         07/02/22 1730    07/02/22 1705  CT Abdomen Pelvis Without Contrast  1 Time Imaging         07/02/22 1704    07/02/22 1704  CT Head Without  Contrast  1 Time Imaging         07/02/22 1704    07/02/22 1704  CT Chest Without Contrast Diagnostic  1 Time Imaging         07/02/22 1704    07/02/22 1613  CK  Once         07/02/22 1612    07/02/22 1613  Urine Drug Screen - Urine, Clean Catch  Once         07/02/22 1612    07/02/22 1613  Ethanol  Once         07/02/22 1612    07/02/22 1613  Blood Culture - Blood, Arm, Left  Once         07/02/22 1612    07/02/22 1613  Blood Culture - Blood, Arm, Right  Once         07/02/22 1612    07/02/22 1613  Lactic Acid, Plasma  Once         07/02/22 1612    07/02/22 1613  Procalcitonin  Once         07/02/22 1612    07/02/22 1613  Protime-INR  Once         07/02/22 1612    07/02/22 1613  aPTT  Once         07/02/22 1612    07/02/22 1613  Ammonia  Once         07/02/22 1612    07/02/22 1612  T4, Free  Once         07/02/22 1612    07/02/22 1612  TSH  Once         07/02/22 1612    07/02/22 1559  ECG 12 Lead  Once         07/02/22 1558    07/02/22 1559  XR Chest 1 View  1 Time Imaging         07/02/22 1558    07/02/22 1558  CBC & Differential  Once         07/02/22 1558    07/02/22 1558  Comprehensive Metabolic Panel  Once         07/02/22 1558    07/02/22 1558  Urinalysis With Microscopic If Indicated (No Culture) - Urine, Clean Catch  Once         07/02/22 1558    07/02/22 1558  BNP  Once         07/02/22 1558    07/02/22 1558  Troponin  Now Then Every 2 Hours       07/02/22 1558    07/02/22 1558  C-reactive Protein  Once         07/02/22 1558    07/02/22 1558  Sedimentation Rate  Once         07/02/22 1558    07/02/22 1558  CBC Auto Differential  PROCEDURE ONCE         07/02/22 1558    Unscheduled  Up With Assistance  As Needed       07/02/22 2022    --  insulin glargine (LANTUS, SEMGLEE) 100 UNIT/ML injection  2 Times Daily PRN         07/02/22 1918                Physician Progress Notes (last 24 hours)  Notes from 07/02/22 0940 through 07/03/22 0940   No notes of this type exist for this encounter.         Consult Notes  (last 24 hours)  Notes from 07/02/22 0940 through 07/03/22 0940   No notes of this type exist for this encounter.

## 2022-07-03 NOTE — PLAN OF CARE
Goal Outcome Evaluation:              Outcome Evaluation: Pt resting in bed son at bedside. Pt confused and tries to get out of bed. Moved closer to nurses station. Will continue to monitor and follow plan of care.

## 2022-07-03 NOTE — PLAN OF CARE
Goal Outcome Evaluation:  Plan of Care Reviewed With: patient           Outcome Evaluation: Pt resting in bed. Transferred from ER this shift. Pt complained of pain; see MAR. Rectal temp 94; CIERA'Minor CERON made aware. Maria Luz clearyer applied per order. Will continue to follow plan of care.

## 2022-07-03 NOTE — PAYOR COMM NOTE
"[facility]  JILL GANT  PHONE  [phone]  FAX  [phone]  NPI:  [id number]    REF#[id number]    Aide Chen (82 y.o. Female)             Date of Birth   1940    Social Security Number       Address   [address] [place] [address] [address] [place] [id number]    Home Phone   [phone]    MRN   [id number]       Mormonism   Unknown    Marital Status                               Admission Date   7/2/22    Admission Type   Emergency    Admitting Provider   Shaw Tomas DO    Attending Provider   Shaw Tomas DO    Department, Room/Bed   [facility] 3 [facility], 3309/2S       Discharge Date       Discharge Disposition       Discharge Destination                               Attending Provider: Shaw Tomas DO    Allergies: Keflex [Cephalexin], Lodine [Etodolac], Penicillins, Sulfa Antibiotics    Isolation: None   Infection: None   Code Status: CPR   Advance Care Planning Activity    Ht: 170.2 cm (67\")   Wt: 70.9 kg (156 lb 3.2 oz)    Admission Cmt: None   Principal Problem: None                Active Insurance as of 7/2/2022     Primary Coverage     Payor Plan Insurance Group Employer/Plan Group    HUMANA MEDICARE REPLACEMENT HUMANA MEDICARE REPLACEMENT [id number]     Payor Plan Address Payor Plan Phone Number Payor Plan Fax Number Effective Dates    [address] [address] [phone]  1/1/2021 - None Entered    [facility] [id number]       Subscriber Name Subscriber Birth Date Member ID       AIDE CHEN 1940 [id number]           Secondary Coverage     Payor Plan Insurance Group Employer/Plan Group    [place]      Payor Plan Address Payor Plan Phone Number Payor Plan Fax Number Effective Dates    [address]   1/1/2014 - None Entered    [facility] [phone]       Subscriber Name Subscriber Birth Date Member ID       AIDE CHEN 1940 [id number]                 Emergency Contacts      (Rel.) Home Phone Work Phone Mobile Phone "    Dylon Chen Jr (Son) [phone] -- [phone]    deysi chen -- -- [phone]    Dylon Chen Sr (Spouse) [phone] -- --    Dylon Chen J (Grandchild) -- -- [phone]               History & Physical      Hiral Fox PA-C at 22 1819     Attestation signed by Shaw Tomas DO at 22 1905    I have reviewed this documentation and agree.  Patient seen and evaluated with pertinent CR Ryan MASON at bedside.  Patient with some lower extremity edema and imaging consistent with mild anasarca and volume overload.  She has had intermittent hemodialysis and outpatient dialysis and has been skipping dialysis sessions on an as-needed basis based on urine output however son does admit that they are not always compliant with fluid restriction.  There is possible UTI present and for now we will continue aztreonam and await urine culture.  Her mostly altered mental status is likely secondary to hypoglycemia which is likely secondary to her subcu insulin administered last night.  Patient's son reports that they have been instructed to give her her long-acting insulin on an as-needed basis if her glucose is greater than 200 at night.  For now we will admit to the telemetry floor placed on diet fluid restrict and have nephrology see for hemodialysis.  In the meantime did instruct the emergency department to give 60 mg IV Lasix to try to aid in further volume output.                       [facility] Medicine Services  HISTORY & PHYSICAL    Patient Identification:  Name:  Sean Chen  Age:  82 y.o.  Sex:  female  :  1940  MRN:  [id number]   Visit Number:  [id number]  Admit Date: 2022   Primary Care Physician:  Ganga Gallardo MD     Subjective     Chief complaint:   Chief Complaint   Patient presents with   • Leg Swelling   • Fatigue     History of presenting illness:   Patient is a 82 y.o. female with past medical history significant for  "progressive dementia, type 2 diabetes mellitus, ESRD on HD, essential hypertension, hyperlipidemia, rheumatoid arthritis, severe pulmonary hypertension, GERD, that presented to the [facility] emergency department for evaluation of bilateral leg edema and fatigue.    Evaluated the patient at bedside with attending, Dr. Tomas.  provided the entire history of presenting illness. Patient is awake but moaning and not able to provide an extensive supplemental history at this time. She is alert and oriented to the location and will respond to her name although she was not able to verbally tell us her name. Per the  the patient was doing very well and had a good appetite yesterday. She also received dialysis yesterday and had \"some fluid taken off.\" He states they monitor her urine output at home and depending on the amount, she may skip a dose of dialysis. She receives dialysis on Monday, Wednesday and Friday schedule. The  states that this morning the patient was very lethargic and refusing to eat. As the day progressed her symptoms worsened. He admits to giving her a dose of insulin last night as her glucose of around 220. The  reports their PCP told them to give the insulin shot if her glucose was >200. The  denies the patient having any fever, chills, diaphoresis, abdominal pain, nausea or vomiting. She has had a difficult time ambulating secondary to generalized weakness however. He further endorses that the patient has an open wound on her left lower posterior leg that is being monitored in the outpatient setting and she was recently stated on antibiotics for a possible infection. He states she was received unna boots  but does not like to wear them and will often tear them off. The patient currently lives at home with her  and father in law who both assist in her care.     Upon arrival to the ED, vitals were temperature 98.8 °F, pulse 65, respirations 18, BP " 159/84, SPO2 100% on 2 L NC.  Troponin T 0.061.  proBNP greater than 70,000.  CMP of glucose 42, sodium 132, chloride 94, creatinine 2.49, calcium 8.5, GFR 19, albumin 3.17.  CBC with RBC 3.66, hemoglobin 11.0, hematocrit 32.7, otherwise unremarkable.  UA with cloudy appearance, 1+ blood, positive nitrites, moderate leukocytes, 3+ protein, 13-20 WBC, 2+ bacteria.  Blood cultures pending x2.  UDS negative.  Blood ethanol unremarkable.  Chest x-ray shows bilateral effusions are relatively stable in the right and improved on the left, vascular congestion without definite pulmonary edema, bibasilar infiltrate or atelectasis, right greater than left.  CT of the abdomen/pelvis with contrast shows a significantly degraded study due to lack of IV contrast and artifact.  CT of the chest without contrast shows a large right and moderate left pleural effusion, areas of consolidative change and nodularity seen adjacent to the pleural effusions with dependent lungs, some scattered reticular nodularity in the right apex, cardiomegaly, severe coronary artery atherosclerosis.  EKG with normal sinus rhythm, right bundle branch block, heart rate 68, QTc 569 MS.    Patient has been admitted to the telemetry floor for further evaluation and treatment    Patient's  and attending Dr. Tomas at bedside during exam  ---------------------------------------------------------------------------------------------------------------------   Review of Systems   Reason unable to perform ROS: provided by .   Constitutional: Positive for appetite change and fatigue. Negative for chills, diaphoresis and fever.   HENT: Negative for congestion, sinus pain and sore throat.    Respiratory: Negative for cough, shortness of breath and wheezing.    Cardiovascular: Positive for leg swelling. Negative for chest pain and palpitations.   Gastrointestinal: Negative for abdominal pain, constipation, diarrhea, nausea and vomiting.   Genitourinary:  Negative for dysuria and frequency.   Musculoskeletal: Negative for gait problem and myalgias.   Skin: Positive for wound (left lower leg).   Neurological: Positive for weakness (generalized ). Negative for dizziness and light-headedness.   Psychiatric/Behavioral: Negative for confusion.      ---------------------------------------------------------------------------------------------------------------------   Past Medical History:   Diagnosis Date   • Allergic rhinitis    • Elevated liver enzymes    • Extrinsic asthma    • Gastritis    • GERD (gastroesophageal reflux disease)    • Hyperlipidemia    • Hypertension    • Iron deficiency anemia    • Meniere's disease    • Osteoarthritis    • Type 2 diabetes mellitus (HCC)    • Vitamin D deficiency disease      Past Surgical History:   Procedure Laterality Date   • APPENDECTOMY     • CARDIAC CATHETERIZATION N/A 5/11/2022    Procedure: Left Heart Cath;  Surgeon: Marcelino Grande MD;  Location: [facility] CATH INVASIVE LOCATION;  Service: Cardiology;  Laterality: N/A;   • CHOLECYSTECTOMY     • HYSTERECTOMY     • INSERTION HEMODIALYSIS CATHETER N/A 5/9/2022    Procedure: HEMODIALYSIS CATHETER INSERTION;  Surgeon: Hans Coates MD;  Location: [facility] OR;  Service: General;  Laterality: N/A;     History reviewed. No pertinent family history.  Social History     Socioeconomic History   • Marital status:      Spouse name: moshe   • Number of children: 1   • Years of education: 8   Tobacco Use   • Smoking status: Never Smoker   • Smokeless tobacco: Never Used   Substance and Sexual Activity   • Alcohol use: No   • Drug use: No   • Sexual activity: Defer     ---------------------------------------------------------------------------------------------------------------------   Allergies:  Keflex [cephalexin], Lodine [etodolac], Penicillins, and Sulfa  antibiotics  ---------------------------------------------------------------------------------------------------------------------   Medications below are reported home medications pulling from within the system; at this time, these medications have not been reconciled unless otherwise specified and are in the verification process for further verifcation as current home medications.    Prior to Admission Medications     Prescriptions Last Dose Informant Patient Reported? Taking?    albuterol sulfate HFA (ProAir HFA) 108 (90 Base) MCG/ACT inhaler  Pharmacy No No    Inhale 2 puffs 4 (Four) Times a Day.    aspirin 81 MG EC tablet   No No    Take 1 tablet by mouth Daily.    atorvastatin (LIPITOR) 80 MG tablet  Pharmacy No No    Take 1 tablet by mouth Every Night.    calcium acetate (PHOS BINDER,) 667 MG capsule capsule   No No    Take 2 capsules by mouth 3 (Three) Times a Day With Meals.    carvedilol (COREG) 6.25 MG tablet   No No    Take 1 tablet by mouth 2 (Two) Times a Day With Meals.    folic acid (FOLVITE) 1 MG tablet   No No    Take 1 tablet by mouth Daily.    Glucose Blood (Blood Glucose Test) strip   No No    1 daily    Lancets misc   No No    1 daily    NIFEdipine CC (ADALAT CC) 30 MG 24 hr tablet   No No    Take 2 tablets by mouth Daily.        ---------------------------------------------------------------------------------------------------------------------    Objective     Hospital Scheduled Meds:  furosemide, 60 mg, Intravenous, Once           Current listed hospital scheduled medications may not yet reflect those currently placed in orders that are signed and held, awaiting patient's arrival to floor/unit.    ---------------------------------------------------------------------------------------------------------------------   Vital Signs:  Temp:  [98.8 °F (37.1 °C)] 98.8 °F (37.1 °C)  Heart Rate:  [65-67] 65  Resp:  [18] 18  BP: (159)/(84) 159/84  Mean Arterial Pressure (Non-Invasive) for the past 24  hrs (Last 3 readings):   Noninvasive MAP (mmHg)   22 1604 129     SpO2 Percentage    22 1604 22 1606   SpO2: 98% 100%     SpO2:  [98 %-100 %] 100 %  on  Flow (L/min):  [2] 2;   Device (Oxygen Therapy): nasal cannula    Body mass index is 20.05 kg/m².  Wt Readings from Last 3 Encounters:   22 58.1 kg (128 lb)   22 57 kg (125 lb 9.6 oz)   22 67.1 kg (147 lb 14.4 oz)     ---------------------------------------------------------------------------------------------------------------------   Physical Exam:  Physical Exam  Constitutional:       General: She is awake.      Appearance: Normal appearance. She is well-developed and normal weight. She is ill-appearing (chronically ).      Interventions: Nasal cannula in place.      Comments: 2L NC   HENT:      Head: Normocephalic and atraumatic.   Eyes:      General: Lids are normal.   Cardiovascular:      Rate and Rhythm: Normal rate and regular rhythm.      Pulses: Normal pulses.           Dorsalis pedis pulses are 2+ on the right side and 2+ on the left side.        Posterior tibial pulses are 2+ on the right side and 2+ on the left side.      Heart sounds: Normal heart sounds. No murmur heard.    No friction rub. No gallop.   Pulmonary:      Effort: Pulmonary effort is normal. No tachypnea.      Breath sounds: Decreased breath sounds (diffusely diminished ) present.      Comments: Crackles in bilateral lower lobes   Abdominal:      General: Bowel sounds are decreased.      Palpations: Abdomen is soft.      Tenderness: There is no abdominal tenderness.   Musculoskeletal:      Right lower le+ Edema present.      Left lower le+ Edema present.   Skin:     Comments: Skin tear on left lower posterior leg    Neurological:      General: No focal deficit present.      Mental Status: She is alert. She is disoriented.   Psychiatric:         Speech: Speech normal.         Behavior: Behavior is cooperative.         Cognition and Memory:  Cognition normal.       ---------------------------------------------------------------------------------------------------------------------  EKG:    Normal sinus rhythm, right bundle branch block, heart rate 68, QTc 569 MS.    Telemetry:    NSR, HR 84 bpm, SpO2 98% on 2L NC.     I have personally reviewed the EKG/Telemetry strip  ---------------------------------------------------------------------------------------------------------------------   Results from last 7 days   Lab Units 07/02/22  1626   CK TOTAL U/L 41   TROPONIN T ng/mL 0.061*     Results from last 7 days   Lab Units 07/02/22  1626   PROBNP pg/mL >70,000.0*         Results from last 7 days   Lab Units 07/02/22  1626   CRP mg/dL 1.96*   LACTATE mmol/L 0.7   WBC 10*3/mm3 7.05   HEMOGLOBIN g/dL 11.0*   HEMATOCRIT % 32.7*   MCV fL 89.3   MCHC g/dL 33.6   PLATELETS 10*3/mm3 259   INR  0.93     Results from last 7 days   Lab Units 07/02/22  1626   SODIUM mmol/L 132*   POTASSIUM mmol/L 3.5   CHLORIDE mmol/L 94*   CO2 mmol/L 24.3   BUN mg/dL 21   CREATININE mg/dL 2.49*   CALCIUM mg/dL 8.5*   GLUCOSE mg/dL 42*   ALBUMIN g/dL 3.17*   BILIRUBIN mg/dL 0.4   ALK PHOS U/L 113   AST (SGOT) U/L 16   ALT (SGPT) U/L 7   Estimated Creatinine Clearance: 16.3 mL/min (A) (by C-G formula based on SCr of 2.49 mg/dL (H)).  Ammonia   Date Value Ref Range Status   07/02/2022 <10 (L) 11 - 51 umol/L Final       Glucose   Date/Time Value Ref Range Status   07/02/2022 1802 114 70 - 130 mg/dL Final     Comment:     Meter: [id number] : SHWETA MARKS     Lab Results   Component Value Date    HGBA1C 5.50 04/29/2022     Lab Results   Component Value Date    TSH 1.390 07/02/2022    FREET4 1.54 07/02/2022     Pain Management Panel     Pain Management Panel Latest Ref Rng & Units 7/2/2022 5/23/2022    CREATININE UR mg/dL - -    AMPHETAMINES SCREEN, URINE Negative Negative Negative    BARBITURATES SCREEN Negative Negative Negative    BENZODIAZEPINE SCREEN, URINE  Negative Negative Negative    BUPRENORPHINEUR Negative Negative Negative    COCAINE SCREEN, URINE Negative Negative Negative    METHADONE SCREEN, URINE Negative Negative Negative    METHAMPHETAMINEUR Negative Negative Negative        I have personally reviewed the above laboratory results.   ---------------------------------------------------------------------------------------------------------------------  Imaging Results (Last 7 Days)     Procedure Component Value Units Date/Time    CT Abdomen Pelvis Without Contrast [[id number]] Collected: 07/02/22 1757     Updated: 07/02/22 1759    Narrative:      CT Abdomen Pelvis WO    INDICATION:   Delirium, abdominal pain, COPD exacerbation    TECHNIQUE:   CT of the abdomen and pelvis without IV contrast. Coronal and sagittal reconstructions were obtained.  Radiation dose reduction techniques included automated exposure control or exposure modulation based on body size. Radiation audit for CT and nuclear  cardiology exams in the last 12 months: 4.     COMPARISON:   None available.    FINDINGS:    See separate CT chest. No destructive osseous lesion.    Evaluation of the solid viscera is compromised by lack of IV contrast. Additionally, the patient's arms are within the gantry which produces significant artifact which further compromises evaluation of the abdomen and pelvis. Allowing for this:    Diffuse body wall edema.    Normal noncontrast appearance of the liver, pancreas, spleen and both adrenal glands. Favor the gallbladder to be surgically absent.    The kidneys are symmetric in size. Evaluation for solid renal lesion is largely precluded by lack of IV contrast. No hydronephrosis. No renal or ureteral calculus. Likely cyst at the lower pole the right kidney.    Urinary bladder is distended.    No evidence of bowel obstruction. No localizing perienteric inflammatory change.    Normal caliber of the abdominal aorta. Extensive vascular calcifications. No lymphadenopathy  within the abdomen or pelvis by size criteria.      Impression:        Evaluation of the solid viscera is compromised by lack of IV contrast. Additionally, the patient's arms are within the gantry which produces significant artifact which further compromises evaluation of the abdomen and pelvis. Exam is moderately to  severely degraded. Allowing for this:    1.  Diffuse body wall edema.  2.  See separate CT chest.  3.  Extensive vascular calcifications.  4.  No definite acute process in the abdomen or pelvis by noncontrast CT.        Signer Name: ADAMS VOSS MD   Signed: 7/2/2022 5:57 PM   Workstation Name: [email or web address]    [facility]    CT Chest Without Contrast Diagnostic [[id number]] Collected: 07/02/22 1752     Updated: 07/02/22 1754    Narrative:      CT Chest WO    INDICATION:   Shortness of air, COPD exacerbation    TECHNIQUE:   CT of the chest without IV contrast. Coronal and sagittal reformatted images. Radiation dose reduction techniques included automated exposure control or exposure modulation based on body size. Radiation audit for CT and nuclear cardiology exams in the  last 12 months: 4.    COMPARISON:   May 23, 2022    FINDINGS:     Large right and moderate left pleural effusions. Areas of consolidative change and nodularity seen adjacent to the pleural effusions within the dependent lungs. Some scattered reticular nodularity in the right apex. Findings favor volume overload or  multifocal pneumonia with bilateral pleural effusions.    Cardiomegaly. Severe coronary artery atherosclerosis. Evaluation of the soft tissues of the chest are otherwise compromised by lack of IV contrast. No overt lymphadenopathy.    No destructive bony lesion.    See separately dictated CT abdomen/pelvis for findings below the diaphragm.      Impression:      1.  Large right and moderate left pleural effusions. Areas of consolidative change and nodularity seen adjacent to the pleural effusions within  the dependent lungs. Some scattered reticular nodularity in the right apex. Findings favor volume overload or  multifocal pneumonia with bilateral pleural effusions.  2.  Cardiomegaly.  3.  Severe coronary artery atherosclerosis.    Signer Name: ADAMS VOSS MD   Signed: 7/2/2022 5:52 PM   Workstation Name: [facility]    [facility]    CT Head Without Contrast [[id number]] Collected: 07/02/22 1749     Updated: 07/02/22 1751    Narrative:      CT Head WO    HISTORY:   Delirium    TECHNIQUE:   Routine noncontrast head CT. Radiation dose reduction techniques included automated exposure control or exposure modulation based on body size. Radiation audit for CT and nuclear cardiology exams in the last 12 months: 4.    COMPARISON:   June 5, 2022    FINDINGS:       No acute intracranial hemorrhage, mass lesion, or abnormal extra-axial fluid collection. No midline shift or focal mass effect. Ventricular system is normal in size and configuration. .    The gray-white matter differentiation is preserved. There are scattered ill-defined and patchy hypoattenuating foci within the bilateral cerebral and deep white matter which are nonspecific but most commonly associated with chronic microvascular ischemic  change. These are advanced.    Visualized paranasal sinuses are clear. Visualized mastoid air cells are clear.    No acute osseous abnormality.        Impression:        1.  Presumed chronic microvascular ischemic changes, advanced. No definite superimposed acute process.    Signer Name: ADAMS VOSS MD   Signed: 7/2/2022 5:49 PM   Workstation Name: [facility]    [facility]    XR Chest 1 View [[id number]] Collected: 07/02/22 1625     Updated: 07/02/22 1627    Narrative:      CR Chest 1 Vw    INDICATION:   Pulmonary edema.     COMPARISON:    6/5/2022    FINDINGS:  Portable AP view(s) of the chest.  Central venous catheter tip remains in the SVC. Heart size is stable. There are  bilateral pleural effusions, right larger than left. Right side is relatively stable, and the left appears slightly improved. There is some  vascular congestion. There is infiltrate or atelectasis in the bases, right greater than left. Sergio pulmonary edema is not definitely identified.      Impression:        1. Bilateral effusions, relatively stable on the right and improved on the left.  2. Vascular congestion without definite pulmonary edema.  3. Bibasilar infiltrate or atelectasis, right greater than left.    Signer Name: Tomas Hoffman MD   Signed: 7/2/2022 4:25 PM   Workstation [facility]: [facility]    [facility]        I have personally reviewed the above radiology results.     Last Echocardiogram:  Results for orders placed during the hospital encounter of 05/23/22    Adult Transthoracic Echo Limited W/ Cont if Necessary Per Protocol    Interpretation Summary  · The left ventricular cavity is borderline dilated.  · Left ventricular ejection fraction appears to be 36 - 40%.  · Left ventricular diastolic function was normal.  · There is calcification of the aortic valve.    ---------------------------------------------------------------------------------------------------------------------    Assessment & Plan      There are no active hospital problems to display for this patient.  #Acute on chronic combined systolic and diastolic CHF exacerbation  #ESRD on HD (M,W,F)  #Chronic bilateral pleural effusion  #Valvular disease-mild AS, mild MR, moderate TR  -Imaging reviewed  -IV Lasix 60 mg ordered; monitor diuresis with strict I's and O's; obtain daily weights  -Echo from 5/25/2022 reviewed, EF 36 to 40%  -Nephrology has been consulted for assistance with HD, further input/assistance is much appreciated  -Bilateral pleural effusions are currently stable  -O2 saturation is around 98% on 2 L NC  -Continue to monitor closely    #Hypoglycemia suspect medication induced   #Type 2 diabetes  mellitus  -Glucose upon arrival to the ED 42; received IV dextrose 25 g in the ED with improvement in glucose to 114  -Suspect hypoglycemia medication induced from insulin given not prior  -Discussed with attending, we will start low-dose sliding scale insulin with Accu-Cheks 3 times daily before meals  -Hypoglycemia protocol in place  -Review home medications once reconciled per pharmacy    #Acute UTI POA  -UA grossly unremarkable  -Urine culture ordered and pending  -Start IV aztreonam  -Repeat a.m. CBC    #Abrasion on left lower posterior leg   -Wound care consulted    #Prolonged QTC (569 MS)  -Corrected QTC 4 right bundle branch block is 559 MS  -Potassium borderline low at 3.5; obtain magnesium     #Chronic troponinemia  #History of coronary artery disease  #Hyperlipidemia  #Essential hypertension  -Has been denies the patient complaining of any chest pain or discomfort  -Recent left heart cath from 5/11/2022 reported to have nonobstructive coronary artery disease  -Overall flat trend so far; continue trend troponin and obtain repeat a.m. EKG  -Admission EKG with normal sinus rhythm, no acute ischemic changes, chronic right bundle branch block  -Plan to resume home aspirin and statin  -Review home medications once available; plan to continue antihypertensive agents as BP is currently somewhat uncontrolled    #Chronic, reported progressive dementia  #Generalized weakness/debility  #Poor p.o. intake  -Currently alert and oriented to location and herself  - reports generalized weakness and poor appetite since this morning  -Patient is to be up with assistance, fall precautions in place  -PT/OT consult  -SLP consulted  -Aspiration precautions in place      F/E/N: No IV fluids. Replace electrolytes as necessary. Consistent carb, cardiac 1500 mL fluid restriction diet.   ---------------------------------------------------  DVT Prophylaxis: Subq heparin  GI Prophylaxis: Protonix   Activity: Up with assistance,  fall precautions     The patient is considered to be a high risk patient due to:     INPATIENT status due to the need for care which can only be reasonably provided in an hospital setting such as aggressive/expedited ancillary services and/or consultation services, the necessity for IV medications, close physician monitoring and/or the possible need for procedures.  In such, I feel patient’s risk for adverse outcomes and need for care warrant INPATIENT evaluation and predict the patient’s care encounter to likely last beyond 2 midnights.    Code Status: FULL CODE     Disposition/Discharge planning: Plan for home at discharge.  Pending clinical course    I have discussed the patient's assessment and plan with the patient, nursing staff, and attending physician      Hiral Fox PA-C  Hospitalist Service -- [facility]       07/02/22  18:49 EDT    Attending Physician: Shaw Tomas MD      Electronically signed by Shaw Tomas DO at 07/02/22 1905          Emergency Department Notes      Jone Schaffer MD at 07/02/22 1753          Subjective     History provided by:  Relative   used: No    Altered Mental Status  Presenting symptoms: confusion    Presenting symptoms: no behavior changes, no combativeness, no disorientation, no lethargy, no memory loss, no partial responsiveness and no unresponsiveness    Severity:  Mild  Most recent episode:  More than 2 days ago  Episode history:  Continuous  Timing:  Constant  Progression:  Waxing and waning  Chronicity:  New  Context: not alcohol use, not dementia, not drug use, not head injury, not homeless, taking medications as prescribed, not nursing home resident, not recent change in medication, not recent illness and not recent infection    Associated symptoms: no abdominal pain, normal movement, no agitation, no bladder incontinence, no decreased appetite, no depression, no difficulty breathing, no eye deviation, no fever,  no hallucinations, no headaches, no light-headedness, no nausea, no palpitations, no rash, no seizures, no slurred speech, no suicidal behavior, no visual change, no vomiting and no weakness        Review of Systems   Constitutional: Negative for activity change, appetite change, chills, decreased appetite, diaphoresis, fatigue and fever.   HENT: Negative for congestion, ear pain and sore throat.    Eyes: Negative for redness.   Respiratory: Negative for cough, chest tightness, shortness of breath and wheezing.    Cardiovascular: Negative for chest pain, palpitations and leg swelling.   Gastrointestinal: Negative for abdominal pain, diarrhea, nausea and vomiting.   Genitourinary: Negative for bladder incontinence, dysuria and urgency.   Musculoskeletal: Negative for arthralgias, back pain, myalgias and neck pain.   Skin: Negative for pallor, rash and wound.   Neurological: Negative for dizziness, seizures, speech difficulty, weakness, light-headedness and headaches.   Psychiatric/Behavioral: Positive for confusion. Negative for agitation, behavioral problems, decreased concentration, hallucinations and memory loss.   All other systems reviewed and are negative.      Past Medical History:   Diagnosis Date   • Allergic rhinitis    • Elevated liver enzymes    • Extrinsic asthma    • Gastritis    • GERD (gastroesophageal reflux disease)    • Hyperlipidemia    • Hypertension    • Iron deficiency anemia    • Meniere's disease    • Osteoarthritis    • Type 2 diabetes mellitus (HCC)    • Vitamin D deficiency disease        Allergies   Allergen Reactions   • Keflex [Cephalexin]    • Lodine [Etodolac]    • Penicillins    • Sulfa Antibiotics        Past Surgical History:   Procedure Laterality Date   • APPENDECTOMY     • CARDIAC CATHETERIZATION N/A 5/11/2022    Procedure: Left Heart Cath;  Surgeon: Marcelino Grande MD;  Location: [facility] INVASIVE LOCATION;  Service: Cardiology;  Laterality: N/A;   • CHOLECYSTECTOMY     •  HYSTERECTOMY     • INSERTION HEMODIALYSIS CATHETER N/A 5/9/2022    Procedure: HEMODIALYSIS CATHETER INSERTION;  Surgeon: Hans Coates MD;  Location: [facility];  Service: General;  Laterality: N/A;       History reviewed. No pertinent family history.    Social History     Socioeconomic History   • Marital status:      Spouse name: moshe   • Number of children: 1   • Years of education: 8   Tobacco Use   • Smoking status: Never Smoker   • Smokeless tobacco: Never Used   Substance and Sexual Activity   • Alcohol use: No   • Drug use: No   • Sexual activity: Defer           Objective   Physical Exam  Vitals and nursing note reviewed.   Constitutional:       General: She is not in acute distress.     Appearance: Normal appearance. She is well-developed. She is ill-appearing. She is not toxic-appearing or diaphoretic.   HENT:      Head: Normocephalic and atraumatic.      Right Ear: External ear normal.      Left Ear: External ear normal.      Nose: Nose normal.      Mouth/Throat:      Pharynx: No oropharyngeal exudate.      Tonsils: No tonsillar exudate.   Eyes:      General: Lids are normal.      Conjunctiva/sclera: Conjunctivae normal.      Pupils: Pupils are equal, round, and reactive to light.   Neck:      Thyroid: No thyromegaly.   Cardiovascular:      Rate and Rhythm: Normal rate and regular rhythm.      Pulses: Normal pulses.      Heart sounds: Normal heart sounds, S1 normal and S2 normal.   Pulmonary:      Effort: Pulmonary effort is normal. No tachypnea or respiratory distress.      Breath sounds: Normal breath sounds. No decreased breath sounds, wheezing or rales.   Chest:      Chest wall: No tenderness.   Abdominal:      General: Bowel sounds are normal. There is no distension.      Palpations: Abdomen is soft.      Tenderness: There is no abdominal tenderness. There is no guarding or rebound.   Musculoskeletal:         General: No tenderness or deformity. Normal range of motion.      Cervical  back: Full passive range of motion without pain, normal range of motion and neck supple.   Lymphadenopathy:      Cervical: No cervical adenopathy.   Skin:     General: Skin is warm and dry.      Coloration: Skin is not pale.      Findings: No erythema or rash.   Neurological:      Mental Status: She is alert and oriented to person, place, and time.      GCS: GCS eye subscore is 4. GCS verbal subscore is 5. GCS motor subscore is 6.      Cranial Nerves: No cranial nerve deficit.      Sensory: No sensory deficit.   Psychiatric:         Speech: Speech normal.         Behavior: Behavior normal.         Thought Content: Thought content normal.         Judgment: Judgment normal.         Procedures          ED Course  ED Course as of 07/03/22 0738   Sat Jul 02, 2022   1618 ECG 12 Lead  Vent. rate 68 BPM  VT interval 160 ms  QRS duration 138 ms  QT/QTcB 536/569 ms  P-R-T axes -27 152 256  Normal sinus rhythm  Right bundle branch block  T wave abnormality, consider inferolateral ischemia  Abnormal ECG  When compared with ECG of 05-JUN-2022 06:18,  VT interval has decreased [ES]   1703 XR Chest 1 View  IMPRESSION:     1. Bilateral effusions, relatively stable on the right and improved on the left.  2. Vascular congestion without definite pulmonary edema.  3. Bibasilar infiltrate or atelectasis, right greater than left. [ES]   1755 CT Head Without Contrast  IMPRESSION:     1.  Presumed chronic microvascular ischemic changes, advanced. No definite superimposed acute process. [ES]   1755 CT Chest Without Contrast Diagnostic  IMPRESSION:  1.  Large right and moderate left pleural effusions. Areas of consolidative change and nodularity seen adjacent to the pleural effusions within the dependent lungs. Some scattered reticular nodularity in the right apex. Findings favor volume overload or  multifocal pneumonia with bilateral pleural effusions.  2.  Cardiomegaly.  3.  Severe coronary artery atherosclerosis. [ES]   1800 CT Abdomen  Pelvis Without Contrast  IMPRESSION:     Evaluation of the solid viscera is compromised by lack of IV contrast. Additionally, the patient's arms are within the gantry which produces significant artifact which further compromises evaluation of the abdomen and pelvis. Exam is moderately to  severely degraded. Allowing for this:     1.  Diffuse body wall edema.  2.  See separate CT chest.  3.  Extensive vascular calcifications.  4.  No definite acute process in the abdomen or pelvis by noncontrast CT. [ES]      ED Course User Index  [ES] Jone Schaffer MD                                           MDM  Number of Diagnoses or Management Options     Amount and/or Complexity of Data Reviewed  Clinical lab tests: reviewed and ordered  Tests in the radiology section of CPT®: reviewed and ordered  Tests in the medicine section of CPT®: reviewed and ordered  Review and summarize past medical records: yes  Independent visualization of images, tracings, or specimens: yes    Risk of Complications, Morbidity, and/or Mortality  Presenting problems: moderate  Diagnostic procedures: moderate  Management options: moderate    Patient Progress  Patient progress: stable      Final diagnoses:   Hypoglycemia       ED Disposition  ED Disposition     ED Disposition   Decision to Admit    Condition   --    Comment   Level of Care: Telemetry [5]   Diagnosis: Hypoglycemia [076312]   Certification: I Certify That Inpatient Hospital Services Are Medically Necessary For Greater Than 2 Midnights               No follow-up provider specified.       Medication List      No changes were made to your prescriptions during this visit.          Jone Schaffer MD  07/03/22 0738      Electronically signed by Jone Schaffer MD at 07/03/22 0738         Current Facility-Administered Medications   Medication Dose Route Frequency Provider Last Rate Last Admin   • acetaminophen (TYLENOL) tablet 650 mg  650 mg Oral Q6H PRN  Yoon Belle PA   650 mg at 07/03/22 0628   • aspirin EC tablet 81 mg  81 mg Oral Daily Shaw Tomas DO   81 mg at 07/03/22 0854   • atorvastatin (LIPITOR) tablet 80 mg  80 mg Oral Nightly Shaw Tomas DO   80 mg at 07/02/22 2322   • aztreonam (AZACTAM) 1 g in sodium chloride 0.9 % 100 mL IVPB-VTB  1 g Intravenous Q8H Shaw Tomas DO   1 g at 07/03/22 0855   • calcium acetate (PHOS BINDER)) capsule 1,334 mg  1,334 mg Oral TID With Meals Shaw Tomas DO   1,334 mg at 07/03/22 0854   • carvedilol (COREG) tablet 6.25 mg  6.25 mg Oral BID With Meals Shaw Tomas DO   6.25 mg at 07/03/22 0854   • dextrose (D50W) (25 g/50 mL) IV injection 25 g  25 g Intravenous Q15 Min PRN Shaw Tomas DO   25 g at 07/03/22 0333   • dextrose (GLUTOSE) oral gel 15 g  15 g Oral Q15 Min PRN Shaw Tomas DO       • folic acid (FOLVITE) tablet 1 mg  1 mg Oral Daily Shaw Tomas DO   1 mg at 07/03/22 0854   • glucagon (human recombinant) (GLUCAGEN DIAGNOSTIC) injection 1 mg  1 mg Intramuscular Q15 Min PRN Shaw Tomas DO       • heparin (porcine) 5000 UNIT/ML injection 5,000 Units  5,000 Units Subcutaneous Q8H Shaw Tomas DO   5,000 Units at 07/03/22 0540   • hydrALAZINE (APRESOLINE) injection 10 mg  10 mg Intravenous Q6H PRN Yoon Belle PA       • NIFEdipine CC (ADALAT CC) 24 hr tablet 60 mg  60 mg Oral Q24H Shaw Tomas DO   60 mg at 07/03/22 0854   • sodium chloride 0.9 % flush 10 mL  10 mL Intravenous Q12H Shaw Tomas DO   10 mL at 07/03/22 0855   • sodium chloride 0.9 % flush 10 mL  10 mL Intravenous PRN Shaw Tomas DO         Orders (last 24 hrs)      Start     Ordered    07/03/22 0930  NIFEdipine CC (ADALAT CC) 24 hr tablet 60 mg  Every 24 Hours Scheduled         07/03/22 0750    07/03/22 0900  aspirin EC tablet 81 mg  Daily         07/02/22 1855 07/03/22 0900  NIFEdipine CC (ADALAT CC) 24 hr tablet 30 mg  Every 24 Hours Scheduled,   Status:  Discontinued         07/02/22 2064     07/03/22 0800  Oral Care  2 Times Daily       07/02/22 2022 07/03/22 0730  Insulin Aspart (novoLOG) injection 0-7 Units  3 Times Daily Before Meals,   Status:  Discontinued         07/02/22 2022 07/03/22 0702  POC Glucose Once  PROCEDURE ONCE         07/03/22 0626    07/03/22 0700  POC Glucose TID AC  3 Times Daily Before Meals       07/02/22 2022 07/03/22 0637  ECG 12 Lead  STAT         07/03/22 0636    07/03/22 0600  CBC Auto Differential  Morning Draw         07/02/22 2022 07/03/22 0600  Comprehensive Metabolic Panel  Morning Draw         07/02/22 2022 07/03/22 0416  POC Glucose Once  PROCEDURE ONCE         07/03/22 0409    07/03/22 0326  POC Glucose Once  PROCEDURE ONCE         07/03/22 0319    07/03/22 0100  aztreonam (AZACTAM) 1 g in sodium chloride 0.9 % 100 mL IVPB-VTB  Every 8 Hours         07/02/22 2022 07/03/22 0100  ECG 12 Lead  Once         07/03/22 0001    07/03/22 0008  Wound Ostomy Eval & Treat  Once         07/03/22 0008    07/03/22 0000  Vital Signs  Every 4 Hours       07/02/22 2022 07/02/22 2343  acetaminophen (TYLENOL) tablet 650 mg  Every 6 Hours PRN         07/02/22 2343    07/02/22 2324  ECG 12 Lead  STAT         07/02/22 2323    07/02/22 2324  Troponin  STAT         07/02/22 2323    07/02/22 2243  Urine Culture - Urine, Urine, Random Void  Once        Comments: Please ensure 'Use Existing Specimen' is selected if this order is for urine culture add-on.  If no button appears, please contact the lab for assistance.      07/02/22 2242 07/02/22 2237  Urinalysis, Microscopic Only - Urine, Random Void  Once        Comments: Please ensure 'Use Existing Specimen' is selected if this order is for urine culture add-on.  If no button appears, please contact the lab for assistance.      07/02/22 2236 07/02/22 2200  heparin (porcine) 5000 UNIT/ML injection 5,000 Units  Every 8 Hours Scheduled         07/02/22 2022 07/02/22 2115  sodium chloride 0.9 % flush 10 mL  Every 12  Hours Scheduled         07/02/22 2022 07/02/22 2100  atorvastatin (LIPITOR) tablet 80 mg  Nightly         07/02/22 1855 07/02/22 2055  hydrALAZINE (APRESOLINE) injection 10 mg  Every 6 Hours PRN         07/02/22 2055 07/02/22 2042  POC Glucose Once  PROCEDURE ONCE         07/02/22 2032 07/02/22 2042  Apply [employer]  Once         07/02/22 2041 07/02/22 2023  Intake & Output  Every Shift       07/02/22 2022 07/02/22 2023  Weigh Patient  Once         07/02/22 2022 07/02/22 2023  Oxygen Therapy- Nasal Cannula; Titrate for SPO2: 90% - 95%  Continuous         07/02/22 2022 07/02/22 2023  Insert Peripheral IV  Once         07/02/22 2022 07/02/22 2023  Saline Lock & Maintain IV Access  Continuous         07/02/22 2022 07/02/22 2023  Pulse Oximetry, Continuous  Continuous         07/02/22 2022 07/02/22 2023  Cardiac Monitoring  Continuous         07/02/22 2022 07/02/22 2023  Diet Regular; Consistent Carbohydrate, Cardiac, Daily Fluid Restriction; 1500 mL Fluid Per Day  Diet Effective Now         07/02/22 2022 07/02/22 2023  Inpatient Nephrology Consult  Once        Specialty:  Nephrology  Provider:  John Jesus MD    07/02/22 2022 07/02/22 2023  PT Consult: Eval & Treat Functional Mobility Below Baseline  Once         07/02/22 2022 07/02/22 2023  OT Consult: Eval & Treat  Once         07/02/22 2022 07/02/22 2023  Do NOT Hold Basal or Correction Scale Insulin When Patient is NPO, Hold Scheduled Mealtime (Bolus) Insulin if NPO  Continuous         07/02/22 2022 07/02/22 2023  Follow [facility] Hypoglycemia Standing Orders For Blood Glucose Less Than 70 mg/dL  Until Discontinued        Comments: ALERT PATIENT - NOT NPO & CAN SAFELY SWALLOW  Administer 4 oz Fruit Juice OR 4 oz Regular Soda OR 8 oz Milk OR 15-30 grams (1 tube) of Glucose Gel.  Recheck Blood Glucose Approximately 15 Minutes After Ingestion, Repeat Treatment & Continue to Recheck Blood Sugar Approximately  Every 15 Minutes Until Blood Glucose is 70 or Higher.  Once Blood Glucose is 70 or Higher & if It Will Be More Than 60 Minutes Until Next Meal, Provide Appropriate Snack (Including Carbohydrate Food) Based on Meal Plan Orde piter. Give Meal Tray As Soon As Possible.    PATIENT HAS IV ACCESS - UNRESPONSIVE, NPO OR UNABLE TO SAFELY SWALLOW  Administer 25g (50ml) D50W IV Push.  Recheck Blood Glucose Approximately 15 Minutes After Administration, if Blood Glucose Remains Less Than 70, Repeat Treatment   Recheck Blood Glucose Approximately 15 Minutes After 2nd Administration, if Blood Glucose Remains Less Than 70 After 2nd Dose of D50W, Contact Provider for Further Treatment Orders & Consider Adding IVF With D5W for [facility]    PATIENT WITHOUT IV ACCESS - UNRESPONSIVE, NPO OR UNABLE TO SAFELY SWALLOW  Administer 1mg Glucagon SQ & Establish IV Access.  Turn Patient on Side - Nausea / Vomiting May Occur.  Recheck Blood Glucose Approximately 15 Minutes After Administration.  If Blood Glucose Remains Less Than 70, Administer 25g D50W IV Push (50ml).  Recheck Blood Glucose Approximately 15 Minutes After Administration of D50W, if Blood Glucose Remains Less Than 70, Contact Provider for Further Treatment Orders & C  Consider Adding IVF With D5 for Maintenance    Document Event & Patient Response to Interventions in EMR, Document Medications on MAR  Notify Provider if Hypoglycemia Treatment Needed    07/02/22 2022 07/02/22 2023  ReDs Vest  Once         07/02/22 2022 07/02/22 2023  Urinalysis With Culture If Indicated - Urine, Random Void  Once        Comments: Please ensure 'Use Existing Specimen' is selected if this order is for urine culture add-on.  If no button appears, please contact the lab for assistance.      07/02/22 2022 07/02/22 2022  dextrose (GLUTOSE) oral gel 15 g  Every 15 Minutes PRN         07/02/22 2022 07/02/22 2022  dextrose (D50W) (25 g/50 mL) IV injection 25 g  Every 15 Minutes PRN          07/02/22 2022 07/02/22 2022  glucagon (human recombinant) (GLUCAGEN DIAGNOSTIC) injection 1 mg  Every 15 Minutes PRN         07/02/22 2022 07/02/22 2022  sodium chloride 0.9 % flush 10 mL  As Needed         07/02/22 2022 07/02/22 2000  calcium acetate (PHOS BINDER)) capsule 1,334 mg  3 Times Daily With Meals         07/02/22 1855 07/02/22 2000  carvedilol (COREG) tablet 6.25 mg  2 Times Daily With Meals         07/02/22 1855 07/02/22 2000  folic acid (FOLVITE) tablet 1 mg  Daily         07/02/22 1855 07/02/22 1924  Case Management  Consult  Once        Provider:  (Not yet assigned)    07/02/22 1923 07/02/22 1915  COVID-19 and FLU A/B PCR - Swab, Nasopharynx  Once         07/02/22 1914 07/02/22 1857  NIFEdipine CC (ADALAT CC) 24 hr tablet 30 mg  Every 24 Hours Scheduled,   Status:  Discontinued         07/02/22 1855 07/02/22 1853  Blood Gas, Arterial With Co-Ox  PROCEDURE ONCE         07/02/22 1851    07/02/22 1846  Inpatient Admission  Once         07/02/22 1854    07/02/22 1846  Code Status and Medical Interventions:  Continuous         07/02/22 1854    07/02/22 1817  furosemide (LASIX) injection 60 mg  Once         07/02/22 1815    07/02/22 1812  POC Glucose Once  PROCEDURE ONCE         07/02/22 1802    07/02/22 1807  POC Glucose STAT  STAT         07/02/22 1806    07/02/22 1803  aztreonam (AZACTAM) 2 g in sodium chloride 0.9 % 100 mL IVPB-VTB  Once         07/02/22 1801    07/02/22 1741  Urinalysis, Microscopic Only - Urine, Clean Catch  Once         07/02/22 1740    07/02/22 1738  sodium chloride 0.9 % bolus 1,000 mL  Once,   Status:  Discontinued         07/02/22 1736    07/02/22 1737  Blood Gas, Arterial -With Co-Ox Panel: Yes  Once         07/02/22 1736    07/02/22 1732  dextrose (D50W) (25 g/50 mL) IV injection 25 g  Once         07/02/22 1730    07/02/22 1705  CT Abdomen Pelvis Without Contrast  1 Time Imaging         07/02/22 1704    07/02/22 1704  CT Head Without  Contrast  1 Time Imaging         07/02/22 1704    07/02/22 1704  CT Chest Without Contrast Diagnostic  1 Time Imaging         07/02/22 1704    07/02/22 1613  CK  Once         07/02/22 1612    07/02/22 1613  Urine Drug Screen - Urine, Clean Catch  Once         07/02/22 1612    07/02/22 1613  Ethanol  Once         07/02/22 1612    07/02/22 1613  Blood Culture - Blood, Arm, Left  Once         07/02/22 1612    07/02/22 1613  Blood Culture - Blood, Arm, Right  Once         07/02/22 1612    07/02/22 1613  Lactic Acid, Plasma  Once         07/02/22 1612    07/02/22 1613  Procalcitonin  Once         07/02/22 1612    07/02/22 1613  Protime-INR  Once         07/02/22 1612    07/02/22 1613  aPTT  Once         07/02/22 1612    07/02/22 1613  Ammonia  Once         07/02/22 1612    07/02/22 1612  T4, Free  Once         07/02/22 1612    07/02/22 1612  TSH  Once         07/02/22 1612    07/02/22 1559  ECG 12 Lead  Once         07/02/22 1558    07/02/22 1559  XR Chest 1 View  1 Time Imaging         07/02/22 1558    07/02/22 1558  CBC & Differential  Once         07/02/22 1558    07/02/22 1558  Comprehensive Metabolic Panel  Once         07/02/22 1558    07/02/22 1558  Urinalysis With Microscopic If Indicated (No Culture) - Urine, Clean Catch  Once         07/02/22 1558    07/02/22 1558  BNP  Once         07/02/22 1558    07/02/22 1558  Troponin  Now Then Every 2 Hours       07/02/22 1558    07/02/22 1558  C-reactive Protein  Once         07/02/22 1558    07/02/22 1558  Sedimentation Rate  Once         07/02/22 1558    07/02/22 1558  CBC Auto Differential  PROCEDURE ONCE         07/02/22 1558    Unscheduled  Up With Assistance  As Needed       07/02/22 2022    --  insulin glargine (LANTUS, SEMGLEE) 100 UNIT/ML injection  2 Times Daily PRN         07/02/22 1918                Physician Progress Notes (last 24 hours)  Notes from 07/02/22 0938 through 07/03/22 0938   No notes of this type exist for this encounter.         Consult Notes  (last 24 hours)  Notes from 07/02/22 0938 through 07/03/22 0938   No notes of this type exist for this encounter.

## 2022-07-03 NOTE — CONSULTS
Nephrology Consultation Note    Referring Provider: Dr. Tomas  Reason for Consultation: Dialysis    Chief complaint weakness and hypoglycemia    Subjective .     History of present illness: Patient was brought in by  for refusal to eat and lethargy.  She has been getting intermittent long-acting insulin for blood sugar greater than 200.  Patient on arrival in the emergency room was noted to be hypoglycemic.  She has been complaining of weakness.  She has been going for dialysis only intermittently.    Radiologically she was found to be in CHF.  She was admitted for CHF and for hypoglycemia    Hemodynamic data reviewed     At the time of my evaluation patient denies any dysuria or abdominal pain or chest pain or shortness of breath.  She says she makes urine twice a day.    History  Past Medical History:   Diagnosis Date   • Allergic rhinitis    • Elevated liver enzymes    • Extrinsic asthma    • Gastritis    • GERD (gastroesophageal reflux disease)    • Hyperlipidemia    • Hypertension    • Iron deficiency anemia    • Meniere's disease    • Osteoarthritis    • Type 2 diabetes mellitus (HCC)    • Vitamin D deficiency disease    ,   Past Surgical History:   Procedure Laterality Date   • APPENDECTOMY     • CARDIAC CATHETERIZATION N/A 5/11/2022    Procedure: Left Heart Cath;  Surgeon: Marcelino Grande MD;  Location: [facility] INVASIVE LOCATION;  Service: Cardiology;  Laterality: N/A;   • CHOLECYSTECTOMY     • HYSTERECTOMY     • INSERTION HEMODIALYSIS CATHETER N/A 5/9/2022    Procedure: HEMODIALYSIS CATHETER INSERTION;  Surgeon: Hans Coates MD;  Location: [facility] OR;  Service: General;  Laterality: N/A;   , History reviewed. No pertinent family history.,   Social History     Tobacco Use   • Smoking status: Never Smoker   • Smokeless tobacco: Never Used   Substance Use Topics   • Alcohol use: No   • Drug use: No    and Allergies:  Keflex [cephalexin], Lodine  [etodolac], Penicillins, and Sulfa antibiotics      Vital Signs   Temp:  [94.6 °F (34.8 °C)-99.2 °F (37.3 °C)] 97.4 °F (36.3 °C)  Heart Rate:  [58-96] 81  Resp:  [22-24] 22  BP: (127-209)/() 170/83    Physical Exam:     General Appearance:    Alert, cooperative, in no acute distress       Eyes:            Conjunctivae and sclerae normal, no icterus, no pallor       Throat:   No thrush, oral mucosa moist   Neck:  Mild JVD       Lungs:    Decreased breath sounds both bases with occasional posterior crackles    Heart:    Regular rhythm and normal rate, normal S1 and S2, no rub                  Abdomen:     Normal bowel sounds, no tenderness       Extremities:  No edema               Neurologic:   Cr Ns grossly intact,  moves all extremities.     Results Review:   I reviewed the patient's new clinical results.      Lab Results (last 24 hours)     Procedure Component Value Units Date/Time    Hemoglobin A1c [[id number]]  (Normal) Collected: 07/02/22 2339    Specimen: Blood Updated: 07/03/22 1311     Hemoglobin A1C 5.50 %     Narrative:      Hemoglobin A1C Ranges:    Increased Risk for Diabetes  5.7% to 6.4%  Diabetes                     >= 6.5%  Diabetic Goal                < 7.0%    POC Glucose Once [[id number]]  (Normal) Collected: 07/03/22 1014    Specimen: Blood Updated: 07/03/22 1039     Glucose 96 mg/dL      Comment: Meter: [id number] : [id number] siu bee       POC Glucose Once [[id number]]  (Normal) Collected: 07/03/22 0626    Specimen: Blood Updated: 07/03/22 0701     Glucose 98 mg/dL      Comment: Meter: [id number] : [id number] siu bee       POC Glucose Once [[id number]]  (Normal) Collected: 07/03/22 0409    Specimen: Blood Updated: 07/03/22 0415     Glucose 119 mg/dL      Comment: Meter: [id number] : [id number] JANNETTE MALONEY       POC Glucose Once [[id number]]  (Abnormal) Collected: 07/03/22 0319    Specimen: Blood Updated: 07/03/22 0325     Glucose 60 mg/dL      Comment: RN  Notified Meter: [id number] : [id number] wilbert liang       Troponin [[id number]]  (Abnormal) Collected: 07/02/22 2339    Specimen: Blood Updated: 07/03/22 0024     Troponin T 0.066 ng/mL     Narrative:      Troponin T Reference Range:  <= 0.03 ng/mL-   Negative for AMI  >0.03 ng/mL-     Abnormal for myocardial necrosis.  Clinicians would have to utilize clinical acumen, EKG, Troponin and serial changes to determine if it is an Acute Myocardial Infarction or myocardial injury due to an underlying chronic condition.       Results may be falsely decreased if patient taking Biotin.      Comprehensive Metabolic Panel [[id number]]  (Abnormal) Collected: 07/02/22 2339    Specimen: Blood Updated: 07/03/22 0016     Glucose 81 mg/dL      BUN 24 mg/dL      Creatinine 2.52 mg/dL      Sodium 133 mmol/L      Potassium 3.9 mmol/L      Chloride 97 mmol/L      CO2 24.0 mmol/L      Calcium 7.7 mg/dL      Total Protein 5.5 g/dL      Albumin 2.71 g/dL      ALT (SGPT) <5 U/L      AST (SGOT) 14 U/L      Alkaline Phosphatase 94 U/L      Total Bilirubin 0.4 mg/dL      Globulin 2.8 gm/dL      A/G Ratio 1.0 g/dL      BUN/Creatinine Ratio 9.5     Anion Gap 12.0 mmol/L      eGFR 18.7 mL/min/1.73      Comment: National Kidney Foundation and American Society of Nephrology (ASN) Task Force recommended calculation based on the Chronic Kidney Disease Epidemiology Collaboration (CKD-EPI) equation refit without adjustment for race.       Narrative:      GFR Normal >60  Chronic Kidney Disease <60  Kidney Failure <15      CBC Auto Differential [[id number]]  (Abnormal) Collected: 07/02/22 2339    Specimen: Blood Updated: 07/02/22 2357     WBC 10.18 10*3/mm3      RBC 3.36 10*6/mm3      Hemoglobin 9.9 g/dL      Hematocrit 30.4 %      MCV 90.5 fL      MCH 29.5 pg      MCHC 32.6 g/dL      RDW 14.6 %      RDW-SD 48.5 fl      MPV 9.5 fL      Platelets 241 10*3/mm3      Neutrophil % 87.4 %      Lymphocyte % 5.9 %      Monocyte % 6.0 %      Eosinophil % 0.0  %      Basophil % 0.3 %      Immature Grans % 0.4 %      Neutrophils, Absolute 8.90 10*3/mm3      Lymphocytes, Absolute 0.60 10*3/mm3      Monocytes, Absolute 0.61 10*3/mm3      Eosinophils, Absolute 0.00 10*3/mm3      Basophils, Absolute 0.03 10*3/mm3      Immature Grans, Absolute 0.04 10*3/mm3      nRBC 0.0 /100 WBC     Urinalysis With Culture If Indicated - Urine, Random Void [[id number]]  (Abnormal) Collected: 07/02/22 2232    Specimen: Urine, Random Void Updated: 07/02/22 2242     Color, UA Yellow     Appearance, UA Cloudy     pH, UA 8.0     Specific Gravity, UA 1.009     Glucose, UA Negative     Ketones, UA Negative     Bilirubin, UA Negative     Blood, UA Small (1+)     Protein, UA >=300 mg/dL (3+)     Leuk Esterase, UA Moderate (2+)     Nitrite, UA Positive     Urobilinogen, UA 0.2 E.U./dL    Narrative:      In absence of clinical symptoms, the presence of pyuria, bacteria, and/or nitrites on the urinalysis result does not correlate with infection.    Urinalysis, Microscopic Only - Urine, Random Void [[id number]]  (Abnormal) Collected: 07/02/22 2232    Specimen: Urine, Random Void Updated: 07/02/22 2242     RBC, UA 0-2 /HPF      WBC, UA 6-12 /HPF      Bacteria, UA 3+ /HPF      Squamous Epithelial Cells, UA None Seen /HPF      Hyaline Casts, UA None Seen /LPF      Methodology Manual Light Microscopy    Urine Culture - Urine, Urine, Random Void [[id number]] Collected: 07/02/22 2232    Specimen: Urine, Random Void Updated: 07/02/22 2242    POC Glucose Once [[id number]]  (Normal) Collected: 07/02/22 2032    Specimen: Blood Updated: 07/02/22 2041     Glucose 88 mg/dL      Comment: Meter: [id number] : [id number] JUDY PARSONS       COVID-19 and FLU A/B PCR - Swab, Nasopharynx [[id number]]  (Normal) Collected: 07/02/22 1916    Specimen: Swab from Nasopharynx Updated: 07/1941     COVID19 Not Detected     Influenza A PCR Not Detected     Influenza B PCR Not Detected    Narrative:      Fact sheet for providers:  https://www.fda.gov/media/331086/download    Fact sheet for patients: https://www.fda.gov/media/413241/download    Test performed by PCR.    Troponin [[id number]]  (Abnormal) Collected: 07/02/22 1846    Specimen: Blood from Arm, Right Updated: 07/02/22 1919     Troponin T 0.055 ng/mL     Narrative:      Troponin T Reference Range:  <= 0.03 ng/mL-   Negative for AMI  >0.03 ng/mL-     Abnormal for myocardial necrosis.  Clinicians would have to utilize clinical acumen, EKG, Troponin and serial changes to determine if it is an Acute Myocardial Infarction or myocardial injury due to an underlying chronic condition.       Results may be falsely decreased if patient taking Biotin.      Blood Gas, Arterial With Co-Ox [[id number]]  (Abnormal) Collected: 07/02/22 1851    Specimen: Arterial Blood Updated: 07/02/22 1852     Site Right Radial     Lonnie's Test Positive     pH, Arterial 7.448 pH units      pCO2, Arterial 39.5 mm Hg      pO2, Arterial 76.8 mm Hg      Comment: 84 Value below reference range        HCO3, Arterial 27.3 mmol/L      Comment: 83 Value above reference range        Base Excess, Arterial 3.0 mmol/L      O2 Saturation, Arterial 97.0 %      Hemoglobin, Blood Gas 10.8 g/dL      Comment: 84 Value below reference range        Hematocrit, Blood Gas 33.0 %      Comment: 84 Value below reference range        Oxyhemoglobin 95.4 %      Methemoglobin 0.20 %      Carboxyhemoglobin 1.4 %      A-a DO2 69.5 mmHg      CO2 Content 28.5 mmol/L      Temperature 0.0 C      Barometric Pressure for Blood Gas 727 mmHg      Modality Nasal Cannula     FIO2 28 %      Flow Rate 2.0 lpm      Ventilator Mode NA     Note Read back and acknowledge     Notified Who DR HARKINS/RN     Notified By [id number]     Collected by [id number]     Comment: Meter: [id number]     :  [id number]        pH, Temp Corrected --     pCO2, Temperature Corrected --     pO2, Temperature Corrected --    POC Glucose Once [[id number]]  (Normal) Collected:  07/02/22 1802    Specimen: Blood Updated: 07/02/22 1811     Glucose 114 mg/dL      Comment: Meter: [id number] : SHWETA MARKS       Urinalysis, Microscopic Only - Urine, Clean Catch [[id number]]  (Abnormal) Collected: 07/02/22 1723    Specimen: Urine, Clean Catch Updated: 07/02/22 1759     RBC, UA 0-2 /HPF      WBC, UA 13-20 /HPF      Bacteria, UA 2+ /HPF      Squamous Epithelial Cells, UA None Seen /HPF      Transitional Epithelial Cells, UA 0-2 /HPF      Hyaline Casts, UA None Seen /LPF      WBC Casts 0-2 /LPF      Methodology Manual Light Microscopy    Urinalysis With Microscopic If Indicated (No Culture) - Urine, Clean Catch [[id number]]  (Abnormal) Collected: 07/02/22 1723    Specimen: Urine, Clean Catch Updated: 07/02/22 1759     Color, UA Yellow     Appearance, UA Cloudy     pH, UA 8.0     Specific Gravity, UA 1.012     Glucose, UA Negative     Ketones, UA Negative     Bilirubin, UA Negative     Blood, UA Small (1+)     Protein, UA >=300 mg/dL (3+)     Leuk Esterase, UA Moderate (2+)     Nitrite, UA Positive     Urobilinogen, UA 0.2 E.U./dL    Urine Drug Screen - Urine, Clean Catch [[id number]]  (Normal) Collected: 07/02/22 1723    Specimen: Urine, Clean Catch Updated: 07/02/22 1747     THC, Screen, Urine Negative     Phencyclidine (PCP), Urine Negative     Cocaine Screen, Urine Negative     Methamphetamine, Ur Negative     Opiate Screen Negative     Amphetamine Screen, Urine Negative     Benzodiazepine Screen, Urine Negative     Tricyclic Antidepressants Screen Negative     Methadone Screen, Urine Negative     Barbiturates Screen, Urine Negative     Oxycodone Screen, Urine Negative     Propoxyphene Screen Negative     Buprenorphine, Screen, Urine Negative    Narrative:      Cutoff For Drugs Screened:    Amphetamines               500 ng/ml  Barbiturates               200 ng/ml  Benzodiazepines            150 ng/ml  Cocaine                    150 ng/ml  Methadone                  200  ng/ml  Opiates                    100 ng/ml  Phencyclidine               25 ng/ml  THC                            50 ng/ml  Methamphetamine            500 ng/ml  Tricyclic Antidepressants  300 ng/ml  Oxycodone                  100 ng/ml  Propoxyphene               300 ng/ml  Buprenorphine               10 ng/ml    The normal value for all drugs tested is negative. This report includes unconfirmed screening results, with the cutoff values listed, to be used for medical treatment purposes only.  Unconfirmed results must not be used for non-medical purposes such as employment or legal testing.  Clinical consideration should be applied to any drug of abuse test, particularly when unconfirmed results are used.      Comprehensive Metabolic Panel [[id number]]  (Abnormal) Collected: 07/02/22 1626    Specimen: Blood from Arm, Left Updated: 07/02/22 1729     Glucose 42 mg/dL      BUN 21 mg/dL      Creatinine 2.49 mg/dL      Sodium 132 mmol/L      Potassium 3.5 mmol/L      Chloride 94 mmol/L      CO2 24.3 mmol/L      Calcium 8.5 mg/dL      Total Protein 6.3 g/dL      Albumin 3.17 g/dL      ALT (SGPT) 7 U/L      AST (SGOT) 16 U/L      Alkaline Phosphatase 113 U/L      Total Bilirubin 0.4 mg/dL      Globulin 3.1 gm/dL      A/G Ratio 1.0 g/dL      BUN/Creatinine Ratio 8.4     Anion Gap 13.7 mmol/L      eGFR 19.0 mL/min/1.73      Comment: National Kidney Foundation and American Society of Nephrology (ASN) Task Force recommended calculation based on the Chronic Kidney Disease Epidemiology Collaboration (CKD-EPI) equation refit without adjustment for race.       Narrative:      GFR Normal >60  Chronic Kidney Disease <60  Kidney Failure <15      Troponin [[id number]]  (Abnormal) Collected: 07/02/22 1626    Specimen: Blood from Arm, Left Updated: 07/02/22 1729     Troponin T 0.061 ng/mL     Narrative:      Troponin T Reference Range:  <= 0.03 ng/mL-   Negative for AMI  >0.03 ng/mL-     Abnormal for myocardial necrosis.  Clinicians would  have to utilize clinical acumen, EKG, Troponin and serial changes to determine if it is an Acute Myocardial Infarction or myocardial injury due to an underlying chronic condition.       Results may be falsely decreased if patient taking Biotin.      BNP [[id number]]  (Abnormal) Collected: 07/02/22 1626    Specimen: Blood from Arm, Left Updated: 07/02/22 1723     proBNP >70,000.0 pg/mL     Narrative:      Among patients with dyspnea, NT-proBNP is highly sensitive for the detection of acute congestive heart failure. In addition NT-proBNP of <300 pg/ml effectively rules out acute congestive heart failure with 99% negative predictive value.    Results may be falsely decreased if patient taking Biotin.      C-reactive Protein [[id number]]  (Abnormal) Collected: 07/02/22 1626    Specimen: Blood from Arm, Left Updated: 07/02/22 1721     C-Reactive Protein 1.96 mg/dL     CK [[id number]]  (Normal) Collected: 07/02/22 1626    Specimen: Blood from Arm, Left Updated: 07/02/22 1721     Creatine Kinase 41 U/L     Ethanol [[id number]] Collected: 07/02/22 1626    Specimen: Blood from Arm, Left Updated: 07/02/22 1721     Ethanol <10 mg/dL      Ethanol % <0.010 %     Narrative:      >/= 80.0 legally intoxicated    T4, Free [[id number]]  (Normal) Collected: 07/02/22 1626    Specimen: Blood from Arm, Left Updated: 07/02/22 1706     Free T4 1.54 ng/dL     Narrative:      Results may be falsely increased if patient taking Biotin.      TSH [[id number]]  (Normal) Collected: 07/02/22 1626    Specimen: Blood from Arm, Left Updated: 07/02/22 1706     TSH 1.390 uIU/mL     Procalcitonin [[id number]]  (Normal) Collected: 07/02/22 1626    Specimen: Blood from Arm, Left Updated: 07/02/22 1706     Procalcitonin 0.16 ng/mL     Narrative:      As a Marker for Sepsis (Non-Neonates):    1. <0.5 ng/mL represents a low risk of severe sepsis and/or septic shock.  2. >2 ng/mL represents a high risk of severe sepsis and/or septic shock.    As a Marker for Lower  "Respiratory Tract Infections that require antibiotic therapy:    PCT on Admission    Antibiotic Therapy       6-12 Hrs later    >0.5                Strongly Recommended  >0.25 - <0.5        Recommended   0.1 - 0.25          Discouraged              Remeasure/reassess PCT  <0.1                Strongly Discouraged     Remeasure/reassess PCT    As 28 day mortality risk marker: \"Change in Procalcitonin Result\" (>80% or <=80%) if Day 0 (or Day 1) and Day 4 values are available. Refer to http://www.[facility]-pct-calculator.com    Change in PCT <=80%  A decrease of PCT levels below or equal to 80% defines a positive change in PCT test result representing a higher risk for 28-day all-cause mortality of patients diagnosed with severe sepsis for septic shock.    Change in PCT >80%  A decrease of PCT levels of more than 80% defines a negative change in PCT result representing a lower risk for 28-day all-cause mortality of patients diagnosed with severe sepsis or septic shock.       Sedimentation Rate [[id number]]  (Abnormal) Collected: 07/02/22 1626    Specimen: Blood from Arm, Left Updated: 07/02/22 1705     Sed Rate 40 mm/hr     Ammonia [[id number]]  (Abnormal) Collected: 07/02/22 1626    Specimen: Blood from Arm, Left Updated: 07/02/22 1659     Ammonia <10 umol/L     Lactic Acid, Plasma [[id number]]  (Normal) Collected: 07/02/22 1626    Specimen: Blood from Arm, Left Updated: 07/02/22 1656     Lactate 0.7 mmol/L     Protime-INR [[id number]]  (Normal) Collected: 07/02/22 1626    Specimen: Blood from Arm, Left Updated: 07/02/22 1646     Protime 12.7 Seconds      INR 0.93    Narrative:      Suggested INR therapeutic range for stable oral anticoagulant therapy:    Low Intensity therapy:   1.5-2.0  Moderate Intensity therapy:   2.0-3.0  High Intensity therapy:   2.5-4.0    aPTT [[id number]]  (Abnormal) Collected: 07/02/22 1626    Specimen: Blood from Arm, Left Updated: 07/02/22 1646     PTT 36.0 seconds     Narrative:      PTT Heparin " Therapeutic Range:  59 - 95 seconds      CBC & Differential [[id number]]  (Abnormal) Collected: 07/02/22 1626    Specimen: Blood from Arm, Left Updated: 07/02/22 1637    Narrative:      The following orders were created for panel order CBC & Differential.  Procedure                               Abnormality         Status                     ---------                               -----------         ------                     CBC Auto Differential[[id number]]        Abnormal            Final result                 Please view results for these tests on the individual orders.    CBC Auto Differential [[id number]]  (Abnormal) Collected: 07/02/22 1626    Specimen: Blood from Arm, Left Updated: 07/02/22 1637     WBC 7.05 10*3/mm3      RBC 3.66 10*6/mm3      Hemoglobin 11.0 g/dL      Hematocrit 32.7 %      MCV 89.3 fL      MCH 30.1 pg      MCHC 33.6 g/dL      RDW 14.6 %      RDW-SD 48.0 fl      MPV 9.7 fL      Platelets 259 10*3/mm3      Neutrophil % 86.9 %      Lymphocyte % 7.7 %      Monocyte % 5.2 %      Eosinophil % 0.0 %      Basophil % 0.1 %      Immature Grans % 0.1 %      Neutrophils, Absolute 6.12 10*3/mm3      Lymphocytes, Absolute 0.54 10*3/mm3      Monocytes, Absolute 0.37 10*3/mm3      Eosinophils, Absolute 0.00 10*3/mm3      Basophils, Absolute 0.01 10*3/mm3      Immature Grans, Absolute 0.01 10*3/mm3      nRBC 0.0 /100 WBC     Blood Culture - Blood, Arm, Left [[id number]] Collected: 07/02/22 1626    Specimen: Blood from Arm, Left Updated: 07/02/22 1633    Blood Culture - Blood, Arm, Right [[id number]] Collected: 07/02/22 1626    Specimen: Blood from Arm, Right Updated: 07/02/22 1633          Imaging Results (Last 24 Hours)     Procedure Component Value Units Date/Time    CT Abdomen Pelvis Without Contrast [[id number]] Collected: 07/02/22 1757     Updated: 07/02/22 1759    Narrative:      CT Abdomen Pelvis WO    INDICATION:   Delirium, abdominal pain, COPD exacerbation    TECHNIQUE:   CT of the abdomen and  pelvis without IV contrast. Coronal and sagittal reconstructions were obtained.  Radiation dose reduction techniques included automated exposure control or exposure modulation based on body size. Radiation audit for CT and nuclear  cardiology exams in the last 12 months: 4.     COMPARISON:   None available.    FINDINGS:    See separate CT chest. No destructive osseous lesion.    Evaluation of the solid viscera is compromised by lack of IV contrast. Additionally, the patient's arms are within the gantry which produces significant artifact which further compromises evaluation of the abdomen and pelvis. Allowing for this:    Diffuse body wall edema.    Normal noncontrast appearance of the liver, pancreas, spleen and both adrenal glands. Favor the gallbladder to be surgically absent.    The kidneys are symmetric in size. Evaluation for solid renal lesion is largely precluded by lack of IV contrast. No hydronephrosis. No renal or ureteral calculus. Likely cyst at the lower pole the right kidney.    Urinary bladder is distended.    No evidence of bowel obstruction. No localizing perienteric inflammatory change.    Normal caliber of the abdominal aorta. Extensive vascular calcifications. No lymphadenopathy within the abdomen or pelvis by size criteria.      Impression:        Evaluation of the solid viscera is compromised by lack of IV contrast. Additionally, the patient's arms are within the gantry which produces significant artifact which further compromises evaluation of the abdomen and pelvis. Exam is moderately to  severely degraded. Allowing for this:    1.  Diffuse body wall edema.  2.  See separate CT chest.  3.  Extensive vascular calcifications.  4.  No definite acute process in the abdomen or pelvis by noncontrast CT.        Signer Name: ADAMS VOSS MD   Signed: 7/2/2022 5:57 PM   Workstation Name: [email or web address]    [facility]    CT Chest Without Contrast Diagnostic [[id number]] Collected:  07/02/22 1752     Updated: 07/02/22 1754    Narrative:      CT Chest WO    INDICATION:   Shortness of air, COPD exacerbation    TECHNIQUE:   CT of the chest without IV contrast. Coronal and sagittal reformatted images. Radiation dose reduction techniques included automated exposure control or exposure modulation based on body size. Radiation audit for CT and nuclear cardiology exams in the  last 12 months: 4.    COMPARISON:   May 23, 2022    FINDINGS:     Large right and moderate left pleural effusions. Areas of consolidative change and nodularity seen adjacent to the pleural effusions within the dependent lungs. Some scattered reticular nodularity in the right apex. Findings favor volume overload or  multifocal pneumonia with bilateral pleural effusions.    Cardiomegaly. Severe coronary artery atherosclerosis. Evaluation of the soft tissues of the chest are otherwise compromised by lack of IV contrast. No overt lymphadenopathy.    No destructive bony lesion.    See separately dictated CT abdomen/pelvis for findings below the diaphragm.      Impression:      1.  Large right and moderate left pleural effusions. Areas of consolidative change and nodularity seen adjacent to the pleural effusions within the dependent lungs. Some scattered reticular nodularity in the right apex. Findings favor volume overload or  multifocal pneumonia with bilateral pleural effusions.  2.  Cardiomegaly.  3.  Severe coronary artery atherosclerosis.    Signer Name: ADAMS VOSS MD   Signed: 7/2/2022 5:52 PM   Workstation Name: [email or web address]    [facility]    CT Head Without Contrast [[id number]] Collected: 07/02/22 1749     Updated: 07/02/22 1751    Narrative:      CT Head WO    HISTORY:   Delirium    TECHNIQUE:   Routine noncontrast head CT. Radiation dose reduction techniques included automated exposure control or exposure modulation based on body size. Radiation audit for CT and nuclear cardiology exams in the last 12  months: 4.    COMPARISON:   June 5, 2022    FINDINGS:       No acute intracranial hemorrhage, mass lesion, or abnormal extra-axial fluid collection. No midline shift or focal mass effect. Ventricular system is normal in size and configuration. .    The gray-white matter differentiation is preserved. There are scattered ill-defined and patchy hypoattenuating foci within the bilateral cerebral and deep white matter which are nonspecific but most commonly associated with chronic microvascular ischemic  change. These are advanced.    Visualized paranasal sinuses are clear. Visualized mastoid air cells are clear.    No acute osseous abnormality.        Impression:        1.  Presumed chronic microvascular ischemic changes, advanced. No definite superimposed acute process.    Signer Name: ADAMS VOSS MD   Signed: 7/2/2022 5:49 PM   [facility]    [facility]    XR Chest 1 View [[id number]] Collected: 07/02/22 1625     Updated: 07/02/22 1627    Narrative:      CR Chest 1 Vw    INDICATION:   Pulmonary edema.     COMPARISON:    6/5/2022    FINDINGS:  Portable AP view(s) of the chest.  Central venous catheter tip remains in the SVC. Heart size is stable. There are bilateral pleural effusions, right larger than left. Right side is relatively stable, and the left appears slightly improved. There is some  vascular congestion. There is infiltrate or atelectasis in the bases, right greater than left. Sergio pulmonary edema is not definitely identified.      Impression:        1. Bilateral effusions, relatively stable on the right and improved on the left.  2. Vascular congestion without definite pulmonary edema.  3. Bibasilar infiltrate or atelectasis, right greater than left.    Signer Name: Tomas Hoffman MD   Signed: 7/2/2022 4:25 PM   Workstation Name: [facility]    [facility]                    Assessment and Plan:    1.  End-stage renal disease on dialysis  2.   Noncompliance with dialysis  3.  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  Diabetes to poor control  5.  Anemia    She is in no acute respiratory distress.  We will plan dialysis for her in the morning.  Patient counseled not to miss treatments.  Will check iron profile.          John Jesus MD  07/03/22  16:07 EDT

## 2022-07-03 NOTE — PROGRESS NOTES
[facility] HOSPITALIST PROGRESS NOTE     Patient Identification:  Name:  Sean Chen  Age:  82 y.o.  Sex:  female  :  1940  MRN:  [id number]  Visit Number:  [id number]  ROOM: [address] [place] [address]     Primary Care Provider:  Ganga Gallardo MD    Length of stay in inpatient status:  1    Subjective     Chief Compliant:    Chief Complaint   Patient presents with   • Leg Swelling   • Fatigue       History of Presenting Illness: Patient seen and evaluated hypercalcemia as well as acute on chronic combined systolic and diastolic heart failure in the setting of end-stage renal disease on hemodialysis chronic bilateral pleural effusions.  Concern for time examination patient's orientation is much improved and she is much sit up in the chair and to be pulled apart in the bed at time of my examination.    Objective     Current Hospital Meds:  aspirin, 81 mg, Oral, Daily  atorvastatin, 80 mg, Oral, Nightly  aztreonam, 1 g, Intravenous, Q8H  calcium acetate, 1,334 mg, Oral, TID With Meals  carvedilol, 6.25 mg, Oral, BID With Meals  folic acid, 1 mg, Oral, Daily  heparin (porcine), 5,000 Units, Subcutaneous, Q8H  NIFEdipine CC, 60 mg, Oral, Q24H  sodium chloride, 10 mL, Intravenous, Q12H         ----------------------------------------------------------------------------------------------------------------------  Vital Signs:  Temp:  [94.6 °F (34.8 °C)-99.2 °F (37.3 °C)] 97.3 °F (36.3 °C)  Heart Rate:  [58-96] 69  Resp:  [18-24] 22  BP: (127-209)/() 137/64  SpO2:  [97 %-100 %] 97 %  on  Flow (L/min):  [2] 2;   Device (Oxygen Therapy): nasal cannula  Body mass index is 24.46 kg/m².      Intake/Output Summary (Last 24 hours) at 7/3/2022 1217  Last data filed at 2022 4036  Gross per 24 hour   Intake 200 ml   Output 880 ml   Net -680 ml      ----------------------------------------------------------------------------------------------------------------------  Physical exam:  Constitutional: Elderly  chronically ill-appearing female sitting up in bed.  HENT:  Head:  Normocephalic and atraumatic.  Mouth:  Moist mucous membranes.    Eyes:  Conjunctivae and EOM are normal. No scleral icterus.    Cardiovascular:  Normal rate, regular rhythm and normal heart sounds with no murmur.  Pulmonary/Chest: Decreased breath sounds bilaterally otherwise clear.  Crackles noted yesterday absent..  Abdominal:  Soft.  Bowel sounds are normal.  No distension and no tenderness.   Musculoskeletal: Tenderness to palpation lower extremities but no deformity.  No red or swollen joints anywhere.    Neurological:  Alert and oriented to person, place but not time.  No cranial nerve deficit.  Intact Sensation throughout  Skin:  Skin is warm and dry. No rash or lesion noted. No pallor.   Peripheral vascular:  Pulses in all 4 extremities with no clubbing, no cyanosis, no edema.  Psychiatric: Appropriate mood and affect, pleasant.   ----------------------------------------------------------------------------------------------------------------------  CBC - WBC/Hgb/Hct/Plts: 10.18/9.9*/30.4*/241 (07/02 2339)  CHEM - BUN/Cr/glu/ALT/AST/amyl/lip:24*/2.52*/81/<5/14/--/-- (07/02 2339)  LYTES - Na/K/Cl/CO2: 133*/3.9/97*/24.0 (07/02 2339)  COAG - PT/INR/PTT: 12.7/0.93/36.0* (07/02 1626)  Art pH/pCO2/pO2/HCO3: 7.448/39.5/76.8*/27.3* (07/02 1851)  No results found for: URINECX  No results found for: BLOODCX    I have personally looked at the labs and they are summarized above.  ----------------------------------------------------------------------------------------------------------------------  Detailed radiology reports for the last 24 hours:  CT Abdomen Pelvis Without Contrast    Result Date: 7/2/2022  Evaluation of the solid viscera is compromised by lack of IV contrast. Additionally, the patient's arms are within the gantry which produces significant artifact which further compromises evaluation of the abdomen and pelvis. Exam is moderately to  severely degraded. Allowing for this: 1.  Diffuse body wall edema. 2.  See separate CT chest. 3.  Extensive vascular calcifications. 4.  No definite acute process in the abdomen or pelvis by noncontrast CT. Signer Name: ADAMS VOSS MD  Signed: 7/2/2022 5:57 PM  Workstation Name: [facility]  Radiology [facility]    CT Head Without Contrast    Result Date: 7/2/2022  1.  Presumed chronic microvascular ischemic changes, advanced. No definite superimposed acute process. Signer Name: ADAMS VOSS MD  Signed: 7/2/2022 5:49 PM  Workstation Name: [facility]    CT Chest Without Contrast Diagnostic    Result Date: 7/2/2022  1.  Large right and moderate left pleural effusions. Areas of consolidative change and nodularity seen adjacent to the pleural effusions within the dependent lungs. Some scattered reticular nodularity in the right apex. Findings favor volume overload or multifocal pneumonia with bilateral pleural effusions. 2.  Cardiomegaly. 3.  Severe coronary artery atherosclerosis. Signer Name: ADAMS VOSS MD  Signed: 7/2/2022 5:52 PM  Workstation Name: [facility]    XR Chest 1 View    Result Date: 7/2/2022  1. Bilateral effusions, relatively stable on the right and improved on the left. 2. Vascular congestion without definite pulmonary edema. 3. Bibasilar infiltrate or atelectasis, right greater than left. Signer Name: Tomas Hoffman MD  Signed: 7/2/2022 4:25 PM  Workstation Name: [facility]  Radiology [facility]    Assessment & Plan      Acute metabolic encephalopathy  Hypoglycemic, likely secondary to insulin  Diabetes mellitus type 2    -Patient presenting to the hospital with glucose of 42 at presentation in setting of recently administered subcu basal insulin the night prior with no intake the day of presentation.    -Most recent hemoglobin A1c 5.5, will repeat.  Patient with recently low A1c's in April of  this year in December of last year.  Concerning for recurrent hypoglycemic episodes at home.  Family reports using as needed long-acting insulin for glucoses over 200 however this may be more than what the patient needs and may need to be on a much lower dose given the inconsistency in oral intake.    -Patient's metabolic encephalopathy resolved and patient appears to be back to her baseline demented state.  She is alert and oriented to person and place but intermittently to time.  Previous inpatient admissions when taking care of her often location waxes and wanes.    -Continue ACH S glucose checks for now but will avoid any sliding scale insulin as patient with recurrent hypoglycemia overnight and would rather patient's glucoses trend on the higher side before consideration of insulin ministration as she signs to be stable.    History of CAD  Chronically elevated troponins  Acute on chronic combined systolic and diastolic heart failure    -Patient with both clinical and radiographical evidence of volume overload.  She is a chronic ESRD patient on intermittent hemodialysis.  Of note family reports that based on urine output dialysis clinic will choose to skip occasional sessions.  But family also admits that they do not stick to fluid restriction prescribed by dialysis clinic and nephrology service.    -Chronic heart failure and volume overload secondary to lack of compliance with fluid restriction in addition to interspersed skipped dialysis sessions.  Given her lack of compliance with fluid restriction facing dialysis sessions on urine output could lead to slowly progressive volume retention as tracking her urine output would be better if patient was maintained fluid restriction.  Patient's son does confirm that they let her drink as much as she would like and encourage her to do this.    -We will place on 1500 mL fluid restriction while in hospital.    ESRD on hemodialysis    -Nephrology consulted to continue  regular scheduled hemodialysis while in hospital.    Concern for acute UTI    -Urinalysis borderline for infection but patient not really able to provide clear history regarding any dysuria and therefore will continue aztreonam for now while awaiting urine culture.    Left lower leg abrasion    -Appears to be in a distribution consistent with friction from contact with Unna boot wrappings.  We will keep Unna boot wrappings off on and keep patient's feet elevated.    -Wound care consulted to see and evaluate.    Hypertension  Hyperlipidemia    -Continue patient's home antihypertensives and statin; nifedipine, Coreg, and atorvastatin    Chronic dementia  Age-related debility    -PT and OT consulted while in hospital.      VTE Prophylaxis:   Mechanical Order History:     None      Pharmalogical Order History:      Ordered     Dose Route Frequency Stop    07/02/22 2022  heparin (porcine) 5000 UNIT/ML injection 5,000 Units         5,000 Units SC Every 8 Hours Scheduled --                Disposition likely home with family once medically stable and clinically improved.    Shaw Tomas DO  [facility] Hospitalist  07/03/22  12:17 EDT

## 2022-07-03 NOTE — OUTREACH NOTE
COPD/PN Week 5 Survey    Flowsheet Row Responses   [facility] facility patient discharged from? [facility]   Does the patient have one of the following disease processes/diagnoses(primary or secondary)? COPD/Pneumonia   Was the primary reason for admission: Pneumonia   Week 5 attempt successful? No   Revoke Readmitted          ALONSO MORRIS - Registered Nurse

## 2022-07-04 LAB
ABSOLUTE LUNG FLUID CONTENT: 48 % (ref 20–35)
ALBUMIN SERPL-MCNC: 2.88 G/DL (ref 3.5–5.2)
ALBUMIN/GLOB SERPL: 1.1 G/DL
ALP SERPL-CCNC: 105 U/L (ref 39–117)
ALT SERPL W P-5'-P-CCNC: 7 U/L (ref 1–33)
ANION GAP SERPL CALCULATED.3IONS-SCNC: 12.4 MMOL/L (ref 5–15)
AST SERPL-CCNC: 13 U/L (ref 1–32)
BASOPHILS # BLD AUTO: 0.04 10*3/MM3 (ref 0–0.2)
BASOPHILS NFR BLD AUTO: 0.5 % (ref 0–1.5)
BILIRUB SERPL-MCNC: 0.4 MG/DL (ref 0–1.2)
BUN SERPL-MCNC: 31 MG/DL (ref 8–23)
BUN/CREAT SERPL: 11.3 (ref 7–25)
CALCIUM SPEC-SCNC: 8.1 MG/DL (ref 8.6–10.5)
CHLORIDE SERPL-SCNC: 93 MMOL/L (ref 98–107)
CO2 SERPL-SCNC: 22.6 MMOL/L (ref 22–29)
CREAT SERPL-MCNC: 2.74 MG/DL (ref 0.57–1)
DEPRECATED RDW RBC AUTO: 49.9 FL (ref 37–54)
EGFRCR SERPLBLD CKD-EPI 2021: 16.9 ML/MIN/1.73
EOSINOPHIL # BLD AUTO: 0.02 10*3/MM3 (ref 0–0.4)
EOSINOPHIL NFR BLD AUTO: 0.2 % (ref 0.3–6.2)
ERYTHROCYTE [DISTWIDTH] IN BLOOD BY AUTOMATED COUNT: 14.7 % (ref 12.3–15.4)
FERRITIN SERPL-MCNC: 1454 NG/ML (ref 13–150)
GLOBULIN UR ELPH-MCNC: 2.5 GM/DL
GLUCOSE BLDC GLUCOMTR-MCNC: 125 MG/DL (ref 70–130)
GLUCOSE BLDC GLUCOMTR-MCNC: 129 MG/DL (ref 70–130)
GLUCOSE BLDC GLUCOMTR-MCNC: 154 MG/DL (ref 70–130)
GLUCOSE BLDC GLUCOMTR-MCNC: 176 MG/DL (ref 70–130)
GLUCOSE BLDC GLUCOMTR-MCNC: 185 MG/DL (ref 70–130)
GLUCOSE SERPL-MCNC: 136 MG/DL (ref 65–99)
HAV IGM SERPL QL IA: NORMAL
HBV CORE IGM SERPL QL IA: NORMAL
HBV SURFACE AG SERPL QL IA: NORMAL
HCT VFR BLD AUTO: 26.7 % (ref 34–46.6)
HCV AB SER DONR QL: NORMAL
HGB BLD-MCNC: 8.7 G/DL (ref 12–15.9)
IMM GRANULOCYTES # BLD AUTO: 0.04 10*3/MM3 (ref 0–0.05)
IMM GRANULOCYTES NFR BLD AUTO: 0.5 % (ref 0–0.5)
IRON 24H UR-MRATE: 60 MCG/DL (ref 37–145)
IRON SATN MFR SERPL: 42 % (ref 20–50)
LYMPHOCYTES # BLD AUTO: 0.82 10*3/MM3 (ref 0.7–3.1)
LYMPHOCYTES NFR BLD AUTO: 9.8 % (ref 19.6–45.3)
MCH RBC QN AUTO: 30 PG (ref 26.6–33)
MCHC RBC AUTO-ENTMCNC: 32.6 G/DL (ref 31.5–35.7)
MCV RBC AUTO: 92.1 FL (ref 79–97)
MONOCYTES # BLD AUTO: 0.45 10*3/MM3 (ref 0.1–0.9)
MONOCYTES NFR BLD AUTO: 5.4 % (ref 5–12)
NEUTROPHILS NFR BLD AUTO: 6.96 10*3/MM3 (ref 1.7–7)
NEUTROPHILS NFR BLD AUTO: 83.6 % (ref 42.7–76)
NRBC BLD AUTO-RTO: 0 /100 WBC (ref 0–0.2)
PLATELET # BLD AUTO: 249 10*3/MM3 (ref 140–450)
PMV BLD AUTO: 9.9 FL (ref 6–12)
POTASSIUM SERPL-SCNC: 4 MMOL/L (ref 3.5–5.2)
PROT SERPL-MCNC: 5.4 G/DL (ref 6–8.5)
QT INTERVAL: 466 MS
QT INTERVAL: 508 MS
QT INTERVAL: 532 MS
QTC INTERVAL: 557 MS
QTC INTERVAL: 559 MS
QTC INTERVAL: 570 MS
RBC # BLD AUTO: 2.9 10*6/MM3 (ref 3.77–5.28)
SODIUM SERPL-SCNC: 128 MMOL/L (ref 136–145)
TIBC SERPL-MCNC: 142 MCG/DL (ref 298–536)
TRANSFERRIN SERPL-MCNC: 95 MG/DL (ref 200–360)
WBC NRBC COR # BLD: 8.33 10*3/MM3 (ref 3.4–10.8)

## 2022-07-04 PROCEDURE — P9047 ALBUMIN (HUMAN), 25%, 50ML: HCPCS | Performed by: INTERNAL MEDICINE

## 2022-07-04 PROCEDURE — 25010000002 HEPARIN (PORCINE) PER 1000 UNITS: Performed by: STUDENT IN AN ORGANIZED HEALTH CARE EDUCATION/TRAINING PROGRAM

## 2022-07-04 PROCEDURE — 85025 COMPLETE CBC W/AUTO DIFF WBC: CPT | Performed by: STUDENT IN AN ORGANIZED HEALTH CARE EDUCATION/TRAINING PROGRAM

## 2022-07-04 PROCEDURE — 80074 ACUTE HEPATITIS PANEL: CPT | Performed by: INTERNAL MEDICINE

## 2022-07-04 PROCEDURE — 25010000002 ALBUMIN HUMAN 25% PER 50 ML: Performed by: INTERNAL MEDICINE

## 2022-07-04 PROCEDURE — 5A1D70Z PERFORMANCE OF URINARY FILTRATION, INTERMITTENT, LESS THAN 6 HOURS PER DAY: ICD-10-PCS | Performed by: INTERNAL MEDICINE

## 2022-07-04 PROCEDURE — 82728 ASSAY OF FERRITIN: CPT | Performed by: INTERNAL MEDICINE

## 2022-07-04 PROCEDURE — 94761 N-INVAS EAR/PLS OXIMETRY MLT: CPT

## 2022-07-04 PROCEDURE — 82962 GLUCOSE BLOOD TEST: CPT

## 2022-07-04 PROCEDURE — 84466 ASSAY OF TRANSFERRIN: CPT | Performed by: INTERNAL MEDICINE

## 2022-07-04 PROCEDURE — 94726 PLETHYSMOGRAPHY LUNG VOLUMES: CPT

## 2022-07-04 PROCEDURE — 94799 UNLISTED PULMONARY SVC/PX: CPT

## 2022-07-04 PROCEDURE — 94664 DEMO&/EVAL PT USE INHALER: CPT

## 2022-07-04 PROCEDURE — 83540 ASSAY OF IRON: CPT | Performed by: INTERNAL MEDICINE

## 2022-07-04 PROCEDURE — 99232 SBSQ HOSP IP/OBS MODERATE 35: CPT | Performed by: INTERNAL MEDICINE

## 2022-07-04 PROCEDURE — 80053 COMPREHEN METABOLIC PANEL: CPT | Performed by: INTERNAL MEDICINE

## 2022-07-04 PROCEDURE — 97161 PT EVAL LOW COMPLEX 20 MIN: CPT

## 2022-07-04 PROCEDURE — 94640 AIRWAY INHALATION TREATMENT: CPT

## 2022-07-04 RX ORDER — IPRATROPIUM BROMIDE AND ALBUTEROL SULFATE 2.5; .5 MG/3ML; MG/3ML
3 SOLUTION RESPIRATORY (INHALATION) EVERY 6 HOURS PRN
Status: DISCONTINUED | OUTPATIENT
Start: 2022-07-04 | End: 2022-07-08

## 2022-07-04 RX ORDER — HYDROXYZINE HYDROCHLORIDE 25 MG/1
25 TABLET, FILM COATED ORAL EVERY 6 HOURS PRN
Status: DISCONTINUED | OUTPATIENT
Start: 2022-07-04 | End: 2022-07-13

## 2022-07-04 RX ORDER — ALBUMIN (HUMAN) 12.5 G/50ML
25 SOLUTION INTRAVENOUS AS NEEDED
Status: DISPENSED | OUTPATIENT
Start: 2022-07-04 | End: 2022-07-05

## 2022-07-04 RX ADMIN — HEPARIN SODIUM 5000 UNITS: 5000 INJECTION INTRAVENOUS; SUBCUTANEOUS at 14:02

## 2022-07-04 RX ADMIN — HEPARIN SODIUM 5000 UNITS: 5000 INJECTION INTRAVENOUS; SUBCUTANEOUS at 22:45

## 2022-07-04 RX ADMIN — CALCIUM ACETATE 1334 MG: 667 CAPSULE ORAL at 07:22

## 2022-07-04 RX ADMIN — Medication 10 ML: at 20:33

## 2022-07-04 RX ADMIN — IPRATROPIUM BROMIDE AND ALBUTEROL SULFATE 3 ML: .5; 3 SOLUTION RESPIRATORY (INHALATION) at 01:30

## 2022-07-04 RX ADMIN — Medication 10 ML: at 07:34

## 2022-07-04 RX ADMIN — ASPIRIN 81 MG: 81 TABLET, COATED ORAL at 07:21

## 2022-07-04 RX ADMIN — CALCIUM ACETATE 1334 MG: 667 CAPSULE ORAL at 18:42

## 2022-07-04 RX ADMIN — CARVEDILOL 6.25 MG: 6.25 TABLET, FILM COATED ORAL at 07:24

## 2022-07-04 RX ADMIN — ATORVASTATIN CALCIUM 80 MG: 40 TABLET, FILM COATED ORAL at 20:33

## 2022-07-04 RX ADMIN — CALCIUM ACETATE 1334 MG: 667 CAPSULE ORAL at 12:49

## 2022-07-04 RX ADMIN — FOLIC ACID 1 MG: 1 TABLET ORAL at 07:22

## 2022-07-04 RX ADMIN — NIFEDIPINE 60 MG: 30 TABLET, EXTENDED RELEASE ORAL at 07:23

## 2022-07-04 RX ADMIN — HYDROXYZINE HYDROCHLORIDE 25 MG: 25 TABLET ORAL at 13:40

## 2022-07-04 RX ADMIN — ALBUMIN HUMAN 25 G: 0.25 SOLUTION INTRAVENOUS at 10:59

## 2022-07-04 RX ADMIN — HEPARIN SODIUM 5000 UNITS: 5000 INJECTION INTRAVENOUS; SUBCUTANEOUS at 05:14

## 2022-07-04 RX ADMIN — AZTREONAM 1 G: 1 INJECTION, POWDER, LYOPHILIZED, FOR SOLUTION INTRAMUSCULAR; INTRAVENOUS at 12:49

## 2022-07-04 RX ADMIN — AZTREONAM 1 G: 1 INJECTION, POWDER, LYOPHILIZED, FOR SOLUTION INTRAMUSCULAR; INTRAVENOUS at 00:47

## 2022-07-04 NOTE — THERAPY EVALUATION
Acute Care - Physical Therapy Initial Evaluation   [facility]     Patient Name: Sean Chen  : 1940  MRN: [id number]  Today's Date: 2022   Onset of Illness/Injury or Date of Surgery: 22  Visit Dx:     ICD-10-CM ICD-9-CM   1. Hypoglycemia  E16.2 251.2     Patient Active Problem List   Diagnosis   • Type 2 diabetes mellitus without complication, without long-term current use of insulin ([facility])   • Vitamin D deficiency disease   • Extrinsic asthma   • Essential hypertension   • Mixed hyperlipidemia   • Generalized osteoarthritis   • Gastroesophageal reflux disease without esophagitis   • Meniere's disease   • Chronic seasonal allergic rhinitis   • Low serum vitamin B12   • Dementia without behavioral disturbance ([facility])   • NSTEMI (non-ST elevated myocardial infarction) ([facility])   • Prolonged Q-T interval on ECG   • Chronic renal failure, stage 4 (severe) ([facility])   • Encounter for immunization   • Hypoglycemia     Past Medical History:   Diagnosis Date   • Allergic rhinitis    • Elevated liver enzymes    • Extrinsic asthma    • Gastritis    • GERD (gastroesophageal reflux disease)    • Hyperlipidemia    • Hypertension    • Iron deficiency anemia    • Meniere's disease    • Osteoarthritis    • Type 2 diabetes mellitus ([facility])    • Vitamin D deficiency disease      Past Surgical History:   Procedure Laterality Date   • APPENDECTOMY     • CARDIAC CATHETERIZATION N/A 2022    Procedure: Left Heart Cath;  Surgeon: Marcelino Grande MD;  Location: [facility] CATH INVASIVE LOCATION;  Service: Cardiology;  Laterality: N/A;   • CHOLECYSTECTOMY     • HYSTERECTOMY     • INSERTION HEMODIALYSIS CATHETER N/A 2022    Procedure: HEMODIALYSIS CATHETER INSERTION;  Surgeon: Hans Coates MD;  Location: [facility] OR;  Service: General;  Laterality: N/A;     PT Assessment (last 12 hours)     PT Evaluation and Treatment     Row Name 22 1546          Physical Therapy Time and Intention    Subjective Information no complaints   -AG     Document Type evaluation  -AG     Mode of Treatment physical therapy  -AG     Patient Effort poor  -AG     Symptoms Noted During/After Treatment none  -AG     Row Name 07/04/22 1546          General Information    Patient Profile Reviewed yes  -AG     Onset of Illness/Injury or Date of Surgery 07/02/22  -     Referring Physician Dr. Tomas  -     Patient Observations alert  -AG     Patient/Family/Caregiver Comments/Observations pt. seated EOB on 2.5L O2 nc; PT replaced nasal cannula after patient removed it.  -AG     Prior Level of Function --  per pt. report, ambulating at home with either a straight cane or RW.  -AG     Equipment Currently Used at Home cane, straight;wheelchair;oxygen;hospital bed;commode  -AG     Pertinent History of Current Functional Problem pt. admitted with hypoglycemia  -AG     Limitations/Impairments safety/cognitive  -AG     Barriers to Rehab cognitive status  -AG     Row Name 07/04/22 1546          Previous Level of Function/Home Environm    Bed Mobility, Premorbid Functional Level independent  -AG     Transfers, Premorbid Functional Level independent  -AG     Household Ambulation, Premorbid Functional Level independent;uses device or equipment  -     Row Name 07/04/22 1546          Living Environment    Current Living Arrangements home  -AG     Home Accessibility --  unknown  -AG     People in Home spouse  -AG     Row Name 07/04/22 1546          Home Use of Assistive/Adaptive Equipment    Equipment Currently Used at Home cane, straight;walker, rolling  -AG     Row Name 07/04/22 1546          Pain    Additional Documentation Pain Scale: FACES Pre/Post-Treatment (Group)  -AG     Row Name 07/04/22 1546          Pain Scale: FACES Pre/Post-Treatment    Pain: FACES Scale, Pretreatment 0-->no hurt  -AG     Posttreatment Pain Rating 0-->no hurt  -AG     Row Name 07/04/22 1546          Cognition    Affect/Mental Status (Cognition) confused;flat/blunted affect  -AG     Behavioral  Issues (Cognition) withdrawn  -AG     Orientation Status (Cognition) oriented to;person  -AG     Follows Commands (Cognition) follows one-step commands;verbal cues/prompting required;0-24% accuracy  -     Cognitive Function executive function deficit;safety deficit  -AG     Safety Deficit (Cognition) insight into deficits/self-awareness;safety precautions awareness;safety precautions follow-through/compliance  -     Row Name 07/04/22 1546          Range of Motion (ROM)    Range of Motion --  all extremities AROM limited; B shoulders~75% normal AROM; B knee extension limited ~20-30 degrees.  -     Row Name 07/04/22 1546          Strength (Manual Muscle Testing)    Strength (Manual Muscle Testing) --  B LE 2+/5  -     Row Name 07/04/22 1546          Sensory    Hearing Status WFL  -[facility] Name 07/04/22 1546          Vision Assessment/Intervention    Visual Impairment/Limitations unable/difficult to assess  -     Row Name 07/04/22 1546          Mobility    Extremity Weight-bearing Status right lower extremity;left lower extremity  -     Left Lower Extremity (Weight-bearing Status) full weight-bearing (FWB)  -AG     Right Lower Extremity (Weight-bearing Status) full weight-bearing (FWB)  -[facility] Name 07/04/22 1546          Bed Mobility    Comment, (Bed Mobility) pt. not cooperative for assessment.  -     Row Name 07/04/22 1546          Safety Issues, Functional Mobility    Impairments Affecting Function (Mobility) cognition;strength;range of motion (ROM)  -     Row Name 07/04/22 1546          Balance    Balance Assessment sitting static balance;sitting dynamic balance;sit to stand dynamic balance;standing static balance;standing dynamic balance  -     Static Sitting Balance independent  -AG     Position, Sitting Balance sitting edge of bed  -     Row Name             Wound 07/02/22 2230 Right lower leg Other (comment)    Wound - Properties Group Placement Date: 07/02/22  -AP Placement Time:  2230  -AP Present on Hospital Admission: Y  -AP Side: Right  -AP Orientation: lower  -AP Location: leg  -AP Primary Wound Type: Other  -AP     Retired Wound - Properties Group Placement Date: 07/02/22  -AP Placement Time: 2230  -AP Present on Hospital Admission: Y  -AP Side: Right  -AP Orientation: lower  -AP Location: leg  -AP Primary Wound Type: Other  -AP     Retired Wound - Properties Group Date first assessed: 07/02/22  -AP Time first assessed: 2230  -AP Present on Hospital Admission: Y  -AP Side: Right  -AP Location: leg  -AP Primary Wound Type: Other  -AP     Row Name             Wound 07/02/22 2232 Left lower leg Other (comment)    Wound - Properties Group Placement Date: 07/02/22  -AP Placement Time: 2232  -AP Side: Left  -AP Orientation: lower  -AP Location: leg  -AP Primary Wound Type: Other  -AP     Retired Wound - Properties Group Placement Date: 07/02/22  -AP Placement Time: 2232  -AP Side: Left  -AP Orientation: lower  -AP Location: leg  -AP Primary Wound Type: Other  -AP     Retired Wound - Properties Group Date first assessed: 07/02/22  -AP Time first assessed: 2232  -AP Side: Left  -AP Location: leg  -AP Primary Wound Type: Other  -AP     Row Name             Wound 07/03/22 0001 Left calf Other (comment)    Wound - Properties Group Placement Date: 07/03/22  -AP Placement Time: 0001  -AP Present on Hospital Admission: Y  -AP Side: Left  -AP Location: calf  -AP Primary Wound Type: Other  -AP     Retired Wound - Properties Group Placement Date: 07/03/22  -AP Placement Time: 0001  -AP Present on Hospital Admission: Y  -AP Side: Left  -AP Location: calf  -AP Primary Wound Type: Other  -AP     Retired Wound - Properties Group Date first assessed: 07/03/22  -AP Time first assessed: 0001  -AP Present on Hospital Admission: Y  -AP Side: Left  -AP Location: calf  -AP Primary Wound Type: Other  -AP     Row Name             Wound 07/03/22 0001 Right calf Other (comment)    Wound - Properties Group Placement  Date: 07/03/22  -AP Placement Time: 0001  -AP Side: Right  -AP Location: calf  -AP Primary Wound Type: Other  -AP     Retired Wound - Properties Group Placement Date: 07/03/22  -AP Placement Time: 0001  -AP Side: Right  -AP Location: calf  -AP Primary Wound Type: Other  -AP     Retired Wound - Properties Group Date first assessed: 07/03/22  -AP Time first assessed: 0001  -AP Side: Right  -AP Location: calf  -AP Primary Wound Type: Other  -AP     Row Name             [REMOVED] Wound 07/03/22 0004 coccyx Pressure Injury    Wound - Properties Group Placement Date: 07/03/22  -AP Placement Time: 0004  -AP Present on Hospital Admission: Y  -AP Location: coccyx  -AP Primary Wound Type: Pressure inj  -AP Removal Date: 07/04/22  -TW Removal Time: 1405  -TW Wound Outcome: Unknown  -TW     Retired Wound - Properties Group Placement Date: 07/03/22  -AP Placement Time: 0004  -AP Present on Hospital Admission: Y  -AP Location: coccyx  -AP Primary Wound Type: Pressure inj  -AP Removal Date: 07/04/22  -TW Removal Time: 1405  -TW Wound Outcome: Unknown  -TW     Retired Wound - Properties Group Date first assessed: 07/03/22  -AP Time first assessed: 0004  -AP Present on Hospital Admission: Y  -AP Location: coccyx  -AP Primary Wound Type: Pressure inj  -AP Resolution Date: 07/04/22  -TW Resolution Time: 1405  -TW Wound Outcome: Unknown  -TW     Row Name             Wound 07/03/22 1141 Right posterior ring finger Skin Tear    Wound - Properties Group Placement Date: 07/03/22  -CW Placement Time: 1141  -CW Side: Right  -CW Orientation: posterior  -CW Location: ring finger  -CW, ring finger skin tear  Primary Wound Type: Skin tear  -CW     Retired Wound - Properties Group Placement Date: 07/03/22  -CW Placement Time: 1141  -CW Side: Right  -CW Orientation: posterior  -CW Location: ring finger  -CW, ring finger skin tear  Primary Wound Type: Skin tear  -CW     Retired Wound - Properties Group Date first assessed: 07/03/22  -CW Time first  assessed: 1141  -CW Side: Right  -CW Location: ring finger  -CW, ring finger skin tear  Primary Wound Type: Skin tear  -CW     Row Name 07/04/22 1546          Coping    Observed Emotional State flat  confused  -AG     Verbalized Emotional State acceptance  -AG     Trust Relationship/Rapport care explained;choices provided;thoughts/feelings acknowledged  -AG     Family/Support Persons spouse  -AG     Involvement in Care not present at bedside  -AG     Family/Support System Care self-care encouraged  -AG     Row Name 07/04/22 1546          Positioning and Restraints    Pre-Treatment Position in bed  -AG     Post Treatment Position bed  -AG     In Bed notified nsg;sitting EOB;call light within reach;encouraged to call for assist;exit alarm on;side rails up x3  -AG     Row Name 07/04/22 1546          Therapy Assessment/Plan (PT)    Patient/Family Therapy Goals Statement (PT) pt. unable to formulate goal  -AG     Criteria for Skilled Interventions Met (PT) no  pt. poorly cooperative for evaluation; confused.  -AG     Therapy Frequency (PT) evaluation only  -AG     Comment, Therapy Assessment/Plan (PT) evaluation completed.  patient was poorly cooperative for evaluation, oriented to person.  Not a candidate for skilled PT at this time.  -AG           User Key  (r) = Recorded By, (t) = Taken By, (c) = Cosigned By    Initials Name Provider Type    Monserrat Rodriguez, RN Registered Nurse    Rehana Goodman, RN Registered Nurse    Kavitha Choudhury, PT Physical Therapist    Jolene Choe, RN Registered Nurse                Physical Therapy Education                 Title: PT OT SLP Therapies (In Progress)     Topic: Physical Therapy (In Progress)     Point: Mobility training (In Progress)     Learning Progress Summary           Patient Acceptance, E,D, NL by  at 7/4/2022 1545                   Point: Home exercise program (In Progress)     Learning Progress Summary           Patient Acceptance, E,D, NL by  at  7/4/2022 1545                   Point: Body mechanics (In Progress)     Learning Progress Summary           Patient Acceptance, E,D, NL by  at 7/4/2022 1545                   Point: Precautions (In Progress)     Learning Progress Summary           Patient Acceptance, E,D, NL by  at 7/4/2022 1545                               User Key     Initials Effective Dates Name Provider Type Discipline     06/16/21 -  Kavitha Montelongo, PT Physical Therapist PT              PT Recommendation and Plan  Anticipated Discharge Disposition (PT): home with 24/7 care, skilled nursing facility  Therapy Frequency (PT): evaluation only          Time Calculation:    PT Charges     Row Name 07/04/22 1545             Time Calculation    PT Received On 07/04/22  -            User Key  (r) = Recorded By, (t) = Taken By, (c) = Cosigned By    Initials Name Provider Type     Kavitha Montelongo, PT Physical Therapist              Therapy Charges for Today     Code Description Service Date Service Provider Modifiers Qty    01980994542 HC PT EVAL LOW COMPLEXITY 4 7/4/2022 Kavitha Montelongo, PT GP 1               Kavitha Montelongo PT  7/4/2022

## 2022-07-04 NOTE — PROGRESS NOTES
[facility] HOSPITALIST PROGRESS NOTE    Subjective     History:   Sean Chen is a 82 y.o. female admitted on 7/2/2022 secondary to <principal problem not specified>     Procedures: None    CC: Follow up hypoglycemia     Patient seen and examined during HD. Awake and alert but confused. Able to follow a few simple commands but unable to provide a reliable history. PO intake remains poor. RN reports some episodes of restlessness. No acute events overnight per RN.     History taken from: chart, and RN.      Objective     Vital Signs  Temp:  [96.9 °F (36.1 °C)-98.3 °F (36.8 °C)] 96.9 °F (36.1 °C)  Heart Rate:  [71-87] 71  Resp:  [20-22] 20  BP: (110-180)/(51-89) 110/51    Intake/Output Summary (Last 24 hours) at 7/4/2022 1353  Last data filed at 7/4/2022 1300  Gross per 24 hour   Intake 360 ml   Output 2660 ml   Net -2300 ml         Physical Exam:  General:    Awake, alert but confused, able to follow a few simple commands, in no acute distress, chronically ill appearing   Heart:      Normal S1 and S2. Regular rate and rhythm. No significant murmur, rubs or gallops appreciated.   Lungs:     Respirations regular, even and unlabored. Bibasilar crackles. No wheezes, rales or rhonchi.   Abdomen:   Soft and nontender. No guarding, rebound tenderness or  organomegaly noted. Bowel sounds present x 4.   Extremities:  No clubbing, cyanosis or edema noted.      Results Review:    Results from last 7 days   Lab Units 07/04/22  0113 07/02/22  2339 07/02/22  1626   WBC 10*3/mm3 8.33 10.18 7.05   HEMOGLOBIN g/dL 8.7* 9.9* 11.0*   PLATELETS 10*3/mm3 249 241 259     Results from last 7 days   Lab Units 07/04/22  0113 07/02/22  2339 07/02/22  1626   SODIUM mmol/L 128* 133* 132*   POTASSIUM mmol/L 4.0 3.9 3.5   CHLORIDE mmol/L 93* 97* 94*   CO2 mmol/L 22.6 24.0 24.3   BUN mg/dL 31* 24* 21   CREATININE mg/dL 2.74* 2.52* 2.49*   CALCIUM mg/dL 8.1* 7.7* 8.5*   GLUCOSE mg/dL 136* 81 42*     Results from last 7 days    Lab Units 07/04/22  0113 07/02/22  2339 07/02/22  1626   BILIRUBIN mg/dL 0.4 0.4 0.4   ALK PHOS U/L 105 94 113   AST (SGOT) U/L 13 14 16   ALT (SGPT) U/L 7 <5 7         Results from last 7 days   Lab Units 07/02/22  1626   INR  0.93     Results from last 7 days   Lab Units 07/02/22  2339 07/02/22  1846 07/02/22  1626   CK TOTAL U/L  --   --  41   TROPONIN T ng/mL 0.066* 0.055* 0.061*       Imaging Results (Last 24 Hours)     ** No results found for the last 24 hours. 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 Home insulin likely contributed with decreased PO intake. Holding home insulin regimen. BG stable. Will likely need reduced regimen at discharge. Cont to monitor.      Abnormal UA/possible UTI: UA abnormal with culture revealing 50,000 GNR. Cont Azactam empirically.     Acute metabolic encephalopathy superimposed on baseline dementia: Likely 2/2 above. CT head reveals presumed chronic microvascular ischemic changes with no acute findings. Remains confused but more alert compared to upon admission. PO intake remains poor and diet liberalized. Cont treatment as above and supportive treatment.      Acute on chronic combined systolic and diastolic CHF: Echo in 5/22 reveraled an EF of 36-40%. Non-adherance to fluid restriction and intermittent HD sessions possibly contributing. Imaging consistent with CHF. ReDS vest reading elevated. HD per nephrology. Repeat ReDS vest reading and CXR following HD. Monitor volume status.     Chronically elevated troponin's: Likely 2/2 ESRD. Trend  overall flat and improved from previous. Cont ASA and statin. Monitor on telemetry.     ESRD on HD: Cont HD per nephrology recs with input appreciated.     Essential HTN: BP intermittently elevated. Cont Coreg and Nifedipine for now. Cont to monitor.     Hyperlipidemia: Cont statin.     Generalized weakness: PT/OT.    DVT PPX: SQ heparin    Goals of Care: Will consult palliative care to assist with ongoing GOC discussions and to provide additional support.     Disposition: Unclear at present. Possibly home with family. Will ask palliative care to evaluate.    Yovani Del Rosario,   07/04/22  13:53 EDT

## 2022-07-04 NOTE — PLAN OF CARE
Goal Outcome Evaluation:   Patient resting well during shift. Patient complaining of SOB oxygen saturations 90-94% on 2 L nasal cannula. Barbra CERON notified see orders. No distress noted plan of care reviewed with the patient. Pt. Remains confused. Reorientation provided.

## 2022-07-04 NOTE — PLAN OF CARE
Goal Outcome Evaluation:         Pt had dialysis today, 2.5 Liters was taken off. Pt had a redo of ReDs vest see results. Monserrat wound care came and stated to do z-guard for coccyx, turn, and keep off. Bilateral back of the lower legs to do Vaseline gauze, abd pad and kerlix. Band-Aid to skin tear on finger. Pt became agitated, took out IV, trying to get OOB, and yelling. Family was called to see if they could come and sit with Pt but was unsuccessful. Will continue to monitor and follow plan of care.

## 2022-07-04 NOTE — PROGRESS NOTES
Nephrology Progress Note    Interval History:     Patient Complaints: Patient seen on dialysis at about 10:30 AM.  Tolerating the same fairly well.  No complaints.  Not eating well.        Vital Signs  Temp:  [97.4 °F (36.3 °C)-98.3 °F (36.8 °C)] 97.5 °F (36.4 °C)  Heart Rate:  [81-87] 86  Resp:  [20-22] 20  BP: (139-180)/(65-89) 180/89    Physical Exam:    General:           No distress      HEENT:  Pallor               Neck:  No JVD       Lungs:    Bibasal crackles   Heart:   S1-S2 normal,  no rub       Abdomen:   Normal bowel sounds, soft non-tender, non-distended, no guarding       Extremities:  No edema       Skin: No petechiae       Neurologic:  Awake and alert.        Results Review:    I reviewed the patient's new clinical results.    Lab Results (last 24 hours)     Procedure Component Value Units Date/Time    POC Glucose Once [[id number]]  (Normal) Collected: 07/04/22 1052    Specimen: Blood Updated: 07/04/22 1058     Glucose 129 mg/dL      Comment: Meter: [id number] : [id number] MELITON SCOTT       Urine Culture - Urine, Urine, Random Void [[id number]]  (Abnormal) Collected: 07/02/22 2232    Specimen: Urine, Random Void Updated: 07/04/22 1008     Urine Culture 50,000 CFU/mL Gram Negative Bacilli    Narrative:      Colonization of the urinary tract without infection is common. Treatment is discouraged unless the patient is symptomatic, pregnant, or undergoing an invasive urologic procedure.    POC Glucose Once [[id number]]  (Abnormal) Collected: 07/04/22 0619    Specimen: Blood Updated: 07/04/22 0625     Glucose 154 mg/dL      Comment: Meter: [id number] : [id number] MELITON GRAYTON       Hepatitis Panel, Acute [[id number]]  (Normal) Collected: 07/04/22 0113    Specimen: Blood Updated: 07/04/22 0212     Hepatitis B Surface Ag Non-Reactive     Hep A IgM Non-Reactive     Hep B C IgM Non-Reactive     Hepatitis C Ab Non-Reactive    Narrative:       Results may be falsely decreased if patient taking Biotin.     Ferritin [[id number]]  (Abnormal) Collected: 07/04/22 0113    Specimen: Blood Updated: 07/04/22 0207     Ferritin 1,454.00 ng/mL     Narrative:      Results may be falsely decreased if patient taking Biotin.      Comprehensive Metabolic Panel [[id number]]  (Abnormal) Collected: 07/04/22 0113    Specimen: Blood Updated: 07/04/22 0204     Glucose 136 mg/dL      BUN 31 mg/dL      Creatinine 2.74 mg/dL      Sodium 128 mmol/L      Potassium 4.0 mmol/L      Chloride 93 mmol/L      CO2 22.6 mmol/L      Calcium 8.1 mg/dL      Total Protein 5.4 g/dL      Albumin 2.88 g/dL      ALT (SGPT) 7 U/L      AST (SGOT) 13 U/L      Alkaline Phosphatase 105 U/L      Total Bilirubin 0.4 mg/dL      Globulin 2.5 gm/dL      A/G Ratio 1.1 g/dL      BUN/Creatinine Ratio 11.3     Anion Gap 12.4 mmol/L      eGFR 16.9 mL/min/1.73      Comment: National Kidney Foundation and American Society of Nephrology (ASN) Task Force recommended calculation based on the Chronic Kidney Disease Epidemiology Collaboration (CKD-EPI) equation refit without adjustment for race.       Narrative:      GFR Normal >60  Chronic Kidney Disease <60  Kidney Failure <15      Iron Profile [[id number]]  (Abnormal) Collected: 07/04/22 0113    Specimen: Blood Updated: 07/04/22 0204     Iron 60 mcg/dL      Iron Saturation 42 %      Transferrin 95 mg/dL      TIBC 142 mcg/dL     CBC & Differential [[id number]]  (Abnormal) Collected: 07/04/22 0113    Specimen: Blood Updated: 07/04/22 0138    Narrative:      The following orders were created for panel order CBC & Differential.  Procedure                               Abnormality         Status                     ---------                               -----------         ------                     CBC Auto Differential[[id number]]        Abnormal            Final result                 Please view results for these tests on the individual orders.    CBC Auto  Differential [[id number]]  (Abnormal) Collected: 07/04/22 0113    Specimen: Blood Updated: 07/04/22 0138     WBC 8.33 10*3/mm3      RBC 2.90 10*6/mm3      Hemoglobin 8.7 g/dL      Hematocrit 26.7 %      MCV 92.1 fL      MCH 30.0 pg      MCHC 32.6 g/dL      RDW 14.7 %      RDW-SD 49.9 fl      MPV 9.9 fL      Platelets 249 10*3/mm3      Neutrophil % 83.6 %      Lymphocyte % 9.8 %      Monocyte % 5.4 %      Eosinophil % 0.2 %      Basophil % 0.5 %      Immature Grans % 0.5 %      Neutrophils, Absolute 6.96 10*3/mm3      Lymphocytes, Absolute 0.82 10*3/mm3      Monocytes, Absolute 0.45 10*3/mm3      Eosinophils, Absolute 0.02 10*3/mm3      Basophils, Absolute 0.04 10*3/mm3      Immature Grans, Absolute 0.04 10*3/mm3      nRBC 0.0 /100 WBC     POC Glucose Once [[id number]]  (Abnormal) Collected: 07/03/22 1841    Specimen: Blood Updated: 07/03/22 1847     Glucose 146 mg/dL      Comment: Meter: [id number] : [id number] WILMAN CHAPA       POC Glucose Once [[id number]]  (Normal) Collected: 07/03/22 1650    Specimen: Blood Updated: 07/03/22 1700     Glucose 116 mg/dL      Comment: Meter: [id number] : [id number] sherron gamboa       Blood Culture - Blood, Arm, Left [[id number]]  (Normal) Collected: 07/02/22 1626    Specimen: Blood from Arm, Left Updated: 07/03/22 1647     Blood Culture No growth at 24 hours    Blood Culture - Blood, Arm, Right [[id number]]  (Normal) Collected: 07/02/22 1626    Specimen: Blood from Arm, Right Updated: 07/03/22 1647     Blood Culture No growth at 24 hours    Hemoglobin A1c [[id number]]  (Normal) Collected: 07/02/22 2339    Specimen: Blood Updated: 07/03/22 1311     Hemoglobin A1C 5.50 %     Narrative:      Hemoglobin A1C Ranges:    Increased Risk for Diabetes  5.7% to 6.4%  Diabetes                     >= 6.5%  Diabetic Goal                < 7.0%          Imaging Results (Last 24 Hours)     ** No results found for the last 24 hours. **          Assessment and Plan:    1.  End-stage renal  disease on dialysis  2.  Noncompliance with dialysis  3.  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  Diabetes to poor control  5.  Anemia    With underlying dementia, and difficulty with transportation to dialysis, I suspect we shall continue to see poor compliance.  Changed to regular diet to facilitate oral intake.  Continue dialysis.  She is stable on the same    John Jesus MD  07/04/22  11:55 EDT

## 2022-07-04 NOTE — NURSING NOTE
Patient with open wounds to bilat posterior calves.  Wound beds with moist, yellow sloughing and moderated yellow drainage.  She has dry, scab covered wounds to her anterior lower legs and feet.  Order placed to cleanse bilat lower legs and feet with cleansing foam, apply TheraHoney to open wounds and cover with non-adherent pads, and secure with Kerlix.      Patient with skin tear to the right proximal 4th finger. Wound is covered with dry scab.  Will cover with band aid daily.    Coccyx and bilat gluteal with intact, blanchable redness noted.  No pressure injury at this time.  Will treat with Z-Guard PRN.    PI prevention orders initiated.

## 2022-07-05 ENCOUNTER — APPOINTMENT (OUTPATIENT)
Dept: GENERAL RADIOLOGY | Facility: HOSPITAL | Age: 82
End: 2022-07-05

## 2022-07-05 ENCOUNTER — TELEPHONE (OUTPATIENT)
Dept: FAMILY MEDICINE CLINIC | Facility: CLINIC | Age: 82
End: 2022-07-05

## 2022-07-05 LAB
ALBUMIN SERPL-MCNC: 3.56 G/DL (ref 3.5–5.2)
ALBUMIN/GLOB SERPL: 1.6 G/DL
ALP SERPL-CCNC: 100 U/L (ref 39–117)
ALT SERPL W P-5'-P-CCNC: 6 U/L (ref 1–33)
ANION GAP SERPL CALCULATED.3IONS-SCNC: 15.9 MMOL/L (ref 5–15)
AST SERPL-CCNC: 13 U/L (ref 1–32)
BASOPHILS # BLD AUTO: 0.03 10*3/MM3 (ref 0–0.2)
BASOPHILS NFR BLD AUTO: 0.3 % (ref 0–1.5)
BILIRUB SERPL-MCNC: 0.5 MG/DL (ref 0–1.2)
BUN SERPL-MCNC: 20 MG/DL (ref 8–23)
BUN/CREAT SERPL: 10.9 (ref 7–25)
CALCIUM SPEC-SCNC: 8.5 MG/DL (ref 8.6–10.5)
CHLORIDE SERPL-SCNC: 91 MMOL/L (ref 98–107)
CO2 SERPL-SCNC: 23.1 MMOL/L (ref 22–29)
CREAT SERPL-MCNC: 1.83 MG/DL (ref 0.57–1)
DEPRECATED RDW RBC AUTO: 49.1 FL (ref 37–54)
EGFRCR SERPLBLD CKD-EPI 2021: 27.5 ML/MIN/1.73
EOSINOPHIL # BLD AUTO: 0.08 10*3/MM3 (ref 0–0.4)
EOSINOPHIL NFR BLD AUTO: 0.8 % (ref 0.3–6.2)
ERYTHROCYTE [DISTWIDTH] IN BLOOD BY AUTOMATED COUNT: 14.7 % (ref 12.3–15.4)
GLOBULIN UR ELPH-MCNC: 2.2 GM/DL
GLUCOSE BLDC GLUCOMTR-MCNC: 146 MG/DL (ref 70–130)
GLUCOSE BLDC GLUCOMTR-MCNC: 173 MG/DL (ref 70–130)
GLUCOSE BLDC GLUCOMTR-MCNC: 188 MG/DL (ref 70–130)
GLUCOSE BLDC GLUCOMTR-MCNC: 230 MG/DL (ref 70–130)
GLUCOSE SERPL-MCNC: 138 MG/DL (ref 65–99)
HCT VFR BLD AUTO: 23.6 % (ref 34–46.6)
HGB BLD-MCNC: 7.8 G/DL (ref 12–15.9)
IMM GRANULOCYTES # BLD AUTO: 0.04 10*3/MM3 (ref 0–0.05)
IMM GRANULOCYTES NFR BLD AUTO: 0.4 % (ref 0–0.5)
LYMPHOCYTES # BLD AUTO: 1.08 10*3/MM3 (ref 0.7–3.1)
LYMPHOCYTES NFR BLD AUTO: 10.4 % (ref 19.6–45.3)
MCH RBC QN AUTO: 29.9 PG (ref 26.6–33)
MCHC RBC AUTO-ENTMCNC: 33.1 G/DL (ref 31.5–35.7)
MCV RBC AUTO: 90.4 FL (ref 79–97)
MONOCYTES # BLD AUTO: 0.64 10*3/MM3 (ref 0.1–0.9)
MONOCYTES NFR BLD AUTO: 6.1 % (ref 5–12)
NEUTROPHILS NFR BLD AUTO: 8.56 10*3/MM3 (ref 1.7–7)
NEUTROPHILS NFR BLD AUTO: 82 % (ref 42.7–76)
NRBC BLD AUTO-RTO: 0 /100 WBC (ref 0–0.2)
PLATELET # BLD AUTO: 254 10*3/MM3 (ref 140–450)
PMV BLD AUTO: 9.3 FL (ref 6–12)
POTASSIUM SERPL-SCNC: 3.5 MMOL/L (ref 3.5–5.2)
PROCALCITONIN SERPL-MCNC: 0.24 NG/ML (ref 0–0.25)
PROT SERPL-MCNC: 5.8 G/DL (ref 6–8.5)
RBC # BLD AUTO: 2.61 10*6/MM3 (ref 3.77–5.28)
SODIUM SERPL-SCNC: 130 MMOL/L (ref 136–145)
WBC NRBC COR # BLD: 10.43 10*3/MM3 (ref 3.4–10.8)

## 2022-07-05 PROCEDURE — 85025 COMPLETE CBC W/AUTO DIFF WBC: CPT | Performed by: INTERNAL MEDICINE

## 2022-07-05 PROCEDURE — 73564 X-RAY EXAM KNEE 4 OR MORE: CPT

## 2022-07-05 PROCEDURE — 25010000002 HEPARIN (PORCINE) PER 1000 UNITS: Performed by: STUDENT IN AN ORGANIZED HEALTH CARE EDUCATION/TRAINING PROGRAM

## 2022-07-05 PROCEDURE — 94799 UNLISTED PULMONARY SVC/PX: CPT

## 2022-07-05 PROCEDURE — 84145 PROCALCITONIN (PCT): CPT | Performed by: INTERNAL MEDICINE

## 2022-07-05 PROCEDURE — 73564 X-RAY EXAM KNEE 4 OR MORE: CPT | Performed by: RADIOLOGY

## 2022-07-05 PROCEDURE — 82962 GLUCOSE BLOOD TEST: CPT

## 2022-07-05 PROCEDURE — 94761 N-INVAS EAR/PLS OXIMETRY MLT: CPT

## 2022-07-05 PROCEDURE — 80053 COMPREHEN METABOLIC PANEL: CPT | Performed by: INTERNAL MEDICINE

## 2022-07-05 PROCEDURE — 93005 ELECTROCARDIOGRAM TRACING: CPT | Performed by: HOSPITALIST

## 2022-07-05 PROCEDURE — 71045 X-RAY EXAM CHEST 1 VIEW: CPT

## 2022-07-05 PROCEDURE — 97166 OT EVAL MOD COMPLEX 45 MIN: CPT

## 2022-07-05 PROCEDURE — 99232 SBSQ HOSP IP/OBS MODERATE 35: CPT | Performed by: INTERNAL MEDICINE

## 2022-07-05 RX ORDER — ASCORBIC ACID, THIAMINE MONONITRATE,RIBOFLAVIN, NIACINAMIDE, PYRIDOXINE HYDROCHLORIDE, FOLIC ACID, CYANOCOBALAMIN, BIOTIN, CALCIUM PANTOTHENATE, 100; 1.5; 1.7; 20; 10; 1; 6000; 150000; 5 MG/1; MG/1; MG/1; MG/1; MG/1; MG/1; UG/1; UG/1; MG/1
1 CAPSULE, LIQUID FILLED ORAL DAILY
Status: DISCONTINUED | OUTPATIENT
Start: 2022-07-05 | End: 2022-07-08

## 2022-07-05 RX ADMIN — Medication 10 ML: at 20:20

## 2022-07-05 RX ADMIN — ASPIRIN 81 MG: 81 TABLET, COATED ORAL at 08:09

## 2022-07-05 RX ADMIN — HEPARIN SODIUM 5000 UNITS: 5000 INJECTION INTRAVENOUS; SUBCUTANEOUS at 20:19

## 2022-07-05 RX ADMIN — NIFEDIPINE 60 MG: 30 TABLET, EXTENDED RELEASE ORAL at 08:09

## 2022-07-05 RX ADMIN — RENO CAPS 1 MG: 100; 1.5; 1.7; 20; 10; 1; 150; 5; 6 CAPSULE ORAL at 14:31

## 2022-07-05 RX ADMIN — HYDROXYZINE HYDROCHLORIDE 25 MG: 25 TABLET ORAL at 00:03

## 2022-07-05 RX ADMIN — ATORVASTATIN CALCIUM 80 MG: 40 TABLET, FILM COATED ORAL at 20:19

## 2022-07-05 RX ADMIN — IPRATROPIUM BROMIDE AND ALBUTEROL SULFATE 3 ML: .5; 3 SOLUTION RESPIRATORY (INHALATION) at 06:19

## 2022-07-05 RX ADMIN — AZTREONAM 500 MG: 1 INJECTION, POWDER, LYOPHILIZED, FOR SOLUTION INTRAMUSCULAR; INTRAVENOUS at 00:31

## 2022-07-05 RX ADMIN — FOLIC ACID 1 MG: 1 TABLET ORAL at 08:08

## 2022-07-05 RX ADMIN — CALCIUM ACETATE 1334 MG: 667 CAPSULE ORAL at 08:09

## 2022-07-05 RX ADMIN — HEPARIN SODIUM 5000 UNITS: 5000 INJECTION INTRAVENOUS; SUBCUTANEOUS at 14:31

## 2022-07-05 RX ADMIN — ACETAMINOPHEN 650 MG: 325 TABLET ORAL at 10:42

## 2022-07-05 RX ADMIN — AZTREONAM 500 MG: 1 INJECTION, POWDER, LYOPHILIZED, FOR SOLUTION INTRAMUSCULAR; INTRAVENOUS at 12:01

## 2022-07-05 RX ADMIN — CALCIUM ACETATE 1334 MG: 667 CAPSULE ORAL at 17:37

## 2022-07-05 RX ADMIN — HYDROXYZINE HYDROCHLORIDE 25 MG: 25 TABLET ORAL at 20:19

## 2022-07-05 RX ADMIN — CARVEDILOL 6.25 MG: 6.25 TABLET, FILM COATED ORAL at 08:08

## 2022-07-05 RX ADMIN — IPRATROPIUM BROMIDE AND ALBUTEROL SULFATE 3 ML: .5; 3 SOLUTION RESPIRATORY (INHALATION) at 00:11

## 2022-07-05 RX ADMIN — CALCIUM ACETATE 1334 MG: 667 CAPSULE ORAL at 11:57

## 2022-07-05 RX ADMIN — ACETAMINOPHEN 650 MG: 325 TABLET ORAL at 20:19

## 2022-07-05 RX ADMIN — Medication 10 ML: at 08:09

## 2022-07-05 NOTE — CASE MANAGEMENT/SOCIAL WORK
Discharge Planning Assessment   Mina     Patient Name: Sean Chen  MRN: [id number]  Today's Date: 7/5/2022    Admit Date: 7/2/2022                   Discharge Plan     Row Name 07/05/22 1519       Plan    Plan Pt was admitted on 07/02/22. SS received  consult for discharge planning/ [facility]. SS spoke with Pt's son, Dylon Cline on this date. Pt lives with her spouse, Dylon. Pt's son and daughter in law are speaking with Pt and spouse about possibly moving in with them at discharge for 24/7 care. Pt attends [facility] Mon, Wed, and Friday at 10:30am. Pt utilizes a bedside commode, hospital bed, home oxygen at 2 liters, portable O2 tanks, and wheelchair via [facility]. Pt's PCP is Ganga Jacobson. Pt uses [facility]. Pt does not have a POA/living will. Pt's family to provide transport. SS to follow.                Kavitha Gatica

## 2022-07-05 NOTE — PLAN OF CARE
Goal Outcome Evaluation:  Plan of Care Reviewed With: patient        Progress: no change  Outcome Evaluation: Patient seen for OT evaluation. Difficult to assess PLOF versus current functional performance due to patient's cognition. She currently requires maximal assist with all ADLs, and sit to stand with moderate/maximal assist. She was pleasantly confused at time of OT evaluation, alert and oriented to self only. Significant assist needed at this time, with recommendation for SNF placement if unable to provide 24/7 physical assist by family.

## 2022-07-05 NOTE — TELEPHONE ENCOUNTER
These have been printed off and put in for scanning.    ----- Message from Ganga Gallardo MD sent at 6/29/2022  3:46 PM EDT -----  Need copies of labs drawn this week by [facility]

## 2022-07-05 NOTE — PROGRESS NOTES
Nephrology Progress Note      Subjective     No chest pain or shortness of breath.     Objective       Vital signs :     Temp:  [96.9 °F (36.1 °C)-98.1 °F (36.7 °C)] 98 °F (36.7 °C)  Heart Rate:  [71-95] 80  Resp:  [18-20] 20  BP: (105-173)/(51-93) 117/74      Intake/Output Summary (Last 24 hours) at 7/5/2022 0713  Last data filed at 7/5/2022 0603  Gross per 24 hour   Intake 420 ml   Output 2600 ml   Net -2180 ml       Physical Exam:    General Appearance : not in acute distress  Lungs : b/l lower zone crackles  Heart :  regular rhythm & normal rate, normal S1, S2 and no murmur, no rub  Abdomen : normal bowel sounds, no masses, no hepatomegaly, no splenomegaly, soft non-tender and no guarding  Extremities : 2+ edema, no cyanosis and no redness  Neurologic :   orientated to person, place, time and situation, Grossly no focal deficits        Laboratory Data :     Albumin Albumin   Date Value Ref Range Status   07/05/2022 3.56 3.50 - 5.20 g/dL Final   07/04/2022 2.88 (L) 3.50 - 5.20 g/dL Final   07/02/2022 2.71 (L) 3.50 - 5.20 g/dL Final   07/02/2022 3.17 (L) 3.50 - 5.20 g/dL Final      Magnesium No results found for: MG       PTH               No results found for: PTH    CBC and coagulation:  Results from last 7 days   Lab Units 07/05/22  0344 07/04/22  0113 07/02/22  2339 07/02/22  1626   PROCALCITONIN ng/mL  --   --   --  0.16   LACTATE mmol/L  --   --   --  0.7   SED RATE mm/hr  --   --   --  40*   CRP mg/dL  --   --   --  1.96*   WBC 10*3/mm3 10.43 8.33 10.18 7.05   HEMOGLOBIN g/dL 7.8* 8.7* 9.9* 11.0*   HEMATOCRIT % 23.6* 26.7* 30.4* 32.7*   MCV fL 90.4 92.1 90.5 89.3   MCHC g/dL 33.1 32.6 32.6 33.6   PLATELETS 10*3/mm3 254 249 241 259   INR   --   --   --  0.93     Acid/base balance:  Results from last 7 days   Lab Units 07/02/22  1851   PH, ARTERIAL pH units 7.448   PO2 ART mm Hg 76.8*   PCO2, ARTERIAL mm Hg 39.5   HCO3 ART mmol/L 27.3*     Renal and electrolytes:  Results from last 7 days   Lab Units  07/05/22  0344 07/04/22  0113 07/02/22  2339 07/02/22  1626   SODIUM mmol/L 130* 128* 133* 132*   POTASSIUM mmol/L 3.5 4.0 3.9 3.5   CHLORIDE mmol/L 91* 93* 97* 94*   CO2 mmol/L 23.1 22.6 24.0 24.3   BUN mg/dL 20 31* 24* 21   CREATININE mg/dL 1.83* 2.74* 2.52* 2.49*   CALCIUM mg/dL 8.5* 8.1* 7.7* 8.5*     Estimated Creatinine Clearance: 26.5 mL/min (A) (by C-G formula based on SCr of 1.83 mg/dL (H)).    Liver and pancreatic function:  Results from last 7 days   Lab Units 07/05/22  0344 07/04/22  0113 07/02/22  2339 07/02/22  1626   ALBUMIN g/dL 3.56 2.88* 2.71* 3.17*   BILIRUBIN mg/dL 0.5 0.4 0.4 0.4   ALK PHOS U/L 100 105 94 113   AST (SGOT) U/L 13 13 14 16   ALT (SGPT) U/L 6 7 <5 7   AMMONIA umol/L  --   --   --  <10*         Cardiac:  Results from last 7 days   Lab Units 07/02/22  1626   PROBNP pg/mL >70,000.0*     Liver and pancreatic function:  Results from last 7 days   Lab Units 07/05/22  0344 07/04/22  0113 07/02/22  2339 07/02/22  1626   ALBUMIN g/dL 3.56 2.88* 2.71* 3.17*   BILIRUBIN mg/dL 0.5 0.4 0.4 0.4   ALK PHOS U/L 100 105 94 113   AST (SGOT) U/L 13 13 14 16   ALT (SGPT) U/L 6 7 <5 7   AMMONIA umol/L  --   --   --  <10*       Medications :     aspirin, 81 mg, Oral, Daily  atorvastatin, 80 mg, Oral, Nightly  aztreonam, 500 mg, Intravenous, Q12H  calcium acetate, 1,334 mg, Oral, TID With Meals  carvedilol, 6.25 mg, Oral, BID With Meals  folic acid, 1 mg, Oral, Daily  heparin (porcine), 5,000 Units, Subcutaneous, Q8H  NIFEdipine CC, 60 mg, Oral, Q24H  sodium chloride, 10 mL, Intravenous, Q12H  ziprasidone (GEODON) injection with sterile water, 10 mg, Intramuscular, Once             Assessment & Plan     1.  End-stage renal disease on dialysis  2.  Noncompliance with dialysis  3.  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  Diabetes to poor control  5.  Anemia     Continue dialysis likely twice a wk, will start on renal vitamin.       Jude Muñoz MD  07/05/22  07:13 EDT

## 2022-07-05 NOTE — THERAPY EVALUATION
Patient Name: Sean Chen  : 1940    MRN: [id number]                              Today's Date: 2022       Admit Date: 2022    Visit Dx:     ICD-10-CM ICD-9-CM   1. Hypoglycemia  E16.2 251.2     Patient Active Problem List   Diagnosis   • Type 2 diabetes mellitus without complication, without long-term current use of insulin (HCC)   • Vitamin D deficiency disease   • Extrinsic asthma   • Essential hypertension   • Mixed hyperlipidemia   • Generalized osteoarthritis   • Gastroesophageal reflux disease without esophagitis   • Meniere's disease   • Chronic seasonal allergic rhinitis   • Low serum vitamin B12   • Dementia without behavioral disturbance ([facility])   • NSTEMI (non-ST elevated myocardial infarction) ([facility])   • Prolonged Q-T interval on ECG   • Chronic renal failure, stage 4 (severe) ([facility])   • Encounter for immunization   • Hypoglycemia     Past Medical History:   Diagnosis Date   • Allergic rhinitis    • Elevated liver enzymes    • Extrinsic asthma    • Gastritis    • GERD (gastroesophageal reflux disease)    • Hyperlipidemia    • Hypertension    • Iron deficiency anemia    • Meniere's disease    • Osteoarthritis    • Type 2 diabetes mellitus (HCC)    • Vitamin D deficiency disease      Past Surgical History:   Procedure Laterality Date   • APPENDECTOMY     • CARDIAC CATHETERIZATION N/A 2022    Procedure: Left Heart Cath;  Surgeon: Marcelino Grande MD;  Location: [facility] CATH INVASIVE LOCATION;  Service: Cardiology;  Laterality: N/A;   • CHOLECYSTECTOMY     • HYSTERECTOMY     • INSERTION HEMODIALYSIS CATHETER N/A 2022    Procedure: HEMODIALYSIS CATHETER INSERTION;  Surgeon: Hans Coates MD;  Location: [facility] OR;  Service: General;  Laterality: N/A;      General Information     Row Name 22 1054          OT Time and Intention    Document Type evaluation  -     Mode of Treatment individual therapy;occupational therapy  -     Row Name 22 1054          General  Information    Patient Profile Reviewed yes  -     Prior Level of Function --  pt unable to provide subjective history at this time due to cognition  -     Existing Precautions/Restrictions fall  -     Barriers to Rehab medically complex;cognitive status  -     Row Name 07/05/22 1054          Occupational Profile    Reason for Services/Referral (Occupational Profile) Patient was admitted to [facility] on 7/2/2022. She was referred for OT evaluation due to change in functional performance with completion of ADLs, functional mobility, and/or transfers.  -     Row Name 07/05/22 1054          Living Environment    People in Home spouse  -[facility] Name 07/05/22 1054          Cognition    Orientation Status (Cognition) oriented to;person  -[facility] Name 07/05/22 1054          Safety Issues, Functional Mobility    Safety Issues Affecting Function (Mobility) awareness of need for assistance;problem-solving  -     Impairments Affecting Function (Mobility) balance;cognition;endurance/activity tolerance;strength  -           User Key  (r) = Recorded By, (t) = Taken By, (c) = Cosigned By    Initials Name Provider Type     Becky Laughlin, ALLAN Occupational Therapist                 Mobility/ADL's     Row Name 07/05/22 1056          Bed Mobility    Comment, (Bed Mobility) up in chair upon OT arrival with safety sitter  -[facility] Name 07/05/22 1056          Transfers    Transfers stand-sit transfer;sit-stand transfer  -     Sit-Stand [place] (Transfers) moderate assist (50% patient effort);maximum assist (25% patient effort)  -     Stand-Sit [place] (Transfers) moderate assist (50% patient effort);maximum assist (25% patient effort)  -[facility] Name 07/05/22 1056          Activities of Daily Living    BADL Assessment/Intervention bathing;upper body dressing;lower body dressing;grooming;feeding;toileting  -[facility] Name 07/05/22 1056          Bathing Assessment/Intervention     [place] Level (Bathing) bathing skills;maximum assist (25% patient effort)  -KP     Row Name 07/05/22 1056          Upper Body Dressing Assessment/Training    [place] Level (Upper Body Dressing) upper body dressing skills;maximum assist (25% patient effort)  -KP     Row Name 07/05/22 1056          Lower Body Dressing Assessment/Training    [place] Level (Lower Body Dressing) lower body dressing skills;maximum assist (25% patient effort)  -KP     Row Name 07/05/22 1056          Grooming Assessment/Training    [place] Level (Grooming) grooming skills;maximum assist (25% patient effort)  -KP     Row Name 07/05/22 1056          Self-Feeding Assessment/Training    [place] Level (Feeding) feeding skills;moderate assist (50% patient effort)  -KP     Row Name 07/05/22 1056          Toileting Assessment/Training    [place] Level (Toileting) toileting skills;maximum assist (25% patient effort)  -           User Key  (r) = Recorded By, (t) = Taken By, (c) = Cosigned By    Initials Name Provider Type     Becky Laughlin OT Occupational Therapist               Obj/Interventions     Row Name 07/05/22 1058          Sensory Assessment (Somatosensory)    Sensory Assessment (Somatosensory) unable/difficult to assess  -KP     Row Name 07/05/22 1058          Vision Assessment/Intervention    Visual Impairment/Limitations unable/difficult to assess  -KP     Row Name 07/05/22 1058          Range of Motion Comprehensive    General Range of Motion bilateral upper extremity ROM WFL  -KP     Row Name 07/05/22 1058          Strength Comprehensive (MMT)    Comment, General Manual Muscle Testing (MMT) Assessment 3+/5 MMT in BUEs  -KP     Row Name 07/05/22 1058          Motor Skills    Motor Skills coordination;functional endurance  -     Coordination WFL;bilateral;upper extremity  -     Functional Endurance poor plus  -KP     Row Name 07/05/22 1058          Balance    Balance Assessment sitting static  balance;standing static balance  -     Static Sitting Balance standby assist  -     Static Standing Balance moderate assist  -           User Key  (r) = Recorded By, (t) = Taken By, (c) = Cosigned By    Initials Name Provider Type    Becky Espinal OT Occupational Therapist               Goals/Plan     Row Name 07/05/22 1101          Transfer Goal 1 (OT)    Activity/Assistive Device (Transfer Goal 1, OT) toilet;commode, 3-in-1  -     [place] Level/Cues Needed (Transfer Goal 1, OT) minimum assist (75% or more patient effort)  -     Time Frame (Transfer Goal 1, OT) by discharge  -     Row Name 07/05/22 1101          Dressing Goal 1 (OT)    Activity/Device (Dressing Goal 1, OT) dressing skills, all  -     [place]/Cues Needed (Dressing Goal 1, OT) minimum assist (75% or more patient effort)  -     Time Frame (Dressing Goal 1, OT) by discharge  -     Row Name 07/05/22 1101          Problem Specific Goal 1 (OT)    Problem Specific Goal 1 (OT) Patient will perform sustained activity X12 minutes to promote functional endurance/activity tolerance needed for daily occupations.  -     Time Frame (Problem Specific Goal 1, OT) by discharge  -     Row Name 07/05/22 1101          Therapy Assessment/Plan (OT)    Planned Therapy Interventions (OT) activity tolerance training;BADL retraining;functional balance retraining;occupation/activity based interventions;ROM/therapeutic exercise;patient/caregiver education/training;strengthening exercise;transfer/mobility retraining  -           User Key  (r) = Recorded By, (t) = Taken By, (c) = Cosigned By    Initials Name Provider Type    Becky Espinal OT Occupational Therapist               Clinical Impression     Row Name 07/05/22 1058          Pain Assessment    Pretreatment Pain Rating 0/10 - no pain  -     Posttreatment Pain Rating 0/10 - no pain  -     Row Name 07/05/22 1058          Plan of Care Review    Plan of Care Reviewed With patient   -     Progress no change  -     Outcome Evaluation Patient seen for OT evaluation. Difficult to assess PLOF versus current functional performance due to patient's cognition. She currently requires maximal assist with all ADLs, and sit to stand with moderate/maximal assist. She was pleasantly confused at time of OT evaluation, alert and oriented to self only. Significant assist needed at this time, with recommendation for SNF placement if unable to provide 24/7 physical assist by family.  -     Row Name 07/05/22 1058          Therapy Assessment/Plan (OT)    Patient/Family Therapy Goal Statement (OT) Patient unable to formulate.  -     Rehab Potential (OT) fair, will monitor progress closely  -     Criteria for Skilled Therapeutic Interventions Met (OT) yes;skilled treatment is necessary;meets criteria  -     Therapy Frequency (OT) 5 times/wk  3-5x/week as available  -     Predicted Duration of Therapy Intervention (OT) discharge  -     Row Name 07/05/22 1058          Therapy Plan Review/Discharge Plan (OT)    Anticipated Discharge Disposition (OT) home with 24/7 care;home with home health;skilled nursing facility  -     Row Name 07/05/22 1058          Vital Signs    O2 Delivery Pre Treatment supplemental O2  -     O2 Delivery Intra Treatment supplemental O2  -     O2 Delivery Post Treatment supplemental O2  -     Row Name 07/05/22 1058          Positioning and Restraints    Pre-Treatment Position sitting in chair/recliner  -     Post Treatment Position chair  -     In Chair with other staff  -           User Key  (r) = Recorded By, (t) = Taken By, (c) = Cosigned By    Initials Name Provider Type    Becky Espinal, OT Occupational Therapist               Outcome Measures    No documentation.                 Occupational Therapy Education                 Title: PT OT SLP Therapies (In Progress)     Topic: Occupational Therapy (In Progress)     Point: ADL training (Done)      Description:   Instruct learner(s) on proper safety adaptation and remediation techniques during self care or transfers.   Instruct in proper use of assistive devices.              Learning Progress Summary           Patient Acceptance, E, VU,NR by  at 7/5/2022 1103    Comment: OT role, plan of care, goals                   Point: Home exercise program (Not Started)     Description:   Instruct learner(s) on appropriate technique for monitoring, assisting and/or progressing therapeutic exercises/activities.              Learner Progress:  Not documented in this visit.          Point: Precautions (Not Started)     Description:   Instruct learner(s) on prescribed precautions during self-care and functional transfers.              Learner Progress:  Not documented in this visit.          Point: Body mechanics (Not Started)     Description:   Instruct learner(s) on proper positioning and spine alignment during self-care, functional mobility activities and/or exercises.              Learner Progress:  Not documented in this visit.                      User Key     Initials Effective Dates Name Provider Type Discipline     06/16/21 -  Becky Laughlin, OT Occupational Therapist OT              OT Recommendation and Plan  Planned Therapy Interventions (OT): activity tolerance training, BADL retraining, functional balance retraining, occupation/activity based interventions, ROM/therapeutic exercise, patient/caregiver education/training, strengthening exercise, transfer/mobility retraining  Therapy Frequency (OT): 5 times/wk (3-5x/week as available)  Plan of Care Review  Plan of Care Reviewed With: patient  Progress: no change  Outcome Evaluation: Patient seen for OT evaluation. Difficult to assess PLOF versus current functional performance due to patient's cognition. She currently requires maximal assist with all ADLs, and sit to stand with moderate/maximal assist. She was pleasantly confused at time of OT evaluation,  alert and oriented to self only. Significant assist needed at this time, with recommendation for SNF placement if unable to provide 24/7 physical assist by family.     Time Calculation:    Time Calculation- OT     Row Name 07/05/22 1104             Time Calculation- OT    OT Received On 07/05/22  -            User Key  (r) = Recorded By, (t) = Taken By, (c) = Cosigned By    Initials Name Provider Type     Becky Laughlin OT Occupational Therapist              Therapy Charges for Today     Code Description Service Date Service Provider Modifiers Qty    [id number]  OT EVAL MOD COMPLEXITY 4 7/5/2022 Becky Laughlin OT GO 1               Becky Laughlin OT  7/5/2022

## 2022-07-05 NOTE — CONSULTS
Palliative Care Initial Consult     Attending Physician: Yovani Del Rosario, *  Referring Provider: Yovani Del Rosario MD    Palliative care reason for consult: GOC/ACP  Code Status:   Code Status and Medical Interventions:   Ordered at: 07/02/22 5199     Code Status (Patient has no pulse and is not breathing):    CPR (Attempt to Resuscitate)     Medical Interventions (Patient has pulse or is breathing):    Full Support      Advanced Directives: Advance Directive Status: Patient does not have advance directive   Healthcare surrogate: Dylon Farmerguanako Matthews/  and son Dylon Chen JR.   Goals of Care: Per Pt son Dylon ROMAN( as pt  is confused and deferred conversation to his son) states that at this time he wishes his mother to remain a full code and plan is to bring her home with him at discharge and would like to continue plan of peritoneal dialysis.    HPI:  Sean Chen is a 82 y.o. female admitted on 7/2/2022 with leg swelling and fatigue, she has a medical history of progressive dementia, type 2 diabetes mellitus, ESRD on HD, essential hypertension, hyperlipidemia, rheumatoid arthritis, severe pulmonary hypertension, and  GERD. Pt sons Dylon Roman states that he gave his mother her insulin the night before her admission as directed by physician as her BS was 220. He states that his mother had been doing better with ambulating with family and therapy and that her appetite has been pretty consistent. He states that dialysis had allowed her to miss last Wednesdays dialysis session as he says her urinary output was better and she was not over loaded. Patients blood sugar upon admission was 42 with BNP greater than 70,000, creatinine 2.49UA with cloudy appearance, 1+ blood, positive nitrites, moderate leukocytes, 3+ protein, 13-20 WBC, 2+ bacteria.  Blood cultures pending x2.  UDS negative.        ROS: Negative except as above in HPI.     Past Medical History:   Diagnosis Date   • Allergic  rhinitis    • Elevated liver enzymes    • Extrinsic asthma    • Gastritis    • GERD (gastroesophageal reflux disease)    • Hyperlipidemia    • Hypertension    • Iron deficiency anemia    • Meniere's disease    • Osteoarthritis    • Type 2 diabetes mellitus (HCC)    • Vitamin D deficiency disease      Past Surgical History:   Procedure Laterality Date   • APPENDECTOMY     • CARDIAC CATHETERIZATION N/A 5/11/2022    Procedure: Left Heart Cath;  Surgeon: Marcelino Grande MD;  Location:  [facility];  Service: Cardiology;  Laterality: N/A;   • CHOLECYSTECTOMY     • HYSTERECTOMY     • INSERTION HEMODIALYSIS CATHETER N/A 5/9/2022    Procedure: HEMODIALYSIS CATHETER INSERTION;  Surgeon: Hans Coates MD;  Location:  [facility] OR;  Service: General;  Laterality: N/A;     Social History     Socioeconomic History   • Marital status:      Spouse name: moshe   • Number of children: 1   • Years of education: 8   Tobacco Use   • Smoking status: Never Smoker   • Smokeless tobacco: Never Used   Substance and Sexual Activity   • Alcohol use: No   • Drug use: No   • Sexual activity: Defer     History reviewed. No pertinent family history.    Allergies   Allergen Reactions   • Keflex [Cephalexin]    • Lodine [Etodolac]    • Penicillins    • Sulfa Antibiotics        Current Facility-Administered Medications   Medication Dose Route Frequency Provider Last Rate Last Admin   • acetaminophen (TYLENOL) tablet 650 mg  650 mg Oral Q6H PRN Yoon Belle PA   650 mg at 07/05/22 1042   • albumin human 25 % IV SOLN 25 g  25 g Intravenous PRN John Jesus MD   25 g at 07/04/22 1059   • aspirin EC tablet 81 mg  81 mg Oral Daily Shaw Tomas DO   81 mg at 07/05/22 0809   • atorvastatin (LIPITOR) tablet 80 mg  80 mg Oral Nightly Shaw Tomas DO   80 mg at 07/04/22 2033   • aztreonam (AZACTAM) 500 mg in sodium chloride 0.9 % 100 mL IVPB  500 mg Intravenous Q12H Shaw Tomas DO   500 mg at 07/05/22 0031   •  calcium acetate (PHOS BINDER)) capsule 1,334 mg  1,334 mg Oral TID With Meals Shaw Tomas DO   1,334 mg at 07/05/22 0809   • carvedilol (COREG) tablet 6.25 mg  6.25 mg Oral BID With Meals Shaw Tomas DO   6.25 mg at 07/05/22 0808   • dextrose (D50W) (25 g/50 mL) IV injection 25 g  25 g Intravenous Q15 Min PRN Shaw Tomas DO   25 g at 07/03/22 0333   • dextrose (GLUTOSE) oral gel 15 g  15 g Oral Q15 Min PRN Shaw Tomas DO       • folic acid (FOLVITE) tablet 1 mg  1 mg Oral Daily Shaw Tomas DO   1 mg at 07/05/22 0808   • glucagon (human recombinant) (GLUCAGEN DIAGNOSTIC) injection 1 mg  1 mg Intramuscular Q15 Min PRN Shaw Tomas DO       • heparin (porcine) 5000 UNIT/ML injection 5,000 Units  5,000 Units Subcutaneous Q8H Shaw Tomas DO   5,000 Units at 07/04/22 2245   • hydrALAZINE (APRESOLINE) injection 10 mg  10 mg Intravenous Q6H PRN Yoon Belle PA       • hydrOXYzine (ATARAX) tablet 25 mg  25 mg Oral Q6H PRN Yovani Del Rosario DO   25 mg at 07/05/22 0003   • ipratropium-albuterol (DUO-NEB) nebulizer solution 3 mL  3 mL Nebulization Q6H PRN Yoon Belle PA   3 mL at 07/05/22 0619   • NIFEdipine CC (ADALAT CC) 24 hr tablet 60 mg  60 mg Oral Q24H Shaw Tomas DO   60 mg at 07/05/22 0809   • sodium chloride 0.9 % flush 10 mL  10 mL Intravenous Q12H Shaw Tomas DO   10 mL at 07/05/22 0809   • sodium chloride 0.9 % flush 10 mL  10 mL Intravenous PRN Shaw Tomas DO       • ziprasidone (GEODON) 10 mg in sterile water (preservative free) 0.5 mL injection  10 mg Intramuscular Once Yovani Del Rosario DO            •  acetaminophen  •  albumin human  •  dextrose  •  dextrose  •  glucagon (human recombinant)  •  hydrALAZINE  •  hydrOXYzine  •  ipratropium-albuterol  •  sodium chloride    Current medication reviewed for route, type, dose and frequency and are current per MAR.    Palliative Performance Scale Score:     BP 94/54 (BP Location: Right arm, Patient  "Position: Lying)   Pulse 72   Temp 97.6 °F (36.4 °C) (Axillary)   Resp 20   Ht 170.2 cm (67\")   Wt 69.7 kg (153 lb 11.2 oz)   SpO2 92%   BMI 24.07 kg/m²     Intake/Output Summary (Last 24 hours) at 7/5/2022 1052  Last data filed at 7/5/2022 0832  Gross per 24 hour   Intake 460 ml   Output 2600 ml   Net -2140 ml       PE:  General Appearance:    Chronically ill appearing, alert to self, cooperative, NAD   HEENT:    NC/AT, without obvious abnormality, EOMI, anicteric    Neck:   supple, trachea midline, no JVD   Lungs:     Clear and diminished in BL Bases    Heart:    RRR, normal S1 and S2, no M/R/G   Abdomen:     Soft, NT, ND, NABS    Extremities:   Moves all extremities, 2+ lower extremity edema   Pulses:   Pulses palpable and equal bilaterally   Skin:   Warm, dry   Neurologic:   Alert to self only cooperative   Psych:   Restless confused       Labs:   Results from last 7 days   Lab Units 07/05/22  0344   WBC 10*3/mm3 10.43   HEMOGLOBIN g/dL 7.8*   HEMATOCRIT % 23.6*   PLATELETS 10*3/mm3 254     Results from last 7 days   Lab Units 07/05/22  0344   SODIUM mmol/L 130*   POTASSIUM mmol/L 3.5   CHLORIDE mmol/L 91*   CO2 mmol/L 23.1   BUN mg/dL 20   CREATININE mg/dL 1.83*   GLUCOSE mg/dL 138*   CALCIUM mg/dL 8.5*     Results from last 7 days   Lab Units 07/05/22  0344   SODIUM mmol/L 130*   POTASSIUM mmol/L 3.5   CHLORIDE mmol/L 91*   CO2 mmol/L 23.1   BUN mg/dL 20   CREATININE mg/dL 1.83*   CALCIUM mg/dL 8.5*   BILIRUBIN mg/dL 0.5   ALK PHOS U/L 100   ALT (SGPT) U/L 6   AST (SGOT) U/L 13   GLUCOSE mg/dL 138*     Imaging Results (Last 72 Hours)     Procedure Component Value Units Date/Time    XR Knee 4+ View Bilateral [[id number]] Collected: 07/05/22 0821     Updated: 07/05/22 0824    Narrative:      EXAM:    XR Bilateral Knees Complete, 4 or More Views     EXAM DATE:    7/5/2022 5:51 AM     CLINICAL HISTORY:    status post fall; E16.2-Hypoglycemia, unspecified     TECHNIQUE:    Four or more views of the bilateral " knees.     COMPARISON:    No relevant prior studies available.     FINDINGS:    Bones/joints:  Advanced tricompartmental osteoarthritis is noted.   Arthritic changes most pronounced of the medial knee compartments  bilaterally.  No joint effusions identified.  No acute fracture.  No  dislocation.    Soft tissues:  Unremarkable.    Vasculature:  Dense arterial vascular calcifications are noted.       Impression:      1.  Advanced tricompartmental osteoarthritis bilateral knees.  2.  No acute osseous or articular abnormalities identified.     This report was finalized on 7/5/2022 8:22 AM by Dr. Mingo Cardenas MD.       XR Chest AP [[id number]] Collected: 07/05/22 0621     Updated: 07/05/22 0623    Narrative:      CR Chest 1 Vw    INDICATION:   Congestive heart failure. Short of air     COMPARISON:    7/2/2022    FINDINGS:  Portable AP view(s) of the chest.    Right-sided large bore central line is unchanged    Cardiac silhouette not well visualized or assessed. Shallow lung expansion with bronchovascular crowding. Bilateral partially layering pleural effusions, right greater than left with associated compressive atelectasis or pneumonia. Worsening opacities in  the perihilar right lung compared to the prior study may be partly positional. No pneumothorax.       Impression:        1. Bilateral pleural effusions with compressive atelectasis or pneumonia worse on the right than the left. Probable underlying volume overload. No pneumothorax.    Signer Name: Cristhian Ballard MD   Signed: 7/5/2022 6:21 AM   Workstation Name: [email or web address]    [facility]    CT Abdomen Pelvis Without Contrast [[id number]] Collected: 07/02/22 1757     Updated: 07/02/22 1759    Narrative:      CT Abdomen Pelvis WO    INDICATION:   Delirium, abdominal pain, COPD exacerbation    TECHNIQUE:   CT of the abdomen and pelvis without IV contrast. Coronal and sagittal reconstructions were obtained.  Radiation dose reduction techniques  included automated exposure control or exposure modulation based on body size. Radiation audit for CT and nuclear  cardiology exams in the last 12 months: 4.     COMPARISON:   None available.    FINDINGS:    See separate CT chest. No destructive osseous lesion.    Evaluation of the solid viscera is compromised by lack of IV contrast. Additionally, the patient's arms are within the gantry which produces significant artifact which further compromises evaluation of the abdomen and pelvis. Allowing for this:    Diffuse body wall edema.    Normal noncontrast appearance of the liver, pancreas, spleen and both adrenal glands. Favor the gallbladder to be surgically absent.    The kidneys are symmetric in size. Evaluation for solid renal lesion is largely precluded by lack of IV contrast. No hydronephrosis. No renal or ureteral calculus. Likely cyst at the lower pole the right kidney.    Urinary bladder is distended.    No evidence of bowel obstruction. No localizing perienteric inflammatory change.    Normal caliber of the abdominal aorta. Extensive vascular calcifications. No lymphadenopathy within the abdomen or pelvis by size criteria.      Impression:        Evaluation of the solid viscera is compromised by lack of IV contrast. Additionally, the patient's arms are within the gantry which produces significant artifact which further compromises evaluation of the abdomen and pelvis. Exam is moderately to  severely degraded. Allowing for this:    1.  Diffuse body wall edema.  2.  See separate CT chest.  3.  Extensive vascular calcifications.  4.  No definite acute process in the abdomen or pelvis by noncontrast CT.        Signer Name: ADAMS VOSS MD   Signed: 7/2/2022 5:57 PM   Workstation Name: [id number]    [facility]    CT Chest Without Contrast Diagnostic [[id number]] Collected: 07/02/22 1752     Updated: 07/02/22 1754    Narrative:      CT Chest WO    INDICATION:   Shortness of air, COPD  exacerbation    TECHNIQUE:   CT of the chest without IV contrast. Coronal and sagittal reformatted images. Radiation dose reduction techniques included automated exposure control or exposure modulation based on body size. Radiation audit for CT and nuclear cardiology exams in the  last 12 months: 4.    COMPARISON:   May 23, 2022    FINDINGS:     Large right and moderate left pleural effusions. Areas of consolidative change and nodularity seen adjacent to the pleural effusions within the dependent lungs. Some scattered reticular nodularity in the right apex. Findings favor volume overload or  multifocal pneumonia with bilateral pleural effusions.    Cardiomegaly. Severe coronary artery atherosclerosis. Evaluation of the soft tissues of the chest are otherwise compromised by lack of IV contrast. No overt lymphadenopathy.    No destructive bony lesion.    See separately dictated CT abdomen/pelvis for findings below the diaphragm.      Impression:      1.  Large right and moderate left pleural effusions. Areas of consolidative change and nodularity seen adjacent to the pleural effusions within the dependent lungs. Some scattered reticular nodularity in the right apex. Findings favor volume overload or  multifocal pneumonia with bilateral pleural effusions.  2.  Cardiomegaly.  3.  Severe coronary artery atherosclerosis.    Signer Name: ADAMS VOSS MD   Signed: 7/2/2022 5:52 PM   Workstation Name: [email or web address]    [facility]    CT Head Without Contrast [[id number]] Collected: 07/02/22 1749     Updated: 07/02/22 1751    Narrative:      CT Head WO    HISTORY:   Delirium    TECHNIQUE:   Routine noncontrast head CT. Radiation dose reduction techniques included automated exposure control or exposure modulation based on body size. Radiation audit for CT and nuclear cardiology exams in the last 12 months: 4.    COMPARISON:   June 5, 2022    FINDINGS:       No acute intracranial hemorrhage, mass lesion, or  abnormal extra-axial fluid collection. No midline shift or focal mass effect. Ventricular system is normal in size and configuration. .    The gray-white matter differentiation is preserved. There are scattered ill-defined and patchy hypoattenuating foci within the bilateral cerebral and deep white matter which are nonspecific but most commonly associated with chronic microvascular ischemic  change. These are advanced.    Visualized paranasal sinuses are clear. Visualized mastoid air cells are clear.    No acute osseous abnormality.        Impression:        1.  Presumed chronic microvascular ischemic changes, advanced. No definite superimposed acute process.    Signer Name: ADAMS VOSS MD   Signed: 7/2/2022 5:49 PM   Workstation Name: [facility]    [facility]    XR Chest 1 View [[id number]] Collected: 07/02/22 1625     Updated: 07/02/22 1627    Narrative:      CR Chest 1 Vw    INDICATION:   Pulmonary edema.     COMPARISON:    6/5/2022    FINDINGS:  Portable AP view(s) of the chest.  Central venous catheter tip remains in the SVC. Heart size is stable. There are bilateral pleural effusions, right larger than left. Right side is relatively stable, and the left appears slightly improved. There is some  vascular congestion. There is infiltrate or atelectasis in the bases, right greater than left. Sergio pulmonary edema is not definitely identified.      Impression:        1. Bilateral effusions, relatively stable on the right and improved on the left.  2. Vascular congestion without definite pulmonary edema.  3. Bibasilar infiltrate or atelectasis, right greater than left.    Signer Name: Tomas Hoffman MD   Signed: 7/2/2022 4:25 PM   Workstation Name: [facility]    [facility]          Diagnostics: Reviewed    A: Sean Chen is a 82 y.o. female admitted on 7/2/2022 with leg swelling and fatigue, she has a medical history of progressive dementia, type 2 diabetes  mellitus, ESRD on HD, essential hypertension, hyperlipidemia, rheumatoid arthritis, severe pulmonary hypertension, and  GERD. Pt sons Dylon Roman states that he gave his mother her insulin the night before her admission as directed by physician as her BS was 220. He states that his mother had been doing better with ambulating with family and therapy and that her appetite has been pretty consistent. He states that dialysis had allowed her to miss last Wednesdays dialysis session as he says her urinary output was better and she was not over loaded. Patients blood sugar upon admission was 42.           P: Palliative was consulted to discuss GOC/ACP. I was able to speak with pts  Dylon OTERO who seemed confused and deferred conversation to his son Dylon Roman. When I spoke with Dylon ROMAN regarding his goals of care for his mother he stated that he would have to think about her code status and discuss with his wife if they wanted to make her a DNR/DNI. Dylon states that he would like to bring his mother home with him at the time of her discharge and I agreed that for her safety would be the best option. He discussed that  dialysis clinic had been getting ready to do their training for peritoneal dialysis before she had to be admitted on Saturday. We did discuss hospice services in the event that after they were home he decided they wanted to go this route that they could call their local hospice agency. Plan at time of discharge is for pt to return home in the care of her son and continue pursuing PD.    We appreciate the consult and the opportunity to participate in Sean Chen's care. We will continue to follow along. Please do not hesitate to contact us regarding further symptom management or goals of care needs, including after hours or on weekends via our on call provider at [phone].     Time: 40 minutes spent reviewing medical and medication records, assessing and examining patient, discussing with  family, answering questions, providing some guidance about a plan and documentation of care, and coordinating care with other healthcare members, with > 50% time spent face to face.     Elida Brown, APRN    7/5/2022

## 2022-07-05 NOTE — PLAN OF CARE
Goal Outcome Evaluation:   Patient resting during shift. At times patient can become agitated and tries to get out of bed. Reorientation provided and PRN medications given for anxiety. Patient remains confused. Bed alarm remains on room close to the nursing station. She is refusing to wear telemetry will re attempt to place on patient later on in shift. Titrated patient oxygen to 3 L nasal cannula due to oxygen saturations staying below 90%. PRN breathing treatments given. Provider aware see orders. No distress noted plan of care reviewed with the patient.

## 2022-07-05 NOTE — PLAN OF CARE
Goal Outcome Evaluation:      Pt rested in bed today with no complaints. Pt has a sitter at bedside assisting in care. Pt has been confused today. BG has been more elevated today. Has poor appetite.BP has been lower today than yesterday. Wound care completed. Will continue to monitor and follow plan of care.

## 2022-07-06 LAB
ANION GAP SERPL CALCULATED.3IONS-SCNC: 11.7 MMOL/L (ref 5–15)
BACTERIA SPEC AEROBE CULT: ABNORMAL
BASOPHILS # BLD AUTO: 0.03 10*3/MM3 (ref 0–0.2)
BASOPHILS NFR BLD AUTO: 0.4 % (ref 0–1.5)
BUN SERPL-MCNC: 28 MG/DL (ref 8–23)
BUN/CREAT SERPL: 12.2 (ref 7–25)
CALCIUM SPEC-SCNC: 8.4 MG/DL (ref 8.6–10.5)
CHLORIDE SERPL-SCNC: 93 MMOL/L (ref 98–107)
CO2 SERPL-SCNC: 24.3 MMOL/L (ref 22–29)
CREAT SERPL-MCNC: 2.29 MG/DL (ref 0.57–1)
DEPRECATED RDW RBC AUTO: 47.9 FL (ref 37–54)
EGFRCR SERPLBLD CKD-EPI 2021: 21 ML/MIN/1.73
EOSINOPHIL # BLD AUTO: 0.14 10*3/MM3 (ref 0–0.4)
EOSINOPHIL NFR BLD AUTO: 1.7 % (ref 0.3–6.2)
ERYTHROCYTE [DISTWIDTH] IN BLOOD BY AUTOMATED COUNT: 14.5 % (ref 12.3–15.4)
GLUCOSE BLDC GLUCOMTR-MCNC: 163 MG/DL (ref 70–130)
GLUCOSE BLDC GLUCOMTR-MCNC: 190 MG/DL (ref 70–130)
GLUCOSE BLDC GLUCOMTR-MCNC: 263 MG/DL (ref 70–130)
GLUCOSE BLDC GLUCOMTR-MCNC: 263 MG/DL (ref 70–130)
GLUCOSE SERPL-MCNC: 160 MG/DL (ref 65–99)
HCT VFR BLD AUTO: 21.7 % (ref 34–46.6)
HGB BLD-MCNC: 7.3 G/DL (ref 12–15.9)
IMM GRANULOCYTES # BLD AUTO: 0.03 10*3/MM3 (ref 0–0.05)
IMM GRANULOCYTES NFR BLD AUTO: 0.4 % (ref 0–0.5)
LYMPHOCYTES # BLD AUTO: 0.75 10*3/MM3 (ref 0.7–3.1)
LYMPHOCYTES NFR BLD AUTO: 8.9 % (ref 19.6–45.3)
MCH RBC QN AUTO: 30.3 PG (ref 26.6–33)
MCHC RBC AUTO-ENTMCNC: 33.6 G/DL (ref 31.5–35.7)
MCV RBC AUTO: 90 FL (ref 79–97)
MONOCYTES # BLD AUTO: 0.63 10*3/MM3 (ref 0.1–0.9)
MONOCYTES NFR BLD AUTO: 7.5 % (ref 5–12)
NEUTROPHILS NFR BLD AUTO: 6.86 10*3/MM3 (ref 1.7–7)
NEUTROPHILS NFR BLD AUTO: 81.1 % (ref 42.7–76)
NRBC BLD AUTO-RTO: 0 /100 WBC (ref 0–0.2)
PLATELET # BLD AUTO: 211 10*3/MM3 (ref 140–450)
PMV BLD AUTO: 10.5 FL (ref 6–12)
POTASSIUM SERPL-SCNC: 3.3 MMOL/L (ref 3.5–5.2)
QT INTERVAL: 548 MS
QTC INTERVAL: 565 MS
RBC # BLD AUTO: 2.41 10*6/MM3 (ref 3.77–5.28)
SODIUM SERPL-SCNC: 129 MMOL/L (ref 136–145)
WBC NRBC COR # BLD: 8.44 10*3/MM3 (ref 3.4–10.8)

## 2022-07-06 PROCEDURE — 25010000002 ZIPRASIDONE MESYLATE PER 10 MG: Performed by: INTERNAL MEDICINE

## 2022-07-06 PROCEDURE — 85025 COMPLETE CBC W/AUTO DIFF WBC: CPT | Performed by: INTERNAL MEDICINE

## 2022-07-06 PROCEDURE — 80048 BASIC METABOLIC PNL TOTAL CA: CPT | Performed by: INTERNAL MEDICINE

## 2022-07-06 PROCEDURE — 25010000002 HEPARIN (PORCINE) PER 1000 UNITS: Performed by: STUDENT IN AN ORGANIZED HEALTH CARE EDUCATION/TRAINING PROGRAM

## 2022-07-06 PROCEDURE — 97530 THERAPEUTIC ACTIVITIES: CPT

## 2022-07-06 PROCEDURE — 99232 SBSQ HOSP IP/OBS MODERATE 35: CPT | Performed by: INTERNAL MEDICINE

## 2022-07-06 PROCEDURE — 82962 GLUCOSE BLOOD TEST: CPT

## 2022-07-06 PROCEDURE — 99222 1ST HOSP IP/OBS MODERATE 55: CPT | Performed by: INTERNAL MEDICINE

## 2022-07-06 PROCEDURE — 94761 N-INVAS EAR/PLS OXIMETRY MLT: CPT

## 2022-07-06 PROCEDURE — 25010000002 GENTAMICIN PER 80 MG: Performed by: INTERNAL MEDICINE

## 2022-07-06 PROCEDURE — 94799 UNLISTED PULMONARY SVC/PX: CPT

## 2022-07-06 RX ADMIN — AZTREONAM 500 MG: 1 INJECTION, POWDER, LYOPHILIZED, FOR SOLUTION INTRAMUSCULAR; INTRAVENOUS at 01:42

## 2022-07-06 RX ADMIN — CARVEDILOL 6.25 MG: 6.25 TABLET, FILM COATED ORAL at 18:01

## 2022-07-06 RX ADMIN — HYDROXYZINE HYDROCHLORIDE 25 MG: 25 TABLET ORAL at 20:33

## 2022-07-06 RX ADMIN — NIFEDIPINE 60 MG: 30 TABLET, EXTENDED RELEASE ORAL at 08:09

## 2022-07-06 RX ADMIN — GENTAMICIN SULFATE 110 MG: 40 INJECTION, SOLUTION INTRAMUSCULAR; INTRAVENOUS at 18:01

## 2022-07-06 RX ADMIN — Medication 10 ML: at 20:33

## 2022-07-06 RX ADMIN — RENO CAPS 1 MG: 100; 1.5; 1.7; 20; 10; 1; 150; 5; 6 CAPSULE ORAL at 08:09

## 2022-07-06 RX ADMIN — ZIPRASIDONE MESYLATE 10 MG: 20 INJECTION, POWDER, LYOPHILIZED, FOR SOLUTION INTRAMUSCULAR at 12:19

## 2022-07-06 RX ADMIN — CALCIUM ACETATE 1334 MG: 667 CAPSULE ORAL at 08:10

## 2022-07-06 RX ADMIN — ASPIRIN 81 MG: 81 TABLET, COATED ORAL at 08:10

## 2022-07-06 RX ADMIN — HYDROXYZINE HYDROCHLORIDE 25 MG: 25 TABLET ORAL at 12:00

## 2022-07-06 RX ADMIN — CARVEDILOL 6.25 MG: 6.25 TABLET, FILM COATED ORAL at 08:10

## 2022-07-06 RX ADMIN — HEPARIN SODIUM 5000 UNITS: 5000 INJECTION INTRAVENOUS; SUBCUTANEOUS at 05:35

## 2022-07-06 RX ADMIN — HEPARIN SODIUM 5000 UNITS: 5000 INJECTION INTRAVENOUS; SUBCUTANEOUS at 20:33

## 2022-07-06 RX ADMIN — CALCIUM ACETATE 1334 MG: 667 CAPSULE ORAL at 18:01

## 2022-07-06 RX ADMIN — IPRATROPIUM BROMIDE AND ALBUTEROL SULFATE 3 ML: .5; 3 SOLUTION RESPIRATORY (INHALATION) at 02:48

## 2022-07-06 RX ADMIN — ACETAMINOPHEN 650 MG: 325 TABLET ORAL at 20:33

## 2022-07-06 RX ADMIN — Medication 10 ML: at 08:10

## 2022-07-06 RX ADMIN — ATORVASTATIN CALCIUM 80 MG: 40 TABLET, FILM COATED ORAL at 20:33

## 2022-07-06 NOTE — CONSULTS
"Diabetes Education  Assessment/Teaching    Patient Name:  Sean Chen  YOB: 1940  MRN: [id number]  Admit Date:  7/2/2022      Assessment Date:  7/6/2022  Flowsheet Row Most Recent Value   General Information     Height 170.2 cm (67\")   Height Method Estimated   Weight 72.1 kg (159 lb)   Weight Method Bed scale   Pregnancy Assessment    Diabetes History    Education Preferences    Nutrition Information    Assessment Topics    DM Goals           Flowsheet Row Most Recent Value   DM Education Needs    Meter Has own   Frequency of Testing 2 times a day   Problem Solving Hypoglycemia, Hyperglycemia, Sick days, Signs, Symptoms, Treatment, Other (comment)  [with family]   Reducing Risks A1C testing, Neuropathy, Retinopathy, Blood pressure, Lipids, Eye exam, Dental exam, Foot care, Immunizations, Cardiovascular, Other (comment)  [with family]   Healthy Eating Basic meal plan provided   Physical Activity Walking   Physical Activity Frequency Occasionally   Healthy Coping Other (comment)  [with family]   Discharge Plan Home, Follow-up with PCP   Motivation Other (comment)  [with family]   Teaching Method Handouts, Explanation, Discussion   Patient Response Verbalized understanding            Other Comments:  A1C 5.5 Patient did not wear a mask. Patient is confused so son who helps take care of her was callrd and received education along with ADA and AADE7 sheets left in patients room for them. The son Dylon Chen stated that him and his wife helps provide care for her and they will be checking her blood glucose help with meals, activity, checking feet daily and watching for S/S of hypo/hyperglycemia. He stated he had no questions or concerns at this time. If any questions please re-consult or call. Thank you.         Electronically signed by:  Karen Roque RN  07/06/22 13:55 EDT  "

## 2022-07-06 NOTE — PROGRESS NOTES
Nephrology Progress Note      Subjective     Mild shortness of breath.   Objective       Vital signs :     Temp:  [97.1 °F (36.2 °C)-98.1 °F (36.7 °C)] 98.1 °F (36.7 °C)  Heart Rate:  [63-86] 70  Resp:  [16-22] 20  BP: (104-159)/(50-73) 104/50      Intake/Output Summary (Last 24 hours) at 7/6/2022 1201  Last data filed at 7/6/2022 0803  Gross per 24 hour   Intake 1076 ml   Output --   Net 1076 ml       Physical Exam:    General Appearance : not in acute distress  Lungs : b/l lower zone crackles  Heart :  regular rhythm & normal rate, normal S1, S2 and no murmur, no rub  Abdomen : normal bowel sounds, no masses, no hepatomegaly, no splenomegaly, soft non-tender and no guarding  Extremities : 2+ edema, no cyanosis and no redness  Neurologic :   orientated to person, place, time and situation, Grossly no focal deficits        Laboratory Data :     Albumin Albumin   Date Value Ref Range Status   07/05/2022 3.56 3.50 - 5.20 g/dL Final   07/04/2022 2.88 (L) 3.50 - 5.20 g/dL Final      Magnesium No results found for: MG       PTH               No results found for: PTH    CBC and coagulation:  Results from last 7 days   Lab Units 07/06/22  0416 07/05/22  0802 07/05/22  0344 07/04/22  0113 07/02/22  2339 07/02/22  1626   PROCALCITONIN ng/mL  --  0.24  --   --   --  0.16   LACTATE mmol/L  --   --   --   --   --  0.7   SED RATE mm/hr  --   --   --   --   --  40*   CRP mg/dL  --   --   --   --   --  1.96*   WBC 10*3/mm3 8.44  --  10.43 8.33   < > 7.05   HEMOGLOBIN g/dL 7.3*  --  7.8* 8.7*   < > 11.0*   HEMATOCRIT % 21.7*  --  23.6* 26.7*   < > 32.7*   MCV fL 90.0  --  90.4 92.1   < > 89.3   MCHC g/dL 33.6  --  33.1 32.6   < > 33.6   PLATELETS 10*3/mm3 211  --  254 249   < > 259   INR   --   --   --   --   --  0.93    < > = values in this interval not displayed.     Acid/base balance:  Results from last 7 days   Lab Units 07/02/22  1851   PH, ARTERIAL pH units 7.448   PO2 ART mm Hg 76.8*   PCO2, ARTERIAL mm Hg 39.5   HCO3 ART  mmol/L 27.3*     Renal and electrolytes:  Results from last 7 days   Lab Units 07/06/22  0415 07/05/22  0344 07/04/22  0113 07/02/22 2339 07/02/22  1626   SODIUM mmol/L 129* 130* 128* 133* 132*   POTASSIUM mmol/L 3.3* 3.5 4.0 3.9 3.5   CHLORIDE mmol/L 93* 91* 93* 97* 94*   CO2 mmol/L 24.3 23.1 22.6 24.0 24.3   BUN mg/dL 28* 20 31* 24* 21   CREATININE mg/dL 2.29* 1.83* 2.74* 2.52* 2.49*   CALCIUM mg/dL 8.4* 8.5* 8.1* 7.7* 8.5*     Estimated Creatinine Clearance: 21.9 mL/min (A) (by C-G formula based on SCr of 2.29 mg/dL (H)).    Liver and pancreatic function:  Results from last 7 days   Lab Units 07/05/22  0344 07/04/22  0113 07/02/22  2339 07/02/22  1626   ALBUMIN g/dL 3.56 2.88* 2.71* 3.17*   BILIRUBIN mg/dL 0.5 0.4 0.4 0.4   ALK PHOS U/L 100 105 94 113   AST (SGOT) U/L 13 13 14 16   ALT (SGPT) U/L 6 7 <5 7   AMMONIA umol/L  --   --   --  <10*         Cardiac:  Results from last 7 days   Lab Units 07/02/22  1626   PROBNP pg/mL >70,000.0*     Liver and pancreatic function:  Results from last 7 days   Lab Units 07/05/22  0344 07/04/22  0113 07/02/22 2339 07/02/22  1626   ALBUMIN g/dL 3.56 2.88* 2.71* 3.17*   BILIRUBIN mg/dL 0.5 0.4 0.4 0.4   ALK PHOS U/L 100 105 94 113   AST (SGOT) U/L 13 13 14 16   ALT (SGPT) U/L 6 7 <5 7   AMMONIA umol/L  --   --   --  <10*       Medications :     aspirin, 81 mg, Oral, Daily  atorvastatin, 80 mg, Oral, Nightly  aztreonam, 500 mg, Intravenous, Q12H  calcium acetate, 1,334 mg, Oral, TID With Meals  carvedilol, 6.25 mg, Oral, BID With Meals  heparin (porcine), 5,000 Units, Subcutaneous, Q8H  NIFEdipine CC, 60 mg, Oral, Q24H  Renal, 1 capsule, Oral, Daily  sodium chloride, 10 mL, Intravenous, Q12H  ziprasidone (GEODON) injection with sterile water, 10 mg, Intramuscular, Once             Assessment & Plan     1.  End-stage renal disease on dialysis  2.  Noncompliance with dialysis  3.  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  Diabetes to poor control  5.  Anemia     Planned  for UF 4L today, and can be discharged from [facility], after dialysis  Will follow up in clinic      Jude Muñoz MD  07/06/22  12:01 EDT

## 2022-07-06 NOTE — THERAPY TREATMENT NOTE
Patient Name: Sean Chen  : 1940    MRN: [id number]                              Today's Date: 2022       Admit Date: 2022    Visit Dx:     ICD-10-CM ICD-9-CM   1. Hypoglycemia  E16.2 251.2     Patient Active Problem List   Diagnosis   • Type 2 diabetes mellitus without complication, without long-term current use of insulin (HCC)   • Vitamin D deficiency disease   • Extrinsic asthma   • Essential hypertension   • Mixed hyperlipidemia   • Generalized osteoarthritis   • Gastroesophageal reflux disease without esophagitis   • Meniere's disease   • Chronic seasonal allergic rhinitis   • Low serum vitamin B12   • Dementia without behavioral disturbance ([facility])   • NSTEMI (non-ST elevated myocardial infarction) ([facility])   • Prolonged Q-T interval on ECG   • Chronic renal failure, stage 4 (severe) ([facility])   • Encounter for immunization   • Hypoglycemia     Past Medical History:   Diagnosis Date   • Allergic rhinitis    • Elevated liver enzymes    • Extrinsic asthma    • Gastritis    • GERD (gastroesophageal reflux disease)    • Hyperlipidemia    • Hypertension    • Iron deficiency anemia    • Meniere's disease    • Osteoarthritis    • Type 2 diabetes mellitus (HCC)    • Vitamin D deficiency disease      Past Surgical History:   Procedure Laterality Date   • APPENDECTOMY     • CARDIAC CATHETERIZATION N/A 2022    Procedure: Left Heart Cath;  Surgeon: Marcelino Grande MD;  Location: [facility] CATH INVASIVE LOCATION;  Service: Cardiology;  Laterality: N/A;   • CHOLECYSTECTOMY     • HYSTERECTOMY     • INSERTION HEMODIALYSIS CATHETER N/A 2022    Procedure: HEMODIALYSIS CATHETER INSERTION;  Surgeon: Hans Coates MD;  Location: [facility] OR;  Service: General;  Laterality: N/A;      General Information     Row Name 22 1253          OT Time and Intention    Document Type therapy note (daily note)  -     Mode of Treatment individual therapy;occupational therapy  -     Row Name 22 1253           General Information    Patient Profile Reviewed yes  -KP     Existing Precautions/Restrictions fall  -KP     Row Name 07/06/22 1253          Cognition    Orientation Status (Cognition) oriented to;person  -KP     Row Name 07/06/22 1253          Safety Issues, Functional Mobility    Impairments Affecting Function (Mobility) balance;cognition;endurance/activity tolerance;strength  -KP           User Key  (r) = Recorded By, (t) = Taken By, (c) = Cosigned By    Initials Name Provider Type    Becky Espinal OT Occupational Therapist                 Mobility/ADL's     Row Name 07/06/22 1254          Transfers    Transfers stand-sit transfer;sit-stand transfer  -KP     Comment, (Transfers) X3 from edge of bed with min assist X2 persons; pt restless in bed with improved presentation status post  -KP     Sit-Stand [place] (Transfers) minimum assist (75% patient effort);2 person assist  -KP     Stand-Sit [place] (Transfers) minimum assist (75% patient effort);2 person assist  -KP           User Key  (r) = Recorded By, (t) = Taken By, (c) = Cosigned By    Initials Name Provider Type    Becky Espinal OT Occupational Therapist               Obj/Interventions     Row Name 07/06/22 1257          Motor Skills    Motor Skills functional endurance  -KP     Functional Endurance fair  -KP     Row Name 07/06/22 1257          Balance    Balance Assessment standing static balance  -KP     Static Standing Balance moderate assist  -KP           User Key  (r) = Recorded By, (t) = Taken By, (c) = Cosigned By    Initials Name Provider Type    Becky Espinal OT Occupational Therapist               Goals/Plan    No documentation.                Clinical Impression     Row Name 07/06/22 1257          Pain Assessment    Pretreatment Pain Rating 1/10  -KP     Posttreatment Pain Rating 1/10  -KP     Pain Location - Side/Orientation Bilateral  -KP     Pain Location - foot  -KP     Row Name 07/06/22 1257          Plan of Care  Review    Plan of Care Reviewed With patient  -     Progress no change  -     Outcome Evaluation Patient restless upon arrival and wanting out of bed but pleasant. Safety sitter present. Tolerated sit to stands from edge of bed X3 trails with minimal assist X2 persons. Continue plan of care.  -     Row Name 07/06/22 1257          Positioning and Restraints    Pre-Treatment Position in bed  -KP     Post Treatment Position bed  -KP     In Bed with nsg  -KP           User Key  (r) = Recorded By, (t) = Taken By, (c) = Cosigned By    Initials Name Provider Type    Becky Espinal, OT Occupational Therapist               Outcome Measures    No documentation.                 Occupational Therapy Education                 Title: PT OT SLP Therapies (In Progress)     Topic: Occupational Therapy (In Progress)     Point: ADL training (Done)     Description:   Instruct learner(s) on proper safety adaptation and remediation techniques during self care or transfers.   Instruct in proper use of assistive devices.              Learning Progress Summary           Patient Acceptance, E, VU,NR by  at 7/5/2022 1103    Comment: OT role, plan of care, goals                   Point: Home exercise program (Not Started)     Description:   Instruct learner(s) on appropriate technique for monitoring, assisting and/or progressing therapeutic exercises/activities.              Learner Progress:  Not documented in this visit.          Point: Precautions (Not Started)     Description:   Instruct learner(s) on prescribed precautions during self-care and functional transfers.              Learner Progress:  Not documented in this visit.          Point: Body mechanics (Not Started)     Description:   Instruct learner(s) on proper positioning and spine alignment during self-care, functional mobility activities and/or exercises.              Learner Progress:  Not documented in this visit.                      User Key     Initials Effective  Dates Name Provider Type Discipline     06/16/21 -  Becky Laughlin OT Occupational Therapist OT              OT Recommendation and Plan  Planned Therapy Interventions (OT): activity tolerance training, BADL retraining, functional balance retraining, occupation/activity based interventions, ROM/therapeutic exercise, patient/caregiver education/training, strengthening exercise, transfer/mobility retraining  Therapy Frequency (OT): 5 times/wk (3-5x/week as available)  Plan of Care Review  Plan of Care Reviewed With: patient  Progress: no change  Outcome Evaluation: Patient restless upon arrival and wanting out of bed but pleasant. Safety sitter present. Tolerated sit to stands from edge of bed X3 trails with minimal assist X2 persons. Continue plan of care.     Time Calculation:    Time Calculation- OT     Row Name 07/06/22 1301             Time Calculation- OT    OT Received On 07/06/22  -              Timed Charges    05239 - OT Therapeutic Activity Minutes 14  -KP              Total Minutes    Timed Charges Total Minutes 14  -KP       Total Minutes 14  -KP            User Key  (r) = Recorded By, (t) = Taken By, (c) = Cosigned By    Initials Name Provider Type     Becky Laughlin OT Occupational Therapist              Therapy Charges for Today     Code Description Service Date Service Provider Modifiers Qty    43135733339  OT EVAL MOD COMPLEXITY 4 7/5/2022 Becky Laughlin OT GO 1    [id number]  OT THERAPEUTIC ACT EA 15 MIN 7/6/2022 Becky Laughlin OT GO 1               Becky Laughlin OT  7/6/2022

## 2022-07-06 NOTE — PROGRESS NOTES
"Palliative Care Daily Progress Note     S: Medical record reviewed, followed up with Primary RN Rehana and Dr Del Rosario regarding patient's condition. When palliative entered the room the pt was resting in bed with sitter at bedside, she was alert to person andappeared to be more tired this am. According to Dr Del Rosario she had a episode of oxygen desaturation this am. Pt said she was having pain in her legs, tight feeling. She denied any other symptoms other than being tired.      O:   Palliative Performance Scale Score:     /50 (BP Location: Right arm, Patient Position: Lying)   Pulse 70   Temp 98.1 °F (36.7 °C) (Oral)   Resp 20   Ht 170.2 cm (67\")   Wt 72.1 kg (159 lb)   SpO2 91%   BMI 24.90 kg/m²     Intake/Output Summary (Last 24 hours) at 7/6/2022 1142  Last data filed at 7/6/2022 0803  Gross per 24 hour   Intake 1076 ml   Output --   Net 1076 ml       PE:  General Appearance:    Chronically ill appearing, alert to self, cooperative, NAD   HEENT:    NC/AT, without obvious abnormality, EOMI, anicteric    Neck:   supple, trachea midline, no JVD   Lungs:     Clear and diminished in BL Bases    Heart:    RRR, normal S1 and S2, no M/R/G   Abdomen:     Soft, NT, ND, NABS    Extremities:   Moves all extremities, 2+ lower extremity edema/wrapped in Kerlex/ACE   Pulses:   Pulses palpable and equal bilaterally   Skin:   Warm, dry   Neurologic:   Alert to self only cooperative   Psych:   Calm, confused               Meds: Reviewed and changes noted.    Labs:   Results from last 7 days   Lab Units 07/06/22  0416   WBC 10*3/mm3 8.44   HEMOGLOBIN g/dL 7.3*   HEMATOCRIT % 21.7*   PLATELETS 10*3/mm3 211     Results from last 7 days   Lab Units 07/06/22  0415   SODIUM mmol/L 129*   POTASSIUM mmol/L 3.3*   CHLORIDE mmol/L 93*   CO2 mmol/L 24.3   BUN mg/dL 28*   CREATININE mg/dL 2.29*   GLUCOSE mg/dL 160*   CALCIUM mg/dL 8.4*     Results from last 7 days   Lab Units 07/06/22 0415 07/05/22  0344   SODIUM mmol/L 129* 130* "   POTASSIUM mmol/L 3.3* 3.5   CHLORIDE mmol/L 93* 91*   CO2 mmol/L 24.3 23.1   BUN mg/dL 28* 20   CREATININE mg/dL 2.29* 1.83*   CALCIUM mg/dL 8.4* 8.5*   BILIRUBIN mg/dL  --  0.5   ALK PHOS U/L  --  100   ALT (SGPT) U/L  --  6   AST (SGOT) U/L  --  13   GLUCOSE mg/dL 160* 138*     Imaging Results (Last 72 Hours)     Procedure Component Value Units Date/Time    XR Knee 4+ View Bilateral [[id number]] Collected: 07/05/22 0821     Updated: 07/05/22 0824    Narrative:      EXAM:    XR Bilateral Knees Complete, 4 or More Views     EXAM DATE:    7/5/2022 5:51 AM     CLINICAL HISTORY:    status post fall; E16.2-Hypoglycemia, unspecified     TECHNIQUE:    Four or more views of the bilateral knees.     COMPARISON:    No relevant prior studies available.     FINDINGS:    Bones/joints:  Advanced tricompartmental osteoarthritis is noted.   Arthritic changes most pronounced of the medial knee compartments  bilaterally.  No joint effusions identified.  No acute fracture.  No  dislocation.    Soft tissues:  Unremarkable.    Vasculature:  Dense arterial vascular calcifications are noted.       Impression:      1.  Advanced tricompartmental osteoarthritis bilateral knees.  2.  No acute osseous or articular abnormalities identified.     This report was finalized on 7/5/2022 8:22 AM by Dr. Mingo Cardenas MD.       XR Chest AP [[id number]] Collected: 07/05/22 0621     Updated: 07/05/22 0623    Narrative:      CR Chest 1 Vw    INDICATION:   Congestive heart failure. Short of air     COMPARISON:    7/2/2022    FINDINGS:  Portable AP view(s) of the chest.    Right-sided large bore central line is unchanged    Cardiac silhouette not well visualized or assessed. Shallow lung expansion with bronchovascular crowding. Bilateral partially layering pleural effusions, right greater than left with associated compressive atelectasis or pneumonia. Worsening opacities in  the perihilar right lung compared to the prior study may be partly positional. No  pneumothorax.       Impression:        1. Bilateral pleural effusions with compressive atelectasis or pneumonia worse on the right than the left. Probable underlying volume overload. No pneumothorax.    Signer Name: Cristhian Ballard MD   Signed: 7/5/2022 6:21 AM   Workstation Name: [id number]    [facility]            Diagnostics: Reviewed    A: Sean Chen is a 82 y.o. female admitted on 7/2/2022 with leg swelling and fatigue, she has a medical history of progressive dementia, type 2 diabetes mellitus, ESRD on HD, essential hypertension, hyperlipidemia, rheumatoid arthritis, severe pulmonary hypertension, and  GERD. Pt sons Dylon Roman states that he gave his mother her insulin the night before her admission as directed by physician as her BS was 220. He states that his mother had been doing better with ambulating with family and therapy and that her appetite has been pretty consistent. He states that dialysis had allowed her to miss last Wednesdays dialysis session as he says her urinary output was better and she was not over loaded. Patients blood sugar upon admission was 42.      P:  I was able to update and provide support to pts daughter in law Mix. I let her know that Sean had an episode of low oxygen saturations this am and plan for dialysis today. I spoke with Maria Del Rosario regarding patients safety and the discussion to move Sean into their home. Maria Del Rosario asked my opinion if they needed to move her in, and I told her that I felt the pt would need someone 24 hours a day and that it would be in Sean's best interest to be moved in with them. I also reiterated if a time came when they felt they needed Hospice resources that they could always call themselves and self refer. Maria Del Rosario stated she would give Dylon ROMAN the message and if he had questions he would call.    We will continue to follow along. Please do not hesitate to contact us regarding further sx mgmt or GOC needs, including after  hours or on weekends via our on call provider at [phone].     Elida Brown, APRN    7/6/2022

## 2022-07-06 NOTE — CASE MANAGEMENT/SOCIAL WORK
Discharge Planning Assessment   Mina     Patient Name: Sean Chen  MRN: [id number]  Today's Date: 7/6/2022    Admit Date: 7/2/2022           Discharge Plan     Row Name 07/06/22 1539       Plan    Plan Pt was admitted on 07/02/22. SS spoke with Pt's son on this date.  Pt lives with her spouse, Dylon. Pt's son states he and Pt's family are working on providing 24/7 shifts at home. Pt attends [facility] Mon, Wed, and Friday at 10:30am. Pt utilizes a bedside commode, hospital bed, home oxygen at 2 liters, portable O2 tanks, and wheelchair via [facility]. SS to follow.              Kavitha Gatica

## 2022-07-06 NOTE — CONSULTS
INFECTIOUS DISEASE CONSULTATION REPORT        Patient Identification:  Name:  Sean Chen  Age:  82 y.o.  Sex:  female  :  1940  MRN:  [id number]   Visit Number:  [id number]  Primary Care Physician:  Ganga Gallardo MD       LOS: 4 days        Subjective       Subjective     History of present illness:      Thank you Dr. Del Rosario for allowing us to participate in the care of your patient.  As you well know, Ms. Sean Chen is a 82 y.o. female with past medical history significant for dementia, type 2 diabetes mellitus, ESRD on HD, hypertension, hyperlipidemia, RA, severe pulmonary hypertension, and GERD, who presented to [facility] Emergency Department on 2022 for bilateral leg edema and fatigue.    The patient is on 3 L nasal cannula, which is stable.  CNA is at bedside.  Patient has underlying dementia and is not able to provide a review of systems.  Reports that she does not feel well today, but is not able to specify any particular complaints.  Nurse remove dressings from bilateral lower extremities and they are very dry skin with chemical dermatitis and sloughing.  Clear to auscultation bilaterally.  Afebrile, no diarrhea.  WBC on 2022 is normal.  Lactic acid on admission was normal.  Urinalysis on 2022 was positive with 13-20 WBCs and 2+ bacteria.  No urine culture was performed.  Repeat urinalysis on 2022 was positive with 6-12 WBCs and 3+ bacteria.  Urine culture finalized as 50,000 colonies of CRE Enterobacter cloacae complex.  Bilateral knee x-rays on 2022 showed advanced tricompartmental osteoarthritis of bilateral knees.  No acute osseous or articular abnormalities identified.  Chest x-ray on 2022 showed bilateral pleural effusions with compressive atelectasis or pneumonia worse on the right than the left.  Probable underlying volume overload.  No pneumothorax.  CT abdomen pelvis on 2022 showed diffuse body wall edema.  Extensive  vascular calcifications.  No definite acute process in the abdomen or pelvis by noncontrast CT.  CT chest on 7/2/2022 showed large right and moderate left pleural effusions.  Areas of consolidative change and nodularity seen adjacent to the pleural effusions within the dependent lungs.  Some scattered reticular nodularity in the right apex.  Findings favor volume overload or multifocal pneumonia with bilateral pleural effusions.  Cardiomegaly.  Severe coronary artery atherosclerosis.  Chest x-ray on 7/2/2022 showed bilateral effusions, relatively stable on the right and improved on the left.  Vascular congestion without definite pulmonary edema.  Bibasilar infiltrate or atelectasis, right greater than left.  COVID-19 and flu A/B PCR on 7/2/2022 was negative.  Blood cultures on 7/2/2022 are so far showing no growth.    Infectious Disease consultation was requested for antimicrobial management.      ---------------------------------------------------------------------------------------------------------------------     Review Of Systems:    Unable to obtain.  Underlying dementia.    ---------------------------------------------------------------------------------------------------------------------     Past Medical History    Past Medical History:   Diagnosis Date   • Allergic rhinitis    • Elevated liver enzymes    • Extrinsic asthma    • Gastritis    • GERD (gastroesophageal reflux disease)    • Hyperlipidemia    • Hypertension    • Iron deficiency anemia    • Meniere's disease    • Osteoarthritis    • Type 2 diabetes mellitus (HCC)    • Vitamin D deficiency disease        Past Surgical History    Past Surgical History:   Procedure Laterality Date   • APPENDECTOMY     • CARDIAC CATHETERIZATION N/A 5/11/2022    Procedure: Left Heart Cath;  Surgeon: Marcelino Grande MD;  Location: [facility] CATH INVASIVE LOCATION;  Service: Cardiology;  Laterality: N/A;   • CHOLECYSTECTOMY     • HYSTERECTOMY     • INSERTION HEMODIALYSIS  CATHETER N/A 5/9/2022    Procedure: HEMODIALYSIS CATHETER INSERTION;  Surgeon: Hans Coates MD;  Location: [facility] OR;  Service: General;  Laterality: N/A;       Family History    History reviewed. No pertinent family history.    Social History    Social History     Tobacco Use   • Smoking status: Never Smoker   • Smokeless tobacco: Never Used   Substance Use Topics   • Alcohol use: No   • Drug use: No       Allergies    Keflex [cephalexin], Lodine [etodolac], Penicillins, and Sulfa antibiotics  ---------------------------------------------------------------------------------------------------------------------     Home Medications:    Prior to Admission Medications     Prescriptions Last Dose Informant Patient Reported? Taking?    albuterol sulfate HFA (ProAir HFA) 108 (90 Base) MCG/ACT inhaler 7/1/2022 Pharmacy No Yes    Inhale 2 puffs 4 (Four) Times a Day.    aspirin 81 MG EC tablet 7/1/2022  No Yes    Take 1 tablet by mouth Daily.    atorvastatin (LIPITOR) 80 MG tablet 7/1/2022 Pharmacy No Yes    Take 1 tablet by mouth Every Night.    calcium acetate (PHOS BINDER,) 667 MG capsule capsule 7/1/2022  No Yes    Take 2 capsules by mouth 3 (Three) Times a Day With Meals.    carvedilol (COREG) 6.25 MG tablet 7/1/2022  No Yes    Take 1 tablet by mouth 2 (Two) Times a Day With Meals.    folic acid (FOLVITE) 1 MG tablet 7/1/2022  No Yes    Take 1 tablet by mouth Daily.    insulin glargine (LANTUS, SEMGLEE) 100 UNIT/ML injection 7/1/2022  Yes Yes    Inject 20 Units under the skin into the appropriate area as directed 2 (Two) Times a Day As Needed (BS).    NIFEdipine CC (ADALAT CC) 30 MG 24 hr tablet 7/1/2022  No Yes    Take 2 tablets by mouth Daily.        ---------------------------------------------------------------------------------------------------------------------    Objective       Objective     Hospital Scheduled Meds:  aspirin, 81 mg, Oral, Daily  atorvastatin, 80 mg, Oral, Nightly  aztreonam, 500 mg,  Intravenous, Q12H  calcium acetate, 1,334 mg, Oral, TID With Meals  carvedilol, 6.25 mg, Oral, BID With Meals  heparin (porcine), 5,000 Units, Subcutaneous, Q8H  NIFEdipine CC, 60 mg, Oral, Q24H  Renal, 1 capsule, Oral, Daily  sodium chloride, 10 mL, Intravenous, Q12H  ziprasidone (GEODON) injection with sterile water, 10 mg, Intramuscular, Once         ---------------------------------------------------------------------------------------------------------------------   Vital Signs:  Temp:  [97.1 °F (36.2 °C)-98.1 °F (36.7 °C)] 98.1 °F (36.7 °C)  Heart Rate:  [63-86] 70  Resp:  [16-22] 20  BP: (104-159)/(50-73) 104/50  Mean Arterial Pressure (Non-Invasive) for the past 24 hrs (Last 3 readings):   Noninvasive MAP (mmHg)   07/06/22 1036 78   07/06/22 0645 124     SpO2 Percentage    07/06/22 0645 07/06/22 0650 07/06/22 1036   SpO2: 93% 94% 91%     SpO2:  [89 %-98 %] 91 %  on  Flow (L/min):  [2-3] 3;   Device (Oxygen Therapy): nasal cannula    Body mass index is 24.9 kg/m².  Wt Readings from Last 3 Encounters:   07/06/22 72.1 kg (159 lb)   05/31/22 57 kg (125 lb 9.6 oz)   05/13/22 67.1 kg (147 lb 14.4 oz)     ---------------------------------------------------------------------------------------------------------------------     Physical Exam:    Constitutional: Elderly  female is resting comfortably in bed in no apparent distress.  Confused, with dementia at baseline.  CNA is at bedside.      HENT:  Head: Normocephalic and atraumatic.  Mouth:  Moist mucous membranes.    Eyes:  Conjunctivae and EOM are normal.  No scleral icterus.  Neck:  Neck supple.  No JVD present.    Cardiovascular:  Normal rate, regular rhythm and normal heart sounds with no murmur. No edema.  Pulmonary/Chest:  No respiratory distress, no wheezes, no crackles, with normal breath sounds and good air movement.  Clear to auscultation bilaterally.  Abdominal:  Soft.  Bowel sounds are normal.  No distension and no tenderness.    Musculoskeletal:  No edema, no tenderness, and no deformity.  No swelling or redness of joints.  Neurological:  Alert and oriented to person.  No facial droop.  No slurred speech.   Skin: Very dry bilateral lower extremities with chemical dermatitis and sloughing.  Psychiatric:  Normal mood and affect.  Behavior is normal.    ---------------------------------------------------------------------------------------------------------------------    Results from last 7 days   Lab Units 07/02/22  2339 07/02/22  1846 07/02/22  1626   CK TOTAL U/L  --   --  41   TROPONIN T ng/mL 0.066* 0.055* 0.061*     Results from last 7 days   Lab Units 07/02/22  1626   PROBNP pg/mL >70,000.0*       Results from last 7 days   Lab Units 07/02/22  1851   PH, ARTERIAL pH units 7.448   PO2 ART mm Hg 76.8*   PCO2, ARTERIAL mm Hg 39.5   HCO3 ART mmol/L 27.3*     Results from last 7 days   Lab Units 07/06/22  0416 07/05/22  0344 07/04/22  0113 07/02/22  2339 07/02/22  1626   CRP mg/dL  --   --   --   --  1.96*   LACTATE mmol/L  --   --   --   --  0.7   WBC 10*3/mm3 8.44 10.43 8.33   < > 7.05   HEMOGLOBIN g/dL 7.3* 7.8* 8.7*   < > 11.0*   HEMATOCRIT % 21.7* 23.6* 26.7*   < > 32.7*   MCV fL 90.0 90.4 92.1   < > 89.3   MCHC g/dL 33.6 33.1 32.6   < > 33.6   PLATELETS 10*3/mm3 211 254 249   < > 259   INR   --   --   --   --  0.93    < > = values in this interval not displayed.     Results from last 7 days   Lab Units 07/06/22  0415 07/05/22  0344 07/04/22  0113 07/02/22  2189   SODIUM mmol/L 129* 130* 128* 133*   POTASSIUM mmol/L 3.3* 3.5 4.0 3.9   CHLORIDE mmol/L 93* 91* 93* 97*   CO2 mmol/L 24.3 23.1 22.6 24.0   BUN mg/dL 28* 20 31* 24*   CREATININE mg/dL 2.29* 1.83* 2.74* 2.52*   CALCIUM mg/dL 8.4* 8.5* 8.1* 7.7*   GLUCOSE mg/dL 160* 138* 136* 81   ALBUMIN g/dL  --  3.56 2.88* 2.71*   BILIRUBIN mg/dL  --  0.5 0.4 0.4   ALK PHOS U/L  --  100 105 94   AST (SGOT) U/L  --  13 13 14   ALT (SGPT) U/L  --  6 7 <5   Estimated Creatinine Clearance: 21.9  mL/min (A) (by C-G formula based on SCr of 2.29 mg/dL (H)).  No results found for: AMMONIA    Glucose   Date/Time Value Ref Range Status   07/06/2022 1038 263 (H) 70 - 130 mg/dL Final     Comment:     Meter: [id number] : [id number] TY PARSONS   07/06/2022 0723 190 (H) 70 - 130 mg/dL Final     Comment:     Meter: [id number] : [id number] TY PARSONS   07/05/2022 1819 188 (H) 70 - 130 mg/dL Final     Comment:     Meter: [id number] : [id number] KATHI PARSONS   07/05/2022 1625 173 (H) 70 - 130 mg/dL Final     Comment:     Meter: [id number] : [id number] SHANA NEUMANN   07/05/2022 1035 230 (H) 70 - 130 mg/dL Final     Comment:     Meter: [id number] : [id number] SHANA NEL   07/05/2022 0629 146 (H) 70 - 130 mg/dL Final     Comment:     Meter: [id number] : [id number] SHANA NEUMANN   07/04/2022 2320 125 70 - 130 mg/dL Final     Comment:     Meter: [id number] : [id number] Morgan Collett   07/04/2022 1840 176 (H) 70 - 130 mg/dL Final     Comment:     Meter: [id number] : [id number] WILMAN EDUARD     Lab Results   Component Value Date    HGBA1C 5.50 07/02/2022     Lab Results   Component Value Date    TSH 1.390 07/02/2022    FREET4 1.54 07/02/2022       Blood Culture   Date Value Ref Range Status   07/02/2022 No growth at 3 days  Preliminary   07/02/2022 No growth at 3 days  Preliminary     Urine Culture   Date Value Ref Range Status   07/02/2022 50,000 CFU/mL Enterobacter cloacae complex CRE (A)  Final     Comment:     Consider infectious disease consult.  Susceptibility results may not correlate to clinical outcomes.  Carbapenem resistant Enterobacteriaceae, patient may be an isolation risk.  No carbapenemase detected.     No results found for: WOUNDCX  No results found for: STOOLCX  No results found for: RESPCX  Pain Management Panel     Pain Management Panel Latest Ref Rng & Units 7/2/2022 5/23/2022    CREATININE UR mg/dL - -    AMPHETAMINES SCREEN, URINE Negative Negative  Negative    BARBITURATES SCREEN Negative Negative Negative    BENZODIAZEPINE SCREEN, URINE Negative Negative Negative    BUPRENORPHINEUR Negative Negative Negative    COCAINE SCREEN, URINE Negative Negative Negative    METHADONE SCREEN, URINE Negative Negative Negative    METHAMPHETAMINEUR Negative Negative Negative        I have personally reviewed the above laboratory results.   ---------------------------------------------------------------------------------------------------------------------  Imaging Results (Last 7 Days)     Procedure Component Value Units Date/Time    XR Knee 4+ View Bilateral [[id number]] Collected: 07/05/22 0821     Updated: 07/05/22 0824    Narrative:      EXAM:    XR Bilateral Knees Complete, 4 or More Views     EXAM DATE:    7/5/2022 5:51 AM     CLINICAL HISTORY:    status post fall; E16.2-Hypoglycemia, unspecified     TECHNIQUE:    Four or more views of the bilateral knees.     COMPARISON:    No relevant prior studies available.     FINDINGS:    Bones/joints:  Advanced tricompartmental osteoarthritis is noted.   Arthritic changes most pronounced of the medial knee compartments  bilaterally.  No joint effusions identified.  No acute fracture.  No  dislocation.    Soft tissues:  Unremarkable.    Vasculature:  Dense arterial vascular calcifications are noted.       Impression:      1.  Advanced tricompartmental osteoarthritis bilateral knees.  2.  No acute osseous or articular abnormalities identified.     This report was finalized on 7/5/2022 8:22 AM by Dr. Mingo Cardenas MD.       XR Chest AP [[id number]] Collected: 07/05/22 0621     Updated: 07/05/22 0623    Narrative:      CR Chest 1 Vw    INDICATION:   Congestive heart failure. Short of air     COMPARISON:    7/2/2022    FINDINGS:  Portable AP view(s) of the chest.    Right-sided large bore central line is unchanged    Cardiac silhouette not well visualized or assessed. Shallow lung expansion with bronchovascular crowding. Bilateral  partially layering pleural effusions, right greater than left with associated compressive atelectasis or pneumonia. Worsening opacities in  the perihilar right lung compared to the prior study may be partly positional. No pneumothorax.       Impression:        1. Bilateral pleural effusions with compressive atelectasis or pneumonia worse on the right than the left. Probable underlying volume overload. No pneumothorax.    Signer Name: Cristhian Ballard MD   Signed: 7/5/2022 6:21 AM   Workstation Name: [email or web address]    [facility]    CT Abdomen Pelvis Without Contrast [[id number]] Collected: 07/02/22 1757     Updated: 07/02/22 1759    Narrative:      CT Abdomen Pelvis WO    INDICATION:   Delirium, abdominal pain, COPD exacerbation    TECHNIQUE:   CT of the abdomen and pelvis without IV contrast. Coronal and sagittal reconstructions were obtained.  Radiation dose reduction techniques included automated exposure control or exposure modulation based on body size. Radiation audit for CT and nuclear  cardiology exams in the last 12 months: 4.     COMPARISON:   None available.    FINDINGS:    See separate CT chest. No destructive osseous lesion.    Evaluation of the solid viscera is compromised by lack of IV contrast. Additionally, the patient's arms are within the gantry which produces significant artifact which further compromises evaluation of the abdomen and pelvis. Allowing for this:    Diffuse body wall edema.    Normal noncontrast appearance of the liver, pancreas, spleen and both adrenal glands. Favor the gallbladder to be surgically absent.    The kidneys are symmetric in size. Evaluation for solid renal lesion is largely precluded by lack of IV contrast. No hydronephrosis. No renal or ureteral calculus. Likely cyst at the lower pole the right kidney.    Urinary bladder is distended.    No evidence of bowel obstruction. No localizing perienteric inflammatory change.    Normal caliber of the  abdominal aorta. Extensive vascular calcifications. No lymphadenopathy within the abdomen or pelvis by size criteria.      Impression:        Evaluation of the solid viscera is compromised by lack of IV contrast. Additionally, the patient's arms are within the gantry which produces significant artifact which further compromises evaluation of the abdomen and pelvis. Exam is moderately to  severely degraded. Allowing for this:    1.  Diffuse body wall edema.  2.  See separate CT chest.  3.  Extensive vascular calcifications.  4.  No definite acute process in the abdomen or pelvis by noncontrast CT.        Signer Name: ADAMS VOSS MD   Signed: 7/2/2022 5:57 PM   Workstation Name: [email or web address]    [facility]    CT Chest Without Contrast Diagnostic [[id number]] Collected: 07/02/22 1752     Updated: 07/02/22 1754    Narrative:      CT Chest WO    INDICATION:   Shortness of air, COPD exacerbation    TECHNIQUE:   CT of the chest without IV contrast. Coronal and sagittal reformatted images. Radiation dose reduction techniques included automated exposure control or exposure modulation based on body size. Radiation audit for CT and nuclear cardiology exams in the  last 12 months: 4.    COMPARISON:   May 23, 2022    FINDINGS:     Large right and moderate left pleural effusions. Areas of consolidative change and nodularity seen adjacent to the pleural effusions within the dependent lungs. Some scattered reticular nodularity in the right apex. Findings favor volume overload or  multifocal pneumonia with bilateral pleural effusions.    Cardiomegaly. Severe coronary artery atherosclerosis. Evaluation of the soft tissues of the chest are otherwise compromised by lack of IV contrast. No overt lymphadenopathy.    No destructive bony lesion.    See separately dictated CT abdomen/pelvis for findings below the diaphragm.      Impression:      1.  Large right and moderate left pleural effusions. Areas of  consolidative change and nodularity seen adjacent to the pleural effusions within the dependent lungs. Some scattered reticular nodularity in the right apex. Findings favor volume overload or  multifocal pneumonia with bilateral pleural effusions.  2.  Cardiomegaly.  3.  Severe coronary artery atherosclerosis.    Signer Name: ADAMS VOSS MD   Signed: 7/2/2022 5:52 PM   Workstation [facility]: [facility]    [facility]    CT Head Without Contrast [[id number]] Collected: 07/02/22 1749     Updated: 07/02/22 1751    Narrative:      CT Head WO    HISTORY:   Delirium    TECHNIQUE:   Routine noncontrast head CT. Radiation dose reduction techniques included automated exposure control or exposure modulation based on body size. Radiation audit for CT and nuclear cardiology exams in the last 12 months: 4.    COMPARISON:   June 5, 2022    FINDINGS:       No acute intracranial hemorrhage, mass lesion, or abnormal extra-axial fluid collection. No midline shift or focal mass effect. Ventricular system is normal in size and configuration. .    The gray-white matter differentiation is preserved. There are scattered ill-defined and patchy hypoattenuating foci within the bilateral cerebral and deep white matter which are nonspecific but most commonly associated with chronic microvascular ischemic  change. These are advanced.    Visualized paranasal sinuses are clear. Visualized mastoid air cells are clear.    No acute osseous abnormality.        Impression:        1.  Presumed chronic microvascular ischemic changes, advanced. No definite superimposed acute process.    Signer Name: ADAMS VOSS MD   Signed: 7/2/2022 5:49 PM   Workstation Name: [facility]    [facility]    XR Chest 1 View [[id number]] Collected: 07/02/22 1625     Updated: 07/02/22 1627    Narrative:      CR Chest 1 Vw    INDICATION:   Pulmonary edema.     COMPARISON:    6/5/2022    FINDINGS:  Portable AP view(s) of the chest.   Central venous catheter tip remains in the SVC. Heart size is stable. There are bilateral pleural effusions, right larger than left. Right side is relatively stable, and the left appears slightly improved. There is some  vascular congestion. There is infiltrate or atelectasis in the bases, right greater than left. Sergio pulmonary edema is not definitely identified.      Impression:        1. Bilateral effusions, relatively stable on the right and improved on the left.  2. Vascular congestion without definite pulmonary edema.  3. Bibasilar infiltrate or atelectasis, right greater than left.    Signer Name: Tomas Hoffman MD   Signed: 7/2/2022 4:25 PM   Workstation Name: [facility]    [facility]        I have personally reviewed the above radiology results.   ---------------------------------------------------------------------------------------------------------------------      Assessment & Plan      ASSESSMENT:    UTI  Multidrug-resistant organism with CRE  End-stage renal disease on hemodialysis      PLAN:    The patient presented to [facility] Emergency Department on 7/2/2022 for bilateral leg edema and fatigue.    The patient is on 3 L nasal cannula, which is stable.  CNA is at bedside.  Patient has underlying dementia and is not able to provide a review of systems.  Reports that she does not feel well today, but is not able to specify any particular complaints.  Nurse remove dressings from bilateral lower extremities and they are very dry skin with chemical dermatitis and sloughing.  Clear to auscultation bilaterally.  Afebrile, no diarrhea.  WBC on 7/6/2022 is normal.  Lactic acid on admission was normal.  Urinalysis on 7/2/2022 was positive with 13-20 WBCs and 2+ bacteria.  No urine culture was performed.  Repeat urinalysis on 7/2/2022 was positive with 6-12 WBCs and 3+ bacteria.  Urine culture finalized as 50,000 colonies of CRE Enterobacter cloacae complex.  Bilateral  knee x-rays on 7/5/2022 showed advanced tricompartmental osteoarthritis of bilateral knees.  No acute osseous or articular abnormalities identified.  Chest x-ray on 7/5/2022 showed bilateral pleural effusions with compressive atelectasis or pneumonia worse on the right than the left.  Probable underlying volume overload.  No pneumothorax.  CT abdomen pelvis on 7/2/2022 showed diffuse body wall edema.  Extensive vascular calcifications.  No definite acute process in the abdomen or pelvis by noncontrast CT.  CT chest on 7/2/2022 showed large right and moderate left pleural effusions.  Areas of consolidative change and nodularity seen adjacent to the pleural effusions within the dependent lungs.  Some scattered reticular nodularity in the right apex.  Findings favor volume overload or multifocal pneumonia with bilateral pleural effusions.  Cardiomegaly.  Severe coronary artery atherosclerosis.  Chest x-ray on 7/2/2022 showed bilateral effusions, relatively stable on the right and improved on the left.  Vascular congestion without definite pulmonary edema.  Bibasilar infiltrate or atelectasis, right greater than left.  COVID-19 and flu A/B PCR on 7/2/2022 was negative.  Blood cultures on 7/2/2022 are so far showing no growth.    At this point the patient is showing no evidence suggestive of clinical sepsis and seems to be very stable which I would consider this to be either a colonization or a uncomplicated UTI.  Would benefit from just observation off coverage or a 3-day course of gentamicin which would equal to 2 doses after dialysis.  At this point I do not see any reason to initiate Zerbaxa or Avycaz unless her clinical condition deteriorates.    I believe her lower extremity condition to be related to chronic venous stasis, contact dermatitis with Unna boot and skin sloughing due to very dry skin.  Would recommend to apply Vaseline or petroleum jelly and allowed to dry to air.        Again, thank you Dr. Del Rosario  for allowing us to participate in the care of your patient and please feel free to call for any questions you may have.    Code Status:   Code Status and Medical Interventions:   Ordered at: 07/02/22 1854     Code Status (Patient has no pulse and is not breathing):    CPR (Attempt to Resuscitate)     Medical Interventions (Patient has pulse or is breathing):    Full Support     JIE Luevano  07/06/22  11:12 EDT    Electronically signed by JIE Luevano, 07/06/22, 11:54 AM EDT.  Electronically signed by Melissa Merino MD, 07/06/22, 12:01 PM EDT.

## 2022-07-06 NOTE — PROGRESS NOTES
[facility] HOSPITALIST PROGRESS NOTE    Subjective     History:   Sean Chen is a 82 y.o. female admitted on 7/2/2022 secondary to <principal problem not specified>     Procedures: None    CC: Follow up hypoglycemia     Patient seen and examined with sitter present at bedside. Awake and alert but confused. Unable to provide reliable history but reports dyspnea with episodes of smothering. Episodes of hypoxia overnight. Episodes of agitation this afternoon.     History taken from: chart, and RN.      Objective     Vital Signs  Temp:  [97.3 °F (36.3 °C)-98.1 °F (36.7 °C)] 98.1 °F (36.7 °C)  Heart Rate:  [63-86] 70  Resp:  [16-22] 20  BP: (104-159)/(50-73) 104/50    Intake/Output Summary (Last 24 hours) at 7/6/2022 1646  Last data filed at 7/6/2022 0803  Gross per 24 hour   Intake 756 ml   Output --   Net 756 ml         Physical Exam:  General:    Awake, alert but confused, in no acute distress, chronically ill appearing   Heart:      Normal S1 and S2. Regular rate and rhythm. No significant murmur, rubs or gallops appreciated.   Lungs:     Respirations regular, even and unlabored. Diminished breath sounds at bases. No wheezes, rales or rhonchi.   Abdomen:   Soft and nontender. No guarding, rebound tenderness or  organomegaly noted. Bowel sounds present x 4.   Extremities:  Trace bilateral lower extremity edema.      Results Review:    Results from last 7 days   Lab Units 07/06/22 0416 07/05/22 0344 07/04/22 0113 07/02/22 2339 07/02/22  1626   WBC 10*3/mm3 8.44 10.43 8.33 10.18 7.05   HEMOGLOBIN g/dL 7.3* 7.8* 8.7* 9.9* 11.0*   PLATELETS 10*3/mm3 211 254 249 241 259     Results from last 7 days   Lab Units 07/06/22  0415 07/05/22 0344 07/04/22  0113 07/02/22  2339 07/02/22  1626   SODIUM mmol/L 129* 130* 128* 133* 132*   POTASSIUM mmol/L 3.3* 3.5 4.0 3.9 3.5   CHLORIDE mmol/L 93* 91* 93* 97* 94*   CO2 mmol/L 24.3 23.1 22.6 24.0 24.3   BUN mg/dL 28* 20 31* 24* 21   CREATININE mg/dL 2.29* 1.83*  2.74* 2.52* 2.49*   CALCIUM mg/dL 8.4* 8.5* 8.1* 7.7* 8.5*   GLUCOSE mg/dL 160* 138* 136* 81 42*     Results from last 7 days   Lab Units 07/05/22  0344 07/04/22  0113 07/02/22  2339 07/02/22  1626   BILIRUBIN mg/dL 0.5 0.4 0.4 0.4   ALK PHOS U/L 100 105 94 113   AST (SGOT) U/L 13 13 14 16   ALT (SGPT) U/L 6 7 <5 7         Results from last 7 days   Lab Units 07/02/22  1626   INR  0.93     Results from last 7 days   Lab Units 07/02/22  2339 07/02/22  1846 07/02/22  1626   CK TOTAL U/L  --   --  41   TROPONIN T ng/mL 0.066* 0.055* 0.061*       Imaging Results (Last 24 Hours)     ** No results found for the last 24 hours. **            Medications:  aspirin, 81 mg, Oral, Daily  atorvastatin, 80 mg, Oral, Nightly  calcium acetate, 1,334 mg, Oral, TID With Meals  carvedilol, 6.25 mg, Oral, BID With Meals  gentamicin, 1.7 mg/kg (Adjusted), Intravenous, Once  heparin (porcine), 5,000 Units, Subcutaneous, Q8H  NIFEdipine CC, 60 mg, Oral, Q24H  Renal, 1 capsule, Oral, Daily  sodium chloride, 10 mL, Intravenous, Q12H      Pharmacy Consult - Pharmacy to dose,             Assessment & Plan   DM II insulin dependent with hypoglycemia upon admission: HgbA1c 5.5. Home insulin likely contributed with decreased PO intake. Holding home insulin regimen. BG stable. Will likely need reduced regimen at discharge. Cont to monitor.      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 Previously on Azactam empirically. ID consulted in the setting of CRE with concern for UTI vs colonization. They have ordered a short course of gentamicin with HD. ID input appreciated.      Acute metabolic encephalopathy superimposed on baseline dementia: Likely 2/2 above. CT head reveals presumed chronic microvascular ischemic changes with no acute findings. Remains confused but more alert compared to upon admission. PO intake poor and diet liberalized. Intermittent agitation noted. Geodon ordered today. Cont treatment as  above and supportive treatment.      Acute on chronic combined systolic and diastolic CHF: Echo in 5/22 reveraled an EF of 36-40%. Non-adherance to fluid restriction and intermittent HD sessions possibly contributing. Imaging consistent with CHF. ReDS vest reading remains elevated s/p HD. HD per nephrology. Monitor volume status.     Acute hypoxic respiratory failure: Likely 2/2 volume overload/CHF. Cont HD per nephrology. Wean supplemental O2 as tolerated.     Chronically elevated troponin's: Likely 2/2 ESRD. Trend overall flat and improved from previous. Cont ASA and statin. Monitor on telemetry.     ESRD on HD: Cont HD per nephrology recs with input appreciated.     Essential HTN: BP intermittently elevated but has fluctuated. Cont Coreg and Nifedipine for now. Cont to monitor.     Hyperlipidemia: Cont statin.     Generalized weakness: PT/OT.    DVT PPX: SQ heparin    Goals of Care: Palliative care following to assist with ongoing GOC discussions and to provide additional support with input appreciated.     Discussed with Dr. Muñoz and palliative care APRN.     Disposition: Home with family once medically stable.    Yovani Del Rosario DO  07/06/22  16:46 EDT

## 2022-07-06 NOTE — PLAN OF CARE
Goal Outcome Evaluation:              Outcome Evaluation: Pt very agitated today. Gave geodon. Pt had dialysis today. Sitting in chair . Sitter at bedside.Will continue to monitor and follow plan of care.

## 2022-07-06 NOTE — PLAN OF CARE
Goal Outcome Evaluation:      Patient has been confused tonight, sitter remains at bedside. No complaints of chest pain but some shortness of breath has been reported. Oxygen saturation was approximately 85%, titrated patient up to 3 liters of oxygen by nasal cannula and respiratory provided a breathing treatment, saturation came up to approximately 94%. Other vital signs remain stable.

## 2022-07-06 NOTE — PROGRESS NOTES
Pharmacy was consulted for gentamicin dosing for CRE UTI x 2 doses after HD.  PT is receiving HD twice weekly, with HD today.  Will give a 1.7mg/kg (110mg) loading dose today x 1. Pharmacy will continue to follow. Thank you.

## 2022-07-07 ENCOUNTER — APPOINTMENT (OUTPATIENT)
Dept: GENERAL RADIOLOGY | Facility: HOSPITAL | Age: 82
End: 2022-07-07

## 2022-07-07 ENCOUNTER — APPOINTMENT (OUTPATIENT)
Dept: CT IMAGING | Facility: HOSPITAL | Age: 82
End: 2022-07-07

## 2022-07-07 LAB
A-A DO2: 199.5 MMHG (ref 0–300)
A-A DO2: 440.1 MMHG (ref 0–300)
A-A DO2: 9.3 MMHG (ref 0–300)
ALBUMIN SERPL-MCNC: 3.44 G/DL (ref 3.5–5.2)
ALBUMIN/GLOB SERPL: 1.3 G/DL
ALP SERPL-CCNC: 123 U/L (ref 39–117)
ALT SERPL W P-5'-P-CCNC: 9 U/L (ref 1–33)
ANION GAP SERPL CALCULATED.3IONS-SCNC: 12.6 MMOL/L (ref 5–15)
ARTERIAL PATENCY WRIST A: ABNORMAL
AST SERPL-CCNC: 16 U/L (ref 1–32)
ATMOSPHERIC PRESS: 726 MMHG
ATMOSPHERIC PRESS: 727 MMHG
ATMOSPHERIC PRESS: 727 MMHG
BACTERIA SPEC AEROBE CULT: NORMAL
BACTERIA SPEC AEROBE CULT: NORMAL
BASE EXCESS BLDA CALC-SCNC: 4 MMOL/L (ref 0–2)
BASE EXCESS BLDA CALC-SCNC: 4.3 MMOL/L (ref 0–2)
BASE EXCESS BLDA CALC-SCNC: 5.1 MMOL/L (ref 0–2)
BASOPHILS # BLD AUTO: 0.05 10*3/MM3 (ref 0–0.2)
BASOPHILS NFR BLD AUTO: 0.6 % (ref 0–1.5)
BDY SITE: ABNORMAL
BILIRUB SERPL-MCNC: 0.6 MG/DL (ref 0–1.2)
BODY TEMPERATURE: 0 C
BUN SERPL-MCNC: 19 MG/DL (ref 8–23)
BUN/CREAT SERPL: 10.9 (ref 7–25)
CALCIUM SPEC-SCNC: 8.4 MG/DL (ref 8.6–10.5)
CHLORIDE SERPL-SCNC: 95 MMOL/L (ref 98–107)
CO2 BLDA-SCNC: 30.7 MMOL/L (ref 22–33)
CO2 BLDA-SCNC: 30.7 MMOL/L (ref 22–33)
CO2 BLDA-SCNC: 31.3 MMOL/L (ref 22–33)
CO2 SERPL-SCNC: 25.4 MMOL/L (ref 22–29)
COHGB MFR BLD: 1.6 % (ref 0–5)
COHGB MFR BLD: 1.6 % (ref 0–5)
COHGB MFR BLD: 1.9 % (ref 0–5)
CREAT SERPL-MCNC: 1.74 MG/DL (ref 0.57–1)
DEPRECATED RDW RBC AUTO: 49.6 FL (ref 37–54)
EGFRCR SERPLBLD CKD-EPI 2021: 29.2 ML/MIN/1.73
EOSINOPHIL # BLD AUTO: 0.27 10*3/MM3 (ref 0–0.4)
EOSINOPHIL NFR BLD AUTO: 3 % (ref 0.3–6.2)
ERYTHROCYTE [DISTWIDTH] IN BLOOD BY AUTOMATED COUNT: 14.6 % (ref 12.3–15.4)
FLUAV SUBTYP SPEC NAA+PROBE: NOT DETECTED
FLUBV RNA ISLT QL NAA+PROBE: NOT DETECTED
GAS FLOW AIRWAY: 15 LPM
GAS FLOW AIRWAY: 15 LPM
GAS FLOW AIRWAY: 6 LPM
GLOBULIN UR ELPH-MCNC: 2.7 GM/DL
GLUCOSE BLDC GLUCOMTR-MCNC: 171 MG/DL (ref 70–130)
GLUCOSE BLDC GLUCOMTR-MCNC: 176 MG/DL (ref 70–130)
GLUCOSE BLDC GLUCOMTR-MCNC: 187 MG/DL (ref 70–130)
GLUCOSE BLDC GLUCOMTR-MCNC: 196 MG/DL (ref 70–130)
GLUCOSE SERPL-MCNC: 183 MG/DL (ref 65–99)
HCO3 BLDA-SCNC: 29.3 MMOL/L (ref 20–26)
HCO3 BLDA-SCNC: 29.3 MMOL/L (ref 20–26)
HCO3 BLDA-SCNC: 29.9 MMOL/L (ref 20–26)
HCT VFR BLD AUTO: 24.7 % (ref 34–46.6)
HCT VFR BLD CALC: 24.3 % (ref 38–51)
HCT VFR BLD CALC: 24.8 % (ref 38–51)
HCT VFR BLD CALC: 25.5 % (ref 38–51)
HGB BLD-MCNC: 8.1 G/DL (ref 12–15.9)
HGB BLDA-MCNC: 7.9 G/DL (ref 13.5–17.5)
HGB BLDA-MCNC: 8.1 G/DL (ref 13.5–17.5)
HGB BLDA-MCNC: 8.3 G/DL (ref 13.5–17.5)
IMM GRANULOCYTES # BLD AUTO: 0.04 10*3/MM3 (ref 0–0.05)
IMM GRANULOCYTES NFR BLD AUTO: 0.4 % (ref 0–0.5)
INHALED O2 CONCENTRATION: 44 %
INHALED O2 CONCENTRATION: 80 %
INHALED O2 CONCENTRATION: 80 %
LYMPHOCYTES # BLD AUTO: 0.82 10*3/MM3 (ref 0.7–3.1)
LYMPHOCYTES NFR BLD AUTO: 9.1 % (ref 19.6–45.3)
Lab: ABNORMAL
MAGNESIUM SERPL-MCNC: 1.6 MG/DL (ref 1.6–2.4)
MCH RBC QN AUTO: 30.2 PG (ref 26.6–33)
MCHC RBC AUTO-ENTMCNC: 32.8 G/DL (ref 31.5–35.7)
MCV RBC AUTO: 92.2 FL (ref 79–97)
METHGB BLD QL: 0.1 % (ref 0–3)
METHGB BLD QL: 0.2 % (ref 0–3)
METHGB BLD QL: 0.2 % (ref 0–3)
MODALITY: ABNORMAL
MONOCYTES # BLD AUTO: 0.56 10*3/MM3 (ref 0.1–0.9)
MONOCYTES NFR BLD AUTO: 6.2 % (ref 5–12)
NEUTROPHILS NFR BLD AUTO: 7.28 10*3/MM3 (ref 1.7–7)
NEUTROPHILS NFR BLD AUTO: 80.7 % (ref 42.7–76)
NOTE: ABNORMAL
NOTIFIED BY: ABNORMAL
NOTIFIED BY: ABNORMAL
NOTIFIED WHO: ABNORMAL
NOTIFIED WHO: ABNORMAL
NRBC BLD AUTO-RTO: 0 /100 WBC (ref 0–0.2)
OXYHGB MFR BLDV: 82.3 % (ref 94–99)
OXYHGB MFR BLDV: 88.8 % (ref 94–99)
OXYHGB MFR BLDV: 94.5 % (ref 94–99)
PCO2 BLDA: 44.8 MM HG (ref 35–45)
PCO2 BLDA: 45.6 MM HG (ref 35–45)
PCO2 BLDA: 47.3 MM HG (ref 35–45)
PCO2 TEMP ADJ BLD: ABNORMAL MM[HG]
PH BLDA: 7.4 PH UNITS (ref 7.35–7.45)
PH BLDA: 7.42 PH UNITS (ref 7.35–7.45)
PH BLDA: 7.43 PH UNITS (ref 7.35–7.45)
PH, TEMP CORRECTED: ABNORMAL
PLATELET # BLD AUTO: 220 10*3/MM3 (ref 140–450)
PMV BLD AUTO: 10.9 FL (ref 6–12)
PO2 BLDA: 49.5 MM HG (ref 83–108)
PO2 BLDA: 57.5 MM HG (ref 83–108)
PO2 BLDA: 80 MM HG (ref 83–108)
PO2 TEMP ADJ BLD: ABNORMAL MM[HG]
POTASSIUM SERPL-SCNC: 3.7 MMOL/L (ref 3.5–5.2)
PROT SERPL-MCNC: 6.1 G/DL (ref 6–8.5)
RBC # BLD AUTO: 2.68 10*6/MM3 (ref 3.77–5.28)
SAO2 % BLDCOA: 84 % (ref 94–99)
SAO2 % BLDCOA: 90.4 % (ref 94–99)
SAO2 % BLDCOA: 96.2 % (ref 94–99)
SARS-COV-2 RNA PNL SPEC NAA+PROBE: NOT DETECTED
SODIUM SERPL-SCNC: 133 MMOL/L (ref 136–145)
VENTILATOR MODE: ABNORMAL
WBC NRBC COR # BLD: 9.02 10*3/MM3 (ref 3.4–10.8)

## 2022-07-07 PROCEDURE — 87636 SARSCOV2 & INF A&B AMP PRB: CPT | Performed by: INTERNAL MEDICINE

## 2022-07-07 PROCEDURE — 80053 COMPREHEN METABOLIC PANEL: CPT | Performed by: INTERNAL MEDICINE

## 2022-07-07 PROCEDURE — 83050 HGB METHEMOGLOBIN QUAN: CPT

## 2022-07-07 PROCEDURE — 36600 WITHDRAWAL OF ARTERIAL BLOOD: CPT

## 2022-07-07 PROCEDURE — 94799 UNLISTED PULMONARY SVC/PX: CPT

## 2022-07-07 PROCEDURE — 82375 ASSAY CARBOXYHB QUANT: CPT

## 2022-07-07 PROCEDURE — 94761 N-INVAS EAR/PLS OXIMETRY MLT: CPT

## 2022-07-07 PROCEDURE — 71045 X-RAY EXAM CHEST 1 VIEW: CPT

## 2022-07-07 PROCEDURE — 99233 SBSQ HOSP IP/OBS HIGH 50: CPT | Performed by: INTERNAL MEDICINE

## 2022-07-07 PROCEDURE — 83735 ASSAY OF MAGNESIUM: CPT | Performed by: INTERNAL MEDICINE

## 2022-07-07 PROCEDURE — 99232 SBSQ HOSP IP/OBS MODERATE 35: CPT | Performed by: INTERNAL MEDICINE

## 2022-07-07 PROCEDURE — 25010000002 HEPARIN (PORCINE) PER 1000 UNITS: Performed by: STUDENT IN AN ORGANIZED HEALTH CARE EDUCATION/TRAINING PROGRAM

## 2022-07-07 PROCEDURE — 82962 GLUCOSE BLOOD TEST: CPT

## 2022-07-07 PROCEDURE — 71250 CT THORAX DX C-: CPT

## 2022-07-07 PROCEDURE — 25010000002 METHYLPREDNISOLONE PER 125 MG: Performed by: INTERNAL MEDICINE

## 2022-07-07 PROCEDURE — 71250 CT THORAX DX C-: CPT | Performed by: RADIOLOGY

## 2022-07-07 PROCEDURE — 82805 BLOOD GASES W/O2 SATURATION: CPT

## 2022-07-07 PROCEDURE — 85025 COMPLETE CBC W/AUTO DIFF WBC: CPT | Performed by: INTERNAL MEDICINE

## 2022-07-07 RX ORDER — IPRATROPIUM BROMIDE AND ALBUTEROL SULFATE 2.5; .5 MG/3ML; MG/3ML
3 SOLUTION RESPIRATORY (INHALATION)
Status: DISCONTINUED | OUTPATIENT
Start: 2022-07-07 | End: 2022-07-08

## 2022-07-07 RX ORDER — METHYLPREDNISOLONE SODIUM SUCCINATE 125 MG/2ML
125 INJECTION, POWDER, LYOPHILIZED, FOR SOLUTION INTRAMUSCULAR; INTRAVENOUS ONCE
Status: COMPLETED | OUTPATIENT
Start: 2022-07-07 | End: 2022-07-07

## 2022-07-07 RX ADMIN — IPRATROPIUM BROMIDE AND ALBUTEROL SULFATE 3 ML: .5; 3 SOLUTION RESPIRATORY (INHALATION) at 04:34

## 2022-07-07 RX ADMIN — CARVEDILOL 6.25 MG: 6.25 TABLET, FILM COATED ORAL at 12:41

## 2022-07-07 RX ADMIN — ATORVASTATIN CALCIUM 80 MG: 40 TABLET, FILM COATED ORAL at 20:48

## 2022-07-07 RX ADMIN — CALCIUM ACETATE 1334 MG: 667 CAPSULE ORAL at 18:09

## 2022-07-07 RX ADMIN — HEPARIN SODIUM 5000 UNITS: 5000 INJECTION INTRAVENOUS; SUBCUTANEOUS at 06:22

## 2022-07-07 RX ADMIN — NIFEDIPINE 60 MG: 30 TABLET, EXTENDED RELEASE ORAL at 10:30

## 2022-07-07 RX ADMIN — HEPARIN SODIUM 5000 UNITS: 5000 INJECTION INTRAVENOUS; SUBCUTANEOUS at 15:16

## 2022-07-07 RX ADMIN — METHYLPREDNISOLONE SODIUM SUCCINATE 125 MG: 125 INJECTION, POWDER, FOR SOLUTION INTRAMUSCULAR; INTRAVENOUS at 18:10

## 2022-07-07 RX ADMIN — IPRATROPIUM BROMIDE AND ALBUTEROL SULFATE 3 ML: .5; 3 SOLUTION RESPIRATORY (INHALATION) at 07:01

## 2022-07-07 RX ADMIN — CARVEDILOL 6.25 MG: 6.25 TABLET, FILM COATED ORAL at 18:09

## 2022-07-07 RX ADMIN — ASPIRIN 81 MG: 81 TABLET, COATED ORAL at 10:30

## 2022-07-07 RX ADMIN — RENO CAPS 1 MG: 100; 1.5; 1.7; 20; 10; 1; 150; 5; 6 CAPSULE ORAL at 10:30

## 2022-07-07 RX ADMIN — HEPARIN SODIUM 5000 UNITS: 5000 INJECTION INTRAVENOUS; SUBCUTANEOUS at 20:48

## 2022-07-07 RX ADMIN — IPRATROPIUM BROMIDE AND ALBUTEROL SULFATE 3 ML: .5; 3 SOLUTION RESPIRATORY (INHALATION) at 19:08

## 2022-07-07 RX ADMIN — Medication 10 ML: at 10:45

## 2022-07-07 RX ADMIN — CALCIUM ACETATE 1334 MG: 667 CAPSULE ORAL at 10:30

## 2022-07-07 NOTE — PROGRESS NOTES
"Palliative Care Daily Progress Note     S: Medical record reviewed, followed up with Primary RN Margoth and Dr Del Rosario regarding patient's condition. Dr Del Rosario stated that pt had further episode of hypoxia and is requiring high concentration of O2 and a CT of chest, CXR and repeat COVID test. When palliative saw pt she appeared more pale and weak this morning and was on 15 L NC. She turned to look at me when I spoke her name and did not answer any of my questions. She had slight tachypnea at that time. Sitter at bedside.      O:   Palliative Performance Scale Score:     /60   Pulse 71   Temp 97.5 °F (36.4 °C) (Oral)   Resp 20   Ht 170.2 cm (67\")   Wt 66 kg (145 lb 6.4 oz)   SpO2 95%   BMI 22.77 kg/m²     Intake/Output Summary (Last 24 hours) at 7/7/2022 1131  Last data filed at 7/7/2022 0800  Gross per 24 hour   Intake 440 ml   Output 4500 ml   Net -4060 ml       PE:  General Appearance:    Chronically ill appearing, alert to self, appears more pale and fatigued today, mild distress   HEENT:    NC/AT, without obvious abnormality, EOMI, anicteric    Neck:   supple, trachea midline, no JVD   Lungs:     Diminished throughout, slight tachypnea requiring 15 L bubble hi flow, mild distress    Heart:    RRR, normal S1 and S2, no M/R/G   Abdomen:     Soft, NT, ND, NABS    Extremities:   Moves all extremities, 2+ lower extremity edema/wrapped in Kerlex/ACE   Pulses:   Pulses palpable and equal bilaterally   Skin:   Pale, Warm, dry   Neurologic:   Alert to self only, confused   Psych:   Calm, confused               Meds: Reviewed and changes noted.    Labs:   Results from last 7 days   Lab Units 07/07/22  0527   WBC 10*3/mm3 9.02   HEMOGLOBIN g/dL 8.1*   HEMATOCRIT % 24.7*   PLATELETS 10*3/mm3 220     Results from last 7 days   Lab Units 07/07/22  0527   SODIUM mmol/L 133*   POTASSIUM mmol/L 3.7   CHLORIDE mmol/L 95*   CO2 mmol/L 25.4   BUN mg/dL 19   CREATININE mg/dL 1.74*   GLUCOSE mg/dL 183*   CALCIUM mg/dL " 8.4*     Results from last 7 days   Lab Units 07/07/22  0527   SODIUM mmol/L 133*   POTASSIUM mmol/L 3.7   CHLORIDE mmol/L 95*   CO2 mmol/L 25.4   BUN mg/dL 19   CREATININE mg/dL 1.74*   CALCIUM mg/dL 8.4*   BILIRUBIN mg/dL 0.6   ALK PHOS U/L 123*   ALT (SGPT) U/L 9   AST (SGOT) U/L 16   GLUCOSE mg/dL 183*     Imaging Results (Last 72 Hours)     Procedure Component Value Units Date/Time    CT Chest Without Contrast Diagnostic [[id number]] Collected: 07/07/22 0929     Updated: 07/07/22 0945    Narrative:      EXAM:    CT Chest Without Intravenous Contrast     EXAM DATE:    7/7/2022 9:20 AM     CLINICAL HISTORY:    Worsening hypoxia; E16.2-Hypoglycemia, unspecified     TECHNIQUE:    Axial computed tomography images of the chest without intravenous  contrast.  Sagittal and coronal reformatted images were created and  reviewed.  This CT exam was performed using one or more of the following  dose reduction techniques:  automated exposure control, adjustment of  the mA and/or kV according to patient size, and/or use of iterative  reconstruction technique.     COMPARISON:    07/02/2022     FINDINGS:    Lungs:  Complete atelectasis of the bilateral lower lobes noted with  superimposed pneumonia.  Evolving groundglass airspace disease  throughout both lungs compatible with evolving airspace edema.  Partial  consolidation of the right middle lobe is slightly improved.  Partial  consolidation of the lingula has developed since previous exam.   Increase in the degree of interlobular septal thickening.    Pleural space:  Unremarkable.  No pneumothorax.  No significant  effusion.    Heart:  Marked cardiomegaly.  Severe coronary artery calcifications.   No pericardial effusion is noted.    Mediastinum:  No bulky mediastinal or hilar adenopathy.    Bones/joints:  No acute bony changes are identified.  No dislocation.    Soft tissues:  Anasarca.    Vasculature:  Atherosclerosis is stable.  No thoracic aortic aneurysm.    Lymph nodes:   See above.    Tubes, lines and devices:  A right hemodialysis catheter terminates at  the cavoatrial junction.       Impression:      1.  Worsening changes of CHF now with evolving airspace edema.  2.  Slight increase in moderate to large right greater than left pleural  effusions.  3.  Increase consolidation and volume loss of the bilateral lower lobes.  4.  Otherwise stable chest CT with other nonacute findings as above.     This report was finalized on 7/7/2022 9:43 AM by Dr. Mingo Cardenas MD.       XR Chest 1 View [[id number]] Collected: 07/07/22 0721     Updated: 07/07/22 0723    Narrative:      CR Chest 1 Vw    INDICATION:   Hypoxia     COMPARISON:    Chest x-ray 7/5/2022    FINDINGS:  Portable AP view(s) of the chest.    No change double-lumen catheter. Lung volumes are low. Cardiac silhouette is mostly obscured by pulmonary parenchymal pathology. There is continued central vascular congestion and interstitial edema with bilateral infiltrates mid to lower lungs and  bilateral pleural effusions.    Likely mild improvement in volume status. No pneumothorax.       Impression:      Likely overall mild improvement in volume status since 7/5/2022.    Signer Name: Maegan Mcgee MD   Signed: 7/7/2022 7:21 AM   Workstation Name: [facility]    [facility]    XR Knee 4+ View Bilateral [[id number]] Collected: 07/05/22 0821     Updated: 07/05/22 0824    Narrative:      EXAM:    XR Bilateral Knees Complete, 4 or More Views     EXAM DATE:    7/5/2022 5:51 AM     CLINICAL HISTORY:    status post fall; E16.2-Hypoglycemia, unspecified     TECHNIQUE:    Four or more views of the bilateral knees.     COMPARISON:    No relevant prior studies available.     FINDINGS:    Bones/joints:  Advanced tricompartmental osteoarthritis is noted.   Arthritic changes most pronounced of the medial knee compartments  bilaterally.  No joint effusions identified.  No acute fracture.  No  dislocation.    Soft tissues:   Unremarkable.    Vasculature:  Dense arterial vascular calcifications are noted.       Impression:      1.  Advanced tricompartmental osteoarthritis bilateral knees.  2.  No acute osseous or articular abnormalities identified.     This report was finalized on 7/5/2022 8:22 AM by Dr. Mingo Cardenas MD.       XR Chest AP [[id number]] Collected: 07/05/22 0621     Updated: 07/05/22 0623    Narrative:      CR Chest 1 Vw    INDICATION:   Congestive heart failure. Short of air     COMPARISON:    7/2/2022    FINDINGS:  Portable AP view(s) of the chest.    Right-sided large bore central line is unchanged    Cardiac silhouette not well visualized or assessed. Shallow lung expansion with bronchovascular crowding. Bilateral partially layering pleural effusions, right greater than left with associated compressive atelectasis or pneumonia. Worsening opacities in  the perihilar right lung compared to the prior study may be partly positional. No pneumothorax.       Impression:        1. Bilateral pleural effusions with compressive atelectasis or pneumonia worse on the right than the left. Probable underlying volume overload. No pneumothorax.    Signer Name: Cristhian Ballard MD   Signed: 7/5/2022 6:21 AM   Workstation Name: [email or web address]    [facility]            Diagnostics: Reviewed    A: Sean Chen is a 82 y.o. female admitted on 7/2/2022 with leg swelling and fatigue, she has a medical history of progressive dementia, type 2 diabetes mellitus, ESRD on HD, essential hypertension, hyperlipidemia, rheumatoid arthritis, severe pulmonary hypertension, and  GERD. Pt sons Dylon Cline states that he gave his mother her insulin the night before her admission as directed by physician as her BS was 220. He states that his mother had been doing better with ambulating with family and therapy and that her appetite has been pretty consistent. He states that dialysis had allowed her to miss last Wednesdays dialysis session  as he says her urinary output was better and she was not over loaded. Patients blood sugar upon admission was 42.      P:  I was able to speak with Pts son Dylon Cline and wife Maria Del Rosario and pts  defers me to his son for updates and decisions. I explained to son and DIL that pt has had further increased O2 demands and was up to 15L hi flow. I let them no she was negative for repeat COVID and that a CT and CXR were done this am which showed worsening CHF and slight increase in pleural effusions. We then discussed GOC again and discussed whether or not Sean would want these things. We discussed the high probability  That she may not be able to come off the ventilator and he felt that he would not want her to go through this and made decision to make her no cpr, no ventilator, he does wants us to continue to treat her as best we can and see if she can show any improvement. I changed code status to DNR/DNI and let BRYAN Justin as well as Dr. Del Rosario know.    We will continue to follow along. Please do not hesitate to contact us regarding further sx mgmt or GOC needs, including after hours or on weekends via our on call provider at [phone].     Elida Brown, APRN    7/7/2022

## 2022-07-07 NOTE — PROGRESS NOTES
Nephrology Progress Note      Subjective     Pt had dialysis yesterday, with about 4.5L UF, tolerated well.   Had hypoxia overnight and now on bubble high flow O2, she is not fully oriented and alert this morning.   Objective       Vital signs :     Temp:  [97.4 °F (36.3 °C)-98.1 °F (36.7 °C)] 97.5 °F (36.4 °C)  Heart Rate:  [67-80] 75  Resp:  [18-22] 20  BP: (104-158)/(48-73) 158/73      Intake/Output Summary (Last 24 hours) at 7/7/2022 0931  Last data filed at 7/7/2022 0800  Gross per 24 hour   Intake 440 ml   Output 4500 ml   Net -4060 ml       Physical Exam:    General Appearance : not in acute distress  Lungs : b/l lower zone crackles  Heart :  regular rhythm & normal rate, normal S1, S2 and no murmur, no rub  Abdomen : normal bowel sounds, no masses, no hepatomegaly, no splenomegaly, soft non-tender and no guarding  Extremities : 2+ edema, no cyanosis and no redness  Neurologic :   orientated to person, place, time and situation, Grossly no focal deficits        Laboratory Data :     Albumin Albumin   Date Value Ref Range Status   07/07/2022 3.44 (L) 3.50 - 5.20 g/dL Final   07/05/2022 3.56 3.50 - 5.20 g/dL Final      Magnesium Magnesium   Date Value Ref Range Status   07/07/2022 1.6 1.6 - 2.4 mg/dL Final          PTH               No results found for: PTH    CBC and coagulation:  Results from last 7 days   Lab Units 07/07/22  0527 07/06/22  0416 07/05/22  0802 07/05/22  0344 07/02/22  2339 07/02/22  1626   PROCALCITONIN ng/mL  --   --  0.24  --   --  0.16   LACTATE mmol/L  --   --   --   --   --  0.7   SED RATE mm/hr  --   --   --   --   --  40*   CRP mg/dL  --   --   --   --   --  1.96*   WBC 10*3/mm3 9.02 8.44  --  10.43   < > 7.05   HEMOGLOBIN g/dL 8.1* 7.3*  --  7.8*   < > 11.0*   HEMATOCRIT % 24.7* 21.7*  --  23.6*   < > 32.7*   MCV fL 92.2 90.0  --  90.4   < > 89.3   MCHC g/dL 32.8 33.6  --  33.1   < > 33.6   PLATELETS 10*3/mm3 220 211  --  254   < > 259   INR   --   --   --   --   --  0.93    < > =  values in this interval not displayed.     Acid/base balance:  Results from last 7 days   Lab Units 07/07/22  0637 07/07/22  0506 07/02/22  1851   PH, ARTERIAL pH units 7.400 7.416 7.448   PO2 ART mm Hg 80.0* 49.5* 76.8*   PCO2, ARTERIAL mm Hg 47.3* 45.6* 39.5   HCO3 ART mmol/L 29.3* 29.3* 27.3*     Renal and electrolytes:  Results from last 7 days   Lab Units 07/07/22  0527 07/06/22  0415 07/05/22  0344 07/04/22  0113 07/02/22  2339   SODIUM mmol/L 133* 129* 130* 128* 133*   POTASSIUM mmol/L 3.7 3.3* 3.5 4.0 3.9   MAGNESIUM mg/dL 1.6  --   --   --   --    CHLORIDE mmol/L 95* 93* 91* 93* 97*   CO2 mmol/L 25.4 24.3 23.1 22.6 24.0   BUN mg/dL 19 28* 20 31* 24*   CREATININE mg/dL 1.74* 2.29* 1.83* 2.74* 2.52*   CALCIUM mg/dL 8.4* 8.4* 8.5* 8.1* 7.7*     Estimated Creatinine Clearance: 26.4 mL/min (A) (by C-G formula based on SCr of 1.74 mg/dL (H)).    Liver and pancreatic function:  Results from last 7 days   Lab Units 07/07/22  0527 07/05/22  0344 07/04/22  0113 07/02/22  2339 07/02/22  1626   ALBUMIN g/dL 3.44* 3.56 2.88*   < > 3.17*   BILIRUBIN mg/dL 0.6 0.5 0.4   < > 0.4   ALK PHOS U/L 123* 100 105   < > 113   AST (SGOT) U/L 16 13 13   < > 16   ALT (SGPT) U/L 9 6 7   < > 7   AMMONIA umol/L  --   --   --   --  <10*    < > = values in this interval not displayed.         Cardiac:  Results from last 7 days   Lab Units 07/02/22  1626   PROBNP pg/mL >70,000.0*     Liver and pancreatic function:  Results from last 7 days   Lab Units 07/07/22  0527 07/05/22  0344 07/04/22  0113 07/02/22  2339 07/02/22  1626   ALBUMIN g/dL 3.44* 3.56 2.88*   < > 3.17*   BILIRUBIN mg/dL 0.6 0.5 0.4   < > 0.4   ALK PHOS U/L 123* 100 105   < > 113   AST (SGOT) U/L 16 13 13   < > 16   ALT (SGPT) U/L 9 6 7   < > 7   AMMONIA umol/L  --   --   --   --  <10*    < > = values in this interval not displayed.       Medications :     aspirin, 81 mg, Oral, Daily  atorvastatin, 80 mg, Oral, Nightly  calcium acetate, 1,334 mg, Oral, TID With  Meals  carvedilol, 6.25 mg, Oral, BID With Meals  heparin (porcine), 5,000 Units, Subcutaneous, Q8H  NIFEdipine CC, 60 mg, Oral, Q24H  Renal, 1 capsule, Oral, Daily  sodium chloride, 10 mL, Intravenous, Q12H      Pharmacy Consult - Pharmacy to dose,           Assessment & Plan     1.  End-stage renal disease on dialysis  2.  Noncompliance with dialysis  3.  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  Diabetes to poor control  5.  Anemia     Discussed with Dr. Del Rosario, to get COVID testing. Will add Nepro with each meals.   Clinically and radiologically not in CHF, etiology on hypoxic respiratory failure is due to paranchymal lung disease.   HD tomorrow.       Jude Muñoz MD  07/07/22  09:31 EDT

## 2022-07-07 NOTE — SIGNIFICANT NOTE
RN called, stated pt was desatting, requested breathing tx, upon arrival pt satting 84 on 6L. Breathing tx given, no change in O2 sat. ABG obtained. Critical PO2. Put on 15L bubble high flow. Sat only 89-90%. Torri CERON aware.

## 2022-07-07 NOTE — PROGRESS NOTES
Unable to obtain Reds Vest measurement after several attempts.  Pt has a dialysis catheter in the area in where the device is placed for the test.

## 2022-07-07 NOTE — PLAN OF CARE
Goal Outcome Evaluation:      Patient has been resting in bed this shift. Patient has became more lethargic, MD aware. Patient is now on 60LPM heated high flow nasal cannula; O2 sat remaining 90% and greater. Will continue to follow plan of care.

## 2022-07-07 NOTE — PROGRESS NOTES
[facility] HOSPITALIST PROGRESS NOTE    Subjective     History:   Sean Chen is a 82 y.o. female admitted on 7/2/2022 secondary to <principal problem not specified>     Procedures: None    CC: Follow up hypoglycemia     Patient seen and examined with sitter present at bedside. Awake and alert but confused. Unable to provide reliable history but reports dyspnea and states she does not feel well today. Episodes of hypoxia again overnight with escalating O2 requirements.    History taken from: chart, and RN.      Objective     Vital Signs  Temp:  [97.4 °F (36.3 °C)-97.6 °F (36.4 °C)] 97.6 °F (36.4 °C)  Heart Rate:  [66-80] 66  Resp:  [18-22] 18  BP: (115-158)/(43-73) 130/43    Intake/Output Summary (Last 24 hours) at 7/7/2022 1711  Last data filed at 7/7/2022 0800  Gross per 24 hour   Intake 440 ml   Output 4500 ml   Net -4060 ml         Physical Exam:  General:    Awake, alert but confused, appears more acutely ill today, chronically ill appearing   Heart:      Normal S1 and S2. Regular rate and rhythm. No significant murmur, rubs or gallops appreciated.   Lungs:     Respirations regular, even and unlabored. Diminished breath sounds throughout with scattered wheezes.    Abdomen:   Soft and nontender. No guarding, rebound tenderness or  organomegaly noted. Bowel sounds present x 4.   Extremities:  Trace bilateral lower extremity edema.      Results Review:    Results from last 7 days   Lab Units 07/07/22  0527 07/06/22 0416 07/05/22 0344 07/04/22  0113 07/02/22  2339 07/02/22  1626   WBC 10*3/mm3 9.02 8.44 10.43 8.33 10.18 7.05   HEMOGLOBIN g/dL 8.1* 7.3* 7.8* 8.7* 9.9* 11.0*   PLATELETS 10*3/mm3 220 211 254 249 241 259     Results from last 7 days   Lab Units 07/07/22  0527 07/06/22  0415 07/05/22  0344 07/04/22  0113 07/02/22 2339 07/02/22  1626   SODIUM mmol/L 133* 129* 130* 128* 133* 132*   POTASSIUM mmol/L 3.7 3.3* 3.5 4.0 3.9 3.5   CHLORIDE mmol/L 95* 93* 91* 93* 97* 94*   CO2 mmol/L 25.4  24.3 23.1 22.6 24.0 24.3   BUN mg/dL 19 28* 20 31* 24* 21   CREATININE mg/dL 1.74* 2.29* 1.83* 2.74* 2.52* 2.49*   CALCIUM mg/dL 8.4* 8.4* 8.5* 8.1* 7.7* 8.5*   GLUCOSE mg/dL 183* 160* 138* 136* 81 42*     Results from last 7 days   Lab Units 07/07/22  0527 07/05/22  0344 07/04/22  0113 07/02/22  2339 07/02/22  1626   BILIRUBIN mg/dL 0.6 0.5 0.4 0.4 0.4   ALK PHOS U/L 123* 100 105 94 113   AST (SGOT) U/L 16 13 13 14 16   ALT (SGPT) U/L 9 6 7 <5 7     Results from last 7 days   Lab Units 07/07/22  0527   MAGNESIUM mg/dL 1.6     Results from last 7 days   Lab Units 07/02/22  1626   INR  0.93     Results from last 7 days   Lab Units 07/02/22  2339 07/02/22  1846 07/02/22  1626   CK TOTAL U/L  --   --  41   TROPONIN T ng/mL 0.066* 0.055* 0.061*       Imaging Results (Last 24 Hours)     Procedure Component Value Units Date/Time    CT Chest Without Contrast Diagnostic [[id number]] Collected: 07/07/22 0929     Updated: 07/07/22 0945    Narrative:      EXAM:    CT Chest Without Intravenous Contrast     EXAM DATE:    7/7/2022 9:20 AM     CLINICAL HISTORY:    Worsening hypoxia; E16.2-Hypoglycemia, unspecified     TECHNIQUE:    Axial computed tomography images of the chest without intravenous  contrast.  Sagittal and coronal reformatted images were created and  reviewed.  This CT exam was performed using one or more of the following  dose reduction techniques:  automated exposure control, adjustment of  the mA and/or kV according to patient size, and/or use of iterative  reconstruction technique.     COMPARISON:    07/02/2022     FINDINGS:    Lungs:  Complete atelectasis of the bilateral lower lobes noted with  superimposed pneumonia.  Evolving groundglass airspace disease  throughout both lungs compatible with evolving airspace edema.  Partial  consolidation of the right middle lobe is slightly improved.  Partial  consolidation of the lingula has developed since previous exam.   Increase in the degree of interlobular septal  thickening.    Pleural space:  Unremarkable.  No pneumothorax.  No significant  effusion.    Heart:  Marked cardiomegaly.  Severe coronary artery calcifications.   No pericardial effusion is noted.    Mediastinum:  No bulky mediastinal or hilar adenopathy.    Bones/joints:  No acute bony changes are identified.  No dislocation.    Soft tissues:  Anasarca.    Vasculature:  Atherosclerosis is stable.  No thoracic aortic aneurysm.    Lymph nodes:  See above.    Tubes, lines and devices:  A right hemodialysis catheter terminates at  the cavoatrial junction.       Impression:      1.  Worsening changes of CHF now with evolving airspace edema.  2.  Slight increase in moderate to large right greater than left pleural  effusions.  3.  Increase consolidation and volume loss of the bilateral lower lobes.  4.  Otherwise stable chest CT with other nonacute findings as above.     This report was finalized on 7/7/2022 9:43 AM by Dr. Mingo Cardenas MD.       XR Chest 1 View [[id number]] Collected: 07/07/22 0721     Updated: 07/07/22 0723    Narrative:      CR Chest 1 Vw    INDICATION:   Hypoxia     COMPARISON:    Chest x-ray 7/5/2022    FINDINGS:  Portable AP view(s) of the chest.    No change double-lumen catheter. Lung volumes are low. Cardiac silhouette is mostly obscured by pulmonary parenchymal pathology. There is continued central vascular congestion and interstitial edema with bilateral infiltrates mid to lower lungs and  bilateral pleural effusions.    Likely mild improvement in volume status. No pneumothorax.       Impression:      Likely overall mild improvement in volume status since 7/5/2022.    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 Home insulin likely contributed with decreased PO intake. Holding home insulin regimen. BG stable. Will likely need reduced regimen at discharge. Cont to monitor.      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 Previously on Azactam empirically. ID consulted in the setting of CRE with concern for UTI vs colonization. They have ordered a short course of gentamicin with HD. ID input appreciated.      Acute metabolic encephalopathy superimposed on baseline dementia: Likely 2/2 above. CT head reveals presumed chronic microvascular ischemic changes with no acute findings. Remains confused but more alert compared to upon admission. PO intake poor and diet liberalized. Intermittent agitation noted. Cont treatment as above and supportive treatment.      Acute on chronic combined systolic and diastolic CHF: Echo in 5/22 reveraled an EF of 36-40%. Non-adherance to fluid restriction and intermittent HD sessions possibly contributing. Imaging consistent with CHF. HD per nephrology. Monitor volume status.     Acute hypoxic respiratory failure: Likely 2/2 volume overload/CHF. O2 requirements escalated to bubble HFNC overnight. Repeat ABG obtained later today reveals mild hypoxia on 15L's bubble and escalated to heated HFNC.  Repeat COVD testing negative. CT chest obtained revealing worsening changes of CHF with bilateral pleural effusions and increase in consolidation and volume loss of bilateral lower lobes. Order a dose of steroids in the setting of wheezing. Add scheduled nebs. Cont HD per nephrology. Wean supplemental O2 as tolerated.     Chronically elevated troponin's: Likely 2/2 ESRD. Trend overall flat and  improved from previous. Cont ASA and statin. Monitor on telemetry.     ESRD on HD: Cont HD per nephrology recs with input appreciated.     Essential HTN: BP intermittently elevated but has fluctuated. Cont Coreg and Nifedipine for now. Cont to monitor.     Hyperlipidemia: Cont statin.     Generalized weakness: PT/OT.    DVT PPX: SQ heparin    Goals of Care: Palliative care following to assist with ongoing GOC discussions and to provide additional support with input appreciated.     Discussed with Dr. Muñoz and palliative care APRN.     Pt is at high risk 2/2 worsening hypoxic resp failure.     Disposition: Likely home with family once medically stable.    Yovani Del Rosario DO  07/07/22  17:11 EDT

## 2022-07-07 NOTE — NURSING NOTE
Called son to notify of room change, went straight to voicemail. Attempted to call daughter, also no answer. Left voicemail for son.

## 2022-07-07 NOTE — PROGRESS NOTES
PROGRESS NOTE         Patient Identification:  Name:  Sean Chen  Age:  82 y.o.  Sex:  female  :  1940  MRN:  [id number]  Visit Number:  [id number]  Primary Care Provider:  Ganga Gallardo MD         LOS: 5 days       ----------------------------------------------------------------------------------------------------------------------  Subjective       Chief Complaints:    Leg Swelling and Fatigue        Interval History:      Case discussed with primary RN Margoth.  Patient on increased oxygen requirement of 15 L bubble nasal cannula this morning.  CNA at bedside.  Overall very weak today.  CNA reports no cough.  WBC normal.  Expiratory wheezing noted bilaterally with right lung base clear in left lung base with rhonchi and crackles.    Review of Systems:    Constitutional: no fever, chills and night sweats.  Generalized fatigue.  Eyes: no eye drainage, itching or redness.  HEENT: no mouth sores, dysphagia or nose bleed.  Respiratory: Positive shortness of breath, no cough or production of sputum.  Cardiovascular: no chest pain, no palpitations, no orthopnea.  Gastrointestinal: no nausea, vomiting or diarrhea. No abdominal pain, hematemesis or rectal bleeding.  Genitourinary: no dysuria or polyuria.  Hematologic/lymphatic: no lymph node abnormalities, no easy bruising or easy bleeding.  Musculoskeletal: no muscle or joint pain.  Skin: No rash and no itching.  Neurological: no loss of consciousness, no seizure, no headache.  Behavioral/Psych: no depression or suicidal ideation.  Endocrine: no hot flashes.  Immunologic: negative.    ----------------------------------------------------------------------------------------------------------------------      Objective       [facility] Meds:  aspirin, 81 mg, Oral, Daily  atorvastatin, 80 mg, Oral, Nightly  calcium acetate, 1,334 mg, Oral, TID With Meals  carvedilol, 6.25 mg, Oral, BID With Meals  heparin (porcine), 5,000 Units,  Subcutaneous, Q8H  NIFEdipine CC, 60 mg, Oral, Q24H  Renal, 1 capsule, Oral, Daily  sodium chloride, 10 mL, Intravenous, Q12H      Pharmacy Consult - Pharmacy to dose,       ----------------------------------------------------------------------------------------------------------------------    Vital Signs:  Temp:  [97.4 °F (36.3 °C)-98.1 °F (36.7 °C)] 97.5 °F (36.4 °C)  Heart Rate:  [67-80] 75  Resp:  [18-22] 20  BP: (104-158)/(48-73) 158/73  Mean Arterial Pressure (Non-Invasive) for the past 24 hrs (Last 3 readings):   Noninvasive MAP (mmHg)   07/07/22 0603 125   07/07/22 0026 99   07/06/22 1830 60     SpO2 Percentage    07/07/22 0514 07/07/22 0603 07/07/22 0701   SpO2: (!) 89% 94% 95%     SpO2:  [85 %-96 %] 95 %  on  Flow (L/min):  [2-15] 15;   Device (Oxygen Therapy): bubble;high-flow nasal cannula    Body mass index is 22.77 kg/m².  Wt Readings from Last 3 Encounters:   07/07/22 66 kg (145 lb 6.4 oz)   05/31/22 57 kg (125 lb 9.6 oz)   05/13/22 67.1 kg (147 lb 14.4 oz)        Intake/Output Summary (Last 24 hours) at 7/7/2022 1030  Last data filed at 7/7/2022 0800  Gross per 24 hour   Intake 440 ml   Output 4500 ml   Net -4060 ml     Diet Regular; Consistent Carbohydrate, Cardiac, Daily Fluid Restriction; 1500 mL Fluid Per Day  ----------------------------------------------------------------------------------------------------------------------      Physical Exam:      Constitutional: Elderly  female is resting comfortably in bed in no apparent distress.  Confused, with dementia at baseline.  CNA is at bedside.      HENT:  Head: Normocephalic and atraumatic.  Mouth:  Moist mucous membranes.    Eyes:  Conjunctivae and EOM are normal.  No scleral icterus.  Neck:  Neck supple.  No JVD present.    Cardiovascular:  Normal rate, regular rhythm and normal heart sounds with no murmur. No edema.  Pulmonary/Chest:  No respiratory distress, no wheezes, no crackles, with normal breath sounds and good air movement.   Clear to auscultation bilaterally.  Abdominal:  Soft.  Bowel sounds are normal.  No distension and no tenderness.   Musculoskeletal:  No edema, no tenderness, and no deformity.  No swelling or redness of joints.  Neurological:  Alert and oriented to person.  No facial droop.  No slurred speech.   Skin: Very dry bilateral lower extremities with chemical dermatitis and sloughing.  Psychiatric:  Normal mood and affect.  Behavior is normal.    ----------------------------------------------------------------------------------------------------------------------  Results from last 7 days   Lab Units 07/02/22  2339 07/02/22  1846 07/02/22  1626   CK TOTAL U/L  --   --  41   TROPONIN T ng/mL 0.066* 0.055* 0.061*     Results from last 7 days   Lab Units 07/02/22  1626   PROBNP pg/mL >70,000.0*       Results from last 7 days   Lab Units 07/07/22  0637   PH, ARTERIAL pH units 7.400   PO2 ART mm Hg 80.0*   PCO2, ARTERIAL mm Hg 47.3*   HCO3 ART mmol/L 29.3*     Results from last 7 days   Lab Units 07/07/22  0527 07/06/22  0416 07/05/22  0344 07/02/22  2339 07/02/22  1626   CRP mg/dL  --   --   --   --  1.96*   LACTATE mmol/L  --   --   --   --  0.7   WBC 10*3/mm3 9.02 8.44 10.43   < > 7.05   HEMOGLOBIN g/dL 8.1* 7.3* 7.8*   < > 11.0*   HEMATOCRIT % 24.7* 21.7* 23.6*   < > 32.7*   MCV fL 92.2 90.0 90.4   < > 89.3   MCHC g/dL 32.8 33.6 33.1   < > 33.6   PLATELETS 10*3/mm3 220 211 254   < > 259   INR   --   --   --   --  0.93    < > = values in this interval not displayed.     Results from last 7 days   Lab Units 07/07/22  0527 07/06/22  0415 07/05/22  0344 07/04/22  0113   SODIUM mmol/L 133* 129* 130* 128*   POTASSIUM mmol/L 3.7 3.3* 3.5 4.0   MAGNESIUM mg/dL 1.6  --   --   --    CHLORIDE mmol/L 95* 93* 91* 93*   CO2 mmol/L 25.4 24.3 23.1 22.6   BUN mg/dL 19 28* 20 31*   CREATININE mg/dL 1.74* 2.29* 1.83* 2.74*   CALCIUM mg/dL 8.4* 8.4* 8.5* 8.1*   GLUCOSE mg/dL 183* 160* 138* 136*   ALBUMIN g/dL 3.44*  --  3.56 2.88*   BILIRUBIN  mg/dL 0.6  --  0.5 0.4   ALK PHOS U/L 123*  --  100 105   AST (SGOT) U/L 16  --  13 13   ALT (SGPT) U/L 9  --  6 7   Estimated Creatinine Clearance: 26.4 mL/min (A) (by C-G formula based on SCr of 1.74 mg/dL (H)).  No results found for: AMMONIA    Glucose   Date/Time Value Ref Range Status   07/07/2022 0650 176 (H) 70 - 130 mg/dL Final     Comment:     Meter: [id number] : [id number] LORENE LOCO   07/06/2022 2049 263 (H) 70 - 130 mg/dL Final     Comment:     Meter: [id number] : [id number] Denise Nga   07/06/2022 1755 163 (H) 70 - 130 mg/dL Final     Comment:     RN Notified Meter: [id number] : [id number] TY ISSA   07/06/2022 1038 263 (H) 70 - 130 mg/dL Final     Comment:     Meter: [id number] : [id number] YT ISSA   07/06/2022 0723 190 (H) 70 - 130 mg/dL Final     Comment:     Meter: [id number] : [id number] TY ISSA   07/05/2022 1819 188 (H) 70 - 130 mg/dL Final     Comment:     Meter: [id number] : [id number] KATHI ISSA   07/05/2022 1625 173 (H) 70 - 130 mg/dL Final     Comment:     Meter: [id number] : [id number] SHANA NEUMANN   07/05/2022 1035 230 (H) 70 - 130 mg/dL Final     Comment:     Meter: [id number] : [id number] SHANA NEUMANN     Lab Results   Component Value Date    HGBA1C 5.50 07/02/2022     Lab Results   Component Value Date    TSH 1.390 07/02/2022    FREET4 1.54 07/02/2022       Blood Culture   Date Value Ref Range Status   07/02/2022 No growth at 4 days  Preliminary   07/02/2022 No growth at 4 days  Preliminary     Urine Culture   Date Value Ref Range Status   07/02/2022 50,000 CFU/mL Enterobacter cloacae complex CRE (A)  Final     Comment:     Consider infectious disease consult.  Susceptibility results may not correlate to clinical outcomes.  Carbapenem resistant Enterobacteriaceae, patient may be an isolation risk.  No carbapenemase detected.     No results found for: WOUNDCX  No results found for: STOOLCX  No results found for:  RESPCX  Pain Management Panel     Pain Management Panel Latest Ref Rng & Units 7/2/2022 5/23/2022    CREATININE UR mg/dL - -    AMPHETAMINES SCREEN, URINE Negative Negative Negative    BARBITURATES SCREEN Negative Negative Negative    BENZODIAZEPINE SCREEN, URINE Negative Negative Negative    BUPRENORPHINEUR Negative Negative Negative    COCAINE SCREEN, URINE Negative Negative Negative    METHADONE SCREEN, URINE Negative Negative Negative    METHAMPHETAMINEUR Negative Negative Negative            ----------------------------------------------------------------------------------------------------------------------  Imaging Results (Last 24 Hours)     Procedure Component Value Units Date/Time    CT Chest Without Contrast Diagnostic [[id number]] Collected: 07/07/22 0929     Updated: 07/07/22 0945    Narrative:      EXAM:    CT Chest Without Intravenous Contrast     EXAM DATE:    7/7/2022 9:20 AM     CLINICAL HISTORY:    Worsening hypoxia; E16.2-Hypoglycemia, unspecified     TECHNIQUE:    Axial computed tomography images of the chest without intravenous  contrast.  Sagittal and coronal reformatted images were created and  reviewed.  This CT exam was performed using one or more of the following  dose reduction techniques:  automated exposure control, adjustment of  the mA and/or kV according to patient size, and/or use of iterative  reconstruction technique.     COMPARISON:    07/02/2022     FINDINGS:    Lungs:  Complete atelectasis of the bilateral lower lobes noted with  superimposed pneumonia.  Evolving groundglass airspace disease  throughout both lungs compatible with evolving airspace edema.  Partial  consolidation of the right middle lobe is slightly improved.  Partial  consolidation of the lingula has developed since previous exam.   Increase in the degree of interlobular septal thickening.    Pleural space:  Unremarkable.  No pneumothorax.  No significant  effusion.    Heart:  Marked cardiomegaly.  Severe  coronary artery calcifications.   No pericardial effusion is noted.    Mediastinum:  No bulky mediastinal or hilar adenopathy.    Bones/joints:  No acute bony changes are identified.  No dislocation.    Soft tissues:  Anasarca.    Vasculature:  Atherosclerosis is stable.  No thoracic aortic aneurysm.    Lymph nodes:  See above.    Tubes, lines and devices:  A right hemodialysis catheter terminates at  the cavoatrial junction.       Impression:      1.  Worsening changes of CHF now with evolving airspace edema.  2.  Slight increase in moderate to large right greater than left pleural  effusions.  3.  Increase consolidation and volume loss of the bilateral lower lobes.  4.  Otherwise stable chest CT with other nonacute findings as above.     This report was finalized on 7/7/2022 9:43 AM by Dr. Mingo Cardenas MD.       XR Chest 1 View [[id number]] Collected: 07/07/22 0721     Updated: 07/07/22 0723    Narrative:      CR Chest 1 Vw    INDICATION:   Hypoxia     COMPARISON:    Chest x-ray 7/5/2022    FINDINGS:  Portable AP view(s) of the chest.    No change double-lumen catheter. Lung volumes are low. Cardiac silhouette is mostly obscured by pulmonary parenchymal pathology. There is continued central vascular congestion and interstitial edema with bilateral infiltrates mid to lower lungs and  bilateral pleural effusions.    Likely mild improvement in volume status. No pneumothorax.       Impression:      Likely overall mild improvement in volume status since 7/5/2022.    Signer Name: Maegan Mcgee MD   Signed: 7/7/2022 7:21 AM   Workstation Name: [email or web address]    [facility]          ----------------------------------------------------------------------------------------------------------------------    Assessment/Plan         ASSESSMENT:     UTI  Multidrug-resistant organism with CRE  End-stage renal disease on hemodialysis        PLAN:     Case discussed with primary RN Margoth.  Patient on increased  oxygen requirement of 15 L bubble nasal cannula this morning.  CNA at bedside.  Overall very weak today.  CNA reports no cough.  WBC normal.  Expiratory wheezing noted bilaterally with right lung base clear in left lung base with rhonchi and crackles.    The patient presented to [facility] on 7/2/2022 for bilateral leg edema and fatigue.     The patient is on 3 L nasal cannula, which is stable.  CNA is at bedside.  Patient has underlying dementia and is not able to provide a review of systems.  Reports that she does not feel well today, but is not able to specify any particular complaints.  Nurse remove dressings from bilateral lower extremities and they are very dry skin with chemical dermatitis and sloughing.  Clear to auscultation bilaterally.  Afebrile, no diarrhea.  WBC on 7/6/2022 is normal.  Lactic acid on admission was normal.  Urinalysis on 7/2/2022 was positive with 13-20 WBCs and 2+ bacteria.  No urine culture was performed.  Repeat urinalysis on 7/2/2022 was positive with 6-12 WBCs and 3+ bacteria.  Urine culture finalized as 50,000 colonies of CRE Enterobacter cloacae complex.  Bilateral knee x-rays on 7/5/2022 showed advanced tricompartmental osteoarthritis of bilateral knees.  No acute osseous or articular abnormalities identified.  Chest x-ray on 7/5/2022 showed bilateral pleural effusions with compressive atelectasis or pneumonia worse on the right than the left.  Probable underlying volume overload.  No pneumothorax.  CT abdomen pelvis on 7/2/2022 showed diffuse body wall edema.  Extensive vascular calcifications.  No definite acute process in the abdomen or pelvis by noncontrast CT.  CT chest on 7/2/2022 showed large right and moderate left pleural effusions.  Areas of consolidative change and nodularity seen adjacent to the pleural effusions within the dependent lungs.  Some scattered reticular nodularity in the right apex.  Findings favor volume overload or  multifocal pneumonia with bilateral pleural effusions.  Cardiomegaly.  Severe coronary artery atherosclerosis.  Chest x-ray on 7/2/2022 showed bilateral effusions, relatively stable on the right and improved on the left.  Vascular congestion without definite pulmonary edema.  Bibasilar infiltrate or atelectasis, right greater than left.  COVID-19 and flu A/B PCR on 7/2/2022 was negative.  Blood cultures on 7/2/2022 are so far showing no growth.    Patient was seen with ELGIN Zavala and plan of care was discussed with her.  Patient seems to be showing improvement on gentamicin after dialysis x2 doses for an uncomplicated MDRO cystitis.  There is still no indication to initiate Zerbaxa or Avycaz at this time.    Patient continues to have chronic venous stasis dermatitis with dry skin and sloughing to both lower extremity mostly to the calf areas and does not seem to be worsening nor showing evidence of active infection.       Code Status:   Code Status and Medical Interventions:   Ordered at: 07/02/22 1854     Code Status (Patient has no pulse and is not breathing):    CPR (Attempt to Resuscitate)     Medical Interventions (Patient has pulse or is breathing):    Full Support       ELGIN Zavala  07/07/22  10:30 EDT     Electronically signed by ELGIN Zavala, 07/07/22, 10:33 AM EDT.  Electronically signed by Melissa Merino MD, 07/07/22, 12:56 PM EDT.

## 2022-07-07 NOTE — PLAN OF CARE
Goal Outcome Evaluation:       Patient has been confused and attempting to get out of bed tonight. No complaints of chest pain or shortness of breath noted. Vital signs remain stable. Sitter has remained at bedside through the night. She was moved to a private room due to CRE. No indicators of acute distress present.

## 2022-07-07 NOTE — NURSING NOTE
Was notified by telemetry that patient's oxygen saturation was in the low 80's. Went to check on patient and she was leaned over the bed. Straightened her up in the bed with no success to increasing oxygen. Attempted to titrate patient's oxygen up and requested breathing treatment from respiratory tech. With breathing treatment patient's saturations did not increase. ABG was ordered per protocol and Mariella CERON was paged, see flowsheets and orders. Patient was placed on bubble high flow at 15 liters with saturations increasing to 89-90%.

## 2022-07-08 LAB
ANION GAP SERPL CALCULATED.3IONS-SCNC: 12.5 MMOL/L (ref 5–15)
BASOPHILS # BLD AUTO: 0 10*3/MM3 (ref 0–0.2)
BASOPHILS NFR BLD AUTO: 0 % (ref 0–1.5)
BUN SERPL-MCNC: 26 MG/DL (ref 8–23)
BUN/CREAT SERPL: 12.9 (ref 7–25)
CALCIUM SPEC-SCNC: 8.4 MG/DL (ref 8.6–10.5)
CHLORIDE SERPL-SCNC: 93 MMOL/L (ref 98–107)
CO2 SERPL-SCNC: 23.5 MMOL/L (ref 22–29)
CREAT SERPL-MCNC: 2.01 MG/DL (ref 0.57–1)
DEPRECATED RDW RBC AUTO: 48.4 FL (ref 37–54)
EGFRCR SERPLBLD CKD-EPI 2021: 24.5 ML/MIN/1.73
EOSINOPHIL # BLD AUTO: 0 10*3/MM3 (ref 0–0.4)
EOSINOPHIL NFR BLD AUTO: 0 % (ref 0.3–6.2)
ERYTHROCYTE [DISTWIDTH] IN BLOOD BY AUTOMATED COUNT: 14.4 % (ref 12.3–15.4)
GLUCOSE BLDC GLUCOMTR-MCNC: 225 MG/DL (ref 70–130)
GLUCOSE SERPL-MCNC: 233 MG/DL (ref 65–99)
HCT VFR BLD AUTO: 22.8 % (ref 34–46.6)
HGB BLD-MCNC: 7.3 G/DL (ref 12–15.9)
IMM GRANULOCYTES # BLD AUTO: 0.02 10*3/MM3 (ref 0–0.05)
IMM GRANULOCYTES NFR BLD AUTO: 0.5 % (ref 0–0.5)
LYMPHOCYTES # BLD AUTO: 0.33 10*3/MM3 (ref 0.7–3.1)
LYMPHOCYTES NFR BLD AUTO: 7.7 % (ref 19.6–45.3)
MCH RBC QN AUTO: 29.4 PG (ref 26.6–33)
MCHC RBC AUTO-ENTMCNC: 32 G/DL (ref 31.5–35.7)
MCV RBC AUTO: 91.9 FL (ref 79–97)
MONOCYTES # BLD AUTO: 0.01 10*3/MM3 (ref 0.1–0.9)
MONOCYTES NFR BLD AUTO: 0.2 % (ref 5–12)
NEUTROPHILS NFR BLD AUTO: 3.95 10*3/MM3 (ref 1.7–7)
NEUTROPHILS NFR BLD AUTO: 91.6 % (ref 42.7–76)
NRBC BLD AUTO-RTO: 0 /100 WBC (ref 0–0.2)
PLATELET # BLD AUTO: 203 10*3/MM3 (ref 140–450)
PMV BLD AUTO: 10.1 FL (ref 6–12)
POTASSIUM SERPL-SCNC: 3.8 MMOL/L (ref 3.5–5.2)
RBC # BLD AUTO: 2.48 10*6/MM3 (ref 3.77–5.28)
SODIUM SERPL-SCNC: 129 MMOL/L (ref 136–145)
WBC NRBC COR # BLD: 4.31 10*3/MM3 (ref 3.4–10.8)

## 2022-07-08 PROCEDURE — 25010000002 MEROPENEM PER 100 MG: Performed by: INTERNAL MEDICINE

## 2022-07-08 PROCEDURE — 85025 COMPLETE CBC W/AUTO DIFF WBC: CPT | Performed by: INTERNAL MEDICINE

## 2022-07-08 PROCEDURE — 82962 GLUCOSE BLOOD TEST: CPT

## 2022-07-08 PROCEDURE — 94799 UNLISTED PULMONARY SVC/PX: CPT

## 2022-07-08 PROCEDURE — 25010000002 EPOETIN ALFA-EPBX 20000 UNIT/ML SOLUTION: Performed by: INTERNAL MEDICINE

## 2022-07-08 PROCEDURE — 25010000002 HEPARIN (PORCINE) PER 1000 UNITS: Performed by: STUDENT IN AN ORGANIZED HEALTH CARE EDUCATION/TRAINING PROGRAM

## 2022-07-08 PROCEDURE — 25010000002 MORPHINE PER 10 MG: Performed by: INTERNAL MEDICINE

## 2022-07-08 PROCEDURE — 25010000002 ONDANSETRON PER 1 MG: Performed by: INTERNAL MEDICINE

## 2022-07-08 PROCEDURE — 99233 SBSQ HOSP IP/OBS HIGH 50: CPT | Performed by: INTERNAL MEDICINE

## 2022-07-08 PROCEDURE — 25010000002 MORPHINE PER 10 MG: Performed by: NURSE PRACTITIONER

## 2022-07-08 PROCEDURE — 80048 BASIC METABOLIC PNL TOTAL CA: CPT | Performed by: INTERNAL MEDICINE

## 2022-07-08 PROCEDURE — 99232 SBSQ HOSP IP/OBS MODERATE 35: CPT | Performed by: INTERNAL MEDICINE

## 2022-07-08 RX ORDER — MORPHINE SULFATE 2 MG/ML
1 INJECTION, SOLUTION INTRAMUSCULAR; INTRAVENOUS EVERY 4 HOURS PRN
Status: DISCONTINUED | OUTPATIENT
Start: 2022-07-08 | End: 2022-07-08

## 2022-07-08 RX ORDER — LORAZEPAM 2 MG/ML
1 INJECTION INTRAMUSCULAR EVERY 6 HOURS PRN
Status: DISCONTINUED | OUTPATIENT
Start: 2022-07-08 | End: 2022-07-08

## 2022-07-08 RX ORDER — LORAZEPAM 2 MG/ML
0.5 INJECTION INTRAMUSCULAR EVERY 6 HOURS PRN
Status: DISCONTINUED | OUTPATIENT
Start: 2022-07-08 | End: 2022-07-11

## 2022-07-08 RX ORDER — GLYCOPYRROLATE 0.2 MG/ML
0.4 INJECTION INTRAMUSCULAR; INTRAVENOUS EVERY 4 HOURS PRN
Status: DISCONTINUED | OUTPATIENT
Start: 2022-07-08 | End: 2022-07-13 | Stop reason: HOSPADM

## 2022-07-08 RX ORDER — MORPHINE SULFATE 2 MG/ML
2 INJECTION, SOLUTION INTRAMUSCULAR; INTRAVENOUS EVERY 4 HOURS PRN
Status: DISCONTINUED | OUTPATIENT
Start: 2022-07-08 | End: 2022-07-11

## 2022-07-08 RX ORDER — ACETAMINOPHEN 650 MG/1
650 SUPPOSITORY RECTAL EVERY 4 HOURS PRN
Status: DISCONTINUED | OUTPATIENT
Start: 2022-07-08 | End: 2022-07-13 | Stop reason: HOSPADM

## 2022-07-08 RX ORDER — ALBUMIN (HUMAN) 12.5 G/50ML
25 SOLUTION INTRAVENOUS AS NEEDED
Status: ACTIVE | OUTPATIENT
Start: 2022-07-08 | End: 2022-07-09

## 2022-07-08 RX ORDER — ONDANSETRON 2 MG/ML
4 INJECTION INTRAMUSCULAR; INTRAVENOUS EVERY 6 HOURS PRN
Status: DISCONTINUED | OUTPATIENT
Start: 2022-07-08 | End: 2022-07-13 | Stop reason: HOSPADM

## 2022-07-08 RX ORDER — ONDANSETRON 2 MG/ML
4 INJECTION INTRAMUSCULAR; INTRAVENOUS EVERY 6 HOURS PRN
Status: DISCONTINUED | OUTPATIENT
Start: 2022-07-08 | End: 2022-07-08

## 2022-07-08 RX ADMIN — RENO CAPS 1 MG: 100; 1.5; 1.7; 20; 10; 1; 150; 5; 6 CAPSULE ORAL at 09:20

## 2022-07-08 RX ADMIN — MEROPENEM 1 G: 1 INJECTION, POWDER, FOR SOLUTION INTRAVENOUS at 12:49

## 2022-07-08 RX ADMIN — ONDANSETRON 4 MG: 2 INJECTION INTRAMUSCULAR; INTRAVENOUS at 10:00

## 2022-07-08 RX ADMIN — IPRATROPIUM BROMIDE AND ALBUTEROL SULFATE 3 ML: .5; 3 SOLUTION RESPIRATORY (INHALATION) at 07:09

## 2022-07-08 RX ADMIN — Medication 10 ML: at 09:25

## 2022-07-08 RX ADMIN — CARVEDILOL 6.25 MG: 6.25 TABLET, FILM COATED ORAL at 17:39

## 2022-07-08 RX ADMIN — HEPARIN SODIUM 5000 UNITS: 5000 INJECTION INTRAVENOUS; SUBCUTANEOUS at 05:26

## 2022-07-08 RX ADMIN — IPRATROPIUM BROMIDE AND ALBUTEROL SULFATE 3 ML: .5; 3 SOLUTION RESPIRATORY (INHALATION) at 00:21

## 2022-07-08 RX ADMIN — Medication 10 ML: at 19:24

## 2022-07-08 RX ADMIN — CALCIUM ACETATE 1334 MG: 667 CAPSULE ORAL at 09:20

## 2022-07-08 RX ADMIN — EPOETIN ALFA-EPBX 20000 UNITS: 20000 INJECTION, SOLUTION INTRAVENOUS; SUBCUTANEOUS at 12:48

## 2022-07-08 RX ADMIN — HEPARIN SODIUM 5000 UNITS: 5000 INJECTION INTRAVENOUS; SUBCUTANEOUS at 14:16

## 2022-07-08 RX ADMIN — ASPIRIN 81 MG: 81 TABLET, COATED ORAL at 09:21

## 2022-07-08 RX ADMIN — CARVEDILOL 6.25 MG: 6.25 TABLET, FILM COATED ORAL at 09:20

## 2022-07-08 RX ADMIN — MORPHINE SULFATE 1 MG: 2 INJECTION, SOLUTION INTRAMUSCULAR; INTRAVENOUS at 10:02

## 2022-07-08 RX ADMIN — NIFEDIPINE 60 MG: 30 TABLET, EXTENDED RELEASE ORAL at 09:21

## 2022-07-08 RX ADMIN — ACETAMINOPHEN 650 MG: 325 TABLET ORAL at 09:15

## 2022-07-08 RX ADMIN — MORPHINE SULFATE 2 MG: 2 INJECTION, SOLUTION INTRAMUSCULAR; INTRAVENOUS at 19:24

## 2022-07-08 NOTE — PROGRESS NOTES
PROGRESS NOTE         Patient Identification:  Name:  Sean Chen  Age:  82 y.o.  Sex:  female  :  1940  MRN:  [id number]  Visit Number:  [id number]  Primary Care Provider:  Ganga Gallardo MD         LOS: 6 days       ----------------------------------------------------------------------------------------------------------------------  Subjective       Chief Complaints:    Leg Swelling and Fatigue        Interval History:      Case discussed with primary hospitalist Dr. Del Rosario.  Patient remains confused but pleasant this morning.  Sitter at bedside.  On increasing oxygen requirements heated high flow at 50 L / 64% FiO2.  Afebrile, no diarrhea.  WBC normal.  CTA chest from 2022 reports worsening changes of CHF with now evolving airspace edema, slight increase in moderate to large right greater than left pleural effusions, increased consolidation volume loss of bilateral lower lobes.    Review of Systems:    Constitutional: no fever, chills and night sweats.  Generalized fatigue.  Eyes: no eye drainage, itching or redness.  HEENT: no mouth sores, dysphagia or nose bleed.  Respiratory: Positive shortness of breath, no cough or production of sputum.  Cardiovascular: no chest pain, no palpitations, no orthopnea.  Gastrointestinal: no nausea, vomiting or diarrhea. No abdominal pain, hematemesis or rectal bleeding.  Genitourinary: no dysuria or polyuria.  Hematologic/lymphatic: no lymph node abnormalities, no easy bruising or easy bleeding.  Musculoskeletal: no muscle or joint pain.  Skin: No rash and no itching.  Neurological: no loss of consciousness, no seizure, no headache.  Behavioral/Psych: no depression or suicidal ideation.  Endocrine: no hot flashes.  Immunologic: negative.    ----------------------------------------------------------------------------------------------------------------------      Objective       Current Hospital Meds:  aspirin, 81 mg, Oral,  Daily  atorvastatin, 80 mg, Oral, Nightly  calcium acetate, 1,334 mg, Oral, TID With Meals  carvedilol, 6.25 mg, Oral, BID With Meals  epoetin nilsa/nilsa-epbx, 20,000 Units, Subcutaneous, Once  heparin (porcine), 5,000 Units, Subcutaneous, Q8H  ipratropium-albuterol, 3 mL, Nebulization, 4x Daily - RT  NIFEdipine CC, 60 mg, Oral, Q24H  Renal, 1 capsule, Oral, Daily  sodium chloride, 10 mL, Intravenous, Q12H      Pharmacy Consult - Pharmacy to dose,       ----------------------------------------------------------------------------------------------------------------------    Vital Signs:  Temp:  [97.5 °F (36.4 °C)-98.1 °F (36.7 °C)] 97.5 °F (36.4 °C)  Heart Rate:  [64-84] 76  Resp:  [18] 18  BP: (121-160)/(43-80) 138/80  Mean Arterial Pressure (Non-Invasive) for the past 24 hrs (Last 3 readings):   Noninvasive MAP (mmHg)   07/08/22 0002 103   07/07/22 1332 75     SpO2 Percentage    07/08/22 0700 07/08/22 0709 07/08/22 0719   SpO2: 97% 96% 98%     SpO2:  [96 %-100 %] 98 %  on  Flow (L/min):  [15-60] 50;   Device (Oxygen Therapy): high-flow nasal cannula;heated    Body mass index is 22.32 kg/m².  Wt Readings from Last 3 Encounters:   07/08/22 64.6 kg (142 lb 8 oz)   05/31/22 57 kg (125 lb 9.6 oz)   05/13/22 67.1 kg (147 lb 14.4 oz)        Intake/Output Summary (Last 24 hours) at 7/8/2022 1101  Last data filed at 7/8/2022 0002  Gross per 24 hour   Intake 120 ml   Output --   Net 120 ml     Diet Regular; Consistent Carbohydrate, Cardiac, Daily Fluid Restriction; 1500 mL Fluid Per Day  ----------------------------------------------------------------------------------------------------------------------      Physical Exam:      Constitutional: Elderly  female is resting comfortably in bed in no apparent distress.  Confused, with dementia at baseline.  CNA is at bedside.      HENT:  Head: Normocephalic and atraumatic.  Mouth:  Moist mucous membranes.    Eyes:  Conjunctivae and EOM are normal.  No scleral  icterus.  Neck:  Neck supple.  No JVD present.    Cardiovascular:  Normal rate, regular rhythm and normal heart sounds with no murmur. No edema.  Pulmonary/Chest:  No respiratory distress, no wheezes, no crackles, with normal breath sounds and good air movement.  Clear to auscultation bilaterally.  Abdominal:  Soft.  Bowel sounds are normal.  No distension and no tenderness.   Musculoskeletal:  No edema, no tenderness, and no deformity.  No swelling or redness of joints.  Neurological:  Alert and oriented to person.  No facial droop.  No slurred speech.   Skin: Very dry bilateral lower extremities with chemical dermatitis and sloughing.  Psychiatric:  Normal mood and affect.  Behavior is normal.    ----------------------------------------------------------------------------------------------------------------------  Results from last 7 days   Lab Units 07/02/22  2339 07/02/22  1846 07/02/22  1626   CK TOTAL U/L  --   --  41   TROPONIN T ng/mL 0.066* 0.055* 0.061*     Results from last 7 days   Lab Units 07/02/22  1626   PROBNP pg/mL >70,000.0*       Results from last 7 days   Lab Units 07/07/22  1510   PH, ARTERIAL pH units 7.434   PO2 ART mm Hg 57.5*   PCO2, ARTERIAL mm Hg 44.8   HCO3 ART mmol/L 29.9*     Results from last 7 days   Lab Units 07/08/22  0118 07/07/22  0527 07/06/22  0416 07/02/22  2339 07/02/22  1626   CRP mg/dL  --   --   --   --  1.96*   LACTATE mmol/L  --   --   --   --  0.7   WBC 10*3/mm3 4.31 9.02 8.44   < > 7.05   HEMOGLOBIN g/dL 7.3* 8.1* 7.3*   < > 11.0*   HEMATOCRIT % 22.8* 24.7* 21.7*   < > 32.7*   MCV fL 91.9 92.2 90.0   < > 89.3   MCHC g/dL 32.0 32.8 33.6   < > 33.6   PLATELETS 10*3/mm3 203 220 211   < > 259   INR   --   --   --   --  0.93    < > = values in this interval not displayed.     Results from last 7 days   Lab Units 07/08/22  0118 07/07/22  0527 07/06/22  0415 07/05/22  0344 07/04/22  0113   SODIUM mmol/L 129* 133* 129* 130* 128*   POTASSIUM mmol/L 3.8 3.7 3.3* 3.5 4.0    MAGNESIUM mg/dL  --  1.6  --   --   --    CHLORIDE mmol/L 93* 95* 93* 91* 93*   CO2 mmol/L 23.5 25.4 24.3 23.1 22.6   BUN mg/dL 26* 19 28* 20 31*   CREATININE mg/dL 2.01* 1.74* 2.29* 1.83* 2.74*   CALCIUM mg/dL 8.4* 8.4* 8.4* 8.5* 8.1*   GLUCOSE mg/dL 233* 183* 160* 138* 136*   ALBUMIN g/dL  --  3.44*  --  3.56 2.88*   BILIRUBIN mg/dL  --  0.6  --  0.5 0.4   ALK PHOS U/L  --  123*  --  100 105   AST (SGOT) U/L  --  16  --  13 13   ALT (SGPT) U/L  --  9  --  6 7   Estimated Creatinine Clearance: 22.4 mL/min (A) (by C-G formula based on SCr of 2.01 mg/dL (H)).  No results found for: AMMONIA    Glucose   Date/Time Value Ref Range Status   07/08/2022 1028 225 (H) 70 - 130 mg/dL Final     Comment:     Meter: [id number] : [id number] LORENE LOCO   07/07/2022 1808 196 (H) 70 - 130 mg/dL Final     Comment:     Meter: [id number] : [id number] VICKIE LING   07/07/2022 1657 171 (H) 70 - 130 mg/dL Final     Comment:     Meter: [id number] : [id number] LORENE LOCO   07/07/2022 1049 187 (H) 70 - 130 mg/dL Final     Comment:     Meter: [id number] : [id number] LORENE LOCO   07/07/2022 0650 176 (H) 70 - 130 mg/dL Final     Comment:     Meter: [id number] : [id number] LORENE LOCO   07/06/2022 2049 263 (H) 70 - 130 mg/dL Final     Comment:     Meter: [id number] : [id number] Denise Sylvester   07/06/2022 1755 163 (H) 70 - 130 mg/dL Final     Comment:     RN Notified Meter: [id number] : [id number] TY PARSONS   07/06/2022 1038 263 (H) 70 - 130 mg/dL Final     Comment:     Meter: [id number] : [id number] TY PARSONS     Lab Results   Component Value Date    HGBA1C 5.50 07/02/2022     Lab Results   Component Value Date    TSH 1.390 07/02/2022    FREET4 1.54 07/02/2022       Blood Culture   Date Value Ref Range Status   07/02/2022 No growth at 4 days  Preliminary   07/02/2022 No growth at 4 days  Preliminary     Urine Culture   Date Value Ref Range Status   07/02/2022 50,000 CFU/mL  Enterobacter cloacae complex CRE (A)  Final     Comment:     Consider infectious disease consult.  Susceptibility results may not correlate to clinical outcomes.  Carbapenem resistant Enterobacteriaceae, patient may be an isolation risk.  No carbapenemase detected.     No results found for: WOUNDCX  No results found for: STOOLCX  No results found for: RESPCX  Pain Management Panel     Pain Management Panel Latest Ref Rng & Units 7/2/2022 5/23/2022    CREATININE UR mg/dL - -    AMPHETAMINES SCREEN, URINE Negative Negative Negative    BARBITURATES SCREEN Negative Negative Negative    BENZODIAZEPINE SCREEN, URINE Negative Negative Negative    BUPRENORPHINEUR Negative Negative Negative    COCAINE SCREEN, URINE Negative Negative Negative    METHADONE SCREEN, URINE Negative Negative Negative    METHAMPHETAMINEUR Negative Negative Negative            ----------------------------------------------------------------------------------------------------------------------  Imaging Results (Last 24 Hours)     ** No results found for the last 24 hours. **          ----------------------------------------------------------------------------------------------------------------------    Assessment/Plan         ASSESSMENT:     UTI  Multidrug-resistant organism with CRE  End-stage renal disease on hemodialysis        PLAN:       Case discussed with primary hospitalist Dr. Del Rosario.  Patient remains confused but pleasant this morning.  Sitter at bedside.  On increasing oxygen requirements heated high flow at 50 L / 64% FiO2.  Afebrile, no diarrhea.  WBC normal.  CTA chest from 7/7/2022 reports worsening changes of CHF with now evolving airspace edema, slight increase in moderate to large right greater than left pleural effusions, increased consolidation volume loss of bilateral lower lobes.    The patient presented to [facility] Emergency Department on 7/2/2022 for bilateral leg edema and fatigue.     The patient is on 3 L  nasal cannula, which is stable.  CNA is at bedside.  Patient has underlying dementia and is not able to provide a review of systems.  Reports that she does not feel well today, but is not able to specify any particular complaints.  Nurse remove dressings from bilateral lower extremities and they are very dry skin with chemical dermatitis and sloughing.  Clear to auscultation bilaterally.  Afebrile, no diarrhea.  WBC on 7/6/2022 is normal.  Lactic acid on admission was normal.  Urinalysis on 7/2/2022 was positive with 13-20 WBCs and 2+ bacteria.  No urine culture was performed.  Repeat urinalysis on 7/2/2022 was positive with 6-12 WBCs and 3+ bacteria.  Urine culture finalized as 50,000 colonies of CRE Enterobacter cloacae complex.  Bilateral knee x-rays on 7/5/2022 showed advanced tricompartmental osteoarthritis of bilateral knees.  No acute osseous or articular abnormalities identified.  Chest x-ray on 7/5/2022 showed bilateral pleural effusions with compressive atelectasis or pneumonia worse on the right than the left.  Probable underlying volume overload.  No pneumothorax.  CT abdomen pelvis on 7/2/2022 showed diffuse body wall edema.  Extensive vascular calcifications.  No definite acute process in the abdomen or pelvis by noncontrast CT.  CT chest on 7/2/2022 showed large right and moderate left pleural effusions.  Areas of consolidative change and nodularity seen adjacent to the pleural effusions within the dependent lungs.  Some scattered reticular nodularity in the right apex.  Findings favor volume overload or multifocal pneumonia with bilateral pleural effusions.  Cardiomegaly.  Severe coronary artery atherosclerosis.  Chest x-ray on 7/2/2022 showed bilateral effusions, relatively stable on the right and improved on the left.  Vascular congestion without definite pulmonary edema.  Bibasilar infiltrate or atelectasis, right greater than left.  COVID-19 and flu A/B PCR on 7/2/2022 was negative.  Blood  cultures on 7/2/2022 are so far showing no growth.    Patient was seen and examined by me this morning and completed a course of gentamicin with very good improvement but overnight patient's oxygen requirement has worsened with known high risk for aspiration and increasing consolidation noted on CT scan of the chest.  I initiated meropenem course pharmacy to dose for aspiration pneumonia and new onset sepsis along with worsening respiratory failure x7 days.    Patient continues to have chronic venous stasis dermatitis with dry skin and sloughing to both lower extremity mostly to the calf areas and does not seem to be worsening nor showing evidence of active infection.       Code Status:   Code Status and Medical Interventions:   Ordered at: 07/07/22 1121     Medical Intervention Limits:    NO intubation (DNI)    NO artificial nutrition     Level Of Support Discussed With:    Next of Kin (If No Surrogate)     Code Status (Patient has no pulse and is not breathing):    No CPR (Do Not Attempt to Resuscitate)     Medical Interventions (Patient has pulse or is breathing):    Limited Support     Comments:    pts son Dylon Cline,  defers decisions to him.       ELGIN Zavala  07/08/22  11:01 EDT     Electronically signed by ELGIN Zavala, 07/08/22, 11:04 AM EDT.  Electronically signed by Melissa Merino MD, 07/08/22, 11:39 AM EDT.

## 2022-07-08 NOTE — PLAN OF CARE
Goal Outcome Evaluation:  Plan of Care Reviewed With: patient           Outcome Evaluation: Pt remains on HHF this shift. Pt pulled O2 out at one point during shift and desatted into high 70s. Sitter still at bedside. VSS at this time. No s/s of acute distress noted at this time.

## 2022-07-08 NOTE — PROGRESS NOTES
Pharmacy was consulted for gentamicin dosing for CRE UTI x 2 doses after HD.  PT is receiving HD twice weekly, with HD today.  Patient received a loading dose of 1.7mg/kg (110mg) on 7/6. Will give another dose gentamicin 1 mg/kg (60 mg) today following dialysis. If gentamicin continued beyond 2 doses will follow up on level at that time.     Thanks for this consult,  Frances ÁlvarezD

## 2022-07-08 NOTE — PROGRESS NOTES
"Palliative Care Daily Progress Note     S: Medical record reviewed, followed up with Primary RN Margoth and Dr Del Rosario regarding patient's condition. When Palliative entered the room the pt was extremely restless and appeared uncomfortable. She was awake and alert to self only and was complaining of her bottom hurting her. Pt's O2 requirements have gone up since yesterday from 15 L of bubble hi flow to heated high flow at 50L and 60% FiO2. She did not answer any of my questions as she was restless and confused.      O:   Palliative Performance Scale Score:     /80   Pulse 76   Temp 97.5 °F (36.4 °C) (Axillary)   Resp 18   Ht 170.2 cm (67\")   Wt 64.6 kg (142 lb 8 oz)   SpO2 98%   BMI 22.32 kg/m²     Intake/Output Summary (Last 24 hours) at 7/8/2022 1514  Last data filed at 7/8/2022 0002  Gross per 24 hour   Intake 120 ml   Output --   Net 120 ml       PE:  General Appearance:    Chronically ill appearing, alert to self, very restless, mod respiratory distress   HEENT:    NC/AT, without obvious abnormality, EOMI, anicteric    Neck:   supple, trachea midline, no JVD   Lungs:     Diminished throughout, slight tachypnea requiring HHF at 50L and 60%Fio2 mod distress    Heart:    RRR, normal S1 and S2, no M/R/G   Abdomen:     Soft, NT, ND, NABS    Extremities:   Moves all extremities weakly, 2+ lower extremity edema/wrapped in Kerlex/ACE   Pulses:   Pulses palpable and equal bilaterally   Skin:   Pale, Warm, dry   Neurologic:   Alert to self only, confused   Psych:   Calm, confused            Meds: Reviewed and changes noted.    Labs:   Results from last 7 days   Lab Units 07/08/22  0118   WBC 10*3/mm3 4.31   HEMOGLOBIN g/dL 7.3*   HEMATOCRIT % 22.8*   PLATELETS 10*3/mm3 203     Results from last 7 days   Lab Units 07/08/22  0118   SODIUM mmol/L 129*   POTASSIUM mmol/L 3.8   CHLORIDE mmol/L 93*   CO2 mmol/L 23.5   BUN mg/dL 26*   CREATININE mg/dL 2.01*   GLUCOSE mg/dL 233*   CALCIUM mg/dL 8.4*     Results from " last 7 days   Lab Units 07/08/22  0118 07/07/22  0527   SODIUM mmol/L 129* 133*   POTASSIUM mmol/L 3.8 3.7   CHLORIDE mmol/L 93* 95*   CO2 mmol/L 23.5 25.4   BUN mg/dL 26* 19   CREATININE mg/dL 2.01* 1.74*   CALCIUM mg/dL 8.4* 8.4*   BILIRUBIN mg/dL  --  0.6   ALK PHOS U/L  --  123*   ALT (SGPT) U/L  --  9   AST (SGOT) U/L  --  16   GLUCOSE mg/dL 233* 183*     Imaging Results (Last 72 Hours)     Procedure Component Value Units Date/Time    CT Chest Without Contrast Diagnostic [[id number]] Collected: 07/07/22 0929     Updated: 07/07/22 0945    Narrative:      EXAM:    CT Chest Without Intravenous Contrast     EXAM DATE:    7/7/2022 9:20 AM     CLINICAL HISTORY:    Worsening hypoxia; E16.2-Hypoglycemia, unspecified     TECHNIQUE:    Axial computed tomography images of the chest without intravenous  contrast.  Sagittal and coronal reformatted images were created and  reviewed.  This CT exam was performed using one or more of the following  dose reduction techniques:  automated exposure control, adjustment of  the mA and/or kV according to patient size, and/or use of iterative  reconstruction technique.     COMPARISON:    07/02/2022     FINDINGS:    Lungs:  Complete atelectasis of the bilateral lower lobes noted with  superimposed pneumonia.  Evolving groundglass airspace disease  throughout both lungs compatible with evolving airspace edema.  Partial  consolidation of the right middle lobe is slightly improved.  Partial  consolidation of the lingula has developed since previous exam.   Increase in the degree of interlobular septal thickening.    Pleural space:  Unremarkable.  No pneumothorax.  No significant  effusion.    Heart:  Marked cardiomegaly.  Severe coronary artery calcifications.   No pericardial effusion is noted.    Mediastinum:  No bulky mediastinal or hilar adenopathy.    Bones/joints:  No acute bony changes are identified.  No dislocation.    Soft tissues:  Anasarca.    Vasculature:  Atherosclerosis is  stable.  No thoracic aortic aneurysm.    Lymph nodes:  See above.    Tubes, lines and devices:  A right hemodialysis catheter terminates at  the cavoatrial junction.       Impression:      1.  Worsening changes of CHF now with evolving airspace edema.  2.  Slight increase in moderate to large right greater than left pleural  effusions.  3.  Increase consolidation and volume loss of the bilateral lower lobes.  4.  Otherwise stable chest CT with other nonacute findings as above.     This report was finalized on 7/7/2022 9:43 AM by Dr. Mingo Cardenas MD.       XR Chest 1 View [[id number]] Collected: 07/07/22 0721     Updated: 07/07/22 0723    Narrative:      CR Chest 1 Vw    INDICATION:   Hypoxia     COMPARISON:    Chest x-ray 7/5/2022    FINDINGS:  Portable AP view(s) of the chest.    No change double-lumen catheter. Lung volumes are low. Cardiac silhouette is mostly obscured by pulmonary parenchymal pathology. There is continued central vascular congestion and interstitial edema with bilateral infiltrates mid to lower lungs and  bilateral pleural effusions.    Likely mild improvement in volume status. No pneumothorax.       Impression:      Likely overall mild improvement in volume status since 7/5/2022.    Signer Name: Maegan Mcgee MD   Signed: 7/7/2022 7:21 AM   Workstation Name: [email or web address]    [facility]            Diagnostics: Reviewed    A: Sean Chen is a 82 y.o. female admitted on 7/2/2022 with leg swelling and fatigue, she has a medical history of progressive dementia, type 2 diabetes mellitus, ESRD on HD, essential hypertension, hyperlipidemia, rheumatoid arthritis, severe pulmonary hypertension, and  GERD. Pt sons Dylon  states that he gave his mother her insulin the night before her admission as directed by physician as her BS was 220. He states that his mother had been doing better with ambulating with family and therapy and that her appetite has been pretty consistent. He  states that dialysis had allowed her to miss last Wednesdays dialysis session as he says her urinary output was better and she was not over loaded. Patients blood sugar upon admission was 42.   Today 7/8/2022  T 97.5, HR 76, RR 18 and BP of 138/80 and was saturating 96% on HHF at 50L and 60%FiO2 and appeared uncomfortable.        P:  I was able to have a lengthy discussion with patients son Dylon regarding his mothers condition and that she has continued to declined daily, both in her mental status and her oxygen requirements have continued to increase. We discussed continuing treatment versus making her comfort measures and treating her symptoms. Dylon states that both he and his father do not want Sean to suffer and agree with focusing on her comfort instead of continuing treatment that has not been improving her condition. We specifically discussed dialysis and Dylon is agreeable to no longer continue dialysis and focus only on comfort. I Let Dr Muñoz know as well as Dr Del Rosario know of conversation and change to comfort measures.    We will continue to follow along. Please do not hesitate to contact us regarding further sx mgmt or GOC needs, including after hours or on weekends via our on call provider at [phone].     Elida Brown, APRN    7/8/2022

## 2022-07-08 NOTE — PROGRESS NOTES
Nephrology Progress Note      Subjective     Pt is feeling better, today, no chest pain or shortness of breath.   Objective       Vital signs :     Temp:  [97.5 °F (36.4 °C)-98.1 °F (36.7 °C)] 97.5 °F (36.4 °C)  Heart Rate:  [64-84] 76  Resp:  [18] 18  BP: (121-160)/(43-80) 138/80      Intake/Output Summary (Last 24 hours) at 7/8/2022 1028  Last data filed at 7/8/2022 0002  Gross per 24 hour   Intake 120 ml   Output --   Net 120 ml       Physical Exam:    General Appearance : not in acute distress  Lungs : B/L CTA  Heart :  regular rhythm & normal rate, normal S1, S2 and no murmur, no rub  Abdomen : normal bowel sounds, no masses, no hepatomegaly, no splenomegaly, soft non-tender and no guarding  Extremities : 1+ edema, no cyanosis and no redness  Neurologic :   Grossly no focal deficits        Laboratory Data :     Albumin Albumin   Date Value Ref Range Status   07/07/2022 3.44 (L) 3.50 - 5.20 g/dL Final      Magnesium Magnesium   Date Value Ref Range Status   07/07/2022 1.6 1.6 - 2.4 mg/dL Final          PTH               No results found for: PTH    CBC and coagulation:  Results from last 7 days   Lab Units 07/08/22  0118 07/07/22  0527 07/06/22  0416 07/05/22  0802 07/02/22  2339 07/02/22  1626   PROCALCITONIN ng/mL  --   --   --  0.24  --  0.16   LACTATE mmol/L  --   --   --   --   --  0.7   SED RATE mm/hr  --   --   --   --   --  40*   CRP mg/dL  --   --   --   --   --  1.96*   WBC 10*3/mm3 4.31 9.02 8.44  --    < > 7.05   HEMOGLOBIN g/dL 7.3* 8.1* 7.3*  --    < > 11.0*   HEMATOCRIT % 22.8* 24.7* 21.7*  --    < > 32.7*   MCV fL 91.9 92.2 90.0  --    < > 89.3   MCHC g/dL 32.0 32.8 33.6  --    < > 33.6   PLATELETS 10*3/mm3 203 220 211  --    < > 259   INR   --   --   --   --   --  0.93    < > = values in this interval not displayed.     Acid/base balance:  Results from last 7 days   Lab Units 07/07/22  1510 07/07/22  0637 07/07/22  0506   PH, ARTERIAL pH units 7.434 7.400 7.416   PO2 ART mm Hg 57.5* 80.0* 49.5*    PCO2, ARTERIAL mm Hg 44.8 47.3* 45.6*   HCO3 ART mmol/L 29.9* 29.3* 29.3*     Renal and electrolytes:  Results from last 7 days   Lab Units 07/08/22 0118 07/07/22 0527 07/06/22  0415 07/05/22 0344 07/04/22  0113   SODIUM mmol/L 129* 133* 129* 130* 128*   POTASSIUM mmol/L 3.8 3.7 3.3* 3.5 4.0   MAGNESIUM mg/dL  --  1.6  --   --   --    CHLORIDE mmol/L 93* 95* 93* 91* 93*   CO2 mmol/L 23.5 25.4 24.3 23.1 22.6   BUN mg/dL 26* 19 28* 20 31*   CREATININE mg/dL 2.01* 1.74* 2.29* 1.83* 2.74*   CALCIUM mg/dL 8.4* 8.4* 8.4* 8.5* 8.1*     Estimated Creatinine Clearance: 22.4 mL/min (A) (by C-G formula based on SCr of 2.01 mg/dL (H)).    Liver and pancreatic function:  Results from last 7 days   Lab Units 07/07/22 0527 07/05/22 0344 07/04/22 0113 07/02/22 2339 07/02/22  1626   ALBUMIN g/dL 3.44* 3.56 2.88*   < > 3.17*   BILIRUBIN mg/dL 0.6 0.5 0.4   < > 0.4   ALK PHOS U/L 123* 100 105   < > 113   AST (SGOT) U/L 16 13 13   < > 16   ALT (SGPT) U/L 9 6 7   < > 7   AMMONIA umol/L  --   --   --   --  <10*    < > = values in this interval not displayed.         Cardiac:  Results from last 7 days   Lab Units 07/02/22  1626   PROBNP pg/mL >70,000.0*     Liver and pancreatic function:  Results from last 7 days   Lab Units 07/07/22 0527 07/05/22 0344 07/04/22 0113 07/02/22 2339 07/02/22  1626   ALBUMIN g/dL 3.44* 3.56 2.88*   < > 3.17*   BILIRUBIN mg/dL 0.6 0.5 0.4   < > 0.4   ALK PHOS U/L 123* 100 105   < > 113   AST (SGOT) U/L 16 13 13   < > 16   ALT (SGPT) U/L 9 6 7   < > 7   AMMONIA umol/L  --   --   --   --  <10*    < > = values in this interval not displayed.       Medications :     aspirin, 81 mg, Oral, Daily  atorvastatin, 80 mg, Oral, Nightly  calcium acetate, 1,334 mg, Oral, TID With Meals  carvedilol, 6.25 mg, Oral, BID With Meals  heparin (porcine), 5,000 Units, Subcutaneous, Q8H  ipratropium-albuterol, 3 mL, Nebulization, 4x Daily - RT  NIFEdipine CC, 60 mg, Oral, Q24H  Renal, 1 capsule, Oral, Daily  sodium  chloride, 10 mL, Intravenous, Q12H      Pharmacy Consult - Pharmacy to dose,           Assessment & Plan     1.  End-stage renal disease on dialysis  2.  Noncompliance with dialysis  3.  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  Diabetes to poor control  5.  Anemia     Dialysis today and continue on IHDx MWF schedule, mild hyponatremia likely due to hypervolumia and is expected to improve with UF today.   Anemia, adequate iron stores, HH below goal will give EPO today      Jude Muñoz MD  07/08/22  10:28 EDT

## 2022-07-08 NOTE — PLAN OF CARE
Goal Outcome Evaluation:      Patient has been resting in bed this shift. Patient currently on heated high flow; titrated down this shift. Patient made comfort measures this shift. Sitter at bedside. Will continue to follow plan of care.

## 2022-07-08 NOTE — PROGRESS NOTES
[facility] HOSPITALIST PROGRESS NOTE    Subjective     History:   Sean Chen is a 82 y.o. female admitted on 7/2/2022 secondary to <principal problem not specified>     Procedures: None    CC: Follow up hypoglycemia     Patient seen and examined with BRYAN Justin. Awake and alert but confused. Unable to provide reliable history but reports nausea and pain. Currently on heated HFNC.     History taken from: chart, and RN.      Objective     Vital Signs  Temp:  [97.5 °F (36.4 °C)-98.1 °F (36.7 °C)] 97.5 °F (36.4 °C)  Heart Rate:  [65-84] 65  Resp:  [18] 18  BP: (138-160)/(64-80) 141/64    Intake/Output Summary (Last 24 hours) at 7/8/2022 1948  Last data filed at 7/8/2022 1557  Gross per 24 hour   Intake 120 ml   Output 3000 ml   Net -2880 ml         Physical Exam:  General:    Awake, alert but confused, appears acutely ill and uncomfortable, chronically ill appearing   Heart:      Normal S1 and S2. Regular rate and rhythm. No significant murmur, rubs or gallops appreciated.   Lungs:     Respirations regular, even and unlabored. Diminished breath sounds throughout with scattered wheezes.    Abdomen:   Soft and nontender. No guarding, rebound tenderness or  organomegaly noted. Bowel sounds present x 4.   Extremities:  Trace bilateral lower extremity edema.      Results Review:    Results from last 7 days   Lab Units 07/08/22  0118 07/07/22  0527 07/06/22  0416 07/05/22  0344 07/04/22  0113 07/02/22  2339 07/02/22  1626   WBC 10*3/mm3 4.31 9.02 8.44 10.43 8.33 10.18 7.05   HEMOGLOBIN g/dL 7.3* 8.1* 7.3* 7.8* 8.7* 9.9* 11.0*   PLATELETS 10*3/mm3 203 220 211 254 249 241 259     Results from last 7 days   Lab Units 07/08/22  0118 07/07/22  0527 07/06/22  0415 07/05/22  0344 07/04/22  0113 07/02/22  2339 07/02/22  1626   SODIUM mmol/L 129* 133* 129* 130* 128* 133* 132*   POTASSIUM mmol/L 3.8 3.7 3.3* 3.5 4.0 3.9 3.5   CHLORIDE mmol/L 93* 95* 93* 91* 93* 97* 94*   CO2 mmol/L 23.5 25.4 24.3 23.1 22.6 24.0  24.3   BUN mg/dL 26* 19 28* 20 31* 24* 21   CREATININE mg/dL 2.01* 1.74* 2.29* 1.83* 2.74* 2.52* 2.49*   CALCIUM mg/dL 8.4* 8.4* 8.4* 8.5* 8.1* 7.7* 8.5*   GLUCOSE mg/dL 233* 183* 160* 138* 136* 81 42*     Results from last 7 days   Lab Units 07/07/22  0527 07/05/22  0344 07/04/22  0113 07/02/22  2339 07/02/22  1626   BILIRUBIN mg/dL 0.6 0.5 0.4 0.4 0.4   ALK PHOS U/L 123* 100 105 94 113   AST (SGOT) U/L 16 13 13 14 16   ALT (SGPT) U/L 9 6 7 <5 7     Results from last 7 days   Lab Units 07/07/22  0527   MAGNESIUM mg/dL 1.6     Results from last 7 days   Lab Units 07/02/22  1626   INR  0.93     Results from last 7 days   Lab Units 07/02/22  2339 07/02/22  1846 07/02/22  1626   CK TOTAL U/L  --   --  41   TROPONIN T ng/mL 0.066* 0.055* 0.061*       Imaging Results (Last 24 Hours)     ** No results found for the last 24 hours. 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    Acute metabolic encephalopathy superimposed on baseline dementia   Acute on chronic combined systolic and diastolic CHF  Acute hypoxic respiratory failure   Chronically elevated troponin's  ESRD on HD  Essential HTN   Hyperlipidemia  Generalized weakness    Plan  Discussed with ID this AM with concern for increasing consolidation on CT and escalating O2 requirements. ID initiated Merrem as pt is at high risk for aspiration pneumonia. Pt appeared more uncomfortable today with pain and nausea. Palliative care continued ongoing GOC discussions with family and after further discussion they elected to transition to comfort measures only. Cont supportive treatment. Input from consultants appreciated.      Discussed with ID and palliative care APRN's.     Disposition: Transition to comfort  measures today.     Yovani Del Rosario,   07/08/22  19:48 EDT

## 2022-07-08 NOTE — CASE MANAGEMENT/SOCIAL WORK
Discharge Planning Assessment   Mina     Patient Name: Sean Chen  MRN: [id number]  Today's Date: 7/8/2022    Admit Date: 7/2/2022                   Discharge Plan     Row Name 07/08/22 1347       Plan    Plan Pt admitted on 07/02/22. Palliative care is following for [facility]. Pt lives with her spouse, Dylon. Pt's son states he and Pt's family are working on providing 24/7 shifts at home. Pt attends [facility] Mon, Wed, and Friday at 10:30am. Pt utilizes a bedside commode, hospital bed, home oxygen at 2 liters, portable O2 tanks, and wheelchair via [facility]. SS to follow.                Kavitha Gatica

## 2022-07-09 PROCEDURE — 99231 SBSQ HOSP IP/OBS SF/LOW 25: CPT | Performed by: INTERNAL MEDICINE

## 2022-07-09 PROCEDURE — 94799 UNLISTED PULMONARY SVC/PX: CPT

## 2022-07-09 PROCEDURE — 94761 N-INVAS EAR/PLS OXIMETRY MLT: CPT

## 2022-07-09 PROCEDURE — 25010000002 MORPHINE PER 10 MG: Performed by: NURSE PRACTITIONER

## 2022-07-09 PROCEDURE — 99232 SBSQ HOSP IP/OBS MODERATE 35: CPT | Performed by: NURSE PRACTITIONER

## 2022-07-09 RX ADMIN — Medication 10 ML: at 08:28

## 2022-07-09 RX ADMIN — CARVEDILOL 6.25 MG: 6.25 TABLET, FILM COATED ORAL at 08:28

## 2022-07-09 RX ADMIN — Medication 10 ML: at 20:31

## 2022-07-09 RX ADMIN — ATORVASTATIN CALCIUM 80 MG: 40 TABLET, FILM COATED ORAL at 20:30

## 2022-07-09 RX ADMIN — CARVEDILOL 6.25 MG: 6.25 TABLET, FILM COATED ORAL at 17:31

## 2022-07-09 RX ADMIN — MORPHINE SULFATE 2 MG: 2 INJECTION, SOLUTION INTRAMUSCULAR; INTRAVENOUS at 08:24

## 2022-07-09 RX ADMIN — ASPIRIN 81 MG: 81 TABLET, COATED ORAL at 08:24

## 2022-07-09 RX ADMIN — MORPHINE SULFATE 2 MG: 2 INJECTION, SOLUTION INTRAMUSCULAR; INTRAVENOUS at 16:23

## 2022-07-09 NOTE — PLAN OF CARE
Problem: Fall Injury Risk  Goal: Absence of Fall and Fall-Related Injury  Intervention: Identify and Manage Contributors  Recent Flowsheet Documentation  Taken 7/8/2022 1915 by Nkechi Guzman RN  Medication Review/Management: medications reviewed  Intervention: Promote Injury-Free Environment  Recent Flowsheet Documentation  Taken 7/9/2022 0100 by Nkechi Guzman, RN  Safety Promotion/Fall Prevention:   safety round/check completed   room organization consistent   fall prevention program maintained   clutter free environment maintained   assistive device/personal items within reach  Taken 7/8/2022 2300 by Nkechi Guzman RN  Safety Promotion/Fall Prevention:   safety round/check completed   room organization consistent   fall prevention program maintained   clutter free environment maintained   assistive device/personal items within reach  Taken 7/8/2022 2100 by Nkechi Guzman RN  Safety Promotion/Fall Prevention:   safety round/check completed   room organization consistent   fall prevention program maintained   assistive device/personal items within reach   clutter free environment maintained  Taken 7/8/2022 1915 by Nkechi Guzman RN  Safety Promotion/Fall Prevention:   safety round/check completed   room organization consistent   fall prevention program maintained   clutter free environment maintained   assistive device/personal items within reach   Goal Outcome Evaluation:

## 2022-07-09 NOTE — PROGRESS NOTES
[facility] HOSPITALIST PROGRESS NOTE    Subjective     History:   Sean Chen is a 82 y.o. female admitted on 7/2/2022 secondary to <principal problem not specified>     Procedures: None    CC: Follow up hypoglycemia     Patient seen and examined with BRYAN Loyola. Awake and alert but confused. Unable to provide reliable history but appears to be resting more comfortably today. Currently on heated HFNC. Sitter reports pt has occasionally pulled at lines and tried to get out of bed. No acute events overnight per RN.     History taken from: chart, and RN.      Objective     Vital Signs  Temp:  [97 °F (36.1 °C)-98.6 °F (37 °C)] 98.6 °F (37 °C)  Heart Rate:  [62-70] 63  Resp:  [6-20] 8  BP: ()/(45-68) 136/68    Intake/Output Summary (Last 24 hours) at 7/9/2022 1728  Last data filed at 7/9/2022 1230  Gross per 24 hour   Intake 2040 ml   Output --   Net 2040 ml         Physical Exam:  General:    Awake, alert but confused, appears to be resting more comfortably today, chronically ill appearing   Heart:      Normal S1 and S2. Regular rate and rhythm. No significant murmur, rubs or gallops appreciated.   Lungs:     Respirations regular, even and unlabored. Diminished breath sounds throughout with scattered wheezes.    Abdomen:   Soft and nontender. No guarding, rebound tenderness or  organomegaly noted. Bowel sounds present x 4.   Extremities:  Trace bilateral lower extremity edema.      Results Review:    Results from last 7 days   Lab Units 07/08/22  0118 07/07/22 0527 07/06/22  0416 07/05/22  0344 07/04/22  0113 07/02/22  2339   WBC 10*3/mm3 4.31 9.02 8.44 10.43 8.33 10.18   HEMOGLOBIN g/dL 7.3* 8.1* 7.3* 7.8* 8.7* 9.9*   PLATELETS 10*3/mm3 203 220 211 254 249 241     Results from last 7 days   Lab Units 07/08/22  0118 07/07/22  0527 07/06/22  0415 07/05/22  0344 07/04/22  0113 07/02/22  2339   SODIUM mmol/L 129* 133* 129* 130* 128* 133*   POTASSIUM mmol/L 3.8 3.7 3.3* 3.5 4.0 3.9   CHLORIDE  mmol/L 93* 95* 93* 91* 93* 97*   CO2 mmol/L 23.5 25.4 24.3 23.1 22.6 24.0   BUN mg/dL 26* 19 28* 20 31* 24*   CREATININE mg/dL 2.01* 1.74* 2.29* 1.83* 2.74* 2.52*   CALCIUM mg/dL 8.4* 8.4* 8.4* 8.5* 8.1* 7.7*   GLUCOSE mg/dL 233* 183* 160* 138* 136* 81     Results from last 7 days   Lab Units 07/07/22  0527 07/05/22  0344 07/04/22  0113 07/02/22  2339   BILIRUBIN mg/dL 0.6 0.5 0.4 0.4   ALK PHOS U/L 123* 100 105 94   AST (SGOT) U/L 16 13 13 14   ALT (SGPT) U/L 9 6 7 <5     Results from last 7 days   Lab Units 07/07/22  0527   MAGNESIUM mg/dL 1.6         Results from last 7 days   Lab Units 07/02/22  2339 07/02/22  1846   TROPONIN T ng/mL 0.066* 0.055*       Imaging Results (Last 24 Hours)     ** No results found for the last 24 hours. 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    Acute metabolic encephalopathy superimposed on baseline dementia   Acute on chronic combined systolic and diastolic CHF  Acute hypoxic respiratory failure   Chronically elevated troponin's  ESRD on HD  Essential HTN   Hyperlipidemia  Generalized weakness    Plan  Pt appears to be resting more comfortably today. PRN's have been effective per RN. Cont current PRN medication regimen. Cont supportive treatment.       Disposition: Cont comfort measures.      Yovani Del Rosario DO  07/09/22  17:28 EDT

## 2022-07-09 NOTE — PROGRESS NOTES
PROGRESS NOTE         Patient Identification:  Name:  Sean Chen  Age:  82 y.o.  Sex:  female  :  1940  MRN:  [id number]  Visit Number:  [id number]  Primary Care Provider:  Ganga Gallardo MD         LOS: 7 days       ----------------------------------------------------------------------------------------------------------------------  Subjective       Chief Complaints:    Leg Swelling and Fatigue        Interval History:      Patient continues on heated high flow at 50 L / 65% FiO2 this morning.  Sleeping this morning.  Family has transition patient to comfort measures.    Review of Systems:    Constitutional: no fever, chills and night sweats.  Generalized fatigue.  Eyes: no eye drainage, itching or redness.  HEENT: no mouth sores, dysphagia or nose bleed.  Respiratory: Positive shortness of breath, no cough or production of sputum.  Cardiovascular: no chest pain, no palpitations, no orthopnea.  Gastrointestinal: no nausea, vomiting or diarrhea. No abdominal pain, hematemesis or rectal bleeding.  Genitourinary: no dysuria or polyuria.  Hematologic/lymphatic: no lymph node abnormalities, no easy bruising or easy bleeding.  Musculoskeletal: no muscle or joint pain.  Skin: No rash and no itching.  Neurological: no loss of consciousness, no seizure, no headache.  Behavioral/Psych: no depression or suicidal ideation.  Endocrine: no hot flashes.  Immunologic: negative.    ----------------------------------------------------------------------------------------------------------------------      Objective       \[facility]\"" Meds:  aspirin, 81 mg, Oral, Daily  atorvastatin, 80 mg, Oral, Nightly  carvedilol, 6.25 mg, Oral, BID With Meals  sodium chloride, 10 mL, Intravenous, Q12H         ----------------------------------------------------------------------------------------------------------------------    Vital Signs:  Temp:  [97 °F (36.1 °C)-97.8 °F (36.6 °C)] 97 °F (36.1 °C)  Heart  Rate:  [62-69] 66  Resp:  [6-18] 6  BP: ()/(45-64) 97/45  Mean Arterial Pressure (Non-Invasive) for the past 24 hrs (Last 3 readings):   Noninvasive MAP (mmHg)   07/09/22 0942 65   07/09/22 0630 66   07/08/22 1812 98     SpO2 Percentage    07/09/22 0039 07/09/22 0630 07/09/22 0942   SpO2: 98% 97% 98%     SpO2:  [97 %-98 %] 98 %  on  Flow (L/min):  [50] 50;   Device (Oxygen Therapy): heated;high-flow nasal cannula    Body mass index is 22.13 kg/m².  Wt Readings from Last 3 Encounters:   07/09/22 64.1 kg (141 lb 4.8 oz)   05/31/22 57 kg (125 lb 9.6 oz)   05/13/22 67.1 kg (147 lb 14.4 oz)        Intake/Output Summary (Last 24 hours) at 7/9/2022 1031  Last data filed at 7/9/2022 0420  Gross per 24 hour   Intake 1440 ml   Output 3000 ml   Net -1560 ml     Diet Regular  ----------------------------------------------------------------------------------------------------------------------      Physical Exam:      Constitutional: Elderly  female is resting comfortably in bed in no apparent distress.  Confused, with dementia at baseline.  CNA is at bedside.      HENT:  Head: Normocephalic and atraumatic.  Mouth:  Moist mucous membranes.    Eyes:  Conjunctivae and EOM are normal.  No scleral icterus.  Neck:  Neck supple.  No JVD present.    Cardiovascular:  Normal rate, regular rhythm and normal heart sounds with no murmur. No edema.  Pulmonary/Chest:   Diminished lung sounds bilaterally.  Abdominal:  Soft.  Bowel sounds are normal.  No distension and no tenderness.   Musculoskeletal:  No edema, no tenderness, and no deformity.  No swelling or redness of joints.  Neurological:  Alert and oriented to person.  No facial droop.  No slurred speech.   Skin: Very dry bilateral lower extremities with chemical dermatitis and sloughing.  Psychiatric:  Normal mood and affect.  Behavior is  normal.    ----------------------------------------------------------------------------------------------------------------------  Results from last 7 days   Lab Units 07/02/22 2339 07/02/22  1846 07/02/22  1626   CK TOTAL U/L  --   --  41   TROPONIN T ng/mL 0.066* 0.055* 0.061*     Results from last 7 days   Lab Units 07/02/22  1626   PROBNP pg/mL >70,000.0*       Results from last 7 days   Lab Units 07/07/22  1510   PH, ARTERIAL pH units 7.434   PO2 ART mm Hg 57.5*   PCO2, ARTERIAL mm Hg 44.8   HCO3 ART mmol/L 29.9*     Results from last 7 days   Lab Units 07/08/22  0118 07/07/22 0527 07/06/22  0416 07/02/22  2339 07/02/22  1626   CRP mg/dL  --   --   --   --  1.96*   LACTATE mmol/L  --   --   --   --  0.7   WBC 10*3/mm3 4.31 9.02 8.44   < > 7.05   HEMOGLOBIN g/dL 7.3* 8.1* 7.3*   < > 11.0*   HEMATOCRIT % 22.8* 24.7* 21.7*   < > 32.7*   MCV fL 91.9 92.2 90.0   < > 89.3   MCHC g/dL 32.0 32.8 33.6   < > 33.6   PLATELETS 10*3/mm3 203 220 211   < > 259   INR   --   --   --   --  0.93    < > = values in this interval not displayed.     Results from last 7 days   Lab Units 07/08/22  0118 07/07/22 0527 07/06/22 0415 07/05/22  0344 07/04/22  0113   SODIUM mmol/L 129* 133* 129* 130* 128*   POTASSIUM mmol/L 3.8 3.7 3.3* 3.5 4.0   MAGNESIUM mg/dL  --  1.6  --   --   --    CHLORIDE mmol/L 93* 95* 93* 91* 93*   CO2 mmol/L 23.5 25.4 24.3 23.1 22.6   BUN mg/dL 26* 19 28* 20 31*   CREATININE mg/dL 2.01* 1.74* 2.29* 1.83* 2.74*   CALCIUM mg/dL 8.4* 8.4* 8.4* 8.5* 8.1*   GLUCOSE mg/dL 233* 183* 160* 138* 136*   ALBUMIN g/dL  --  3.44*  --  3.56 2.88*   BILIRUBIN mg/dL  --  0.6  --  0.5 0.4   ALK PHOS U/L  --  123*  --  100 105   AST (SGOT) U/L  --  16  --  13 13   ALT (SGPT) U/L  --  9  --  6 7   Estimated Creatinine Clearance: 22.2 mL/min (A) (by C-G formula based on SCr of 2.01 mg/dL (H)).  No results found for: AMMONIA    Glucose   Date/Time Value Ref Range Status   07/08/2022 1028 225 (H) 70 - 130 mg/dL Final     Comment:      Meter: [id number] : [id number] LORENE LOCO   07/07/2022 1808 196 (H) 70 - 130 mg/dL Final     Comment:     Meter: [id number] : [id number] VICKIE LING   07/07/2022 1657 171 (H) 70 - 130 mg/dL Final     Comment:     Meter: [id number] : [id number] LORENE SURGENER   07/07/2022 1049 187 (H) 70 - 130 mg/dL Final     Comment:     Meter: [id number] : [id number] LORENE SURGENER   07/07/2022 0650 176 (H) 70 - 130 mg/dL Final     Comment:     Meter: [id number] : [id number] LORENE SURGENER   07/06/2022 2049 263 (H) 70 - 130 mg/dL Final     Comment:     Meter: [id number] : [id number] Denise Crystal   07/06/2022 1755 163 (H) 70 - 130 mg/dL Final     Comment:     RN Notified Meter: [id number] : [id number] TY PARSONS   07/06/2022 1038 263 (H) 70 - 130 mg/dL Final     Comment:     Meter: [id number] : [id number] TY PARSONS     Lab Results   Component Value Date    HGBA1C 5.50 07/02/2022     Lab Results   Component Value Date    TSH 1.390 07/02/2022    FREET4 1.54 07/02/2022       Blood Culture   Date Value Ref Range Status   07/02/2022 No growth at 4 days  Preliminary   07/02/2022 No growth at 4 days  Preliminary     Urine Culture   Date Value Ref Range Status   07/02/2022 50,000 CFU/mL Enterobacter cloacae complex CRE (A)  Final     Comment:     Consider infectious disease consult.  Susceptibility results may not correlate to clinical outcomes.  Carbapenem resistant Enterobacteriaceae, patient may be an isolation risk.  No carbapenemase detected.     No results found for: WOUNDCX  No results found for: STOOLCX  No results found for: RESPCX  Pain Management Panel     Pain Management Panel Latest Ref Rng & Units 7/2/2022 5/23/2022    CREATININE UR mg/dL - -    AMPHETAMINES SCREEN, URINE Negative Negative Negative    BARBITURATES SCREEN Negative Negative Negative    BENZODIAZEPINE SCREEN, URINE Negative Negative Negative    BUPRENORPHINEUR Negative Negative Negative    COCAINE SCREEN,  URINE Negative Negative Negative    METHADONE SCREEN, URINE Negative Negative Negative    METHAMPHETAMINEUR Negative Negative Negative            ----------------------------------------------------------------------------------------------------------------------  Imaging Results (Last 24 Hours)     ** No results found for the last 24 hours. **          ----------------------------------------------------------------------------------------------------------------------    Assessment/Plan         ASSESSMENT:     UTI  Multidrug-resistant organism with CRE  End-stage renal disease on hemodialysis        PLAN:       Patient continues on heated high flow at 50 L / 65% FiO2 this morning.  Sleeping this morning.  Family has transition patient to comfort measures.    CTA chest from 7/7/2022 reports worsening changes of CHF with now evolving airspace edema, slight increase in moderate to large right greater than left pleural effusions, increased consolidation volume loss of bilateral lower lobes.    The patient presented to [facility] Emergency Department on 7/2/2022 for bilateral leg edema and fatigue.     The patient is on 3 L nasal cannula, which is stable.  CNA is at bedside.  Patient has underlying dementia and is not able to provide a review of systems.  Reports that she does not feel well today, but is not able to specify any particular complaints.  Nurse remove dressings from bilateral lower extremities and they are very dry skin with chemical dermatitis and sloughing.  Clear to auscultation bilaterally.  Afebrile, no diarrhea.  WBC on 7/6/2022 is normal.  Lactic acid on admission was normal.  Urinalysis on 7/2/2022 was positive with 13-20 WBCs and 2+ bacteria.  No urine culture was performed.  Repeat urinalysis on 7/2/2022 was positive with 6-12 WBCs and 3+ bacteria.  Urine culture finalized as 50,000 colonies of CRE Enterobacter cloacae complex.  Bilateral knee x-rays on 7/5/2022 showed advanced  tricompartmental osteoarthritis of bilateral knees.  No acute osseous or articular abnormalities identified.  Chest x-ray on 7/5/2022 showed bilateral pleural effusions with compressive atelectasis or pneumonia worse on the right than the left.  Probable underlying volume overload.  No pneumothorax.  CT abdomen pelvis on 7/2/2022 showed diffuse body wall edema.  Extensive vascular calcifications.  No definite acute process in the abdomen or pelvis by noncontrast CT.  CT chest on 7/2/2022 showed large right and moderate left pleural effusions.  Areas of consolidative change and nodularity seen adjacent to the pleural effusions within the dependent lungs.  Some scattered reticular nodularity in the right apex.  Findings favor volume overload or multifocal pneumonia with bilateral pleural effusions.  Cardiomegaly.  Severe coronary artery atherosclerosis.  Chest x-ray on 7/2/2022 showed bilateral effusions, relatively stable on the right and improved on the left.  Vascular congestion without definite pulmonary edema.  Bibasilar infiltrate or atelectasis, right greater than left.  COVID-19 and flu A/B PCR on 7/2/2022 was negative.  Blood cultures on 7/2/2022 are so far showing no growth.      Patient continues to have chronic venous stasis dermatitis with dry skin and sloughing to both lower extremity mostly to the calf areas and does not seem to be worsening nor showing evidence of active infection.    Due to patient's continued respiratory decline patient family has transition patient to comfort measures and antibiotic therapy has been discontinued per the request.  ID will sign off for now.  Please contact us if we can further assist in this case.       Code Status:   Code Status and Medical Interventions:   Ordered at: 07/08/22 1323     Level Of Support Discussed With:    Next of Kin (If No Surrogate)     Code Status (Patient has no pulse and is not breathing):    No CPR (Do Not Attempt to Resuscitate)     Medical  Interventions (Patient has pulse or is breathing):    Comfort Measures     Comments:    pt son Dylon and  Dylon Sr       ELGIN Zavala  07/09/22  10:31 EDT     Electronically signed by ELGIN Zavala, 07/09/22, 10:33 AM EDT.

## 2022-07-09 NOTE — PLAN OF CARE
Goal Outcome Evaluation:   Progress: no change  Patient is resting in bed with family at bedside. Patient has tolerated all interventions. No complaints or concerns at this time. No signs of acute distress noted. Will continue to follow plan of care.

## 2022-07-10 PROCEDURE — 25010000002 LORAZEPAM PER 2 MG: Performed by: HOSPITALIST

## 2022-07-10 PROCEDURE — 25010000002 MORPHINE PER 10 MG: Performed by: HOSPITALIST

## 2022-07-10 PROCEDURE — 25010000002 ONDANSETRON PER 1 MG: Performed by: HOSPITALIST

## 2022-07-10 PROCEDURE — 94760 N-INVAS EAR/PLS OXIMETRY 1: CPT

## 2022-07-10 PROCEDURE — 99231 SBSQ HOSP IP/OBS SF/LOW 25: CPT | Performed by: INTERNAL MEDICINE

## 2022-07-10 RX ORDER — HALOPERIDOL 5 MG/ML
0.5 INJECTION INTRAMUSCULAR EVERY 6 HOURS PRN
Status: DISCONTINUED | OUTPATIENT
Start: 2022-07-10 | End: 2022-07-13 | Stop reason: HOSPADM

## 2022-07-10 RX ADMIN — MORPHINE SULFATE 2 MG: 2 INJECTION, SOLUTION INTRAMUSCULAR; INTRAVENOUS at 20:39

## 2022-07-10 RX ADMIN — MORPHINE SULFATE 2 MG: 2 INJECTION, SOLUTION INTRAMUSCULAR; INTRAVENOUS at 09:18

## 2022-07-10 RX ADMIN — MORPHINE SULFATE 2 MG: 2 INJECTION, SOLUTION INTRAMUSCULAR; INTRAVENOUS at 00:04

## 2022-07-10 RX ADMIN — CARVEDILOL 6.25 MG: 6.25 TABLET, FILM COATED ORAL at 08:12

## 2022-07-10 RX ADMIN — ONDANSETRON 4 MG: 2 INJECTION INTRAMUSCULAR; INTRAVENOUS at 20:39

## 2022-07-10 RX ADMIN — CARVEDILOL 6.25 MG: 6.25 TABLET, FILM COATED ORAL at 17:16

## 2022-07-10 RX ADMIN — Medication 10 ML: at 08:13

## 2022-07-10 RX ADMIN — LORAZEPAM 0.5 MG: 2 INJECTION INTRAMUSCULAR at 10:34

## 2022-07-10 RX ADMIN — ASPIRIN 81 MG: 81 TABLET, COATED ORAL at 08:12

## 2022-07-10 NOTE — PROGRESS NOTES
[facility] HOSPITALIST PROGRESS NOTE    Subjective     History:   Sean Chen is a 82 y.o. female admitted on 7/2/2022 secondary to <principal problem not specified>     Procedures: None    CC: Follow up hypoglycemia     Patient seen and examined with BRYAN Herzog. Awake and alert. Pleasantly confused during my encounter but RN reports pt has had episodes of agitation today. Appears to be resting more comfortably compared to previous. O2 requirements improved. No acute events overnight per RN.     History taken from: chart, and RN.      Objective     Vital Signs  Temp:  [97.7 °F (36.5 °C)-98.6 °F (37 °C)] 97.7 °F (36.5 °C)  Heart Rate:  [56-72] 62  Resp:  [8-18] 18  BP: (128-137)/(57-68) 137/57    Intake/Output Summary (Last 24 hours) at 7/10/2022 1409  Last data filed at 7/10/2022 0900  Gross per 24 hour   Intake 240 ml   Output --   Net 240 ml         Physical Exam:  General:    Awake, alert, pleasantly confused, appears to be resting comfortably during my encounter, chronically ill appearing   Heart:      Normal S1 and S2. Regular rate and rhythm. No significant murmur, rubs or gallops appreciated.   Lungs:     Respirations regular, even and unlabored. Diminished breath sounds at bases.    Abdomen:   Soft and nontender. No guarding, rebound tenderness or  organomegaly noted. Bowel sounds present x 4.   Extremities:  Trace bilateral lower extremity edema.      Results Review:    Results from last 7 days   Lab Units 07/08/22 0118 07/07/22 0527 07/06/22 0416 07/05/22 0344 07/04/22  0113   WBC 10*3/mm3 4.31 9.02 8.44 10.43 8.33   HEMOGLOBIN g/dL 7.3* 8.1* 7.3* 7.8* 8.7*   PLATELETS 10*3/mm3 203 220 211 254 249     Results from last 7 days   Lab Units 07/08/22 0118 07/07/22 0527 07/06/22 0415 07/05/22  0344 07/04/22  0113   SODIUM mmol/L 129* 133* 129* 130* 128*   POTASSIUM mmol/L 3.8 3.7 3.3* 3.5 4.0   CHLORIDE mmol/L 93* 95* 93* 91* 93*   CO2 mmol/L 23.5 25.4 24.3 23.1 22.6   BUN mg/dL 26*  19 28* 20 31*   CREATININE mg/dL 2.01* 1.74* 2.29* 1.83* 2.74*   CALCIUM mg/dL 8.4* 8.4* 8.4* 8.5* 8.1*   GLUCOSE mg/dL 233* 183* 160* 138* 136*     Results from last 7 days   Lab Units 07/07/22  0527 07/05/22  0344 07/04/22  0113   BILIRUBIN mg/dL 0.6 0.5 0.4   ALK PHOS U/L 123* 100 105   AST (SGOT) U/L 16 13 13   ALT (SGPT) U/L 9 6 7     Results from last 7 days   Lab Units 07/07/22  0527   MAGNESIUM mg/dL 1.6               Imaging Results (Last 24 Hours)     ** No results found for the last 24 hours. 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    Acute metabolic encephalopathy superimposed on baseline dementia   Acute on chronic combined systolic and diastolic CHF  Acute hypoxic respiratory failure   Chronically elevated troponin's  ESRD on HD  Essential HTN   Hyperlipidemia  Generalized weakness    Plan  Pt appears to be resting more comfortably compared to previous. O2 requirements improved. Add PRN Haldol in the setting of intermittent agitation. Cont supportive treatment.       Disposition: Cont comfort measures.      Yovani Del Rosario,   07/10/22  14:09 EDT

## 2022-07-10 NOTE — PLAN OF CARE
Goal Outcome Evaluation:           Progress: improving  Outcome Evaluation: Pt resting in bed. Complaints of pain this morning, prn pain medication given per MAR. Family at bedside at this time. No other acute changes noted.

## 2022-07-10 NOTE — PLAN OF CARE
Goal Outcome Evaluation:  Plan of Care Reviewed With: patient        Progress: improving  Outcome Evaluation: Pt resting in bed. Has not attempted to pull O2 off tonight per sitter. O2 sat at 97% on 2L nc. Will continue with POC

## 2022-07-10 NOTE — SIGNIFICANT NOTE
Took patient off Heated HFNC and placed on bubble at this time, no distress noted.  Patient sat is 98-99 on 6L and will continue to titrate.

## 2022-07-10 NOTE — NURSING NOTE
Called to [facility] to report on pt, room not yet ready. Will call when bed is ready to transfer to them. Pt resting in bed with sitter at bedside. No obvious distress noted. Call light within reach and use encouraged.

## 2022-07-11 PROCEDURE — 99232 SBSQ HOSP IP/OBS MODERATE 35: CPT | Performed by: INTERNAL MEDICINE

## 2022-07-11 PROCEDURE — 25010000002 LORAZEPAM PER 2 MG: Performed by: HOSPITALIST

## 2022-07-11 PROCEDURE — 25010000002 MORPHINE PER 10 MG: Performed by: HOSPITALIST

## 2022-07-11 RX ORDER — LORAZEPAM 2 MG/ML
0.25 INJECTION INTRAMUSCULAR EVERY 6 HOURS PRN
Status: DISCONTINUED | OUTPATIENT
Start: 2022-07-11 | End: 2022-07-13 | Stop reason: HOSPADM

## 2022-07-11 RX ORDER — MORPHINE SULFATE 2 MG/ML
1 INJECTION, SOLUTION INTRAMUSCULAR; INTRAVENOUS EVERY 4 HOURS PRN
Status: DISCONTINUED | OUTPATIENT
Start: 2022-07-11 | End: 2022-07-13 | Stop reason: HOSPADM

## 2022-07-11 RX ADMIN — ATORVASTATIN CALCIUM 80 MG: 40 TABLET, FILM COATED ORAL at 20:40

## 2022-07-11 RX ADMIN — MORPHINE SULFATE 2 MG: 2 INJECTION, SOLUTION INTRAMUSCULAR; INTRAVENOUS at 09:34

## 2022-07-11 RX ADMIN — CARVEDILOL 6.25 MG: 6.25 TABLET, FILM COATED ORAL at 17:43

## 2022-07-11 RX ADMIN — ASPIRIN 81 MG: 81 TABLET, COATED ORAL at 08:12

## 2022-07-11 RX ADMIN — Medication 10 ML: at 08:12

## 2022-07-11 RX ADMIN — Medication 10 ML: at 20:40

## 2022-07-11 RX ADMIN — CARVEDILOL 6.25 MG: 6.25 TABLET, FILM COATED ORAL at 08:12

## 2022-07-11 RX ADMIN — LORAZEPAM 0.5 MG: 2 INJECTION INTRAMUSCULAR at 10:14

## 2022-07-11 NOTE — PROGRESS NOTES
"Palliative Care Daily Progress Note     S: The patient remains on comfort measures and continues to have intermittent periods of agitation.  This morning she is yelling out \"I need to get out of here!\"  She is due for her medication and the nurse says that it has been effective.  We will continue to monitor and may need to adjust medications.    O:   Palliative Performance Scale Score:     /56 (BP Location: Right arm, Patient Position: Lying)   Pulse 66   Temp 97.4 °F (36.3 °C) (Axillary)   Resp 18   Ht 170.2 cm (67\")   Wt 63.1 kg (139 lb 1.6 oz)   SpO2 90%   BMI 21.79 kg/m²     Intake/Output Summary (Last 24 hours) at 7/11/2022 1245  Last data filed at 7/10/2022 1700  Gross per 24 hour   Intake 0 ml   Output --   Net 0 ml       PE:  Refuses exam                                                  Meds: Reviewed     Labs:   Results from last 7 days   Lab Units 07/08/22  0118   WBC 10*3/mm3 4.31   HEMOGLOBIN g/dL 7.3*   HEMATOCRIT % 22.8*   PLATELETS 10*3/mm3 203     Results from last 7 days   Lab Units 07/08/22  0118   SODIUM mmol/L 129*   POTASSIUM mmol/L 3.8   CHLORIDE mmol/L 93*   CO2 mmol/L 23.5   BUN mg/dL 26*   CREATININE mg/dL 2.01*   GLUCOSE mg/dL 233*   CALCIUM mg/dL 8.4*     Results from last 7 days   Lab Units 07/08/22  0118 07/07/22  0527   SODIUM mmol/L 129* 133*   POTASSIUM mmol/L 3.8 3.7   CHLORIDE mmol/L 93* 95*   CO2 mmol/L 23.5 25.4   BUN mg/dL 26* 19   CREATININE mg/dL 2.01* 1.74*   CALCIUM mg/dL 8.4* 8.4*   BILIRUBIN mg/dL  --  0.6   ALK PHOS U/L  --  123*   ALT (SGPT) U/L  --  9   AST (SGOT) U/L  --  16   GLUCOSE mg/dL 233* 183*     Imaging Results (Last 72 Hours)     ** No results found for the last 72 hours. **            Diagnostics: Reviewed    A: End-stage renal disease with multiple complications now on comfort measures.  The patient remains agitated intermittently.      P: We will continue present management.  The nurse instructed to give dose now for her agitation.  We will " continue to monitor and offer support.      We will continue to follow along. Please do not hesitate to contact us regarding further sx mgmt or GOC needs, including after hours or on weekends via our on call provider at [phone].     Beau Delgado MD    7/11/2022

## 2022-07-11 NOTE — PLAN OF CARE
Goal Outcome Evaluation:           Progress: no change  Outcome Evaluation: Pt rested well tonight. Pain reported x1. Morphine given with good results. no acute changes at time.

## 2022-07-11 NOTE — CASE MANAGEMENT/SOCIAL WORK
Discharge Planning Assessment  [facility]     Patient Name: Sean Chen  MRN: [id number]  Today's Date: 7/11/2022    Admit Date: 7/2/2022     Discharge Plan     Row Name 07/11/22 1646       Plan    Plan Pt is comfort measures only. SS attempted contact with sonDylon Jr. [phone] and left a message. SS to follow.    17:59: SS received call from sonDylon Jr. on this date who states plans for pt to return home at discharge. Pt's son voices agreement for pt to return home with hospice services if ordered. Pt's son does not have a preference of hospice agency. SS to follow.              Continued Care and Services - Admitted Since 7/2/2022     Home Medical Care     Service Provider Request Status Selected Services Address Phone Fax Patient Preferred    [facility]  Pending - No Request Sent N/A [address] [place] [address] [address] [facility] [id number] [phone] --            Selected Continued Care - Prior Encounters Includes selections from prior encounters from 4/3/2022 to 7/11/2022    Discharged on 6/8/2022 Admission date: 5/23/2022 - Discharge disposition: Home-Health Care Svc    Durable Medical Equipment     Service Provider Selected Services Address Phone Fax Patient Preferred    [facility]  Durable Medical Equipment [id number] [place] [address] [phone] --          Home Medical Care     Service Provider Selected Services Address Phone Fax Patient Preferred    [facility]  Home Health Services ,  Home Rehabilitation ,  Home Nursing [address] [place] [address] [facility] [address] [phone] --                    Expected Discharge Date and Time     Expected Discharge Date Expected Discharge Time    Jul 13, 2022         DEVONTE Jacobs

## 2022-07-11 NOTE — PROGRESS NOTES
[facility]     Progress Note    Patient Name: Sean Chen  : 1940  MRN: [id number]  Primary Care Physician:  Ganga Gallardo MD  Date of admission: 2022    Subjective   Subjective     Chief Complaint: 83 y/o female being seen in follow up for hypoglycemia    History of Present Illness  Patient Reports resting comfortably in bed - no distress appreciated. Some apnic episodes noted.    Review of Systems  No evidence of dyspnea. No generalized pain.     Objective   Objective     Vitals:   Temp:  [97 °F (36.1 °C)-98.6 °F (37 °C)] 97.4 °F (36.3 °C)  Heart Rate:  [58-66] 66  Resp:  [18-20] 18  BP: (134-152)/(56-66) 150/56  Flow (L/min):  [2] 2    Physical Exam  Constitutional:       General: She is not in acute distress.     Appearance: She is well-developed.   HENT:      Head: Normocephalic and atraumatic.   Neck:      Trachea: No tracheal deviation.   Cardiovascular:      Rate and Rhythm: Normal rate and regular rhythm.      Heart sounds: No murmur heard.    No friction rub. No gallop.   Pulmonary:      Effort: No respiratory distress.      Breath sounds: Examination of the right-lower field reveals decreased breath sounds. Examination of the left-lower field reveals decreased breath sounds. Decreased breath sounds present. No wheezing or rales.      Comments: Coarse bilateral breath sounds  Chest:      Comments: Right HD temporary catheter in place  Abdominal:      General: Bowel sounds are decreased. There is no distension.      Palpations: Abdomen is soft.      Tenderness: There is no abdominal tenderness. There is no guarding.   Skin:     General: Skin is warm and dry.      Findings: No erythema or rash.   Neurological:      Mental Status: She is lethargic.       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 Continue with as needed Robinul, Morphine, Haldol, Zofran, Ativan, etc. Continue with supplemental oxygen as needed. Add other supportive care pending patient's needs.     Spoke to patient's son Dylon via my office extension [phone] via phone and updated him on his mother's plan of care. He wishes to continue with comfort measures for now.    Disposition:  Continue comfort measures.    Olive Anil Paris, DO

## 2022-07-11 NOTE — NURSING NOTE
Nathaniel Osborne request for  To call for update.    Son: [phone]    Son's wife: [phone]

## 2022-07-11 NOTE — PLAN OF CARE
Goal Outcome Evaluation:           Progress: no change  Outcome Evaluation: Patient was very restless early in the shift, PRN medications given with good results.  Pt has been more calm this afternoon.

## 2022-07-12 PROCEDURE — 25010000002 LORAZEPAM PER 2 MG: Performed by: INTERNAL MEDICINE

## 2022-07-12 PROCEDURE — 99231 SBSQ HOSP IP/OBS SF/LOW 25: CPT | Performed by: INTERNAL MEDICINE

## 2022-07-12 PROCEDURE — 25010000002 MORPHINE PER 10 MG: Performed by: INTERNAL MEDICINE

## 2022-07-12 PROCEDURE — 25010000002 HALOPERIDOL LACTATE PER 5 MG: Performed by: INTERNAL MEDICINE

## 2022-07-12 RX ADMIN — LORAZEPAM 0.25 MG: 2 INJECTION INTRAMUSCULAR; INTRAVENOUS at 20:29

## 2022-07-12 RX ADMIN — MORPHINE SULFATE 1 MG: 2 INJECTION, SOLUTION INTRAMUSCULAR; INTRAVENOUS at 05:32

## 2022-07-12 RX ADMIN — CARVEDILOL 6.25 MG: 6.25 TABLET, FILM COATED ORAL at 08:24

## 2022-07-12 RX ADMIN — ATORVASTATIN CALCIUM 80 MG: 40 TABLET, FILM COATED ORAL at 20:29

## 2022-07-12 RX ADMIN — MORPHINE SULFATE 1 MG: 2 INJECTION, SOLUTION INTRAMUSCULAR; INTRAVENOUS at 09:10

## 2022-07-12 RX ADMIN — HALOPERIDOL LACTATE 0.5 MG: 5 INJECTION, SOLUTION INTRAMUSCULAR at 13:38

## 2022-07-12 RX ADMIN — MORPHINE SULFATE 1 MG: 2 INJECTION, SOLUTION INTRAMUSCULAR; INTRAVENOUS at 13:18

## 2022-07-12 RX ADMIN — LORAZEPAM 0.25 MG: 2 INJECTION INTRAMUSCULAR; INTRAVENOUS at 10:39

## 2022-07-12 RX ADMIN — Medication 10 ML: at 08:25

## 2022-07-12 RX ADMIN — Medication 10 ML: at 20:29

## 2022-07-12 RX ADMIN — ASPIRIN 81 MG: 81 TABLET, COATED ORAL at 08:24

## 2022-07-12 NOTE — PROGRESS NOTES
"Palliative Care Daily Progress Note     S: Medical record reviewed, followed up with Primary RN Joellen JIMENES and  regarding patient's condition. When palliative entered the room the pt was resting quietly and was in no distress. No family at bedside. I did not awaken her as she has periods of agitation and is now calm.      O:   Palliative Performance Scale Score:     /61 (BP Location: Right arm, Patient Position: Lying)   Pulse 65   Temp 97 °F (36.1 °C) (Axillary)   Resp 20   Ht 170.2 cm (67\")   Wt 63.1 kg (139 lb 1.6 oz)   SpO2 97%   BMI 21.79 kg/m²     Intake/Output Summary (Last 24 hours) at 7/12/2022 1543  Last data filed at 7/11/2022 2300  Gross per 24 hour   Intake 240 ml   Output --   Net 240 ml       PE:  Deferred assessment as pt is calm and restful, does not appear to be in distress.      Meds: Reviewed and changes noted.    Labs:   Results from last 7 days   Lab Units 07/08/22  0118   WBC 10*3/mm3 4.31   HEMOGLOBIN g/dL 7.3*   HEMATOCRIT % 22.8*   PLATELETS 10*3/mm3 203     Results from last 7 days   Lab Units 07/08/22  0118   SODIUM mmol/L 129*   POTASSIUM mmol/L 3.8   CHLORIDE mmol/L 93*   CO2 mmol/L 23.5   BUN mg/dL 26*   CREATININE mg/dL 2.01*   GLUCOSE mg/dL 233*   CALCIUM mg/dL 8.4*     Results from last 7 days   Lab Units 07/08/22  0118 07/07/22  0527   SODIUM mmol/L 129* 133*   POTASSIUM mmol/L 3.8 3.7   CHLORIDE mmol/L 93* 95*   CO2 mmol/L 23.5 25.4   BUN mg/dL 26* 19   CREATININE mg/dL 2.01* 1.74*   CALCIUM mg/dL 8.4* 8.4*   BILIRUBIN mg/dL  --  0.6   ALK PHOS U/L  --  123*   ALT (SGPT) U/L  --  9   AST (SGOT) U/L  --  16   GLUCOSE mg/dL 233* 183*     Imaging Results (Last 72 Hours)     ** No results found for the last 72 hours. **            Diagnostics: Reviewed    A: Sean Chen is a 82 y.o. yo female admitted on 7/2/2022 with leg swelling and fatigue, she has a medical history of progressive dementia, type 2 diabetes mellitus, ESRD on HD, essential hypertension, " hyperlipidemia, rheumatoid arthritis, severe pulmonary hypertension, and  GERD. Pt sons Dylon Cline states that he gave his mother her insulin the night before her admission as directed by physician as her BS was 220. He states that his mother had been doing better with ambulating with family and therapy and that her appetite has been pretty consistent. He states that dialysis had allowed her to miss last Wednesdays dialysis session as he says her urinary output was better and she was not over loaded. Patients blood sugar upon admission was 42.  Today 7/12/2022  Temp 97, HR 65, RR 20 and BP of 135/62 and was saturating 97% on 2L NC and was in no distress.    P:  I attempted on several occasions to get a hold of aj Osborne Jr without success to discuss equipment needs as decision has been made to take her home with hospice. I ordered home with hospice referral in. Per my discussion with Leeanna in SS she does not feel that they need equipment but may need to be switched out by [facility]. I spoke with Ortiz from [facility] and he is going to touch base with son to ensure they do not need equipment.     We will continue to follow along. Please do not hesitate to contact us regarding further sx mgmt or GOC needs, including after hours or on weekends via our on call provider at [phone].     Elida Brown, APRN    7/12/2022

## 2022-07-12 NOTE — CASE MANAGEMENT/SOCIAL WORK
Discharge Planning Assessment   Mina     Patient Name: Sean Chen  MRN: [id number]  Today's Date: 7/12/2022    Admit Date: 7/2/2022       Discharge Plan     Row Name 07/12/22 1131       Plan    Plan SS received consult for home with hospice Dx renal fx  home to [facility] contact is [phone]. SS spoke to pt's son yesterday and he does not have have a preference for hospice agency. SS contacted [facility] per Venus [phone] with referral information. [phone]. SS to follow.    14:57: Pt was discussed in [facility] today and Dr. Paris voices pt is ready for discharge. SS received call from Ortiz at [facility] who states plans to discuss dialysis with Palliative Care Elida EISENBERG. [facility] to notify SS if pt can be admitted. SS to follow.    16:48: SS received call from Ortiz at [facility] stating they will admit pt. SS notified Dr. Paris. SS to follow.              Continued Care and Services - Admitted Since 7/2/2022     Home Medical Care     Service Provider Request Status Selected Services Address Phone Fax Patient Preferred    [facility]  Pending - No Request Sent N/A [address] [facility] [address] [phone] --    [facility]  Pending - No Request Sent N/A [address] [facility] [address] [phone] --            Selected Continued Care - Prior Encounters Includes selections from prior encounters from 4/3/2022 to 7/12/2022    Discharged on 6/8/2022 Admission date: 5/23/2022 - Discharge disposition: Home-Health Care Svc    Durable Medical Equipment     Service Provider Selected Services Address Phone Fax Patient Preferred    [facility]  Durable Medical Equipment [id number] [place] [address] [phone] --          Home Medical Care     Service Provider Selected Services Address Phone Fax Patient Preferred    [facility]  Home Health Services ,  Home Rehabilitation ,  Home Nursing [address] [place] [address] [address] [facility] [address] [phone] --                    Expected Discharge Date and Time     Expected Discharge Date Expected Discharge Time    Jul 13, 2022       DEVONTE Jacobs

## 2022-07-12 NOTE — PLAN OF CARE
Goal Outcome Evaluation:           Progress: no change  Outcome Evaluation: Pt alert and oriented to self.  2L NC. Pt would not keep monitors placed.  Pt resting in bed with no complaints.  Call light in reach with bed low, locked, and alarmed.

## 2022-07-12 NOTE — PLAN OF CARE
Goal Outcome Evaluation:           Progress: no change  Outcome Evaluation: Patient has been extremely confused today.  Pulled IV out, dressings off and has taken gown off numerous times.  Has attempted to get out of bed frequently.  Bed alarm in use and properly working.  VSS.  Will continue to monitor.

## 2022-07-12 NOTE — DISCHARGE PLACEMENT REQUEST
"Sean Chen (82 y.o. Female)             Date of Birth   1940    Social Security Number       Address   [address] [place] [address] [address] [place] [id number]    Home Phone   [phone]    MRN   [id number]       Restoration   Unknown    Marital Status                               Admission Date   7/2/22    Admission Type   Emergency    Admitting Provider   Shaw Tomas DO    Attending Provider   Olive Paris DO    Department, Room/Bed   [address], 3335/       Discharge Date       Discharge Disposition       Discharge Destination                               Attending Provider: Olive Paris DO    Allergies: Keflex [Cephalexin], Lodine [Etodolac], Penicillins, Sulfa Antibiotics    Isolation: Contact   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 [address]       Subscriber Name Subscriber Birth Date Member ID       SEAN CHEN 1940 [id number]           Secondary Coverage     Payor Plan Insurance Group Employer/Plan Group    [facility]      Payor Plan Address Payor Plan Phone Number Payor Plan Fax Number Effective Dates    [address]   1/1/2014 - None Entered    [facility] [phone]       Subscriber Name Subscriber Birth Date Member ID       SEAN CHEN 1940 [id number]                 Emergency Contacts      (Rel.) Home Phone Work Phone Mobile Phone    Dylon Chen Jr (Son) [phone] -- [phone]    deysi chen -- -- " [phone]    Dylon Chen Sr (Spouse) [phone] -- --    Dylon Chen (Grandchild) -- -- [phone]        [address] [place] [address]  Phone:  [phone]  Fax:  [phone]        Patient: ROOM: [address] [place] [address]   Sean Chen MRN:  [id number]   [address]   [place] [address] :  1940  SSN:    Phone: [phone] Sex:  F   PCP: Ganga Gallardo                 Emergency Contact Information      Name Relation Home Work Mobile     Dylon Chen Jr Son [phone]     deysi chen       [phone]     Dylon Chen Sr Spouse [phone]         Dylon Chen Grandchild     [phone]          INSURANCE PAYOR PLAN GROUP # SUBSCRIBER ID   Primary:  Secondary:    HUMANA MEDICARE REPLACEMENT  AET [employer] [place] [id number]  [id number]    [id number]  [id number]   Admitting Diagnosis: Hypoglycemia [E16.2]  Order Date:  2022        Case Management  Consult       (Order ID: [id number])     Diagnosis:         Priority:  Routine Expected Date:   Expiration Date:        Interval:   Count:    Reason for Consult? home with hospice Dx renal fx  home to adonay Osborne JR house contact is [phone]        Authorizing Provider:Elida Brown APRN  Authorizing Provider's NPI: [id number]  Order Entered By: Elida Brown APRN 2022  8:44 AM     Electronically signed by: Elida Brown APRN 2022  8:44 AM            History & Physical      RuddyHiral PA-C at 22 1819     Attestation signed by Shaw Tomas DO at 22 1905    I have reviewed this documentation and agree.  Patient seen and evaluated with pertinent LELAND Davis PA-C at bedside.  Patient with some lower extremity edema and imaging consistent with mild anasarca and volume overload.  She has had intermittent hemodialysis and outpatient dialysis and has been skipping dialysis sessions on an as-needed basis based on urine  output however son does admit that they are not always compliant with fluid restriction.  There is possible UTI present and for now we will continue aztreonam and await urine culture.  Her mostly altered mental status is likely secondary to hypoglycemia which is likely secondary to her subcu insulin administered last night.  Patient's son reports that they have been instructed to give her her long-acting insulin on an as-needed basis if her glucose is greater than 200 at night.  For now we will admit to the telemetry floor placed on diet fluid restrict and have nephrology see for hemodialysis.  In the meantime did instruct the emergency department to give 60 mg IV Lasix to try to aid in further volume output.                       [facility]  HISTORY & PHYSICAL    Patient Identification:  Name:  Sean Chen  Age:  82 y.o.  Sex:  female  :  1940  MRN:  [id number]   Visit Number:  [id number]  Admit Date: 2022   Primary Care Physician:  Ganga Gallardo MD     Subjective     Chief complaint:   Chief Complaint   Patient presents with   • Leg Swelling   • Fatigue     History of presenting illness:   Patient is a 82 y.o. female with past medical history significant for progressive dementia, type 2 diabetes mellitus, ESRD on HD, essential hypertension, hyperlipidemia, rheumatoid arthritis, severe pulmonary hypertension, GERD, that presented to the [facility] emergency department for evaluation of bilateral leg edema and fatigue.    Evaluated the patient at bedside with attending, Dr. Tomas.  provided the entire history of presenting illness. Patient is awake but moaning and not able to provide an extensive supplemental history at this time. She is alert and oriented to the location and will respond to her name although she was not able to verbally tell us her name. Per the  the patient was doing very well and had a good appetite  "yesterday. She also received dialysis yesterday and had \"some fluid taken off.\" He states they monitor her urine output at home and depending on the amount, she may skip a dose of dialysis. She receives dialysis on Monday, Wednesday and Friday schedule. The  states that this morning the patient was very lethargic and refusing to eat. As the day progressed her symptoms worsened. He admits to giving her a dose of insulin last night as her glucose of around 220. The  reports their PCP told them to give the insulin shot if her glucose was >200. The  denies the patient having any fever, chills, diaphoresis, abdominal pain, nausea or vomiting. She has had a difficult time ambulating secondary to generalized weakness however. He further endorses that the patient has an open wound on her left lower posterior leg that is being monitored in the outpatient setting and she was recently stated on antibiotics for a possible infection. He states she was received unna boots  but does not like to wear them and will often tear them off. The patient currently lives at home with her  and father in law who both assist in her care.     Upon arrival to the ED, vitals were temperature 98.8 °F, pulse 65, respirations 18, /84, SPO2 100% on 2 L NC.  Troponin T 0.061.  proBNP greater than 70,000.  CMP of glucose 42, sodium 132, chloride 94, creatinine 2.49, calcium 8.5, GFR 19, albumin 3.17.  CBC with RBC 3.66, hemoglobin 11.0, hematocrit 32.7, otherwise unremarkable.  UA with cloudy appearance, 1+ blood, positive nitrites, moderate leukocytes, 3+ protein, 13-20 WBC, 2+ bacteria.  Blood cultures pending x2.  UDS negative.  Blood ethanol unremarkable.  Chest x-ray shows bilateral effusions are relatively stable in the right and improved on the left, vascular congestion without definite pulmonary edema, bibasilar infiltrate or atelectasis, right greater than left.  CT of the abdomen/pelvis with contrast shows a " significantly degraded study due to lack of IV contrast and artifact.  CT of the chest without contrast shows a large right and moderate left pleural effusion, areas of consolidative change and nodularity seen adjacent to the pleural effusions with dependent lungs, some scattered reticular nodularity in the right apex, cardiomegaly, severe coronary artery atherosclerosis.  EKG with normal sinus rhythm, right bundle branch block, heart rate 68, QTc 569 MS.    Patient has been admitted to the telemetry floor for further evaluation and treatment    Patient's  and attending Dr. Tomas at bedside during exam  ---------------------------------------------------------------------------------------------------------------------   Review of Systems   Reason unable to perform ROS: provided by .   Constitutional: Positive for appetite change and fatigue. Negative for chills, diaphoresis and fever.   HENT: Negative for congestion, sinus pain and sore throat.    Respiratory: Negative for cough, shortness of breath and wheezing.    Cardiovascular: Positive for leg swelling. Negative for chest pain and palpitations.   Gastrointestinal: Negative for abdominal pain, constipation, diarrhea, nausea and vomiting.   Genitourinary: Negative for dysuria and frequency.   Musculoskeletal: Negative for gait problem and myalgias.   Skin: Positive for wound (left lower leg).   Neurological: Positive for weakness (generalized ). Negative for dizziness and light-headedness.   Psychiatric/Behavioral: Negative for confusion.      ---------------------------------------------------------------------------------------------------------------------   Past Medical History:   Diagnosis Date   • Allergic rhinitis    • Elevated liver enzymes    • Extrinsic asthma    • Gastritis    • GERD (gastroesophageal reflux disease)    • Hyperlipidemia    • Hypertension    • Iron deficiency anemia    • Meniere's disease    • Osteoarthritis    • Type 2  diabetes mellitus (HCC)    • Vitamin D deficiency disease      Past Surgical History:   Procedure Laterality Date   • APPENDECTOMY     • CARDIAC CATHETERIZATION N/A 5/11/2022    Procedure: Left Heart Cath;  Surgeon: Marcelino Grande MD;  Location: [facility] CATH INVASIVE LOCATION;  Service: Cardiology;  Laterality: N/A;   • CHOLECYSTECTOMY     • HYSTERECTOMY     • INSERTION HEMODIALYSIS CATHETER N/A 5/9/2022    Procedure: HEMODIALYSIS CATHETER INSERTION;  Surgeon: Hans Coates MD;  Location: [facility] OR;  Service: General;  Laterality: N/A;     History reviewed. No pertinent family history.  Social History     Socioeconomic History   • Marital status:      Spouse name: moshe   • Number of children: 1   • Years of education: 8   Tobacco Use   • Smoking status: Never Smoker   • Smokeless tobacco: Never Used   Substance and Sexual Activity   • Alcohol use: No   • Drug use: No   • Sexual activity: Defer     ---------------------------------------------------------------------------------------------------------------------   Allergies:  Keflex [cephalexin], Lodine [etodolac], Penicillins, and Sulfa antibiotics  ---------------------------------------------------------------------------------------------------------------------   Medications below are reported home medications pulling from within the system; at this time, these medications have not been reconciled unless otherwise specified and are in the verification process for further verifcation as current home medications.    Prior to Admission Medications     Prescriptions Last Dose Informant Patient Reported? Taking?    albuterol sulfate HFA (ProAir HFA) 108 (90 Base) MCG/ACT inhaler  Pharmacy No No    Inhale 2 puffs 4 (Four) Times a Day.    aspirin 81 MG EC tablet   No No    Take 1 tablet by mouth Daily.    atorvastatin (LIPITOR) 80 MG tablet  Pharmacy No No    Take 1 tablet by mouth Every Night.    calcium acetate (PHOS BINDER,) 667 MG capsule capsule    No No    Take 2 capsules by mouth 3 (Three) Times a Day With Meals.    carvedilol (COREG) 6.25 MG tablet   No No    Take 1 tablet by mouth 2 (Two) Times a Day With Meals.    folic acid (FOLVITE) 1 MG tablet   No No    Take 1 tablet by mouth Daily.    Glucose Blood (Blood Glucose Test) strip   No No    1 daily    Lancets misc   No No    1 daily    NIFEdipine CC (ADALAT CC) 30 MG 24 hr tablet   No No    Take 2 tablets by mouth Daily.        ---------------------------------------------------------------------------------------------------------------------    Objective     Hospital Scheduled Meds:  furosemide, 60 mg, Intravenous, Once           Current listed hospital scheduled medications may not yet reflect those currently placed in orders that are signed and held, awaiting patient's arrival to floor/unit.    ---------------------------------------------------------------------------------------------------------------------   Vital Signs:  Temp:  [98.8 °F (37.1 °C)] 98.8 °F (37.1 °C)  Heart Rate:  [65-67] 65  Resp:  [18] 18  BP: (159)/(84) 159/84  Mean Arterial Pressure (Non-Invasive) for the past 24 hrs (Last 3 readings):   Noninvasive MAP (mmHg)   07/02/22 1604 129     SpO2 Percentage    07/02/22 1604 07/02/22 1606   SpO2: 98% 100%     SpO2:  [98 %-100 %] 100 %  on  Flow (L/min):  [2] 2;   Device (Oxygen Therapy): nasal cannula    Body mass index is 20.05 kg/m².  Wt Readings from Last 3 Encounters:   07/02/22 58.1 kg (128 lb)   05/31/22 57 kg (125 lb 9.6 oz)   05/13/22 67.1 kg (147 lb 14.4 oz)     ---------------------------------------------------------------------------------------------------------------------   Physical Exam:  Physical Exam  Constitutional:       General: She is awake.      Appearance: Normal appearance. She is well-developed and normal weight. She is ill-appearing (chronically ).      Interventions: Nasal cannula in place.      Comments: 2L NC   HENT:      Head: Normocephalic and atraumatic.    Eyes:      General: Lids are normal.   Cardiovascular:      Rate and Rhythm: Normal rate and regular rhythm.      Pulses: Normal pulses.           Dorsalis pedis pulses are 2+ on the right side and 2+ on the left side.        Posterior tibial pulses are 2+ on the right side and 2+ on the left side.      Heart sounds: Normal heart sounds. No murmur heard.    No friction rub. No gallop.   Pulmonary:      Effort: Pulmonary effort is normal. No tachypnea.      Breath sounds: Decreased breath sounds (diffusely diminished ) present.      Comments: Crackles in bilateral lower lobes   Abdominal:      General: Bowel sounds are decreased.      Palpations: Abdomen is soft.      Tenderness: There is no abdominal tenderness.   Musculoskeletal:      Right lower le+ Edema present.      Left lower le+ Edema present.   Skin:     Comments: Skin tear on left lower posterior leg    Neurological:      General: No focal deficit present.      Mental Status: She is alert. She is disoriented.   Psychiatric:         Speech: Speech normal.         Behavior: Behavior is cooperative.         Cognition and Memory: Cognition normal.       ---------------------------------------------------------------------------------------------------------------------  EKG:    Normal sinus rhythm, right bundle branch block, heart rate 68, QTc 569 MS.    Telemetry:    NSR, HR 84 bpm, SpO2 98% on 2L NC.     I have personally reviewed the EKG/Telemetry strip  ---------------------------------------------------------------------------------------------------------------------   Results from last 7 days   Lab Units 22  1626   CK TOTAL U/L 41   TROPONIN T ng/mL 0.061*     Results from last 7 days   Lab Units 22  1626   PROBNP pg/mL >70,000.0*         Results from last 7 days   Lab Units 22  1626   CRP mg/dL 1.96*   LACTATE mmol/L 0.7   WBC 10*3/mm3 7.05   HEMOGLOBIN g/dL 11.0*   HEMATOCRIT % 32.7*   MCV fL 89.3   MCHC g/dL 33.6    PLATELETS 10*3/mm3 259   INR  0.93     Results from last 7 days   Lab Units 07/02/22  1626   SODIUM mmol/L 132*   POTASSIUM mmol/L 3.5   CHLORIDE mmol/L 94*   CO2 mmol/L 24.3   BUN mg/dL 21   CREATININE mg/dL 2.49*   CALCIUM mg/dL 8.5*   GLUCOSE mg/dL 42*   ALBUMIN g/dL 3.17*   BILIRUBIN mg/dL 0.4   ALK PHOS U/L 113   AST (SGOT) U/L 16   ALT (SGPT) U/L 7   Estimated Creatinine Clearance: 16.3 mL/min (A) (by C-G formula based on SCr of 2.49 mg/dL (H)).  Ammonia   Date Value Ref Range Status   07/02/2022 <10 (L) 11 - 51 umol/L Final       Glucose   Date/Time Value Ref Range Status   07/02/2022 1802 114 70 - 130 mg/dL Final     Comment:     Meter: [id number] : ASLTOÑAERFred MONROYJOSELINE MARKS     Lab Results   Component Value Date    HGBA1C 5.50 04/29/2022     Lab Results   Component Value Date    TSH 1.390 07/02/2022    FREET4 1.54 07/02/2022     Pain Management Panel     Pain Management Panel Latest Ref Rng & Units 7/2/2022 5/23/2022    CREATININE UR mg/dL - -    AMPHETAMINES SCREEN, URINE Negative Negative Negative    BARBITURATES SCREEN Negative Negative Negative    BENZODIAZEPINE SCREEN, URINE Negative Negative Negative    BUPRENORPHINEUR Negative Negative Negative    COCAINE SCREEN, URINE Negative Negative Negative    METHADONE SCREEN, URINE Negative Negative Negative    METHAMPHETAMINEUR Negative Negative Negative        I have personally reviewed the above laboratory results.   ---------------------------------------------------------------------------------------------------------------------  Imaging Results (Last 7 Days)     Procedure Component Value Units Date/Time    CT Abdomen Pelvis Without Contrast [[id number]] Collected: 07/02/22 1757     Updated: 07/02/22 1759    Narrative:      CT Abdomen Pelvis WO    INDICATION:   Delirium, abdominal pain, COPD exacerbation    TECHNIQUE:   CT of the abdomen and pelvis without IV contrast. Coronal and sagittal reconstructions were obtained.  Radiation dose reduction  techniques included automated exposure control or exposure modulation based on body size. Radiation audit for CT and nuclear  cardiology exams in the last 12 months: 4.     COMPARISON:   None available.    FINDINGS:    See separate CT chest. No destructive osseous lesion.    Evaluation of the solid viscera is compromised by lack of IV contrast. Additionally, the patient's arms are within the gantry which produces significant artifact which further compromises evaluation of the abdomen and pelvis. Allowing for this:    Diffuse body wall edema.    Normal noncontrast appearance of the liver, pancreas, spleen and both adrenal glands. Favor the gallbladder to be surgically absent.    The kidneys are symmetric in size. Evaluation for solid renal lesion is largely precluded by lack of IV contrast. No hydronephrosis. No renal or ureteral calculus. Likely cyst at the lower pole the right kidney.    Urinary bladder is distended.    No evidence of bowel obstruction. No localizing perienteric inflammatory change.    Normal caliber of the abdominal aorta. Extensive vascular calcifications. No lymphadenopathy within the abdomen or pelvis by size criteria.      Impression:        Evaluation of the solid viscera is compromised by lack of IV contrast. Additionally, the patient's arms are within the gantry which produces significant artifact which further compromises evaluation of the abdomen and pelvis. Exam is moderately to  severely degraded. Allowing for this:    1.  Diffuse body wall edema.  2.  See separate CT chest.  3.  Extensive vascular calcifications.  4.  No definite acute process in the abdomen or pelvis by noncontrast CT.        Signer Name: ADAMS VOSS MD   Signed: 7/2/2022 5:57 PM   Workstation Name: [email or web address]    [facility]    CT Chest Without Contrast Diagnostic [[id number]] Collected: 07/02/22 1752     Updated: 07/02/22 1754    Narrative:      CT Chest WO    INDICATION:   Shortness of air,  COPD exacerbation    TECHNIQUE:   CT of the chest without IV contrast. Coronal and sagittal reformatted images. Radiation dose reduction techniques included automated exposure control or exposure modulation based on body size. Radiation audit for CT and nuclear cardiology exams in the  last 12 months: 4.    COMPARISON:   May 23, 2022    FINDINGS:     Large right and moderate left pleural effusions. Areas of consolidative change and nodularity seen adjacent to the pleural effusions within the dependent lungs. Some scattered reticular nodularity in the right apex. Findings favor volume overload or  multifocal pneumonia with bilateral pleural effusions.    Cardiomegaly. Severe coronary artery atherosclerosis. Evaluation of the soft tissues of the chest are otherwise compromised by lack of IV contrast. No overt lymphadenopathy.    No destructive bony lesion.    See separately dictated CT abdomen/pelvis for findings below the diaphragm.      Impression:      1.  Large right and moderate left pleural effusions. Areas of consolidative change and nodularity seen adjacent to the pleural effusions within the dependent lungs. Some scattered reticular nodularity in the right apex. Findings favor volume overload or  multifocal pneumonia with bilateral pleural effusions.  2.  Cardiomegaly.  3.  Severe coronary artery atherosclerosis.    Signer Name: ADAMS VOSS MD   Signed: 7/2/2022 5:52 PM   Workstation Name: [email or web address]    [facility]    CT Head Without Contrast [[id number]] Collected: 07/02/22 1749     Updated: 07/02/22 1751    Narrative:      CT Head WO    HISTORY:   Delirium    TECHNIQUE:   Routine noncontrast head CT. Radiation dose reduction techniques included automated exposure control or exposure modulation based on body size. Radiation audit for CT and nuclear cardiology exams in the last 12 months: 4.    COMPARISON:   June 5, 2022    FINDINGS:       No acute intracranial hemorrhage, mass lesion,  or abnormal extra-axial fluid collection. No midline shift or focal mass effect. Ventricular system is normal in size and configuration. .    The gray-white matter differentiation is preserved. There are scattered ill-defined and patchy hypoattenuating foci within the bilateral cerebral and deep white matter which are nonspecific but most commonly associated with chronic microvascular ischemic  change. These are advanced.    Visualized paranasal sinuses are clear. Visualized mastoid air cells are clear.    No acute osseous abnormality.        Impression:        1.  Presumed chronic microvascular ischemic changes, advanced. No definite superimposed acute process.    Signer Name: ADAMS VOSS MD   Signed: 7/2/2022 5:49 PM   Workstation Name: [email or web address]    [facility]    XR Chest 1 View [[id number]] Collected: 07/02/22 1625     Updated: 07/02/22 1627    Narrative:      CR Chest 1 Vw    INDICATION:   Pulmonary edema.     COMPARISON:    6/5/2022    FINDINGS:  Portable AP view(s) of the chest.  Central venous catheter tip remains in the SVC. Heart size is stable. There are bilateral pleural effusions, right larger than left. Right side is relatively stable, and the left appears slightly improved. There is some  vascular congestion. There is infiltrate or atelectasis in the bases, right greater than left. Sergio pulmonary edema is not definitely identified.      Impression:        1. Bilateral effusions, relatively stable on the right and improved on the left.  2. Vascular congestion without definite pulmonary edema.  3. Bibasilar infiltrate or atelectasis, right greater than left.    Signer Name: Tomas Hoffman MD   Signed: 7/2/2022 4:25 PM   Workstation Name: [facility]    [facility]        I have personally reviewed the above radiology results.     Last Echocardiogram:  Results for orders placed during the hospital encounter of 05/23/22    Adult Transthoracic Echo Limited  W/ Cont if Necessary Per Protocol    Interpretation Summary  · The left ventricular cavity is borderline dilated.  · Left ventricular ejection fraction appears to be 36 - 40%.  · Left ventricular diastolic function was normal.  · There is calcification of the aortic valve.    ---------------------------------------------------------------------------------------------------------------------    Assessment & Plan      There are no active hospital problems to display for this patient.  #Acute on chronic combined systolic and diastolic CHF exacerbation  #ESRD on HD (M,W,F)  #Chronic bilateral pleural effusion  #Valvular disease-mild AS, mild MR, moderate TR  -Imaging reviewed  -IV Lasix 60 mg ordered; monitor diuresis with strict I's and O's; obtain daily weights  -Echo from 5/25/2022 reviewed, EF 36 to 40%  -Nephrology has been consulted for assistance with HD, further input/assistance is much appreciated  -Bilateral pleural effusions are currently stable  -O2 saturation is around 98% on 2 L NC  -Continue to monitor closely    #Hypoglycemia suspect medication induced   #Type 2 diabetes mellitus  -Glucose upon arrival to the ED 42; received IV dextrose 25 g in the ED with improvement in glucose to 114  -Suspect hypoglycemia medication induced from insulin given not prior  -Discussed with attending, we will start low-dose sliding scale insulin with Accu-Cheks 3 times daily before meals  -Hypoglycemia protocol in place  -Review home medications once reconciled per pharmacy    #Acute UTI POA  -UA grossly unremarkable  -Urine culture ordered and pending  -Start IV aztreonam  -Repeat a.m. CBC    #Abrasion on left lower posterior leg   -Wound care consulted    #Prolonged QTC (569 MS)  -Corrected QTC 4 right bundle branch block is 559 MS  -Potassium borderline low at 3.5; obtain magnesium     #Chronic troponinemia  #History of coronary artery disease  #Hyperlipidemia  #Essential hypertension  -Has been denies the patient  complaining of any chest pain or discomfort  -Recent left heart cath from 5/11/2022 reported to have nonobstructive coronary artery disease  -Overall flat trend so far; continue trend troponin and obtain repeat a.m. EKG  -Admission EKG with normal sinus rhythm, no acute ischemic changes, chronic right bundle branch block  -Plan to resume home aspirin and statin  -Review home medications once available; plan to continue antihypertensive agents as BP is currently somewhat uncontrolled    #Chronic, reported progressive dementia  #Generalized weakness/debility  #Poor p.o. intake  -Currently alert and oriented to location and herself  - reports generalized weakness and poor appetite since this morning  -Patient is to be up with assistance, fall precautions in place  -PT/OT consult  -SLP consulted  -Aspiration precautions in place      F/E/N: No IV fluids. Replace electrolytes as necessary. Consistent carb, cardiac 1500 mL fluid restriction diet.   ---------------------------------------------------  DVT Prophylaxis: Subq heparin  GI Prophylaxis: Protonix   Activity: Up with assistance, fall precautions     The patient is considered to be a high risk patient due to:     INPATIENT status due to the need for care which can only be reasonably provided in an hospital setting such as aggressive/expedited ancillary services and/or consultation services, the necessity for IV medications, close physician monitoring and/or the possible need for procedures.  In such, I feel patient’s risk for adverse outcomes and need for care warrant INPATIENT evaluation and predict the patient’s care encounter to likely last beyond 2 midnights.    Code Status: FULL CODE     Disposition/Discharge planning: Plan for home at discharge.  Pending clinical course    I have discussed the patient's assessment and plan with the patient, nursing staff, and attending physician      Hiral Fox PA-C  Hospitalist Service -- [facility]  [place]       07/02/22  18:49 EDT    Attending Physician: Shaw Tomas MD      Electronically signed by Shaw Tomas DO at 07/02/22 1905       Lines, Drains & Airways     Active LDAs     Name Placement date Placement time Site Days    Peripheral IV 07/05/22 1926 Anterior;Left Forearm 07/05/22 1926  Forearm  6    Hemodialysis Cath Double 05/09/22  1532  Subclavian  63                  Current Facility-Administered Medications   Medication Dose Route Frequency Provider Last Rate Last Admin   • acetaminophen (TYLENOL) suppository 650 mg  650 mg Rectal Q4H PRN Dylon Mcdonnell MD       • aspirin EC tablet 81 mg  81 mg Oral Daily Dylon Mcdonnell MD   81 mg at 07/12/22 0824   • atorvastatin (LIPITOR) tablet 80 mg  80 mg Oral Nightly Dylon Mcdonnell MD   80 mg at 07/11/22 2040   • carvedilol (COREG) tablet 6.25 mg  6.25 mg Oral BID With Meals Dylon Mcdonnell MD   6.25 mg at 07/12/22 0824   • glycopyrrolate (ROBINUL) injection 0.4 mg  0.4 mg Intravenous Q4H PRN Dylon Mcdonnell MD       • haloperidol lactate (HALDOL) injection 0.5 mg  0.5 mg Intramuscular Q6H PRN Yovani Del Rosario DO       • hydrALAZINE (APRESOLINE) injection 10 mg  10 mg Intravenous Q6H PRN Dylon Mcdonnell MD       • hydrOXYzine (ATARAX) tablet 25 mg  25 mg Oral Q6H PRN Dylon Mcdonnell MD   25 mg at 07/06/22 2033   • LORazepam (ATIVAN) injection 0.25 mg  0.25 mg Intravenous Q6H PRN Olive Paris DO   0.25 mg at 07/12/22 1039   • morphine injection 1 mg  1 mg Intravenous Q4H PRN Olive Paris DO   1 mg at 07/12/22 0910   • ondansetron (ZOFRAN) injection 4 mg  4 mg Intravenous Q6H PRN Dylon Mcdonnell MD   4 mg at 07/10/22 2039   • sodium chloride 0.9 % flush 10 mL  10 mL Intravenous Q12H Dylon Mcdonnell MD   10 mL at 07/12/22 0883   • sodium chloride 0.9 % flush 10 mL  10 mL Intravenous PRN Dylon Mcdonnell MD           Imaging Results (Most Recent)     Procedure  Component Value Units Date/Time    CT Chest Without Contrast Diagnostic [[id number]] Collected: 07/07/22 0929     Updated: 07/07/22 0945    Narrative:      EXAM:    CT Chest Without Intravenous Contrast     EXAM DATE:    7/7/2022 9:20 AM     CLINICAL HISTORY:    Worsening hypoxia; E16.2-Hypoglycemia, unspecified     TECHNIQUE:    Axial computed tomography images of the chest without intravenous  contrast.  Sagittal and coronal reformatted images were created and  reviewed.  This CT exam was performed using one or more of the following  dose reduction techniques:  automated exposure control, adjustment of  the mA and/or kV according to patient size, and/or use of iterative  reconstruction technique.     COMPARISON:    07/02/2022     FINDINGS:    Lungs:  Complete atelectasis of the bilateral lower lobes noted with  superimposed pneumonia.  Evolving groundglass airspace disease  throughout both lungs compatible with evolving airspace edema.  Partial  consolidation of the right middle lobe is slightly improved.  Partial  consolidation of the lingula has developed since previous exam.   Increase in the degree of interlobular septal thickening.    Pleural space:  Unremarkable.  No pneumothorax.  No significant  effusion.    Heart:  Marked cardiomegaly.  Severe coronary artery calcifications.   No pericardial effusion is noted.    Mediastinum:  No bulky mediastinal or hilar adenopathy.    Bones/joints:  No acute bony changes are identified.  No dislocation.    Soft tissues:  Anasarca.    Vasculature:  Atherosclerosis is stable.  No thoracic aortic aneurysm.    Lymph nodes:  See above.    Tubes, lines and devices:  A right hemodialysis catheter terminates at  the cavoatrial junction.       Impression:      1.  Worsening changes of CHF now with evolving airspace edema.  2.  Slight increase in moderate to large right greater than left pleural  effusions.  3.  Increase consolidation and volume loss of the bilateral lower  lobes.  4.  Otherwise stable chest CT with other nonacute findings as above.     This report was finalized on 7/7/2022 9:43 AM by Dr. Mingo Cardenas MD.       XR Chest 1 View [[id number]] Collected: 07/07/22 0721     Updated: 07/07/22 0723    Narrative:      CR Chest 1 Vw    INDICATION:   Hypoxia     COMPARISON:    Chest x-ray 7/5/2022    FINDINGS:  Portable AP view(s) of the chest.    No change double-lumen catheter. Lung volumes are low. Cardiac silhouette is mostly obscured by pulmonary parenchymal pathology. There is continued central vascular congestion and interstitial edema with bilateral infiltrates mid to lower lungs and  bilateral pleural effusions.    Likely mild improvement in volume status. No pneumothorax.       Impression:      Likely overall mild improvement in volume status since 7/5/2022.    Signer Name: Maegan Mcgee MD   Signed: 7/7/2022 7:21 AM   Workstation Name: [facility]    [facility]    XR Knee 4+ View Bilateral [[id number]] Collected: 07/05/22 0821     Updated: 07/05/22 0824    Narrative:      EXAM:    XR Bilateral Knees Complete, 4 or More Views     EXAM DATE:    7/5/2022 5:51 AM     CLINICAL HISTORY:    status post fall; E16.2-Hypoglycemia, unspecified     TECHNIQUE:    Four or more views of the bilateral knees.     COMPARISON:    No relevant prior studies available.     FINDINGS:    Bones/joints:  Advanced tricompartmental osteoarthritis is noted.   Arthritic changes most pronounced of the medial knee compartments  bilaterally.  No joint effusions identified.  No acute fracture.  No  dislocation.    Soft tissues:  Unremarkable.    Vasculature:  Dense arterial vascular calcifications are noted.       Impression:      1.  Advanced tricompartmental osteoarthritis bilateral knees.  2.  No acute osseous or articular abnormalities identified.     This report was finalized on 7/5/2022 8:22 AM by Dr. Mingo Cardenas MD.       XR Chest AP [[id number]] Collected: 07/05/22 0621      Updated: 07/05/22 0623    Narrative:      CR Chest 1 Vw    INDICATION:   Congestive heart failure. Short of air     COMPARISON:    7/2/2022    FINDINGS:  Portable AP view(s) of the chest.    Right-sided large bore central line is unchanged    Cardiac silhouette not well visualized or assessed. Shallow lung expansion with bronchovascular crowding. Bilateral partially layering pleural effusions, right greater than left with associated compressive atelectasis or pneumonia. Worsening opacities in  the perihilar right lung compared to the prior study may be partly positional. No pneumothorax.       Impression:        1. Bilateral pleural effusions with compressive atelectasis or pneumonia worse on the right than the left. Probable underlying volume overload. No pneumothorax.    Signer Name: Cristhian Ballard MD   Signed: 7/5/2022 6:21 AM   Workstation Name: [email or web address]    [facility]    CT Abdomen Pelvis Without Contrast [[id number]] Collected: 07/02/22 1757     Updated: 07/02/22 1759    Narrative:      CT Abdomen Pelvis WO    INDICATION:   Delirium, abdominal pain, COPD exacerbation    TECHNIQUE:   CT of the abdomen and pelvis without IV contrast. Coronal and sagittal reconstructions were obtained.  Radiation dose reduction techniques included automated exposure control or exposure modulation based on body size. Radiation audit for CT and nuclear  cardiology exams in the last 12 months: 4.     COMPARISON:   None available.    FINDINGS:    See separate CT chest. No destructive osseous lesion.    Evaluation of the solid viscera is compromised by lack of IV contrast. Additionally, the patient's arms are within the gantry which produces significant artifact which further compromises evaluation of the abdomen and pelvis. Allowing for this:    Diffuse body wall edema.    Normal noncontrast appearance of the liver, pancreas, spleen and both adrenal glands. Favor the gallbladder to be surgically  absent.    The kidneys are symmetric in size. Evaluation for solid renal lesion is largely precluded by lack of IV contrast. No hydronephrosis. No renal or ureteral calculus. Likely cyst at the lower pole the right kidney.    Urinary bladder is distended.    No evidence of bowel obstruction. No localizing perienteric inflammatory change.    Normal caliber of the abdominal aorta. Extensive vascular calcifications. No lymphadenopathy within the abdomen or pelvis by size criteria.      Impression:        Evaluation of the solid viscera is compromised by lack of IV contrast. Additionally, the patient's arms are within the gantry which produces significant artifact which further compromises evaluation of the abdomen and pelvis. Exam is moderately to  severely degraded. Allowing for this:    1.  Diffuse body wall edema.  2.  See separate CT chest.  3.  Extensive vascular calcifications.  4.  No definite acute process in the abdomen or pelvis by noncontrast CT.        Signer Name: ADAMS VOSS MD   Signed: 7/2/2022 5:57 PM   Workstation Name: [email or web address]    [facility]    CT Chest Without Contrast Diagnostic [[id number]] Collected: 07/02/22 1752     Updated: 07/02/22 1754    Narrative:      CT Chest WO    INDICATION:   Shortness of air, COPD exacerbation    TECHNIQUE:   CT of the chest without IV contrast. Coronal and sagittal reformatted images. Radiation dose reduction techniques included automated exposure control or exposure modulation based on body size. Radiation audit for CT and nuclear cardiology exams in the  last 12 months: 4.    COMPARISON:   May 23, 2022    FINDINGS:     Large right and moderate left pleural effusions. Areas of consolidative change and nodularity seen adjacent to the pleural effusions within the dependent lungs. Some scattered reticular nodularity in the right apex. Findings favor volume overload or  multifocal pneumonia with bilateral pleural effusions.    Cardiomegaly.  Severe coronary artery atherosclerosis. Evaluation of the soft tissues of the chest are otherwise compromised by lack of IV contrast. No overt lymphadenopathy.    No destructive bony lesion.    See separately dictated CT abdomen/pelvis for findings below the diaphragm.      Impression:      1.  Large right and moderate left pleural effusions. Areas of consolidative change and nodularity seen adjacent to the pleural effusions within the dependent lungs. Some scattered reticular nodularity in the right apex. Findings favor volume overload or  multifocal pneumonia with bilateral pleural effusions.  2.  Cardiomegaly.  3.  Severe coronary artery atherosclerosis.    Signer Name: ADAMS VOSS MD   Signed: 7/2/2022 5:52 PM   Workstation Name: [id number]    [facility]    CT Head Without Contrast [[id number]] Collected: 07/02/22 1749     Updated: 07/02/22 1751    Narrative:      CT Head WO    HISTORY:   Delirium    TECHNIQUE:   Routine noncontrast head CT. Radiation dose reduction techniques included automated exposure control or exposure modulation based on body size. Radiation audit for CT and nuclear cardiology exams in the last 12 months: 4.    COMPARISON:   June 5, 2022    FINDINGS:       No acute intracranial hemorrhage, mass lesion, or abnormal extra-axial fluid collection. No midline shift or focal mass effect. Ventricular system is normal in size and configuration. .    The gray-white matter differentiation is preserved. There are scattered ill-defined and patchy hypoattenuating foci within the bilateral cerebral and deep white matter which are nonspecific but most commonly associated with chronic microvascular ischemic  change. These are advanced.    Visualized paranasal sinuses are clear. Visualized mastoid air cells are clear.    No acute osseous abnormality.        Impression:        1.  Presumed chronic microvascular ischemic changes, advanced. No definite superimposed acute  "process.    Signer Name: ADAMS VOSS MD   Signed: 7/2/2022 5:49 PM   Workstation Name: [facility]    [facility]    XR Chest 1 View [[id number]] Collected: 07/02/22 1625     Updated: 07/02/22 1627    Narrative:      CR Chest 1 Vw    INDICATION:   Pulmonary edema.     COMPARISON:    6/5/2022    FINDINGS:  Portable AP view(s) of the chest.  Central venous catheter tip remains in the SVC. Heart size is stable. There are bilateral pleural effusions, right larger than left. Right side is relatively stable, and the left appears slightly improved. There is some  vascular congestion. There is infiltrate or atelectasis in the bases, right greater than left. Sergio pulmonary edema is not definitely identified.      Impression:        1. Bilateral effusions, relatively stable on the right and improved on the left.  2. Vascular congestion without definite pulmonary edema.  3. Bibasilar infiltrate or atelectasis, right greater than left.    Signer Name: Tomas Hoffman MD   Signed: 7/2/2022 4:25 PM   Workstation Name: [email or web address]    [facility]        Operative/Procedure Notes (most recent note)    No notes of this type exist for this encounter.            Physician Progress Notes (most recent note)      Beau Delgado MD at 07/11/22 1245          Palliative Care Daily Progress Note     S: The patient remains on comfort measures and continues to have intermittent periods of agitation.  This morning she is yelling out \"I need to get out of here!\"  She is due for her medication and the nurse says that it has been effective.  We will continue to monitor and may need to adjust medications.    O:   Palliative Performance Scale Score:     /56 (BP Location: Right arm, Patient Position: Lying)   Pulse 66   Temp 97.4 °F (36.3 °C) (Axillary)   Resp 18   Ht 170.2 cm (67\")   Wt 63.1 kg (139 lb 1.6 oz)   SpO2 90%   BMI 21.79 kg/m²     Intake/Output Summary (Last 24 hours) at " 7/11/2022 1245  Last data filed at 7/10/2022 1700  Gross per 24 hour   Intake 0 ml   Output --   Net 0 ml       PE:  Refuses exam                                                  Meds: Reviewed     Labs:   Results from last 7 days   Lab Units 07/08/22  0118   WBC 10*3/mm3 4.31   HEMOGLOBIN g/dL 7.3*   HEMATOCRIT % 22.8*   PLATELETS 10*3/mm3 203     Results from last 7 days   Lab Units 07/08/22  0118   SODIUM mmol/L 129*   POTASSIUM mmol/L 3.8   CHLORIDE mmol/L 93*   CO2 mmol/L 23.5   BUN mg/dL 26*   CREATININE mg/dL 2.01*   GLUCOSE mg/dL 233*   CALCIUM mg/dL 8.4*     Results from last 7 days   Lab Units 07/08/22  0118 07/07/22  0527   SODIUM mmol/L 129* 133*   POTASSIUM mmol/L 3.8 3.7   CHLORIDE mmol/L 93* 95*   CO2 mmol/L 23.5 25.4   BUN mg/dL 26* 19   CREATININE mg/dL 2.01* 1.74*   CALCIUM mg/dL 8.4* 8.4*   BILIRUBIN mg/dL  --  0.6   ALK PHOS U/L  --  123*   ALT (SGPT) U/L  --  9   AST (SGOT) U/L  --  16   GLUCOSE mg/dL 233* 183*     Imaging Results (Last 72 Hours)     ** No results found for the last 72 hours. **            Diagnostics: Reviewed    A: End-stage renal disease with multiple complications now on comfort measures.  The patient remains agitated intermittently.      P: We will continue present management.  The nurse instructed to give dose now for her agitation.  We will continue to monitor and offer support.      We will continue to follow along. Please do not hesitate to contact us regarding further sx mgmt or GOC needs, including after hours or on weekends via our on call provider at [phone].     Beau Delgado MD    7/11/2022    Electronically signed by Beau Delgado MD at 07/11/22 1249          Consult Notes (most recent note)      Melissa Merino MD at 07/06/22 1112      Consult Orders    1. Inpatient Infectious Diseases Consult [[id number]] ordered by Yovani Del Rosario DO at 07/06/22 1050                       INFECTIOUS DISEASE CONSULTATION REPORT        Patient  Identification:  Name:  Sean Chen  Age:  82 y.o.  Sex:  female  :  1940  MRN:  [id number]   Visit Number:  [id number]  Primary Care Physician:  Ganga Gallardo MD       LOS: 4 days        Subjective       Subjective     History of present illness:      Thank you Dr. Del Rosario for allowing us to participate in the care of your patient.  As you well know, Ms. Sean Chen is a 82 y.o. female with past medical history significant for dementia, type 2 diabetes mellitus, ESRD on HD, hypertension, hyperlipidemia, RA, severe pulmonary hypertension, and GERD, who presented to [facility] Emergency Department on 2022 for bilateral leg edema and fatigue.    The patient is on 3 L nasal cannula, which is stable.  CNA is at bedside.  Patient has underlying dementia and is not able to provide a review of systems.  Reports that she does not feel well today, but is not able to specify any particular complaints.  Nurse remove dressings from bilateral lower extremities and they are very dry skin with chemical dermatitis and sloughing.  Clear to auscultation bilaterally.  Afebrile, no diarrhea.  WBC on 2022 is normal.  Lactic acid on admission was normal.  Urinalysis on 2022 was positive with 13-20 WBCs and 2+ bacteria.  No urine culture was performed.  Repeat urinalysis on 2022 was positive with 6-12 WBCs and 3+ bacteria.  Urine culture finalized as 50,000 colonies of CRE Enterobacter cloacae complex.  Bilateral knee x-rays on 2022 showed advanced tricompartmental osteoarthritis of bilateral knees.  No acute osseous or articular abnormalities identified.  Chest x-ray on 2022 showed bilateral pleural effusions with compressive atelectasis or pneumonia worse on the right than the left.  Probable underlying volume overload.  No pneumothorax.  CT abdomen pelvis on 2022 showed diffuse body wall edema.  Extensive vascular calcifications.  No definite acute process in the  abdomen or pelvis by noncontrast CT.  CT chest on 7/2/2022 showed large right and moderate left pleural effusions.  Areas of consolidative change and nodularity seen adjacent to the pleural effusions within the dependent lungs.  Some scattered reticular nodularity in the right apex.  Findings favor volume overload or multifocal pneumonia with bilateral pleural effusions.  Cardiomegaly.  Severe coronary artery atherosclerosis.  Chest x-ray on 7/2/2022 showed bilateral effusions, relatively stable on the right and improved on the left.  Vascular congestion without definite pulmonary edema.  Bibasilar infiltrate or atelectasis, right greater than left.  COVID-19 and flu A/B PCR on 7/2/2022 was negative.  Blood cultures on 7/2/2022 are so far showing no growth.    Infectious Disease consultation was requested for antimicrobial management.      ---------------------------------------------------------------------------------------------------------------------     Review Of Systems:    Unable to obtain.  Underlying dementia.    ---------------------------------------------------------------------------------------------------------------------     Past Medical History    Past Medical History:   Diagnosis Date   • Allergic rhinitis    • Elevated liver enzymes    • Extrinsic asthma    • Gastritis    • GERD (gastroesophageal reflux disease)    • Hyperlipidemia    • Hypertension    • Iron deficiency anemia    • Meniere's disease    • Osteoarthritis    • Type 2 diabetes mellitus (HCC)    • Vitamin D deficiency disease        Past Surgical History    Past Surgical History:   Procedure Laterality Date   • APPENDECTOMY     • CARDIAC CATHETERIZATION N/A 5/11/2022    Procedure: Left Heart Cath;  Surgeon: Marcelino Grande MD;  Location: [facility] CATH INVASIVE LOCATION;  Service: Cardiology;  Laterality: N/A;   • CHOLECYSTECTOMY     • HYSTERECTOMY     • INSERTION HEMODIALYSIS CATHETER N/A 5/9/2022    Procedure: HEMODIALYSIS CATHETER  INSERTION;  Surgeon: Hans Coates MD;  Location: [facility];  Service: General;  Laterality: N/A;       Family History    History reviewed. No pertinent family history.    Social History    Social History     Tobacco Use   • Smoking status: Never Smoker   • Smokeless tobacco: Never Used   Substance Use Topics   • Alcohol use: No   • Drug use: No       Allergies    Keflex [cephalexin], Lodine [etodolac], Penicillins, and Sulfa antibiotics  ---------------------------------------------------------------------------------------------------------------------     Home Medications:    Prior to Admission Medications     Prescriptions Last Dose Informant Patient Reported? Taking?    albuterol sulfate HFA (ProAir HFA) 108 (90 Base) MCG/ACT inhaler 7/1/2022 Pharmacy No Yes    Inhale 2 puffs 4 (Four) Times a Day.    aspirin 81 MG EC tablet 7/1/2022  No Yes    Take 1 tablet by mouth Daily.    atorvastatin (LIPITOR) 80 MG tablet 7/1/2022 Pharmacy No Yes    Take 1 tablet by mouth Every Night.    calcium acetate (PHOS BINDER,) 667 MG capsule capsule 7/1/2022  No Yes    Take 2 capsules by mouth 3 (Three) Times a Day With Meals.    carvedilol (COREG) 6.25 MG tablet 7/1/2022  No Yes    Take 1 tablet by mouth 2 (Two) Times a Day With Meals.    folic acid (FOLVITE) 1 MG tablet 7/1/2022  No Yes    Take 1 tablet by mouth Daily.    insulin glargine (LANTUS, SEMGLEE) 100 UNIT/ML injection 7/1/2022  Yes Yes    Inject 20 Units under the skin into the appropriate area as directed 2 (Two) Times a Day As Needed (BS).    NIFEdipine CC (ADALAT CC) 30 MG 24 hr tablet 7/1/2022  No Yes    Take 2 tablets by mouth Daily.        ---------------------------------------------------------------------------------------------------------------------    Objective       Objective     Hospital Scheduled Meds:  aspirin, 81 mg, Oral, Daily  atorvastatin, 80 mg, Oral, Nightly  aztreonam, 500 mg, Intravenous, Q12H  calcium acetate, 1,334 mg, Oral, TID With  Meals  carvedilol, 6.25 mg, Oral, BID With Meals  heparin (porcine), 5,000 Units, Subcutaneous, Q8H  NIFEdipine CC, 60 mg, Oral, Q24H  Renal, 1 capsule, Oral, Daily  sodium chloride, 10 mL, Intravenous, Q12H  ziprasidone (GEODON) injection with sterile water, 10 mg, Intramuscular, Once         ---------------------------------------------------------------------------------------------------------------------   Vital Signs:  Temp:  [97.1 °F (36.2 °C)-98.1 °F (36.7 °C)] 98.1 °F (36.7 °C)  Heart Rate:  [63-86] 70  Resp:  [16-22] 20  BP: (104-159)/(50-73) 104/50  Mean Arterial Pressure (Non-Invasive) for the past 24 hrs (Last 3 readings):   Noninvasive MAP (mmHg)   07/06/22 1036 78   07/06/22 0645 124     SpO2 Percentage    07/06/22 0645 07/06/22 0650 07/06/22 1036   SpO2: 93% 94% 91%     SpO2:  [89 %-98 %] 91 %  on  Flow (L/min):  [2-3] 3;   Device (Oxygen Therapy): nasal cannula    Body mass index is 24.9 kg/m².  Wt Readings from Last 3 Encounters:   07/06/22 72.1 kg (159 lb)   05/31/22 57 kg (125 lb 9.6 oz)   05/13/22 67.1 kg (147 lb 14.4 oz)     ---------------------------------------------------------------------------------------------------------------------     Physical Exam:    Constitutional: Elderly  female is resting comfortably in bed in no apparent distress.  Confused, with dementia at baseline.  CNA is at bedside.      HENT:  Head: Normocephalic and atraumatic.  Mouth:  Moist mucous membranes.    Eyes:  Conjunctivae and EOM are normal.  No scleral icterus.  Neck:  Neck supple.  No JVD present.    Cardiovascular:  Normal rate, regular rhythm and normal heart sounds with no murmur. No edema.  Pulmonary/Chest:  No respiratory distress, no wheezes, no crackles, with normal breath sounds and good air movement.  Clear to auscultation bilaterally.  Abdominal:  Soft.  Bowel sounds are normal.  No distension and no tenderness.   Musculoskeletal:  No edema, no tenderness, and no deformity.  No swelling or  redness of joints.  Neurological:  Alert and oriented to person.  No facial droop.  No slurred speech.   Skin: Very dry bilateral lower extremities with chemical dermatitis and sloughing.  Psychiatric:  Normal mood and affect.  Behavior is normal.    ---------------------------------------------------------------------------------------------------------------------    Results from last 7 days   Lab Units 07/02/22  2339 07/02/22  1846 07/02/22  1626   CK TOTAL U/L  --   --  41   TROPONIN T ng/mL 0.066* 0.055* 0.061*     Results from last 7 days   Lab Units 07/02/22  1626   PROBNP pg/mL >70,000.0*       Results from last 7 days   Lab Units 07/02/22  1851   PH, ARTERIAL pH units 7.448   PO2 ART mm Hg 76.8*   PCO2, ARTERIAL mm Hg 39.5   HCO3 ART mmol/L 27.3*     Results from last 7 days   Lab Units 07/06/22  0416 07/05/22  0344 07/04/22  0113 07/02/22  2339 07/02/22  1626   CRP mg/dL  --   --   --   --  1.96*   LACTATE mmol/L  --   --   --   --  0.7   WBC 10*3/mm3 8.44 10.43 8.33   < > 7.05   HEMOGLOBIN g/dL 7.3* 7.8* 8.7*   < > 11.0*   HEMATOCRIT % 21.7* 23.6* 26.7*   < > 32.7*   MCV fL 90.0 90.4 92.1   < > 89.3   MCHC g/dL 33.6 33.1 32.6   < > 33.6   PLATELETS 10*3/mm3 211 254 249   < > 259   INR   --   --   --   --  0.93    < > = values in this interval not displayed.     Results from last 7 days   Lab Units 07/06/22  0415 07/05/22  0344 07/04/22  0113 07/02/22  2339   SODIUM mmol/L 129* 130* 128* 133*   POTASSIUM mmol/L 3.3* 3.5 4.0 3.9   CHLORIDE mmol/L 93* 91* 93* 97*   CO2 mmol/L 24.3 23.1 22.6 24.0   BUN mg/dL 28* 20 31* 24*   CREATININE mg/dL 2.29* 1.83* 2.74* 2.52*   CALCIUM mg/dL 8.4* 8.5* 8.1* 7.7*   GLUCOSE mg/dL 160* 138* 136* 81   ALBUMIN g/dL  --  3.56 2.88* 2.71*   BILIRUBIN mg/dL  --  0.5 0.4 0.4   ALK PHOS U/L  --  100 105 94   AST (SGOT) U/L  --  13 13 14   ALT (SGPT) U/L  --  6 7 <5   Estimated Creatinine Clearance: 21.9 mL/min (A) (by C-G formula based on SCr of 2.29 mg/dL (H)).  No results found  for: AMMONIA    Glucose   Date/Time Value Ref Range Status   07/06/2022 1038 263 (H) 70 - 130 mg/dL Final     Comment:     Meter: [id number] : [id number] TY PARSONS   07/06/2022 0723 190 (H) 70 - 130 mg/dL Final     Comment:     Meter: [id number] : [id number] TY PARSONS   07/05/2022 1819 188 (H) 70 - 130 mg/dL Final     Comment:     Meter: [id number] : [id number] KATHI PARSONS   07/05/2022 1625 173 (H) 70 - 130 mg/dL Final     Comment:     Meter: [id number] : [id number] SHANA NEUMANN   07/05/2022 1035 230 (H) 70 - 130 mg/dL Final     Comment:     Meter: [id number] : [id number] SHANA NEUMANN   07/05/2022 0629 146 (H) 70 - 130 mg/dL Final     Comment:     Meter: [id number] : [id number] SHANA NEUMANN   07/04/2022 2320 125 70 - 130 mg/dL Final     Comment:     Meter: [id number] : [id number] Morgan Collett   07/04/2022 1840 176 (H) 70 - 130 mg/dL Final     Comment:     Meter: [id number] : [id number] WILMAN CHAPA     Lab Results   Component Value Date    HGBA1C 5.50 07/02/2022     Lab Results   Component Value Date    TSH 1.390 07/02/2022    FREET4 1.54 07/02/2022       Blood Culture   Date Value Ref Range Status   07/02/2022 No growth at 3 days  Preliminary   07/02/2022 No growth at 3 days  Preliminary     Urine Culture   Date Value Ref Range Status   07/02/2022 50,000 CFU/mL Enterobacter cloacae complex CRE (A)  Final     Comment:     Consider infectious disease consult.  Susceptibility results may not correlate to clinical outcomes.  Carbapenem resistant Enterobacteriaceae, patient may be an isolation risk.  No carbapenemase detected.     No results found for: WOUNDCX  No results found for: STOOLCX  No results found for: RESPCX  Pain Management Panel     Pain Management Panel Latest Ref Rng & Units 7/2/2022 5/23/2022    CREATININE UR mg/dL - -    AMPHETAMINES SCREEN, URINE Negative Negative Negative    BARBITURATES SCREEN Negative Negative Negative    BENZODIAZEPINE  SCREEN, URINE Negative Negative Negative    BUPRENORPHINEUR Negative Negative Negative    COCAINE SCREEN, URINE Negative Negative Negative    METHADONE SCREEN, URINE Negative Negative Negative    METHAMPHETAMINEUR Negative Negative Negative        I have personally reviewed the above laboratory results.   ---------------------------------------------------------------------------------------------------------------------  Imaging Results (Last 7 Days)     Procedure Component Value Units Date/Time    XR Knee 4+ View Bilateral [[id number]] Collected: 07/05/22 0821     Updated: 07/05/22 0824    Narrative:      EXAM:    XR Bilateral Knees Complete, 4 or More Views     EXAM DATE:    7/5/2022 5:51 AM     CLINICAL HISTORY:    status post fall; E16.2-Hypoglycemia, unspecified     TECHNIQUE:    Four or more views of the bilateral knees.     COMPARISON:    No relevant prior studies available.     FINDINGS:    Bones/joints:  Advanced tricompartmental osteoarthritis is noted.   Arthritic changes most pronounced of the medial knee compartments  bilaterally.  No joint effusions identified.  No acute fracture.  No  dislocation.    Soft tissues:  Unremarkable.    Vasculature:  Dense arterial vascular calcifications are noted.       Impression:      1.  Advanced tricompartmental osteoarthritis bilateral knees.  2.  No acute osseous or articular abnormalities identified.     This report was finalized on 7/5/2022 8:22 AM by Dr. Mingo Cardenas MD.       XR Chest AP [[id number]] Collected: 07/05/22 0621     Updated: 07/05/22 0623    Narrative:      CR Chest 1 Vw    INDICATION:   Congestive heart failure. Short of air     COMPARISON:    7/2/2022    FINDINGS:  Portable AP view(s) of the chest.    Right-sided large bore central line is unchanged    Cardiac silhouette not well visualized or assessed. Shallow lung expansion with bronchovascular crowding. Bilateral partially layering pleural effusions, right greater than left with associated  compressive atelectasis or pneumonia. Worsening opacities in  the perihilar right lung compared to the prior study may be partly positional. No pneumothorax.       Impression:        1. Bilateral pleural effusions with compressive atelectasis or pneumonia worse on the right than the left. Probable underlying volume overload. No pneumothorax.    Signer Name: Cristhian Ballard MD   Signed: 7/5/2022 6:21 AM   Workstation Name: [email or web address]    [facility]    CT Abdomen Pelvis Without Contrast [[id number]] Collected: 07/02/22 1757     Updated: 07/02/22 1759    Narrative:      CT Abdomen Pelvis WO    INDICATION:   Delirium, abdominal pain, COPD exacerbation    TECHNIQUE:   CT of the abdomen and pelvis without IV contrast. Coronal and sagittal reconstructions were obtained.  Radiation dose reduction techniques included automated exposure control or exposure modulation based on body size. Radiation audit for CT and nuclear  cardiology exams in the last 12 months: 4.     COMPARISON:   None available.    FINDINGS:    See separate CT chest. No destructive osseous lesion.    Evaluation of the solid viscera is compromised by lack of IV contrast. Additionally, the patient's arms are within the gantry which produces significant artifact which further compromises evaluation of the abdomen and pelvis. Allowing for this:    Diffuse body wall edema.    Normal noncontrast appearance of the liver, pancreas, spleen and both adrenal glands. Favor the gallbladder to be surgically absent.    The kidneys are symmetric in size. Evaluation for solid renal lesion is largely precluded by lack of IV contrast. No hydronephrosis. No renal or ureteral calculus. Likely cyst at the lower pole the right kidney.    Urinary bladder is distended.    No evidence of bowel obstruction. No localizing perienteric inflammatory change.    Normal caliber of the abdominal aorta. Extensive vascular calcifications. No lymphadenopathy within the  abdomen or pelvis by size criteria.      Impression:        Evaluation of the solid viscera is compromised by lack of IV contrast. Additionally, the patient's arms are within the gantry which produces significant artifact which further compromises evaluation of the abdomen and pelvis. Exam is moderately to  severely degraded. Allowing for this:    1.  Diffuse body wall edema.  2.  See separate CT chest.  3.  Extensive vascular calcifications.  4.  No definite acute process in the abdomen or pelvis by noncontrast CT.        Signer Name: ADAMS VOSS MD   Signed: 7/2/2022 5:57 PM   Workstation Name: [id number]    [facility]    CT Chest Without Contrast Diagnostic [[id number]] Collected: 07/02/22 1752     Updated: 07/02/22 1754    Narrative:      CT Chest WO    INDICATION:   Shortness of air, COPD exacerbation    TECHNIQUE:   CT of the chest without IV contrast. Coronal and sagittal reformatted images. Radiation dose reduction techniques included automated exposure control or exposure modulation based on body size. Radiation audit for CT and nuclear cardiology exams in the  last 12 months: 4.    COMPARISON:   May 23, 2022    FINDINGS:     Large right and moderate left pleural effusions. Areas of consolidative change and nodularity seen adjacent to the pleural effusions within the dependent lungs. Some scattered reticular nodularity in the right apex. Findings favor volume overload or  multifocal pneumonia with bilateral pleural effusions.    Cardiomegaly. Severe coronary artery atherosclerosis. Evaluation of the soft tissues of the chest are otherwise compromised by lack of IV contrast. No overt lymphadenopathy.    No destructive bony lesion.    See separately dictated CT abdomen/pelvis for findings below the diaphragm.      Impression:      1.  Large right and moderate left pleural effusions. Areas of consolidative change and nodularity seen adjacent to the pleural effusions within the  dependent lungs. Some scattered reticular nodularity in the right apex. Findings favor volume overload or  multifocal pneumonia with bilateral pleural effusions.  2.  Cardiomegaly.  3.  Severe coronary artery atherosclerosis.    Signer Name: ADAMS VOSS MD   Signed: 7/2/2022 5:52 PM   Workstation Name: [facility]    [facility]    CT Head Without Contrast [[id number]] Collected: 07/02/22 1749     Updated: 07/02/22 1751    Narrative:      CT Head WO    HISTORY:   Delirium    TECHNIQUE:   Routine noncontrast head CT. Radiation dose reduction techniques included automated exposure control or exposure modulation based on body size. Radiation audit for CT and nuclear cardiology exams in the last 12 months: 4.    COMPARISON:   June 5, 2022    FINDINGS:       No acute intracranial hemorrhage, mass lesion, or abnormal extra-axial fluid collection. No midline shift or focal mass effect. Ventricular system is normal in size and configuration. .    The gray-white matter differentiation is preserved. There are scattered ill-defined and patchy hypoattenuating foci within the bilateral cerebral and deep white matter which are nonspecific but most commonly associated with chronic microvascular ischemic  change. These are advanced.    Visualized paranasal sinuses are clear. Visualized mastoid air cells are clear.    No acute osseous abnormality.        Impression:        1.  Presumed chronic microvascular ischemic changes, advanced. No definite superimposed acute process.    Signer Name: ADAMS VOSS MD   Signed: 7/2/2022 5:49 PM   Workstation Name: [facility]    [facility]    XR Chest 1 View [[id number]] Collected: 07/02/22 1625     Updated: 07/02/22 1627    Narrative:      CR Chest 1 Vw    INDICATION:   Pulmonary edema.     COMPARISON:    6/5/2022    FINDINGS:  Portable AP view(s) of the chest.  Central venous catheter tip remains in the SVC. Heart size is stable. There are  bilateral pleural effusions, right larger than left. Right side is relatively stable, and the left appears slightly improved. There is some  vascular congestion. There is infiltrate or atelectasis in the bases, right greater than left. Esrgio pulmonary edema is not definitely identified.      Impression:        1. Bilateral effusions, relatively stable on the right and improved on the left.  2. Vascular congestion without definite pulmonary edema.  3. Bibasilar infiltrate or atelectasis, right greater than left.    Signer Name: Tomas Hoffman MD   Signed: 7/2/2022 4:25 PM   Workstation Name: [email or web address]    [facility] [place]        I have personally reviewed the above radiology results.   ---------------------------------------------------------------------------------------------------------------------      Assessment & Plan      ASSESSMENT:    UTI  Multidrug-resistant organism with CRE  End-stage renal disease on hemodialysis      PLAN:    The patient presented to [facility] Emergency Department on 7/2/2022 for bilateral leg edema and fatigue.    The patient is on 3 L nasal cannula, which is stable.  CNA is at bedside.  Patient has underlying dementia and is not able to provide a review of systems.  Reports that she does not feel well today, but is not able to specify any particular complaints.  Nurse remove dressings from bilateral lower extremities and they are very dry skin with chemical dermatitis and sloughing.  Clear to auscultation bilaterally.  Afebrile, no diarrhea.  WBC on 7/6/2022 is normal.  Lactic acid on admission was normal.  Urinalysis on 7/2/2022 was positive with 13-20 WBCs and 2+ bacteria.  No urine culture was performed.  Repeat urinalysis on 7/2/2022 was positive with 6-12 WBCs and 3+ bacteria.  Urine culture finalized as 50,000 colonies of CRE Enterobacter cloacae complex.  Bilateral knee x-rays on 7/5/2022 showed advanced tricompartmental osteoarthritis of  bilateral knees.  No acute osseous or articular abnormalities identified.  Chest x-ray on 7/5/2022 showed bilateral pleural effusions with compressive atelectasis or pneumonia worse on the right than the left.  Probable underlying volume overload.  No pneumothorax.  CT abdomen pelvis on 7/2/2022 showed diffuse body wall edema.  Extensive vascular calcifications.  No definite acute process in the abdomen or pelvis by noncontrast CT.  CT chest on 7/2/2022 showed large right and moderate left pleural effusions.  Areas of consolidative change and nodularity seen adjacent to the pleural effusions within the dependent lungs.  Some scattered reticular nodularity in the right apex.  Findings favor volume overload or multifocal pneumonia with bilateral pleural effusions.  Cardiomegaly.  Severe coronary artery atherosclerosis.  Chest x-ray on 7/2/2022 showed bilateral effusions, relatively stable on the right and improved on the left.  Vascular congestion without definite pulmonary edema.  Bibasilar infiltrate or atelectasis, right greater than left.  COVID-19 and flu A/B PCR on 7/2/2022 was negative.  Blood cultures on 7/2/2022 are so far showing no growth.    At this point the patient is showing no evidence suggestive of clinical sepsis and seems to be very stable which I would consider this to be either a colonization or a uncomplicated UTI.  Would benefit from just observation off coverage or a 3-day course of gentamicin which would equal to 2 doses after dialysis.  At this point I do not see any reason to initiate Zerbaxa or Avycaz unless her clinical condition deteriorates.    I believe her lower extremity condition to be related to chronic venous stasis, contact dermatitis with Unna boot and skin sloughing due to very dry skin.  Would recommend to apply Vaseline or petroleum jelly and allowed to dry to air.        Again, thank you Dr. Del Rosario for allowing us to participate in the care of your patient and please feel  free to call for any questions you may have.    Code Status:   Code Status and Medical Interventions:   Ordered at: 22 1854     Code Status (Patient has no pulse and is not breathing):    CPR (Attempt to Resuscitate)     Medical Interventions (Patient has pulse or is breathing):    Full Support     JIE Luevano  22  11:12 EDT    Electronically signed by JIE Luevano, 22, 11:54 AM EDT.  Electronically signed by Melissa Merino MD, 22, 12:01 PM EDT.      Electronically signed by Melissa Merino MD at 22 1205       Nutrition Notes (most recent note)    No notes exist for this encounter.            Physical Therapy Notes (most recent note)      Kavitha Montelongo, PT at 22 1559  Version 1 of 1         Acute Care - Physical Therapy Initial Evaluation  CHICHO Enriquez     Patient Name: Sean Chen  : 1940  MRN: [id number]  Today's Date: 2022   Onset of Illness/Injury or Date of Surgery: 22  Visit Dx:     ICD-10-CM ICD-9-CM   1. Hypoglycemia  E16.2 251.2     Patient Active Problem List   Diagnosis   • Type 2 diabetes mellitus without complication, without long-term current use of insulin ([facility])   • Vitamin D deficiency disease   • Extrinsic asthma   • Essential hypertension   • Mixed hyperlipidemia   • Generalized osteoarthritis   • Gastroesophageal reflux disease without esophagitis   • Meniere's disease   • Chronic seasonal allergic rhinitis   • Low serum vitamin B12   • Dementia without behavioral disturbance ([facility])   • NSTEMI (non-ST elevated myocardial infarction) ([facility])   • Prolonged Q-T interval on ECG   • Chronic renal failure, stage 4 (severe) ([facility])   • Encounter for immunization   • Hypoglycemia     Past Medical History:   Diagnosis Date   • Allergic rhinitis    • Elevated liver enzymes    • Extrinsic asthma    • Gastritis    • GERD (gastroesophageal reflux disease)    • Hyperlipidemia    • Hypertension    • Iron deficiency anemia    •  Meniere's disease    • Osteoarthritis    • Type 2 diabetes mellitus ([facility])    • Vitamin D deficiency disease      Past Surgical History:   Procedure Laterality Date   • APPENDECTOMY     • CARDIAC CATHETERIZATION N/A 5/11/2022    Procedure: Left Heart Cath;  Surgeon: Marcelino Grande MD;  Location: [facility] CATH INVASIVE LOCATION;  Service: Cardiology;  Laterality: N/A;   • CHOLECYSTECTOMY     • HYSTERECTOMY     • INSERTION HEMODIALYSIS CATHETER N/A 5/9/2022    Procedure: HEMODIALYSIS CATHETER INSERTION;  Surgeon: Hans Coates MD;  Location: [facility] OR;  Service: General;  Laterality: N/A;     PT Assessment (last 12 hours)     PT Evaluation and Treatment     Row Name 07/04/22 1546          Physical Therapy Time and Intention    Subjective Information no complaints  -AG     Document Type evaluation  -AG     Mode of Treatment physical therapy  -AG     Patient Effort poor  -AG     Symptoms Noted During/After Treatment none  -AG     Row Name 07/04/22 1546          General Information    Patient Profile Reviewed yes  -AG     Onset of Illness/Injury or Date of Surgery 07/02/22  -AG     Referring Physician Dr. Tomas  -AG     Patient Observations alert  -AG     Patient/Family/Caregiver Comments/Observations pt. seated EOB on 2.5L O2 nc; PT replaced nasal cannula after patient removed it.  -AG     Prior Level of Function --  per pt. report, ambulating at home with either a straight cane or RW.  -AG     Equipment Currently Used at Home cane, straight;wheelchair;oxygen;hospital bed;commode  -AG     Pertinent History of Current Functional Problem pt. admitted with hypoglycemia  -AG     Limitations/Impairments safety/cognitive  -AG     Barriers to Rehab cognitive status  -AG     Row Name 07/04/22 1546          Previous Level of Function/Home Environm    Bed Mobility, Premorbid Functional Level independent  -AG     Transfers, Premorbid Functional Level independent  -AG     Household Ambulation, Premorbid Functional Level  independent;uses device or equipment  -AG     Row Name 07/04/22 1546          Living Environment    Current Living Arrangements home  -     Home Accessibility --  unknown  -AG     People in Home spouse  -AG     Row Name 07/04/22 1546          Home Use of Assistive/Adaptive Equipment    Equipment Currently Used at Home cane, straight;walker, rolling  -AG     Row Name 07/04/22 1546          Pain    Additional Documentation Pain Scale: FACES Pre/Post-Treatment (Group)  -AG     Row Name 07/04/22 1546          Pain Scale: FACES Pre/Post-Treatment    Pain: FACES Scale, Pretreatment 0-->no hurt  -AG     Posttreatment Pain Rating 0-->no hurt  -AG     Row Name 07/04/22 1546          Cognition    Affect/Mental Status (Cognition) confused;flat/blunted affect  -     Behavioral Issues (Cognition) withdrawn  -     Orientation Status (Cognition) oriented to;person  -AG     Follows Commands (Cognition) follows one-step commands;verbal cues/prompting required;0-24% accuracy  -     Cognitive Function executive function deficit;safety deficit  -AG     Safety Deficit (Cognition) insight into deficits/self-awareness;safety precautions awareness;safety precautions follow-through/compliance  -AG     Row Name 07/04/22 1546          Range of Motion (ROM)    Range of Motion --  all extremities AROM limited; B shoulders~75% normal AROM; B knee extension limited ~20-30 degrees.  -AG     Row Name 07/04/22 1546          Strength (Manual Muscle Testing)    Strength (Manual Muscle Testing) --  B LE 2+/5  -AG     Row Name 07/04/22 1546          Sensory    Hearing Status WFL  -AG     Row Name 07/04/22 1546          Vision Assessment/Intervention    Visual Impairment/Limitations unable/difficult to assess  -AG     Row Name 07/04/22 1546          Mobility    Extremity Weight-bearing Status right lower extremity;left lower extremity  -     Left Lower Extremity (Weight-bearing Status) full weight-bearing (FWB)  -     Right Lower Extremity  (Weight-bearing Status) full weight-bearing (FWB)  -AG     Row Name 07/04/22 1546          Bed Mobility    Comment, (Bed Mobility) pt. not cooperative for assessment.  -AG     Row Name 07/04/22 1546          Safety Issues, Functional Mobility    Impairments Affecting Function (Mobility) cognition;strength;range of motion (ROM)  -AG     Row Name 07/04/22 1546          Balance    Balance Assessment sitting static balance;sitting dynamic balance;sit to stand dynamic balance;standing static balance;standing dynamic balance  -AG     Static Sitting Balance independent  -AG     Position, Sitting Balance sitting edge of bed  -AG     Row Name             Wound 07/02/22 2230 Right lower leg Other (comment)    Wound - Properties Group Placement Date: 07/02/22  -AP Placement Time: 2230  -AP Present on Hospital Admission: Y  -AP Side: Right  -AP Orientation: lower  -AP Location: leg  -AP Primary Wound Type: Other  -AP     Retired Wound - Properties Group Placement Date: 07/02/22  -AP Placement Time: 2230  -AP Present on Hospital Admission: Y  -AP Side: Right  -AP Orientation: lower  -AP Location: leg  -AP Primary Wound Type: Other  -AP     Retired Wound - Properties Group Date first assessed: 07/02/22  -AP Time first assessed: 2230  -AP Present on Hospital Admission: Y  -AP Side: Right  -AP Location: leg  -AP Primary Wound Type: Other  -AP     Row Name             Wound 07/02/22 2232 Left lower leg Other (comment)    Wound - Properties Group Placement Date: 07/02/22  -AP Placement Time: 2232  -AP Side: Left  -AP Orientation: lower  -AP Location: leg  -AP Primary Wound Type: Other  -AP     Retired Wound - Properties Group Placement Date: 07/02/22  -AP Placement Time: 2232  -AP Side: Left  -AP Orientation: lower  -AP Location: leg  -AP Primary Wound Type: Other  -AP     Retired Wound - Properties Group Date first assessed: 07/02/22  -AP Time first assessed: 2232  -AP Side: Left  -AP Location: leg  -AP Primary Wound Type: Other   -AP     Row Name             Wound 07/03/22 0001 Left calf Other (comment)    Wound - Properties Group Placement Date: 07/03/22  -AP Placement Time: 0001  -AP Present on Hospital Admission: Y  -AP Side: Left  -AP Location: calf  -AP Primary Wound Type: Other  -AP     Retired Wound - Properties Group Placement Date: 07/03/22  -AP Placement Time: 0001  -AP Present on Hospital Admission: Y  -AP Side: Left  -AP Location: calf  -AP Primary Wound Type: Other  -AP     Retired Wound - Properties Group Date first assessed: 07/03/22  -AP Time first assessed: 0001  -AP Present on Hospital Admission: Y  -AP Side: Left  -AP Location: calf  -AP Primary Wound Type: Other  -AP     Row Name             Wound 07/03/22 0001 Right calf Other (comment)    Wound - Properties Group Placement Date: 07/03/22  -AP Placement Time: 0001  -AP Side: Right  -AP Location: calf  -AP Primary Wound Type: Other  -AP     Retired Wound - Properties Group Placement Date: 07/03/22  -AP Placement Time: 0001  -AP Side: Right  -AP Location: calf  -AP Primary Wound Type: Other  -AP     Retired Wound - Properties Group Date first assessed: 07/03/22  -AP Time first assessed: 0001  -AP Side: Right  -AP Location: calf  -AP Primary Wound Type: Other  -AP     Row Name             [REMOVED] Wound 07/03/22 0004 coccyx Pressure Injury    Wound - Properties Group Placement Date: 07/03/22  -AP Placement Time: 0004  -AP Present on Hospital Admission: Y  -AP Location: coccyx  -AP Primary Wound Type: Pressure inj  -AP Removal Date: 07/04/22  -TW Removal Time: 1405  -TW Wound Outcome: Unknown  -TW     Retired Wound - Properties Group Placement Date: 07/03/22  -AP Placement Time: 0004  -AP Present on Hospital Admission: Y  -AP Location: coccyx  -AP Primary Wound Type: Pressure inj  -AP Removal Date: 07/04/22  -TW Removal Time: 1405  -TW Wound Outcome: Unknown  -TW     Retired Wound - Properties Group Date first assessed: 07/03/22  -AP Time first assessed: 0004  -AP Present  on Hospital Admission: Y  -AP Location: coccyx  -AP Primary Wound Type: Pressure inj  -AP Resolution Date: 07/04/22  -TW Resolution Time: 1405  -TW Wound Outcome: Unknown  -TW     Row Name             Wound 07/03/22 1141 Right posterior ring finger Skin Tear    Wound - Properties Group Placement Date: 07/03/22  -CW Placement Time: 1141  -CW Side: Right  -CW Orientation: posterior  -CW Location: ring finger  -CW, ring finger skin tear  Primary Wound Type: Skin tear  -CW     Retired Wound - Properties Group Placement Date: 07/03/22  -CW Placement Time: 1141  -CW Side: Right  -CW Orientation: posterior  -CW Location: ring finger  -CW, ring finger skin tear  Primary Wound Type: Skin tear  -CW     Retired Wound - Properties Group Date first assessed: 07/03/22  -CW Time first assessed: 1141  -CW Side: Right  -CW Location: ring finger  -CW, ring finger skin tear  Primary Wound Type: Skin tear  -CW     Row Name 07/04/22 1546          Coping    Observed Emotional State flat  confused  -AG     Verbalized Emotional State acceptance  -AG     Trust Relationship/Rapport care explained;choices provided;thoughts/feelings acknowledged  -AG     Family/Support Persons spouse  -AG     Involvement in Care not present at bedside  -AG     Family/Support System Care self-care encouraged  -AG     Row Name 07/04/22 1546          Positioning and Restraints    Pre-Treatment Position in bed  -AG     Post Treatment Position bed  -AG     In Bed notified nsg;sitting EOB;call light within reach;encouraged to call for assist;exit alarm on;side rails up x3  -AG     Row Name 07/04/22 1546          Therapy Assessment/Plan (PT)    Patient/Family Therapy Goals Statement (PT) pt. unable to formulate goal  -AG     Criteria for Skilled Interventions Met (PT) no  pt. poorly cooperative for evaluation; confused.  -AG     Therapy Frequency (PT) evaluation only  -AG     Comment, Therapy Assessment/Plan (PT) evaluation completed.  patient was poorly cooperative  for evaluation, oriented to person.  Not a candidate for skilled PT at this time.  -           User Key  (r) = Recorded By, (t) = Taken By, (c) = Cosigned By    Initials Name Provider Type    Monserrat Rodriguez, RN Registered Nurse    Rehana Goodman RN Registered Nurse    Kavitha Choudhury, PT Physical Therapist    Jolene Choe RN Registered Nurse                Physical Therapy Education                 Title: PT OT SLP Therapies (In Progress)     Topic: Physical Therapy (In Progress)     Point: Mobility training (In Progress)     Learning Progress Summary           Patient Acceptance, E,D, NL by  at 7/4/2022 1545                   Point: Home exercise program (In Progress)     Learning Progress Summary           Patient Acceptance, E,D, NL by  at 7/4/2022 1545                   Point: Body mechanics (In Progress)     Learning Progress Summary           Patient Acceptance, E,D, NL by  at 7/4/2022 1545                   Point: Precautions (In Progress)     Learning Progress Summary           Patient Acceptance, E,D, NL by  at 7/4/2022 1545                               User Key     Initials Effective Dates Name Provider Type Discipline     06/16/21 -  Kavitha Montelongo, PT Physical Therapist PT              PT Recommendation and Plan  Anticipated Discharge Disposition (PT): home with 24/7 care, skilled nursing facility  Therapy Frequency (PT): evaluation only          Time Calculation:    PT Charges     Row Name 07/04/22 1545             Time Calculation    PT Received On 07/04/22  -            User Key  (r) = Recorded By, (t) = Taken By, (c) = Cosigned By    Initials Name Provider Type     Kavitha Montelongo, PT Physical Therapist              Therapy Charges for Today     Code Description Service Date Service Provider Modifiers Qty    63321063648 HC PT EVAL LOW COMPLEXITY 4 7/4/2022 Kavitha Montelongo, PT GP 1               Kavitha Montelongo PT  7/4/2022      Electronically signed by Kavitha Montelongo,  PT at 22 1559          Occupational Therapy Notes (most recent note)      Becky Laughlin, OT at 22 1302          Patient Name: Sean Chen  : 1940    MRN: [id number]                              Today's Date: 2022       Admit Date: 2022    Visit Dx:     ICD-10-CM ICD-9-CM   1. Hypoglycemia  E16.2 251.2     Patient Active Problem List   Diagnosis   • Type 2 diabetes mellitus without complication, without long-term current use of insulin (HCC)   • Vitamin D deficiency disease   • Extrinsic asthma   • Essential hypertension   • Mixed hyperlipidemia   • Generalized osteoarthritis   • Gastroesophageal reflux disease without esophagitis   • Meniere's disease   • Chronic seasonal allergic rhinitis   • Low serum vitamin B12   • Dementia without behavioral disturbance ([facility])   • NSTEMI (non-ST elevated myocardial infarction) ([facility])   • Prolonged Q-T interval on ECG   • Chronic renal failure, stage 4 (severe) ([facility])   • Encounter for immunization   • Hypoglycemia     Past Medical History:   Diagnosis Date   • Allergic rhinitis    • Elevated liver enzymes    • Extrinsic asthma    • Gastritis    • GERD (gastroesophageal reflux disease)    • Hyperlipidemia    • Hypertension    • Iron deficiency anemia    • Meniere's disease    • Osteoarthritis    • Type 2 diabetes mellitus (HCC)    • Vitamin D deficiency disease      Past Surgical History:   Procedure Laterality Date   • APPENDECTOMY     • CARDIAC CATHETERIZATION N/A 2022    Procedure: Left Heart Cath;  Surgeon: Marcelino Grande MD;  Location: [facility] CATH INVASIVE LOCATION;  Service: Cardiology;  Laterality: N/A;   • CHOLECYSTECTOMY     • HYSTERECTOMY     • INSERTION HEMODIALYSIS CATHETER N/A 2022    Procedure: HEMODIALYSIS CATHETER INSERTION;  Surgeon: Hans Coates MD;  Location: [facility] OR;  Service: General;  Laterality: N/A;      General Information     Row Name 22 1253          OT Time and Intention    Document Type therapy note  (daily note)  -     Mode of Treatment individual therapy;occupational therapy  -KP     Row Name 07/06/22 1253          General Information    Patient Profile Reviewed yes  -KP     Existing Precautions/Restrictions fall  -KP     Row Name 07/06/22 1253          Cognition    Orientation Status (Cognition) oriented to;person  -KP     Row Name 07/06/22 1253          Safety Issues, Functional Mobility    Impairments Affecting Function (Mobility) balance;cognition;endurance/activity tolerance;strength  -KP           User Key  (r) = Recorded By, (t) = Taken By, (c) = Cosigned By    Initials Name Provider Type    Becky Espinal OT Occupational Therapist                 Mobility/ADL's     Row Name 07/06/22 1254          Transfers    Transfers stand-sit transfer;sit-stand transfer  -     Comment, (Transfers) X3 from edge of bed with min assist X2 persons; pt restless in bed with improved presentation status post  -KP     Sit-Stand [place] (Transfers) minimum assist (75% patient effort);2 person assist  -KP     Stand-Sit [place] (Transfers) minimum assist (75% patient effort);2 person assist  -KP           User Key  (r) = Recorded By, (t) = Taken By, (c) = Cosigned By    Initials Name Provider Type    Becky Espinal OT Occupational Therapist               Obj/Interventions     Row Name 07/06/22 1257          Motor Skills    Motor Skills functional endurance  -KP     Functional Endurance fair  -KP     Row Name 07/06/22 1257          Balance    Balance Assessment standing static balance  -KP     Static Standing Balance moderate assist  -KP           User Key  (r) = Recorded By, (t) = Taken By, (c) = Cosigned By    Initials Name Provider Type    Becky Espinal OT Occupational Therapist               Goals/Plan    No documentation.                Clinical Impression     Row Name 07/06/22 1257          Pain Assessment    Pretreatment Pain Rating 1/10  -KP     Posttreatment Pain Rating 1/10  -KP     Pain  Location - Side/Orientation Bilateral  -KP     Pain Location - foot  -     Row Name 07/06/22 1257          Plan of Care Review    Plan of Care Reviewed With patient  -     Progress no change  -     Outcome Evaluation Patient restless upon arrival and wanting out of bed but pleasant. Safety sitter present. Tolerated sit to stands from edge of bed X3 trails with minimal assist X2 persons. Continue plan of care.  -     Row Name 07/06/22 1257          Positioning and Restraints    Pre-Treatment Position in bed  -KP     Post Treatment Position bed  -KP     In Bed with nsg  -KP           User Key  (r) = Recorded By, (t) = Taken By, (c) = Cosigned By    Initials Name Provider Type    Becky Espinal, OT Occupational Therapist               Outcome Measures    No documentation.                 Occupational Therapy Education                 Title: PT OT SLP Therapies (In Progress)     Topic: Occupational Therapy (In Progress)     Point: ADL training (Done)     Description:   Instruct learner(s) on proper safety adaptation and remediation techniques during self care or transfers.   Instruct in proper use of assistive devices.              Learning Progress Summary           Patient Acceptance, E, VU,NR by  at 7/5/2022 1103    Comment: OT role, plan of care, goals                   Point: Home exercise program (Not Started)     Description:   Instruct learner(s) on appropriate technique for monitoring, assisting and/or progressing therapeutic exercises/activities.              Learner Progress:  Not documented in this visit.          Point: Precautions (Not Started)     Description:   Instruct learner(s) on prescribed precautions during self-care and functional transfers.              Learner Progress:  Not documented in this visit.          Point: Body mechanics (Not Started)     Description:   Instruct learner(s) on proper positioning and spine alignment during self-care, functional mobility activities and/or  exercises.              Learner Progress:  Not documented in this visit.                      User Key     Initials Effective Dates Name Provider Type Discipline     06/16/21 -  Becky Laughlin OT Occupational Therapist OT              OT Recommendation and Plan  Planned Therapy Interventions (OT): activity tolerance training, BADL retraining, functional balance retraining, occupation/activity based interventions, ROM/therapeutic exercise, patient/caregiver education/training, strengthening exercise, transfer/mobility retraining  Therapy Frequency (OT): 5 times/wk (3-5x/week as available)  Plan of Care Review  Plan of Care Reviewed With: patient  Progress: no change  Outcome Evaluation: Patient restless upon arrival and wanting out of bed but pleasant. Safety sitter present. Tolerated sit to stands from edge of bed X3 trails with minimal assist X2 persons. Continue plan of care.     Time Calculation:    Time Calculation- OT     Row Name 07/06/22 1301             Time Calculation- OT    OT Received On 07/06/22  -KP              Timed Charges    78249 - OT Therapeutic Activity Minutes 14  -KP              Total Minutes    Timed Charges Total Minutes 14  -KP       Total Minutes 14  -KP            User Key  (r) = Recorded By, (t) = Taken By, (c) = Cosigned By    Initials Name Provider Type     Becky Laughlin OT Occupational Therapist              Therapy Charges for Today     Code Description Service Date Service Provider Modifiers Qty    35382150253 HC OT EVAL MOD COMPLEXITY 4 7/5/2022 Becky Laughlin OT GO 1    [id number]  OT THERAPEUTIC ACT EA 15 MIN 7/6/2022 Becky Laughlin OT GO 1               Becky Laughlin OT  7/6/2022    Electronically signed by Becky Laughlin OT at 07/06/22 1302       Speech Language Pathology Notes (most recent note)    No notes exist for this encounter.            Respiratory Therapy Notes (most recent note)      Onelia Camara, RRT at 07/09/22 2015        Took patient off Heated  HFNC and placed on bubble at this time, no distress noted.  Patient sat is 98-99 on 6L and will continue to titrate.     Electronically signed by Onelia Camara, RRT at 07/09/22 8865       ADL Documentation (most recent)    Flowsheet Row Most Recent Value   Transferring 4 - completely dependent   Toileting 4 - completely dependent   Bathing 4 - completely dependent   Dressing 4 - completely dependent   Eating 2 - assistive person   Communication 0 - understands/communicates without difficulty   Swallowing 0 - swallows foods/liquids without difficulty   Equipment Currently Used at Home cane, straight, walker, rolling

## 2022-07-12 NOTE — PROGRESS NOTES
[facility]     Progress Note    Patient Name: Sean Chen  : 1940  MRN: [id number]  Primary Care Physician:  Ganga Gallardo MD  Date of admission: 2022    Subjective   Subjective     Chief Complaint: 83 y/o female being seen in follow up for hypoglycemia    History of Present Illness  Patient Reports resting comfortably in bed - more awake and alert this morning. Had recently complained of pain per nursing staff. It appeared she had eaten a small amount of breakfast.    Review of Systems   Constitutional: Positive for fatigue.     No evidence of dyspnea. No vomiting.    Objective   Objective     Vitals:   Temp:  [97 °F (36.1 °C)-97.9 °F (36.6 °C)] 97 °F (36.1 °C)  Heart Rate:  [57-68] 65  Resp:  [18-20] 20  BP: (135-161)/(61-74) 161/61  Flow (L/min):  [2] 2    Physical Exam  Constitutional:       General: She is not in acute distress.     Appearance: She is well-developed.   HENT:      Head: Normocephalic and atraumatic.   Neck:      Trachea: No tracheal deviation.   Cardiovascular:      Rate and Rhythm: Normal rate and regular rhythm.      Heart sounds: Murmur heard.     No friction rub. No gallop.   Pulmonary:      Effort: No respiratory distress.      Breath sounds: Examination of the right-lower field reveals decreased breath sounds. Examination of the left-lower field reveals decreased breath sounds. Decreased breath sounds present. No wheezing or rales.   Chest:      Comments: Right HD temporary catheter in place  Abdominal:      General: Bowel sounds are decreased. There is no distension.      Palpations: Abdomen is soft.      Tenderness: There is no abdominal tenderness. There is no guarding.   Skin:     General: Skin is warm and dry.      Findings: No erythema or rash.   Neurological:      Comments: Arousable; confused.       Assessment / Plan     #DM II insulin dependent with hypoglycemia upon admission  #Abnormal UA/possible UTI with (+) CRE  #Acute metabolic encephalopathy  superimposed on baseline dementia  #Acute hypoxemic respiratory failure  #ESRD on HD  #Essential hypertension  #Hyperlipidemia  #Generalized weakness    Patient appeared comfortable during my exam. Continue with as needed Robinul, Morphine, Haldol, Zofran, Ativan, etc. Narcotics and Anxiolytic dosing decreased on 7/11/22 after discussion with son. Appear to be effective at current/reduced dose but will continue to monitor closely.     Continue with supplemental oxygen as needed. Add other supportive care pending patient's needs.     Discussed with ELGIN Marrero and BRYAN Goldstein - palliative care team    Disposition:  Continue comfort measures; possibly home with hospice.    Olive Paris, DO

## 2022-07-13 VITALS
DIASTOLIC BLOOD PRESSURE: 94 MMHG | WEIGHT: 139.1 LBS | SYSTOLIC BLOOD PRESSURE: 189 MMHG | TEMPERATURE: 97 F | BODY MASS INDEX: 21.83 KG/M2 | RESPIRATION RATE: 18 BRPM | HEIGHT: 67 IN | HEART RATE: 70 BPM | OXYGEN SATURATION: 97 %

## 2022-07-13 PROBLEM — E16.2 HYPOGLYCEMIA: Status: RESOLVED | Noted: 2022-07-02 | Resolved: 2022-07-13

## 2022-07-13 PROCEDURE — 99221 1ST HOSP IP/OBS SF/LOW 40: CPT

## 2022-07-13 PROCEDURE — 36589 REMOVAL TUNNELED CV CATH: CPT

## 2022-07-13 PROCEDURE — 02PYX3Z REMOVAL OF INFUSION DEVICE FROM GREAT VESSEL, EXTERNAL APPROACH: ICD-10-PCS

## 2022-07-13 PROCEDURE — 99239 HOSP IP/OBS DSCHRG MGMT >30: CPT | Performed by: INTERNAL MEDICINE

## 2022-07-13 PROCEDURE — 25010000002 MORPHINE PER 10 MG: Performed by: INTERNAL MEDICINE

## 2022-07-13 RX ORDER — LORAZEPAM 0.5 MG/1
0.25 TABLET ORAL EVERY 6 HOURS PRN
Status: DISCONTINUED | OUTPATIENT
Start: 2022-07-13 | End: 2022-07-13 | Stop reason: HOSPADM

## 2022-07-13 RX ORDER — HYDROMORPHONE HYDROCHLORIDE 2 MG/1
2 TABLET ORAL EVERY 4 HOURS PRN
Status: DISCONTINUED | OUTPATIENT
Start: 2022-07-13 | End: 2022-07-13 | Stop reason: HOSPADM

## 2022-07-13 RX ORDER — HYDROMORPHONE HYDROCHLORIDE 2 MG/1
2 TABLET ORAL EVERY 4 HOURS PRN
Qty: 12 TABLET | Refills: 0 | Status: SHIPPED | OUTPATIENT
Start: 2022-07-13

## 2022-07-13 RX ORDER — ONDANSETRON 4 MG/1
4 TABLET, ORALLY DISINTEGRATING ORAL EVERY 8 HOURS PRN
Qty: 6 TABLET | Refills: 0 | Status: SHIPPED | OUTPATIENT
Start: 2022-07-13

## 2022-07-13 RX ORDER — LIDOCAINE HYDROCHLORIDE AND EPINEPHRINE 10; 10 MG/ML; UG/ML
20 INJECTION, SOLUTION INFILTRATION; PERINEURAL ONCE
Status: DISCONTINUED | OUTPATIENT
Start: 2022-07-13 | End: 2022-07-13 | Stop reason: HOSPADM

## 2022-07-13 RX ORDER — LORAZEPAM 0.5 MG/1
0.25 TABLET ORAL EVERY 6 HOURS PRN
Qty: 4 TABLET | Refills: 0 | Status: SHIPPED | OUTPATIENT
Start: 2022-07-13 | End: 2022-07-15

## 2022-07-13 RX ADMIN — MORPHINE SULFATE 1 MG: 2 INJECTION, SOLUTION INTRAMUSCULAR; INTRAVENOUS at 13:36

## 2022-07-13 RX ADMIN — CARVEDILOL 6.25 MG: 6.25 TABLET, FILM COATED ORAL at 08:25

## 2022-07-13 RX ADMIN — CARVEDILOL 6.25 MG: 6.25 TABLET, FILM COATED ORAL at 17:23

## 2022-07-13 RX ADMIN — Medication 10 ML: at 08:25

## 2022-07-13 RX ADMIN — ASPIRIN 81 MG: 81 TABLET, COATED ORAL at 08:24

## 2022-07-13 NOTE — PROGRESS NOTES
"Palliative Care Daily Progress Note     S: Medical record reviewed, followed up with Joellen ADAMS and Dr. Paris regarding patient's condition. When Palliative entered the room the pt was resting quietly and did not appear to be in any distress. Per RN Joellen pt has been doing much better to today and appeared comfortable.      O:   Palliative Performance Scale Score:     /70 (BP Location: Right arm, Patient Position: Lying)   Pulse 65   Temp 97 °F (36.1 °C) (Oral)   Resp 18   Ht 170.2 cm (67\")   Wt 63.1 kg (139 lb 1.6 oz)   SpO2 97%   BMI 21.79 kg/m²     Intake/Output Summary (Last 24 hours) at 7/13/2022 1417  Last data filed at 7/13/2022 1301  Gross per 24 hour   Intake 480 ml   Output 800 ml   Net -320 ml       PE:  Pt is comfortable and appears to be comfortable at this time.      Meds: Reviewed and changes noted.    Labs:   Results from last 7 days   Lab Units 07/08/22  0118   WBC 10*3/mm3 4.31   HEMOGLOBIN g/dL 7.3*   HEMATOCRIT % 22.8*   PLATELETS 10*3/mm3 203     Results from last 7 days   Lab Units 07/08/22  0118   SODIUM mmol/L 129*   POTASSIUM mmol/L 3.8   CHLORIDE mmol/L 93*   CO2 mmol/L 23.5   BUN mg/dL 26*   CREATININE mg/dL 2.01*   GLUCOSE mg/dL 233*   CALCIUM mg/dL 8.4*     Results from last 7 days   Lab Units 07/08/22  0118 07/07/22  0527   SODIUM mmol/L 129* 133*   POTASSIUM mmol/L 3.8 3.7   CHLORIDE mmol/L 93* 95*   CO2 mmol/L 23.5 25.4   BUN mg/dL 26* 19   CREATININE mg/dL 2.01* 1.74*   CALCIUM mg/dL 8.4* 8.4*   BILIRUBIN mg/dL  --  0.6   ALK PHOS U/L  --  123*   ALT (SGPT) U/L  --  9   AST (SGOT) U/L  --  16   GLUCOSE mg/dL 233* 183*     Imaging Results (Last 72 Hours)     ** No results found for the last 72 hours. **            Diagnostics: Reviewed    A: Sean Chen is a 82 y.o. female  admitted on 7/2/2022 with leg swelling and fatigue, she has a medical history of progressive dementia, type 2 diabetes mellitus, ESRD on HD, essential hypertension, hyperlipidemia, rheumatoid " arthritis, severe pulmonary hypertension, and  GERD. Pt sons Dylon Cline states that he gave his mother her insulin the night before her admission as directed by physician as her BS was 220. He states that his mother had been doing better with ambulating with family and therapy and that her appetite has been pretty consistent. He states that dialysis had allowed her to miss last Wednesdays dialysis session as he says her urinary output was better and she was not over loaded. Patients blood sugar upon admission was 42.  Today 7/13/2022  T 97, HR 65, RR 18 and BP of 144/57 and was on 3L NC saturating 97%    P:  Plan is for pt to have dialysis catheter removed before returning home with hospice with  and son Dylon.    We will continue to follow along. Please do not hesitate to contact us regarding further sx mgmt or GOC needs, including after hours or on weekends via our on call provider at [phone].     Elida Brown, APRN    7/13/2022

## 2022-07-13 NOTE — CASE MANAGEMENT/SOCIAL WORK
Discharge Planning Assessment   Mina     Patient Name: Sean Chen  MRN: [id number]  Today's Date: 7/13/2022    Admit Date: 7/2/2022     Discharge Plan     Row Name 07/13/22 0910       Plan    Plan SS discussed pt with Palliative Care Elida EISENBERG and surgery has been consulted for possible HD cath removal per son's request. SS to follow.    13:59: Pt is being discharged home today with [facility]. SS contacted pt's son, Dylon who states being agreeable for pt to return home today. Pt will need EMS arranged. SS will complete EMS PCS form. SS notified [facility] per Ortiz of discharge. SS will fax AVS to [facility]. RN will need to call report to [facility]. SS to follow.    14:52: SS contacted [facility] per Joe. No other needs identified.               Continued Care and Services - Admitted Since 7/2/2022     Home Medical Care     Service Provider Request Status Selected Services Address Phone Fax Patient Preferred    [facility]  Pending - No Request Sent N/A [address] [place] [address] [address] [facility] [address] [phone] --    [facility]  Pending - No Request Sent N/A [address] [address] [phone] --            Selected Continued Care - Prior Encounters Includes selections from prior encounters from 4/3/2022 to 7/13/2022    Discharged on 6/8/2022 Admission date: 5/23/2022 - Discharge disposition: Home-Health Care Svc    Durable Medical Equipment     Service Provider Selected Services Address Phone Fax Patient Preferred    [facility]  Durable Medical Equipment [id number] [place] [address] [phone] --          Home Medical Care     Service Provider Selected Services Address Phone Fax Patient Preferred    [facility]  Home Health Services ,  Home Rehabilitation ,  Home Nursing [address] [place] [address] [address] [facility] [address] [phone]  [phone] --                    Expected Discharge Date and Time     Expected Discharge Date Expected Discharge Time    Jul 13, 2022         DEVONTE Jacobs

## 2022-07-13 NOTE — PLAN OF CARE
Goal Outcome Evaluation:              Outcome Evaluation: Pt resting well this shift. Confused. PRN ativan given x1 for agitation at beginning of shift. Pt bladder scanned for urinary retention, >800 present. Straight cathed x1, pt tolerated well. VSS. 3L NC in place. Will continue to monitor.

## 2022-07-13 NOTE — CONSULTS
Consulting physician: ELGIN Young and Dr. Sharpe    Referring physician: ELGIN Layne    Date of consultation: 07/13/22     Chief complaint removal dialysis catheter    Subjective     Patient is a 82 y.o. female who is currently on comfort measures, she will be discharged home on hospice.  Family requested that dialysis catheter to right chest be removed due to patient pulling at it.      Review of Systems  Unable to obtain due to mental status.    History  Past Medical History:   Diagnosis Date   • Allergic rhinitis    • Elevated liver enzymes    • Extrinsic asthma    • Gastritis    • GERD (gastroesophageal reflux disease)    • Hyperlipidemia    • Hypertension    • Iron deficiency anemia    • Meniere's disease    • Osteoarthritis    • Type 2 diabetes mellitus (HCC)    • Vitamin D deficiency disease      Past Surgical History:   Procedure Laterality Date   • APPENDECTOMY     • CARDIAC CATHETERIZATION N/A 5/11/2022    Procedure: Left Heart Cath;  Surgeon: Marcelino Grande MD;  Location: [facility] CATH INVASIVE LOCATION;  Service: Cardiology;  Laterality: N/A;   • CHOLECYSTECTOMY     • HYSTERECTOMY     • INSERTION HEMODIALYSIS CATHETER N/A 5/9/2022    Procedure: HEMODIALYSIS CATHETER INSERTION;  Surgeon: Hans Coates MD;  Location: [facility] OR;  Service: General;  Laterality: N/A;     History reviewed. No pertinent family history.  Social History     Tobacco Use   • Smoking status: Never Smoker   • Smokeless tobacco: Never Used   Substance Use Topics   • Alcohol use: No   • Drug use: No     Medications Prior to Admission   Medication Sig Dispense Refill Last Dose   • albuterol sulfate HFA (ProAir HFA) 108 (90 Base) MCG/ACT inhaler Inhale 2 puffs 4 (Four) Times a Day. 8.5 g 5 7/1/2022 at Unknown time   • aspirin 81 MG EC tablet Take 1 tablet by mouth Daily. 30 tablet 0 7/1/2022 at Unknown time   • atorvastatin (LIPITOR) 80 MG tablet Take 1 tablet by mouth Every Night. 30 tablet 5 7/1/2022 at Unknown time   •  calcium acetate (PHOS BINDER,) 667 MG capsule capsule Take 2 capsules by mouth 3 (Three) Times a Day With Meals. 180 capsule 0 7/1/2022 at Unknown time   • carvedilol (COREG) 6.25 MG tablet Take 1 tablet by mouth 2 (Two) Times a Day With Meals. 60 tablet 0 7/1/2022 at Unknown time   • folic acid (FOLVITE) 1 MG tablet Take 1 tablet by mouth Daily. 30 tablet 0 7/1/2022 at Unknown time   • insulin glargine (LANTUS, SEMGLEE) 100 UNIT/ML injection Inject 20 Units under the skin into the appropriate area as directed 2 (Two) Times a Day As Needed (BS).   7/1/2022 at Unknown time   • NIFEdipine CC (ADALAT CC) 30 MG 24 hr tablet Take 2 tablets by mouth Daily. 60 tablet 0 7/1/2022 at Unknown time     Allergies:  Keflex [cephalexin], Lodine [etodolac], Penicillins, and Sulfa antibiotics    Objective     Vital Signs  Temp:  [96.9 °F (36.1 °C)-97 °F (36.1 °C)] 97 °F (36.1 °C)  Heart Rate:  [56-65] 65  Resp:  [18] 18  BP: (144-172)/(57-70) 172/70    Physical Exam:  General:  This is a WD WN female in no acute distress  Vital signs: Stable, afebrile  HEENT exam:  WNL. Sclerae are anicteric.  EOMI  Neck:  Supple, FROM.  No JVD.  Trachea midline  Lungs:  Respiratory effort normal.   Chest: Dialysis catheter noted to right chest  Abdomen: Distended  Musculoskeletal:  Muscle strength/tone is normal.    Psych:  Alert, oriented x 3.  Mood and affect are appropriate  Skin:  Warm with good turgor.  Without rash or lesion  Extremities:  Examination of the extremities revealed no cyanosis, clubbing or edema.    Results Review:       Results from last 7 days   Lab Units 07/08/22  0118 07/07/22  0527   WBC 10*3/mm3 4.31 9.02   HEMOGLOBIN g/dL 7.3* 8.1*   HEMATOCRIT % 22.8* 24.7*   PLATELETS 10*3/mm3 203 220     Results from last 7 days   Lab Units 07/07/22  1510   PH, ARTERIAL pH units 7.434   PO2 ART mm Hg 57.5*   PCO2, ARTERIAL mm Hg 44.8   HCO3 ART mmol/L 29.9*     Results from last 7 days   Lab Units 07/08/22  0118 07/07/22  0527   SODIUM  mmol/L 129* 133*   POTASSIUM mmol/L 3.8 3.7   MAGNESIUM mg/dL  --  1.6   CHLORIDE mmol/L 93* 95*   CO2 mmol/L 23.5 25.4   BUN mg/dL 26* 19   CREATININE mg/dL 2.01* 1.74*   CALCIUM mg/dL 8.4* 8.4*   GLUCOSE mg/dL 233* 183*   ALBUMIN g/dL  --  3.44*   BILIRUBIN mg/dL  --  0.6   ALK PHOS U/L  --  123*   AST (SGOT) U/L  --  16   ALT (SGPT) U/L  --  9   Estimated Creatinine Clearance: 21.9 mL/min (A) (by C-G formula based on SCr of 2.01 mg/dL (H)).  No results found for: AMMONIA      No results found for: BLOODCX  No results found for: URINECX  No results found for: WOUNDCX  No results found for: STOOLCX        Impression: 82-year-old female who is on comfort measures, removal of dialysis catheter today  Patient Active Problem List   Diagnosis Code   • Type 2 diabetes mellitus without complication, without long-term current use of insulin ([facility]) E11.9   • Vitamin D deficiency disease E55.9   • Extrinsic asthma J45.909   • Essential hypertension I10   • Mixed hyperlipidemia E78.2   • Generalized osteoarthritis M15.9   • Gastroesophageal reflux disease without esophagitis K21.9   • Meniere's disease H81.09   • Chronic seasonal allergic rhinitis J30.2   • Low serum vitamin B12 E53.8   • Dementia without behavioral disturbance ([facility]) F03.90   • NSTEMI (non-ST elevated myocardial infarction) ([facility]) I21.4   • Prolonged Q-T interval on ECG R94.31   • Chronic renal failure, stage 4 (severe) ([facility]) N18.4   • Encounter for immunization Z23       Plan:  Removal of dialysis catheter to right chest    Discussion:  Discussed with Dr. Sharpe.    Dom Edwards, ELGIN  07/13/22  13:16 EDT    Time: Time spent:    Please note that portions of this note were completed with a voice recognition program.

## 2022-07-13 NOTE — DISCHARGE SUMMARY
Discharge Summary:    Date of Admission: 7/2/2022  Date of Discharge:  7/13/2022    PCP: Ganga Gallardo MD     Patient will be discharged home with hospice    DISCHARGE DIAGNOSIS  -Abnormal urinalysis/possible urinary tract infection with CRE  -Acute metabolic encephalopathy superimposed on baseline dementia  -Acute hypoxemic respiratory failure  -Acute on chronic combined systolic and diastolic CHF  -Chronically elevated troponin T level  -Diabetes mellitus type 2, insulin-dependent with hyperglycemia upon admission  -ESRD on hemodialysis  -Generalized weakness    SECONDARY DIAGNOSES  Past Medical History:   Diagnosis Date   • Allergic rhinitis    • Elevated liver enzymes    • Extrinsic asthma    • Gastritis    • GERD (gastroesophageal reflux disease)    • Hyperlipidemia    • Hypertension    • Iron deficiency anemia    • Meniere's disease    • Osteoarthritis    • Type 2 diabetes mellitus (HCC)    • Vitamin D deficiency disease        CONSULTS   Dr. Jesus - Nephrology  Dr. Merino - Infectious Disease  Elida Brown, ELGIN - Palliative Care ELGIN Edwards, Surgery APRN    PROCEDURES PERFORMED  Dialysis catheter removal    HOSPITAL COURSE  Patient is a 82 y.o. female presented to [facility] complaining of altered mental status and hypoglycemia.  Please see the admitting history and physical for further details.      The patient was admitted to the hospital medicine service.  Patient was noted to have hypoglycemia in the setting of decreased oral intake and insulin therapy at home.    The patient was also noted to have a mild acute on chronic decompensated combined CHF exacerbation.  The patient did receive diuretic therapy and was seen by nephrology for dialysis during her hospitalization.    The patient's mental status did improve during her hospitalization, however, the patient was noted to have worsening respiratory failure. The patient was escalated to heated high flow nasal cannula with  "concern for the development of aspiration pneumonia.  The patient appeared more uncomfortable and restless.  Palliative care was consulted after goals of care discussion with family who ultimately decided to transition to comfort measures only.    The patient's altered mental status waxed and waned.  Her oral intake remained poor.  The patient's supplemental oxygen requirement did improve during her hospital stay.  Palliative care assisted with symptom management in terms of agitation, pain and anxiety.    The patient's family decided to have the patient's dialysis catheter removed as they no longer wished for the patient to proceed with hemodialysis.    It was felt that the patient had maximized the benefit of her inpatient hospitalization.  The patient was discharged home with hospice in a stable condition.    CONDITION ON DISCHARGE  Stable.    VITAL SIGNS  /70 (BP Location: Right arm, Patient Position: Lying)   Pulse 65   Temp 97 °F (36.1 °C) (Oral)   Resp 18   Ht 170.2 cm (67\")   Wt 63.1 kg (139 lb 1.6 oz)   SpO2 97%   BMI 21.79 kg/m²   Objective  Constitutional:       General: She is not in acute distress.     Appearance: She is well-developed.   On nasal cannula.  HENT:      Head: Normocephalic and atraumatic.   Neck:      Trachea: No tracheal deviation.   Cardiovascular:      Rate and Rhythm: Normal rate and regular rhythm.      Heart sounds: Murmur heard.     No friction rub. No gallop.   Pulmonary:      Effort: No respiratory distress.      Breath sounds: Examination of the right-lower field reveals decreased breath sounds. Examination of the left-lower field reveals decreased breath sounds. Decreased breath sounds present. No wheezing or rales.   Chest:      Comments: Right HD temporary catheter in place.  Abdominal:      General: Bowel sounds are decreased. There is no distension.      Palpations: Abdomen is soft.      Tenderness: There is no abdominal tenderness. There is no guarding. "   Skin:     General: Skin is warm and dry.      Findings: No erythema or rash.   Neurological:      Comments: Arousable; confused.     DISCHARGE DISPOSITION   Hospice/Home    DISCHARGE MEDICATIONS     Discharge Medications      New Medications      Instructions Start Date   HYDROmorphone 2 MG tablet  Commonly known as: DILAUDID   2 mg, Oral, Every 4 Hours PRN      LORazepam 0.5 MG tablet  Commonly known as: ATIVAN   0.25 mg, Oral, Every 6 Hours PRN      ondansetron ODT 4 MG disintegrating tablet  Commonly known as: Zofran ODT   4 mg, Translingual, Every 8 Hours PRN         Continue These Medications      Instructions Start Date   albuterol sulfate  (90 Base) MCG/ACT inhaler  Commonly known as: ProAir HFA   2 puffs, Inhalation, 4 Times Daily      aspirin 81 MG EC tablet   81 mg, Oral, Daily      atorvastatin 80 MG tablet  Commonly known as: LIPITOR   80 mg, Oral, Nightly      carvedilol 6.25 MG tablet  Commonly known as: COREG   6.25 mg, Oral, 2 Times Daily With Meals      folic acid 1 MG tablet  Commonly known as: FOLVITE   1 mg, Oral, Daily      NIFEdipine CC 30 MG 24 hr tablet  Commonly known as: ADALAT CC   60 mg, Oral, Every 24 Hours Scheduled         Stop These Medications    calcium acetate 667 MG capsule capsule  Commonly known as: PHOS BINDER)     insulin glargine 100 UNIT/ML injection  Commonly known as: LANTUS, SEMGLEE            DISCHARGE DIET  regular diet    ACTIVITY AT DISCHARGE   activity as tolerated    Future Appointments   Date Time Provider Department Center   9/1/2022  1:15 PM Ganga Gallardo MD [facility]         TEST  RESULTS PENDING AT DISCHARGE      Olive Paris DO  07/13/22  13:09 EDT      Time: greater than 30 minutes.

## 2022-07-13 NOTE — PROCEDURES
PROCEDURE NOTE    Patient Name:  Sean Chen  YOB: 1940  [id number]    7/13/2022        PREOPERATIVE DIAGNOSIS: End-stage renal disease      POSTOPERATIVE DIAGNOSIS: Same       PROCEDURE PERFORMED: Dialysis catheter removal       Provider: Raul EISENBERG       SPECIMENS: Intact tunneled dialysis catheter       FINDINGS:   1.  Tunneled dialysis catheter noted to right chest.  Cuff of dialysis catheter noted to be visible from exit of chest wall.       INDICATIONS:    The patient is a 82 y.o. female who is on comfort measures and will be sent home with hospice.  She will no longer be on dialysis.     DESCRIPTION OF PROCEDURE:      Tunneled dialysis catheter because of was visible at exit site from right chest.  Forceps were used to remove scar tissue from cath.  Tunneled dialysis catheter was then removed from right chest.  Pressure held on patient's right internal jugular vein for approximately 30 seconds.  Patient tolerated this procedure well.  There were no complications noted.  There is no blood loss noted.  A 4 x 4 was used to dress area.    ELGIN Polo  7/13/2022  13:20 EDT

## 2022-08-08 ENCOUNTER — TELEPHONE (OUTPATIENT)
Dept: FAMILY MEDICINE CLINIC | Facility: CLINIC | Age: 82
End: 2022-08-08

## 2022-08-08 NOTE — TELEPHONE ENCOUNTER
SON OF PATIENT HAS CALLED AND STATED PATIENT WAS ON PREVIOUS MEDICATION TO HELP WITH HER DEMENTIA. SON WAS TOLD IT WAS A DEPRESSION MEDICATION. SON IS REQUESTING A CALL BACK ASAP WITH NAME OF THE MEDICATION SO THAT HE CAN LET HOSPICE KNOW IN ORDER TO CONSIDER PRESCRIBING MEDICATION BACK TO  PATIENT.    CALL BACK NUMBER [phone]

## 2022-11-26 NOTE — PROGRESS NOTES
Subjective   Sean Chen is a 81 y.o. female.     Chief Complaint  She returns for a scheduled reassessment of multiple medical problems including type 2 diabetes mellitus, hyperlipidemia, essential hypertension, renal insufficiency, and memory impairment    History of Present Illness     Renal Insufficiency  She was referred to both nephrology and cardiology following her last visit.  While records are unavailable she was apparently admitted to [facility] afterward.  She does not know what medication she is on at present nor does her .  She does not appear to have follow-up appointments with nephrology and cardiology.  She admits to generalized weakness and continues to have intermittent lightheadedness. She denies any further falls. There is no history of any chest pain, diaphoresis or nausea and she continues to deny any change in her shortness of breath or lower extremity edema.  There is no history of any new headaches and she denies any unilateral weakness or numbness    Diabetes  Current symptoms include: fatigue.  She continues to deny any paresthesias of the feet, visual disturbances, polydipsia, polyuria, or foot ulcerations.     Dyslipidemia  She has been prescribed atorvastatin    Hypertension  Home blood pressure readings: not doing. Associated signs and symptoms: dyspnea and peripheral edema.  She continues to deny any chest pain, palpitations, orthopnea or paroxysmal nocturnal dyspnea.  Was prescribed losartan, amlodipine, and hydralazine.    Lab Results   Component Value Date    GLUCOSE 164 (H) 01/27/2022    BUN 36 (H) 01/27/2022    CREATININE 3.30 (H) 01/27/2022    EGFRIFNONA 13 (L) 01/27/2022    EGFRIFAFRI 15 (L) 01/27/2022    BCR 11 (L) 01/27/2022    K 3.3 (L) 01/27/2022    CO2 15 (L) 01/27/2022    CALCIUM 8.3 (L) 01/27/2022    PROTENTOTREF 6.0 01/27/2022    ALBUMIN 3.7 01/27/2022    LABIL2 1.6 01/27/2022    AST 13 01/27/2022    ALT 6 01/27/2022     Lab Results   Component Value  Date    ALKPHOS 106 01/27/2022    ALKPHOS 91 08/24/2020     Asthma  Her symptoms have included dyspnea and non-productive cough.  She continues to deny any chest pain or hemoptysis. Suspected precipitants include upper respiratory infection and allergens.   Lab Results   Component Value Date    WBC 10.5 01/27/2022    HGB 10.0 (L) 01/27/2022    HCT 30.7 (L) 01/27/2022    MCV 88 01/27/2022     01/27/2022     The following portions of the patient's history were reviewed and updated as appropriate: allergies, current medications, past medical history, past social history and problem list.    Review of Systems   Constitutional: Positive for fatigue. Negative for appetite change, chills, fever and unexpected weight change.   HENT: Positive for congestion, hearing loss, sneezing and tinnitus. Negative for ear pain, postnasal drip, rhinorrhea and sore throat.    Eyes: Negative for visual disturbance.   Respiratory: Positive for cough and shortness of breath (with exertion). Negative for wheezing.    Cardiovascular: Positive for leg swelling. Negative for chest pain and palpitations.   Gastrointestinal: Negative for abdominal pain, blood in stool, constipation, diarrhea, nausea and vomiting.   Endocrine: Negative for polydipsia and polyuria.   Genitourinary: Negative for dysuria, frequency, hematuria and urgency.   Musculoskeletal: Positive for arthralgias and back pain. Negative for myalgias.   Skin: Negative for rash.   Neurological: Positive for light-headedness. Negative for weakness and numbness.   Psychiatric/Behavioral: Positive for confusion (memory impairment). Negative for dysphoric mood and sleep disturbance. The patient is not nervous/anxious.      Objective   Physical Exam  Constitutional:       General: She is not in acute distress.     Appearance: Normal appearance. She is well-developed. She is not diaphoretic.      Comments: Accompanied by her .  Bright and in fair spirits. Sitting in a  wheelchair.  No apparent distress.  Slightly pale. No jaundice, diaphoresis, or cyanosis.   HENT:      Head: Atraumatic.      Right Ear: Tympanic membrane, ear canal and external ear normal.      Left Ear: Tympanic membrane, ear canal and external ear normal.   Eyes:      Conjunctiva/sclera: Conjunctivae normal.   Neck:      Thyroid: No thyroid mass or thyromegaly.      Vascular: No carotid bruit or JVD.      Trachea: Trachea normal. No tracheal deviation.   Cardiovascular:      Rate and Rhythm: Regular rhythm. Bradycardia present.      Heart sounds: Normal heart sounds, S1 normal and S2 normal. No murmur heard.    No gallop.   Pulmonary:      Effort: Pulmonary effort is normal.      Breath sounds: Wheezing (diffuse - mild -primarily on forced vital capacity) present. No rales.   Chest:   Breasts:      Right: No supraclavicular adenopathy.      Left: No supraclavicular adenopathy.       Abdominal:      General: Bowel sounds are normal. There is no distension or abdominal bruit.      Palpations: Abdomen is soft. There is no hepatomegaly, splenomegaly or mass.      Tenderness: There is no abdominal tenderness.      Hernia: No hernia is present.   Musculoskeletal:      Right lower leg: No edema.      Left lower leg: No edema.   Lymphadenopathy:      Head:      Right side of head: No submental, submandibular, tonsillar, preauricular, posterior auricular or occipital adenopathy.      Left side of head: No submental, submandibular, tonsillar, preauricular, posterior auricular or occipital adenopathy.      Cervical: No cervical adenopathy.      Upper Body:      Right upper body: No supraclavicular adenopathy.      Left upper body: No supraclavicular adenopathy.   Skin:     General: Skin is warm.      Coloration: Skin is not cyanotic, jaundiced or pale.      Findings: No rash.      Nails: There is no clubbing.   Neurological:      Mental Status: She is alert and oriented to person, place, and time.      Cranial Nerves: No  cranial nerve deficit.      Motor: No tremor.      Coordination: Coordination normal.   Psychiatric:         Attention and Perception: Attention normal.         Mood and Affect: Mood normal.         Speech: Speech normal.         Behavior: Behavior normal.         Thought Content: Thought content normal.         Cognition and Memory: She exhibits impaired recent memory and impaired remote memory.       Assessment/Plan   Problems Addressed this Visit        Allergies and Adverse Reactions    Chronic seasonal allergic rhinitis       Cardiac and Vasculature    Bradycardia  Impoved  Will continue to monitor    Essential hypertension   Hypertension: elevated today. Evidence of target organ damage: chronic kidney disease.  Encouraged to continue to work on diet and what exercise she can tolerate safely  Will obtain records from [facility], cardiology, and nephrology, as well as a medication list from her pharmacy  Update labs drawn    Relevant Orders    CBC & Differential    Comprehensive Metabolic Panel    TSH    Magnesium    Mixed hyperlipidemia  As above.    Relevant Orders    Lipid Panel    TSH       ENT    Meniere's disease       Endocrine and Metabolic    Low serum vitamin B12    Relevant Orders    CBC & Differential    Vitamin B12    Type 2 diabetes mellitus without complication, with long-term current use of insulin (HCC)  Diabetes mellitus Type II, under unknown control.   As above    Relevant Orders    Hemoglobin A1c    Vitamin D deficiency disease    Relevant Orders    Vitamin D 25 Hydroxy       Gastrointestinal Abdominal     Elevated liver enzymes    Gastroesophageal reflux disease without esophagitis       Genitourinary and Reproductive     Renal insufficiency  Reminded to avoid any NSAIDs prescription or OTC    Relevant Orders    Comprehensive Metabolic Panel    Magnesium       Health Encounters    Healthcare maintenance  Recommended a third dose of the Pfizer COVID-19 vaccine.       Musculoskeletal  and Injuries    Generalized osteoarthritis       Neuro    Memory impairment       Pulmonary and Pneumonias    Extrinsic asthma  Moderate persistent asthma. The patient is not currently having an exacerbation. In general, the patient's disease is well controlled.  Encouraged to report if this should change.  Continue current medication      Diagnoses       Codes Comments    Chronic seasonal allergic rhinitis    -  Primary ICD-10-CM: J30.2  ICD-9-CM: 477.8     Essential hypertension     ICD-10-CM: I10  ICD-9-CM: 401.9     Mixed hyperlipidemia     ICD-10-CM: E78.2  ICD-9-CM: 272.2     Meniere's disease, unspecified laterality     ICD-10-CM: H81.09  ICD-9-CM: 386.00     Low serum vitamin B12     ICD-10-CM: E53.8  ICD-9-CM: 266.2     Type 2 diabetes mellitus without complication, with long-term current use of insulin (HCC)     ICD-10-CM: E11.9, Z79.4  ICD-9-CM: 250.00, V58.67     Vitamin D deficiency disease     ICD-10-CM: E55.9  ICD-9-CM: 268.9     Elevated liver enzymes     ICD-10-CM: R74.8  ICD-9-CM: 790.5     Gastroesophageal reflux disease without esophagitis     ICD-10-CM: K21.9  ICD-9-CM: 530.81     Renal insufficiency     ICD-10-CM: N28.9  ICD-9-CM: 593.9     Healthcare maintenance     ICD-10-CM: Z00.00  ICD-9-CM: V70.0     Generalized osteoarthritis     ICD-10-CM: M15.9  ICD-9-CM: 715.00     Memory impairment     ICD-10-CM: R41.3  ICD-9-CM: 780.93     Moderate persistent extrinsic asthma without complication     ICD-10-CM: J45.40  ICD-9-CM: 493.00     Bradycardia     ICD-10-CM: R00.1  ICD-9-CM: 427.89                   Resident

## (undated) DEVICE — RADIFOCUS OPTITORQUE ANGIOGRAPHIC CATHETER: Brand: OPTITORQUE

## (undated) DEVICE — KT CATH TAL PALINDROME SAPPHIRE 14.5F19CM

## (undated) DEVICE — ASMBL SPK CONTRST CONTRL

## (undated) DEVICE — SHEATH INTRO SUPERSHEATH JWIRE .035 6F 11CM

## (undated) DEVICE — PK CATH CARD 70

## (undated) DEVICE — DRAPE,T,LAPARO,TRANS,STERILE: Brand: MEDLINE

## (undated) DEVICE — GLV SURG SENSICARE MICRO PF LF 7.5 STRL

## (undated) DEVICE — LN FLTR ORL/NASL MICROSTREAM NONINTUB A/LNG

## (undated) DEVICE — ELECTRD NDL MEGADYNE EZCLEAN NOSE 7CM

## (undated) DEVICE — CATH F5 INF JR 4 100CM: Brand: INFINITI

## (undated) DEVICE — CATH F5 INF JL 4 100CM: Brand: INFINITI

## (undated) DEVICE — SUT NLY 2/0 664G

## (undated) DEVICE — KT MINI ACC MAK COAXL 5F 10CM

## (undated) DEVICE — ST EXT IV SMARTSITE 2VLV SP M LL 5ML IV1

## (undated) DEVICE — TRY DRSNG CENTRL LN UC44 FCP

## (undated) DEVICE — ADULT DISPOSABLE SINGLE-PATIENT USE PULSE OXIMETER SENSOR: Brand: NONIN

## (undated) DEVICE — DRAPE, RADIAL, STERILE: Brand: MEDLINE

## (undated) DEVICE — RUNWAY RADL W/TOP PAD

## (undated) DEVICE — TBG PENCL TELESCP MEGADYNE SMOKE EVAC 10FT

## (undated) DEVICE — GW INQW FIX/CORE PTFE J/3MM .035 260CM

## (undated) DEVICE — HOLDER: Brand: DEROYAL

## (undated) DEVICE — BIOPATCH™ ANTIMICROBIAL DRESSING WITH CHLORHEXIDINE GLUCONATE IS A HYDROPHILLIC POLYURETHANE ABSORPTIVE FOAM WITH CHLORHEXIDINE GLUCONATE (CHG) WHICH INHIBITS BACTERIAL GROWTH UNDER THE DRESSING. THE DRESSING IS INTENDED TO BE USED TO ABSORB EXUDATE, COVER A WOUND CAUSED BY VASCULAR AND NONVASCULAR PERCUTANEOUS MEDICAL DEVICES DURING SURGERY, AS WELL AS REDUCE LOCAL INFECTION AND COLONIZATION OF MICROORGANISMS.: Brand: BIOPATCH

## (undated) DEVICE — SYR LUERLOK 50ML

## (undated) DEVICE — ANTIBACTERIAL UNDYED BRAIDED (POLYGLACTIN 910), SYNTHETIC ABSORBABLE SUTURE: Brand: COATED VICRYL

## (undated) DEVICE — MYNXGRIP 6F/7F: Brand: MYNXGRIP

## (undated) DEVICE — SYR LUERLOK 5CC

## (undated) DEVICE — DRSNG SURESITE WNDW 4X4.5

## (undated) DEVICE — GLIDESHEATH SLENDER STAINLESS STEEL KIT: Brand: GLIDESHEATH SLENDER

## (undated) DEVICE — APPL CHLORAPREP HI/LITE 26ML ORNG

## (undated) DEVICE — DRP C/ARM W/BAND W/CLIPS 41X74IN

## (undated) DEVICE — ST INF PRI SMRTSTE 20DRP 2VLV 24ML 117

## (undated) DEVICE — SYR LUERLOK 30CC

## (undated) DEVICE — PK BASIC 70

## (undated) DEVICE — 3M™ IOBAN™ 2 ANTIMICROBIAL INCISE DRAPE 6650EZ: Brand: IOBAN™ 2